# Patient Record
Sex: MALE | Race: WHITE | NOT HISPANIC OR LATINO | Employment: OTHER | URBAN - METROPOLITAN AREA
[De-identification: names, ages, dates, MRNs, and addresses within clinical notes are randomized per-mention and may not be internally consistent; named-entity substitution may affect disease eponyms.]

---

## 2017-02-08 ENCOUNTER — TELEPHONE (OUTPATIENT)
Dept: FAMILY MEDICINE | Facility: CLINIC | Age: 62
End: 2017-02-08

## 2017-02-08 DIAGNOSIS — E11.9 TYPE 2 DIABETES MELLITUS WITHOUT COMPLICATION (H): Primary | ICD-10-CM

## 2017-02-10 NOTE — TELEPHONE ENCOUNTER
"Pt now notified of the below message   And was offered apt's   And stated \" i have bigger problems right now i will call back later\"    Veena Jorge  [facility]     "

## 2017-05-19 ENCOUNTER — MYC MEDICAL ADVICE (OUTPATIENT)
Dept: FAMILY MEDICINE | Facility: CLINIC | Age: 62
End: 2017-05-19

## 2017-05-19 DIAGNOSIS — E78.5 HYPERLIPIDEMIA LDL GOAL <100: ICD-10-CM

## 2017-05-19 DIAGNOSIS — E11.9 TYPE 2 DIABETES MELLITUS WITHOUT COMPLICATION, WITHOUT LONG-TERM CURRENT USE OF INSULIN (H): Primary | ICD-10-CM

## 2017-05-19 DIAGNOSIS — I25.9 CHRONIC ISCHEMIC HEART DISEASE, UNSPECIFIED: ICD-10-CM

## 2017-05-19 DIAGNOSIS — I10 ESSENTIAL HYPERTENSION WITH GOAL BLOOD PRESSURE LESS THAN 140/90: ICD-10-CM

## 2017-05-19 NOTE — TELEPHONE ENCOUNTER
Lisinopril    Last Written Prescription Date: 08/05/16  Last Fill Quantity: 90, # refills: 3  Last Office Visit with [facility] or  Health prescribing provider: 08/05/16       Potassium   Date Value Ref Range Status   07/30/2016 5.0 3.4 - 5.3 mmol/L Final     Creatinine   Date Value Ref Range Status   07/30/2016 0.81 0.66 - 1.25 mg/dL Final     BP Readings from Last 3 Encounters:   08/05/16 115/65   01/29/16 114/64   07/24/15 136/82     ATorvastatin         Last Written Prescription Date: 08/05/16  Last Fill Quantity: 90, # refills: 3    Last Office Visit with [facility] or  Health prescribing provider:  08/05/16   Future Office Visit:      BP Readings from Last 3 Encounters:   08/05/16 115/65   01/29/16 114/64   07/24/15 136/82     Lab Results   Component Value Date    ALT 31 08/14/2013     Lab Results   Component Value Date    CHOL 152 01/29/2016     Lab Results   Component Value Date    HDL 47 01/29/2016     Lab Results   Component Value Date    LDL 84 01/29/2016     Lab Results   Component Value Date    TRIG 105 01/29/2016     Lab Results   Component Value Date    CHOLHDLRATIO 3.0 07/24/2015     Metoprolol     Last Written Prescription Date: 08/05/16  Last Fill Quantity: 90,  # refills: 3   Last Office Visit with [facility] or  Health prescribing provider: 08/05/16    Metformin         Last Written Prescription Date: 08/05/16  Last Fill Quantity: 90, # refills: 3  Last Office Visit with [facility] or  Health prescribing provider:  08/05/16        BP Readings from Last 3 Encounters:   08/05/16 115/65   01/29/16 114/64   07/24/15 136/82     Lab Results   Component Value Date    MICROL 6 07/24/2015     Lab Results   Component Value Date    UMALCR 7.50 07/24/2015     Creatinine   Date Value Ref Range Status   07/30/2016 0.81 0.66 - 1.25 mg/dL Final   ]  GFR Estimate   Date Value Ref Range Status   07/30/2016 >90  Non  GFR Calc   >60 mL/min/1.7m2 Final   01/29/2016 >90  Non  GFR Calc   >60  mL/min/1.7m2 Final   07/24/2015 >90  Non  GFR Calc   >60 mL/min/1.7m2 Final     GFR Estimate If Black   Date Value Ref Range Status   07/30/2016 >90   GFR Calc   >60 mL/min/1.7m2 Final   01/29/2016 >90   GFR Calc   >60 mL/min/1.7m2 Final   07/24/2015 >90   GFR Calc   >60 mL/min/1.7m2 Final     Lab Results   Component Value Date    CHOL 152 01/29/2016     Lab Results   Component Value Date    HDL 47 01/29/2016     Lab Results   Component Value Date    LDL 84 01/29/2016     Lab Results   Component Value Date    TRIG 105 01/29/2016     Lab Results   Component Value Date    CHOLHDLRATIO 3.0 07/24/2015     Lab Results   Component Value Date    AST 24 10/27/2012     Lab Results   Component Value Date    ALT 31 08/14/2013     Lab Results   Component Value Date    A1C 6.0 07/30/2016    A1C 6.3 01/29/2016    A1C 6.1 07/24/2015    A1C 6.2 07/26/2014    A1C 6.3 04/14/2014     Potassium   Date Value Ref Range Status   07/30/2016 5.0 3.4 - 5.3 mmol/L Final

## 2017-05-24 NOTE — TELEPHONE ENCOUNTER
Routing refill request to provider for review/approval because:  Labs not current:  lipids  Patient needs to be seen because it has been more than 6 months since last office visit for metformin      Thank you  Arlin ZEPEDA RN

## 2017-05-25 RX ORDER — LISINOPRIL 10 MG/1
TABLET ORAL
Qty: 90 TABLET | Refills: 0 | Status: SHIPPED | OUTPATIENT
Start: 2017-05-25 | End: 2017-08-04

## 2017-05-25 RX ORDER — METOPROLOL SUCCINATE 50 MG/1
TABLET, EXTENDED RELEASE ORAL
Qty: 90 TABLET | Refills: 0 | Status: SHIPPED | OUTPATIENT
Start: 2017-05-25 | End: 2017-08-04

## 2017-05-25 RX ORDER — ATORVASTATIN CALCIUM 80 MG/1
TABLET, FILM COATED ORAL
Qty: 90 TABLET | Refills: 0 | Status: SHIPPED | OUTPATIENT
Start: 2017-05-25 | End: 2017-08-04

## 2017-05-25 NOTE — TELEPHONE ENCOUNTER
Refilled for 90 days.  He will need an appointment for further refills.  Maria De Jesus Hay, CNP

## 2017-07-12 ENCOUNTER — TELEPHONE (OUTPATIENT)
Dept: FAMILY MEDICINE | Facility: CLINIC | Age: 62
End: 2017-07-12

## 2017-07-12 DIAGNOSIS — I10 HYPERTENSION GOAL BP (BLOOD PRESSURE) < 140/90: Primary | ICD-10-CM

## 2017-07-12 DIAGNOSIS — E11.9 TYPE 2 DIABETES MELLITUS WITHOUT COMPLICATION (H): ICD-10-CM

## 2017-07-12 NOTE — TELEPHONE ENCOUNTER
Due for diabetes office visit.  Labs: A1c, lipids, BMP, TSH, microalbumin.  Maria De Jesus Hay, CNP

## 2017-07-26 NOTE — TELEPHONE ENCOUNTER
Panel Management Review      Patient has the following on his problem list:     Diabetes  Please see documentation below from Maria De Jesus Hay  Patient notified and scheduled for appointment.    ASA: Passed    Last A1C  Lab Results   Component Value Date    A1C 6.0 07/30/2016    A1C 6.3 01/29/2016    A1C 6.1 07/24/2015    A1C 6.2 07/26/2014    A1C 6.3 04/14/2014     A1C tested: Passed    Last LDL:    Lab Results   Component Value Date    CHOL 152 01/29/2016     Lab Results   Component Value Date    HDL 47 01/29/2016     Lab Results   Component Value Date    LDL 84 01/29/2016     Lab Results   Component Value Date    TRIG 105 01/29/2016     Lab Results   Component Value Date    CHOLHDLRATIO 3.0 07/24/2015     Lab Results   Component Value Date    NHDL 105 01/29/2016       Is the patient on a Statin? YES             Is the patient on Aspirin? YES    Medications     HMG CoA Reductase Inhibitors    atorvastatin (LIPITOR) 80 MG tablet    Salicylates    aspirin 81 MG EC tablet          Last three blood pressure readings:  BP Readings from Last 3 Encounters:   08/05/16 115/65   01/29/16 114/64   07/24/15 136/82       Date of last diabetes office visit:      Tobacco History:     History   Smoking Status     Current Every Day Smoker     Packs/day: 1.00     Years: 40.00     Types: Cigarettes     Start date: 1/1/1972   Smokeless Tobacco     Never Used             Composite cancer screening  Chart review shows that this patient is due/due soon for the following None  Summary:    Patient is due/failing the following:   Diabetes office visit  Labs as ordered    Action needed:   Patient needs office visit .    Type of outreach:    Sent [employer] message.    Questions for provider review:    None                                                                                                                                    SHERON VERA        .

## 2017-07-29 DIAGNOSIS — E11.9 TYPE 2 DIABETES MELLITUS WITHOUT COMPLICATION (H): ICD-10-CM

## 2017-07-29 DIAGNOSIS — I10 HYPERTENSION GOAL BP (BLOOD PRESSURE) < 140/90: ICD-10-CM

## 2017-07-29 LAB
ANION GAP SERPL CALCULATED.3IONS-SCNC: 3 MMOL/L (ref 3–14)
BUN SERPL-MCNC: 17 MG/DL (ref 7–30)
CALCIUM SERPL-MCNC: 8.7 MG/DL (ref 8.5–10.1)
CHLORIDE SERPL-SCNC: 104 MMOL/L (ref 94–109)
CHOLEST SERPL-MCNC: 133 MG/DL
CO2 SERPL-SCNC: 31 MMOL/L (ref 20–32)
CREAT SERPL-MCNC: 0.88 MG/DL (ref 0.66–1.25)
CREAT UR-MCNC: 93 MG/DL
GFR SERPL CREATININE-BSD FRML MDRD: 88 ML/MIN/1.7M2
GLUCOSE SERPL-MCNC: 92 MG/DL (ref 70–99)
HBA1C MFR BLD: 6 % (ref 4.3–6)
HDLC SERPL-MCNC: 50 MG/DL
LDLC SERPL CALC-MCNC: 70 MG/DL
MICROALBUMIN UR-MCNC: 35 MG/L
MICROALBUMIN/CREAT UR: 37.74 MG/G CR (ref 0–17)
NONHDLC SERPL-MCNC: 83 MG/DL
POTASSIUM SERPL-SCNC: 4.5 MMOL/L (ref 3.4–5.3)
SODIUM SERPL-SCNC: 138 MMOL/L (ref 133–144)
TRIGL SERPL-MCNC: 64 MG/DL
TSH SERPL DL<=0.005 MIU/L-ACNC: 0.42 MU/L (ref 0.4–4)

## 2017-07-29 PROCEDURE — 83036 HEMOGLOBIN GLYCOSYLATED A1C: CPT | Performed by: NURSE PRACTITIONER

## 2017-07-29 PROCEDURE — 82043 UR ALBUMIN QUANTITATIVE: CPT | Performed by: NURSE PRACTITIONER

## 2017-07-29 PROCEDURE — 80061 LIPID PANEL: CPT | Performed by: NURSE PRACTITIONER

## 2017-07-29 PROCEDURE — 84443 ASSAY THYROID STIM HORMONE: CPT | Performed by: NURSE PRACTITIONER

## 2017-07-29 PROCEDURE — 36415 COLL VENOUS BLD VENIPUNCTURE: CPT | Performed by: NURSE PRACTITIONER

## 2017-07-29 PROCEDURE — 80048 BASIC METABOLIC PNL TOTAL CA: CPT | Performed by: NURSE PRACTITIONER

## 2017-08-04 ENCOUNTER — OFFICE VISIT (OUTPATIENT)
Dept: FAMILY MEDICINE | Facility: CLINIC | Age: 62
End: 2017-08-04
Payer: COMMERCIAL

## 2017-08-04 VITALS
SYSTOLIC BLOOD PRESSURE: 131 MMHG | BODY MASS INDEX: 38.25 KG/M2 | HEART RATE: 60 BPM | WEIGHT: 238 LBS | DIASTOLIC BLOOD PRESSURE: 78 MMHG | HEIGHT: 66 IN | TEMPERATURE: 98.4 F

## 2017-08-04 DIAGNOSIS — I73.9 PAD (PERIPHERAL ARTERY DISEASE) (H): ICD-10-CM

## 2017-08-04 DIAGNOSIS — E66.01 MORBID OBESITY DUE TO EXCESS CALORIES (H): ICD-10-CM

## 2017-08-04 DIAGNOSIS — E78.5 HYPERLIPIDEMIA LDL GOAL <100: ICD-10-CM

## 2017-08-04 DIAGNOSIS — I10 ESSENTIAL HYPERTENSION WITH GOAL BLOOD PRESSURE LESS THAN 140/90: ICD-10-CM

## 2017-08-04 DIAGNOSIS — E11.9 TYPE 2 DIABETES MELLITUS WITHOUT COMPLICATION, WITHOUT LONG-TERM CURRENT USE OF INSULIN (H): Primary | ICD-10-CM

## 2017-08-04 DIAGNOSIS — I25.10 CORONARY ARTERY DISEASE INVOLVING NATIVE CORONARY ARTERY OF NATIVE HEART WITHOUT ANGINA PECTORIS: ICD-10-CM

## 2017-08-04 PROCEDURE — 99214 OFFICE O/P EST MOD 30 MIN: CPT | Performed by: NURSE PRACTITIONER

## 2017-08-04 RX ORDER — METOPROLOL SUCCINATE 50 MG/1
TABLET, EXTENDED RELEASE ORAL
Qty: 90 TABLET | Refills: 3 | Status: SHIPPED | OUTPATIENT
Start: 2017-08-04 | End: 2018-06-15

## 2017-08-04 RX ORDER — ATORVASTATIN CALCIUM 80 MG/1
TABLET, FILM COATED ORAL
Qty: 90 TABLET | Refills: 3 | Status: SHIPPED | OUTPATIENT
Start: 2017-08-04 | End: 2018-08-17

## 2017-08-04 RX ORDER — LISINOPRIL 10 MG/1
TABLET ORAL
Qty: 90 TABLET | Refills: 3 | Status: SHIPPED | OUTPATIENT
Start: 2017-08-04 | End: 2018-08-17

## 2017-08-04 NOTE — PROGRESS NOTES
"  SUBJECTIVE:                                                    Roger Bonilla is a 61 year old male who presents to clinic today for the following health issues:      Diabetes Follow-up      Patient is checking blood sugars: not at all    Diabetic concerns: None     Symptoms of hypoglycemia (low blood sugar): none     Paresthesias (numbness or burning in feet) or sores: No     Date of last diabetic eye exam: 2013    Hyperlipidemia Follow-Up      Rate your low fat/cholesterol diet?: not monitoring fat    Taking statin?  Yes, no muscle aches from statin    Other lipid medications/supplements?:  none    Hypertension Follow-up      Outpatient blood pressures are not being checked.    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  Additional history: as documented      Reviewed and updated as needed this visit by clinical staffTobacco  Allergies  Meds       Reviewed and updated as needed this visit by Provider         ROS:  Constitutional, HEENT, cardiovascular, pulmonary, gi and gu systems are negative, except as otherwise noted.      OBJECTIVE:   /78  Pulse 60  Temp 98.4  F (36.9  C) (Oral)  Ht 5' 6\" (1.676 m)  Wt 238 lb (108 kg)  BMI 38.41 kg/m2  Body mass index is 38.41 kg/(m^2).  GENERAL: healthy, alert and no distress  HENT: ear canals and TM's normal, nose and mouth without ulcers or lesions  NECK: no adenopathy, no " asymmetry, masses, or scars and thyroid normal to palpation  RESP: lungs clear to auscultation - no rales, rhonchi or wheezes  CV: regular rate and rhythm, normal S1 S2, no S3 or S4, no murmur, click or rub, no peripheral edema and peripheral pulses strong  ABDOMEN: soft, nontender, no hepatosplenomegaly, no masses and bowel sounds normal  MS: no gross musculoskeletal defects noted, no edema  Diabetic foot exam: normal DP and PT pulses, no trophic changes or ulcerative lesions and normal monofilament exam    Diagnostic Test Results:  Results for orders placed or performed in visit on 07/29/17   Hemoglobin A1c   Result Value Ref Range    Hemoglobin A1C 6.0 4.3 - 6.0 %   Lipid panel reflex to direct LDL   Result Value Ref Range    Cholesterol 133 <200 mg/dL    Triglycerides 64 <150 mg/dL    HDL Cholesterol 50 >39 mg/dL    LDL Cholesterol Calculated 70 <100 mg/dL    Non HDL Cholesterol 83 <130 mg/dL   Basic metabolic panel   Result Value Ref Range    Sodium 138 133 - 144 mmol/L    Potassium 4.5 3.4 - 5.3 mmol/L    Chloride 104 94 - 109 mmol/L    Carbon Dioxide 31 20 - 32 mmol/L    Anion Gap 3 3 - 14 mmol/L    Glucose 92 70 - 99 mg/dL    Urea Nitrogen 17 7 - 30 mg/dL    Creatinine 0.88 0.66 - 1.25 mg/dL    GFR Estimate 88 >60 mL/min/1.7m2    GFR Estimate If Black >90   GFR Calc   >60 mL/min/1.7m2    Calcium 8.7 8.5 - 10.1 mg/dL   TSH with free T4 reflex   Result Value Ref Range    TSH 0.42 0.40 - 4.00 mU/L   Albumin Random Urine Quantitative   Result Value Ref Range    Creatinine Urine 93 mg/dL    Albumin Urine mg/L 35 mg/L    Albumin Urine mg/g Cr 37.74 (H) 0 - 17 mg/g Cr       ASSESSMENT/PLAN:       ICD-10-CM    1. Type 2 diabetes mellitus without complication, without long-term current use of insulin (H) E11.9 Well controlled.  Follow up in 6 months.  Recommended smoking cessation.  metFORMIN (GLUCOPHAGE) 1000 MG tablet     2. Essential hypertension with goal blood pressure less than 140/90 I10 Well  controlled.  lisinopril (PRINIVIL/ZESTRIL) 10 MG tablet     metoprolol (TOPROL-XL) 50 MG 24 hr tablet     3. Hyperlipidemia LDL goal <100 E78.5 Well controlled.  atorvastatin (LIPITOR) 80 MG tablet     4. Coronary artery disease involving native coronary artery of native heart without angina pectoris I25.10 Asymptomatic.  Hasn't seen a cardiologist in years.  Last stress test was 2011.  Recommended cardiology follow up - he declined.  Recommended stress test - he declined.    metoprolol (TOPROL-XL) 50 MG 24 hr tablet     5. PAD - no complaints today. Doesn't want any follow up.  6. Morbid obesity - advised weight loss.    The risks, benefits and treatment options of prescribed medications or other treatments have been discussed with the patient. The patient verbalized their understanding and should call or follow up if no improvement or if they develop further problems.    SAL [facility]

## 2017-08-04 NOTE — MR AVS SNAPSHOT
After Visit Summary   8/4/2017    Roger Bonilla    MRN: [id number]           Patient Information     Date Of Birth          1955        Visit Information        Provider Department      8/4/2017 9:00 AM Maria De Jesus Hay APRN CNP [facility]        Today's Diagnoses     Type 2 diabetes mellitus without complication, without long-term current use of insulin (H)    -  1    Essential hypertension with goal blood pressure less than 140/90        Hyperlipidemia LDL goal <100        Coronary artery disease involving native coronary artery of native heart without angina pectoris        PAD (peripheral artery disease) (H)        Morbid obesity due to excess calories (H)           Follow-ups after your visit        Who to contact     If you have questions or need follow up information about today's clinic visit or your schedule please contact [facility] directly at [phone].  Normal or non-critical lab and imaging results will be communicated to you by [employer], letter or phone within 4 business days after the clinic has received the results. If you do not hear from us within 7 days, please contact the clinic through [employer] or phone. If you have a critical or abnormal lab result, we will notify you by phone as soon as possible.  Submit refill requests through [employer] or call your pharmacy and they will forward the refill request to us. Please allow 3 business days for your refill to be completed.          Additional Information About Your Visit        MyChart Information     [employer] gives you secure access to your electronic health record. If you see a primary care provider, you can also send messages to your care team and make appointments. If you have questions, please call your primary care clinic.  If you do not have a primary care provider, please call [phone] and they will assist you.        Care EveryWhere ID     This is your Care EveryWhere ID. This  "could be used by other organizations to access your [facility] medical records  [id number]        Your Vitals Were     Pulse Temperature Height BMI (Body Mass Index)          60 98.4  F (36.9  C) (Oral) 5' 6\" (1.676 m) 38.41 kg/m2         Blood Pressure from Last 3 Encounters:   08/04/17 131/78   08/05/16 115/65   01/29/16 114/64    Weight from Last 3 Encounters:   08/04/17 238 lb (108 kg)   08/05/16 246 lb (111.6 kg)   01/29/16 243 lb 11.2 oz (110.5 kg)              Today, you had the following     No orders found for display         Today's Medication Changes          These changes are accurate as of: 8/4/17  9:32 AM.  If you have any questions, ask your nurse or doctor.               These medicines have changed or have updated prescriptions.        Dose/Directions    atorvastatin 80 MG tablet   Commonly known as:  LIPITOR   This may have changed:  See the new instructions.   Used for:  Hyperlipidemia LDL goal <100   Changed by:  Maria De Jesus Hay APRN CNP        TAKE 1 TABLET (80 MG) BY MOUTH DAILY   Quantity:  90 tablet   Refills:  3       lisinopril 10 MG tablet   Commonly known as:  PRINIVIL/ZESTRIL   This may have changed:  See the new instructions.   Used for:  Essential hypertension with goal blood pressure less than 140/90   Changed by:  Maria De Jesus Hay APRN CNP        TAKE 1 TABLET (10 MG) BY MOUTH DAILY   Quantity:  90 tablet   Refills:  3       metFORMIN 1000 MG tablet   Commonly known as:  GLUCOPHAGE   This may have changed:  See the new instructions.   Used for:  Type 2 diabetes mellitus without complication, without long-term current use of insulin (H)   Changed by:  Maria De Jesus Hay APRN CNP        TAKE ONE TABLET BY MOUTH ONE TIME DAILY   Quantity:  90 tablet   Refills:  3       metoprolol 50 MG 24 hr tablet   Commonly known as:  TOPROL-XL   This may have changed:  See the new instructions.   Used for:  Essential hypertension with goal blood pressure less " than 140/90, Coronary artery disease involving native coronary artery of native heart without angina pectoris   Changed by:  Maria De Jesus Hay APRN CNP        TAKE 1 TABLET (50 MG) BY MOUTH DAILY   Quantity:  90 tablet   Refills:  3            Where to get your medicines      These medications were sent to [facility] [id number] [place] [address]. [place]  [address] [place] [address]     Phone:  [phone]     atorvastatin 80 MG tablet    lisinopril 10 MG tablet    metFORMIN 1000 MG tablet    metoprolol 50 MG 24 hr tablet                Primary Care Provider Office Phone # Fax #    SAL Jackson [phone]       [facility] [address] [facility] [address]        Equal Access to Services     CALOS SALAS : Hadii maxine cardoso hadasho Soomaali, waaxda luqadaha, qaybta kaalmada adeegyada, milton chandler . So [facility] [phone].    ATENCIÓN: Si habla español, tiene a canales disposición servicios gratuitos de asistencia lingüística. Llame al [phone].    We comply with applicable federal civil rights laws and [place] laws. We do not discriminate on the basis of race, color, national origin, age, disability sex, sexual orientation or gender identity.            Thank you!     Thank you for choosing [facility]  for your care. Our goal is always to provide you with excellent care. Hearing back from our patients is one way we can continue to improve our services. Please take a few minutes to complete the written survey that you may receive in the mail after your visit with us. Thank you!             Your Updated Medication List - Protect others around you: Learn how to safely use, store and throw away your medicines at www.disposemymeds.org.          This list is accurate as of: 8/4/17  9:32 AM.  Always use your most recent med list.                   Brand Name Dispense Instructions for use Diagnosis     aspirin 81 MG EC tablet     90 tablet    Take 1 tablet (81 mg) by mouth daily    PAD (peripheral artery disease) (H), Type 2 diabetes, HbA1c goal < 7% (H)       atorvastatin 80 MG tablet    LIPITOR    90 tablet    TAKE 1 TABLET (80 MG) BY MOUTH DAILY    Hyperlipidemia LDL goal <100       lisinopril 10 MG tablet    PRINIVIL/ZESTRIL    90 tablet    TAKE 1 TABLET (10 MG) BY MOUTH DAILY    Essential hypertension with goal blood pressure less than 140/90       metFORMIN 1000 MG tablet    GLUCOPHAGE    90 tablet    TAKE ONE TABLET BY MOUTH ONE TIME DAILY    Type 2 diabetes mellitus without complication, without long-term current use of insulin (H)       metoprolol 50 MG 24 hr tablet    TOPROL-XL    90 tablet    TAKE 1 TABLET (50 MG) BY MOUTH DAILY    Essential hypertension with goal blood pressure less than 140/90, Coronary artery disease involving native coronary artery of native heart without angina pectoris

## 2018-02-14 ENCOUNTER — TELEPHONE (OUTPATIENT)
Dept: FAMILY MEDICINE | Facility: CLINIC | Age: 63
End: 2018-02-14

## 2018-02-14 DIAGNOSIS — E11.9 TYPE 2 DIABETES MELLITUS WITHOUT COMPLICATION, WITHOUT LONG-TERM CURRENT USE OF INSULIN (H): Primary | ICD-10-CM

## 2018-02-14 DIAGNOSIS — Z11.59 NEED FOR HEPATITIS C SCREENING TEST: ICD-10-CM

## 2018-02-14 NOTE — TELEPHONE ENCOUNTER
Patient is due for a diabetes follow up appointment with me.      Non-fasting labs due:  A1C    Also due for hep C screening    Orders were placed, please have lab work done before visit.      Please ask patient to bring in their glucometer so we can download the data.    Please call patient to schedule.  Maria De Jesus Hay, CNP

## 2018-02-14 NOTE — LETTER
February 27, 2018      Roger Bonilla  [address]    [facility] [address]        Dear Roger,     In order to ensure we are providing the best quality care, we have reviewed your chart and see that you are due for:  A diabetic office visit with non fasting labs.  Please have the labs done prior to the office visit.  Bring your glucose meter to your visit so we can download the information to your chart.    Please call the clinic at your earliest convenience to schedule a lab and clinic appointment.  [phone]    Thank you for trusting us with your health care.  Sincerely,    Your [facility] Team/lw

## 2018-02-27 NOTE — TELEPHONE ENCOUNTER
Panel Management Review      Patient has the following on his problem list:     Diabetes    ASA:     Last A1C  Lab Results   Component Value Date    A1C 6.0 07/29/2017    A1C 6.0 07/30/2016    A1C 6.3 01/29/2016    A1C 6.1 07/24/2015    A1C 6.2 07/26/2014     A1C tested: Passed    Last LDL:    Lab Results   Component Value Date    CHOL 133 07/29/2017     Lab Results   Component Value Date    HDL 50 07/29/2017     Lab Results   Component Value Date    LDL 70 07/29/2017     Lab Results   Component Value Date    TRIG 64 07/29/2017     Lab Results   Component Value Date    CHOLHDLRATIO 3.0 07/24/2015     Lab Results   Component Value Date    NHDL 83 07/29/2017       Is the patient on a Statin? YES             Is the patient on Aspirin? YES    Medications     HMG CoA Reductase Inhibitors    atorvastatin (LIPITOR) 80 MG tablet    Salicylates    aspirin 81 MG EC tablet          Last three blood pressure readings:  BP Readings from Last 3 Encounters:   08/04/17 131/78   08/05/16 115/65   01/29/16 114/64       Date of last diabetes office visit: 8/4/17     Tobacco History:     History   Smoking Status     Current Every Day Smoker     Packs/day: 1.00     Years: 40.00     Types: Cigarettes     Start date: 1/1/1972   Smokeless Tobacco     Never Used           Composite cancer screening  Chart review shows that this patient is due/due soon for the following None  Summary:    Patient is due/failing the following:   Diabetic office visit and labs    Action needed:   Patient needs office visit for diabetic check.    Type of outreach:    No response to phone calls, letter with recommendations mailed to patient.    Questions for provider review:    None                                                                                                                                    BENJAMIN Donis, SHERON

## 2018-04-02 DIAGNOSIS — E78.5 HYPERLIPIDEMIA LDL GOAL <100: ICD-10-CM

## 2018-04-02 DIAGNOSIS — I10 ESSENTIAL HYPERTENSION WITH GOAL BLOOD PRESSURE LESS THAN 140/90: ICD-10-CM

## 2018-04-02 DIAGNOSIS — E11.9 TYPE 2 DIABETES MELLITUS WITHOUT COMPLICATION, WITHOUT LONG-TERM CURRENT USE OF INSULIN (H): ICD-10-CM

## 2018-04-02 RX ORDER — LISINOPRIL 10 MG/1
TABLET ORAL
Qty: 90 TABLET | Refills: 3 | Status: CANCELLED | OUTPATIENT
Start: 2018-04-02

## 2018-04-02 RX ORDER — ATORVASTATIN CALCIUM 80 MG/1
TABLET, FILM COATED ORAL
Qty: 90 TABLET | Refills: 3 | Status: CANCELLED | OUTPATIENT
Start: 2018-04-02

## 2018-04-02 NOTE — TELEPHONE ENCOUNTER
Left message for patient to return call to clinic.    Patient has refills available on all medications requested, please have him check his pharmacy  Patient is due to follow up in clinic for diabets    Natasha SOTO Rn

## 2018-05-09 ENCOUNTER — TELEPHONE (OUTPATIENT)
Dept: FAMILY MEDICINE | Facility: CLINIC | Age: 63
End: 2018-05-09

## 2018-05-09 NOTE — TELEPHONE ENCOUNTER
Left message on patient's answering machine to schedule lab and office visit for diabetes recheck.  Please help patient schedule these two appointments and remind him to bring his glucometer in with him.  Thank you!

## 2018-05-09 NOTE — TELEPHONE ENCOUNTER
Patient is due for a diabetes follow up appointment with me.      Non-fasting labs due:  A1C    Also due for hepatitis C screening.    Orders were placed, please have lab work done before visit.      Please ask patient to bring in their glucometer so we can download the data.    Please call patient to schedule.  Maria De Jesus Hay, CNP

## 2018-05-16 NOTE — TELEPHONE ENCOUNTER
Panel Management Review      Patient has the following on his problem list:     Diabetes    ASA: Passed    Last A1C  Lab Results   Component Value Date    A1C 6.0 07/29/2017    A1C 6.0 07/30/2016    A1C 6.3 01/29/2016    A1C 6.1 07/24/2015    A1C 6.2 07/26/2014     A1C tested: FAILED    Last LDL:    Lab Results   Component Value Date    CHOL 133 07/29/2017     Lab Results   Component Value Date    HDL 50 07/29/2017     Lab Results   Component Value Date    LDL 70 07/29/2017     Lab Results   Component Value Date    TRIG 64 07/29/2017     Lab Results   Component Value Date    CHOLHDLRATIO 3.0 07/24/2015     Lab Results   Component Value Date    NHDL 83 07/29/2017       Is the patient on a Statin? YES             Is the patient on Aspirin? YES    Medications     HMG CoA Reductase Inhibitors    atorvastatin (LIPITOR) 80 MG tablet    Salicylates    aspirin 81 MG EC tablet          Last three blood pressure readings:  BP Readings from Last 3 Encounters:   08/04/17 131/78   08/05/16 115/65   01/29/16 114/64       Date of last diabetes office visit: 8/4/17     Tobacco History:     History   Smoking Status     Current Every Day Smoker     Packs/day: 1.00     Years: 40.00     Types: Cigarettes     Start date: 1/1/1972   Smokeless Tobacco     Never Used           Composite cancer screening  Chart review shows that this patient is due/due soon for the following None  Summary:    Patient is due/failing the following:   Hep C and A1C followed by a office visit to followup on Diabetes    Action needed:   Patient needs office visit for Diabetes. and Patient needs non-fasting lab only appointment    Type of outreach:    Sent [employer] message. will postpone a few days to see if he has viewed    Questions for provider review:    Miranda SOTO CMA

## 2018-05-25 ENCOUNTER — MYC MEDICAL ADVICE (OUTPATIENT)
Dept: FAMILY MEDICINE | Facility: CLINIC | Age: 63
End: 2018-05-25

## 2018-06-15 DIAGNOSIS — I25.10 CORONARY ARTERY DISEASE INVOLVING NATIVE CORONARY ARTERY OF NATIVE HEART WITHOUT ANGINA PECTORIS: ICD-10-CM

## 2018-06-15 DIAGNOSIS — I10 ESSENTIAL HYPERTENSION WITH GOAL BLOOD PRESSURE LESS THAN 140/90: ICD-10-CM

## 2018-06-15 NOTE — TELEPHONE ENCOUNTER
"Requested Prescriptions   Pending Prescriptions Disp Refills     metoprolol succinate (TOPROL-XL) 50 MG 24 hr tablet  Last Written Prescription Date:  08/04/17  Last Fill Quantity: 90,  # refills: 3   Last office visit: 8/4/2017 with prescribing provider:  08/04/17   Future Office Visit:     90 tablet 3     Sig: TAKE 1 TABLET (50 MG) BY MOUTH DAILY    Beta-Blockers Protocol Passed    6/15/2018  1:17 PM       Passed - Blood pressure under 140/90 in past 12 months    BP Readings from Last 3 Encounters:   08/04/17 131/78   08/05/16 115/65   01/29/16 114/64          Passed - Patient is age 6 or older       Passed - Recent (12 mo) or future (30 days) visit within the authorizing provider's specialty    Patient had office visit in the last 12 months or has a visit in the next 30 days with authorizing provider or within the authorizing provider's specialty.  See \"Patient Info\" tab in inbasket, or \"Choose Columns\" in Meds & Orders section of the refill encounter.              "

## 2018-06-19 RX ORDER — METOPROLOL SUCCINATE 50 MG/1
TABLET, EXTENDED RELEASE ORAL
Qty: 90 TABLET | Refills: 0 | Status: SHIPPED | OUTPATIENT
Start: 2018-06-19 | End: 2018-08-17

## 2018-06-19 NOTE — TELEPHONE ENCOUNTER
Prescription approved per [facility] Refill Protocol.  Pt due for labs and OV end of July/early Aug.    Raven SERRANO RN

## 2018-07-27 DIAGNOSIS — Z11.59 NEED FOR HEPATITIS C SCREENING TEST: ICD-10-CM

## 2018-07-27 DIAGNOSIS — E11.9 TYPE 2 DIABETES MELLITUS WITHOUT COMPLICATION, WITHOUT LONG-TERM CURRENT USE OF INSULIN (H): ICD-10-CM

## 2018-07-27 LAB — HBA1C MFR BLD: 6 % (ref 0–5.6)

## 2018-07-27 PROCEDURE — 83036 HEMOGLOBIN GLYCOSYLATED A1C: CPT | Performed by: NURSE PRACTITIONER

## 2018-07-27 PROCEDURE — 87522 HEPATITIS C REVRS TRNSCRPJ: CPT | Performed by: NURSE PRACTITIONER

## 2018-07-27 PROCEDURE — 36415 COLL VENOUS BLD VENIPUNCTURE: CPT | Performed by: NURSE PRACTITIONER

## 2018-07-27 PROCEDURE — 86803 HEPATITIS C AB TEST: CPT | Performed by: NURSE PRACTITIONER

## 2018-07-29 LAB — HCV AB SERPL QL IA: REACTIVE

## 2018-07-30 LAB
HCV RNA SERPL NAA+PROBE-ACNC: NORMAL [IU]/ML
HCV RNA SERPL NAA+PROBE-LOG IU: NORMAL LOG IU/ML

## 2018-08-17 ENCOUNTER — OFFICE VISIT (OUTPATIENT)
Dept: FAMILY MEDICINE | Facility: CLINIC | Age: 63
End: 2018-08-17
Payer: COMMERCIAL

## 2018-08-17 VITALS
TEMPERATURE: 98.4 F | WEIGHT: 258 LBS | SYSTOLIC BLOOD PRESSURE: 130 MMHG | DIASTOLIC BLOOD PRESSURE: 80 MMHG | BODY MASS INDEX: 42.99 KG/M2 | OXYGEN SATURATION: 95 % | HEART RATE: 68 BPM | HEIGHT: 65 IN

## 2018-08-17 DIAGNOSIS — I10 ESSENTIAL HYPERTENSION WITH GOAL BLOOD PRESSURE LESS THAN 140/90: ICD-10-CM

## 2018-08-17 DIAGNOSIS — E66.01 MORBID OBESITY (H): ICD-10-CM

## 2018-08-17 DIAGNOSIS — B07.8 COMMON WART: ICD-10-CM

## 2018-08-17 DIAGNOSIS — I73.9 PAD (PERIPHERAL ARTERY DISEASE) (H): ICD-10-CM

## 2018-08-17 DIAGNOSIS — I25.10 CORONARY ARTERY DISEASE INVOLVING NATIVE CORONARY ARTERY OF NATIVE HEART WITHOUT ANGINA PECTORIS: ICD-10-CM

## 2018-08-17 DIAGNOSIS — E78.5 HYPERLIPIDEMIA LDL GOAL <100: ICD-10-CM

## 2018-08-17 DIAGNOSIS — E11.9 TYPE 2 DIABETES MELLITUS WITHOUT COMPLICATION, WITHOUT LONG-TERM CURRENT USE OF INSULIN (H): Primary | ICD-10-CM

## 2018-08-17 DIAGNOSIS — Z72.0 TOBACCO ABUSE: ICD-10-CM

## 2018-08-17 LAB
ANION GAP SERPL CALCULATED.3IONS-SCNC: 4 MMOL/L (ref 3–14)
BUN SERPL-MCNC: 15 MG/DL (ref 7–30)
CALCIUM SERPL-MCNC: 8.9 MG/DL (ref 8.5–10.1)
CHLORIDE SERPL-SCNC: 102 MMOL/L (ref 94–109)
CHOLEST SERPL-MCNC: 142 MG/DL
CO2 SERPL-SCNC: 29 MMOL/L (ref 20–32)
CREAT SERPL-MCNC: 0.83 MG/DL (ref 0.66–1.25)
CREAT UR-MCNC: 59 MG/DL
GFR SERPL CREATININE-BSD FRML MDRD: >90 ML/MIN/1.7M2
GLUCOSE SERPL-MCNC: 100 MG/DL (ref 70–99)
HDLC SERPL-MCNC: 42 MG/DL
LDLC SERPL CALC-MCNC: 77 MG/DL
MICROALBUMIN UR-MCNC: 24 MG/L
MICROALBUMIN/CREAT UR: 40.98 MG/G CR (ref 0–17)
NONHDLC SERPL-MCNC: 100 MG/DL
POTASSIUM SERPL-SCNC: 5 MMOL/L (ref 3.4–5.3)
SODIUM SERPL-SCNC: 135 MMOL/L (ref 133–144)
TRIGL SERPL-MCNC: 116 MG/DL

## 2018-08-17 PROCEDURE — 17110 DESTRUCTION B9 LES UP TO 14: CPT | Performed by: NURSE PRACTITIONER

## 2018-08-17 PROCEDURE — 80061 LIPID PANEL: CPT | Performed by: NURSE PRACTITIONER

## 2018-08-17 PROCEDURE — 36415 COLL VENOUS BLD VENIPUNCTURE: CPT | Performed by: NURSE PRACTITIONER

## 2018-08-17 PROCEDURE — 99214 OFFICE O/P EST MOD 30 MIN: CPT | Mod: 25 | Performed by: NURSE PRACTITIONER

## 2018-08-17 PROCEDURE — 82043 UR ALBUMIN QUANTITATIVE: CPT | Performed by: NURSE PRACTITIONER

## 2018-08-17 PROCEDURE — 80048 BASIC METABOLIC PNL TOTAL CA: CPT | Performed by: NURSE PRACTITIONER

## 2018-08-17 RX ORDER — METOPROLOL SUCCINATE 50 MG/1
TABLET, EXTENDED RELEASE ORAL
Qty: 90 TABLET | Refills: 3 | Status: SHIPPED | OUTPATIENT
Start: 2018-08-17 | End: 2019-06-11

## 2018-08-17 RX ORDER — LISINOPRIL 10 MG/1
TABLET ORAL
Qty: 90 TABLET | Refills: 3 | Status: SHIPPED | OUTPATIENT
Start: 2018-08-17 | End: 2019-06-11

## 2018-08-17 RX ORDER — ATORVASTATIN CALCIUM 80 MG/1
TABLET, FILM COATED ORAL
Qty: 90 TABLET | Refills: 3 | Status: SHIPPED | OUTPATIENT
Start: 2018-08-17 | End: 2019-06-11

## 2018-08-17 NOTE — LETTER
August 21, 2018      Roger Bonilla  P O    [facility] [place] [address]        Dear ,    We are writing to inform you of your test results.    Cholesterol is very good.  Electrolytes are normal.  Your urine albumin was mildly elevated - this test monitors for protein in your urine which is the first sign of early kidney damage from your diabetes and hypertension. Your blood test for kidney function is still normal.  It will continue to be important to keep your diabetes and blood pressure well controlled to prevent further kidney damage.  It also is important that you quit smoking.  Let me know if you have questions.    Resulted Orders   Lipid panel reflex to direct LDL Fasting   Result Value Ref Range    Cholesterol 142 <200 mg/dL    Triglycerides 116 <150 mg/dL    HDL Cholesterol 42 >39 mg/dL    LDL Cholesterol Calculated 77 <100 mg/dL      Comment:      Desirable:       <100 mg/dl    Non HDL Cholesterol 100 <130 mg/dL   Basic metabolic panel   Result Value Ref Range    Sodium 135 133 - 144 mmol/L    Potassium 5.0 3.4 - 5.3 mmol/L      Comment:      Specimen slightly hemolyzed, potassium may be falsely elevated    Chloride 102 94 - 109 mmol/L    Carbon Dioxide 29 20 - 32 mmol/L    Anion Gap 4 3 - 14 mmol/L    Glucose 100 (H) 70 - 99 mg/dL    Urea Nitrogen 15 7 - 30 mg/dL    Creatinine 0.83 0.66 - 1.25 mg/dL    GFR Estimate >90 >60 mL/min/1.7m2      Comment:      Non  GFR Calc    GFR Estimate If Black >90 >60 mL/min/1.7m2      Comment:       GFR Calc    Calcium 8.9 8.5 - 10.1 mg/dL   Albumin Random Urine Quantitative with Creat Ratio   Result Value Ref Range    Creatinine Urine 59 mg/dL    Albumin Urine mg/L 24 mg/L    Albumin Urine mg/g Cr 40.98 (H) 0 - 17 mg/g Cr       If you have any questions or concerns, please call the clinic at the number listed above.       Sincerely,        SAL Reid CNP

## 2018-08-17 NOTE — PROGRESS NOTES
"  SUBJECTIVE:   Roger Bonilla is a 62 year old male who presents to clinic today for the following health issues:      Diabetes Follow-up      Patient is checking blood sugars: not at all    Diabetic concerns: None     Symptoms of hypoglycemia (low blood sugar): none     Paresthesias (numbness or burning in feet) or sores: Yes  Numbness; no sores     Date of last diabetic eye exam: 2012      Hyperlipidemia Follow-Up      Rate your low fat/cholesterol diet?: poor    Taking statin?  Yes, muscle aches, possibly from other cause    Other lipid medications/supplements?:  none    Hypertension Follow-up      Outpatient blood pressures are not being checked.    Low Salt Diet: no added salt    BP Readings from Last 2 Encounters:   08/17/18 130/80   08/04/17 131/78     Hemoglobin A1C (%)   Date Value   07/27/2018 6.0 (H)   07/29/2017 6.0     LDL Cholesterol Calculated (mg/dL)   Date Value   07/29/2017 70   01/29/2016 84       Vascular Disease Follow-up:  Coronary Artery Disease (CAD) and PAD      Chest pain or pressure, left side neck or arm pain: No    Shortness of breath/increased sweats/nausea with exertion: Yes  When its humid outside-     Pain in calves walking 1-2 blocks: Yes \"sore\"    Worsened or new symptoms since last visit: No    Nitroglycerin use: no    Daily aspirin use: Yes      Amount of exercise or physical activity: job- 4 days per week-     Problems taking medications regularly: No    Medication side effects: none    Diet: regular (no restrictions)      Smoker - thinking about quitting.  Doesn't want any medication to help  Will do on own when ready.      Left thumb wart - asking to have frozen off today.      Problem list and histories reviewed & adjusted, as indicated.  Additional history: as documented      Reviewed and updated as needed this visit by clinical staff  Tobacco  Allergies  Meds  Med Hx  Surg Hx  Fam Hx  Soc Hx      Reviewed and updated as needed this visit by " "Provider         ROS:  Constitutional, HEENT, cardiovascular, pulmonary, gi and gu systems are negative, except as otherwise noted.    OBJECTIVE:     /80 (BP Location: Right arm)  Pulse 68  Temp 98.4  F (36.9  C) (Tympanic)  Ht 5' 5.25\" (1.657 m)  Wt 258 lb (117 kg)  SpO2 95%  BMI 42.61 kg/m2  Body mass index is 42.61 kg/(m^2).  GENERAL: healthy, alert and no distress  HENT: ear canals and TM's normal, nose and mouth without ulcers or lesions  NECK: no adenopathy, no asymmetry, masses, or scars and thyroid normal to palpation  RESP: lungs clear to auscultation - no rales, rhonchi or wheezes  CV: regular rate and rhythm, normal S1 S2, no S3 or S4, no murmur, click or rub, no peripheral edema and peripheral pulses strong  ABDOMEN: soft, nontender, no hepatosplenomegaly, no masses and bowel sounds normal  MS: no gross musculoskeletal defects noted, no edema  Diabetic foot exam: normal DP and PT pulses, no trophic changes or ulcerative lesions, normal sensory exam and normal monofilament exam    Diagnostic Test Results:  Results for orders placed or performed in visit on 07/27/18   **A1C FUTURE anytime   Result Value Ref Range    Hemoglobin A1C 6.0 (H) 0 - 5.6 %   **Hepatitis C Screen Reflex to RNA FUTURE anytime   Result Value Ref Range    Hepatitis C Antibody Reactive (AA) NR^Nonreactive   Hepatitis C RNA Quantitative   Result Value Ref Range    HCV RNA Quant IU/ml HCV RNA Not Detected HCVND^HCV RNA Not Detected [IU]/mL    Log of HCV RNA Qt Not Calculated <1.2 Log IU/mL       SKIN: raised common wart left thumb:  After a complete discussion of the multiple treatment options for warts including the risks and bennefits of each, the patient has decided on treatment with Liquid nitrogen.  Paring was not performed on each wart.  Each wart was frozen easily three times with liquid nitrogen. A total of 1 warts are treated today.  The etiology of common warts were discussed.  Warm soapy water soaks and sanding " also recommended.  The patient is to return every two weeks until all warts have resolved.    ASSESSMENT/PLAN:       ICD-10-CM    1. Type 2 diabetes mellitus without complication, without long-term current use of insulin (H) E11.9 Well controlled.  Follow up in 6 months.  metFORMIN (GLUCOPHAGE) 1000 MG tablet     Lipid panel reflex to direct LDL Fasting     Basic metabolic panel     Albumin Random Urine Quantitative with Creat Ratio     2. Coronary artery disease involving native coronary artery of native heart without angina pectoris I25.10 Asymptomatic currently.  Patient declines cardiology follow up at this time.  metoprolol succinate (TOPROL-XL) 50 MG 24 hr tablet     3. PAD (peripheral artery disease) (H) I73.9 Patient denies symptoms.     4. Morbid obesity (H) E66.01 Encouraged weight loss     5. Hyperlipidemia LDL goal <100 E78.5 Lipid panel today.  atorvastatin (LIPITOR) 80 MG tablet     6. Essential hypertension with goal blood pressure less than 140/90 I10 Well controlled.  lisinopril (PRINIVIL/ZESTRIL) 10 MG tablet     metoprolol succinate (TOPROL-XL) 50 MG 24 hr tablet     7. Tobacco abuse Z72.0 Encouraged cessation - he is thinking about it.     8. Common wart B07.8 DESTRUCT BENIGN LESION, UP TO 14         The risks, benefits and treatment options of prescribed medications or other treatments have been discussed with the patient. The patient verbalized their understanding and should call or follow up if no improvement or if they develop further problems.    SAL Reid [facility]

## 2018-08-17 NOTE — PATIENT INSTRUCTIONS
Thank you for choosing [facility].  You may be receiving a survey in the mail from [employer] Magall[employer] regarding your visit today.  Please take a few minutes to complete and return the survey to let us know how we are doing.      If you have questions or concerns, please contact us via [employer] or you can contact your care team at [phone].    Our Clinic hours are:  Monday 6:40 am  to 7:00 pm  Tuesday -Friday 6:40 am to 5:00 pm    The [place] outpatient lab hours are:  Monday - Friday 6:10 am to 4:45 pm  Saturdays 7:00 am to 11:00 am  Appointments are required, call [phone]    If you have clinical questions after hours or would like to schedule an appointment,  call the clinic at [phone].

## 2018-08-17 NOTE — MR AVS SNAPSHOT
After Visit Summary   8/17/2018    Roger Bonilla    MRN: [id number]           Patient Information     Date Of Birth          1955        Visit Information        Provider Department      8/17/2018 10:00 AM Maria De Jesus Hay APRN CNP [facility]        Today's Diagnoses     Type 2 diabetes mellitus without complication, without long-term current use of insulin (H)    -  1    Coronary artery disease involving native coronary artery of native heart without angina pectoris        PAD (peripheral artery disease) (H)        Morbid obesity (H)        Hyperlipidemia LDL goal <100        Essential hypertension with goal blood pressure less than 140/90        Tobacco abuse        Common wart          Care Instructions          Thank you for choosing [facility]  You may be receiving a survey in the mail from [employer] regarding your visit today.  Please take a few minutes to complete and return the survey to let us know how we are doing.      If you have questions or concerns, please contact us via [employer] or you can contact your care team at [phone].    Our Clinic hours are:  Monday 6:40 am  to 7:00 pm  Tuesday -Friday 6:40 am to 5:00 pm    The [place] outpatient lab hours are:  Monday - Friday 6:10 am to 4:45 pm  Saturdays 7:00 am to 11:00 am  Appointments are required, call [phone]    If you have clinical questions after hours or would like to schedule an appointment,  call the clinic at [phone].            Follow-ups after your visit        Who to contact     If you have questions or need follow up information about today's clinic visit or your schedule please contact [facility] directly at [phone].  Normal or non-critical lab and imaging results will be communicated to you by MyChart, letter or phone within 4 business days after the clinic has received the results. If you do not hear from us within 7 days, please contact the clinic  "through [employer] or phone. If you have a critical or abnormal lab result, we will notify you by phone as soon as possible.  Submit refill requests through [employer] or call your pharmacy and they will forward the refill request to us. Please allow 3 business days for your refill to be completed.          Additional Information About Your Visit        [employer] Information     [employer] gives you secure access to your electronic health record. If you see a primary care provider, you can also send messages to your care team and make appointments. If you have questions, please call your primary care clinic.  If you do not have a primary care provider, please call [phone] and they will assist you.        Care EveryWhere ID     This is your Care EveryWhere ID. This could be used by other organizations to access your [place] medical records  [id number]        Your Vitals Were     Pulse Temperature Height Pulse Oximetry BMI (Body Mass Index)       68 98.4  F (36.9  C) (Tympanic) 5' 5.25\" (1.657 m) 95% 42.61 kg/m2        Blood Pressure from Last 3 Encounters:   08/17/18 130/80   08/04/17 131/78   08/05/16 115/65    Weight from Last 3 Encounters:   08/17/18 258 lb (117 kg)   08/04/17 238 lb (108 kg)   08/05/16 246 lb (111.6 kg)              We Performed the Following     Albumin Random Urine Quantitative with Creat Ratio     Basic metabolic panel     DESTRUCT BENIGN LESION, UP TO 14     Lipid panel reflex to direct LDL Fasting          Where to get your medicines      These medications were [place] [facility] [place] [place] [address] [address]  [address] [address] [address]     Phone:  [phone]     atorvastatin 80 MG tablet    lisinopril 10 MG tablet    metFORMIN 1000 MG tablet    metoprolol succinate 50 MG 24 hr tablet          Primary Care Provider Office Phone # Fax #    Maria De Jesus SAL Ramirez [phone] [address] [facility] [address] [facility] [address]        Equal Access " to Services     CALOS SALAS : Carlos A Mcelroy, wanelson correia, qalucita engelalmilton kendall. So [facility] [phone].    ATENCIÓN: Si habla darrin, tiene a canales disposición servicios gratuitos de asistencia lingüística. Llame al [phone].    We comply with applicable federal civil rights laws and [place] laws. We do not discriminate on the basis of race, color, national origin, age, disability, sex, sexual orientation, or gender identity.            Thank you!     Thank you for choosing [facility]  for your care. Our goal is always to provide you with excellent care. Hearing back from our patients is one way we can continue to improve our services. Please take a few minutes to complete the written survey that you may receive in the mail after your visit with us. Thank you!             Your Updated Medication List - Protect others around you: Learn how to safely use, store and throw away your medicines at www.disposemymeds.org.          This list is accurate as of 8/17/18 10:29 AM.  Always use your most recent med list.                   Brand Name Dispense Instructions for use Diagnosis    aspirin 81 MG EC tablet     90 tablet    Take 1 tablet (81 mg) by mouth daily    PAD (peripheral artery disease) (H), Type 2 diabetes, HbA1c goal < 7% (H)       atorvastatin 80 MG tablet    LIPITOR    90 tablet    TAKE 1 TABLET (80 MG) BY MOUTH DAILY    Hyperlipidemia LDL goal <100       lisinopril 10 MG tablet    PRINIVIL/ZESTRIL    90 tablet    TAKE 1 TABLET (10 MG) BY MOUTH DAILY    Essential hypertension with goal blood pressure less than 140/90       metFORMIN 1000 MG tablet    GLUCOPHAGE    90 tablet    TAKE ONE TABLET BY MOUTH ONE TIME DAILY    Type 2 diabetes mellitus without complication, without long-term current use of insulin (H)       metoprolol succinate 50 MG 24 hr tablet    TOPROL-XL    90 tablet    TAKE 1 TABLET (50 MG) BY MOUTH DAILY     Essential hypertension with goal blood pressure less than 140/90, Coronary artery disease involving native coronary artery of native heart without angina pectoris

## 2019-02-07 ENCOUNTER — TELEPHONE (OUTPATIENT)
Dept: FAMILY MEDICINE | Facility: CLINIC | Age: 64
End: 2019-02-07

## 2019-02-07 DIAGNOSIS — E11.9 TYPE 2 DIABETES MELLITUS WITHOUT COMPLICATION, WITHOUT LONG-TERM CURRENT USE OF INSULIN (H): Primary | ICD-10-CM

## 2019-02-07 NOTE — TELEPHONE ENCOUNTER
Patient returned call to clinic. Informed him he was due to a diabetic check, offered to schedule OV, patient declined and reports he will be in August.     [facility] A[facility]

## 2019-02-07 NOTE — TELEPHONE ENCOUNTER
Panel Management Review      Patient has the following on his problem list: None      Composite cancer screening  Chart review shows that this patient is due/due soon for the following None  Summary:    Patient is due/failing the following:   A1C    Action needed:   Patient needs office visit for diabetes check, needs labs done prior     Type of outreach:    Phone, left message for patient to call back.     Questions for provider review:    None                                                                                                                                    Tiffanie Bentley

## 2019-02-07 NOTE — TELEPHONE ENCOUNTER
Patient is due for a diabetes follow up appointment with me.      Non-fsting labs due:  A1C    Orders were placed, please have lab work done before visit.      Please ask patient to bring in their glucometer so we can download the data.    Please call patient to schedule.    Also ask about diabetic eye exam  Maria De Jesus Hay, CNP

## 2019-06-06 ENCOUNTER — NURSE TRIAGE (OUTPATIENT)
Dept: FAMILY MEDICINE | Facility: CLINIC | Age: 64
End: 2019-06-06

## 2019-06-06 NOTE — TELEPHONE ENCOUNTER
"  Reason for Disposition    Numbness (i.e., loss of sensation) in hand or fingers    Additional Information    Negative: Passed out (i.e., lost consciousness, collapsed and was not responding)    Negative: Shock suspected (e.g., cold/pale/clammy skin, too weak to stand, low BP, rapid pulse)    Negative: [1] Similar pain previously AND [2] it was from \"heart attack\"    Negative: [1] Similar pain previously AND [2] it was from \"angina\" AND [3] not relieved by nitroglycerin    Negative: Sounds like a life-threatening emergency to the triager    Followed a shoulder injury    Shoulder pain from overuse (work, exercise, gardening) OR from self-induced lifting injury    Negative: Chest pain    Negative: Difficulty breathing or unusual sweating (e.g., sweating without exertion)    Negative: [1] Pain lasting > 5 minutes AND [2] pain also present in chest  (Exception: pain is clearly made worse by movement)    Negative: [1] Age > 40 AND [2] no obvious cause AND [3] pain even when not moving the arm    (Exception: pain is clearly made worse by moving arm or bending neck)    Negative: [1] SEVERE pain AND [2] not improved 2 hours after pain medicine    Negative: [1] Red area or streak AND [2] fever    Negative: [1] Swollen joint AND [2] fever    Negative: Patient sounds very sick or weak to the triager    Negative: Entire arm is swollen    Negative: Weakness (i.e., loss of strength) in hand or fingers    (Exception: not truly weak; hand feels weak because of pain)    Negative: [1] Shoulder pains with exertion (e.g., walking) AND [2] pain goes away on resting AND [3] not present now    Answer Assessment - Initial Assessment Questions  1. ONSET: \"When did the pain start?\"      4 months  2. LOCATION: \"Where is the pain located?\"      Shoulders, then arms, now into hands  3. PAIN: \"How bad is the pain?\" (Scale 1-10; or mild, moderate, severe)    - MILD (1-3): doesn't interfere with normal activities    - MODERATE (4-7): interferes " "with normal activities (e.g., work or school) or awakens from sleep    - SEVERE (8-10): excruciating pain, unable to do any normal activities, unable to move arm at all due to pain      Moderate per pt  4. 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  5. CAUSE: \"What do you think is causing the shoulder pain?\"      Lifting the heavy bags  6. OTHER SYMPTOMS: \"Do you have any other symptoms?\" (e.g., neck pain, swelling, rash, fever, numbness, weakness)      Numbness and tingling into the arms and hands bilaterally.  7. PREGNANCY: \"Is there any chance you are pregnant?\" \"When was your last menstrual period?\"      No.    Protocols used: SHOULDER PAIN-A-, SHOULDER INJURY-A-    "

## 2019-06-06 NOTE — TELEPHONE ENCOUNTER
Pt LM of numbness and tingling in his hands.  Call got disconnected.  Called pt back and LM to call clinic/RN to discuss symptoms.  Panda

## 2019-06-06 NOTE — TELEPHONE ENCOUNTER
Nurse Triage SBAR    Situation: Roger Bonilla has questions about care advice given per protocol. Patient states he is tasking ibuprofen for the bilateral shoulder and arm pain and it is helping some and requesting/needing  an office visit today or tomorrow..    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  He is having bilateral shoulder pain x 4 months with tingling and numbness radiating into his hands.  He is having difficulty raising his arms above his head now.  He is not dropping things.     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  He is having bilateral shoulder pain x 4 months with tingling and numbness radiating into his hands.  He is having difficulty raising his arms above his head now.  He is not dropping things.    (See information below for more triage details.)    Recommendation: Routing to provider for recommendation on not needed.    Protocol Recommended Disposition: Routine office follow-up appointment  in next 24 hours    Nurse Triage Follow Up: Patient informed of recommendations and reasons to call back or seek care in the Clinic. Patient verbalized understanding and agrees with the plan.

## 2019-06-11 ENCOUNTER — ANCILLARY PROCEDURE (OUTPATIENT)
Dept: GENERAL RADIOLOGY | Facility: CLINIC | Age: 64
End: 2019-06-11
Attending: NURSE PRACTITIONER
Payer: COMMERCIAL

## 2019-06-11 ENCOUNTER — OFFICE VISIT (OUTPATIENT)
Dept: FAMILY MEDICINE | Facility: CLINIC | Age: 64
End: 2019-06-11
Payer: COMMERCIAL

## 2019-06-11 ENCOUNTER — TELEPHONE (OUTPATIENT)
Dept: FAMILY MEDICINE | Facility: CLINIC | Age: 64
End: 2019-06-11

## 2019-06-11 VITALS
WEIGHT: 261 LBS | DIASTOLIC BLOOD PRESSURE: 82 MMHG | RESPIRATION RATE: 16 BRPM | OXYGEN SATURATION: 97 % | HEART RATE: 73 BPM | BODY MASS INDEX: 41.95 KG/M2 | SYSTOLIC BLOOD PRESSURE: 138 MMHG | HEIGHT: 66 IN | TEMPERATURE: 97.5 F

## 2019-06-11 DIAGNOSIS — M25.512 CHRONIC PAIN OF BOTH SHOULDERS: Primary | ICD-10-CM

## 2019-06-11 DIAGNOSIS — M25.511 CHRONIC PAIN OF BOTH SHOULDERS: ICD-10-CM

## 2019-06-11 DIAGNOSIS — M25.512 CHRONIC PAIN OF BOTH SHOULDERS: ICD-10-CM

## 2019-06-11 DIAGNOSIS — Z72.0 TOBACCO ABUSE: ICD-10-CM

## 2019-06-11 DIAGNOSIS — G89.29 CHRONIC PAIN OF BOTH SHOULDERS: ICD-10-CM

## 2019-06-11 DIAGNOSIS — M25.511 CHRONIC PAIN OF BOTH SHOULDERS: Primary | ICD-10-CM

## 2019-06-11 DIAGNOSIS — I25.10 CORONARY ARTERY DISEASE INVOLVING NATIVE CORONARY ARTERY OF NATIVE HEART WITHOUT ANGINA PECTORIS: ICD-10-CM

## 2019-06-11 DIAGNOSIS — E78.5 HYPERLIPIDEMIA LDL GOAL <100: ICD-10-CM

## 2019-06-11 DIAGNOSIS — G89.29 CHRONIC PAIN OF BOTH SHOULDERS: Primary | ICD-10-CM

## 2019-06-11 DIAGNOSIS — E11.9 TYPE 2 DIABETES MELLITUS WITHOUT COMPLICATION, WITHOUT LONG-TERM CURRENT USE OF INSULIN (H): ICD-10-CM

## 2019-06-11 DIAGNOSIS — I73.9 PAD (PERIPHERAL ARTERY DISEASE) (H): ICD-10-CM

## 2019-06-11 DIAGNOSIS — I10 ESSENTIAL HYPERTENSION WITH GOAL BLOOD PRESSURE LESS THAN 140/90: ICD-10-CM

## 2019-06-11 DIAGNOSIS — E66.01 MORBID OBESITY (H): ICD-10-CM

## 2019-06-11 LAB
ANION GAP SERPL CALCULATED.3IONS-SCNC: 2 MMOL/L (ref 3–14)
BUN SERPL-MCNC: 15 MG/DL (ref 7–30)
CALCIUM SERPL-MCNC: 9.1 MG/DL (ref 8.5–10.1)
CHLORIDE SERPL-SCNC: 103 MMOL/L (ref 94–109)
CHOLEST SERPL-MCNC: 152 MG/DL
CO2 SERPL-SCNC: 30 MMOL/L (ref 20–32)
CREAT SERPL-MCNC: 0.7 MG/DL (ref 0.66–1.25)
CREAT UR-MCNC: 63 MG/DL
GFR SERPL CREATININE-BSD FRML MDRD: >90 ML/MIN/{1.73_M2}
GLUCOSE SERPL-MCNC: 108 MG/DL (ref 70–99)
HBA1C MFR BLD: 6.3 % (ref 0–5.6)
HDLC SERPL-MCNC: 48 MG/DL
LDLC SERPL CALC-MCNC: 87 MG/DL
MICROALBUMIN UR-MCNC: 126 MG/L
MICROALBUMIN/CREAT UR: 199.68 MG/G CR (ref 0–17)
NONHDLC SERPL-MCNC: 104 MG/DL
POTASSIUM SERPL-SCNC: 4.5 MMOL/L (ref 3.4–5.3)
SODIUM SERPL-SCNC: 135 MMOL/L (ref 133–144)
TRIGL SERPL-MCNC: 86 MG/DL
TSH SERPL DL<=0.005 MIU/L-ACNC: 0.76 MU/L (ref 0.4–4)

## 2019-06-11 PROCEDURE — 73030 X-RAY EXAM OF SHOULDER: CPT | Mod: LT

## 2019-06-11 PROCEDURE — 82043 UR ALBUMIN QUANTITATIVE: CPT | Performed by: NURSE PRACTITIONER

## 2019-06-11 PROCEDURE — 36415 COLL VENOUS BLD VENIPUNCTURE: CPT | Performed by: NURSE PRACTITIONER

## 2019-06-11 PROCEDURE — 99214 OFFICE O/P EST MOD 30 MIN: CPT | Performed by: NURSE PRACTITIONER

## 2019-06-11 PROCEDURE — 80048 BASIC METABOLIC PNL TOTAL CA: CPT | Performed by: NURSE PRACTITIONER

## 2019-06-11 PROCEDURE — 84443 ASSAY THYROID STIM HORMONE: CPT | Performed by: NURSE PRACTITIONER

## 2019-06-11 PROCEDURE — 73030 X-RAY EXAM OF SHOULDER: CPT | Mod: RT

## 2019-06-11 PROCEDURE — 80061 LIPID PANEL: CPT | Performed by: NURSE PRACTITIONER

## 2019-06-11 PROCEDURE — 72040 X-RAY EXAM NECK SPINE 2-3 VW: CPT

## 2019-06-11 PROCEDURE — 83036 HEMOGLOBIN GLYCOSYLATED A1C: CPT | Performed by: NURSE PRACTITIONER

## 2019-06-11 RX ORDER — LISINOPRIL 10 MG/1
TABLET ORAL
Qty: 90 TABLET | Refills: 3 | Status: SHIPPED | OUTPATIENT
Start: 2019-06-11 | End: 2019-06-11 | Stop reason: DRUGHIGH

## 2019-06-11 RX ORDER — METOPROLOL SUCCINATE 50 MG/1
TABLET, EXTENDED RELEASE ORAL
Qty: 90 TABLET | Refills: 3 | Status: SHIPPED | OUTPATIENT
Start: 2019-06-11 | End: 2020-06-04

## 2019-06-11 RX ORDER — ATORVASTATIN CALCIUM 80 MG/1
TABLET, FILM COATED ORAL
Qty: 90 TABLET | Refills: 3 | Status: SHIPPED | OUTPATIENT
Start: 2019-06-11 | End: 2020-06-04

## 2019-06-11 RX ORDER — LISINOPRIL 20 MG/1
20 TABLET ORAL DAILY
Qty: 90 TABLET | Refills: 3 | Status: SHIPPED | OUTPATIENT
Start: 2019-06-11 | End: 2020-04-12

## 2019-06-11 ASSESSMENT — MIFFLIN-ST. JEOR: SCORE: 1913.7

## 2019-06-11 ASSESSMENT — PAIN SCALES - GENERAL: PAINLEVEL: SEVERE PAIN (7)

## 2019-06-11 NOTE — PATIENT INSTRUCTIONS
Schedule a diabetic eye exam.        Thank you for choosing [facility].  You may be receiving an email and/or telephone survey request from [facility] [employer] regarding your visit today.  Please take a few minutes to respond to the survey to let us know how we are doing.      If you have questions or concerns, please contact us via [employer] or you can contact your care team at [phone].    Our Clinic hours are:  Monday 6:40 am  to 7:00 pm  Tuesday -Friday 6:40 am to 5:00 pm    The [place] outpatient lab hours are:  Monday - Friday 6:10 am to 4:45 pm  Saturdays 7:00 am to 11:00 am  Appointments are required, call [phone]    If you have clinical questions after hours or would like to schedule an appointment,  call the clinic at [phone].

## 2019-06-11 NOTE — PROGRESS NOTES
" Subjective   Chief Complaint   Patient presents with     Muscle Pain     muscle aches in Arms     Diabetes     Due for a1c and Diabetic follow up , he is fasting        Roger Bonilla is a 63 year old male who presents to clinic today for the following health issues:      Diabetes Follow-up      How often are you checking your blood sugar? Not at all    What time of day are you checking your blood sugars (select all that apply)?N/A    Have you had any blood sugars above 200?  No    Have you had any blood sugars below 70?  No    What symptoms do you notice when your blood sugar is low?  None    What concerns do you have today about your diabetes? None     Do you have any of these symptoms? (Select all that apply)  Blurry Vision, Numbness in Hand     Have you had a diabetic eye exam in the last 12 months? No      Hyperlipidemia Follow-Up      Are you having any of the following symptoms? (Select all that apply)  Left-sided neck or arm pain, and Right side side    Are you regularly taking any medication or supplement to lower your cholesterol?   Yes- Atorvastatin 80mg     Are you having muscle aches or other side effects that you think could be caused by your cholesterol lowering medication?  No    Hypertension Follow-up      Do you check your blood pressure regularly outside of the clinic? No     Are you following a low salt diet? No    Are your blood pressures ever more than 140 on the top number (systolic) OR more   than 90 on the bottom number (diastolic), for example 140/90? No    BP Readings from Last 2 Encounters:   06/11/19 138/82   08/17/18 130/80     Hemoglobin A1C (%)   Date Value   07/27/2018 6.0 (H)   07/29/2017 6.0     LDL Cholesterol Calculated (mg/dL)   Date Value   08/17/2018 77   07/29/2017 70       Amount of exercise or physical activity: \" All day long\"    Problems taking medications regularly: No    Medication side effects: none    Diet: regular (no restrictions)      Muscle Aches     Onset: 4 " "months ago     Description:   Location: Started in Shoulders and Neck Area, now down into both Biceps, Now hand numbness as well   Character: sharp pain when he lifts arms    Intensity: moderate to severe with use     Progression of Symptoms: worse    Accompanying Signs & Symptoms:  Other symptoms: numbness, weakness of Arms and swelling    History:   Previous similar pain: no       Precipitating factors:   Trauma or overuse: no   Pain in shoulder with lifting arms above shoulder level or reaching/working above his head.    Alleviating factors:  Improved by: rest/inactivity- certain positions he lays in bed helps    Therapies Tried and outcome: Ibuprofen - somewhat helpful      Vascular Disease Follow-up      Are you having any of the following symptoms? (Select all that apply) No complaints of shortness of breath, chest pain or pressure.  No increased sweating or nausea with activity.  No left-sided neck or arm pain.  No complaints of pain in calves when walking 1-2 blocks.    How often do you take nitroglycerin? Never    Do you take an aspirin every day? Yes            Reviewed and updated as needed this visit by Provider         Review of Systems   ROS COMP: Constitutional, HEENT, cardiovascular, pulmonary, gi and gu systems are negative, except as otherwise noted.      Objective    /82   Pulse 73   Temp 97.5  F (36.4  C) (Tympanic)   Resp 16   Ht 1.664 m (5' 5.5\")   Wt 118.4 kg (261 lb)   SpO2 97%   BMI 42.77 kg/m    Body mass index is 42.77 kg/m .  Physical Exam   GENERAL: healthy, alert and no distress  HENT: ear canals and TM's normal, nose and mouth without ulcers or lesions  NECK: no adenopathy, no asymmetry, masses, or scars and thyroid normal to palpation  RESP: lungs clear to auscultation - no rales, rhonchi or wheezes  CV: regular rate and rhythm, normal S1 S2, no S3 or S4, no murmur, click or rub, no peripheral edema and peripheral pulses strong  MS: Shoulders: appearance normal. ROM " limited to shoulder level in all directions. Neck: nontender. ROM normal.  Diabetic foot exam: normal DP and PT pulses, no trophic changes or ulcerative lesions and normal monofilament exam            Assessment & Plan       ICD-10-CM    1. Chronic pain of both shoulders M25.511 Arthritis in shoulders vs DDD in neck vs rotator cuff pathology vs other.  xrays today - plan pending results.    XR Cervical Spine 2/3 Views    G89.29 XR Shoulder Left 2 Views    M25.512 XR Shoulder Right 2 Views        2. Type 2 diabetes mellitus without complication, without long-term current use of insulin (H) E11.9 metFORMIN (GLUCOPHAGE) 1000 MG tablet     **A1C FUTURE anytime     Lipid panel reflex to direct LDL Fasting     Basic metabolic panel     TSH with free T4 reflex     Albumin Random Urine Quantitative with Creat Ratio     3. Hyperlipidemia LDL goal <100 E78.5 atorvastatin (LIPITOR) 80 MG tablet     Lipid panel reflex to direct LDL Fasting     4. PAD (peripheral artery disease) (H) I73.9 Currently asymptomatic.     5. Morbid obesity (H) E66.01 Encouraged weight loss.     6. Essential hypertension with goal blood pressure less than 140/90 I10 Borderline to mildly high.  Increase lisinopril to 20 mg daily.     metoprolol succinate ER (TOPROL-XL) 50 MG 24 hr tablet     7. Coronary artery disease involving native coronary artery of native heart without angina pectoris I25.10 Currently asymptomatic.  metoprolol succinate ER (TOPROL-XL) 50 MG 24 hr tablet     8. Tobacco abuse Z72.0 Encouraged cessation - he is thinking about it.  Offered Chantix - he declined.        Tobacco Cessation:   reports that he has been smoking cigarettes.  He started smoking about 47 years ago. He has a 40.00 pack-year smoking history. He has never used smokeless tobacco.  Tobacco Cessation Action Plan: Information offered: Patient not interested at this time      BMI:   Estimated body mass index is 42.77 kg/m  as calculated from the following:    Height as  "of this encounter: 1.664 m (5' 5.5\").    Weight as of this encounter: 118.4 kg (261 lb).   Weight management plan: Discussed healthy diet and exercise guidelines        Patient Instructions   Schedule a diabetic eye exam.        Thank you for choosing [facility].  You may be receiving an email and/or telephone survey request from [facility] Customer Experience regarding your visit today.  Please take a few minutes to respond to the survey to let us know how we are doing.      If you have questions or concerns, please contact us via [employer] works or you can contact your care team at [phone].    Our Clinic hours are:  Monday 6:40 am  to 7:00 pm  Tuesday -Friday 6:40 am to 5:00 pm    The [place] outpatient lab hours are:  Monday - Friday 6:10 am to 4:45 pm  Saturdays 7:00 am to 11:00 am  Appointments are required, call [phone]    If you have clinical questions after hours or would like to schedule an appointment,  call the clinic at [phone].        Return in about 6 months (around 12/11/2019) for Diabetes Recheck.    SAL Kc CNP  [facility]      "

## 2019-06-11 NOTE — TELEPHONE ENCOUNTER
I called pharmacist at [facility].  He reports that he received a prescription for lisinopril. 10 mg daily.  Then, he received a prescription for lisinopril 20 mg daily a few min later.    Reviewed today's visit notes and prescription is for 20 mg daily.    JASON Loyd's visit note from today:  Essential hypertension with goal blood pressure less than 140/90 I10 Borderline to mildly high.  Increase lisinopril to 20 mg daily.        metoprolol succinate ER (TOPROL-XL) 50 MG 24 hr tablet

## 2019-06-13 DIAGNOSIS — R93.89 ABNORMAL X-RAY OF NECK: Primary | ICD-10-CM

## 2019-06-13 DIAGNOSIS — M19.019 SHOULDER ARTHRITIS: Primary | ICD-10-CM

## 2019-06-14 ENCOUNTER — MYC MEDICAL ADVICE (OUTPATIENT)
Dept: FAMILY MEDICINE | Facility: CLINIC | Age: 64
End: 2019-06-14

## 2019-06-14 NOTE — TELEPHONE ENCOUNTER
Please see mychart.  Arthritis found on neck Xray.  Possible this could be work related from repetitive motion?    Routing to provider.  Chaya SOTO RN

## 2019-06-20 ENCOUNTER — HOSPITAL ENCOUNTER (OUTPATIENT)
Dept: ULTRASOUND IMAGING | Facility: CLINIC | Age: 64
Discharge: HOME | End: 2019-06-20
Attending: NURSE PRACTITIONER | Admitting: NURSE PRACTITIONER
Payer: COMMERCIAL

## 2019-06-20 ENCOUNTER — TELEPHONE (OUTPATIENT)
Dept: OTHER | Facility: CLINIC | Age: 64
End: 2019-06-20

## 2019-06-20 ENCOUNTER — OFFICE VISIT (OUTPATIENT)
Dept: ORTHOPEDICS | Facility: CLINIC | Age: 64
End: 2019-06-20
Payer: COMMERCIAL

## 2019-06-20 VITALS
BODY MASS INDEX: 41.95 KG/M2 | SYSTOLIC BLOOD PRESSURE: 181 MMHG | WEIGHT: 261 LBS | DIASTOLIC BLOOD PRESSURE: 86 MMHG | HEIGHT: 66 IN

## 2019-06-20 DIAGNOSIS — M25.511 PAIN OF BOTH SHOULDER JOINTS: Primary | ICD-10-CM

## 2019-06-20 DIAGNOSIS — I65.23 CAROTID STENOSIS, BILATERAL: Primary | ICD-10-CM

## 2019-06-20 DIAGNOSIS — R93.89 ABNORMAL X-RAY OF NECK: ICD-10-CM

## 2019-06-20 DIAGNOSIS — I65.23 ATHEROSCLEROSIS OF BOTH CAROTID ARTERIES: Primary | ICD-10-CM

## 2019-06-20 DIAGNOSIS — M25.512 PAIN OF BOTH SHOULDER JOINTS: Primary | ICD-10-CM

## 2019-06-20 PROCEDURE — 93880 EXTRACRANIAL BILAT STUDY: CPT

## 2019-06-20 PROCEDURE — 99203 OFFICE O/P NEW LOW 30 MIN: CPT | Mod: 25 | Performed by: FAMILY MEDICINE

## 2019-06-20 PROCEDURE — 20611 DRAIN/INJ JOINT/BURSA W/US: CPT | Mod: 50 | Performed by: FAMILY MEDICINE

## 2019-06-20 RX ADMIN — ROPIVACAINE HYDROCHLORIDE 3 ML: 5 INJECTION, SOLUTION EPIDURAL; INFILTRATION; PERINEURAL at 12:00

## 2019-06-20 RX ADMIN — TRIAMCINOLONE ACETONIDE 40 MG: 40 INJECTION, SUSPENSION INTRA-ARTICULAR; INTRAMUSCULAR at 12:00

## 2019-06-20 ASSESSMENT — MIFFLIN-ST. JEOR: SCORE: 1913.7

## 2019-06-20 NOTE — LETTER
June 20, 2019      Roger was seen in my office today.  He needs modification to his normal work duties based on my exam today, as these symptoms have increased with his work duties starting in February. 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  He should have a lift/carry restriction of 20 pounds.  He should avoid lifting any weight over his head, or any repetitive activity over shoulder height.  He should otherwise modify or eliminate other painful activity as needed.  Updated recommendations will be provided at his next visit in 2 weeks.      Fantasma Brady DO, GAYLE  Primary Care Sports Medicine  [facility]

## 2019-06-20 NOTE — TELEPHONE ENCOUNTER
Pt referred to [facility] by Maria De Jesus Hay APRN CNP for bilateral carotid stenosis.    Pt needs to be scheduled for CTA head/neck and consult with vascular surgery.  Will route to scheduling to coordinate an appointment at next available.    SHI ThompsonN, RN

## 2019-06-20 NOTE — TELEPHONE ENCOUNTER
Attempted to reach patient on home/mobile number but could not leave a message. I will attempt to reach him again later.

## 2019-06-20 NOTE — PROGRESS NOTES
Roger Bonilla  :  1955  DOS: 2019  MRN: [id number]    Sports Medicine Clinic Visit    PCP: Maria De Jesus Hay    Roger Bonilla is a 63 year old Right hand dominant male who is seen in consultation at the request of  Maria De Jesus Hay PA-C presenting with chronic bilateral shoulder pain.    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  Pain located over bilateral deep anterior shoulder, biceps region, radiating to hands.  Reports constant radiating, numbness/tingling and swelling to bilateral hands, mostly ring fingers.  Additional Features:  Positive: swelling, numbness and weakness.  Symptoms are better with Rest.  Symptoms are worse with: lifting, shoulder flexion/abduction, lying on either shoulder.  Hand numbness is worse in evening.  Other evaluation and/or treatments so far consists of: Ibuprofen, Rest and PCP.  Recent imaging completed: X-rays completed 19.  Prior History of related problems: H/o mild baseline shoulder pain that he has not treated in recent past.    Social History: works  for [employer]    Review of Systems  Musculoskeletal: as above  Remainder of review of systems is negative including constitutional, CV, pulmonary, GI, Skin and Neurologic except as noted in HPI or medical history.    Past Medical History:   Diagnosis Date     Coronary artery disease may 1 2005    2 stents put in heart     Pure hypercholesterolemia      Type II or unspecified type diabetes mellitus without mention of complication, not stated as uncontrolled      Unspecified cardiovascular disease -    MI -- Angioplasty two stents RCA & OM2     Unspecified essential hypertension      Past Surgical History:   Procedure Laterality Date     CARDIAC SURGERY  may 1, 2005     COLONOSCOPY  2011    Procedure:COLONOSCOPY; Screening Colonoscopy; Surgeon:LUTHER FLORES; Location:[facility]     SURGICAL HISTORY OF -   "     None     VASCULAR SURGERY  oct 2013    stent put in leg       Objective  /86   Ht 1.664 m (5' 5.5\")   Wt 118.4 kg (261 lb)   BMI 42.77 kg/m      General: healthy, alert and in no distress    HEENT: no scleral icterus or conjunctival erythema   Skin: no suspicious lesions or rash. No jaundice.   CV: regular rhythm by palpation, 2+ distal pulses, no pedal edema    Resp: normal respiratory effort without conversational dyspnea   Psych: normal mood and affect    Gait: nonantalgic, appropriate coordination and balance   Neuro: normal light touch sensory exam of the extremities. Motor strength as noted below     Bilateral Shoulder exam    ROM:        Decreased active and passive ROM with flexion, extension, abduction, internal and external rotation.    Tender:        Anterior GH joint > subacromial areas, lateral deltoid    Non Tender:       remainder of shoulder    Strength:        abduction 5/5       internal rotation 5/5       external rotation 5/5       adduction 5/5    Impingement testing:        positive (+) Neer       neg (-) Manley       neg (-) empty can       neg (-) crossover       positive (+) O'esther       positive (+) crank    Stability testing:       neg (-) anterior glide       neg (-) sulcus sign    Skin:       no visible deformities       well perfused       capillary refill brisk    Sensation:        normal sensation over shoulder and upper extremity       Radiology  Results for orders placed or performed in visit on 06/11/19   XR Shoulder Right 2 Views    Narrative    RIGHT SHOULDER 2 VIEWS  6/11/2019 8:37 AM     HISTORY: Chronic pain of both shoulders.    COMPARISON: 8/1/2005      Impression    IMPRESSION: Moderate acromioclavicular degenerative change and mild  inferior glenohumeral degenerative change. No evidence of acute  fracture.    KAN FERRARA MD     Large Joint Injection/Arthocentesis: bilateral glenohumeral  Date/Time: 6/20/2019 12:00 PM  Performed by: Fantasma Cook " DO Yosef  Authorized by: Fantasma Cook DO     Indications:  Pain  Needle Size:  21 G  Guidance: ultrasound    Approach:  Posterolateral  Location:  Shoulder  Laterality:  Bilateral      Site:  Bilateral glenohumeral  Medications (Right):  40 mg triamcinolone 40 MG/ML; 3 mL ropivacaine 5 MG/ML  Medications (Left):  40 mg triamcinolone 40 MG/ML; 3 mL ropivacaine 5 MG/ML  Outcome:  Tolerated well, no immediate complications  Procedure discussed: discussed risks, benefits, and alternatives    Consent Given by:  Patient  Prep: patient was prepped and draped in usual sterile fashion        Assessment:  1. Pain of both shoulder joints        Plan:  Discussed the assessment with the patient.  Follow up: prn based on clinical progress  XR images independently visualized and reviewed with patient today in clinic  Mild GH joint changes, moderate AC joint changes  B/l GH joint injections today with good initial relief from anesthetic effect  PT options reviewed  Consider additional imaging vs diagnostic injection based on clinical progress  Expectations and goals of CSI reviewed  Often 2-3 days for steroid effect, and can take up to two weeks for maximum effect  We discussed modified progressive pain-free activity as tolerated  Do not overuse in first two weeks if feeling better due to concern for vulnerability while steroid is working  Supportive care reviewed  All questions were answered today  Contact us with additional questions or concerns  Signs and sx of concern reviewed    Thanks very much for sending this nice gentleman to us, I will keep you updated with his progress      Fantasma Cook DO, CAQ  Primary Care Sports Medicine  [facility]           Disclaimer: This note consists of symbols derived from keyboarding, dictation and/or voice recognition software. As a result, there may be errors in the script that have gone undetected. Please consider this when interpreting information  found in this chart.

## 2019-06-20 NOTE — LETTER
2019         RE: Roger Bonilla  [address]  [facility] [place] [address]        Dear Colleague,    Thank you for referring your patient, Roger Bonilla, to the [facility]. Please see a copy of my visit note below.    Roger Bonilla  :  1955  DOS: 2019  MRN: [id number]    Sports Medicine Clinic Visit    PCP: Maria De Jesus Hay    Roger Bonilla is a 63 year old Right hand dominant male who is seen in consultation at the request of  Maria De Jesus Hay PA-C presenting with chronic bilateral shoulder pain.    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  Pain located over bilateral deep anterior shoulder, biceps region, radiating to hands.  Reports constant radiating, numbness/tingling and swelling to bilateral hands, mostly ring fingers.  Additional Features:  Positive: swelling, numbness and weakness.  Symptoms are better with Rest.  Symptoms are worse with: lifting, shoulder flexion/abduction, lying on either shoulder.  Hand numbness is worse in evening.  Other evaluation and/or treatments so far consists of: Ibuprofen, Rest and PCP.  Recent imaging completed: X-rays completed 19.  Prior History of related problems: H/o mild baseline shoulder pain that he has not treated in recent past.    Social History: works  for [employer]    Review of Systems  Musculoskeletal: as above  Remainder of review of systems is negative including constitutional, CV, pulmonary, GI, Skin and Neurologic except as noted in HPI or medical history.    Past Medical History:   Diagnosis Date     Coronary artery disease may 1 2005    2 stents put in heart     Pure hypercholesterolemia      Type II or unspecified type diabetes mellitus without mention of complication, not stated as uncontrolled      Unspecified cardiovascular disease -    MI -- Angioplasty two stents RCA & OM2     Unspecified essential  "hypertension      Past Surgical History:   Procedure Laterality Date     CARDIAC SURGERY  may 1, 2005     COLONOSCOPY  11/30/2011    Procedure:COLONOSCOPY; Screening Colonoscopy; Surgeon:LUTHER FLORES; Location:[facility]     SURGICAL HISTORY OF -       None     VASCULAR SURGERY  oct 2013    stent put in leg       Objective  /86   Ht 1.664 m (5' 5.5\")   Wt 118.4 kg (261 lb)   BMI 42.77 kg/m       General: healthy, alert and in no distress    HEENT: no scleral icterus or conjunctival erythema   Skin: no suspicious lesions or rash. No jaundice.   CV: regular rhythm by palpation, 2+ distal pulses, no pedal edema    Resp: normal respiratory effort without conversational dyspnea   Psych: normal mood and affect    Gait: nonantalgic, appropriate coordination and balance   Neuro: normal light touch sensory exam of the extremities. Motor strength as noted below     Bilateral Shoulder exam    ROM:        Decreased active and passive ROM with flexion, extension, abduction, internal and external rotation.    Tender:        Anterior GH joint > subacromial areas, lateral deltoid    Non Tender:       remainder of shoulder    Strength:        abduction 5/5       internal rotation 5/5       external rotation 5/5       adduction 5/5    Impingement testing:        positive (+) Neer       neg (-) Manley       neg (-) empty can       neg (-) crossover       positive (+) O'esther       positive (+) crank    Stability testing:       neg (-) anterior glide       neg (-) sulcus sign    Skin:       no visible deformities       well perfused       capillary refill brisk    Sensation:        normal sensation over shoulder and upper extremity       Radiology  Results for orders placed or performed in visit on 06/11/19   XR Shoulder Right 2 Views    Narrative    RIGHT SHOULDER 2 VIEWS  6/11/2019 8:37 AM     HISTORY: Chronic pain of both shoulders.    COMPARISON: 8/1/2005      Impression    IMPRESSION: Moderate acromioclavicular " degenerative change and mild  inferior glenohumeral degenerative change. No evidence of acute  fracture.    KAN FERRARA MD     Large Joint Injection/Arthocentesis: bilateral glenohumeral  Date/Time: 6/20/2019 12:00 PM  Performed by: Fantasma Cook DO  Authorized by: Fantasma Cook DO     Indications:  Pain  Needle Size:  21 G  Guidance: ultrasound    Approach:  Posterolateral  Location:  Shoulder  Laterality:  Bilateral      Site:  Bilateral glenohumeral  Medications (Right):  40 mg triamcinolone 40 MG/ML; 3 mL ropivacaine 5 MG/ML  Medications (Left):  40 mg triamcinolone 40 MG/ML; 3 mL ropivacaine 5 MG/ML  Outcome:  Tolerated well, no immediate complications  Procedure discussed: discussed risks, benefits, and alternatives    Consent Given by:  Patient  Prep: patient was prepped and draped in usual sterile fashion        Assessment:  1. Pain of both shoulder joints        Plan:  Discussed the assessment with the patient.  Follow up: prn based on clinical progress  XR images independently visualized and reviewed with patient today in clinic  Mild GH joint changes, moderate AC joint changes  B/l GH joint injections today with good initial relief from anesthetic effect  PT options reviewed  Consider additional imaging vs diagnostic injection based on clinical progress  Expectations and goals of CSI reviewed  Often 2-3 days for steroid effect, and can take up to two weeks for maximum effect  We discussed modified progressive pain-free activity as tolerated  Do not overuse in first two weeks if feeling better due to concern for vulnerability while steroid is working  Supportive care reviewed  All questions were answered today  Contact us with additional questions or concerns  Signs and sx of concern reviewed    Thanks very much for sending this nice gentleman to us, I will keep you updated with his progress      Fantasma Cook DO, CAQ  Primary Care Sports Medicine  [facility]           Disclaimer: This note consists of symbols derived from keyboarding, dictation and/or voice recognition software. As a result, there may be errors in the script that have gone undetected. Please consider this when interpreting information found in this chart.    Again, thank you for allowing me to participate in the care of your patient.        Sincerely,        Fantasma Cook, DO

## 2019-07-02 ENCOUNTER — OFFICE VISIT (OUTPATIENT)
Dept: ORTHOPEDICS | Facility: CLINIC | Age: 64
End: 2019-07-02
Payer: COMMERCIAL

## 2019-07-02 VITALS
WEIGHT: 260 LBS | DIASTOLIC BLOOD PRESSURE: 86 MMHG | HEIGHT: 66 IN | SYSTOLIC BLOOD PRESSURE: 142 MMHG | BODY MASS INDEX: 41.78 KG/M2

## 2019-07-02 DIAGNOSIS — M25.511 PAIN OF BOTH SHOULDER JOINTS: Primary | ICD-10-CM

## 2019-07-02 DIAGNOSIS — M25.512 PAIN OF BOTH SHOULDER JOINTS: Primary | ICD-10-CM

## 2019-07-02 PROCEDURE — 99213 OFFICE O/P EST LOW 20 MIN: CPT | Performed by: FAMILY MEDICINE

## 2019-07-02 RX ORDER — TRIAMCINOLONE ACETONIDE 40 MG/ML
40 INJECTION, SUSPENSION INTRA-ARTICULAR; INTRAMUSCULAR
Status: DISCONTINUED | OUTPATIENT
Start: 2019-06-20 | End: 2019-09-03

## 2019-07-02 RX ORDER — ROPIVACAINE HYDROCHLORIDE 5 MG/ML
3 INJECTION, SOLUTION EPIDURAL; INFILTRATION; PERINEURAL
Status: DISCONTINUED | OUTPATIENT
Start: 2019-06-20 | End: 2019-09-03

## 2019-07-02 ASSESSMENT — MIFFLIN-ST. JEOR: SCORE: 1909.16

## 2019-07-02 NOTE — LETTER
2019         RE: Roger Bonilla  [address]  [facility] [address]        Dear Colleague,    Thank you for referring your patient, Roger Bonilla, to the [facility]. Please see a copy of my visit note below.    Roger Bonilla  :  1955  DOS: 2019  MRN: [id number]    Sports Medicine Clinic Visit    PCP: Maria De Jesus Hay    Roger Bonilla is a 63 year old Right hand dominant male who is seen in consultation at the request of  Maria De Jesus Hay PA-C presenting with chronic bilateral shoulder pain.    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  Pain located over bilateral deep anterior shoulder, biceps region, radiating to hands.  Reports constant radiating, numbness/tingling and swelling to bilateral hands, mostly ring fingers.  Additional Features:  Positive: swelling, numbness and weakness.  Symptoms are better with Rest.  Symptoms are worse with: lifting, shoulder flexion/abduction, lying on either shoulder.  Hand numbness is worse in evening.  Other evaluation and/or treatments so far consists of: Ibuprofen, Rest and PCP.  Recent imaging completed: X-rays completed 19.  Prior History of related problems: H/o mild baseline shoulder pain that he has not treated in recent past.    Social History: works  for [employer]    Interim History - 2019  Since last visit on 2019 patient has mild right shoulder pain, minimal left shoulder pain over the last 2 weeks.  Bilateral shoulder glenohumeral injection completed on  provided excellent relief on left, ~ 80% relief on right.  Continues to note mild tingling sensation in right hand and pain with shoulder flexion.  No new injury in the interim.      Review of Systems  Musculoskeletal: as above  Remainder of review of systems is negative including constitutional, CV, pulmonary, GI, Skin and Neurologic except as  "noted in HPI or medical history.    Past Medical History:   Diagnosis Date     Coronary artery disease may 1 2005    2 stents put in heart     Pure hypercholesterolemia      Type II or unspecified type diabetes mellitus without mention of complication, not stated as uncontrolled      Unspecified cardiovascular disease 5-2001    MI -- Angioplasty two stents RCA & OM2     Unspecified essential hypertension      Past Surgical History:   Procedure Laterality Date     CARDIAC SURGERY  may 1, 2005     COLONOSCOPY  11/30/2011    Procedure:COLONOSCOPY; Screening Colonoscopy; Surgeon:LUTHER FLORES; Location:[facility]     SURGICAL HISTORY OF -       None     VASCULAR SURGERY  oct 2013    stent put in leg       Objective  /86   Ht 1.664 m (5' 5.5\")   Wt 117.9 kg (260 lb)   BMI 42.61 kg/m       General: healthy, alert and in no distress    HEENT: no scleral icterus or conjunctival erythema   Skin: no suspicious lesions or rash. No jaundice.   CV: regular rhythm by palpation, 2+ distal pulses, no pedal edema    Resp: normal respiratory effort without conversational dyspnea   Psych: normal mood and affect    Gait: nonantalgic, appropriate coordination and balance   Neuro: normal light touch sensory exam of the extremities. Motor strength as noted below     Bilateral Shoulder exam    ROM:        Decreased active and passive ROM with flexion, extension, abduction, internal and external rotation.    Tender:        Anterior GH joint > subacromial areas, lateral deltoid, decreased today    Non Tender:       remainder of shoulder    Strength:        abduction 5/5       internal rotation 5/5       external rotation 5/5       adduction 5/5    Impingement testing:        positive (+) Neer       neg (-) empty can       neg (-) crossover       Very mild positive (+) crank    Stability testing:       neg (-) anterior glide       neg (-) sulcus sign    Skin:       no visible deformities       well perfused       capillary refill " brisk    Sensation:        normal sensation over shoulder and upper extremity       Radiology  Results for orders placed or performed in visit on 06/11/19   XR Shoulder Right 2 Views    Narrative    RIGHT SHOULDER 2 VIEWS  6/11/2019 8:37 AM     HISTORY: Chronic pain of both shoulders.    COMPARISON: 8/1/2005      Impression    IMPRESSION: Moderate acromioclavicular degenerative change and mild  inferior glenohumeral degenerative change. No evidence of acute  fracture.    KAN FERRARA MD     Procedures  Assessment:  1. Pain of both shoulder joints        Plan:  Discussed the assessment with the patient.  Follow up: prn based on clinical progress  XR images independently visualized and reviewed with patient again today in clinic  Mild GH joint changes, moderate AC joint changes  B/l GH joint injections have provided good relief so far  PT options reviewed  Supportive care reviewed  All questions were answered today  Contact us with additional questions or concerns  Signs and sx of concern reviewed      Fantasma Cook DO, CAQ  Primary Care Sports Medicine  [facility]           Disclaimer: This note consists of symbols derived from keyboarding, dictation and/or voice recognition software. As a result, there may be errors in the script that have gone undetected. Please consider this when interpreting information found in this chart.    Again, thank you for allowing me to participate in the care of your patient.        Sincerely,        Fantasma Cook DO

## 2019-07-02 NOTE — PROGRESS NOTES
Roger Bonilla  :  1955  DOS: 2019  MRN: [id number]    Sports Medicine Clinic Visit    PCP: Maria De Jesus Hay    Roger Bonilla is a 63 year old Right hand dominant male who is seen in consultation at the request of  Maria De Jesus Hay PA-C presenting with chronic bilateral shoulder pain.    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  Pain located over bilateral deep anterior shoulder, biceps region, radiating to hands.  Reports constant radiating, numbness/tingling and swelling to bilateral hands, mostly ring fingers.  Additional Features:  Positive: swelling, numbness and weakness.  Symptoms are better with Rest.  Symptoms are worse with: lifting, shoulder flexion/abduction, lying on either shoulder.  Hand numbness is worse in evening.  Other evaluation and/or treatments so far consists of: Ibuprofen, Rest and PCP.  Recent imaging completed: X-rays completed 19.  Prior History of related problems: H/o mild baseline shoulder pain that he has not treated in recent past.    Social History: works  for [employer]    Interim History - 2019  Since last visit on 2019 patient has mild right shoulder pain, minimal left shoulder pain over the last 2 weeks.  Bilateral shoulder glenohumeral injection completed on  provided excellent relief on left, ~ 80% relief on right.  Continues to note mild tingling sensation in right hand and pain with shoulder flexion.  No new injury in the interim.      Review of Systems  Musculoskeletal: as above  Remainder of review of systems is negative including constitutional, CV, pulmonary, GI, Skin and Neurologic except as noted in HPI or medical history.    Past Medical History:   Diagnosis Date     Coronary artery disease may 1 2005    2 stents put in heart     Pure hypercholesterolemia      Type II or unspecified type diabetes mellitus without mention of complication,  "not stated as uncontrolled      Unspecified cardiovascular disease 5-2001    MI -- Angioplasty two stents RCA & OM2     Unspecified essential hypertension      Past Surgical History:   Procedure Laterality Date     CARDIAC SURGERY  may 1, 2005     COLONOSCOPY  11/30/2011    Procedure:COLONOSCOPY; Screening Colonoscopy; Surgeon:LUTHER FLORES; Location:[facility]     SURGICAL HISTORY OF -       None     VASCULAR SURGERY  oct 2013    stent put in leg       Objective  /86   Ht 1.664 m (5' 5.5\")   Wt 117.9 kg (260 lb)   BMI 42.61 kg/m      General: healthy, alert and in no distress    HEENT: no scleral icterus or conjunctival erythema   Skin: no suspicious lesions or rash. No jaundice.   CV: regular rhythm by palpation, 2+ distal pulses, no pedal edema    Resp: normal respiratory effort without conversational dyspnea   Psych: normal mood and affect    Gait: nonantalgic, appropriate coordination and balance   Neuro: normal light touch sensory exam of the extremities. Motor strength as noted below     Bilateral Shoulder exam    ROM:        Decreased active and passive ROM with flexion, extension, abduction, internal and external rotation.    Tender:        Anterior GH joint > subacromial areas, lateral deltoid, decreased today    Non Tender:       remainder of shoulder    Strength:        abduction 5/5       internal rotation 5/5       external rotation 5/5       adduction 5/5    Impingement testing:        positive (+) Neer       neg (-) empty can       neg (-) crossover       Very mild positive (+) crank    Stability testing:       neg (-) anterior glide       neg (-) sulcus sign    Skin:       no visible deformities       well perfused       capillary refill brisk    Sensation:        normal sensation over shoulder and upper extremity       Radiology  Results for orders placed or performed in visit on 06/11/19   XR Shoulder Right 2 Views    Narrative    RIGHT SHOULDER 2 VIEWS  6/11/2019 8:37 AM     HISTORY: " Chronic pain of both shoulders.    COMPARISON: 8/1/2005      Impression    IMPRESSION: Moderate acromioclavicular degenerative change and mild  inferior glenohumeral degenerative change. No evidence of acute  fracture.    KAN FERRARA MD     Procedures  Assessment:  1. Pain of both shoulder joints        Plan:  Discussed the assessment with the patient.  Follow up: prn based on clinical progress  XR images independently visualized and reviewed with patient again today in clinic  Mild GH joint changes, moderate AC joint changes  B/l GH joint injections have provided good relief so far  PT options reviewed  Supportive care reviewed  All questions were answered today  Contact us with additional questions or concerns  Signs and sx of concern reviewed      Fantasma Cook DO, GAYLE  Primary Care Sports Medicine  [facility]           Disclaimer: This note consists of symbols derived from keyboarding, dictation and/or voice recognition software. As a result, there may be errors in the script that have gone undetected. Please consider this when interpreting information found in this chart.

## 2019-07-19 ENCOUNTER — TELEPHONE (OUTPATIENT)
Dept: FAMILY MEDICINE | Facility: CLINIC | Age: 64
End: 2019-07-19

## 2019-07-19 ENCOUNTER — ALLIED HEALTH/NURSE VISIT (OUTPATIENT)
Dept: FAMILY MEDICINE | Facility: CLINIC | Age: 64
End: 2019-07-19
Payer: COMMERCIAL

## 2019-07-19 VITALS — SYSTOLIC BLOOD PRESSURE: 128 MMHG | HEART RATE: 71 BPM | DIASTOLIC BLOOD PRESSURE: 72 MMHG

## 2019-07-19 DIAGNOSIS — Z01.30 BP CHECK: Primary | ICD-10-CM

## 2019-07-19 PROCEDURE — 99207 ZZC NO CHARGE NURSE ONLY: CPT

## 2019-07-19 NOTE — PROGRESS NOTES
S-(situation): BP Check    B-(background): 06/11/19 [place]:     6. Essential hypertension with goal blood pressure less than 140/90 I10 Borderline to mildly high.  Increase lisinopril to 20 mg daily.       metoprolol succinate ER (TOPROL-XL) 50 MG 24 hr tablet   BP at /90 and 138/82    A-(assessment):   Vital Signs 7/19/2019   Systolic 128   Diastolic 72   Pulse 71   Manual    Patient reports no side effects or complaints at time of visit.     R-(recommendations): Continue medications as prescribed. Routing provider for update.     SHI RobisonN, RN

## 2019-08-19 ENCOUNTER — MYC MEDICAL ADVICE (OUTPATIENT)
Dept: FAMILY MEDICINE | Facility: CLINIC | Age: 64
End: 2019-08-19

## 2019-08-19 NOTE — TELEPHONE ENCOUNTER
Please see my chart note  Patient did follow up with ortho for shoulders, but do not see mention of legs and hands  Do you want him to follow up with ortho or you?     Routing to provider.    Natasha SOTO Rn

## 2019-08-20 ENCOUNTER — MYC MEDICAL ADVICE (OUTPATIENT)
Dept: FAMILY MEDICINE | Facility: CLINIC | Age: 64
End: 2019-08-20

## 2019-08-23 ENCOUNTER — TELEPHONE (OUTPATIENT)
Dept: VASCULAR SURGERY | Facility: CLINIC | Age: 64
End: 2019-08-23

## 2019-08-23 NOTE — TELEPHONE ENCOUNTER
Spoke to Mr Bonilla.  Patient will be seen by Dr Adorno at [facility] on 9/17/19.

## 2019-08-23 NOTE — TELEPHONE ENCOUNTER
Health Call Center    Phone Message    May a detailed message be left on voicemail: yes    Reason for Call: Other: Pt is being referred by his [place] provider Maria De Jesus Hay for the diagnosis of Atherosclerosis of both carotid arteries. It appears that Seb Adorno and Juanita see for this primarily, please contact pt to discuss scheduling with one of them. Thanks!     Action Taken: Message routed to:  [facility] ([facility] Vascular

## 2019-09-03 ENCOUNTER — OFFICE VISIT (OUTPATIENT)
Dept: ORTHOPEDICS | Facility: CLINIC | Age: 64
End: 2019-09-03
Payer: OTHER MISCELLANEOUS

## 2019-09-03 ENCOUNTER — ANCILLARY PROCEDURE (OUTPATIENT)
Dept: GENERAL RADIOLOGY | Facility: CLINIC | Age: 64
End: 2019-09-03
Attending: FAMILY MEDICINE
Payer: OTHER MISCELLANEOUS

## 2019-09-03 VITALS
BODY MASS INDEX: 40.82 KG/M2 | HEIGHT: 66 IN | DIASTOLIC BLOOD PRESSURE: 76 MMHG | WEIGHT: 254 LBS | SYSTOLIC BLOOD PRESSURE: 136 MMHG

## 2019-09-03 DIAGNOSIS — M25.532 CHRONIC PAIN OF BOTH WRISTS: ICD-10-CM

## 2019-09-03 DIAGNOSIS — M25.532 CHRONIC PAIN OF BOTH WRISTS: Primary | ICD-10-CM

## 2019-09-03 DIAGNOSIS — M25.531 CHRONIC PAIN OF BOTH WRISTS: ICD-10-CM

## 2019-09-03 DIAGNOSIS — M25.441 SWELLING OF HAND JOINT, RIGHT: ICD-10-CM

## 2019-09-03 DIAGNOSIS — G89.29 CHRONIC PAIN OF BOTH WRISTS: Primary | ICD-10-CM

## 2019-09-03 DIAGNOSIS — M25.531 CHRONIC PAIN OF BOTH WRISTS: Primary | ICD-10-CM

## 2019-09-03 DIAGNOSIS — G56.03 BILATERAL CARPAL TUNNEL SYNDROME: ICD-10-CM

## 2019-09-03 DIAGNOSIS — G89.29 CHRONIC PAIN OF BOTH WRISTS: ICD-10-CM

## 2019-09-03 PROCEDURE — 73110 X-RAY EXAM OF WRIST: CPT | Mod: RT

## 2019-09-03 PROCEDURE — 99214 OFFICE O/P EST MOD 30 MIN: CPT | Performed by: FAMILY MEDICINE

## 2019-09-03 ASSESSMENT — MIFFLIN-ST. JEOR: SCORE: 1881.95

## 2019-09-03 NOTE — LETTER
September 3, 2019      Roger was seen in my office again today.  He requires ongoing modification to his normal work duties based on my exam today, as his shoulder and wrist and hand symptoms have increased with work duties starting in February/March of 2019.  His shoulders are feeling better after cortisone injections.  His wrist symptoms today besides generalized pain are more consistant with carpal tunnel syndrome, on the right much greater than the left.  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  He should to have a lift/carry restriction of 20 pounds.  He should avoid lifting any weight over his head, or any repetitive activity over shoulder height.  He should otherwise modify or eliminate other painful activity as needed in his wrists or hands.  Updated recommendations will be provided at his next visit in 3 weeks.      Fantasma Brady, , CAVALENCIA  Primary Care Sports Medicine  [facility]

## 2019-09-03 NOTE — LETTER
9/3/2019         RE: Roger Bonilla  [address]  [facility] [address]        Dear Colleague,    Thank you for referring your patient, Roger Bonilla, to the [facility]. Please see a copy of my visit note below.    Roger Bonilla  :  1955  DOS: 9/3/2019  MRN: [id number]    Sports Medicine Clinic Visit    PCP: Maria De Jesus Hay    Roger Bonilla is a 63 year old Right hand dominant male who is seen as a self referral presenting with bilateral hand pain and numbness/tingling.    He has reported swelling in the right hand since March.  He reports shoulder pain to start, but that migrated to both hands, with his right hand more numb/tingling than left hand throughout the day, affecting the 3rd finger most, but all fingers to some degree.  No specific injury, but he reports worsening symptoms with repetitive activity at work using hands and arms for lift/carry/push/pul/grasp/manipulation.  He reports strength issues as well.  No history of infection.  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    Past Medical History:   Diagnosis Date     Coronary artery disease may 1 2005    2 stents put in heart     Pure hypercholesterolemia      Type II or unspecified type diabetes mellitus without mention of complication, not stated as uncontrolled      Unspecified cardiovascular disease -    MI -- Angioplasty two stents RCA & OM2     Unspecified essential hypertension      Past Surgical History:   Procedure Laterality Date     CARDIAC SURGERY  may 1, 2005     COLONOSCOPY  2011    Procedure:COLONOSCOPY; Screening Colonoscopy; Surgeon:LUTHER FLORES; Location:[facility]     SURGICAL HISTORY OF -        "None     VASCULAR SURGERY  oct 2013    stent put in leg     Family History   Problem Relation Age of Onset     Cerebrovascular Disease Maternal Grandmother      Arthritis Other         hip fracture     Respiratory Father         emphysema     Alzheimer Disease Maternal Grandfather      Breast Cancer Sister      Cancer - colorectal No family hx of      Prostate Cancer No family hx of          Objective  /76   Ht 1.664 m (5' 5.5\")   Wt 115.2 kg (254 lb)   BMI 41.62 kg/m         General: healthy, alert and in no distress      HEENT: no scleral icterus or conjunctival erythema     Skin: no suspicious lesions or rash. No jaundice.     CV: regular rhythm by palpation, 2+ distal pulses, no pedal edema      Resp: normal respiratory effort without conversational dyspnea     Psych: normal mood and affect      Gait: nonantalgic, appropriate coordination and balance     Neuro: normal light touch sensory exam of the extremities. Motor strength as noted below     Bilateral Wrist and Hand exam    Inspection:       Swelling: mild over the dorsal hand and wrist on the right    Tender:       Radiocarpal joint R>>L    Non Tender:       Remainder of the Wrist and Hand bilateral    ROM:       Decreased active and passive ROM of the R>L wrist with flexino and extension, painful flexion on the right     Strength:       Motor function intact, decreased thumb /opposition strength on the R>>L    Special Tests:        positive (+) Tinel's test right,       positive (+) Phalen's test bilateral,       neg (-) TFCC compression test bilateral and       neg (-) Finkelstein's maneuver bilateral    Neurovascular:       2+ radial pulses bilaterally with brisk capillary refill and      normal sensation to light touch in the radial, median and ulnar nerve distributions    Radiology:  Recent Results (from the past 744 hour(s))   XR Wrist Bilateral G/E 3 vw    Narrative    WRIST BILATERAL THREE OR MORE VIEWS     9/3/2019 2:14 PM     HISTORY: " Chronic pain of both wrists.     COMPARISON: None.      Impression    IMPRESSION:   Right wrist: Joint spaces well-preserved. No evidence of fracture or  subluxation.    Left wrist: Mild STT and first MCP degenerative changes. Old ulnar  styloid avulsion fracture. No acute fracture or subluxation.    LONNY DURÁN MD       Assessment:  1. Chronic pain of both wrists    2. Bilateral carpal tunnel syndrome    3. Swelling of hand joint, right        Plan:  Discussed the assessment with the patient.  Follow up: 3 weeks  Unsure of the etiology of the vague dorsal hand swelling on the right, advised f/u with PCP as this appears more derm related, I asked patient to think about chemical contacts or other risks at work  No clear infectious appearance, reviewed signs and sx of concern  Main concern with numbness, tingling and weakness in the R>L hand and wrist  Sx consistent with CTS, along with some generalized RC joint pain on the right  Start with bracing, reviewed strategies for full time weaning to nighttime only  He will also have 2 of the next 3 weeks off from work, which may be helpful  Consider CSI about median nerve, vs RC joint, for labile or worsening sx  Reviewed option for EMG in the future  XR images independently visualized and reviewed with patient today in clinic  No significant DJD in wrists today, which is reassuring  Letter for work updated  We also discussed long term risks with the nature of his work relative to his age/conditioning for ongoing joint pains and overuse injuries  Home handouts provided and supportive care reviewed  All questions were answered today  Contact us with additional questions or concerns  Signs and sx of concern reviewed      Fantasma Cook DO, CAQ  Primary Care Sports Medicine  [facility]       Time spent in one-on-one evaluation and discussion with patient regarding nature of problem, course, prior treatments, and therapeutic options, at least 50% of  which was spent in counseling and coordination of care: 35 minutes          Disclaimer: This note consists of symbols derived from keyboarding, dictation and/or voice recognition software. As a result, there may be errors in the script that have gone undetected. Please consider this when interpreting information found in this chart.    Again, thank you for allowing me to participate in the care of your patient.        Sincerely,        Fantasma Cook, DO

## 2019-09-03 NOTE — PROGRESS NOTES
Roger Bonilla  :  1955  DOS: 9/3/2019  MRN: [id number]    Sports Medicine Clinic Visit    PCP: Maria De Jesus Hay    Roger Bonilla is a 63 year old Right hand dominant male who is seen as a self referral presenting with bilateral hand pain and numbness/tingling.    He has reported swelling in the right hand since March.  He reports shoulder pain to start, but that migrated to both hands, with his right hand more numb/tingling than left hand throughout the day, affecting the 3rd finger most, but all fingers to some degree.  No specific injury, but he reports worsening symptoms with repetitive activity at work using hands and arms for lift/carry/push/pul/grasp/manipulation.  He reports strength issues as well.  No history of infection.  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    Past Medical History:   Diagnosis Date     Coronary artery disease may 1 2005    2 stents put in heart     Pure hypercholesterolemia      Type II or unspecified type diabetes mellitus without mention of complication, not stated as uncontrolled      Unspecified cardiovascular disease -    MI -- Angioplasty two stents RCA & OM2     Unspecified essential hypertension      Past Surgical History:   Procedure Laterality Date     CARDIAC SURGERY  may 1, 2005     COLONOSCOPY  2011    Procedure:COLONOSCOPY; Screening Colonoscopy; Surgeon:LUTHER FLORES; Location:[facility]     SURGICAL HISTORY OF -       None     VASCULAR SURGERY  oct 2013    stent put in leg     Family History   Problem Relation Age of Onset     Cerebrovascular Disease Maternal Grandmother      Arthritis Other         hip fracture     Respiratory Father         emphysema      "Alzheimer Disease Maternal Grandfather      Breast Cancer Sister      Cancer - colorectal No family hx of      Prostate Cancer No family hx of          Objective  /76   Ht 1.664 m (5' 5.5\")   Wt 115.2 kg (254 lb)   BMI 41.62 kg/m        General: healthy, alert and in no distress      HEENT: no scleral icterus or conjunctival erythema     Skin: no suspicious lesions or rash. No jaundice.     CV: regular rhythm by palpation, 2+ distal pulses, no pedal edema      Resp: normal respiratory effort without conversational dyspnea     Psych: normal mood and affect      Gait: nonantalgic, appropriate coordination and balance     Neuro: normal light touch sensory exam of the extremities. Motor strength as noted below     Bilateral Wrist and Hand exam    Inspection:       Swelling: mild over the dorsal hand and wrist on the right    Tender:       Radiocarpal joint R>>L    Non Tender:       Remainder of the Wrist and Hand bilateral    ROM:       Decreased active and passive ROM of the R>L wrist with flexino and extension, painful flexion on the right     Strength:       Motor function intact, decreased thumb /opposition strength on the R>>L    Special Tests:        positive (+) Tinel's test right,       positive (+) Phalen's test bilateral,       neg (-) TFCC compression test bilateral and       neg (-) Finkelstein's maneuver bilateral    Neurovascular:       2+ radial pulses bilaterally with brisk capillary refill and      normal sensation to light touch in the radial, median and ulnar nerve distributions    Radiology:  Recent Results (from the past 744 hour(s))   XR Wrist Bilateral G/E 3 vw    Narrative    WRIST BILATERAL THREE OR MORE VIEWS     9/3/2019 2:14 PM     HISTORY: Chronic pain of both wrists.     COMPARISON: None.      Impression    IMPRESSION:   Right wrist: Joint spaces well-preserved. No evidence of fracture or  subluxation.    Left wrist: Mild STT and first MCP degenerative changes. Old " ulnar  styloid avulsion fracture. No acute fracture or subluxation.    LONNY DURÁN MD       Assessment:  1. Chronic pain of both wrists    2. Bilateral carpal tunnel syndrome    3. Swelling of hand joint, right        Plan:  Discussed the assessment with the patient.  Follow up: 3 weeks  Unsure of the etiology of the vague dorsal hand swelling on the right, advised f/u with PCP as this appears more derm related, I asked patient to think about chemical contacts or other risks at work  No clear infectious appearance, reviewed signs and sx of concern  Main concern with numbness, tingling and weakness in the R>L hand and wrist  Sx consistent with CTS, along with some generalized RC joint pain on the right  Start with bracing, reviewed strategies for full time weaning to nighttime only  He will also have 2 of the next 3 weeks off from work, which may be helpful  Consider CSI about median nerve, vs RC joint, for labile or worsening sx  Reviewed option for EMG in the future  XR images independently visualized and reviewed with patient today in clinic  No significant DJD in wrists today, which is reassuring  Letter for work updated  We also discussed long term risks with the nature of his work relative to his age/conditioning for ongoing joint pains and overuse injuries  Home handouts provided and supportive care reviewed  All questions were answered today  Contact us with additional questions or concerns  Signs and sx of concern reviewed      Fantasma Cook DO, CAVALENCIA  Primary Care Sports Medicine  [facility]       Time spent in one-on-one evaluation and discussion with patient regarding nature of problem, course, prior treatments, and therapeutic options, at least 50% of which was spent in counseling and coordination of care: 35 minutes          Disclaimer: This note consists of symbols derived from keyboarding, dictation and/or voice recognition software. As a result, there may be errors in the script  that have gone undetected. Please consider this when interpreting information found in this chart.

## 2019-09-17 ENCOUNTER — OFFICE VISIT (OUTPATIENT)
Dept: VASCULAR SURGERY | Facility: CLINIC | Age: 64
End: 2019-09-17
Payer: COMMERCIAL

## 2019-09-17 VITALS — DIASTOLIC BLOOD PRESSURE: 84 MMHG | RESPIRATION RATE: 18 BRPM | SYSTOLIC BLOOD PRESSURE: 163 MMHG | HEART RATE: 80 BPM

## 2019-09-17 DIAGNOSIS — I73.9 PAD (PERIPHERAL ARTERY DISEASE) (H): ICD-10-CM

## 2019-09-17 DIAGNOSIS — I65.23 ATHEROSCLEROSIS OF BOTH CAROTID ARTERIES: Primary | ICD-10-CM

## 2019-09-17 PROCEDURE — 99203 OFFICE O/P NEW LOW 30 MIN: CPT | Performed by: SURGERY

## 2019-09-17 NOTE — NURSING NOTE
"Initial BP (!) 163/84 (BP Location: Left arm, Patient Position: Chair, Cuff Size: Adult Regular)   Pulse 80   Resp 18  Estimated body mass index is 41.62 kg/m  as calculated from the following:    Height as of 9/3/19: 1.664 m (5' 5.5\").    Weight as of 9/3/19: 115.2 kg (254 lb). .    Patient is here for a consult for carotid stenosis.  grayson vasquez LPN    "

## 2019-09-17 NOTE — LETTER
9/17/2019       RE: Roger Bonilla  [address]  [facility] [address]     Dear Colleague,    Thank you for referring your patient, Roger Bonilla, to the [facility]. Please see a copy of my visit note below.      Vascular Surgery Consultation Note     Patient:  Roger Bonilla   Date of birth 1955, Medical record number [id number]  Date of Visit:  09/17/2019  Consult Requester:No att. providers found            Assessment and Recommendations:   ASSESSMENT / RECOMMENDATION:  Asymptomatic right carotid stenosis not in need of intervention. Will check duplex 6 months from his last study and also check on his peripheral circulation given his reported h/o leg stent in the past. Strongly encouraged smoking cessation. Would consider ECHO given prior PCI and recent overall development of edema.      Many thanks for involving me in the care of this very pleasant patient. Should any questions or concerns arise, please don't hesitate to contact me.    Warm Regards,    Karma Adorno MD, DFSVS, RPVI  Director, [facility]  Professor and Chief, Vascular and Endovascular Surgery  [facility]  Cell: [phone]  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   Last Carotid duplex in June 2019. Denies stroke, amaurosis or TIA. Had a leg stent about 13 years ago, PCI about 20 years ago. States he has been having issues with swelling in arms and legs lately.         Review of Systems:   CONSTITUTIONAL:  No fevers or chills  EYES: negative for icterus  ENT:  negative for hearing loss, tinnitus and sore throat  RESPIRATORY:  negative for cough with sputum and dyspnea  CARDIOVASCULAR:  negative for chest pain, dyspnea  GASTROINTESTINAL:  negative for nausea, vomiting, diarrhea and constipation  GENITOURINARY:  negative for dysuria  HEME:  No easy  bruising  INTEGUMENT:  negative for rash and pruritus  NEURO:  Negative for headache           Past Medical History:     Past Medical History:   Diagnosis Date     Coronary artery disease may 1 2005    2 stents put in heart     Pure hypercholesterolemia      Type II or unspecified type diabetes mellitus without mention of complication, not stated as uncontrolled      Unspecified cardiovascular disease 5-2001    MI -- Angioplasty two stents RCA & OM2     Unspecified essential hypertension             Past Surgical History:     Past Surgical History:   Procedure Laterality Date     CARDIAC SURGERY  may 1, 2005     COLONOSCOPY  11/30/2011    Procedure:COLONOSCOPY; Screening Colonoscopy; Surgeon:LUTHER FLORES; Location:[facility]     SURGICAL HISTORY OF -       None     VASCULAR SURGERY  oct 2013    stent put in leg            Family History:     Family History   Problem Relation Age of Onset     Cerebrovascular Disease Maternal Grandmother      Arthritis Other         hip fracture     Respiratory Father         emphysema     Alzheimer Disease Maternal Grandfather      Breast Cancer Sister      Cancer - colorectal No family hx of      Prostate Cancer No family hx of             Social History:     Social History     Tobacco Use     Smoking status: Current Every Day Smoker     Packs/day: 1.00     Years: 40.00     Pack years: 40.00     Types: Cigarettes     Start date: 1/1/1972     Smokeless tobacco: Never Used   Substance Use Topics     Alcohol use: Yes     Comment: 10 beers a week      History   Sexual Activity     Sexual activity: Yes     Partners: Female     Birth control/ protection: Condom            Current Medications (antimicrobials listed in bold):            Allergies:     Allergies   Allergen Reactions     Nkda [No Known Drug Allergies]             Physical Exam:   Vitals were reviewed  Patient Vitals for the past 24 hrs:   BP Pulse Resp   09/17/19 0836 (!) 163/84 80 18   09/17/19 0834 (!) 182/88 80 18      Ranges for his vital signs:  Pulse:  [80] 80  Resp:  [18] 18  BP: (163-182)/(84-88) 163/84    Physical Examination:  GENERAL:  well-developed, well-nourished, in no acute distress.   HEENT:  Head is normocephalic, atraumatic   EYES:  Eyes have anicteric sclerae without conjunctival injection or stigmata of endocarditis.    ENT:  Oropharynx is moist without exudates or ulcers. Tongue is midline  NECK:  Supple. No cervical lymphadenopathy. No carotid bruits.  LUNGS:  Clear to auscultation bilateral.   CARDIOVASCULAR:  Regular rate and rhythm with no murmurs, gallops or rubs.  ABDOMEN:  Normal bowel sounds, soft, nontender. No appreciable pulsatile masses.  SKIN:  No acute rashes.    NEUROLOGIC:  Grossly nonfocal. Active x 4 extremities  PULSES: Radial, carotid, femoral pulses difficult to appreciate. Pedal  pulses by Doppler bilaterally.  EXTREMITIES: No Ulcers.         Laboratory Data:     Hematology Studies    Recent Labs   Lab Test 09/13/13  0640 08/14/13  0917 12/18/12  1800   WBC 12.0* 10.4 13.0*   ANEU  --   --  7.0   AEOS  --   --  0.7   HGB 16.4  --  15.0   MCV 93  --  93     --  221       Metabolic Studies     Recent Labs   Lab Test 06/11/19  0845 08/17/18  1030 07/29/17  0850 07/30/16  0959 01/29/16  1059    135 138 139 139   POTASSIUM 4.5 5.0 4.5 5.0 5.6*   CHLORIDE 103 102 104 104 103   CO2 30 29 31 30 35*   BUN 15 15 17 13 15   CR 0.70 0.83 0.88 0.81 0.81   GFRESTIMATED >90 >90 88 >90  Non  GFR Calc   >90  Non  GFR Calc         Imaging Studies  R ICA  with EDV 62 cm/s with ratio of 2.6 puts this at ~60-70% stenosis.    Total time spent 30  minutes face to face with patient with more than 50% time spent in counseling and coordination of care.    Again, thank you for allowing me to participate in the care of your patient.      Sincerely,    Karma Adorno MD

## 2019-09-17 NOTE — PROGRESS NOTES
Vascular Surgery Consultation Note     Patient:  Roger Bonilla   Date of birth 1955, Medical record number [id number]  Date of Visit:  09/17/2019  Consult Requester:No att. providers found            Assessment and Recommendations:   ASSESSMENT / RECOMMENDATION:  Asymptomatic right carotid stenosis not in need of intervention. Will check duplex 6 months from his last study and also check on his peripheral circulation given his reported h/o leg stent in the past. Strongly encouraged smoking cessation. Would consider ECHO given prior PCI and recent overall development of edema.      Many thanks for involving me in the care of this very pleasant patient. Should any questions or concerns arise, please don't hesitate to contact me.    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   Last Carotid duplex in June 2019. Denies stroke, amaurosis or TIA. Had a leg stent about 13 years ago, PCI about 20 years ago. States he has been having issues with swelling in arms and legs lately.         Review of Systems:   CONSTITUTIONAL:  No fevers or chills  EYES: negative for icterus  ENT:  negative for hearing loss, tinnitus and sore throat  RESPIRATORY:  negative for cough with sputum and dyspnea  CARDIOVASCULAR:  negative for chest pain, dyspnea  GASTROINTESTINAL:  negative for nausea, vomiting, diarrhea and constipation  GENITOURINARY:  negative for dysuria  HEME:  No easy bruising  INTEGUMENT:  negative for rash and pruritus  NEURO:  Negative for headache           Past Medical History:     Past Medical History:   Diagnosis Date     Coronary artery disease may 1 2005    2 stents put in heart     Pure hypercholesterolemia      Type II or  unspecified type diabetes mellitus without mention of complication, not stated as uncontrolled      Unspecified cardiovascular disease 5-2001    MI -- Angioplasty two stents RCA & OM2     Unspecified essential hypertension             Past Surgical History:     Past Surgical History:   Procedure Laterality Date     CARDIAC SURGERY  may 1, 2005     COLONOSCOPY  11/30/2011    Procedure:COLONOSCOPY; Screening Colonoscopy; Surgeon:LUTHER FLORES; Location:[facility]     SURGICAL HISTORY OF -       None     VASCULAR SURGERY  oct 2013    stent put in leg            Family History:     Family History   Problem Relation Age of Onset     Cerebrovascular Disease Maternal Grandmother      Arthritis Other         hip fracture     Respiratory Father         emphysema     Alzheimer Disease Maternal Grandfather      Breast Cancer Sister      Cancer - colorectal No family hx of      Prostate Cancer No family hx of             Social History:     Social History     Tobacco Use     Smoking status: Current Every Day Smoker     Packs/day: 1.00     Years: 40.00     Pack years: 40.00     Types: Cigarettes     Start date: 1/1/1972     Smokeless tobacco: Never Used   Substance Use Topics     Alcohol use: Yes     Comment: 10 beers a week      History   Sexual Activity     Sexual activity: Yes     Partners: Female     Birth control/ protection: Condom            Current Medications (antimicrobials listed in bold):            Allergies:     Allergies   Allergen Reactions     Nkda [No Known Drug Allergies]             Physical Exam:   Vitals were reviewed  Patient Vitals for the past 24 hrs:   BP Pulse Resp   09/17/19 0836 (!) 163/84 80 18   09/17/19 0834 (!) 182/88 80 18     Ranges for his vital signs:  Pulse:  [80] 80  Resp:  [18] 18  BP: (163-182)/(84-88) 163/84    Physical Examination:  GENERAL:  well-developed, well-nourished, in no acute distress.   HEENT:  Head is normocephalic, atraumatic   EYES:  Eyes have anicteric sclerae without  conjunctival injection or stigmata of endocarditis.    ENT:  Oropharynx is moist without exudates or ulcers. Tongue is midline  NECK:  Supple. No cervical lymphadenopathy. No carotid bruits.  LUNGS:  Clear to auscultation bilateral.   CARDIOVASCULAR:  Regular rate and rhythm with no murmurs, gallops or rubs.  ABDOMEN:  Normal bowel sounds, soft, nontender. No appreciable pulsatile masses.  SKIN:  No acute rashes.    NEUROLOGIC:  Grossly nonfocal. Active x 4 extremities  PULSES: Radial, carotid, femoral pulses difficult to appreciate. Pedal  pulses by Doppler bilaterally.  EXTREMITIES: No Ulcers.         Laboratory Data:     Hematology Studies    Recent Labs   Lab Test 09/13/13  0640 08/14/13  0917 12/18/12  1800   WBC 12.0* 10.4 13.0*   ANEU  --   --  7.0   AEOS  --   --  0.7   HGB 16.4  --  15.0   MCV 93  --  93     --  221       Metabolic Studies     Recent Labs   Lab Test 06/11/19  0845 08/17/18  1030 07/29/17  0850 07/30/16  0959 01/29/16  1059    135 138 139 139   POTASSIUM 4.5 5.0 4.5 5.0 5.6*   CHLORIDE 103 102 104 104 103   CO2 30 29 31 30 35*   BUN 15 15 17 13 15   CR 0.70 0.83 0.88 0.81 0.81   GFRESTIMATED >90 >90 88 >90  Non  GFR Calc   >90  Non  GFR Calc         Imaging Studies  R ICA  with EDV 62 cm/s with ratio of 2.6 puts this at ~60-70% stenosis.    Total time spent 30  minutes face to face with patient with more than 50% time spent in counseling and coordination of care.

## 2019-09-18 ENCOUNTER — HOSPITAL ENCOUNTER (OUTPATIENT)
Dept: ULTRASOUND IMAGING | Facility: CLINIC | Age: 64
Discharge: HOME | End: 2019-09-18
Attending: SURGERY | Admitting: SURGERY
Payer: COMMERCIAL

## 2019-09-18 ENCOUNTER — HOSPITAL ENCOUNTER (OUTPATIENT)
Dept: ULTRASOUND IMAGING | Facility: CLINIC | Age: 64
End: 2019-09-18
Attending: SURGERY
Payer: COMMERCIAL

## 2019-09-18 DIAGNOSIS — I73.9 PAD (PERIPHERAL ARTERY DISEASE) (H): ICD-10-CM

## 2019-09-18 DIAGNOSIS — I65.23 ATHEROSCLEROSIS OF BOTH CAROTID ARTERIES: ICD-10-CM

## 2019-09-18 PROCEDURE — 93922 UPR/L XTREMITY ART 2 LEVELS: CPT

## 2019-09-18 PROCEDURE — 93925 LOWER EXTREMITY STUDY: CPT

## 2019-09-24 ENCOUNTER — OFFICE VISIT (OUTPATIENT)
Dept: ORTHOPEDICS | Facility: CLINIC | Age: 64
End: 2019-09-24
Payer: OTHER MISCELLANEOUS

## 2019-09-24 VITALS — DIASTOLIC BLOOD PRESSURE: 80 MMHG | SYSTOLIC BLOOD PRESSURE: 135 MMHG

## 2019-09-24 DIAGNOSIS — G56.03 BILATERAL CARPAL TUNNEL SYNDROME: ICD-10-CM

## 2019-09-24 DIAGNOSIS — G89.29 CHRONIC PAIN OF BOTH WRISTS: Primary | ICD-10-CM

## 2019-09-24 DIAGNOSIS — M25.532 CHRONIC PAIN OF BOTH WRISTS: Primary | ICD-10-CM

## 2019-09-24 DIAGNOSIS — M25.531 CHRONIC PAIN OF BOTH WRISTS: Primary | ICD-10-CM

## 2019-09-24 DIAGNOSIS — M25.441 SWELLING OF HAND JOINT, RIGHT: ICD-10-CM

## 2019-09-24 PROCEDURE — 76942 ECHO GUIDE FOR BIOPSY: CPT | Performed by: FAMILY MEDICINE

## 2019-09-24 PROCEDURE — 99214 OFFICE O/P EST MOD 30 MIN: CPT | Mod: 25 | Performed by: FAMILY MEDICINE

## 2019-09-24 PROCEDURE — 20526 THER INJECTION CARP TUNNEL: CPT | Mod: RT | Performed by: FAMILY MEDICINE

## 2019-09-24 RX ADMIN — TRIAMCINOLONE ACETONIDE 40 MG: 40 INJECTION, SUSPENSION INTRA-ARTICULAR; INTRAMUSCULAR at 13:40

## 2019-09-24 RX ADMIN — LIDOCAINE HYDROCHLORIDE 2 ML: 10 INJECTION, SOLUTION INFILTRATION; PERINEURAL at 13:40

## 2019-09-24 ASSESSMENT — MIFFLIN-ST. JEOR: SCORE: 1876.95

## 2019-09-24 NOTE — PROGRESS NOTES
Roger Bonilla  :  1955  DOS: 19  MRN: [id number]    Sports Medicine Clinic Visit    PCP: Maria De Jesus Hay    Roger Bonilla is a 63 year old Right hand dominant male who is seen as a self referral presenting with bilateral hand pain and numbness/tingling.    He has reported swelling in the right hand since March.  He reports shoulder pain to start, but that migrated to both hands, with his right hand more numb/tingling than left hand throughout the day, affecting the 3rd finger most, but all fingers to some degree.  No specific injury, but he reports worsening symptoms with repetitive activity at work using hands and arms for lift/carry/push/pul/grasp/manipulation.  He reports strength issues as well.  No history of infection.  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  Has been wearing wrist braces in evening, mild relief from symptoms while he was off of work.  No interim injury.       Review of Systems  Musculoskeletal: as above  Remainder of review of systems is negative including constitutional, CV, pulmonary, GI, Skin and Neurologic except as noted in HPI or medical history.    Past Medical History:   Diagnosis Date     Coronary artery disease may 1 2005    2 stents put in heart     Pure hypercholesterolemia      Type II or unspecified type diabetes mellitus without mention of complication, not stated as uncontrolled      Unspecified cardiovascular disease -    MI -- Angioplasty two stents RCA & OM2     Unspecified essential hypertension      Past Surgical History:   Procedure Laterality Date     CARDIAC SURGERY  may 1, 2005     COLONOSCOPY  2011    Procedure:COLONOSCOPY; Screening Colonoscopy; Surgeon:LUTHER FLORES; Location:[facility]     SURGICAL HISTORY OF -     "   None     VASCULAR SURGERY  oct 2013    stent put in leg     Family History   Problem Relation Age of Onset     Cerebrovascular Disease Maternal Grandmother      Arthritis Other         hip fracture     Respiratory Father         emphysema     Alzheimer Disease Maternal Grandfather      Breast Cancer Sister      Cancer - colorectal No family hx of      Prostate Cancer No family hx of      Objective  /80   Ht (P) 1.664 m (5' 5.5\")   Wt (P) 115.2 kg (254 lb)   BMI (P) 41.62 kg/m        General: healthy, alert and in no distress      HEENT: no scleral icterus or conjunctival erythema     Skin: no suspicious lesions or rash. No jaundice.     CV: regular rhythm by palpation, 2+ distal pulses, no pedal edema      Resp: normal respiratory effort without conversational dyspnea     Psych: normal mood and affect      Gait: nonantalgic, appropriate coordination and balance     Neuro: normal light touch sensory exam of the extremities. Motor strength as noted below     Bilateral Wrist and Hand exam    Inspection:       Swelling: mild over the dorsal hand and wrist on the right improved   Tender:       Radiocarpal joint R>L    Non Tender:       Remainder of the Wrist and Hand bilateral    ROM:       Decreased active and passive ROM of the R>L wrist with flexion and extension, painful flexion on the right     Strength:       Motor function intact, decreased thumb /opposition strength on the R>L     Special Tests:        positive (+) Tinel's test right ,       positive (+) Phalen's test bilateral,       neg (-) TFCC compression test bilateral and       neg (-) Finkelstein's maneuver bilateral    Neurovascular:       2+ radial pulses bilaterally with brisk capillary refill and      normal sensation to light touch in the radial, median and ulnar nerve distributions    Radiology:  Recent Results (from the past 744 hour(s))   XR Wrist Bilateral G/E 3 vw    Narrative    WRIST BILATERAL THREE OR MORE VIEWS     9/3/2019 " 2:14 PM     HISTORY: Chronic pain of both wrists.     COMPARISON: None.      Impression    IMPRESSION:   Right wrist: Joint spaces well-preserved. No evidence of fracture or  subluxation.    Left wrist: Mild STT and first MCP degenerative changes. Old ulnar  styloid avulsion fracture. No acute fracture or subluxation.    LONNY DURÁN MD     Hand / Upper Extremity Injection/Arthrocentesis: R carpal tunnel  Date/Time: 9/24/2019 1:40 PM  Performed by: Fantasma Cook DO  Authorized by: Fantasma Cook DO     Indications:  Therapeutic and diagnostic  Needle Size:  25 G  Guidance: ultrasound    Approach:  Ulnar  Condition: carpal tunnel      Site:  R carpal tunnel  Medications:  2 mL lidocaine 1 %; 40 mg triamcinolone 40 MG/ML  Outcome:  Tolerated well, no immediate complications  Procedure discussed: discussed risks, benefits, and alternatives    Consent Given by:  Patient  Timeout: timeout called immediately prior to procedure    Prep: patient was prepped and draped in usual sterile fashion     Hydrodissection of median nerve in carpal tunnel performed, with fenestration of transverse carpal ligament      Assessment:  1. Chronic pain of both wrists    2. Bilateral carpal tunnel syndrome    3. Swelling of hand joint, right        Plan:  Discussed the assessment with the patient.  Follow up: 3 weeks  Unsure of the etiology of the vague dorsal hand swelling on the right, advised f/u with PCP as this appears more derm related, I asked patient to think about chemical contacts or other risks at work  No clear infectious appearance, reviewed signs and sx of concern  Main concern with numbness, tingling and weakness in the R>L hand and wrist  Sx consistent with CTS, along with some generalized RC joint pain on the right  Continue with bracing, reviewed strategies for full time weaning to nighttime only  CSI to right carpal tunnel today  Reviewed option for EMG in the future  XR images independently visualized  and reviewed with patient today in clinic  No significant DJD in wrists today, which is reassuring  Letter for work updated  We also discussed again long term risks with the nature of his work relative to his age/conditioning for ongoing joint pains and overuse injuries  Home handouts provided and supportive care reviewed  All questions were answered today  Contact us with additional questions or concerns  Signs and sx of concern reviewed      Fantasma Cook DO, CAVALENCIA  Primary Care Sports Medicine  [facility]       Time spent in one-on-one evaluation and discussion with patient regarding nature of problem, course, prior treatments, and therapeutic options, at least 50% of which was spent in counseling and coordination of care: 27 minutes          Disclaimer: This note consists of symbols derived from keyboarding, dictation and/or voice recognition software. As a result, there may be errors in the script that have gone undetected. Please consider this when interpreting information found in this chart.

## 2019-09-24 NOTE — LETTER
2019         RE: Roger Bonilla  [address]  [facility] [address]        Dear Colleague,    Thank you for referring your patient, Roger Bonilla, to the [facility]. Please see a copy of my visit note below.    Roger Bonilla  :  1955  DOS: 19  MRN: [id number]    Sports Medicine Clinic Visit    PCP: Maria De Jesus Hay    Roger Bonilla is a 63 year old Right hand dominant male who is seen as a self referral presenting with bilateral hand pain and numbness/tingling.    He has reported swelling in the right hand since March.  He reports shoulder pain to start, but that migrated to both hands, with his right hand more numb/tingling than left hand throughout the day, affecting the 3rd finger most, but all fingers to some degree.  No specific injury, but he reports worsening symptoms with repetitive activity at work using hands and arms for lift/carry/push/pul/grasp/manipulation.  He reports strength issues as well.  No history of infection.  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  Has been wearing wrist braces in evening, mild relief from symptoms while he was off of work.  No interim injury.       Review of Systems  Musculoskeletal: as above  Remainder of review of systems is negative including constitutional, CV, pulmonary, GI, Skin and Neurologic except as noted in HPI or medical history.    Past Medical History:   Diagnosis Date     Coronary artery disease may 1 2005    2 stents put in heart     Pure hypercholesterolemia      Type II or unspecified type diabetes mellitus without mention of complication, not stated as uncontrolled      Unspecified cardiovascular disease -    MI -- Angioplasty two stents RCA & OM2     Unspecified essential  "hypertension      Past Surgical History:   Procedure Laterality Date     CARDIAC SURGERY  may 1, 2005     COLONOSCOPY  11/30/2011    Procedure:COLONOSCOPY; Screening Colonoscopy; Surgeon:LUTHER FLORES; Location:[facility]     SURGICAL HISTORY OF -       None     VASCULAR SURGERY  oct 2013    stent put in leg     Family History   Problem Relation Age of Onset     Cerebrovascular Disease Maternal Grandmother      Arthritis Other         hip fracture     Respiratory Father         emphysema     Alzheimer Disease Maternal Grandfather      Breast Cancer Sister      Cancer - colorectal No family hx of      Prostate Cancer No family hx of      Objective  /80   Ht (P) 1.664 m (5' 5.5\")   Wt (P) 115.2 kg (254 lb)   BMI (P) 41.62 kg/m         General: healthy, alert and in no distress      HEENT: no scleral icterus or conjunctival erythema     Skin: no suspicious lesions or rash. No jaundice.     CV: regular rhythm by palpation, 2+ distal pulses, no pedal edema      Resp: normal respiratory effort without conversational dyspnea     Psych: normal mood and affect      Gait: nonantalgic, appropriate coordination and balance     Neuro: normal light touch sensory exam of the extremities. Motor strength as noted below     Bilateral Wrist and Hand exam    Inspection:       Swelling: mild over the dorsal hand and wrist on the right improved   Tender:       Radiocarpal joint R>L    Non Tender:       Remainder of the Wrist and Hand bilateral    ROM:       Decreased active and passive ROM of the R>L wrist with flexion and extension, painful flexion on the right     Strength:       Motor function intact, decreased thumb /opposition strength on the R>L     Special Tests:        positive (+) Tinel's test right ,       positive (+) Phalen's test bilateral,       neg (-) TFCC compression test bilateral and       neg (-) Finkelstein's maneuver bilateral    Neurovascular:       2+ radial pulses bilaterally with brisk capillary " refill and      normal sensation to light touch in the radial, median and ulnar nerve distributions    Radiology:  Recent Results (from the past 744 hour(s))   XR Wrist Bilateral G/E 3 vw    Narrative    WRIST BILATERAL THREE OR MORE VIEWS     9/3/2019 2:14 PM     HISTORY: Chronic pain of both wrists.     COMPARISON: None.      Impression    IMPRESSION:   Right wrist: Joint spaces well-preserved. No evidence of fracture or  subluxation.    Left wrist: Mild STT and first MCP degenerative changes. Old ulnar  styloid avulsion fracture. No acute fracture or subluxation.    LONNY DURÁN MD     Hand / Upper Extremity Injection/Arthrocentesis: R carpal tunnel  Date/Time: 9/24/2019 1:40 PM  Performed by: Fantasma Cook DO  Authorized by: Fantasma Cook DO     Indications:  Therapeutic and diagnostic  Needle Size:  25 G  Guidance: ultrasound    Approach:  Ulnar  Condition: carpal tunnel      Site:  R carpal tunnel  Medications:  2 mL lidocaine 1 %; 40 mg triamcinolone 40 MG/ML  Outcome:  Tolerated well, no immediate complications  Procedure discussed: discussed risks, benefits, and alternatives    Consent Given by:  Patient  Timeout: timeout called immediately prior to procedure    Prep: patient was prepped and draped in usual sterile fashion     Hydrodissection of median nerve in carpal tunnel performed, with fenestration of transverse carpal ligament      Assessment:  1. Chronic pain of both wrists    2. Bilateral carpal tunnel syndrome    3. Swelling of hand joint, right        Plan:  Discussed the assessment with the patient.  Follow up: 3 weeks  Unsure of the etiology of the vague dorsal hand swelling on the right, advised f/u with PCP as this appears more derm related, I asked patient to think about chemical contacts or other risks at work  No clear infectious appearance, reviewed signs and sx of concern  Main concern with numbness, tingling and weakness in the R>L hand and wrist  Sx consistent with  CTS, along with some generalized RC joint pain on the right  Continue with bracing, reviewed strategies for full time weaning to nighttime only  CSI to right carpal tunnel today  Reviewed option for EMG in the future  XR images independently visualized and reviewed with patient today in clinic  No significant DJD in wrists today, which is reassuring  Letter for work updated  We also discussed again long term risks with the nature of his work relative to his age/conditioning for ongoing joint pains and overuse injuries  Home handouts provided and supportive care reviewed  All questions were answered today  Contact us with additional questions or concerns  Signs and sx of concern reviewed      Fantasma Cook DO, GAYLE  Primary Care Sports Medicine  [facility]       Time spent in one-on-one evaluation and discussion with patient regarding nature of problem, course, prior treatments, and therapeutic options, at least 50% of which was spent in counseling and coordination of care: 27 minutes          Disclaimer: This note consists of symbols derived from keyboarding, dictation and/or voice recognition software. As a result, there may be errors in the script that have gone undetected. Please consider this when interpreting information found in this chart.    Again, thank you for allowing me to participate in the care of your patient.        Sincerely,        Fantasma Cook DO

## 2019-09-24 NOTE — LETTER
September 24, 2019      Roger was seen in my office again today.  He requires ongoing modification to his normal work duties based on my follow up exam today, as his shoulder and wrist and hand symptoms have increased with work duties starting in February/March of 2019.  His shoulders are feeling better after cortisone injections.  His wrist symptoms today continue to demonstrate generalized pain in the wrist, but his worst symptoms are nerve related and are consistant with carpal tunnel syndrome, on the right much greater than the left.  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  He should continue to have a lift/carry restriction of 20 pounds.  He should avoid lifting any weight over his head, or any repetitive activity over shoulder height.  He should otherwise modify or eliminate other painful activity as needed in his wrists or hands.  We will try an injection for carpal tunnel syndrome today and evaluate his response.  Updated recommendations will be provided at his next visit in 3 weeks.      Fantasma Brady DO, GAYLE  Primary Care Sports Medicine  [facility]

## 2019-10-04 ENCOUNTER — TRANSFERRED RECORDS (OUTPATIENT)
Dept: HEALTH INFORMATION MANAGEMENT | Facility: CLINIC | Age: 64
End: 2019-10-04

## 2019-10-15 ENCOUNTER — OFFICE VISIT (OUTPATIENT)
Dept: ORTHOPEDICS | Facility: CLINIC | Age: 64
End: 2019-10-15
Payer: OTHER MISCELLANEOUS

## 2019-10-15 VITALS
SYSTOLIC BLOOD PRESSURE: 156 MMHG | BODY MASS INDEX: 41.14 KG/M2 | WEIGHT: 256 LBS | DIASTOLIC BLOOD PRESSURE: 77 MMHG | HEIGHT: 66 IN

## 2019-10-15 DIAGNOSIS — M25.512 PAIN OF BOTH SHOULDER JOINTS: ICD-10-CM

## 2019-10-15 DIAGNOSIS — M25.511 PAIN OF BOTH SHOULDER JOINTS: ICD-10-CM

## 2019-10-15 DIAGNOSIS — M25.532 CHRONIC PAIN OF BOTH WRISTS: Primary | ICD-10-CM

## 2019-10-15 DIAGNOSIS — G56.03 BILATERAL CARPAL TUNNEL SYNDROME: ICD-10-CM

## 2019-10-15 DIAGNOSIS — M25.531 CHRONIC PAIN OF BOTH WRISTS: Primary | ICD-10-CM

## 2019-10-15 DIAGNOSIS — G89.29 CHRONIC PAIN OF BOTH WRISTS: Primary | ICD-10-CM

## 2019-10-15 PROCEDURE — 99213 OFFICE O/P EST LOW 20 MIN: CPT | Performed by: FAMILY MEDICINE

## 2019-10-15 ASSESSMENT — MIFFLIN-ST. JEOR: SCORE: 1886.02

## 2019-10-15 NOTE — PROGRESS NOTES
Roger Bonilla  :  1955  DOS: 10/15/19  MRN: [id number]    Sports Medicine Clinic Visit    PCP: Maria De Jesus Hay    Roger Bonilla is a 63 year old Right hand dominant male who is seen as a self referral presenting with bilateral hand pain and numbness/tingling.    He has reported swelling in the right hand since March.  He reports shoulder pain to start, but that migrated to both hands, with his right hand more numb/tingling than left hand throughout the day, affecting the 3rd finger most, but all fingers to some degree.  No specific injury, but he reports worsening symptoms with repetitive activity at work using hands and arms for lift/carry/push/pul/grasp/manipulation.  He reports strength issues as well.  No history of infection.  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  Has been wearing wrist braces in evening, mild relief from symptoms while he was off of work.  No interim injury.       Interim History - October 15, 2019  Since last visit on 2019 patient has mild-moderate right hand numbness/tingling, continues to have left hand pain.  Right carpal tunnel injection completed on  provided some relief.  Notes overall tingling is improved, but still present.  Continues to have discomfort with grasping objects.  No new injury in the interim.      Review of Systems  Musculoskeletal: as above  Remainder of review of systems is negative including constitutional, CV, pulmonary, GI, Skin and Neurologic except as noted in HPI or medical history.    Past Medical History:   Diagnosis Date     Coronary artery disease may 1 2005    2 stents put in heart     Pure hypercholesterolemia      Type II or unspecified type diabetes mellitus without mention of complication, not stated as  "uncontrolled      Unspecified cardiovascular disease 5-2001    MI -- Angioplasty two stents RCA & OM2     Unspecified essential hypertension      Past Surgical History:   Procedure Laterality Date     CARDIAC SURGERY  may 1, 2005     COLONOSCOPY  11/30/2011    Procedure:COLONOSCOPY; Screening Colonoscopy; Surgeon:LUTHER FLORES; Location:[facility]     SURGICAL HISTORY OF -       None     VASCULAR SURGERY  oct 2013    stent put in leg     Family History   Problem Relation Age of Onset     Cerebrovascular Disease Maternal Grandmother      Arthritis Other         hip fracture     Respiratory Father         emphysema     Alzheimer Disease Maternal Grandfather      Breast Cancer Sister      Cancer - colorectal No family hx of      Prostate Cancer No family hx of          Objective  BP (!) 156/77   Ht 1.664 m (5' 5.5\")   Wt 116.1 kg (256 lb)   BMI 41.95 kg/m        General: healthy, alert and in no distress      HEENT: no scleral icterus or conjunctival erythema     Skin: no suspicious lesions or rash. No jaundice.     CV: regular rhythm by palpation, 2+ distal pulses, no pedal edema      Resp: normal respiratory effort without conversational dyspnea     Psych: normal mood and affect      Gait: nonantalgic, appropriate coordination and balance     Neuro: normal light touch sensory exam of the extremities. Motor strength as noted below     Bilateral Wrist and Hand exam    Inspection:       Swelling: mild over the dorsal hand and wrist on the right improved    Tender:       Radiocarpal joint R>>L improved    Non Tender:       Remainder of the Wrist and Hand bilateral    ROM:       Minimally decreased active and passive ROM of the b/l wrist with flexino and extension, painful flexion on the right     Strength:       Motor function intact, decreased thumb /opposition strength on the R, similar to previous    Special Tests:        positive (+) Tinel's test right improved,       positive (+) Phalen's test bilateral " improved,       neg (-) TFCC compression test bilateral and       neg (-) Finkelstein's maneuver bilateral    Neurovascular:       2+ radial pulses bilaterally with brisk capillary refill and      normal sensation to light touch in the radial, median and ulnar nerve distributions    Radiology:  Recent Results (from the past 744 hour(s))   XR Wrist Bilateral G/E 3 vw    Narrative    WRIST BILATERAL THREE OR MORE VIEWS     9/3/2019 2:14 PM     HISTORY: Chronic pain of both wrists.     COMPARISON: None.      Impression    IMPRESSION:   Right wrist: Joint spaces well-preserved. No evidence of fracture or  subluxation.    Left wrist: Mild STT and first MCP degenerative changes. Old ulnar  styloid avulsion fracture. No acute fracture or subluxation.    LONNY DURÁN MD     Procedures  Assessment:  1. Chronic pain of both wrists    2. Bilateral carpal tunnel syndrome    3. Pain of both shoulder joints        Plan:  Discussed the assessment with the patient.  Follow up: 8 weeks  Overall the shoulders continue to feel much better after glenohumeral joint injections  He does have intermittent mild pain on the right, but does not need additional intervention at this point  Main concern today continues to be numbness, tingling and weakness in the R>L hand and wrist  Overall his symptoms are significantly improved after the right carpal tunnel injection  He still has mild symptoms, but does not believe that there is any significant pain that was not impacted by the injection  Advised that the strength deficits he is noticing may improve over time, but may not as well  Continue with bracing, reviewed strategies for full time weaning to nighttime only  Reviewed option for EMG in the future  Reviewed option for orthopedic hand surgery referral in the future  Advised physical therapy, and offered occupational hand therapy as well  He deferred these for now, are available anytime in the future if desired  XR images independently  visualized and reviewed with patient today in clinic  Letter for work updated, continue current restrictions, can consider making permanent overtime based on his ongoing clinical progress  We also again discussed long term risks with the nature of his work relative to his age/conditioning for ongoing joint pains and overuse injuries  Home handouts provided and supportive care reviewed  All questions were answered today  Contact us with additional questions or concerns  Signs and sx of concern reviewed      Fantasma Cook DO, CAVALENCIA  Primary Care Sports Medicine  [facility]             Disclaimer: This note consists of symbols derived from keyboarding, dictation and/or voice recognition software. As a result, there may be errors in the script that have gone undetected. Please consider this when interpreting information found in this chart.

## 2019-10-15 NOTE — LETTER
10/15/2019         RE: Roger Bonilla  [address]  [facility] [address]        Dear Colleague,    Thank you for referring your patient, Roger Bonilla, to the [facility]. Please see a copy of my visit note below.    Roger Bonilla  :  1955  DOS: 10/15/19  MRN: [id number]    Sports Medicine Clinic Visit    PCP: Maria De Jesus Hay    Roger Bonilla is a 63 year old Right hand dominant male who is seen as a self referral presenting with bilateral hand pain and numbness/tingling.    He has reported swelling in the right hand since March.  He reports shoulder pain to start, but that migrated to both hands, with his right hand more numb/tingling than left hand throughout the day, affecting the 3rd finger most, but all fingers to some degree.  No specific injury, but he reports worsening symptoms with repetitive activity at work using hands and arms for lift/carry/push/pul/grasp/manipulation.  He reports strength issues as well.  No history of infection.  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  Has been wearing wrist braces in evening, mild relief from symptoms while he was off of work.  No interim injury.       Interim History - October 15, 2019  Since last visit on 2019 patient has mild-moderate right hand numbness/tingling, continues to have left hand pain.  Right carpal tunnel injection completed on  provided some relief.  Notes overall tingling is improved, but still present.  Continues to have discomfort with grasping objects.  No new injury in the interim.      Review of Systems  Musculoskeletal: as above  Remainder of review of systems is negative including constitutional, CV, pulmonary, GI, Skin and Neurologic except as noted in HPI or medical  "history.    Past Medical History:   Diagnosis Date     Coronary artery disease may 1 2005    2 stents put in heart     Pure hypercholesterolemia      Type II or unspecified type diabetes mellitus without mention of complication, not stated as uncontrolled      Unspecified cardiovascular disease 5-2001    MI -- Angioplasty two stents RCA & OM2     Unspecified essential hypertension      Past Surgical History:   Procedure Laterality Date     CARDIAC SURGERY  may 1, 2005     COLONOSCOPY  11/30/2011    Procedure:COLONOSCOPY; Screening Colonoscopy; Surgeon:LUTHER FLORES; Location:[facility]     SURGICAL HISTORY OF -       None     VASCULAR SURGERY  oct 2013    stent put in leg     Family History   Problem Relation Age of Onset     Cerebrovascular Disease Maternal Grandmother      Arthritis Other         hip fracture     Respiratory Father         emphysema     Alzheimer Disease Maternal Grandfather      Breast Cancer Sister      Cancer - colorectal No family hx of      Prostate Cancer No family hx of          Objective  BP (!) 156/77   Ht 1.664 m (5' 5.5\")   Wt 116.1 kg (256 lb)   BMI 41.95 kg/m         General: healthy, alert and in no distress      HEENT: no scleral icterus or conjunctival erythema     Skin: no suspicious lesions or rash. No jaundice.     CV: regular rhythm by palpation, 2+ distal pulses, no pedal edema      Resp: normal respiratory effort without conversational dyspnea     Psych: normal mood and affect      Gait: nonantalgic, appropriate coordination and balance     Neuro: normal light touch sensory exam of the extremities. Motor strength as noted below     Bilateral Wrist and Hand exam    Inspection:       Swelling: mild over the dorsal hand and wrist on the right improved    Tender:       Radiocarpal joint R>>L improved    Non Tender:       Remainder of the Wrist and Hand bilateral    ROM:       Minimally decreased active and passive ROM of the b/l wrist with flexino and extension, painful " flexion on the right     Strength:       Motor function intact, decreased thumb /opposition strength on the R, similar to previous    Special Tests:        positive (+) Tinel's test right improved,       positive (+) Phalen's test bilateral improved,       neg (-) TFCC compression test bilateral and       neg (-) Finkelstein's maneuver bilateral    Neurovascular:       2+ radial pulses bilaterally with brisk capillary refill and      normal sensation to light touch in the radial, median and ulnar nerve distributions    Radiology:  Recent Results (from the past 744 hour(s))   XR Wrist Bilateral G/E 3 vw    Narrative    WRIST BILATERAL THREE OR MORE VIEWS     9/3/2019 2:14 PM     HISTORY: Chronic pain of both wrists.     COMPARISON: None.      Impression    IMPRESSION:   Right wrist: Joint spaces well-preserved. No evidence of fracture or  subluxation.    Left wrist: Mild STT and first MCP degenerative changes. Old ulnar  styloid avulsion fracture. No acute fracture or subluxation.    LONNY DURÁN MD     Procedures  Assessment:  1. Chronic pain of both wrists    2. Bilateral carpal tunnel syndrome    3. Pain of both shoulder joints        Plan:  Discussed the assessment with the patient.  Follow up: 8 weeks  Overall the shoulders continue to feel much better after glenohumeral joint injections  He does have intermittent mild pain on the right, but does not need additional intervention at this point  Main concern today continues to be numbness, tingling and weakness in the R>L hand and wrist  Overall his symptoms are significantly improved after the right carpal tunnel injection  He still has mild symptoms, but does not believe that there is any significant pain that was not impacted by the injection  Advised that the strength deficits he is noticing may improve over time, but may not as well  Continue with bracing, reviewed strategies for full time weaning to nighttime only  Reviewed option for EMG in the  future  Reviewed option for orthopedic hand surgery referral in the future  Advised physical therapy, and offered occupational hand therapy as well  He deferred these for now, are available anytime in the future if desired  XR images independently visualized and reviewed with patient today in clinic  Letter for work updated, continue current restrictions, can consider making permanent overtime based on his ongoing clinical progress  We also again discussed long term risks with the nature of his work relative to his age/conditioning for ongoing joint pains and overuse injuries  Home handouts provided and supportive care reviewed  All questions were answered today  Contact us with additional questions or concerns  Signs and sx of concern reviewed      Fantasma Cook DO, GAYLE  Primary Care Sports Medicine  [facility]             Disclaimer: This note consists of symbols derived from keyboarding, dictation and/or voice recognition software. As a result, there may be errors in the script that have gone undetected. Please consider this when interpreting information found in this chart.    Again, thank you for allowing me to participate in the care of your patient.        Sincerely,        Fantasma Cook DO

## 2019-10-15 NOTE — LETTER
October 15, 2019      Roger was seen in my office again today.  He requires ongoing modification to his normal work duties for his shoulder and wrist and hand symptoms with work duties starting in February/March of 2019.  His shoulders are feeling better after cortisone injections, with only mild recurrence of pain on the right intermittently.  His wrist symptoms today have overall improved after injection for CTS.  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  He should continue to have a lift/carry restriction of 20 pounds.  He should avoid lifting any weight over his head, or any repetitive activity over shoulder height.  He should otherwise modify or eliminate other painful activity as needed in his wrists or hands. I have offered physical therapy and occupational therapy if needed, and these will continue to be available if needed.  We will consider whether to make these restrictions permanent based on his ongoing progress. Updated recommendations will be provided at his next visit in 2 months, sooner if needed.        Fantasma Brady, , GAYLE  Primary Care Sports Medicine  [facility]

## 2019-11-06 RX ORDER — TRIAMCINOLONE ACETONIDE 40 MG/ML
40 INJECTION, SUSPENSION INTRA-ARTICULAR; INTRAMUSCULAR
Status: DISCONTINUED | OUTPATIENT
Start: 2019-09-24 | End: 2020-09-08

## 2019-11-06 RX ORDER — LIDOCAINE HYDROCHLORIDE 10 MG/ML
2 INJECTION, SOLUTION INFILTRATION; PERINEURAL
Status: DISCONTINUED | OUTPATIENT
Start: 2019-09-24 | End: 2020-09-08

## 2019-11-14 ENCOUNTER — OFFICE VISIT (OUTPATIENT)
Dept: ORTHOPEDICS | Facility: CLINIC | Age: 64
End: 2019-11-14
Payer: OTHER MISCELLANEOUS

## 2019-11-14 VITALS
DIASTOLIC BLOOD PRESSURE: 86 MMHG | BODY MASS INDEX: 42.27 KG/M2 | SYSTOLIC BLOOD PRESSURE: 161 MMHG | HEIGHT: 66 IN | WEIGHT: 263 LBS

## 2019-11-14 DIAGNOSIS — M25.511 PAIN OF BOTH SHOULDER JOINTS: Primary | ICD-10-CM

## 2019-11-14 DIAGNOSIS — M19.011 OSTEOARTHRITIS OF BILATERAL GLENOHUMERAL JOINTS: ICD-10-CM

## 2019-11-14 DIAGNOSIS — M25.512 PAIN OF BOTH SHOULDER JOINTS: Primary | ICD-10-CM

## 2019-11-14 DIAGNOSIS — M19.012 OSTEOARTHRITIS OF BILATERAL GLENOHUMERAL JOINTS: ICD-10-CM

## 2019-11-14 DIAGNOSIS — G56.03 BILATERAL CARPAL TUNNEL SYNDROME: ICD-10-CM

## 2019-11-14 PROCEDURE — 20611 DRAIN/INJ JOINT/BURSA W/US: CPT | Mod: LT | Performed by: FAMILY MEDICINE

## 2019-11-14 PROCEDURE — 99213 OFFICE O/P EST LOW 20 MIN: CPT | Mod: 25 | Performed by: FAMILY MEDICINE

## 2019-11-14 RX ADMIN — ROPIVACAINE HYDROCHLORIDE 3 ML: 5 INJECTION, SOLUTION EPIDURAL; INFILTRATION; PERINEURAL at 14:00

## 2019-11-14 RX ADMIN — TRIAMCINOLONE ACETONIDE 40 MG: 40 INJECTION, SUSPENSION INTRA-ARTICULAR; INTRAMUSCULAR at 14:00

## 2019-11-14 ASSESSMENT — MIFFLIN-ST. JEOR: SCORE: 1917.77

## 2019-11-14 NOTE — LETTER
2019         RE: Roger Bonilla  [address]  [facility] [address]        Dear Colleague,    Thank you for referring your patient, Roger Bonilla, to the [facility]. Please see a copy of my visit note below.    Roger Bonilla  :  1955  DOS: 19  MRN: [id number]    Sports Medicine Clinic Visit    PCP: Maria De Jesus Hay    Roger Bonilla is a 63 year old Right hand dominant male who is seen in consultation at the request of  Maria De Jesus Hay PA-C presenting with chronic bilateral shoulder pain.    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  Pain located over bilateral deep anterior shoulder, biceps region, radiating to hands.  Reports constant radiating, numbness/tingling and swelling to bilateral hands, mostly ring fingers.  Additional Features:  Positive: swelling, numbness and weakness.  Symptoms are better with Rest.  Symptoms are worse with: lifting, shoulder flexion/abduction, lying on either shoulder.  Hand numbness is worse in evening.  Other evaluation and/or treatments so far consists of: Ibuprofen, Rest and PCP.  Recent imaging completed: X-rays completed 19.  Prior History of related problems: H/o mild baseline shoulder pain that he has not treated in recent past.    Social History: works  for [employer]    Interim History - 2019  Since last visit on 2019 patient has mild right shoulder pain, minimal left shoulder pain over the last 2 weeks.  Bilateral shoulder glenohumeral injection completed on  provided excellent relief on left, ~ 80% relief on right.  Continues to note mild tingling sensation in right hand and pain with shoulder flexion.  No new injury in the interim.    Interim History - 2019  Since last visit on 10/15/2019 patient has moderate bilateral shoulder pain right >>>left.  Bilateral shoulder glenohumeral  "injection completed on 6/20/19 provided good relief for ~ 3.5 - 4 months.  Patient desires repeat injections today.  No new injury in the interim.    Right hand numbness/tingling continues to be improved following carpal tunnel injection on 9/24.    Review of Systems  Musculoskeletal: as above  Remainder of review of systems is negative including constitutional, CV, pulmonary, GI, Skin and Neurologic except as noted in HPI or medical history.    Past Medical History:   Diagnosis Date     Coronary artery disease may 1 2005    2 stents put in heart     Pure hypercholesterolemia      Type II or unspecified type diabetes mellitus without mention of complication, not stated as uncontrolled      Unspecified cardiovascular disease 5-2001    MI -- Angioplasty two stents RCA & OM2     Unspecified essential hypertension      Past Surgical History:   Procedure Laterality Date     CARDIAC SURGERY  may 1, 2005     COLONOSCOPY  11/30/2011    Procedure:COLONOSCOPY; Screening Colonoscopy; Surgeon:LUTHER FLORES; Location:[facility]     SURGICAL HISTORY OF -       None     VASCULAR SURGERY  oct 2013    stent put in leg       Objective  BP (!) 161/86   Ht 1.664 m (5' 5.5\")   Wt 119.3 kg (263 lb)   BMI 43.10 kg/m       General: healthy, alert and in no distress    HEENT: no scleral icterus or conjunctival erythema   Skin: no suspicious lesions or rash. No jaundice.   CV: regular rhythm by palpation, 2+ distal pulses, no pedal edema    Resp: normal respiratory effort without conversational dyspnea   Psych: normal mood and affect    Gait: nonantalgic, appropriate coordination and balance   Neuro: normal light touch sensory exam of the extremities. Motor strength as noted below     Bilateral Shoulder exam    ROM:        Decreased active and passive ROM with flexion, extension, abduction, internal and external rotation.    Tender:        Anterior GH joint > subacromial areas, lateral deltoid, more focal left than right today    Non " Tender:       remainder of shoulder    Strength:        abduction 5/5       internal rotation 5/5       external rotation 5/5       adduction 5/5    Impingement testing:        positive (+) Neer       neg (-) empty can       neg (-) crossover       Left positive (+) crank    Stability testing:       neg (-) anterior glide       neg (-) sulcus sign    Skin:       no visible deformities       well perfused       capillary refill brisk    Sensation:        normal sensation over shoulder and upper extremity       Radiology  Results for orders placed or performed in visit on 06/11/19   XR Shoulder Right 2 Views    Narrative    RIGHT SHOULDER 2 VIEWS  6/11/2019 8:37 AM     HISTORY: Chronic pain of both shoulders.    COMPARISON: 8/1/2005      Impression    IMPRESSION: Moderate acromioclavicular degenerative change and mild  inferior glenohumeral degenerative change. No evidence of acute  fracture.    KAN FERRARA MD     Large Joint Injection/Arthocentesis: L glenohumeral joint  Date/Time: 11/14/2019 2:00 PM  Performed by: Fantasma Cook DO  Authorized by: Fantasma Cook DO     Indications:  Pain  Needle Size:  21 G  Guidance: ultrasound    Approach:  Posterolateral  Location:  Shoulder      Site:  L glenohumeral joint  Medications:  40 mg triamcinolone 40 MG/ML; 3 mL ropivacaine 5 MG/ML  Outcome:  Tolerated well, no immediate complications  Procedure discussed: discussed risks, benefits, and alternatives    Consent Given by:  Patient  Timeout: timeout called immediately prior to procedure    Prep: patient was prepped and draped in usual sterile fashion        Assessment:  1. Pain of both shoulder joints    2. Osteoarthritis of bilateral glenohumeral joints    3. Bilateral carpal tunnel syndrome        Plan:  Discussed the assessment with the patient.  Follow up: prn based on clinical progress  XR images independently visualized and reviewed with patient again today in clinic  Mild GH joint changes,  moderate AC joint changes  B/l GH joint injections had provided good relief so far, worsening pain now on L>R  Repeat CSI to the left GH joint today, can consider on Right again in future if needed  Ongoing work restrictions outlined in letter  PT options reviewed  Expectations and goals of CSI reviewed  Often 2-3 days for steroid effect, and can take up to two weeks for maximum effect  We discussed modified progressive pain-free activity as tolerated  Do not overuse in first two weeks if feeling better due to concern for vulnerability while steroid is working  Supportive care reviewed  All questions were answered today  Contact us with additional questions or concerns  Signs and sx of concern reviewed      Fantasma Cook DO, GAYLE  Primary Care Sports Medicine  [facility]           Disclaimer: This note consists of symbols derived from keyboarding, dictation and/or voice recognition software. As a result, there may be errors in the script that have gone undetected. Please consider this when interpreting information found in this chart.    Again, thank you for allowing me to participate in the care of your patient.        Sincerely,        Fantasma Cook DO

## 2019-11-14 NOTE — LETTER
November 14, 2019      Roger was seen in my office again today.  He requires ongoing modification to his normal work duties for his shoulder and wrist and hand symptoms, which started with work duties starting in February/March of 2019.  His shoulders were feeling better after cortisone injections to the glenohumeral joint, with only mild recurrence of pain on the right intermittently, and now moderate recurrence of pain on the left.  His wrist symptoms today continue to be overall improved after injection for Carpal Tunnel Syndrome.  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  He should continue to have a lift/carry restriction of 20 pounds.  He should avoid lifting any weight over his head, or any repetitive activity over shoulder height.  He should otherwise modify or eliminate other painful activity as needed in his wrists or hands. I have offered physical therapy and occupational therapy if needed, and these will continue to be available if needed.  We will consider whether to make these restrictions permanent based on his ongoing progress. Updated recommendations will be provided at his next visit in 3 months, sooner if needed.        Fantasma Brady DO, GAYLE  Primary Care Sports Medicine  [facility]

## 2019-11-14 NOTE — PROGRESS NOTES
Roger Bonilla  :  1955  DOS: 19  MRN: [id number]    Sports Medicine Clinic Visit    PCP: Maria De Jesus Hay    Roger Bonilla is a 63 year old Right hand dominant male who is seen in consultation at the request of  Maria De Jesus Hay PA-C presenting with chronic bilateral shoulder pain.    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  Pain located over bilateral deep anterior shoulder, biceps region, radiating to hands.  Reports constant radiating, numbness/tingling and swelling to bilateral hands, mostly ring fingers.  Additional Features:  Positive: swelling, numbness and weakness.  Symptoms are better with Rest.  Symptoms are worse with: lifting, shoulder flexion/abduction, lying on either shoulder.  Hand numbness is worse in evening.  Other evaluation and/or treatments so far consists of: Ibuprofen, Rest and PCP.  Recent imaging completed: X-rays completed 19.  Prior History of related problems: H/o mild baseline shoulder pain that he has not treated in recent past.    Social History: works  for [employer]    Interim History - 2019  Since last visit on 2019 patient has mild right shoulder pain, minimal left shoulder pain over the last 2 weeks.  Bilateral shoulder glenohumeral injection completed on  provided excellent relief on left, ~ 80% relief on right.  Continues to note mild tingling sensation in right hand and pain with shoulder flexion.  No new injury in the interim.    Interim History - 2019  Since last visit on 10/15/2019 patient has moderate bilateral shoulder pain right >>>left.  Bilateral shoulder glenohumeral injection completed on 19 provided good relief for ~ 3.5 - 4 months.  Patient desires repeat injections today.  No new injury in the interim.    Right hand numbness/tingling continues to be improved following carpal tunnel injection on .    Review  "of Systems  Musculoskeletal: as above  Remainder of review of systems is negative including constitutional, CV, pulmonary, GI, Skin and Neurologic except as noted in HPI or medical history.    Past Medical History:   Diagnosis Date     Coronary artery disease may 1 2005    2 stents put in heart     Pure hypercholesterolemia      Type II or unspecified type diabetes mellitus without mention of complication, not stated as uncontrolled      Unspecified cardiovascular disease 5-2001    MI -- Angioplasty two stents RCA & OM2     Unspecified essential hypertension      Past Surgical History:   Procedure Laterality Date     CARDIAC SURGERY  may 1, 2005     COLONOSCOPY  11/30/2011    Procedure:COLONOSCOPY; Screening Colonoscopy; Surgeon:LUTHER FLORES; Location:[facility]     SURGICAL HISTORY OF -       None     VASCULAR SURGERY  oct 2013    stent put in leg       Objective  BP (!) 161/86   Ht 1.664 m (5' 5.5\")   Wt 119.3 kg (263 lb)   BMI 43.10 kg/m      General: healthy, alert and in no distress    HEENT: no scleral icterus or conjunctival erythema   Skin: no suspicious lesions or rash. No jaundice.   CV: regular rhythm by palpation, 2+ distal pulses, no pedal edema    Resp: normal respiratory effort without conversational dyspnea   Psych: normal mood and affect    Gait: nonantalgic, appropriate coordination and balance   Neuro: normal light touch sensory exam of the extremities. Motor strength as noted below     Bilateral Shoulder exam    ROM:        Decreased active and passive ROM with flexion, extension, abduction, internal and external rotation.    Tender:        Anterior GH joint > subacromial areas, lateral deltoid, more focal left than right today    Non Tender:       remainder of shoulder    Strength:        abduction 5/5       internal rotation 5/5       external rotation 5/5       adduction 5/5    Impingement testing:        positive (+) Neer       neg (-) empty can       neg (-) crossover       Left " positive (+) crank    Stability testing:       neg (-) anterior glide       neg (-) sulcus sign    Skin:       no visible deformities       well perfused       capillary refill brisk    Sensation:        normal sensation over shoulder and upper extremity       Radiology  Results for orders placed or performed in visit on 06/11/19   XR Shoulder Right 2 Views    Narrative    RIGHT SHOULDER 2 VIEWS  6/11/2019 8:37 AM     HISTORY: Chronic pain of both shoulders.    COMPARISON: 8/1/2005      Impression    IMPRESSION: Moderate acromioclavicular degenerative change and mild  inferior glenohumeral degenerative change. No evidence of acute  fracture.    KAN FERRARA MD     Large Joint Injection/Arthocentesis: L glenohumeral joint  Date/Time: 11/14/2019 2:00 PM  Performed by: Fantasma Cook DO  Authorized by: Fantasma Cook DO     Indications:  Pain  Needle Size:  21 G  Guidance: ultrasound    Approach:  Posterolateral  Location:  Shoulder      Site:  L glenohumeral joint  Medications:  40 mg triamcinolone 40 MG/ML; 3 mL ropivacaine 5 MG/ML  Outcome:  Tolerated well, no immediate complications  Procedure discussed: discussed risks, benefits, and alternatives    Consent Given by:  Patient  Timeout: timeout called immediately prior to procedure    Prep: patient was prepped and draped in usual sterile fashion        Assessment:  1. Pain of both shoulder joints    2. Osteoarthritis of bilateral glenohumeral joints    3. Bilateral carpal tunnel syndrome        Plan:  Discussed the assessment with the patient.  Follow up: prn based on clinical progress  XR images independently visualized and reviewed with patient again today in clinic  Mild GH joint changes, moderate AC joint changes  B/l GH joint injections had provided good relief so far, worsening pain now on L>R  Repeat CSI to the left GH joint today, can consider on Right again in future if needed  Ongoing work restrictions outlined in letter  PT  options reviewed  Expectations and goals of CSI reviewed  Often 2-3 days for steroid effect, and can take up to two weeks for maximum effect  We discussed modified progressive pain-free activity as tolerated  Do not overuse in first two weeks if feeling better due to concern for vulnerability while steroid is working  Supportive care reviewed  All questions were answered today  Contact us with additional questions or concerns  Signs and sx of concern reviewed      Fantasma Cook DO, CAVALENCIA  Primary Care Sports Medicine  [facility]           Disclaimer: This note consists of symbols derived from keyboarding, dictation and/or voice recognition software. As a result, there may be errors in the script that have gone undetected. Please consider this when interpreting information found in this chart.

## 2019-11-18 RX ORDER — TRIAMCINOLONE ACETONIDE 40 MG/ML
40 INJECTION, SUSPENSION INTRA-ARTICULAR; INTRAMUSCULAR
Status: DISCONTINUED | OUTPATIENT
Start: 2019-11-14 | End: 2020-09-08

## 2019-11-18 RX ORDER — ROPIVACAINE HYDROCHLORIDE 5 MG/ML
3 INJECTION, SOLUTION EPIDURAL; INFILTRATION; PERINEURAL
Status: DISCONTINUED | OUTPATIENT
Start: 2019-11-14 | End: 2020-09-08

## 2019-12-05 ENCOUNTER — TELEPHONE (OUTPATIENT)
Dept: FAMILY MEDICINE | Facility: CLINIC | Age: 64
End: 2019-12-05

## 2019-12-05 DIAGNOSIS — E11.9 TYPE 2 DIABETES MELLITUS WITHOUT COMPLICATION, WITHOUT LONG-TERM CURRENT USE OF INSULIN (H): Primary | ICD-10-CM

## 2019-12-05 NOTE — TELEPHONE ENCOUNTER
Patient is due for a diabetes follow up appointment with me.      Non-fasting labs due:  A1C    Orders were placed, please have lab work done before visit.      Please ask patient to bring in their glucometer so we can download the data.    Please call patient to schedule.  Maria De Jesus Hay, CNP

## 2019-12-08 ENCOUNTER — HEALTH MAINTENANCE LETTER (OUTPATIENT)
Age: 64
End: 2019-12-08

## 2019-12-10 NOTE — TELEPHONE ENCOUNTER
Panel Management Review      Patient has the following on his problem list:     Diabetes        Composite cancer screening  Chart review shows that this patient is due/due soon for the following None  Summary:    Patient is due/failing the following:     Patient is due for Diabetes office visit with Maria De Jesus Hay NP and  lab to be done prior   Non-fasting labs due:  A1C     Orders were placed, please have lab work done before visit.       Please ask patient to bring in their glucometer so we can download the data.      Type of outreach:    Phone, left message for patient to call back.     Maco SOTO CMA

## 2019-12-16 NOTE — TELEPHONE ENCOUNTER
Panel Management Review      Patient has the following on his problem list:     Diabetes    Patient is due/failing the following:   Office visit  and A1C  Type of outreach:    Phone, spoke to patient.  He stated he will have to look at his schedule and call back another time.                                                                                                                                      Maco SOTO CMA

## 2019-12-17 ENCOUNTER — HOSPITAL ENCOUNTER (OUTPATIENT)
Dept: ULTRASOUND IMAGING | Facility: CLINIC | Age: 64
Discharge: HOME | End: 2019-12-17
Attending: SURGERY | Admitting: SURGERY
Payer: COMMERCIAL

## 2019-12-17 ENCOUNTER — OFFICE VISIT (OUTPATIENT)
Dept: VASCULAR SURGERY | Facility: CLINIC | Age: 64
End: 2019-12-17
Payer: COMMERCIAL

## 2019-12-17 VITALS — RESPIRATION RATE: 18 BRPM | DIASTOLIC BLOOD PRESSURE: 83 MMHG | HEART RATE: 72 BPM | SYSTOLIC BLOOD PRESSURE: 164 MMHG

## 2019-12-17 DIAGNOSIS — I73.9 PAD (PERIPHERAL ARTERY DISEASE) (H): ICD-10-CM

## 2019-12-17 DIAGNOSIS — I65.23 ATHEROSCLEROSIS OF BOTH CAROTID ARTERIES: Primary | ICD-10-CM

## 2019-12-17 DIAGNOSIS — I65.23 ATHEROSCLEROSIS OF BOTH CAROTID ARTERIES: ICD-10-CM

## 2019-12-17 PROCEDURE — 93880 EXTRACRANIAL BILAT STUDY: CPT

## 2019-12-17 PROCEDURE — 99213 OFFICE O/P EST LOW 20 MIN: CPT | Performed by: SURGERY

## 2019-12-17 NOTE — NURSING NOTE
"Initial BP (!) 164/83 (BP Location: Right arm, Patient Position: Chair, Cuff Size: Adult Large)   Pulse 72   Resp 18  Estimated body mass index is 43.1 kg/m  as calculated from the following:    Height as of 11/14/19: 1.664 m (5' 5.5\").    Weight as of 11/14/19: 119.3 kg (263 lb). .    Patient is here for results.  grayson vasquez LPN    "

## 2019-12-17 NOTE — PROGRESS NOTES
Vascular Surgery Consultation Note     Patient:  Roger Bonilla   Date of birth 1955, Medical record number [id number]  Date of Visit:  12/17/2019  Consult Requester:No att. providers found            Assessment and Recommendations:   ASSESSMENT / RECOMMENDATION:    Stable right carotid stenosis in the 50-79% based on plaque on Bmode and color duplex. Better evaluation of carotid this time. Will recheck carotid duplex and MARGARITA (non-exercise) in one year. Continue antiplatelet therapy and statin. Signs and symptoms of stroke reviewed. Will follow up sooner than one year if issues arise.    Many thanks for involving me in the care of this very pleasant patient. Should any questions or concerns arise, please don't hesitate to contact me.    Warm Regards,    Karma Adorno MD, DFSVS, RPVI  Director, [facility]  Professor and Chief, Vascular and Endovascular Surgery  [facility]  Cell: [phone]  [email or web address]           History of Present Illness:   Following up today for asymptomatic carotid disease and PAD. Denies stroke, amaurosis or TIA. Still smoking ~ 1/2 ppd. Continues to have issues with his joints and receives cortisone shots in shoulders and wrists. Still working at [employer]. Denies lifestyle-limiting claudication.           Review of Systems:   CONSTITUTIONAL:  No fevers or chills  EYES: negative for icterus  ENT:  negative for hearing loss, tinnitus and sore throat  RESPIRATORY:  negative for cough with sputum and dyspnea  CARDIOVASCULAR:  negative for chest pain, dyspnea  GASTROINTESTINAL:  negative for nausea, vomiting, diarrhea and constipation  GENITOURINARY:  negative for dysuria  HEME:  No easy bruising  INTEGUMENT:  negative for rash and pruritus  NEURO:  Negative for headache           Past Medical History:     Past Medical History:   Diagnosis Date     Coronary artery disease may 1 2005    2 stents put in heart     Pure hypercholesterolemia      Type II or  unspecified type diabetes mellitus without mention of complication, not stated as uncontrolled      Unspecified cardiovascular disease 5-2001    MI -- Angioplasty two stents RCA & OM2     Unspecified essential hypertension             Past Surgical History:     Past Surgical History:   Procedure Laterality Date     CARDIAC SURGERY  may 1, 2005     COLONOSCOPY  11/30/2011    Procedure:COLONOSCOPY; Screening Colonoscopy; Surgeon:LUTHER FLORES; Location:[place] [facility]     SURGICAL HISTORY OF -       None     VASCULAR SURGERY  oct 2013    stent put in leg            Family History:     Family History   Problem Relation Age of Onset     Cerebrovascular Disease Maternal Grandmother      Arthritis Other         hip fracture     Respiratory Father         emphysema     Alzheimer Disease Maternal Grandfather      Breast Cancer Sister      Cancer - colorectal No family hx of      Prostate Cancer No family hx of             Social History:     Social History     Tobacco Use     Smoking status: Current Every Day Smoker     Packs/day: 1.00     Years: 40.00     Pack years: 40.00     Types: Cigarettes     Start date: 1/1/1972     Smokeless tobacco: Never Used   Substance Use Topics     Alcohol use: Yes     Comment: 10 beers a week      History   Sexual Activity     Sexual activity: Yes     Partners: Female     Birth control/ protection: Condom            Current Medications (antimicrobials listed in bold):       lidocaine  2 mL       ropivacaine  3 mL       triamcinolone  40 mg       triamcinolone  40 mg              Allergies:     Allergies   Allergen Reactions     Nkda [No Known Drug Allergies]             Physical Exam:   Vitals were reviewed  Patient Vitals for the past 24 hrs:   BP Pulse Resp   12/17/19 0857 (!) 164/83 72 18     Ranges for his vital signs:  Pulse:  [72] 72  Resp:  [18] 18  BP: (164)/(83) 164/83    Physical Examination:  GENERAL:  well-developed, well-nourished, in no acute distress.   HEENT:  Head is  normocephalic, atraumatic   EYES:  Eyes have anicteric sclerae without conjunctival injection or stigmata of endocarditis.    ENT:  Oropharynx is moist without exudates or ulcers. Tongue is midline  NECK:  Supple. No cervical lymphadenopathy. No carotid bruits.  LUNGS:  Clear to auscultation bilateral.   CARDIOVASCULAR:  Regular rate and rhythm with no murmurs, gallops or rubs.  ABDOMEN:  Normal bowel sounds, soft, nontender. No appreciable pulsatile masses.  SKIN:  No acute rashes.    NEUROLOGIC:  Grossly nonfocal. Active x 4 extremities  PULSES: Radial, carotid, femoral, feet warm without ulcers           Laboratory Data:     Hematology Studies    Recent Labs   Lab Test 09/13/13  0640 08/14/13  0917 12/18/12  1800   WBC 12.0* 10.4 13.0*   ANEU  --   --  7.0   AEOS  --   --  0.7   HGB 16.4  --  15.0   MCV 93  --  93     --  221       Metabolic Studies     Recent Labs   Lab Test 06/11/19  0845 08/17/18  1030 07/29/17  0850 07/30/16  0959 01/29/16  1059    135 138 139 139   POTASSIUM 4.5 5.0 4.5 5.0 5.6*   CHLORIDE 103 102 104 104 103   CO2 30 29 31 30 35*   BUN 15 15 17 13 15   CR 0.70 0.83 0.88 0.81 0.81   GFRESTIMATED >90 >90 88 >90  Non  GFR Calc   >90  Non  GFR Calc         Imaging Studies  Stable 50-70% stenosis of right internal carotid artery and <50% on the left    Total time spent 20 minutes face to face with patient with more than 50% time spent in counseling and coordination of care.

## 2020-03-05 ENCOUNTER — TELEPHONE (OUTPATIENT)
Dept: FAMILY MEDICINE | Facility: CLINIC | Age: 65
End: 2020-03-05

## 2020-03-05 NOTE — TELEPHONE ENCOUNTER
Patient Quality Outreach Summary      Summary:    Patient is due/failing the following:   Diabetic Follow-Up Visit, non fasting labs    Type of outreach:    Phone, left message for patient/parent to call back.    Questions for provider review:    None                                                                                                                    BENJAMIN Donis MA       Chart routed to Care Team.

## 2020-03-12 NOTE — TELEPHONE ENCOUNTER
Patient Quality Outreach 2nd Attempt      Summary:    Type of outreach:    Phone, spoke to patient/parent. Lab and clinic appts scheduled for diabetes.  4/3/20 and 4/10/20        Questions for provider review:    None                                                                                                                    BENJAMIN Donis MA

## 2020-03-15 ENCOUNTER — HEALTH MAINTENANCE LETTER (OUTPATIENT)
Age: 65
End: 2020-03-15

## 2020-04-29 ENCOUNTER — TELEPHONE (OUTPATIENT)
Dept: FAMILY MEDICINE | Facility: CLINIC | Age: 65
End: 2020-04-29

## 2020-04-29 DIAGNOSIS — E11.9 TYPE 2 DIABETES MELLITUS WITHOUT COMPLICATION, WITHOUT LONG-TERM CURRENT USE OF INSULIN (H): Primary | ICD-10-CM

## 2020-04-29 NOTE — LETTER
May 12, 2020      Roger Bonilla  [address]   [facility] [address]        Dear Roger,     Maria De Jesus Hay NP  has been reviewing your chart.  It appears that there are aspects of your care that could be improved.  At this time you are due for : Patient is due for an office visit with me. BP was high at last several office visits and needs to be rechecked.  He is also due for diabetes visit and labs (fasting). Labs ordered.  Appointments for lab and office visit can be scheduled by calling [phone].   If you could plan to do that in the near future it would be very helpful.  We are trying to help our patients achieve their health care goals.      If you have any questions please feel free to contact the clinic     Thank you,      Sincerely,        SAL Kc CNP

## 2020-04-29 NOTE — TELEPHONE ENCOUNTER
Panel Management Review      Patient has the following on his problem list:     Diabetes      Summary:    Patient is due/failing the following:   Patient is due for an office visit with me.  BP was high at last several office visits and needs to be rechecked.  He is also due for diabetes visit and labs (fasting).     I am in [place] next week - please call him and help him schedule a face-to-face visit with me.     Labs ordered.  Maria De Jesus Hay CNP     Type of outreach:    Phone, left message for patient to call back.       Maco SOTO CMA

## 2020-04-29 NOTE — TELEPHONE ENCOUNTER
Patient is due for an office visit with me.  BP was high at last several office visits and needs to be rechecked.  He is also due for diabetes visit and labs (fasting).    I am in [place] next week - please call him and help him schedule a face-to-face visit with me.    Labs ordered.  Maria De Jesus Hay, CNP

## 2020-05-06 NOTE — TELEPHONE ENCOUNTER
Panel Management Review      Patient has the following on his problem list:     Diabetes    Type of outreach:    Attempt # 2. Left message for patient to call back.     Maco SOTO CMA

## 2020-05-12 NOTE — TELEPHONE ENCOUNTER
Panel Management Review      Patient has the following on his problem list:   Diabetes    Summary:    Patient is due/failing the following:   Patient is due for an office visit with me.  BP was high at last several office visits and needs to be rechecked.  He is also due for diabetes visit and labs (fasting).        Labs ordered.  Maria De Jesus Hay CNP    Type of outreach:    Sent letter.  Maco SOTO CMA

## 2020-06-04 ENCOUNTER — OFFICE VISIT (OUTPATIENT)
Dept: FAMILY MEDICINE | Facility: CLINIC | Age: 65
End: 2020-06-04
Payer: COMMERCIAL

## 2020-06-04 VITALS
WEIGHT: 259 LBS | HEIGHT: 65 IN | OXYGEN SATURATION: 96 % | DIASTOLIC BLOOD PRESSURE: 92 MMHG | SYSTOLIC BLOOD PRESSURE: 152 MMHG | BODY MASS INDEX: 43.15 KG/M2 | TEMPERATURE: 97.9 F | HEART RATE: 69 BPM

## 2020-06-04 DIAGNOSIS — E66.01 MORBID OBESITY (H): ICD-10-CM

## 2020-06-04 DIAGNOSIS — E78.5 HYPERLIPIDEMIA LDL GOAL <100: ICD-10-CM

## 2020-06-04 DIAGNOSIS — Z01.83 BLOOD TYPING ENCOUNTER: ICD-10-CM

## 2020-06-04 DIAGNOSIS — I10 ESSENTIAL HYPERTENSION WITH GOAL BLOOD PRESSURE LESS THAN 140/90: ICD-10-CM

## 2020-06-04 DIAGNOSIS — I73.9 PAD (PERIPHERAL ARTERY DISEASE) (H): ICD-10-CM

## 2020-06-04 DIAGNOSIS — I25.10 CORONARY ARTERY DISEASE INVOLVING NATIVE CORONARY ARTERY OF NATIVE HEART WITHOUT ANGINA PECTORIS: ICD-10-CM

## 2020-06-04 DIAGNOSIS — Z87.891 PERSONAL HISTORY OF TOBACCO USE: ICD-10-CM

## 2020-06-04 DIAGNOSIS — E11.9 TYPE 2 DIABETES MELLITUS WITHOUT COMPLICATION, WITHOUT LONG-TERM CURRENT USE OF INSULIN (H): Primary | ICD-10-CM

## 2020-06-04 LAB
ABO + RH BLD: NORMAL
ABO + RH BLD: NORMAL
ANION GAP SERPL CALCULATED.3IONS-SCNC: 5 MMOL/L (ref 3–14)
BUN SERPL-MCNC: 17 MG/DL (ref 7–30)
CALCIUM SERPL-MCNC: 9 MG/DL (ref 8.5–10.1)
CHLORIDE SERPL-SCNC: 105 MMOL/L (ref 94–109)
CHOLEST SERPL-MCNC: 139 MG/DL
CO2 SERPL-SCNC: 27 MMOL/L (ref 20–32)
CREAT SERPL-MCNC: 0.89 MG/DL (ref 0.66–1.25)
CREAT UR-MCNC: 95 MG/DL
GFR SERPL CREATININE-BSD FRML MDRD: >90 ML/MIN/{1.73_M2}
GLUCOSE SERPL-MCNC: 103 MG/DL (ref 70–99)
HBA1C MFR BLD: 7 % (ref 0–5.6)
HDLC SERPL-MCNC: 36 MG/DL
LDLC SERPL CALC-MCNC: 86 MG/DL
MICROALBUMIN UR-MCNC: 78 MG/L
MICROALBUMIN/CREAT UR: 82.12 MG/G CR (ref 0–17)
NONHDLC SERPL-MCNC: 103 MG/DL
POTASSIUM SERPL-SCNC: 4.6 MMOL/L (ref 3.4–5.3)
SODIUM SERPL-SCNC: 137 MMOL/L (ref 133–144)
SPECIMEN EXP DATE BLD: NORMAL
TRIGL SERPL-MCNC: 83 MG/DL

## 2020-06-04 PROCEDURE — 82043 UR ALBUMIN QUANTITATIVE: CPT | Performed by: NURSE PRACTITIONER

## 2020-06-04 PROCEDURE — 80061 LIPID PANEL: CPT | Performed by: NURSE PRACTITIONER

## 2020-06-04 PROCEDURE — G0296 VISIT TO DETERM LDCT ELIG: HCPCS | Performed by: NURSE PRACTITIONER

## 2020-06-04 PROCEDURE — 80048 BASIC METABOLIC PNL TOTAL CA: CPT | Performed by: NURSE PRACTITIONER

## 2020-06-04 PROCEDURE — 86900 BLOOD TYPING SEROLOGIC ABO: CPT | Performed by: NURSE PRACTITIONER

## 2020-06-04 PROCEDURE — 36415 COLL VENOUS BLD VENIPUNCTURE: CPT | Performed by: NURSE PRACTITIONER

## 2020-06-04 PROCEDURE — 86901 BLOOD TYPING SEROLOGIC RH(D): CPT | Performed by: NURSE PRACTITIONER

## 2020-06-04 PROCEDURE — 83036 HEMOGLOBIN GLYCOSYLATED A1C: CPT | Performed by: NURSE PRACTITIONER

## 2020-06-04 PROCEDURE — 99214 OFFICE O/P EST MOD 30 MIN: CPT | Performed by: NURSE PRACTITIONER

## 2020-06-04 RX ORDER — METOPROLOL SUCCINATE 50 MG/1
TABLET, EXTENDED RELEASE ORAL
Qty: 90 TABLET | Refills: 3 | Status: SHIPPED | OUTPATIENT
Start: 2020-06-04 | End: 2021-03-25

## 2020-06-04 RX ORDER — ATORVASTATIN CALCIUM 80 MG/1
TABLET, FILM COATED ORAL
Qty: 90 TABLET | Refills: 3 | Status: SHIPPED | OUTPATIENT
Start: 2020-06-04 | End: 2021-03-25

## 2020-06-04 RX ORDER — LISINOPRIL 20 MG/1
TABLET ORAL
Qty: 90 TABLET | Refills: 0 | Status: CANCELLED | OUTPATIENT
Start: 2020-06-04

## 2020-06-04 RX ORDER — LISINOPRIL 40 MG/1
40 TABLET ORAL DAILY
Qty: 90 TABLET | Refills: 3 | Status: SHIPPED | OUTPATIENT
Start: 2020-06-04 | End: 2021-03-25

## 2020-06-04 ASSESSMENT — MIFFLIN-ST. JEOR: SCORE: 1891.7

## 2020-06-04 NOTE — PATIENT INSTRUCTIONS
Increase lisinopril to 40 mg daily.  Return for BP check in 1 month.    Diabetes visit in 6 months.      Lung cancer screening test: to schedule call [phone]        Thank you for choosing [facility].  You may be receiving an email and/or telephone survey request from [facility] Customer Experience regarding your visit today.  Please take a few minutes to respond to the survey to let us know how we are doing.      If you have questions or concerns, please contact us via [employer] or you can contact your care team at [phone].    Our Clinic hours are:  Monday 6:40 am  to 7:00 pm  Tuesday -Friday 6:40 am to 5:00 pm    The [place] outpatient lab hours are:  Monday - Friday 6:10 am to 4:45 pm  Saturdays 7:00 am to 11:00 am  Appointments are required, call [phone]    If you have clinical questions after hours or would like to schedule an appointment,  call the clinic at [phone].    Lung Cancer Screening   Frequently Asked Questions  If you are at high-risk for lung cancer, getting screened with low-dose computed tomography (LDCT) every year can help save your life. This handout offers answers to some of the most common questions about lung cancer screening. If you have other questions, please call [phone] ([phone]).     What is it?  Lung cancer screening uses special X-ray technology to create an image of your lung tissue. The exam is quick and easy and takes less than 10 seconds. We don t give you any medicine or use any needles. You can eat before and after the exam. You don t need to change your clothes as long as the clothing on your chest doesn t contain metal. But, you do need to be able to hold your breath for at least 6 seconds during the exam.    What is the goal of lung cancer screening?  The goal of lung cancer screening is to save lives. Many times, lung cancer is not found until a person starts having physical symptoms. Lung cancer screening can help detect lung cancer  in the earliest stages when it may be easier to treat.    Who should be screened for lung cancer?  We suggest lung cancer screening for anyone who is at high-risk for lung cancer. You are in the high-risk group if you:      are between the ages of 55 and 79, and    have smoked at least 1 pack of cigarettes a day for 30 or more years, and    still smoke or have quit within the past 15 years.    However, if you have a new cough or shortness of breath, you should talk to your doctor before being screened.    Some national lung health advocacy groups also recommend screening for people ages 50 to 79 who have smoked an average of 1 pack of cigarettes a day for 20 years. They must also have at least 1 other risk factor for lung cancer, not including exposure to secondhand smoke. Other risk factors are having had cancer in the past, emphysema, pulmonary fibrosis, COPD, a family history of lung cancer, or exposure to certain materials such as arsenic, asbestos, beryllium, cadmium, chromium, diesel fumes, nickel, radon or silica. Your care team can help you know if you have one of these risk factors.     Why does it matter if I have symptoms?  Certain symptoms can be a sign that you have a condition in your lungs that should be checked and treated by your doctor. These symptoms include fever, chest pain, a new or changing cough, shortness of breath that you have never felt before, coughing up blood or unexplained weight loss. Having any of these symptoms can greatly affect the results of lung cancer screening.       Should all smokers get an LDCT lung cancer screening exam?  It depends. Lung cancer screening is for a very specific group of men and women who have a history of heavy smoking over a long period of time (see  Who should be screened for lung cancer  above).  I am in the high-risk group, but have been diagnosed with cancer in the past. Is LDCT lung cancer screening right for me?  In some cases, you should not have  LDCT lung screening, such as when your doctor is already following your cancer with CT scan studies. Your doctor will help you decide if LDCT lung screening is right for you.  Do I need to have a screening exam every year?  Yes. If you are in the high-risk group described earlier, you should get an LDCT lung cancer screening exam every year until you are 79, or are no longer willing or able to undergo screening and possible procedures to diagnose and treat lung cancer.  How effective is LDCT at preventing death from lung cancer?  Studies have shown that LDCT lung cancer screening can lower the risk of death from lung cancer by 20 percent in people who are at high-risk.  What are the risks?  There are some risks and limitations of LDCT lung cancer screening. We want to make sure you understand the risks and benefits, so please let us know if you have any questions. Your doctor may want to talk with you more about these risks.    Radiation exposure: As with any exam that uses radiation, there is a very small increased risk of cancer. The amount of radiation in LDCT is small--about the same amount a person would get from a mammogram. Your doctor orders the exam when he or she feels the potential benefits outweigh the risks.    False negatives: No test is perfect, including LDCT. It is possible that you may have a medical condition, including lung cancer, that is not found during your exam. This is called a false negative result.    False positives and more testing: LDCT very often finds something in the lung that could be cancer, but in fact is not. This is called a false positive result. False positive tests often cause anxiety. To make sure these findings are not cancer, you may need to have more tests. These tests will be done only if you give us permission. Sometimes patients need a treatment that can have side effects, such as a biopsy. For more information on false positives, see  What can I expect from the  results?     Findings not related to lung cancer: Your LDCT exam also takes pictures of areas of your body next to your lungs. In a very small number of cases, the CT scan will show an abnormal finding in one of these areas, such as your kidneys, adrenal glands, liver or thyroid. This finding may not be serious, but you may need more tests. Your doctor can help you decide what other tests you may need, if any.  What can I expect from the results?  About 1 out of 4 LDCT exams will find something that may need more tests. Most of the time, these findings are lung nodules. Lung nodules are very small collections of tissue in the lung. These nodules are very common, and the vast majority--more than 97 percent--are not cancer (benign). Most are normal lymph nodes or small areas of scarring from past infections.  But, if a small lung nodule is found to be cancer, the cancer can be cured more than 90 percent of the time. To know if the nodule is cancer, we may need to get more images before your next yearly screening exam. If the nodule has suspicious features (for example, it is large, has an odd shape or grows over time), we will refer you to a specialist for further testing.  Will my doctor also get the results?  Yes. Your doctor will get a copy of your results.  Is it okay to keep smoking now that there s a cancer screening exam?  No. Tobacco is one of the strongest cancer-causing agents. It causes not only lung cancer, but other cancers and cardiovascular (heart) diseases as well. The damage caused by smoking builds over time. This means that the longer you smoke, the higher your risk of disease. While it is never too late to quit, the sooner you quit, the better.  Where can I find help to quit smoking?  The best way to prevent lung cancer is to stop smoking. If you have already quit smoking, congratulations and keep it up! For help on quitting smoking, please call QUITPLAN at 2-651-840-PLAN (9569) or the American  Cancer Society at 1-246.288.1978 to find local resources near you.  One-on-one health coaching:  If you d prefer to work individually with a health care provider on tobacco cessation, we offer:      Medication Therapy Management:  Our specially trained pharmacists work closely with you and your doctor to help you quit smoking.  Call [phone] or [phone] (toll free).     Can Do: Health coaching offered by [facility].  [email or web address]

## 2020-06-04 NOTE — PROGRESS NOTES
Subjective     Roger Bonilla is a 64 year old male who presents to clinic today for the following health issues:    HPI   Chief Complaint   Patient presents with     Chronic Disease Management     Patient Request     patient would like to know his blood type; can you order for him?       Diabetes Follow-up      How often are you checking your blood sugar? Not at all    What concerns do you have today about your diabetes? None     Do you have any of these symptoms? (Select all that apply)  No numbness or tingling in feet.  No redness, sores or blisters on feet.  No complaints of excessive thirst.  No reports of blurry vision.  No significant changes to weight.              Hyperlipidemia Follow-Up      Are you regularly taking any medication or supplement to lower your cholesterol?   Yes- lipitor    Are you having muscle aches or other side effects that you think could be caused by your cholesterol lowering medication?  Yes- patient has all kinds of muscle aches    Hypertension Follow-up      Do you check your blood pressure regularly outside of the clinic? No     Are you following a low salt diet? Yes    Are your blood pressures ever more than 140 on the top number (systolic) OR more   than 90 on the bottom number (diastolic), for example 140/90?  Doesn't check    BP Readings from Last 2 Encounters:   06/04/20 (!) 152/92   12/17/19 (!) 164/83     Hemoglobin A1C (%)   Date Value   06/11/2019 6.3 (H)   07/27/2018 6.0 (H)     LDL Cholesterol Calculated (mg/dL)   Date Value   06/11/2019 87   08/17/2018 77         How many servings of fruits and vegetables do you eat daily?  0-1    On average, how many sweetened beverages do you drink each day (Examples: soda, juice, sweet tea, etc.  Do NOT count diet or artificially sweetened beverages)?   0    How many days per week do you exercise enough to make your heart beat faster?  Just while at work    How many minutes a day do you exercise enough to make your heart beat faster?  "    How many days per week do you miss taking your medication? 0      PAD: states that his legs feel ok.  He doesn't think there was any follow up plan from vascular.    Tobacco:  No interest in quitting.              Reviewed and updated as needed this visit by Provider         Review of Systems   Constitutional, HEENT, cardiovascular, pulmonary, gi and gu systems are negative, except as otherwise noted.      Objective    BP (!) 152/92 (BP Location: Left arm)   Pulse 69   Temp 97.9  F (36.6  C) (Tympanic)   Ht 1.651 m (5' 5\")   Wt 117.5 kg (259 lb)   SpO2 96%   BMI 43.10 kg/m    Body mass index is 43.1 kg/m .  Physical Exam   GENERAL: healthy, alert and no distress  NECK: no adenopathy, no asymmetry, masses, or scars and thyroid normal to palpation  RESP: lungs clear to auscultation - no rales, rhonchi or wheezes  CV: regular rate and rhythm, normal S1 S2, no S3 or S4, no murmur, click or rub, no peripheral edema and peripheral pulses strong  ABDOMEN: soft, nontender, no hepatosplenomegaly, no masses and bowel sounds normal  MS: no gross musculoskeletal defects noted, no edema  Diabetic foot exam: normal DP and PT pulses, no trophic changes or ulcerative lesions and normal monofilament exam    Diagnostic Test Results:  Results for orders placed or performed in visit on 06/04/20 (from the past 24 hour(s))   **A1C FUTURE anytime   Result Value Ref Range    Hemoglobin A1C 7.0 (H) 0 - 5.6 %           Assessment & Plan       ICD-10-CM    1. Type 2 diabetes mellitus without complication, without long-term current use of insulin (H)  E11.9 A1c increased.  Patient reports that he may be eating more since he quit drinking. Advised to monitor diet more closely.  Recheck in 6 months.    metFORMIN (GLUCOPHAGE) 1000 MG tablet     Albumin Random Urine Quantitative with Creat Ratio     **Basic metabolic panel FUTURE anytime     Lipid panel reflex to direct LDL Fasting     **A1C FUTURE anytime     2. Hyperlipidemia LDL goal " "<100  E78.5 Continue atorvastatin (LIPITOR) 80 MG tablet     3. Essential hypertension with goal blood pressure less than 140/90  I10 Poorly controlled.  Continue metoprolol  Increase lisinopril to 40 mg daily    metoprolol succinate ER (TOPROL-XL) 50 MG 24 hr tablet     lisinopril (ZESTRIL) 40 MG tablet     4. Coronary artery disease involving native coronary artery of native heart without angina pectoris  I25.10 Asymptomatic.  metoprolol succinate ER (TOPROL-XL) 50 MG 24 hr tablet     5. PAD (peripheral artery disease) (H)  I73.9 Continue ASA daily.  Per vascular notes, will need repeat ABIs and carotid US one year after last visit.     6. Morbid obesity (H)  E66.01 Work on weight loss     7. Personal history of tobacco use  Z87.891 Prof fee: Shared Decisionmaking for Lung Cancer Screening     CT Chest Lung Cancer Scrn Low Dose wo     Okay for Smoking Cessation Study (PLUTO) to Contact Patient     8. Blood typing encounter  Z01.83 ABO and Rh - patient request.        Tobacco Cessation:   reports that he has been smoking cigarettes. He started smoking about 48 years ago. He has a 50.00 pack-year smoking history. He has never used smokeless tobacco.  Tobacco Cessation Action Plan: Information offered: Patient not interested at this time      BMI:   Estimated body mass index is 43.1 kg/m  as calculated from the following:    Height as of this encounter: 1.651 m (5' 5\").    Weight as of this encounter: 117.5 kg (259 lb).   Weight management plan: Discussed healthy diet and exercise guidelines            Return in about 6 months (around 12/4/2020) for Diabetes Recheck.    [facility]      Lung Cancer Screening Shared Decision Making Visit     Roger Bonilla is eligible for lung cancer screening on the basis of the information provided in my signed lung cancer screening order.     I have discussed with patient the risks and benefits of screening for lung cancer with low-dose CT. "     The risks include:  radiation exposure: one low dose chest CT has as much ionizing radiation as about 15 chest x-rays or 6 months of background radiation living in [place]    false positives: 96% of positive findings/nodules are NOT cancer, but some might still require additional diagnostic evaluation, including biopsy  over-diagnosis: some slow growing cancers that might never have been clinically significant will be detected and treated unnecessarily     The benefit of early detection of lung cancer is contingent upon adherence to annual screening or more frequent follow up if indicated.     Furthermore, reaping the benefits of screening requires Roger A Ring to be willing and physically able to undergo diagnostic procedures, if indicated. Although no specific guide is available for determining severity of comorbidities, it is reasonable to withhold screening in patients who have greater mortality risk from other diseases.     We did discuss that the only way to prevent lung cancer is to not smoke. Smoking cessation assistance was offered.    I did not offer risk estimation using a calculator such as this one:    Megan Hay, CNP

## 2020-08-17 DIAGNOSIS — I10 ESSENTIAL HYPERTENSION WITH GOAL BLOOD PRESSURE LESS THAN 140/90: ICD-10-CM

## 2020-08-17 RX ORDER — LISINOPRIL 20 MG/1
TABLET ORAL
Qty: 90 TABLET | Refills: 0 | OUTPATIENT
Start: 2020-08-17

## 2020-08-17 NOTE — TELEPHONE ENCOUNTER
This script for lisinopril 20mg should be discontinued with the pharmacy.  His lisinopril was increased to 40 mg and a script was sent for the year on 6/4/2020.  Maria De Jesus Hay CNP

## 2020-08-27 ENCOUNTER — TELEPHONE (OUTPATIENT)
Dept: FAMILY MEDICINE | Facility: CLINIC | Age: 65
End: 2020-08-27

## 2020-08-27 NOTE — TELEPHONE ENCOUNTER
Lisinopril increased to 40 mg daily in June  Please ask him to come in for an RN BP check.  Maria De Jesus Hay, CNP

## 2020-09-01 NOTE — TELEPHONE ENCOUNTER
Panel Management Review      Patient has the following on his problem list:     Hypertension   Last three blood pressure readings:  BP Readings from Last 3 Encounters:   06/04/20 (!) 152/92   12/17/19 (!) 164/83   11/14/19 (!) 161/86     Blood pressure: FAILED    HTN Guidelines:  Less than 140/90      Composite cancer screening  Chart review shows that this patient is due/due soon for the following None  Summary:    Patient is due/failing the following:   BP CHECK    Action needed:   Patient needs nurse only appointment.    Type of outreach:    Sent [email or web address] message. will postpone a few days to see if he has viewed                                                                                                                                        Maco SOTO CMA

## 2020-09-08 ENCOUNTER — TELEPHONE (OUTPATIENT)
Dept: FAMILY MEDICINE | Facility: CLINIC | Age: 65
End: 2020-09-08

## 2020-09-08 ENCOUNTER — ALLIED HEALTH/NURSE VISIT (OUTPATIENT)
Dept: FAMILY MEDICINE | Facility: CLINIC | Age: 65
End: 2020-09-08
Payer: MEDICARE

## 2020-09-08 VITALS — SYSTOLIC BLOOD PRESSURE: 134 MMHG | HEART RATE: 80 BPM | DIASTOLIC BLOOD PRESSURE: 80 MMHG

## 2020-09-08 DIAGNOSIS — I10 HYPERTENSION GOAL BP (BLOOD PRESSURE) < 140/90: Primary | ICD-10-CM

## 2020-09-08 DIAGNOSIS — I10 ESSENTIAL HYPERTENSION WITH GOAL BLOOD PRESSURE LESS THAN 140/90: Primary | ICD-10-CM

## 2020-09-08 PROCEDURE — 99207 ZZC NO CHARGE NURSE ONLY: CPT

## 2020-09-08 NOTE — TELEPHONE ENCOUNTER
Covering for PCP: BP below goal stated in his chart.  Rx printed for home BP cuff and placed in outbasket in [facility].      Thanks,  John Martinez MD

## 2020-09-08 NOTE — TELEPHONE ENCOUNTER
Roger Bonilla is a 64 year old year old patient who comes in today for a Blood Pressure check because for ongoing blood pressure monitoring and BP recheck since lisinopril was increased ot 40 mg daily when pt was seen in June.     Vital Signs as repeated by RN:  145/84  Pulse of 84  134/80  Pulse of 80, rechecked 5 min later.     Patient is taking medication as prescribed  Patient is tolerating medications well.  Patient is not monitoring Blood Pressure at home.  However, pt asks for blood pressure monitor.  He says he will check BP's at home if insurance covers a BP monitor.     Current complaints: none  Disposition:  Routed to provider.  Pt is at goal today since lisinopril was increased to 40 mg daily.     Also, pt updates that he is still working on reducing smoking.  He smokes 1/2 pack most days.     Pt uses [facility].     Britta Martin RN            Instructions      After Visit Summary (Automatic SnapShot taken 9/8/2020)   Additional Documentation     Vitals:     /80 (BP Location: Left arm, Patient Position: Sitting, Cuff Size: Adult Large)    Pulse 80       More Vitals    Flowsheets:     Vitals Reassessment       Encounter Info:     Billing Info,    History,    Allergies,    Detailed Report       AVS Reports     Date/Time  Report  Action  User    9/8/2020  1:35 PM  After Visit Summary  Automatically Generated  Britta Martin RN    Encounter Information      Provider  Department  Encounter #  Center    9/8/2020 1:00 PM  [facility]   Arrive at: [facility]  [id number]  [facility]    Reviewed this Encounter      Medications  Problems  Allergies  History    Britta Martin RN    Reviewed     Communicable/Travel screen     Communicable/Travel Screening  12/17/2019 12/17/2019 6/4/2020 9/8/2020 9/8/2020    In the last month, have you been in contact with someone who was confirmed or suspected to have Coronavirus / COVID-19?  -  -  No / Unsure  No / Unsure  No / Unsure    Do you have any of  the following symptoms?  -  -  None of these  None of these  None of these    Have you traveled internationally in the last month?  No  No  No  No  No    View Complete Flowsheet  ...More Data Exists    Orders Placed      None   Medication Changes        None      Medication List     Visit Diagnoses         Hypertension goal BP (blood pressure) < 140/90      Problem List       Roger Bonilla is a 64 year old year old patient who comes in today for a Blood Pressure check because for ongoing blood pressure monitoring and BP recheck since lisinopril was increased ot 40 mg daily when pt was seen in June.     Vital Signs as repeated by RN:  145/84  Pulse of 84  134/80  Pulse of 80, rechecked 5 min later.     Patient is taking medication as prescribed  Patient is tolerating medications well.  Patient is not monitoring Blood Pressure at home.  However, pt asks for blood pressure monitor.  He says he will check BP's at home if insurance covers a BP monitor.     Current complaints: none  Disposition:  Routed to provider.  Pt is at goal today since lisinopril was increased to 40 mg daily.     Also, pt updates that he is still working on reducing smoking.  He smokes 1/2 pack most days.     Pt uses [facility].     Britta Martin RN

## 2020-09-08 NOTE — NURSING NOTE
Roger Bonilla is a 64 year old year old patient who comes in today for a Blood Pressure check because for ongoing blood pressure monitoring and BP recheck since lisinopril was increased ot 40 mg daily when pt was seen in June.    Vital Signs as repeated by RN:  145/84  Pulse of 84  134/80  Pulse of 80, rechecked 5 min later.    Patient is taking medication as prescribed  Patient is tolerating medications well.  Patient is not monitoring Blood Pressure at home.  However, pt asks for blood pressure monitor.  He says he will check BP's at home if insurance covers a BP monitor.    Current complaints: none  Disposition:  Routed to provider.  Pt is at goal today since lisinopril was increased to 40 mg daily.    Also, pt updates that he is still working on reducing smoking.  He smokes 1/2 pack most days.    Pt uses [facility].    Britta Martin RN

## 2020-12-16 ENCOUNTER — TELEPHONE (OUTPATIENT)
Dept: FAMILY MEDICINE | Facility: CLINIC | Age: 65
End: 2020-12-16

## 2020-12-16 DIAGNOSIS — E11.9 TYPE 2 DIABETES MELLITUS WITHOUT COMPLICATION, WITHOUT LONG-TERM CURRENT USE OF INSULIN (H): Primary | ICD-10-CM

## 2020-12-16 NOTE — TELEPHONE ENCOUNTER
Patient Quality Outreach      Summary:    Patient has the following on his problem list/HM:   Diabetes    Last A1C:  Lab Results   Component Value Date    A1C 7.0 06/04/2020    A1C 6.3 06/11/2019       Last LDL:    Lab Results   Component Value Date    LDL 86 06/04/2020       Is the patient on a Statin? Yes          Is the patient on Aspirin? Yes    Medications     HMG CoA Reductase Inhibitors     atorvastatin (LIPITOR) 80 MG tablet       Salicylates     aspirin 81 MG EC tablet             Last three blood pressure readings:  BP Readings from Last 3 Encounters:   09/08/20 134/80   06/04/20 (!) 152/92   12/17/19 (!) 164/83            Tobacco Use      Smoking status: Current Every Day Smoker        Packs/day: 1.00        Years: 50.00        Pack years: 50        Types: Cigarettes        Start date: 1/1/1972      Smokeless tobacco: Never Used          Patient is due/failing the following:   Patient is due for a diabetes follow up appointment with me.       Non-fasting labs due:  A1C     Orders were placed, please have lab work done before visit.       Please ask patient to bring in their glucometer so we can download the data.     Please call patient to schedule.  Maria De Jesus Hay CNP       Type of outreach:    Sent [employer] message. -will postpone a few days to see if patient views                                                                                    Maco SOTO CMA

## 2021-01-10 ENCOUNTER — HEALTH MAINTENANCE LETTER (OUTPATIENT)
Age: 66
End: 2021-01-10

## 2021-03-01 ENCOUNTER — TELEPHONE (OUTPATIENT)
Dept: FAMILY MEDICINE | Facility: CLINIC | Age: 66
End: 2021-03-01

## 2021-03-01 NOTE — TELEPHONE ENCOUNTER
Patient Quality Outreach Summary      Summary:    Patient is due/failing the following:   Diabetic Follow-Up Visit-Patient is due for a diabetes follow up appointment with me.       Non-fasting labs due:  A1C     Orders were placed, please have lab work done before visit.       Please ask patient to bring in their glucometer so we can download the data.       Type of outreach:  Attempt #1  Phone, left message for patient/parent to call back.                                                 Maco SOTO CMA

## 2021-03-01 NOTE — LETTER
March 9, 2021      Roger Bonilla  [address]   [facility] [address]        Dear Roger,     Maria De Jesus Hay NP  has been reviewing your chart.  It appears that there are aspects of your care that could be improved.  At this time you are due for : Patient is due for a diabetes follow up appointment with me.   Non-fasting labs due: A1C Orders were placed, please have lab work done before visit.   Please ask patient to bring in their glucometer so we can download the data.. Appointments for lab and office visit can be scheduled thru my chart or by calling [phone].    If you could plan to do that in the near future it would be very helpful.  We are trying to help our patients achieve their health care goals.      If you have any questions please feel free to contact the clinic     Thank you,    Sincerely,        [facility] CNP

## 2021-03-06 ENCOUNTER — IMMUNIZATION (OUTPATIENT)
Dept: FAMILY MEDICINE | Facility: CLINIC | Age: 66
End: 2021-03-06
Payer: COMMERCIAL

## 2021-03-06 PROCEDURE — 91300 PR COVID VAC PFIZER DIL RECON 30 MCG/0.3 ML IM: CPT

## 2021-03-06 PROCEDURE — 0001A PR COVID VAC PFIZER DIL RECON 30 MCG/0.3 ML IM: CPT

## 2021-03-09 NOTE — TELEPHONE ENCOUNTER
Patient Quality Outreach Summary      Summary:    Patient is due/failing the following:   Diabetic Follow-Up Visit - labsprior     Type of outreach:    Sent letter.                                                 Maco SOTO CMA

## 2021-03-24 NOTE — PROGRESS NOTES
"    Assessment & Plan     Type 2 diabetes mellitus without complication, without long-term current use of insulin (H)  Well controlled.  - metFORMIN (GLUCOPHAGE) 1000 MG tablet; TAKE ONE TABLET BY MOUTH ONE TIME DAILY  - **A1C FUTURE anytime    Coronary artery disease involving native coronary artery of native heart without angina pectoris  Asymptomatic.  Doesn't want to follow up with cardiology.  - metoprolol succinate ER (TOPROL-XL) 50 MG 24 hr tablet; TAKE 1 TABLET (50 MG) BY MOUTH DAILY    Essential hypertension with goal blood pressure less than 140/90  Well controlled.  - lisinopril (ZESTRIL) 40 MG tablet; Take 1 tablet (40 mg) by mouth daily  - metoprolol succinate ER (TOPROL-XL) 50 MG 24 hr tablet; TAKE 1 TABLET (50 MG) BY MOUTH DAILY    Hyperlipidemia LDL goal <100  - atorvastatin (LIPITOR) 80 MG tablet; TAKE 1 TABLET (80 MG) BY MOUTH DAILY    PAD (peripheral artery disease) (H)  Stable.    Morbid obesity (H)  Encourage weight loss    Atherosclerosis of both carotid arteries  Due for surveillance carotid ultrasound - he declined.          BMI:   Estimated body mass index is 44.1 kg/m  as calculated from the following:    Height as of this encounter: 1.651 m (5' 5\").    Weight as of this encounter: 120.2 kg (265 lb).   Weight management plan: Discussed healthy diet and exercise guidelines        Return in about 6 months (around 9/25/2021) for Diabetes Recheck.    The risks, benefits and treatment options of prescribed medications or other treatments have been discussed with the patient. The patient verbalized their understanding and should call or follow up if no improvement or if they develop further problems.    [facility] is a 65 year old who presents for the following health issues     HPI     Diabetes Follow-up      How often are you checking your blood sugar? Not at all    What concerns do you have today about your " diabetes? None     Do you have any of these symptoms? (Select all that apply)  No numbness or tingling in feet.  No redness, sores or blisters on feet.  No complaints of excessive thirst.  No reports of blurry vision.  No significant changes to weight.-  Has blurry vision    Have you had a diabetic eye exam in the last 12 months? No              Hyperlipidemia Follow-Up      Are you regularly taking any medication or supplement to lower your cholesterol?   Yes- lipitor    Are you having muscle aches or other side effects that you think could be caused by your cholesterol lowering medication?  No    Hypertension Follow-up      Do you check your blood pressure regularly outside of the clinic? No     Are you following a low salt diet? No, doesn't add salt    Are your blood pressures ever more than 140 on the top number (systolic) OR more   than 90 on the bottom number (diastolic), for example 140/90?  Doesn't check    BP Readings from Last 2 Encounters:   03/25/21 130/78   09/08/20 134/80     Hemoglobin A1C (%)   Date Value   03/25/2021 6.4 (H)   06/04/2020 7.0 (H)     LDL Cholesterol Calculated (mg/dL)   Date Value   06/04/2020 86   06/11/2019 87         How many servings of fruits and vegetables do you eat daily?  1-2    On average, how many sweetened beverages do you drink each day (Examples: soda, juice, sweet tea, etc.  Do NOT count diet or artificially sweetened beverages)?   0    How many days per week do you exercise enough to make your heart beat faster? JUST AT WORK- PHYSICAL JOB    How many minutes a day do you exercise enough to make your heart beat faster?     How many days per week do you miss taking your medication? 0    Vascular Disease Follow-up      How often do you take nitroglycerin? Doesn't have RX    Do you take an aspirin every day? Yes      Obesity:  Not working on weight loss.  Wt Readings from Last 4 Encounters:   03/25/21 120.2 kg (265 lb)   06/04/20 117.5 kg (259 lb)   11/14/19 119.3 kg (263  "lb)   10/15/19 116.1 kg (256 lb)       PAD:  States that he has intermittent pain in legs - stable    Carotid atherosclerosis:  Last was seen by vascular 12/2019  Was told to follow up in one year.      Review of Systems   Constitutional, HEENT, cardiovascular, pulmonary, gi and gu systems are negative, except as otherwise noted.      Objective    /78 (BP Location: Right arm)   Pulse 69   Temp 98.7  F (37.1  C) (Tympanic)   Ht 1.651 m (5' 5\")   Wt 120.2 kg (265 lb)   SpO2 95%   BMI 44.10 kg/m    Body mass index is 44.1 kg/m .  Physical Exam   GENERAL: healthy, alert and no distress  HENT: ear canals and TM's normal, nose and mouth without ulcers or lesions  NECK: no adenopathy, no asymmetry, masses, or scars and thyroid normal to palpation  RESP: lungs clear to auscultation - no rales, rhonchi or wheezes  CV: regular rate and rhythm, normal S1 S2, no S3 or S4, no murmur, click or rub, no peripheral edema and peripheral pulses strong  ABDOMEN: soft, nontender, no hepatosplenomegaly, no masses and bowel sounds normal  MS: no gross musculoskeletal defects noted, no edema  Diabetic foot exam: normal DP and PT pulses, no trophic changes or ulcerative lesions, normal sensory exam and normal monofilament exam    Results for orders placed or performed in visit on 03/25/21 (from the past 24 hour(s))   **A1C FUTURE anytime   Result Value Ref Range    Hemoglobin A1C 6.4 (H) 0 - 5.6 %               "

## 2021-03-25 ENCOUNTER — OFFICE VISIT (OUTPATIENT)
Dept: FAMILY MEDICINE | Facility: CLINIC | Age: 66
End: 2021-03-25
Payer: COMMERCIAL

## 2021-03-25 VITALS
TEMPERATURE: 98.7 F | HEIGHT: 65 IN | OXYGEN SATURATION: 95 % | DIASTOLIC BLOOD PRESSURE: 78 MMHG | HEART RATE: 69 BPM | BODY MASS INDEX: 44.15 KG/M2 | SYSTOLIC BLOOD PRESSURE: 130 MMHG | WEIGHT: 265 LBS

## 2021-03-25 DIAGNOSIS — E11.9 TYPE 2 DIABETES MELLITUS WITHOUT COMPLICATION, WITHOUT LONG-TERM CURRENT USE OF INSULIN (H): Primary | ICD-10-CM

## 2021-03-25 DIAGNOSIS — I65.23 ATHEROSCLEROSIS OF BOTH CAROTID ARTERIES: ICD-10-CM

## 2021-03-25 DIAGNOSIS — E66.01 MORBID OBESITY (H): ICD-10-CM

## 2021-03-25 DIAGNOSIS — I10 ESSENTIAL HYPERTENSION WITH GOAL BLOOD PRESSURE LESS THAN 140/90: ICD-10-CM

## 2021-03-25 DIAGNOSIS — I25.10 CORONARY ARTERY DISEASE INVOLVING NATIVE CORONARY ARTERY OF NATIVE HEART WITHOUT ANGINA PECTORIS: ICD-10-CM

## 2021-03-25 DIAGNOSIS — I73.9 PAD (PERIPHERAL ARTERY DISEASE) (H): ICD-10-CM

## 2021-03-25 DIAGNOSIS — E78.5 HYPERLIPIDEMIA LDL GOAL <100: ICD-10-CM

## 2021-03-25 LAB — HBA1C MFR BLD: 6.4 % (ref 0–5.6)

## 2021-03-25 PROCEDURE — 99214 OFFICE O/P EST MOD 30 MIN: CPT | Performed by: NURSE PRACTITIONER

## 2021-03-25 PROCEDURE — 36415 COLL VENOUS BLD VENIPUNCTURE: CPT | Performed by: NURSE PRACTITIONER

## 2021-03-25 PROCEDURE — 83036 HEMOGLOBIN GLYCOSYLATED A1C: CPT | Performed by: NURSE PRACTITIONER

## 2021-03-25 RX ORDER — LISINOPRIL 40 MG/1
40 TABLET ORAL DAILY
Qty: 90 TABLET | Refills: 3 | Status: SHIPPED | OUTPATIENT
Start: 2021-03-25 | End: 2022-03-23

## 2021-03-25 RX ORDER — ATORVASTATIN CALCIUM 80 MG/1
TABLET, FILM COATED ORAL
Qty: 90 TABLET | Refills: 3 | Status: SHIPPED | OUTPATIENT
Start: 2021-03-25 | End: 2022-03-23

## 2021-03-25 RX ORDER — METOPROLOL SUCCINATE 50 MG/1
TABLET, EXTENDED RELEASE ORAL
Qty: 90 TABLET | Refills: 3 | Status: SHIPPED | OUTPATIENT
Start: 2021-03-25 | End: 2022-03-23

## 2021-03-25 ASSESSMENT — MIFFLIN-ST. JEOR: SCORE: 1913.91

## 2021-03-27 ENCOUNTER — IMMUNIZATION (OUTPATIENT)
Dept: FAMILY MEDICINE | Facility: CLINIC | Age: 66
End: 2021-03-27
Attending: FAMILY MEDICINE
Payer: COMMERCIAL

## 2021-03-27 PROCEDURE — 0002A PR COVID VAC PFIZER DIL RECON 30 MCG/0.3 ML IM: CPT

## 2021-03-27 PROCEDURE — 91300 PR COVID VAC PFIZER DIL RECON 30 MCG/0.3 ML IM: CPT

## 2021-05-12 ENCOUNTER — TRANSFERRED RECORDS (OUTPATIENT)
Dept: HEALTH INFORMATION MANAGEMENT | Facility: CLINIC | Age: 66
End: 2021-05-12

## 2021-05-12 LAB — RETINOPATHY: NEGATIVE

## 2021-06-21 ENCOUNTER — MYC MEDICAL ADVICE (OUTPATIENT)
Dept: FAMILY MEDICINE | Facility: CLINIC | Age: 66
End: 2021-06-21

## 2021-06-23 ENCOUNTER — HOSPITAL ENCOUNTER (INPATIENT)
Facility: CLINIC | Age: 66
LOS: 1 days | Discharge: HOME | DRG: 392 | End: 2021-06-25
Attending: FAMILY MEDICINE | Admitting: FAMILY MEDICINE
Payer: COMMERCIAL

## 2021-06-23 ENCOUNTER — APPOINTMENT (OUTPATIENT)
Dept: CT IMAGING | Facility: CLINIC | Age: 66
DRG: 392 | End: 2021-06-23
Attending: FAMILY MEDICINE
Payer: COMMERCIAL

## 2021-06-23 DIAGNOSIS — N17.9 ACUTE KIDNEY INJURY (H): ICD-10-CM

## 2021-06-23 DIAGNOSIS — K52.9 GASTROENTERITIS: ICD-10-CM

## 2021-06-23 DIAGNOSIS — Z11.52 ENCOUNTER FOR SCREENING LABORATORY TESTING FOR COVID-19 VIRUS: ICD-10-CM

## 2021-06-23 DIAGNOSIS — E86.0 DEHYDRATION: ICD-10-CM

## 2021-06-23 PROBLEM — R11.2 NAUSEA, VOMITING AND DIARRHEA: Status: ACTIVE | Noted: 2021-06-23

## 2021-06-23 PROBLEM — Z20.822 COVID-19 RULED OUT: Status: ACTIVE | Noted: 2021-06-23

## 2021-06-23 PROBLEM — I25.10 CORONARY ARTERY DISEASE INVOLVING NATIVE CORONARY ARTERY OF NATIVE HEART WITHOUT ANGINA PECTORIS: Chronic | Status: ACTIVE | Noted: 2017-08-04

## 2021-06-23 PROBLEM — E83.42 HYPOMAGNESEMIA: Status: ACTIVE | Noted: 2021-06-23

## 2021-06-23 PROBLEM — I65.23 ATHEROSCLEROSIS OF BOTH CAROTID ARTERIES: Chronic | Status: ACTIVE | Noted: 2019-06-20

## 2021-06-23 PROBLEM — E83.39 HYPOPHOSPHATEMIA: Status: ACTIVE | Noted: 2021-06-23

## 2021-06-23 PROBLEM — R19.7 NAUSEA, VOMITING AND DIARRHEA: Status: ACTIVE | Noted: 2021-06-23

## 2021-06-23 LAB
ALBUMIN SERPL-MCNC: 4.4 G/DL (ref 3.4–5)
ALBUMIN UR-MCNC: NEGATIVE MG/DL
ALP SERPL-CCNC: 72 U/L (ref 40–150)
ALT SERPL W P-5'-P-CCNC: 40 U/L (ref 0–70)
ANION GAP SERPL CALCULATED.3IONS-SCNC: 8 MMOL/L (ref 3–14)
APPEARANCE UR: CLEAR
AST SERPL W P-5'-P-CCNC: 26 U/L (ref 0–45)
BASOPHILS # BLD AUTO: 0.1 10E9/L (ref 0–0.2)
BASOPHILS NFR BLD AUTO: 0.4 %
BILIRUB SERPL-MCNC: 1.2 MG/DL (ref 0.2–1.3)
BILIRUB UR QL STRIP: NEGATIVE
BUN SERPL-MCNC: 66 MG/DL (ref 7–30)
CALCIUM SERPL-MCNC: 10 MG/DL (ref 8.5–10.1)
CHLORIDE SERPL-SCNC: 102 MMOL/L (ref 94–109)
CK SERPL-CCNC: 119 U/L (ref 30–300)
CO2 SERPL-SCNC: 24 MMOL/L (ref 20–32)
COLOR UR AUTO: YELLOW
CREAT SERPL-MCNC: 2.86 MG/DL (ref 0.66–1.25)
CREAT SERPL-MCNC: 3.56 MG/DL (ref 0.66–1.25)
CREAT UR-MCNC: 225 MG/DL
DIFFERENTIAL METHOD BLD: ABNORMAL
EOSINOPHIL # BLD AUTO: 0.1 10E9/L (ref 0–0.7)
EOSINOPHIL NFR BLD AUTO: 0.7 %
ERYTHROCYTE [DISTWIDTH] IN BLOOD BY AUTOMATED COUNT: 13.4 % (ref 10–15)
FRACT EXCRET NA UR+SERPL-RTO: 0.4 %
GFR SERPL CREATININE-BSD FRML MDRD: 17 ML/MIN/{1.73_M2}
GFR SERPL CREATININE-BSD FRML MDRD: 22 ML/MIN/{1.73_M2}
GLUCOSE BLDC GLUCOMTR-MCNC: 119 MG/DL (ref 70–99)
GLUCOSE BLDC GLUCOMTR-MCNC: 77 MG/DL (ref 70–99)
GLUCOSE SERPL-MCNC: 88 MG/DL (ref 70–99)
GLUCOSE UR STRIP-MCNC: NEGATIVE MG/DL
HCT VFR BLD AUTO: 47.4 % (ref 40–53)
HGB BLD-MCNC: 15.7 G/DL (ref 13.3–17.7)
HGB UR QL STRIP: NEGATIVE
HYALINE CASTS #/AREA URNS LPF: 17 /LPF (ref 0–2)
IMM GRANULOCYTES # BLD: 0.1 10E9/L (ref 0–0.4)
IMM GRANULOCYTES NFR BLD: 0.4 %
KETONES UR STRIP-MCNC: 5 MG/DL
LABORATORY COMMENT REPORT: NORMAL
LACTATE BLD-SCNC: 1.9 MMOL/L (ref 0.7–2)
LEUKOCYTE ESTERASE UR QL STRIP: NEGATIVE
LIPASE SERPL-CCNC: 259 U/L (ref 73–393)
LYMPHOCYTES # BLD AUTO: 2.1 10E9/L (ref 0.8–5.3)
LYMPHOCYTES NFR BLD AUTO: 15 %
MAGNESIUM SERPL-MCNC: 1 MG/DL (ref 1.6–2.3)
MCH RBC QN AUTO: 31.9 PG (ref 26.5–33)
MCHC RBC AUTO-ENTMCNC: 33.1 G/DL (ref 31.5–36.5)
MCV RBC AUTO: 96 FL (ref 78–100)
MONOCYTES # BLD AUTO: 1.1 10E9/L (ref 0–1.3)
MONOCYTES NFR BLD AUTO: 8.4 %
MUCOUS THREADS #/AREA URNS LPF: PRESENT /LPF
NEUTROPHILS # BLD AUTO: 10.3 10E9/L (ref 1.6–8.3)
NEUTROPHILS NFR BLD AUTO: 75.1 %
NITRATE UR QL: NEGATIVE
NRBC # BLD AUTO: 0 10*3/UL
NRBC BLD AUTO-RTO: 0 /100
PH UR STRIP: 5 PH (ref 5–7)
PHOSPHATE SERPL-MCNC: 2.3 MG/DL (ref 2.5–4.5)
PHOSPHATE SERPL-MCNC: 3.7 MG/DL (ref 2.5–4.5)
PLATELET # BLD AUTO: 229 10E9/L (ref 150–450)
POTASSIUM SERPL-SCNC: 5.1 MMOL/L (ref 3.4–5.3)
PROT SERPL-MCNC: 8.2 G/DL (ref 6.8–8.8)
RBC # BLD AUTO: 4.92 10E12/L (ref 4.4–5.9)
RBC #/AREA URNS AUTO: 1 /HPF (ref 0–2)
SARS-COV-2 RNA RESP QL NAA+PROBE: NEGATIVE
SODIUM SERPL-SCNC: 134 MMOL/L (ref 133–144)
SODIUM UR-SCNC: 48 MMOL/L
SOURCE: ABNORMAL
SP GR UR STRIP: 1.01 (ref 1–1.03)
SPECIMEN SOURCE: NORMAL
SQUAMOUS #/AREA URNS AUTO: 1 /HPF (ref 0–1)
T4 FREE SERPL-MCNC: 1.32 NG/DL (ref 0.76–1.46)
TSH SERPL DL<=0.005 MIU/L-ACNC: 0.35 MU/L (ref 0.4–4)
UROBILINOGEN UR STRIP-MCNC: 0 MG/DL (ref 0–2)
WBC # BLD AUTO: 11.1 10E9/L (ref 4–11)
WBC # BLD AUTO: 13.7 10E9/L (ref 4–11)
WBC #/AREA URNS AUTO: 1 /HPF (ref 0–5)

## 2021-06-23 PROCEDURE — 96361 HYDRATE IV INFUSION ADD-ON: CPT | Performed by: FAMILY MEDICINE

## 2021-06-23 PROCEDURE — 83735 ASSAY OF MAGNESIUM: CPT | Performed by: PHYSICIAN ASSISTANT

## 2021-06-23 PROCEDURE — 96375 TX/PRO/DX INJ NEW DRUG ADDON: CPT

## 2021-06-23 PROCEDURE — 999N001017 HC STATISTIC GLUCOSE BY METER IP

## 2021-06-23 PROCEDURE — 96374 THER/PROPH/DIAG INJ IV PUSH: CPT | Performed by: FAMILY MEDICINE

## 2021-06-23 PROCEDURE — 250N000009 HC RX 250: Performed by: FAMILY MEDICINE

## 2021-06-23 PROCEDURE — 83690 ASSAY OF LIPASE: CPT | Performed by: FAMILY MEDICINE

## 2021-06-23 PROCEDURE — 85048 AUTOMATED LEUKOCYTE COUNT: CPT | Performed by: PHYSICIAN ASSISTANT

## 2021-06-23 PROCEDURE — 99285 EMERGENCY DEPT VISIT HI MDM: CPT | Performed by: FAMILY MEDICINE

## 2021-06-23 PROCEDURE — 87635 SARS-COV-2 COVID-19 AMP PRB: CPT | Performed by: FAMILY MEDICINE

## 2021-06-23 PROCEDURE — 99285 EMERGENCY DEPT VISIT HI MDM: CPT | Mod: 25 | Performed by: FAMILY MEDICINE

## 2021-06-23 PROCEDURE — G0378 HOSPITAL OBSERVATION PER HR: HCPCS

## 2021-06-23 PROCEDURE — 80053 COMPREHEN METABOLIC PANEL: CPT | Performed by: EMERGENCY MEDICINE

## 2021-06-23 PROCEDURE — 84443 ASSAY THYROID STIM HORMONE: CPT | Performed by: FAMILY MEDICINE

## 2021-06-23 PROCEDURE — 82565 ASSAY OF CREATININE: CPT | Performed by: PHYSICIAN ASSISTANT

## 2021-06-23 PROCEDURE — 258N000003 HC RX IP 258 OP 636: Performed by: FAMILY MEDICINE

## 2021-06-23 PROCEDURE — 250N000011 HC RX IP 250 OP 636: Performed by: PHYSICIAN ASSISTANT

## 2021-06-23 PROCEDURE — 84300 ASSAY OF URINE SODIUM: CPT | Performed by: PHYSICIAN ASSISTANT

## 2021-06-23 PROCEDURE — C9803 HOPD COVID-19 SPEC COLLECT: HCPCS | Performed by: FAMILY MEDICINE

## 2021-06-23 PROCEDURE — 36415 COLL VENOUS BLD VENIPUNCTURE: CPT | Performed by: PHYSICIAN ASSISTANT

## 2021-06-23 PROCEDURE — 81001 URINALYSIS AUTO W/SCOPE: CPT | Performed by: FAMILY MEDICINE

## 2021-06-23 PROCEDURE — 85025 COMPLETE CBC W/AUTO DIFF WBC: CPT | Performed by: EMERGENCY MEDICINE

## 2021-06-23 PROCEDURE — 82570 ASSAY OF URINE CREATININE: CPT | Performed by: PHYSICIAN ASSISTANT

## 2021-06-23 PROCEDURE — 250N000011 HC RX IP 250 OP 636: Performed by: EMERGENCY MEDICINE

## 2021-06-23 PROCEDURE — 83930 ASSAY OF BLOOD OSMOLALITY: CPT | Performed by: PHYSICIAN ASSISTANT

## 2021-06-23 PROCEDURE — 99220 PR INITIAL OBSERVATION CARE,LEVEL III: CPT | Performed by: PHYSICIAN ASSISTANT

## 2021-06-23 PROCEDURE — 84439 ASSAY OF FREE THYROXINE: CPT | Performed by: FAMILY MEDICINE

## 2021-06-23 PROCEDURE — 258N000003 HC RX IP 258 OP 636: Performed by: EMERGENCY MEDICINE

## 2021-06-23 PROCEDURE — 84100 ASSAY OF PHOSPHORUS: CPT | Performed by: PHYSICIAN ASSISTANT

## 2021-06-23 PROCEDURE — 83605 ASSAY OF LACTIC ACID: CPT | Performed by: FAMILY MEDICINE

## 2021-06-23 PROCEDURE — 82550 ASSAY OF CK (CPK): CPT | Performed by: FAMILY MEDICINE

## 2021-06-23 PROCEDURE — 74176 CT ABD & PELVIS W/O CONTRAST: CPT

## 2021-06-23 RX ORDER — HYDRALAZINE HYDROCHLORIDE 20 MG/ML
10 INJECTION INTRAMUSCULAR; INTRAVENOUS EVERY 6 HOURS PRN
Status: DISCONTINUED | OUTPATIENT
Start: 2021-06-23 | End: 2021-06-25 | Stop reason: HOSPADM

## 2021-06-23 RX ORDER — ATORVASTATIN CALCIUM 80 MG/1
80 TABLET, FILM COATED ORAL DAILY
Status: DISCONTINUED | OUTPATIENT
Start: 2021-06-24 | End: 2021-06-25 | Stop reason: HOSPADM

## 2021-06-23 RX ORDER — ONDANSETRON 2 MG/ML
4 INJECTION INTRAMUSCULAR; INTRAVENOUS EVERY 6 HOURS PRN
Status: DISCONTINUED | OUTPATIENT
Start: 2021-06-23 | End: 2021-06-25 | Stop reason: HOSPADM

## 2021-06-23 RX ORDER — SODIUM CHLORIDE, SODIUM LACTATE, POTASSIUM CHLORIDE, CALCIUM CHLORIDE 600; 310; 30; 20 MG/100ML; MG/100ML; MG/100ML; MG/100ML
1000 INJECTION, SOLUTION INTRAVENOUS CONTINUOUS
Status: DISCONTINUED | OUTPATIENT
Start: 2021-06-23 | End: 2021-06-25 | Stop reason: HOSPADM

## 2021-06-23 RX ORDER — ACETAMINOPHEN 650 MG/1
650 SUPPOSITORY RECTAL EVERY 4 HOURS PRN
Status: DISCONTINUED | OUTPATIENT
Start: 2021-06-23 | End: 2021-06-25 | Stop reason: HOSPADM

## 2021-06-23 RX ORDER — METOPROLOL SUCCINATE 50 MG/1
50 TABLET, EXTENDED RELEASE ORAL DAILY
Status: DISCONTINUED | OUTPATIENT
Start: 2021-06-24 | End: 2021-06-25 | Stop reason: HOSPADM

## 2021-06-23 RX ORDER — MAGNESIUM SULFATE HEPTAHYDRATE 40 MG/ML
4 INJECTION, SOLUTION INTRAVENOUS ONCE
Status: COMPLETED | OUTPATIENT
Start: 2021-06-23 | End: 2021-06-23

## 2021-06-23 RX ORDER — ONDANSETRON 2 MG/ML
4 INJECTION INTRAMUSCULAR; INTRAVENOUS ONCE
Status: COMPLETED | OUTPATIENT
Start: 2021-06-23 | End: 2021-06-23

## 2021-06-23 RX ORDER — DEXTROSE MONOHYDRATE 25 G/50ML
25-50 INJECTION, SOLUTION INTRAVENOUS
Status: DISCONTINUED | OUTPATIENT
Start: 2021-06-23 | End: 2021-06-25 | Stop reason: HOSPADM

## 2021-06-23 RX ORDER — ONDANSETRON 4 MG/1
4 TABLET, ORALLY DISINTEGRATING ORAL EVERY 6 HOURS PRN
Status: DISCONTINUED | OUTPATIENT
Start: 2021-06-23 | End: 2021-06-23

## 2021-06-23 RX ORDER — ASPIRIN 81 MG/1
81 TABLET ORAL DAILY
Status: DISCONTINUED | OUTPATIENT
Start: 2021-06-24 | End: 2021-06-25 | Stop reason: HOSPADM

## 2021-06-23 RX ORDER — SODIUM CHLORIDE, SODIUM LACTATE, POTASSIUM CHLORIDE, CALCIUM CHLORIDE 600; 310; 30; 20 MG/100ML; MG/100ML; MG/100ML; MG/100ML
INJECTION, SOLUTION INTRAVENOUS CONTINUOUS
Status: DISCONTINUED | OUTPATIENT
Start: 2021-06-23 | End: 2021-06-23

## 2021-06-23 RX ORDER — ONDANSETRON 2 MG/ML
4 INJECTION INTRAMUSCULAR; INTRAVENOUS EVERY 6 HOURS PRN
Status: DISCONTINUED | OUTPATIENT
Start: 2021-06-23 | End: 2021-06-23

## 2021-06-23 RX ORDER — ONDANSETRON 4 MG/1
4 TABLET, ORALLY DISINTEGRATING ORAL EVERY 6 HOURS PRN
Status: DISCONTINUED | OUTPATIENT
Start: 2021-06-23 | End: 2021-06-25 | Stop reason: HOSPADM

## 2021-06-23 RX ORDER — ACETAMINOPHEN 325 MG/1
650 TABLET ORAL EVERY 4 HOURS PRN
Status: DISCONTINUED | OUTPATIENT
Start: 2021-06-23 | End: 2021-06-25 | Stop reason: HOSPADM

## 2021-06-23 RX ORDER — LISINOPRIL 40 MG/1
40 TABLET ORAL DAILY
Status: DISCONTINUED | OUTPATIENT
Start: 2021-06-24 | End: 2021-06-25 | Stop reason: HOSPADM

## 2021-06-23 RX ORDER — NICOTINE POLACRILEX 4 MG
15-30 LOZENGE BUCCAL
Status: DISCONTINUED | OUTPATIENT
Start: 2021-06-23 | End: 2021-06-25 | Stop reason: HOSPADM

## 2021-06-23 RX ADMIN — SODIUM CHLORIDE, POTASSIUM CHLORIDE, SODIUM LACTATE AND CALCIUM CHLORIDE 1000 ML: 600; 310; 30; 20 INJECTION, SOLUTION INTRAVENOUS at 16:12

## 2021-06-23 RX ADMIN — ONDANSETRON 4 MG: 2 INJECTION INTRAMUSCULAR; INTRAVENOUS at 12:00

## 2021-06-23 RX ADMIN — MAGNESIUM SULFATE 4 G: 4 INJECTION INTRAVENOUS at 18:14

## 2021-06-23 RX ADMIN — SODIUM CHLORIDE, POTASSIUM CHLORIDE, SODIUM LACTATE AND CALCIUM CHLORIDE 1000 ML: 600; 310; 30; 20 INJECTION, SOLUTION INTRAVENOUS at 12:00

## 2021-06-23 RX ADMIN — SODIUM PHOSPHATE, MONOBASIC, MONOHYDRATE 9 MMOL: 276; 142 INJECTION, SOLUTION INTRAVENOUS at 20:31

## 2021-06-23 RX ADMIN — SODIUM CHLORIDE, POTASSIUM CHLORIDE, SODIUM LACTATE AND CALCIUM CHLORIDE 1000 ML: 600; 310; 30; 20 INJECTION, SOLUTION INTRAVENOUS at 17:41

## 2021-06-23 ASSESSMENT — ENCOUNTER SYMPTOMS
SINUS PRESSURE: 0
VOMITING: 1
COUGH: 0
ABDOMINAL PAIN: 1
SHORTNESS OF BREATH: 0
FREQUENCY: 0
DYSURIA: 0
CHILLS: 0
CONSTIPATION: 0
FEVER: 0
BLOOD IN STOOL: 0
WHEEZING: 0
PALPITATIONS: 0
DIARRHEA: 0
HEADACHES: 0
NAUSEA: 1
DIAPHORESIS: 0
SORE THROAT: 0
FATIGUE: 1

## 2021-06-23 ASSESSMENT — MIFFLIN-ST. JEOR
SCORE: 1816.38
SCORE: 1826.75

## 2021-06-23 NOTE — PROGRESS NOTES
"[facility] [facility] [place] [place] ADMISSION NOTE    Patient admitted to room 2311 at approximately 1715 via cart from emergency room. Patient was accompanied by transport tech.     Verbal SBAR report received from RN prior to patient arrival.     Patient ambulated to bed independently. Patient alert and oriented X 3. The patient is not having any pain.  . Admission vital signs: Blood pressure 136/64, pulse 75, temperature 99  F (37.2  C), temperature source Oral, resp. rate 18, height 1.676 m (5' 6\"), weight 109.9 kg (242 lb 4.6 oz), SpO2 96 %. Patient was oriented to plan of care, call light, bed controls, tv, telephone, bathroom and visiting hours.     Risk Assessment    The following safety risks were identified during admission: none. Yellow risk band applied: NO.     Skin Initial Assessment    This writer admitted this patient and completed a full skin assessment and Clifford score in the Adult PCS flowsheet. Appropriate interventions initiated as needed.     Secondary skin check completed by Stefany GOMEZ.         Education    Patient has a [place] to Observation order: Yes  Observation education completed and documented: Yes      Romana Pena RN    "

## 2021-06-23 NOTE — H&P
[facility]    History and Physical  Hospital Medicine       Date of Admission:  6/23/2021  Date of Service: 6/23/2021     Assessment & Plan   Roger Bonilla is a 65 year old male who presents on 6/23/2021 with nausea, vomiting, and diarrhea.     Acute kidney injury   Creatinine at time of admission 3.56, GFR 17, BUN 66.  No known history of kidney disease.  Prior creatinine in June 2020 was 0.89.  Suspect pre renal due to dehydration in setting of GI loss.  He was given 2 L of LR in the emergency department.   - Check urine sodium, serum osm, Fena.    - If not improving consider renal US.   - Bladder scan per unit routine.     - Obtain urinalysis.    - Continue IVF.   - Follow BMP.   - Avoid nephrotoxic agents.   - Renally dose medications.    Nausea, vomiting and diarrhea  Abdominal pain  Dehydration  Roughly 2 weeks of symptoms.  Patient is vague in his description of his symptoms.  He says that his diarrhea is roughly 3 episodes of watery diarrhea per day.  He denies seeing any blood in the stool.  He has been taking over-the-counter Pepto and Imodium without significant relief.  He admits to diffuse abdominal pain.  He admits to poor oral intake.  He denies any new foods or eating out at any restaurants.  No known sick contacts.  No recent travel.  CT was obtained which revealed no acute changes in the abdomen or pelvis to account for patient's symptoms.   - Stool studies ordered.    - Enteric precautions.    - Fluids as above.    - Antiemetics on board.   - Diet as tolerated.     Hypomagnesemia  Initial mag of 1.0.    - Replacement and monitoring per protocol.     Hypophosphatemia  Initial phos of 2.3.    - Replacement and monitoring per protocol.      Hypertension goal BP (blood pressure) < 140/90  Patient managed prior to admission with lisinopril 40 mg daily, Toprol-XL 50 mg daily.   - Holding lisinopril in the setting of acute kidney injury.  Resume when creatinine stable.   -  Continue prior to admission metoprolol with holding parameters.   - As needed hydralazine.   - Continue to monitor blood pressure.    Type 2 diabetes mellitus without complication  Patient managed prior to admission with metformin 1000 mg once daily.  A1c in March 2021 of 6.4.   - Holding Metformin in the setting of acute kidney injury.   - Insulin sliding scale.   - Continue to monitor blood sugar.   - Hypo-/hyperglycemia protocol.    Coronary artery disease involving native coronary artery of native heart without angina pectoris  Atherosclerosis of both carotid arteries  PAD (peripheral artery disease)   Hyperlipidemia LDL goal <100  Patient has a history of stent placement in 2005.  He is due for surveillance carotid ultrasound though during his last primary care visit in March he declined.   - Continue prior to admission aspirin, Lipitor.    AAA  Noted on CT:  Extensive calcified atherosclerotic plaque, abdominal aorta and  branch vessels with small infrarenal abdominal aortic aneurysm  measuring 3.3 cm.    Adrenal gland thickening, bilateral   Noted on CT.     ABIGAIL (obstructive sleep apnea)  Patient does not use CPAP at baseline.      COVID-19 ruled out  COVID 19 vaccination received   Patient tested negative at time of hospital admission.   Patient received his COVID vaccination in March.            Diet:  As tolerated, moderate CHO  DVT Prophylaxis: Low Risk/Ambulatory with no VTE prophylaxis indicated  Peter Catheter: Not present  Code Status:   Full code       Disposition Plan   Expected discharge: 1-2 days, recommended to prior living arrangement once adequate pain management/ tolerating PO medications, renal function improved and safe disposition plan.  Entered: Kelli Sotomayor PA-C 06/23/2021, 4:00 PM       Status: Patient is appropriate for admission to observation for further evaluation and treatment.    Kelli Sotomayor PA-C        The patient's care was discussed with the Attending Physician,  Dr. Sun, ED provider, Dr. Ochoa, and the patient.     Primary Care Physician   Maria De Jesus Hay [phone]    History is obtained from the patient, Roger Bonilla and review of old records via the EMR.    History of Present Illness   Roger Bonilla is a 65 year old male with past medical history of hypertension, CAD,  hyperlipidemia, DM, PAD who now presents on 6/23/2021 with abdominal pain, nausea, vomiting and diarrhea.     Patient reports symptoms started roughly 2 weeks ago.  He says that he had onset of abdominal pain which is diffuse, diarrhea up to 2-3 episodes per day, nausea and vomiting.  He admits to poor oral intake.  He denies passing any blood in his stool or in his vomit.  He feels dehydrated and weak.  He admits to poor urine output.  He admits to chills.    Patient denies chest pain or shortness of breath.   Denies headache or dizziness. No recent falls or injuries. No cough, sore throat or nasal congestion. Denies neuropathy.     Patient admits to daily tobacco use. Patient admits to alcohol use.  Last use was roughly 2 weeks ago.  Prior to that he drank 2-3 beers every day.  Denies history of alcohol withdrawal. Patient denies recreational drug use.    Patient lives at home with his significant other. No known sick contacts.  He has been vaccinated for Covid.    Review of Systems   Negative unless stated in HPI.     Past Medical History    Past Medical History:   Diagnosis Date     Coronary artery disease may 1 2005    2 stents put in heart     Pure hypercholesterolemia      Type II or unspecified type diabetes mellitus without mention of complication, not stated as uncontrolled      Unspecified cardiovascular disease 5-2001    MI -- Angioplasty two stents RCA & OM2     Unspecified essential hypertension      Patient Active Problem List    Diagnosis Date Noted     Acute kidney injury (H) 06/23/2021     Priority: Medium     COVID-19 ruled out 06/23/2021     Priority: Medium     Nausea, vomiting and  diarrhea 06/23/2021     Priority: Medium     Dehydration 06/23/2021     Priority: Medium     Gastroenteritis 06/23/2021     Priority: Medium     Hypomagnesemia 06/23/2021     Priority: Medium     Hypophosphatemia 06/23/2021     Priority: Medium     Atherosclerosis of both carotid arteries 06/20/2019     Priority: Medium     Coronary artery disease involving native coronary artery of native heart without angina pectoris 08/04/2017     Priority: Medium     Type 2 diabetes mellitus without complication (H) 10/21/2015     Priority: Medium     PAD (peripheral artery disease) (H) 04/14/2014     Priority: Medium     Hypertension goal BP (blood pressure) < 140/90 08/05/2013     Priority: Medium     Tobacco abuse 11/13/2012     Priority: Medium     Morbid obesity (H) 12/01/2011     Priority: Medium     ABIGAIL (obstructive sleep apnea) 11/28/2011     Priority: Medium     Severe ABIGAIL with significant oxygen desaturations in REM (AHI 87.2, ba desat 66% in REM)  Incomplete titration with remaining events in supine sleep on CPAP 19 cm H2O.  Looked significantly improved during brief trial of bilevel PAP at 16/12 including lateral NREM / REM and supine NREM.         Hyperlipidemia LDL goal <100 09/30/2011     Priority: Medium     Disorder of bursae and tendons in shoulder region 11/10/2005     Priority: Medium     Problem list name updated by automated process. Provider to review       Cardiovascular disease 05/01/2001     Priority: Medium     MI -- Angioplasty two stents RCA & OM2  Problem list name updated by automated process. Provider to review          Past Surgical History   Past Surgical History:   Procedure Laterality Date     CARDIAC SURGERY  may 1, 2005     COLONOSCOPY  11/30/2011    Procedure:COLONOSCOPY; Screening Colonoscopy; Surgeon:LUTHER FLORES; Location:[facility]     SURGICAL HISTORY OF -       None     VASCULAR SURGERY  oct 2013    stent put in leg        Prior to Admission Medications   Prior to Admission  Medications   Prescriptions Last Dose Informant Patient Reported? Taking?   aspirin 81 MG EC tablet 6/23/2021 at 0450  No Yes   Sig: Take 1 tablet (81 mg) by mouth daily   atorvastatin (LIPITOR) 80 MG tablet 6/23/2021 at 0450  No Yes   Sig: TAKE 1 TABLET (80 MG) BY MOUTH DAILY   lisinopril (ZESTRIL) 40 MG tablet 6/23/2021 at 0450  No Yes   Sig: Take 1 tablet (40 mg) by mouth daily   metFORMIN (GLUCOPHAGE) 1000 MG tablet 6/23/2021 at 0450  No Yes   Sig: TAKE ONE TABLET BY MOUTH ONE TIME DAILY   metoprolol succinate ER (TOPROL-XL) 50 MG 24 hr tablet 6/23/2021 at 0450  No Yes   Sig: TAKE 1 TABLET (50 MG) BY MOUTH DAILY      Facility-Administered Medications: None     Allergies   Allergies   Allergen Reactions     Nkda [No Known Drug Allergies]        Family History    Family History   Problem Relation Age of Onset     Cerebrovascular Disease Maternal Grandmother      Arthritis Other         hip fracture     Respiratory Father         emphysema     Alzheimer Disease Maternal Grandfather      Breast Cancer Sister      Cancer - colorectal No family hx of      Prostate Cancer No family hx of        Social History   Social History     Socioeconomic History     Marital status:      Spouse name: Not on file     Number of children: Not on file     Years of education: Not on file     Highest education level: Not on file   Occupational History     Not on file   Social Needs     Financial resource strain: Not on file     Food insecurity     Worry: Not on file     Inability: Not on file     Transportation needs     Medical: Not on file     Non-medical: Not on file   Tobacco Use     Smoking status: Current Every Day Smoker     Packs/day: 0.50     Years: 50.00     Pack years: 25.00     Types: Cigarettes     Start date: 1/1/1972     Smokeless tobacco: Never Used   Substance and Sexual Activity     Alcohol use: Not Currently     Comment: 10 beers a week      Drug use: No     Sexual activity: Yes     Partners: Female     Birth  "control/protection: Condom   Lifestyle     Physical activity     Days per week: Not on file     Minutes per session: Not on file     Stress: Not on file   Relationships     Social connections     Talks on phone: Not on file     Gets together: Not on file     Attends Muslim service: Not on file     Active member of club or organization: Not on file     Attends meetings of clubs or organizations: Not on file     Relationship status: Not on file     Intimate partner violence     Fear of current or ex partner: Not on file     Emotionally abused: Not on file     Physically abused: Not on file     Forced sexual activity: Not on file   Other Topics Concern     Parent/sibling w/ CABG, MI or angioplasty before 65F 55M? No   Social History Narrative     Not on file       Physical Exam   /64   Pulse 75   Temp 99  F (37.2  C) (Oral)   Resp 18   Ht 1.676 m (5' 6\")   Wt 109.9 kg (242 lb 4.6 oz)   SpO2 96%   BMI 39.11 kg/m       Weight: 242 lbs 4.57 oz Body mass index is 39.11 kg/m .     Constitutional: Alert, oriented, cooperative, no apparent distress, appears nontoxic, speaking in full sentences, appears stated age.  Flat affect.  Eyes: Eyes are clear. No scleral icterus. Extroccular movements intact.   HEENT: Oropharynx is clear and dry.  Normocephalic, no evidence of cranial trauma.   Cardiovascular: Regular rhythm and rate, normal S1 and S2. No murmur appreciated. Radial pulses strong and symmetric. No lower extremity edema.  Respiratory: Lung sounds are clear to auscultation bilaterally without wheezes, rhonchi, or crackles.  GI: Obese soft, non-distended. Non-tender, no rebound or guarding. No hepatosplenomegaly or masses appreciated. Normal bowel sounds.  Musculoskeletal: Without obvious deformity, moves all extremities approprietly. Normal muscle bulk and tone.   Skin: Warm and dry, no rashes or ecchymoses.   Neurologic: Neck supple. Patient moves all extremities. Cranial nerves are grossly intact.  " is symmetric. Gross strength and sensation are equal in bilateral upper and lower extremities.   Genitourinary: Deferred      Data   Data reviewed today:   Recent Labs   Lab 06/23/21  1156   WBC 13.7*   HGB 15.7   MCV 96         POTASSIUM 5.1   CHLORIDE 102   CO2 24   BUN 66*   CR 3.56*   ANIONGAP 8   SUE 10.0   GLC 88   ALBUMIN 4.4   PROTTOTAL 8.2   BILITOTAL 1.2   ALKPHOS 72   ALT 40   AST 26   LIPASE 259       Recent Results (from the past 24 hour(s))   CT Abdomen Pelvis w/o Contrast    Narrative    CT ABDOMEN AND PELVIS WITHOUT CONTRAST 6/23/2021 3:08 PM    CLINICAL HISTORY: Bowel obstruction suspected; two weeks of abd pain,  vomiting, decreased oral intake, distention and now acute kidney  injury - absolutely NO IV contrast.    TECHNIQUE: CT scan of the abdomen and pelvis was performed without IV  contrast. Multiplanar reformats were obtained. Dose reduction  techniques were used.  CONTRAST: None.    COMPARISON: None.    FINDINGS:   LOWER CHEST: Normal.    HEPATOBILIARY: Unremarkable allowing for the noncontrast technique.    PANCREAS: Normal.    SPLEEN: Normal.    ADRENAL GLANDS: Bilateral adrenal gland thickening is noted, left  greater than right.    KIDNEYS/BLADDER: No urinary tract calculi or hydronephrosis. Bladder  is unremarkable. No definite renal lesions are appreciated on this  noncontrast study.    BOWEL: No bowel obstruction, diverticulitis or appendicitis.    LYMPH NODES: No enlarged abdominal or pelvic lymph nodes. Incidental  retroaortic left renal vein is a normal anatomic variant.    VASCULATURE: Calcified plaque abdominal aorta and branch vessels with  small infrarenal abdominal aortic aneurysm measuring 3.3 cm on image  109 of series 5.    PELVIC ORGANS: Prostate gland and rectum are unremarkable. No pelvic  free fluid.    OTHER: None.    MUSCULOSKELETAL: Degenerative spine changes.      Impression    IMPRESSION:   1.  No acute changes in the abdomen or pelvis to account for  patient's  symptoms.    2.  Extensive calcified atherosclerotic plaque, abdominal aorta and  branch vessels with small infrarenal abdominal aortic aneurysm  measuring 3.3 cm.    3.  Bilateral adrenal gland thickening. No specific follow-up  suggested.    BEVERLY SAENZ MD

## 2021-06-23 NOTE — PROGRESS NOTES
Skin affirmation note    Admitting nurse completed full skin assessment, Clifford score and Clifford interventions. This writer agrees with the initial skin assessment findings.

## 2021-06-23 NOTE — ED NOTES
Mid-abdominal pain for 2 weeks.  Nausea/vomiting after eating.  Patient denies pain with urination.  Patient's last bowel movement was yesterday and small.  Patient stated that bowel movements have been small.

## 2021-06-23 NOTE — ED PROVIDER NOTES
History     Chief Complaint   Patient presents with     GI Problem     nausea VOMITING diarrhea 2 weeks/ diabetic     HPI  Roger Bonilla is a 65 year old male who presents with history of tobacco abuse hypertension peripheral arterial disease type 2 diabetes coronary disease with vomiting and diarrhea for the last 2 weeks.  Findings on initial labs suggestive of acute kidney injury.  Home medications include metoprolol Metformin lisinopril atorvastatin and aspirin.    He tells me that vomiting and diarrhea started approximately 2 weeks ago and since then decreased p.o. intake.  Feeling more dry and dehydrated.  No significant back pain.  No dysuria urgency frequency or hematuria.  He has had decreased urination.  He has not had no obvious spoiled food intake although wonders if he ate bad meat but does not have a specific complaint.  He has no travel history no contagious contacts.  He has had some abdominal distention.  No bloody emesis and no green or brown emesis.'s been clear.  He had one episode of dark or even black stool he notes after taking some Ensure but he primarily sees brownish stool.  No blood in the stool that he is aware of.  He has no prior bowel obstruction.  He has had associated abdominal pain primarily upper and periumbilical.  No radiation.  Allergies:  Allergies   Allergen Reactions     Nkda [No Known Drug Allergies]        Problem List:    Patient Active Problem List    Diagnosis Date Noted     Atherosclerosis of both carotid arteries 06/20/2019     Priority: Medium     Coronary artery disease involving native coronary artery of native heart without angina pectoris 08/04/2017     Priority: Medium     Type 2 diabetes mellitus without complication (H) 10/21/2015     Priority: Medium     PAD (peripheral artery disease) (H) 04/14/2014     Priority: Medium     Hypertension goal BP (blood pressure) < 140/90 08/05/2013     Priority: Medium     Tobacco abuse 11/13/2012     Priority: Medium      Morbid obesity (H) 12/01/2011     Priority: Medium     ABIGAIL (obstructive sleep apnea) 11/28/2011     Priority: Medium     Severe ABIGAIL with significant oxygen desaturations in REM (AHI 87.2, ba desat 66% in REM)  Incomplete titration with remaining events in supine sleep on CPAP 19 cm H2O.  Looked significantly improved during brief trial of bilevel PAP at 16/12 including lateral NREM / REM and supine NREM.         Hyperlipidemia LDL goal <100 09/30/2011     Priority: Medium     Disorder of bursae and tendons in shoulder region 11/10/2005     Priority: Medium     Problem list name updated by automated process. Provider to review       Cardiovascular disease 05/01/2001     Priority: Medium     MI -- Angioplasty two stents RCA & OM2  Problem list name updated by automated process. Provider to review          Past Medical History:    Past Medical History:   Diagnosis Date     Coronary artery disease may 1 2005     Pure hypercholesterolemia      Type II or unspecified type diabetes mellitus without mention of complication, not stated as uncontrolled      Unspecified cardiovascular disease 5-2001     Unspecified essential hypertension        Past Surgical History:    Past Surgical History:   Procedure Laterality Date     CARDIAC SURGERY  may 1, 2005     COLONOSCOPY  11/30/2011    Procedure:COLONOSCOPY; Screening Colonoscopy; Surgeon:LUTHER FLORES; Location:[facility]     SURGICAL HISTORY OF -       None     VASCULAR SURGERY  oct 2013    stent put in leg       Family History:    Family History   Problem Relation Age of Onset     Cerebrovascular Disease Maternal Grandmother      Arthritis Other         hip fracture     Respiratory Father         emphysema     Alzheimer Disease Maternal Grandfather      Breast Cancer Sister      Cancer - colorectal No family hx of      Prostate Cancer No family hx of        Social History:  Marital Status:   [2]  Social History     Tobacco Use     Smoking status: Current Every Day  "Smoker     Packs/day: 0.50     Years: 50.00     Pack years: 25.00     Types: Cigarettes     Start date: 1/1/1972     Smokeless tobacco: Never Used   Substance Use Topics     Alcohol use: Not Currently     Comment: 10 beers a week      Drug use: No        Medications:    aspirin 81 MG EC tablet  atorvastatin (LIPITOR) 80 MG tablet  lisinopril (ZESTRIL) 40 MG tablet  metFORMIN (GLUCOPHAGE) 1000 MG tablet  metoprolol succinate ER (TOPROL-XL) 50 MG 24 hr tablet          Review of Systems   Constitutional: Positive for fatigue. Negative for chills, diaphoresis and fever.   HENT: Negative for ear pain, sinus pressure and sore throat.    Eyes: Negative for visual disturbance.   Respiratory: Negative for cough, shortness of breath and wheezing.    Cardiovascular: Negative for chest pain and palpitations.   Gastrointestinal: Positive for abdominal pain, nausea and vomiting. Negative for blood in stool, constipation and diarrhea.   Genitourinary: Negative for dysuria, frequency and urgency.   Skin: Negative for rash.   Neurological: Negative for headaches.   All other systems reviewed and are negative.      Physical Exam   BP: 114/74  Pulse: 100  Temp: 98  F (36.7  C)  Resp: 18  Height: 167.6 cm (5' 6\")  Weight: 108.9 kg (240 lb)  SpO2: 96 %      Physical Exam  Constitutional:       Appearance: He is not diaphoretic.   HENT:      Head: Atraumatic.   Eyes:      Conjunctiva/sclera: Conjunctivae normal.   Neck:      Musculoskeletal: Neck supple.   Cardiovascular:      Rate and Rhythm: Normal rate.      Heart sounds: No murmur.   Pulmonary:      Effort: No respiratory distress.      Breath sounds: No stridor. No wheezing.   Abdominal:      General: Abdomen is flat. There is distension.      Palpations: Abdomen is soft.      Tenderness: There is abdominal tenderness.   Musculoskeletal:      Right lower leg: No edema.      Left lower leg: No edema.   Skin:     Coloration: Skin is not pale.      Findings: No rash.   Neurological:    "   General: No focal deficit present.      Mental Status: He is alert.      Sensory: No sensory deficit.      Motor: No weakness.         ED Course        Procedures               Critical Care time:  none               Results for orders placed or performed during the hospital encounter of 06/23/21 (from the past 24 hour(s))   CBC with platelets, differential   Result Value Ref Range    WBC 13.7 (H) 4.0 - 11.0 10e9/L    RBC Count 4.92 4.4 - 5.9 10e12/L    Hemoglobin 15.7 13.3 - 17.7 g/dL    Hematocrit 47.4 40.0 - 53.0 %    MCV 96 78 - 100 fl    MCH 31.9 26.5 - 33.0 pg    MCHC 33.1 31.5 - 36.5 g/dL    RDW 13.4 10.0 - 15.0 %    Platelet Count 229 150 - 450 10e9/L    Diff Method Automated Method     % Neutrophils 75.1 %    % Lymphocytes 15.0 %    % Monocytes 8.4 %    % Eosinophils 0.7 %    % Basophils 0.4 %    % Immature Granulocytes 0.4 %    Nucleated RBCs 0 0 /100    Absolute Neutrophil 10.3 (H) 1.6 - 8.3 10e9/L    Absolute Lymphocytes 2.1 0.8 - 5.3 10e9/L    Absolute Monocytes 1.1 0.0 - 1.3 10e9/L    Absolute Eosinophils 0.1 0.0 - 0.7 10e9/L    Absolute Basophils 0.1 0.0 - 0.2 10e9/L    Abs Immature Granulocytes 0.1 0 - 0.4 10e9/L    Absolute Nucleated RBC 0.0    Comprehensive metabolic panel   Result Value Ref Range    Sodium 134 133 - 144 mmol/L    Potassium 5.1 3.4 - 5.3 mmol/L    Chloride 102 94 - 109 mmol/L    Carbon Dioxide 24 20 - 32 mmol/L    Anion Gap 8 3 - 14 mmol/L    Glucose 88 70 - 99 mg/dL    Urea Nitrogen 66 (H) 7 - 30 mg/dL    Creatinine 3.56 (H) 0.66 - 1.25 mg/dL    GFR Estimate 17 (L) >60 mL/min/[1.73_m2]    GFR Estimate If Black 20 (L) >60 mL/min/[1.73_m2]    Calcium 10.0 8.5 - 10.1 mg/dL    Bilirubin Total 1.2 0.2 - 1.3 mg/dL    Albumin 4.4 3.4 - 5.0 g/dL    Protein Total 8.2 6.8 - 8.8 g/dL    Alkaline Phosphatase 72 40 - 150 U/L    ALT 40 0 - 70 U/L    AST 26 0 - 45 U/L   Lipase   Result Value Ref Range    Lipase 259 73 - 393 U/L   TSH with free T4 reflex   Result Value Ref Range    TSH 0.35 (L)  0.40 - 4.00 mU/L   CK total   Result Value Ref Range    CK Total 119 30 - 300 U/L   T4 free   Result Value Ref Range    T4 Free 1.32 0.76 - 1.46 ng/dL   Asymptomatic SARS-CoV-2 COVID-19 Virus (Coronavirus) by PCR    Specimen: Nasopharyngeal   Result Value Ref Range    SARS-CoV-2 Virus Specimen Source Nasopharyngeal     SARS-CoV-2 PCR Result NEGATIVE     SARS-CoV-2 PCR Comment (Note)    CT Abdomen Pelvis w/o Contrast    Narrative    CT ABDOMEN AND PELVIS WITHOUT CONTRAST 6/23/2021 3:08 PM    CLINICAL HISTORY: Bowel obstruction suspected; two weeks of abd pain,  vomiting, decreased oral intake, distention and now acute kidney  injury - absolutely NO IV contrast.    TECHNIQUE: CT scan of the abdomen and pelvis was performed without IV  contrast. Multiplanar reformats were obtained. Dose reduction  techniques were used.  CONTRAST: None.    COMPARISON: None.    FINDINGS:   LOWER CHEST: Normal.    HEPATOBILIARY: Unremarkable allowing for the noncontrast technique.    PANCREAS: Normal.    SPLEEN: Normal.    ADRENAL GLANDS: Bilateral adrenal gland thickening is noted, left  greater than right.    KIDNEYS/BLADDER: No urinary tract calculi or hydronephrosis. Bladder  is unremarkable. No definite renal lesions are appreciated on this  noncontrast study.    BOWEL: No bowel obstruction, diverticulitis or appendicitis.    LYMPH NODES: No enlarged abdominal or pelvic lymph nodes. Incidental  retroaortic left renal vein is a normal anatomic variant.    VASCULATURE: Calcified plaque abdominal aorta and branch vessels with  small infrarenal abdominal aortic aneurysm measuring 3.3 cm on image  109 of series 5.    PELVIC ORGANS: Prostate gland and rectum are unremarkable. No pelvic  free fluid.    OTHER: None.    MUSCULOSKELETAL: Degenerative spine changes.      Impression    IMPRESSION:   1.  No acute changes in the abdomen or pelvis to account for patient's  symptoms.    2.  Extensive calcified atherosclerotic plaque, abdominal aorta  and  branch vessels with small infrarenal abdominal aortic aneurysm  measuring 3.3 cm.    3.  Bilateral adrenal gland thickening. No specific follow-up  suggested.    BEVERLY SAENZ MD   Lactic acid whole blood   Result Value Ref Range    Lactic Acid 1.9 0.7 - 2.0 mmol/L       Medications   lactated ringers BOLUS 1,000 mL (1,000 mLs Intravenous New Bag 6/23/21 1200)   ondansetron (ZOFRAN) injection 4 mg (4 mg Intravenous Given 6/23/21 1200)       Assessments & Plan (with Medical Decision Making)     MDM: Roger Bonilla is a 65 year old male who presents with obstructive sleep apnea hypertension type 2 diabetes coronary disease and tobacco abuse.    Patient has an acute kidney injury prior to my seeing him.  He has findings on exam of some abdominal distention tenderness.  Differential diagnosis is gastroenteritis versus partial small bowel obstruction, gastroparesis related to his diabetes.  He has no anion gap or acidosis to suggest ketoacidosis.  He is initiated on IV fluids, obtain stool samples when available CT without contrast other than oral contrast.  Obtain urinalysis when labs back will discuss with hospitalist regarding hospitalization for hydration.  Covid test done for hospital stay    CT imaging and other testing are reassuring other than his acute kidney injury.  Will manage this as an acute kidney injury gastroenteritis.  Obtain stool samples as able.  Discussed with Kelli and she accepts for observation stay.        ED to Inpatient Handoff:    Discussed with Kelli ROSENBERG  Patient accepted for Observation Stay  Pending studies include lactate  Code Status: Not Addressed           I have reviewed the nursing notes.    I have reviewed the findings, diagnosis, plan and need for follow up with the patient.       New Prescriptions    No medications on file       Final diagnoses:   Acute kidney injury (H)   Gastroenteritis   Dehydration       6/23/2021   [facility] EMERGENCY DEPT     Don  Fantasma PINEDA MD  06/23/21 6623

## 2021-06-24 PROBLEM — Z11.52 ENCOUNTER FOR SCREENING LABORATORY TESTING FOR COVID-19 VIRUS: Status: ACTIVE | Noted: 2021-06-24

## 2021-06-24 LAB
ANION GAP SERPL CALCULATED.3IONS-SCNC: 7 MMOL/L (ref 3–14)
BUN SERPL-MCNC: 56 MG/DL (ref 7–30)
C DIFF TOX B STL QL: NEGATIVE
CALCIUM SERPL-MCNC: 8.8 MG/DL (ref 8.5–10.1)
CHLORIDE SERPL-SCNC: 105 MMOL/L (ref 94–109)
CO2 SERPL-SCNC: 26 MMOL/L (ref 20–32)
CREAT SERPL-MCNC: 2.43 MG/DL (ref 0.66–1.25)
ERYTHROCYTE [DISTWIDTH] IN BLOOD BY AUTOMATED COUNT: 13.4 % (ref 10–15)
GFR SERPL CREATININE-BSD FRML MDRD: 27 ML/MIN/{1.73_M2}
GLUCOSE BLDC GLUCOMTR-MCNC: 109 MG/DL (ref 70–99)
GLUCOSE BLDC GLUCOMTR-MCNC: 120 MG/DL (ref 70–99)
GLUCOSE BLDC GLUCOMTR-MCNC: 129 MG/DL (ref 70–99)
GLUCOSE BLDC GLUCOMTR-MCNC: 83 MG/DL (ref 70–99)
GLUCOSE BLDC GLUCOMTR-MCNC: 96 MG/DL (ref 70–99)
GLUCOSE SERPL-MCNC: 108 MG/DL (ref 70–99)
HCT VFR BLD AUTO: 41.8 % (ref 40–53)
HGB BLD-MCNC: 13.7 G/DL (ref 13.3–17.7)
MAGNESIUM SERPL-MCNC: 2.9 MG/DL (ref 1.6–2.3)
MCH RBC QN AUTO: 31.9 PG (ref 26.5–33)
MCHC RBC AUTO-ENTMCNC: 32.8 G/DL (ref 31.5–36.5)
MCV RBC AUTO: 97 FL (ref 78–100)
OSMOLALITY SERPL: 298 MMOL/KG (ref 280–301)
PHOSPHATE SERPL-MCNC: 4.2 MG/DL (ref 2.5–4.5)
PLATELET # BLD AUTO: 172 10E9/L (ref 150–450)
POTASSIUM SERPL-SCNC: 4.4 MMOL/L (ref 3.4–5.3)
RBC # BLD AUTO: 4.29 10E12/L (ref 4.4–5.9)
SODIUM SERPL-SCNC: 138 MMOL/L (ref 133–144)
SODIUM UR-SCNC: 48 MMOL/L
SPECIMEN SOURCE: NORMAL
WBC # BLD AUTO: 9.4 10E9/L (ref 4–11)

## 2021-06-24 PROCEDURE — 999N001017 HC STATISTIC GLUCOSE BY METER IP

## 2021-06-24 PROCEDURE — 250N000013 HC RX MED GY IP 250 OP 250 PS 637: Performed by: PHYSICIAN ASSISTANT

## 2021-06-24 PROCEDURE — G0378 HOSPITAL OBSERVATION PER HR: HCPCS

## 2021-06-24 PROCEDURE — 87493 C DIFF AMPLIFIED PROBE: CPT | Performed by: FAMILY MEDICINE

## 2021-06-24 PROCEDURE — 84100 ASSAY OF PHOSPHORUS: CPT | Performed by: PHYSICIAN ASSISTANT

## 2021-06-24 PROCEDURE — 258N000003 HC RX IP 258 OP 636: Performed by: FAMILY MEDICINE

## 2021-06-24 PROCEDURE — 120N000001 HC R&B MED SURG/OB

## 2021-06-24 PROCEDURE — 80048 BASIC METABOLIC PNL TOTAL CA: CPT | Performed by: PHYSICIAN ASSISTANT

## 2021-06-24 PROCEDURE — 85027 COMPLETE CBC AUTOMATED: CPT | Performed by: PHYSICIAN ASSISTANT

## 2021-06-24 PROCEDURE — 83735 ASSAY OF MAGNESIUM: CPT | Performed by: PHYSICIAN ASSISTANT

## 2021-06-24 PROCEDURE — 87506 IADNA-DNA/RNA PROBE TQ 6-11: CPT | Performed by: FAMILY MEDICINE

## 2021-06-24 PROCEDURE — 36415 COLL VENOUS BLD VENIPUNCTURE: CPT | Performed by: PHYSICIAN ASSISTANT

## 2021-06-24 PROCEDURE — 99232 SBSQ HOSP IP/OBS MODERATE 35: CPT | Performed by: FAMILY MEDICINE

## 2021-06-24 RX ADMIN — METOPROLOL SUCCINATE 50 MG: 50 TABLET, EXTENDED RELEASE ORAL at 08:47

## 2021-06-24 RX ADMIN — ATORVASTATIN CALCIUM 80 MG: 80 TABLET, FILM COATED ORAL at 08:47

## 2021-06-24 RX ADMIN — SODIUM CHLORIDE, POTASSIUM CHLORIDE, SODIUM LACTATE AND CALCIUM CHLORIDE 1000 ML: 600; 310; 30; 20 INJECTION, SOLUTION INTRAVENOUS at 11:25

## 2021-06-24 RX ADMIN — ASPIRIN 81 MG: 81 TABLET ORAL at 08:47

## 2021-06-24 RX ADMIN — SODIUM CHLORIDE, POTASSIUM CHLORIDE, SODIUM LACTATE AND CALCIUM CHLORIDE 1000 ML: 600; 310; 30; 20 INJECTION, SOLUTION INTRAVENOUS at 03:37

## 2021-06-24 RX ADMIN — SODIUM CHLORIDE, POTASSIUM CHLORIDE, SODIUM LACTATE AND CALCIUM CHLORIDE 1000 ML: 600; 310; 30; 20 INJECTION, SOLUTION INTRAVENOUS at 19:43

## 2021-06-24 ASSESSMENT — MIFFLIN-ST. JEOR: SCORE: 1837.75

## 2021-06-24 ASSESSMENT — ACTIVITIES OF DAILY LIVING (ADL)
ADLS_ACUITY_SCORE: 16
ADLS_ACUITY_SCORE: 16

## 2021-06-24 NOTE — PLAN OF CARE
Patient is alert and oriented.  Denies pain or nausea at this time.  Tolerated clear liquid dinner, no emesis.  No stool since arrival.  Voided x 1, 350cc.  Bladder scan following showed post void residual of 25cc.  Mag and phos levels low and being replaced per protocols.  LR infusing at 125cc/hr.  Creatinine recheck was down to 2.86 at 1930.

## 2021-06-24 NOTE — PROGRESS NOTES
[facility] Hospitalist Progress Note           Assessment & Plan        Roger Bonilla is a 65 year old male who presents on 6/23/2021 with nausea, vomiting, and diarrhea.      Acute kidney injury   6/23/21 -- Creatinine at time of admission 3.56, GFR 17, BUN 66.  No known history of kidney disease.  Prior creatinine in June 2020 was 0.89.  Suspect pre renal due to dehydration in setting of GI loss.  He was given 2 L of LR in the emergency department.   - If not improving consider renal US.   - Bladder scan per unit routine.      - Continue IVF.  - UA normal.    6/24/2021 -- creatinine down to 2.4, FENa consistent with pre-renal injury. Continue IVF, slowing to 100/h.       Nausea, vomiting and diarrhea  Abdominal pain  Dehydration  6/23/21 -- Roughly 2 weeks of symptoms.  Patient is vague in his description of his symptoms.  He says that his diarrhea is roughly 3 episodes of watery diarrhea per day.  He denies seeing any blood in the stool.  He has been taking over-the-counter Pepto and Imodium without significant relief.  He admits to diffuse abdominal pain.  He admits to poor oral intake.  He denies any new foods or eating out at any restaurants.  No known sick contacts.  No recent travel.  CT was obtained which revealed no acute changes in the abdomen or pelvis to account for patient's symptoms.  6/24/2021 -- improving, CDiff negative, enteric panel in process.      Hypomagnesemia - due to nausea/v and poor intake as above  Initial mag of 1.0.    - Replacement and monitoring per protocol.      Hypophosphatemia - due to nausea/v and poor intake as above  Initial phos of 2.3.    - Replacement and monitoring per protocol.      Hypertension goal BP (blood pressure) < 140/90  6/23/21 -- Patient managed prior to admission with lisinopril 40 mg daily, Toprol-XL 50 mg daily.   - Holding lisinopril in the setting of acute kidney injury.  Resume when creatinine stable.   - Continue prior to admission metoprolol with  holding parameters.   - As needed hydralazine.  6/24/2021 -- reasonably stable.       Type 2 diabetes mellitus without complication  Patient managed prior to admission with metformin 1000 mg once daily.  A1c in March 2021 of 6.4.   - Holding Metformin in the setting of acute kidney injury.   - Insulin sliding scale.   - Continue to monitor blood sugar.   - Hypo-/hyperglycemia protocol.     Coronary artery disease involving native coronary artery of native heart without angina pectoris  Atherosclerosis of both carotid arteries  PAD (peripheral artery disease)   Hyperlipidemia LDL goal <100  Patient has a history of stent placement in 2005.  He was due for surveillance carotid ultrasound though during his last primary care visit in March he declined.   - Continue prior to admission aspirin, Lipitor.     AAA  Noted on CT:  Extensive calcified atherosclerotic plaque, abdominal aorta and branch vessels with small infrarenal abdominal aortic aneurysm measuring 3.3 cm.  Recommend repeat imaging in 2-3 years.       Adrenal gland thickening, bilateral   Noted on CT.  no follow-up needed per radiology      ABIGAIL (obstructive sleep apnea)  Patient does not use CPAP at baseline.      COVID-19 ruled out  COVID 19 vaccination received   Patient tested negative at time of hospital admission.   Patient received his COVID vaccination in March.       DVT Prophylaxis: Low Risk/Ambulatory with no VTE prophylaxis indicated     Peter Catheter: Not present    Code Status:   Full code     Diet  Orders Placed This Encounter      Moderate Consistent CHO Diet            Disposition  Hope for discharge tomorrow AM.              Interval History:   Improving.   3 smaller bowel movements today, still soft.  No emesis, good intake.  No fever or chills. No pain.  No new concerns.              Review of Systems:      ROS: 10 point ROS neg other than the symptoms noted above in the HPI.           Medications:   Current active medications and PTA  medications reviewed, see medication list for details.            Physical Exam:   Vitals were reviewed  Patient Vitals for the past 24 hrs:   BP Temp Temp src Pulse Resp SpO2 Weight   21 1558 133/69 98.9  F (37.2  C) Oral 67 18 93 % --   21 1111 (!) 152/73 98.4  F (36.9  C) Oral 67 20 94 % --   21 0732 (!) 140/70 97.8  F (36.6  C) Oral 65 18 90 % --   21 0340 (!) 143/67 -- -- 59 -- -- --   21 0303 (!) 190/75 98.1  F (36.7  C) Oral 66 18 92 % 111 kg (244 lb 11.4 oz)   21 2235 (!) 142/65 98.2  F (36.8  C) Oral 53 18 92 % --       Temperatures:  Current - Temp: 98.9  F (37.2  C); Max - Temp  Av.3  F (36.8  C)  Min: 97.8  F (36.6  C)  Max: 98.9  F (37.2  C)  Respiration range: Resp  Av.4  Min: 18  Max: 20  Pulse range: Pulse  Av.8  Min: 53  Max: 67  Blood pressure range: Systolic (24hrs), Av , Min:133 , Max:190   ; Diastolic (24hrs), Av, Min:65, Max:75    Pulse oximetry range: SpO2  Av.2 %  Min: 90 %  Max: 94 %  I/O last 3 completed shifts:  In: 2579 [I.V.:2579]  Out: 1550 [Urine:1550]    Intake/Output Summary (Last 24 hours) at 2021 1733  Last data filed at 2021 1657  Gross per 24 hour   Intake 2999 ml   Output 1550 ml   Net 1449 ml     EXAM:  General: awake and alert, NAD, oriented x 3  Head: normocephalic  Neck: unremarkable, no lymphadenopathy   HEENT: oropharynx pink and moist    Heart: Regular rate and rhythm, no murmurs, rubs, or gallops  Lungs: clear to auscultation bilaterally with good air movement throughout  Abdomen: soft, non-tender, no masses or organomegaly  Extremities: no edema in lower extremities   Skin unremarkable.               Data:     Results for orders placed or performed during the hospital encounter of 21 (from the past 24 hour(s))   UA with Microscopic   Result Value Ref Range    Color Urine Yellow     Appearance Urine Clear     Glucose Urine Negative NEG^Negative mg/dL    Bilirubin Urine Negative NEG^Negative     Ketones Urine 5 (A) NEG^Negative mg/dL    Specific Gravity Urine 1.015 1.003 - 1.035    Blood Urine Negative NEG^Negative    pH Urine 5.0 5.0 - 7.0 pH    Protein Albumin Urine Negative NEG^Negative mg/dL    Urobilinogen mg/dL 0.0 0.0 - 2.0 mg/dL    Nitrite Urine Negative NEG^Negative    Leukocyte Esterase Urine Negative NEG^Negative    Source Midstream Urine     WBC Urine 1 0 - 5 /HPF    RBC Urine 1 0 - 2 /HPF    Squamous Epithelial /HPF Urine 1 0 - 1 /HPF    Mucous Urine Present (A) NEG^Negative /LPF    Hyaline Casts 17 (H) 0 - 2 /LPF   Fractional excretion of sodium   Result Value Ref Range    Creatinine Urine 225 mg/dL    Sodium Urine mmol/L 48 mmol/L    %FENA 0.4 %   Sodium random urine   Result Value Ref Range    Sodium Urine mmol/L 48 mmol/L   Osmolality   Result Value Ref Range    Osmolality 298 280 - 301 mmol/kg   Creatinine   Result Value Ref Range    Creatinine 2.86 (H) 0.66 - 1.25 mg/dL    GFR Estimate 22 (L) >60 mL/min/[1.73_m2]    GFR Estimate If Black 25 (L) >60 mL/min/[1.73_m2]   WBC count   Result Value Ref Range    WBC 11.1 (H) 4.0 - 11.0 10e9/L   Phosphorus   Result Value Ref Range    Phosphorus 3.7 2.5 - 4.5 mg/dL   Glucose by meter   Result Value Ref Range    Glucose 119 (H) 70 - 99 mg/dL   Glucose by meter   Result Value Ref Range    Glucose 83 70 - 99 mg/dL   Basic metabolic panel   Result Value Ref Range    Sodium 138 133 - 144 mmol/L    Potassium 4.4 3.4 - 5.3 mmol/L    Chloride 105 94 - 109 mmol/L    Carbon Dioxide 26 20 - 32 mmol/L    Anion Gap 7 3 - 14 mmol/L    Glucose 108 (H) 70 - 99 mg/dL    Urea Nitrogen 56 (H) 7 - 30 mg/dL    Creatinine 2.43 (H) 0.66 - 1.25 mg/dL    GFR Estimate 27 (L) >60 mL/min/[1.73_m2]    GFR Estimate If Black 31 (L) >60 mL/min/[1.73_m2]    Calcium 8.8 8.5 - 10.1 mg/dL   CBC with platelets   Result Value Ref Range    WBC 9.4 4.0 - 11.0 10e9/L    RBC Count 4.29 (L) 4.4 - 5.9 10e12/L    Hemoglobin 13.7 13.3 - 17.7 g/dL    Hematocrit 41.8 40.0 - 53.0 %    MCV 97  78 - 100 fl    MCH 31.9 26.5 - 33.0 pg    MCHC 32.8 31.5 - 36.5 g/dL    RDW 13.4 10.0 - 15.0 %    Platelet Count 172 150 - 450 10e9/L   Magnesium   Result Value Ref Range    Magnesium 2.9 (H) 1.6 - 2.3 mg/dL   Phosphorus   Result Value Ref Range    Phosphorus 4.2 2.5 - 4.5 mg/dL   Glucose by meter   Result Value Ref Range    Glucose 96 70 - 99 mg/dL   Clostridium difficile toxin B PCR    Specimen: Feces   Result Value Ref Range    Specimen Description Feces     C Diff Toxin B PCR Negative NEG^Negative   Glucose by meter   Result Value Ref Range    Glucose 120 (H) 70 - 99 mg/dL   Glucose by meter   Result Value Ref Range    Glucose 109 (H) 70 - 99 mg/dL           Attestation:  I have reviewed today's vital signs, notes, medications, labs and imaging.  Amount of time spent in direct patient care: 30 minutes.     Jg Elliott MD, MD

## 2021-06-24 NOTE — UTILIZATION REVIEW
Admission Status; Secondary Review Determination     Admission Date: 6/23/2021 11:40 AM       Under the authority of the Utilization Management Committee, the utilization review process indicated a secondary review on the above patient.  The review outcome is based on review of the medical records, discussions with staff, and applying clinical experience noted on the date of the review.        (x)      Inpatient Status Appropriate - This patient's medical care is consistent with medical management for inpatient care and reasonable inpatient medical practice.       RATIONALE FOR DETERMINATION      Brief clinical presentation, information copied from the chart, abbreviated and edited for relevant content:     Roger Bonilla is a 65 year old male who presents on 6/23/2021 with nausea, vomiting, and diarrhea.  Admitted to OBS with Acute kidney injury. Meeting IP guidelines, paged team to change to IP.      Creatinine at time of admission 3.56, GFR 17, BUN 66.  No known history of kidney disease.  Prior creatinine in June 2020 was 0.89.Suspect pre renal due to dehydration in setting of GI loss.  He was given 2 L of LR in the emergency department. Plan to Check urine sodium, serum osm, Fena.  If not improving consider renal US. Bladder scan per unit routine.  Obtain urinalysis. Continue IVF. Follow BMP.   Also with Nausea, vomiting and diarrhea, Abdominal pain and Dehydration.   Roughly 2 weeks of symptoms. He has been taking over-the-counter Pepto and Imodium without significant relief.  He admits to diffuse abdominal pain.  He admits to poor oral intake.CT was obtained which revealed no acute changes in the abdomen or pelvis to account for patient's symptoms.Stool studies ordered. Also with Hypomagnesemia, Initial mag of 1.0, Replacement and monitoring per protocol.    Also with Hypophosphatemia, Initial phos of 2.3.    Replacement and monitoring per protocol.    At the time of admission with the information available to  the attending physician, more than 2 nights hospital complex care was anticipated. Also, there was a risk of adverse outcome if patient was treated outpatient or observation. High intensity of services anticipated. Inpatient admission appropriate based on Medicare guidelines.       The information on this document is developed by the utilization review team in order for the business office to ensure compliance.  This only denotes the appropriateness of proper admission status and does not reflect the quality of care rendered.         The definitions of Inpatient Status and Observation Status used in making the determination above are those provided in the CMS Coverage Manual, Chapter 1 and Chapter 6, section 70.4.      Sincerely,      Genoveva Gayle MD   Utilization Review/ Case Management  [facility].

## 2021-06-24 NOTE — PLAN OF CARE
"AOx4, up independent in room. Denies nausea, no episodes of diarrhea-awaiting stool sample. LR @ 125ml/hr, denies pain, blood sugars 119 HS; 83 0200. Rested comfortably throughout shift. BP (!) 143/67 (BP Location: Left arm)   Pulse 59   Temp 98.1  F (36.7  C) (Oral)   Resp 18   Ht 1.676 m (5' 6\")   Wt 111 kg (244 lb 11.4 oz)   SpO2 92%   BMI 39.50 kg/m      "

## 2021-06-24 NOTE — PLAN OF CARE
Stool samples sent, small amount of liquid brown stool this am.  Denies nausea and no complaints of pain.  Up independently in room, IV fluids infusing, tolerating diet.

## 2021-06-25 VITALS
HEIGHT: 66 IN | RESPIRATION RATE: 18 BRPM | HEART RATE: 93 BPM | DIASTOLIC BLOOD PRESSURE: 73 MMHG | BODY MASS INDEX: 39.33 KG/M2 | SYSTOLIC BLOOD PRESSURE: 151 MMHG | OXYGEN SATURATION: 93 % | WEIGHT: 244.71 LBS | TEMPERATURE: 98.1 F

## 2021-06-25 PROBLEM — I71.40 AAA (ABDOMINAL AORTIC ANEURYSM) (H): Status: ACTIVE | Noted: 2021-06-25

## 2021-06-25 LAB
ANION GAP SERPL CALCULATED.3IONS-SCNC: 5 MMOL/L (ref 3–14)
BUN SERPL-MCNC: 32 MG/DL (ref 7–30)
C COLI+JEJUNI+LARI FUSA STL QL NAA+PROBE: NOT DETECTED
CALCIUM SERPL-MCNC: 9 MG/DL (ref 8.5–10.1)
CHLORIDE SERPL-SCNC: 111 MMOL/L (ref 94–109)
CO2 SERPL-SCNC: 25 MMOL/L (ref 20–32)
CREAT SERPL-MCNC: 1.37 MG/DL (ref 0.66–1.25)
EC STX1 GENE STL QL NAA+PROBE: NOT DETECTED
EC STX2 GENE STL QL NAA+PROBE: NOT DETECTED
ENTERIC PATHOGEN COMMENT: NORMAL
ERYTHROCYTE [DISTWIDTH] IN BLOOD BY AUTOMATED COUNT: 13.3 % (ref 10–15)
GFR SERPL CREATININE-BSD FRML MDRD: 53 ML/MIN/{1.73_M2}
GLUCOSE BLDC GLUCOMTR-MCNC: 107 MG/DL (ref 70–99)
GLUCOSE BLDC GLUCOMTR-MCNC: 94 MG/DL (ref 70–99)
GLUCOSE SERPL-MCNC: 101 MG/DL (ref 70–99)
HCT VFR BLD AUTO: 42.2 % (ref 40–53)
HGB BLD-MCNC: 14.1 G/DL (ref 13.3–17.7)
MCH RBC QN AUTO: 32.2 PG (ref 26.5–33)
MCHC RBC AUTO-ENTMCNC: 33.4 G/DL (ref 31.5–36.5)
MCV RBC AUTO: 96 FL (ref 78–100)
NOROV GI+II ORF1-ORF2 JNC STL QL NAA+PR: NOT DETECTED
PLATELET # BLD AUTO: 152 10E9/L (ref 150–450)
POTASSIUM SERPL-SCNC: 4.7 MMOL/L (ref 3.4–5.3)
RBC # BLD AUTO: 4.38 10E12/L (ref 4.4–5.9)
RVA NSP5 STL QL NAA+PROBE: NOT DETECTED
SALMONELLA SP RPOD STL QL NAA+PROBE: NOT DETECTED
SHIGELLA SP+EIEC IPAH STL QL NAA+PROBE: NOT DETECTED
SODIUM SERPL-SCNC: 141 MMOL/L (ref 133–144)
V CHOL+PARA RFBL+TRKH+TNAA STL QL NAA+PR: NOT DETECTED
WBC # BLD AUTO: 9.6 10E9/L (ref 4–11)
Y ENTERO RECN STL QL NAA+PROBE: NOT DETECTED

## 2021-06-25 PROCEDURE — 80048 BASIC METABOLIC PNL TOTAL CA: CPT | Performed by: FAMILY MEDICINE

## 2021-06-25 PROCEDURE — 999N001017 HC STATISTIC GLUCOSE BY METER IP

## 2021-06-25 PROCEDURE — 36415 COLL VENOUS BLD VENIPUNCTURE: CPT | Performed by: FAMILY MEDICINE

## 2021-06-25 PROCEDURE — 99239 HOSP IP/OBS DSCHRG MGMT >30: CPT | Performed by: FAMILY MEDICINE

## 2021-06-25 PROCEDURE — 85027 COMPLETE CBC AUTOMATED: CPT | Performed by: FAMILY MEDICINE

## 2021-06-25 PROCEDURE — 250N000013 HC RX MED GY IP 250 OP 250 PS 637: Performed by: PHYSICIAN ASSISTANT

## 2021-06-25 RX ADMIN — METOPROLOL SUCCINATE 50 MG: 50 TABLET, EXTENDED RELEASE ORAL at 08:45

## 2021-06-25 RX ADMIN — ASPIRIN 81 MG: 81 TABLET ORAL at 08:45

## 2021-06-25 RX ADMIN — ATORVASTATIN CALCIUM 80 MG: 80 TABLET, FILM COATED ORAL at 08:45

## 2021-06-25 ASSESSMENT — ACTIVITIES OF DAILY LIVING (ADL)
ADLS_ACUITY_SCORE: 16
WEAR_GLASSES_OR_BLIND: YES
DOING_ERRANDS_INDEPENDENTLY_DIFFICULTY: NO
TOILETING_ISSUES: NO
FALL_HISTORY_WITHIN_LAST_SIX_MONTHS: NO
VISION_MANAGEMENT: GLASSES
WALKING_OR_CLIMBING_STAIRS_DIFFICULTY: NO
DIFFICULTY_EATING/SWALLOWING: NO
DRESSING/BATHING_DIFFICULTY: NO
DIFFICULTY_COMMUNICATING: NO
CONCENTRATING,_REMEMBERING_OR_MAKING_DECISIONS_DIFFICULTY: NO

## 2021-06-25 NOTE — DISCHARGE SUMMARY
[facility] Discharge Summary    Roger Bonilla MRN# [id number]   Age: 65 year old YOB: 1955     Date of Admission:  6/23/2021  Date of Discharge::  6/25/2021  Admitting Physician:  gJ Elliott MD  Discharge Physician:  Jg Elliott MD, MD             Admission Diagnoses:   Dehydration [E86.0]  Gastroenteritis [K52.9]  Acute kidney injury (H) [N17.9]  Encounter for screening laboratory testing for COVID-19 virus [Z20.822]          Principle Discharge Diagnosis:       Acute kidney injury due to diarrhea/dehydration     See hospital course for further active diagnoses addressed during this admission.              Medications Prior to Admission:     Medications Prior to Admission   Medication Sig Dispense Refill Last Dose     aspirin 81 MG EC tablet Take 1 tablet (81 mg) by mouth daily 90 tablet 3 6/23/2021 at 0450     atorvastatin (LIPITOR) 80 MG tablet TAKE 1 TABLET (80 MG) BY MOUTH DAILY 90 tablet 3 6/23/2021 at 0450     lisinopril (ZESTRIL) 40 MG tablet Take 1 tablet (40 mg) by mouth daily 90 tablet 3 6/23/2021 at 0450     metFORMIN (GLUCOPHAGE) 1000 MG tablet TAKE ONE TABLET BY MOUTH ONE TIME DAILY 90 tablet 3 6/23/2021 at 0450     metoprolol succinate ER (TOPROL-XL) 50 MG 24 hr tablet TAKE 1 TABLET (50 MG) BY MOUTH DAILY 90 tablet 3 6/23/2021 at 0450             Discharge Medications:     Current Discharge Medication List      CONTINUE these medications which have NOT CHANGED    Details   aspirin 81 MG EC tablet Take 1 tablet (81 mg) by mouth daily  Qty: 90 tablet, Refills: 3    Associated Diagnoses: PAD (peripheral artery disease) (H); Type 2 diabetes, HbA1c goal < 7% (H)      atorvastatin (LIPITOR) 80 MG tablet TAKE 1 TABLET (80 MG) BY MOUTH DAILY  Qty: 90 tablet, Refills: 3    Comments: Profile Rx: patient will contact pharmacy when needed  Associated Diagnoses: Hyperlipidemia LDL goal <100      lisinopril (ZESTRIL) 40 MG tablet Take 1 tablet (40 mg) by mouth daily  Qty: 90 tablet,  Refills: 3    Comments: Profile Rx: patient will contact pharmacy when needed  Associated Diagnoses: Essential hypertension with goal blood pressure less than 140/90      metFORMIN (GLUCOPHAGE) 1000 MG tablet TAKE ONE TABLET BY MOUTH ONE TIME DAILY  Qty: 90 tablet, Refills: 3    Comments: Profile Rx: patient will contact pharmacy when needed  Associated Diagnoses: Type 2 diabetes mellitus without complication, without long-term current use of insulin (H)      metoprolol succinate ER (TOPROL-XL) 50 MG 24 hr tablet TAKE 1 TABLET (50 MG) BY MOUTH DAILY  Qty: 90 tablet, Refills: 3    Comments: Profile Rx: patient will contact pharmacy when needed  Associated Diagnoses: Essential hypertension with goal blood pressure less than 140/90; Coronary artery disease involving native coronary artery of native heart without angina pectoris                        Brief History of Illness:     From Admission H+P:   Roger Bonilla is a 65 year old male with past medical history of hypertension, CAD,  hyperlipidemia, DM, PAD who now presents on 6/23/2021 with abdominal pain, nausea, vomiting and diarrhea.      Patient reports symptoms started roughly 2 weeks ago.  He says that he had onset of abdominal pain which is diffuse, diarrhea up to 2-3 episodes per day, nausea and vomiting.  He admits to poor oral intake.  He denies passing any blood in his stool or in his vomit.  He feels dehydrated and weak.  He admits to poor urine output.  He admits to chills.     Patient denies chest pain or shortness of breath.   Denies headache or dizziness. No recent falls or injuries. No cough, sore throat or nasal congestion. Denies neuropathy.      Patient admits to daily tobacco use. Patient admits to alcohol use.  Last use was roughly 2 weeks ago.  Prior to that he drank 2-3 beers every day.  Denies history of alcohol withdrawal. Patient denies recreational drug use.     Patient lives at home with his significant other. No known sick contacts.  He  "has been vaccinated for Covid.               TODAY:     Subjective:  Doing well, diarrhea resolved with none yesterday evening or today.  Feels good and ready for discharge.  Taking orals well. No dyspnea pain or fever.       ROS:   ROS: 10 point ROS neg other than the symptoms noted above in the HPI.   BP (!) 151/73   Pulse 93   Temp 98.1  F (36.7  C) (Oral)   Resp 18   Ht 1.676 m (5' 6\")   Wt 111 kg (244 lb 11.4 oz)   SpO2 93%   BMI 39.50 kg/m     EXAM:  General: awake and alert, NAD, oriented x 3  Head: normocephalic  Neck: unremarkable, no lymphadenopathy   HEENT: oropharynx pink and moist    Heart: Regular rate and rhythm, no murmurs, rubs, or gallops  Lungs: clear to auscultation bilaterally with good air movement throughout  Abdomen: soft, non-tender, no masses or organomegaly  Extremities: no edema in lower extremities   Skin unremarkable.            Hospital Course:      Roger Bonilla is a 65 year old male who presents on 6/23/2021 with nausea, vomiting, and diarrhea.      Acute kidney injury due to diarrhea/dehydration   6/23/21 -- Creatinine at time of admission 3.56, GFR 17, BUN 66.  No known history of kidney disease.  Prior creatinine in June 2020 was 0.89.  Suspect pre renal due to dehydration in setting of GI loss.  He was given 2 L of LR in the emergency department.   - If not improving consider renal US.   - Bladder scan per unit routine.      - Continue IVF.  - UA normal.    6/24/2021 -- creatinine down to 2.4, FENa consistent with pre-renal injury. Continue IVF, slowing to 100/h.    6/25/2021 -- creatinine back to 1.37 - ok for discharge, follow-up with primary care provider in 1 week.       Nausea, vomiting and diarrhea - suspect viral gastroenteritis   Dehydration due to this  6/23/21 -- Roughly 2 weeks of symptoms.  Patient is vague in his description of his symptoms.  He says that his diarrhea is roughly 3 episodes of watery diarrhea per day.  He denies seeing any blood in the stool. "  He has been taking over-the-counter Pepto and Imodium without significant relief.  He admits to diffuse abdominal pain.  He admits to poor oral intake.  He denies any new foods or eating out at any restaurants.  No known sick contacts.  No recent travel.  CT was obtained which revealed no acute changes in the abdomen or pelvis to account for patient's symptoms.  6/24/2021 -- improving, CDiff negative,   6/25/2021 -- diarrhea resolved, enteric panel still in process.  ok to discharge without results given symptoms having resolved, lab checking into this - may be a problem with the specimen.  Follow-up on final results with primary care provider.      Hypomagnesemia - due to nausea/v and poor intake as above  Initial mag of 1.0.    - Replaced per protocol.      Hypophosphatemia - due to nausea/v and poor intake as above  Initial phos of 2.3.    - Replaced per protocol.      Hypertension goal BP (blood pressure) < 140/90  6/23/21 -- Patient managed prior to admission with lisinopril 40 mg daily, Toprol-XL 50 mg daily.   - Holding lisinopril in the setting of acute kidney injury.  Resume when creatinine stable.   - Continue prior to admission metoprolol with holding parameters.   - As needed hydralazine.  6/24/2021 -- reasonably stable.    6/25/2021 -- ok to resume prior to admission medications given improved creatinine.       Type 2 diabetes mellitus without complication  Patient managed prior to admission with metformin 1000 mg once daily.  A1c in March 2021 of 6.4.   - Held Metformin in the setting of acute kidney injury, resumed on discharge with improved creatinine.      Coronary artery disease involving native coronary artery of native heart without angina pectoris  Atherosclerosis of both carotid arteries  PAD (peripheral artery disease)   Hyperlipidemia LDL goal <100  Patient has a history of stent placement in 2005.  He was due for surveillance carotid ultrasound though during his last primary care visit in  March he declined.   - Continue prior to admission aspirin, Lipitor.     AAA  Noted on CT:  Extensive calcified atherosclerotic plaque, abdominal aorta and branch vessels with small infrarenal abdominal aortic aneurysm measuring 3.3 cm.  Recommend repeat imaging in 2-3 years.       Adrenal gland thickening, bilateral   Noted on CT.  no follow-up needed per radiology      ABIGAIL (obstructive sleep apnea)  Patient does not use CPAP at baseline.      COVID-19 ruled out  COVID 19 vaccination received   Patient tested negative at time of hospital admission.   Patient received his COVID vaccination in March.         DVT Prophylaxis: Low Risk/Ambulatory with no VTE prophylaxis indicated     Code Status:   Full code                Discharge Instructions and Follow-Up:     Discharge diet: Orders Placed This Encounter      Moderate Consistent CHO Diet      Diet       Discharge activity: Activity as tolerated   Discharge follow-up: Follow up with primary care provider in 7 days           Discharge Disposition:     Discharged to home      Attestation:  I have reviewed today's vital signs, notes, medications, labs and imaging.  Amount of time performed on this discharge summary: 45 minutes.    Jg Elliott MD, MD

## 2021-06-25 NOTE — PROGRESS NOTES
[facility] DISCHARGE NOTE    Patient discharged to home at 11:11 AM via wheel chair. Accompanied by staff. Discharge instructions reviewed with patient, opportunity offered to ask questions. Prescriptions - None ordered for discharge. All belongings sent with patient.    Romana Pena RN

## 2021-06-25 NOTE — PLAN OF CARE
AOx4, up independent in room. Denies pain, nausea, vomiting and diarrhea. LR @ 125ml/hr, blood sugars 129 HS; 146 0200. VSS, rested comfortably throughout shift.   35

## 2021-06-28 ENCOUNTER — TELEPHONE (OUTPATIENT)
Dept: FAMILY MEDICINE | Facility: CLINIC | Age: 66
End: 2021-06-28

## 2021-06-28 NOTE — TELEPHONE ENCOUNTER
ED/UC/IP follow up phone call: Acute Kidney Injury (H)    RN please call to follow up. [facility]    Number of ED visits in past 6 months = 0      Number of IP visits in past 6 months = 1    Elza Landry CSS on 6/28/2021 at 9:20 AM

## 2021-06-30 NOTE — PROGRESS NOTES
Assessment & Plan     Gastroenteritis  - CBC with platelets  - Basic metabolic panel  (Ca, Cl, CO2, Creat, Gluc, K, Na, BUN)    Dehydration  - CBC with platelets  - Basic metabolic panel  (Ca, Cl, CO2, Creat, Gluc, K, Na, BUN)    Hospital discharge follow-up  - CBC with platelets  - Basic metabolic panel  (Ca, Cl, CO2, Creat, Gluc, K, Na, BUN)    65-year-old male who was admitted to the hospital after presenting to the ER with viral gastroenteritis and acute kidney injury.  By time of discharge patient's kidney function had recovered and he was tolerating a regular diet.  He had no complications while hospitalized and his chronic medical conditions were stable.  Today he reports complete resolution of his gastroenteritis symptoms.  He is working on increasing his water intake and has cut back on his cigarette use.  We will recheck a CBC and BMP today.  He was instructed to follow-up for any recurrence of symptoms.    The risks, benefits and treatment options of prescribed medications or other treatments have been discussed with the patient. The patient verbalized their understanding and should call or follow up if no improvement or if they develop further problems.    SAL Kc CNP  [facility] JANE Mccord is a 65 year old who presents for the following health issues     HPI       Hospital Follow-up Visit:    Hospital/Nursing Home/IP Rehab Facility: [facility]  Date of Admission: 6/23  Date of Discharge: 6/25/21  Reason(s) for Admission: dehydration, gastroenteritis      Was your hospitalization related to COVID-19? No   Problems taking medications regularly:  None  Medication changes since discharge: None  Problems adhering to non-medication therapy:  None    Summary of hospitalization:  [facility] discharge summary reviewed  Diagnostic Tests/Treatments reviewed.  Follow up needed: none  Other Healthcare Providers Involved in  "Patient s Care:         None  Update since discharge: improved.     Post Discharge Medication Reconciliation: discharge medications reconciled, continue medications without change.  Plan of care communicated with patient              HPI: 65-year-old male with past medical history significant for hypertension, coronary artery disease, hyperlipidemia, diabetes and peripheral artery disease who presented to the ER on June 23 with diarrhea, nausea and vomiting, and abdominal pain.  In the ER his creatinine was noted to be 3.56.  He was admitted to the hospital with diagnosis of viral gastroenteritis and acute kidney injury related to dehydration.  Patient was treated with IV fluids.  By time of discharge creatinine was down to 1.37.  He was tested for C. difficile while hospitalized which was negative.  Enteric bacteria and viral panel was also negative.  AAA noted incidentally on CT scan, measuring 3.3 cm.  Repeat imaging will be needed in 2 to 3 years.  Patient also has atherosclerosis in his carotid arteries, coronary artery disease and peripheral artery disease.  Follow-up has been recommended and he declines.  His other medical conditions were stable while hospitalized.    Today patient reports that he is feeling at his baseline.  He denies any diarrhea, nausea or vomiting, or abdominal pain.  His appetite is normal and he is eating well.  He denies any fever or chills.  He is taking his medications as prescribed.  He continues to smoke but is working on cutting back.  He had no concerns or questions today.            Review of Systems   Constitutional, HEENT, cardiovascular, pulmonary, gi and gu systems are negative, except as otherwise noted.      Objective    /72 (BP Location: Right arm)   Pulse 79   Temp 99  F (37.2  C) (Tympanic)   Ht 1.676 m (5' 6\")   Wt 113.9 kg (251 lb)   SpO2 96%   BMI 40.51 kg/m    Body mass index is 40.51 kg/m .  Physical Exam   GENERAL: healthy, alert and no distress  NECK: " no adenopathy, no asymmetry, masses, or scars and thyroid normal to palpation  RESP: lungs clear to auscultation - no rales, rhonchi or wheezes  CV: regular rate and rhythm, normal S1 S2, no S3 or S4, no murmur, click or rub, no peripheral edema and peripheral pulses strong  ABDOMEN: soft, nontender, no hepatosplenomegaly, no masses and bowel sounds normal  MS: no gross musculoskeletal defects noted, no edema

## 2021-07-01 ENCOUNTER — OFFICE VISIT (OUTPATIENT)
Dept: FAMILY MEDICINE | Facility: CLINIC | Age: 66
End: 2021-07-01
Payer: COMMERCIAL

## 2021-07-01 VITALS
SYSTOLIC BLOOD PRESSURE: 130 MMHG | BODY MASS INDEX: 40.34 KG/M2 | HEIGHT: 66 IN | DIASTOLIC BLOOD PRESSURE: 72 MMHG | WEIGHT: 251 LBS | TEMPERATURE: 99 F | OXYGEN SATURATION: 96 % | HEART RATE: 79 BPM

## 2021-07-01 DIAGNOSIS — K52.9 GASTROENTERITIS: Primary | ICD-10-CM

## 2021-07-01 DIAGNOSIS — E86.0 DEHYDRATION: ICD-10-CM

## 2021-07-01 DIAGNOSIS — Z09 HOSPITAL DISCHARGE FOLLOW-UP: ICD-10-CM

## 2021-07-01 LAB
ANION GAP SERPL CALCULATED.3IONS-SCNC: 6 MMOL/L (ref 3–14)
BUN SERPL-MCNC: 13 MG/DL (ref 7–30)
CALCIUM SERPL-MCNC: 9.3 MG/DL (ref 8.5–10.1)
CHLORIDE SERPL-SCNC: 105 MMOL/L (ref 94–109)
CO2 SERPL-SCNC: 27 MMOL/L (ref 20–32)
CREAT SERPL-MCNC: 1.09 MG/DL (ref 0.66–1.25)
ERYTHROCYTE [DISTWIDTH] IN BLOOD BY AUTOMATED COUNT: 13.7 % (ref 10–15)
GFR SERPL CREATININE-BSD FRML MDRD: 70 ML/MIN/{1.73_M2}
GLUCOSE SERPL-MCNC: 85 MG/DL (ref 70–99)
HCT VFR BLD AUTO: 42.9 % (ref 40–53)
HGB BLD-MCNC: 14.1 G/DL (ref 13.3–17.7)
MCH RBC QN AUTO: 32.3 PG (ref 26.5–33)
MCHC RBC AUTO-ENTMCNC: 32.9 G/DL (ref 31.5–36.5)
MCV RBC AUTO: 98 FL (ref 78–100)
PLATELET # BLD AUTO: 212 10E9/L (ref 150–450)
POTASSIUM SERPL-SCNC: 4.6 MMOL/L (ref 3.4–5.3)
RBC # BLD AUTO: 4.36 10E12/L (ref 4.4–5.9)
SODIUM SERPL-SCNC: 138 MMOL/L (ref 133–144)
WBC # BLD AUTO: 12.4 10E9/L (ref 4–11)

## 2021-07-01 PROCEDURE — 85027 COMPLETE CBC AUTOMATED: CPT | Performed by: NURSE PRACTITIONER

## 2021-07-01 PROCEDURE — 99213 OFFICE O/P EST LOW 20 MIN: CPT | Performed by: NURSE PRACTITIONER

## 2021-07-01 PROCEDURE — 80048 BASIC METABOLIC PNL TOTAL CA: CPT | Performed by: NURSE PRACTITIONER

## 2021-07-01 PROCEDURE — 36415 COLL VENOUS BLD VENIPUNCTURE: CPT | Performed by: NURSE PRACTITIONER

## 2021-07-01 RX ORDER — PREDNISOLONE ACETATE 10 MG/ML
SUSPENSION/ DROPS OPHTHALMIC
Status: ON HOLD | COMMUNITY
Start: 2021-06-11 | End: 2021-07-21

## 2021-07-01 RX ORDER — OFLOXACIN 3 MG/ML
SOLUTION/ DROPS OPHTHALMIC
Status: ON HOLD | COMMUNITY
Start: 2021-06-11 | End: 2021-07-21

## 2021-07-01 RX ORDER — KETOROLAC TROMETHAMINE 5 MG/ML
SOLUTION OPHTHALMIC
Status: ON HOLD | COMMUNITY
Start: 2021-06-11 | End: 2021-07-21

## 2021-07-01 ASSESSMENT — MIFFLIN-ST. JEOR: SCORE: 1866.28

## 2021-07-01 NOTE — TELEPHONE ENCOUNTER
Pt was discharged from the Hospital on 6/25/21 with Kidney Injury.  Pt has appt today,7/1/21 for ER/IP f/u. Panda

## 2021-07-19 ENCOUNTER — HOSPITAL ENCOUNTER (INPATIENT)
Facility: CLINIC | Age: 66
LOS: 1 days | Discharge: HOME | DRG: 392 | End: 2021-07-21
Attending: EMERGENCY MEDICINE
Payer: COMMERCIAL

## 2021-07-19 ENCOUNTER — APPOINTMENT (OUTPATIENT)
Dept: CT IMAGING | Facility: CLINIC | Age: 66
DRG: 392 | End: 2021-07-19
Attending: EMERGENCY MEDICINE
Payer: COMMERCIAL

## 2021-07-19 DIAGNOSIS — K29.00 ACUTE SUPERFICIAL GASTRITIS WITHOUT HEMORRHAGE: Primary | ICD-10-CM

## 2021-07-19 DIAGNOSIS — R10.13 EPIGASTRIC PAIN: ICD-10-CM

## 2021-07-19 DIAGNOSIS — N17.9 ACUTE KIDNEY INJURY (H): ICD-10-CM

## 2021-07-19 DIAGNOSIS — Z11.52 ENCOUNTER FOR SCREENING LABORATORY TESTING FOR COVID-19 VIRUS: ICD-10-CM

## 2021-07-19 LAB
ALBUMIN SERPL-MCNC: 3.6 G/DL (ref 3.4–5)
ALP SERPL-CCNC: 71 U/L (ref 40–150)
ALT SERPL W P-5'-P-CCNC: 31 U/L (ref 0–70)
ANION GAP SERPL CALCULATED.3IONS-SCNC: 5 MMOL/L (ref 3–14)
AST SERPL W P-5'-P-CCNC: 15 U/L (ref 0–45)
BASOPHILS # BLD AUTO: 0.1 10E3/UL (ref 0–0.2)
BASOPHILS NFR BLD AUTO: 0 %
BILIRUB SERPL-MCNC: 0.8 MG/DL (ref 0.2–1.3)
BUN SERPL-MCNC: 22 MG/DL (ref 7–30)
CALCIUM SERPL-MCNC: 9.5 MG/DL (ref 8.5–10.1)
CHLORIDE BLD-SCNC: 103 MMOL/L (ref 94–109)
CO2 SERPL-SCNC: 27 MMOL/L (ref 20–32)
CREAT SERPL-MCNC: 2.22 MG/DL (ref 0.66–1.25)
EOSINOPHIL # BLD AUTO: 0.1 10E3/UL (ref 0–0.7)
EOSINOPHIL NFR BLD AUTO: 0 %
ERYTHROCYTE [DISTWIDTH] IN BLOOD BY AUTOMATED COUNT: 13.6 % (ref 10–15)
GFR SERPL CREATININE-BSD FRML MDRD: 30 ML/MIN/1.73M2
GLUCOSE BLD-MCNC: 98 MG/DL (ref 70–99)
GLUCOSE BLDC GLUCOMTR-MCNC: 95 MG/DL (ref 70–99)
HCT VFR BLD AUTO: 45 % (ref 40–53)
HGB BLD-MCNC: 15 G/DL (ref 13.3–17.7)
IMM GRANULOCYTES # BLD: 0.1 10E3/UL
IMM GRANULOCYTES NFR BLD: 1 %
LIPASE SERPL-CCNC: 156 U/L (ref 73–393)
LYMPHOCYTES # BLD AUTO: 2 10E3/UL (ref 0.8–5.3)
LYMPHOCYTES NFR BLD AUTO: 13 %
MAGNESIUM SERPL-MCNC: 1.9 MG/DL (ref 1.6–2.3)
MCH RBC QN AUTO: 32.4 PG (ref 26.5–33)
MCHC RBC AUTO-ENTMCNC: 33.3 G/DL (ref 31.5–36.5)
MCV RBC AUTO: 97 FL (ref 78–100)
MONOCYTES # BLD AUTO: 1.6 10E3/UL (ref 0–1.3)
MONOCYTES NFR BLD AUTO: 10 %
NEUTROPHILS # BLD AUTO: 11.7 10E3/UL (ref 1.6–8.3)
NEUTROPHILS NFR BLD AUTO: 76 %
NRBC # BLD AUTO: 0 10E3/UL
NRBC BLD AUTO-RTO: 0 /100
PLATELET # BLD AUTO: 255 10E3/UL (ref 150–450)
POTASSIUM BLD-SCNC: 5.1 MMOL/L (ref 3.4–5.3)
PROT SERPL-MCNC: 7.2 G/DL (ref 6.8–8.8)
RBC # BLD AUTO: 4.63 10E6/UL (ref 4.4–5.9)
SARS-COV-2 RNA RESP QL NAA+PROBE: NEGATIVE
SODIUM SERPL-SCNC: 135 MMOL/L (ref 133–144)
WBC # BLD AUTO: 15.5 10E3/UL (ref 4–11)

## 2021-07-19 PROCEDURE — C9803 HOPD COVID-19 SPEC COLLECT: HCPCS | Performed by: EMERGENCY MEDICINE

## 2021-07-19 PROCEDURE — 250N000013 HC RX MED GY IP 250 OP 250 PS 637: Performed by: EMERGENCY MEDICINE

## 2021-07-19 PROCEDURE — 83735 ASSAY OF MAGNESIUM: CPT | Performed by: EMERGENCY MEDICINE

## 2021-07-19 PROCEDURE — 258N000003 HC RX IP 258 OP 636: Performed by: EMERGENCY MEDICINE

## 2021-07-19 PROCEDURE — 250N000009 HC RX 250: Performed by: EMERGENCY MEDICINE

## 2021-07-19 PROCEDURE — G0378 HOSPITAL OBSERVATION PER HR: HCPCS

## 2021-07-19 PROCEDURE — 250N000011 HC RX IP 250 OP 636: Performed by: EMERGENCY MEDICINE

## 2021-07-19 PROCEDURE — 96375 TX/PRO/DX INJ NEW DRUG ADDON: CPT | Performed by: EMERGENCY MEDICINE

## 2021-07-19 PROCEDURE — 36415 COLL VENOUS BLD VENIPUNCTURE: CPT | Performed by: EMERGENCY MEDICINE

## 2021-07-19 PROCEDURE — 83690 ASSAY OF LIPASE: CPT | Performed by: EMERGENCY MEDICINE

## 2021-07-19 PROCEDURE — 99285 EMERGENCY DEPT VISIT HI MDM: CPT | Mod: 25 | Performed by: EMERGENCY MEDICINE

## 2021-07-19 PROCEDURE — 93005 ELECTROCARDIOGRAM TRACING: CPT | Performed by: EMERGENCY MEDICINE

## 2021-07-19 PROCEDURE — 96361 HYDRATE IV INFUSION ADD-ON: CPT | Performed by: EMERGENCY MEDICINE

## 2021-07-19 PROCEDURE — 87635 SARS-COV-2 COVID-19 AMP PRB: CPT | Performed by: EMERGENCY MEDICINE

## 2021-07-19 PROCEDURE — 85004 AUTOMATED DIFF WBC COUNT: CPT | Performed by: EMERGENCY MEDICINE

## 2021-07-19 PROCEDURE — 93010 ELECTROCARDIOGRAM REPORT: CPT | Performed by: EMERGENCY MEDICINE

## 2021-07-19 PROCEDURE — 82040 ASSAY OF SERUM ALBUMIN: CPT | Performed by: EMERGENCY MEDICINE

## 2021-07-19 PROCEDURE — 36592 COLLECT BLOOD FROM PICC: CPT | Performed by: EMERGENCY MEDICINE

## 2021-07-19 PROCEDURE — 74176 CT ABD & PELVIS W/O CONTRAST: CPT

## 2021-07-19 PROCEDURE — 96365 THER/PROPH/DIAG IV INF INIT: CPT | Performed by: EMERGENCY MEDICINE

## 2021-07-19 RX ORDER — ONDANSETRON 2 MG/ML
4 INJECTION INTRAMUSCULAR; INTRAVENOUS EVERY 6 HOURS PRN
Status: DISCONTINUED | OUTPATIENT
Start: 2021-07-19 | End: 2021-07-19

## 2021-07-19 RX ORDER — ONDANSETRON 2 MG/ML
4 INJECTION INTRAMUSCULAR; INTRAVENOUS EVERY 30 MIN PRN
Status: DISCONTINUED | OUTPATIENT
Start: 2021-07-19 | End: 2021-07-20

## 2021-07-19 RX ORDER — ACETAMINOPHEN 325 MG/1
650 TABLET ORAL EVERY 4 HOURS PRN
Status: DISCONTINUED | OUTPATIENT
Start: 2021-07-19 | End: 2021-07-19

## 2021-07-19 RX ORDER — ONDANSETRON 2 MG/ML
4 INJECTION INTRAMUSCULAR; INTRAVENOUS EVERY 6 HOURS PRN
Status: DISCONTINUED | OUTPATIENT
Start: 2021-07-19 | End: 2021-07-21 | Stop reason: HOSPADM

## 2021-07-19 RX ORDER — NALOXONE HYDROCHLORIDE 0.4 MG/ML
0.4 INJECTION, SOLUTION INTRAMUSCULAR; INTRAVENOUS; SUBCUTANEOUS
Status: DISCONTINUED | OUTPATIENT
Start: 2021-07-19 | End: 2021-07-21 | Stop reason: HOSPADM

## 2021-07-19 RX ORDER — HYDROMORPHONE HYDROCHLORIDE 1 MG/ML
0.5 INJECTION, SOLUTION INTRAMUSCULAR; INTRAVENOUS; SUBCUTANEOUS ONCE
Status: COMPLETED | OUTPATIENT
Start: 2021-07-19 | End: 2021-07-19

## 2021-07-19 RX ORDER — ONDANSETRON 4 MG/1
4 TABLET, ORALLY DISINTEGRATING ORAL EVERY 6 HOURS PRN
Status: DISCONTINUED | OUTPATIENT
Start: 2021-07-19 | End: 2021-07-19

## 2021-07-19 RX ORDER — OXYCODONE HYDROCHLORIDE 5 MG/1
5-10 TABLET ORAL
Status: DISCONTINUED | OUTPATIENT
Start: 2021-07-19 | End: 2021-07-21 | Stop reason: HOSPADM

## 2021-07-19 RX ORDER — ACETAMINOPHEN 325 MG/1
650 TABLET ORAL EVERY 4 HOURS PRN
Status: DISCONTINUED | OUTPATIENT
Start: 2021-07-19 | End: 2021-07-21 | Stop reason: HOSPADM

## 2021-07-19 RX ORDER — ONDANSETRON 4 MG/1
4 TABLET, ORALLY DISINTEGRATING ORAL EVERY 6 HOURS PRN
Status: DISCONTINUED | OUTPATIENT
Start: 2021-07-19 | End: 2021-07-21 | Stop reason: HOSPADM

## 2021-07-19 RX ORDER — SODIUM CHLORIDE, SODIUM LACTATE, POTASSIUM CHLORIDE, CALCIUM CHLORIDE 600; 310; 30; 20 MG/100ML; MG/100ML; MG/100ML; MG/100ML
INJECTION, SOLUTION INTRAVENOUS CONTINUOUS
Status: DISCONTINUED | OUTPATIENT
Start: 2021-07-19 | End: 2021-07-21 | Stop reason: HOSPADM

## 2021-07-19 RX ORDER — NALOXONE HYDROCHLORIDE 0.4 MG/ML
0.2 INJECTION, SOLUTION INTRAMUSCULAR; INTRAVENOUS; SUBCUTANEOUS
Status: DISCONTINUED | OUTPATIENT
Start: 2021-07-19 | End: 2021-07-21 | Stop reason: HOSPADM

## 2021-07-19 RX ADMIN — LIDOCAINE HYDROCHLORIDE 30 ML: 20 SOLUTION ORAL; TOPICAL at 17:38

## 2021-07-19 RX ADMIN — FAMOTIDINE 20 MG: 20 INJECTION, SOLUTION INTRAVENOUS at 20:14

## 2021-07-19 RX ADMIN — SODIUM CHLORIDE, POTASSIUM CHLORIDE, SODIUM LACTATE AND CALCIUM CHLORIDE 1000 ML: 600; 310; 30; 20 INJECTION, SOLUTION INTRAVENOUS at 18:12

## 2021-07-19 RX ADMIN — SODIUM CHLORIDE, POTASSIUM CHLORIDE, SODIUM LACTATE AND CALCIUM CHLORIDE: 600; 310; 30; 20 INJECTION, SOLUTION INTRAVENOUS at 20:40

## 2021-07-19 RX ADMIN — HYDROMORPHONE HYDROCHLORIDE 0.5 MG: 1 INJECTION, SOLUTION INTRAMUSCULAR; INTRAVENOUS; SUBCUTANEOUS at 18:02

## 2021-07-19 RX ADMIN — ONDANSETRON 4 MG: 2 INJECTION INTRAMUSCULAR; INTRAVENOUS at 16:34

## 2021-07-19 ASSESSMENT — ENCOUNTER SYMPTOMS
DIARRHEA: 0
FEVER: 0
ABDOMINAL PAIN: 1
MYALGIAS: 0
COUGH: 0
DYSURIA: 0
SORE THROAT: 0
BLOOD IN STOOL: 0
CHILLS: 0
HEADACHES: 0
LIGHT-HEADEDNESS: 0

## 2021-07-19 ASSESSMENT — MIFFLIN-ST. JEOR: SCORE: 1812.88

## 2021-07-19 NOTE — ED NOTES
Patient reports going to primary doctor after being at ER last and they checked his kidney function, telling him his labs were okay. Hx Type II Diabetes. He reports attempting to drink water with no success in feeling better.

## 2021-07-19 NOTE — ED PROVIDER NOTES
"  History     Chief Complaint   Patient presents with     Abdominal Pain     pt is here with abdominal pain in the middle of his abdomen for one month. pt was seen two weeks ago he was told he was dehydrated. Pt reports daily abdominal pain that starts at noon and ends around midnight.      ADRIAN Bonilla is a 65 year old male with history significant for AAA, DYLAN, obesity, coronary artery disease, diabetes type 2, hypertension and hyperlipidemia who presents emergency department for evaluation of abdominal pain.  Patient has had abdominal pain in his mid abdomen for greater than 1 month.  Chart review shows hospitalization from 6/23/2021 through 6/25/2021 for dehydration, gastroenteritis, acute kidney injury.  Abdominal pain is similar to what he had previously.  He reports having 2 episodes of nausea and vomiting since discharge which occurred a few days ago.  Reports having a decreased appetite.  Pain is described as \"sharp\".  Starts at about noon each day and ends at about 10 PM to midnight.  Pain is constantly there he during this time with a waxing and waning intensity.  Also describes it as \"childbirth\".  Pain worsens after eating or drinking.  Patient also reports that pain is worse when his wife yells at the dog.  No change in pain with position.  Has not been taking anything for pain.  Pain is nonradiating.  No melena or blood in stool.  CT scan of abdomen pelvis at that time showed 3.3 cm AAA.  Recommendation was for repeat imaging in 2 to 3 years.    Current alcohol and tobacco use.         The patient's PMHx, Surgical Hx, Allergies, and Medications were all reviewed with the patient.    Allergies:  Allergies   Allergen Reactions     Nkda [No Known Drug Allergies]        Problem List:    Patient Active Problem List    Diagnosis Date Noted     Epigastric pain 07/19/2021     Priority: Medium     AAA (abdominal aortic aneurysm) (H) 06/25/2021     Priority: Medium     3.3 cm, consider repeat imaging in " 2-3 years from 6/2021       Encounter for screening laboratory testing for COVID-19 virus 06/24/2021     Priority: Medium     Acute kidney injury (H) 06/23/2021     Priority: Medium     COVID-19 ruled out 06/23/2021     Priority: Medium     Dehydration 06/23/2021     Priority: Medium     Hypomagnesemia 06/23/2021     Priority: Medium     Hypophosphatemia 06/23/2021     Priority: Medium     Atherosclerosis of both carotid arteries 06/20/2019     Priority: Medium     Coronary artery disease involving native coronary artery of native heart without angina pectoris 08/04/2017     Priority: Medium     Type 2 diabetes mellitus without complication (H) 10/21/2015     Priority: Medium     PAD (peripheral artery disease) (H) 04/14/2014     Priority: Medium     Hypertension goal BP (blood pressure) < 140/90 08/05/2013     Priority: Medium     Tobacco abuse 11/13/2012     Priority: Medium     Morbid obesity (H) 12/01/2011     Priority: Medium     ABIGAIL (obstructive sleep apnea) 11/28/2011     Priority: Medium     Severe ABIGAIL with significant oxygen desaturations in REM (AHI 87.2, ba desat 66% in REM)  Incomplete titration with remaining events in supine sleep on CPAP 19 cm H2O.  Looked significantly improved during brief trial of bilevel PAP at 16/12 including lateral NREM / REM and supine NREM.         Hyperlipidemia LDL goal <100 09/30/2011     Priority: Medium     Disorder of bursae and tendons in shoulder region 11/10/2005     Priority: Medium     Problem list name updated by automated process. Provider to review       Cardiovascular disease 05/01/2001     Priority: Medium     MI -- Angioplasty two stents RCA & OM2  Problem list name updated by automated process. Provider to review          Past Medical History:    Past Medical History:   Diagnosis Date     Coronary artery disease may 1 2005     Pure hypercholesterolemia      Type II or unspecified type diabetes mellitus without mention of complication, not stated as  uncontrolled      Unspecified cardiovascular disease 5-2001     Unspecified essential hypertension        Past Surgical History:    Past Surgical History:   Procedure Laterality Date     CARDIAC SURGERY  may 1, 2005     COLONOSCOPY  11/30/2011    Procedure:COLONOSCOPY; Screening Colonoscopy; Surgeon:LUTHER FLORES; Location:[facility]     SURGICAL HISTORY OF -       None     VASCULAR SURGERY  oct 2013    stent put in leg       Family History:    Family History   Problem Relation Age of Onset     Cerebrovascular Disease Maternal Grandmother      Arthritis Other         hip fracture     Respiratory Father         emphysema     Alzheimer Disease Maternal Grandfather      Breast Cancer Sister      Cancer - colorectal No family hx of      Prostate Cancer No family hx of        Social History:  Marital Status:   [2]  Social History     Tobacco Use     Smoking status: Current Every Day Smoker     Packs/day: 0.50     Years: 50.00     Pack years: 25.00     Types: Cigarettes     Start date: 1/1/1972     Smokeless tobacco: Never Used   Substance Use Topics     Alcohol use: Not Currently     Comment: 10 beers a week      Drug use: No        Medications:    aspirin 81 MG EC tablet  atorvastatin (LIPITOR) 80 MG tablet  lisinopril (ZESTRIL) 40 MG tablet  metFORMIN (GLUCOPHAGE) 1000 MG tablet  metoprolol succinate ER (TOPROL-XL) 50 MG 24 hr tablet  ketorolac (ACULAR) 0.5 % ophthalmic solution  ofloxacin (OCUFLOX) 0.3 % ophthalmic solution  prednisoLONE acetate (PRED FORTE) 1 % ophthalmic suspension          Review of Systems   Constitutional: Negative for chills and fever.   HENT: Negative for congestion and sore throat.    Eyes: Negative for visual disturbance.   Respiratory: Negative for cough.    Cardiovascular: Negative for chest pain.   Gastrointestinal: Positive for abdominal pain. Negative for blood in stool and diarrhea.   Genitourinary: Negative for dysuria.   Musculoskeletal: Negative for myalgias.   Skin:  Negative for rash.   Neurological: Negative for light-headedness and headaches.       Physical Exam   BP: 134/75  Pulse: 85  Temp: 98.4  F (36.9  C)  Resp: 20  Weight: 110.7 kg (244 lb)  SpO2: 96 %    Physical Exam  GEN: Awake, alert, and cooperative. No acute distress.  Resting comfortably in recumbent position on cart.  HENT: MMM. External ears and nose normal bilaterally.  EYES: EOM intact. Conjunctiva clear. No discharge.   NECK: Symmetric, freely mobile.   CV : Regular rate and rhythm.  No murmurs appreciated.  PULM: Normal effort. No wheezes, rales, or rhonchi bilaterally.  ABD: No pitting pedal or pretibial edema.  NEURO: Normal speech. Following commands. CN II-XII grossly intact. Answering questions and interacting appropriately.   EXT: No gross deformity.  No pitting pedal or pretibial edema  INT: Warm. No diaphoresis. Normal color.        ED Course        Procedures       EKG:  Sinus rhythm with rate of 80 bpm.  Normal axis.  RSR in V1.  Normal R wave progression.  S wave in lead I Q wave in lead III.  No acute changes compared to ECG from 12/18/2012.    Critical Care time:  none               Results for orders placed or performed during the hospital encounter of 07/19/21 (from the past 24 hour(s))   Comprehensive metabolic panel   Result Value Ref Range    Sodium 135 133 - 144 mmol/L    Potassium 5.1 3.4 - 5.3 mmol/L    Chloride 103 94 - 109 mmol/L    Carbon Dioxide (CO2) 27 20 - 32 mmol/L    Anion Gap 5 3 - 14 mmol/L    Urea Nitrogen 22 7 - 30 mg/dL    Creatinine 2.22 (H) 0.66 - 1.25 mg/dL    Calcium 9.5 8.5 - 10.1 mg/dL    Glucose 98 70 - 99 mg/dL    Alkaline Phosphatase 71 40 - 150 U/L    AST 15 0 - 45 U/L    ALT 31 0 - 70 U/L    Protein Total 7.2 6.8 - 8.8 g/dL    Albumin 3.6 3.4 - 5.0 g/dL    Bilirubin Total 0.8 0.2 - 1.3 mg/dL    GFR Estimate 30 (L) >60 mL/min/1.73m2   CBC with platelets differential    Narrative    The following orders were created for panel order CBC with platelets  differential.  Procedure                               Abnormality         Status                     ---------                               -----------         ------                     CBC with platelets and d...[[id number]]  Abnormal            Final result                 Please view results for these tests on the individual orders.   Magnesium   Result Value Ref Range    Magnesium 1.9 1.6 - 2.3 mg/dL   Lipase   Result Value Ref Range    Lipase 156 73 - 393 U/L   CBC with platelets and differential   Result Value Ref Range    WBC Count 15.5 (H) 4.0 - 11.0 10e3/uL    RBC Count 4.63 4.40 - 5.90 10e6/uL    Hemoglobin 15.0 13.3 - 17.7 g/dL    Hematocrit 45.0 40.0 - 53.0 %    MCV 97 78 - 100 fL    MCH 32.4 26.5 - 33.0 pg    MCHC 33.3 31.5 - 36.5 g/dL    RDW 13.6 10.0 - 15.0 %    Platelet Count 255 150 - 450 10e3/uL    % Neutrophils 76 %    % Lymphocytes 13 %    % Monocytes 10 %    % Eosinophils 0 %    % Basophils 0 %    % Immature Granulocytes 1 %    NRBCs per 100 WBC 0 <1 /100    Absolute Neutrophils 11.7 (H) 1.6 - 8.3 10e3/uL    Absolute Lymphocytes 2.0 0.8 - 5.3 10e3/uL    Absolute Monocytes 1.6 (H) 0.0 - 1.3 10e3/uL    Absolute Eosinophils 0.1 0.0 - 0.7 10e3/uL    Absolute Basophils 0.1 0.0 - 0.2 10e3/uL    Absolute Immature Granulocytes 0.1 (H) <=0.0 10e3/uL    Absolute NRBCs 0.0 10e3/uL   Abd/pelvis CT - no contrast - Stone Protocol    Narrative    EXAM: CT ABDOMEN PELVIS W/O CONTRAST  LOCATION: [facility]   DATE/TIME: 7/19/2021 7:03 PM    INDICATION: Nausea/vomiting  Epigastric pain  COMPARISON: 6/23/2021  TECHNIQUE: CT scan of the abdomen and pelvis was performed without IV contrast. Multiplanar reformats were obtained. Dose reduction techniques were used.  CONTRAST: None.    FINDINGS:   LOWER CHEST: Coronary artery calcifications noted.    HEPATOBILIARY: Normal.    PANCREAS: Normal.    SPLEEN: Normal.    ADRENAL GLANDS: Normal.    KIDNEYS/BLADDER: Normal.    BOWEL: No obstruction or  inflammatory change.    LYMPH NODES: Normal.    VASCULATURE: Heavy diffuse atherosclerotic plaque including heavy calcifications involving the proximal splanchnic and renal arteries.. Small AAA with maximal dimension of 3.3 cm unchanged.    PELVIC ORGANS: Minimal bladder wall thickening, bladder collapsed. No ascites.    MUSCULOSKELETAL: Normal.      Impression    IMPRESSION:   1.  No etiology for symptoms evident. Nothing obstructive or inflammatory involving bowel. No urinary tract stone disease.  2.  No change in small AAA. Prominent diffuse atherosclerotic vascular disease.     Asymptomatic COVID-19 Virus (Coronavirus) by PCR Nasopharyngeal *Canceled*    Specimen: Nasopharyngeal; Swab    Narrative    The following orders were created for panel order Asymptomatic COVID-19 Virus (Coronavirus) by PCR Nasopharyngeal.  Procedure                               Abnormality         Status                     ---------                               -----------         ------                       Please view results for these tests on the individual orders.   Asymptomatic COVID-19 Virus (Coronavirus) by PCR Nasopharyngeal *Canceled*    Specimen: Nasopharyngeal; Swab    Narrative    The following orders were created for panel order Asymptomatic COVID-19 Virus (Coronavirus) by PCR Nasopharyngeal.  Procedure                               Abnormality         Status                     ---------                               -----------         ------                     SARS-COV2 (COVID-19) Vir...[[id number]]                                                   Please view results for these tests on the individual orders.   Asymptomatic COVID-19 Virus (Coronavirus) by PCR Nasopharyngeal    Specimen: Nasopharyngeal; Swab    Narrative    The following orders were created for panel order Asymptomatic COVID-19 Virus (Coronavirus) by PCR Nasopharyngeal.  Procedure                               Abnormality         Status                      ---------                               -----------         ------                     SARS-COV2 (COVID-19) Vir...[[id number]]  Normal              Final result                 Please view results for these tests on the individual orders.   SARS-COV2 (COVID-19) Virus RT-PCR    Specimen: Nasopharyngeal; Swab   Result Value Ref Range    SARS CoV2 PCR Negative Negative    Narrative    Testing was performed using the dina  SARS-CoV-2 & Influenza A/B Assay on the dina  Criss[employer]  System.  This test should be ordered for the detection of SARS-COV-2 in individuals who meet SARS-CoV-2 clinical and/or epidemiological criteria. Test performance is unknown in asymptomatic patients.  This test is for in vitro diagnostic use under the FDA EUA for laboratories certified under CLIA to perform moderate and/or high complexity testing. This test has not been FDA cleared or approved.  A negative test does not rule out the presence of PCR inhibitors in the specimen or target RNA in concentration below the limit of detection for the assay. The possibility of a false negative should be considered if the patient's recent exposure or clinical presentation suggests COVID-19.  [facility] are certified under the Clinical Laboratory Improvement Amendments of 1988 (CLIA-88) as qualified to perform moderate and/or high complexity laboratory testing.       Medications   ondansetron (ZOFRAN) injection 4 mg (4 mg Intravenous Given 7/19/21 1634)   lactated ringers infusion ( Intravenous New Bag 7/19/21 2040)   lidocaine (XYLOCAINE) 2 % 15 mL, alum & mag hydroxide-simethicone (MAALOX) 15 mL GI Cocktail (30 mLs Oral Given 7/19/21 1738)   HYDROmorphone (PF) (DILAUDID) injection 0.5 mg (0.5 mg Intravenous Given 7/19/21 1802)   lactated ringers BOLUS 1,000 mL (0 mLs Intravenous Stopped 7/19/21 2039)   famotidine (PEPCID) infusion 20 mg (0 mg Intravenous Stopped 7/19/21 2039)       Assessments & Plan (with Medical Decision Making)    65 year old male with past medical history hypertension, hyperlipidemia, AAA, DYLAN, obesity, with recent admission for likely gastroenteritis with subsequent dehydration and acute kidney injury with abdominal pain for the past month.  Decreased appetite and decreased oral intake.  Reassuring abdominal exam.  Labs notable for leukocytosis with left shift which available on prior admission.  His creatinine was up to greater than 3 on his prior admission and read improved to 1.09 2 weeks ago and is now back up to 2.22.  CT scan of abdomen and pelvis without contrast obtained without any obvious etiology of his symptoms.  There is calcifications of a splanchnic and renal vasculature.    The timing and associated features of his abdominal pain is puzzling.  His symptoms could be in part due to gastritis.  He did not have reported improvement after GI cocktail.  He did have resolution of his pain after hydromorphone.  Fact that the symptoms seem to be worsened with food would be supportive of this diagnosis.  He could also been having abdominal angina.  Unable to obtain contrasted study at this time due to his DYLAN.  He was given 2 L of IV fluids on the emergency department, 20 mg IV Pepcid, and 0.5 mg IV hydromorphone x1.    Given the current pandemic SARS-CoV-2 testing was obtained and results were negative.  Patient require hospitalization for further evaluation and management of his acute kidney injury likely related to decreased oral intake.  I discussed the case with Eda Montelongo with hospital service who accepted the admission.  Transition orders were placed.    I have reviewed the nursing notes.         New Prescriptions    No medications on file       Final diagnoses:   Acute kidney injury (H)   Epigastric pain     Clint Yeh MD    7/19/2021   [facility] EMERGENCY DEPT    Disclaimer: This note consists of words and symbols derived from keyboarding and dictation using voice recognition  software.  As a result, there may be errors that have gone undetected.  Please consider this when interpreting information found in this note.             Clint Yeh MD  07/19/21 [id number]

## 2021-07-19 NOTE — ED TRIAGE NOTES
pt is here with abdominal pain in the middle of his abdomen for one month. pt was seen two weeks ago he was told he was dehydrated. Pt reports daily abdominal pain that starts at noon and ends around midnight.

## 2021-07-20 ENCOUNTER — ANESTHESIA EVENT (OUTPATIENT)
Dept: GASTROENTEROLOGY | Facility: CLINIC | Age: 66
DRG: 392 | End: 2021-07-20
Payer: COMMERCIAL

## 2021-07-20 LAB
ALBUMIN SERPL-MCNC: 2.7 G/DL (ref 3.4–5)
ALBUMIN UR-MCNC: 30 MG/DL
ALP SERPL-CCNC: 54 U/L (ref 40–150)
ALT SERPL W P-5'-P-CCNC: 21 U/L (ref 0–70)
ANION GAP SERPL CALCULATED.3IONS-SCNC: 2 MMOL/L (ref 3–14)
APPEARANCE UR: ABNORMAL
AST SERPL W P-5'-P-CCNC: 14 U/L (ref 0–45)
BASOPHILS # BLD AUTO: 0 10E3/UL (ref 0–0.2)
BASOPHILS NFR BLD AUTO: 0 %
BILIRUB SERPL-MCNC: 0.6 MG/DL (ref 0.2–1.3)
BILIRUB UR QL STRIP: NEGATIVE
BUN SERPL-MCNC: 26 MG/DL (ref 7–30)
CALCIUM SERPL-MCNC: 8.4 MG/DL (ref 8.5–10.1)
CHLORIDE BLD-SCNC: 105 MMOL/L (ref 94–109)
CO2 SERPL-SCNC: 29 MMOL/L (ref 20–32)
COLOR UR AUTO: ABNORMAL
CREAT SERPL-MCNC: 2.34 MG/DL (ref 0.66–1.25)
CREAT UR-MCNC: 326 MG/DL
EOSINOPHIL # BLD AUTO: 0.2 10E3/UL (ref 0–0.7)
EOSINOPHIL NFR BLD AUTO: 2 %
ERYTHROCYTE [DISTWIDTH] IN BLOOD BY AUTOMATED COUNT: 13.6 % (ref 10–15)
FRACT EXCRET NA UR+SERPL-RTO: 0.2 %
GFR SERPL CREATININE-BSD FRML MDRD: 28 ML/MIN/1.73M2
GLUCOSE BLD-MCNC: 100 MG/DL (ref 70–99)
GLUCOSE BLDC GLUCOMTR-MCNC: 115 MG/DL (ref 70–99)
GLUCOSE BLDC GLUCOMTR-MCNC: 120 MG/DL (ref 70–99)
GLUCOSE BLDC GLUCOMTR-MCNC: 121 MG/DL (ref 70–99)
GLUCOSE BLDC GLUCOMTR-MCNC: 140 MG/DL (ref 70–99)
GLUCOSE BLDC GLUCOMTR-MCNC: 92 MG/DL (ref 70–99)
GLUCOSE BLDC GLUCOMTR-MCNC: 93 MG/DL (ref 70–99)
GLUCOSE UR STRIP-MCNC: NEGATIVE MG/DL
HBA1C MFR BLD: 6.2 % (ref 0–5.6)
HCT VFR BLD AUTO: 38.4 % (ref 40–53)
HGB BLD-MCNC: 12.5 G/DL (ref 13.3–17.7)
HGB UR QL STRIP: NEGATIVE
HYALINE CASTS: 62 /LPF
IMM GRANULOCYTES # BLD: 0.1 10E3/UL
IMM GRANULOCYTES NFR BLD: 1 %
KETONES UR STRIP-MCNC: NEGATIVE MG/DL
LEUKOCYTE ESTERASE UR QL STRIP: ABNORMAL
LYMPHOCYTES # BLD AUTO: 3.1 10E3/UL (ref 0.8–5.3)
LYMPHOCYTES NFR BLD AUTO: 25 %
MCH RBC QN AUTO: 32.2 PG (ref 26.5–33)
MCHC RBC AUTO-ENTMCNC: 32.6 G/DL (ref 31.5–36.5)
MCV RBC AUTO: 99 FL (ref 78–100)
MONOCYTES # BLD AUTO: 1.3 10E3/UL (ref 0–1.3)
MONOCYTES NFR BLD AUTO: 11 %
MUCOUS THREADS #/AREA URNS LPF: PRESENT /LPF
NEUTROPHILS # BLD AUTO: 7.4 10E3/UL (ref 1.6–8.3)
NEUTROPHILS NFR BLD AUTO: 61 %
NITRATE UR QL: NEGATIVE
NRBC # BLD AUTO: 0 10E3/UL
NRBC BLD AUTO-RTO: 0 /100
PH UR STRIP: 5 [PH] (ref 5–7)
PLATELET # BLD AUTO: 201 10E3/UL (ref 150–450)
POTASSIUM BLD-SCNC: 4.8 MMOL/L (ref 3.4–5.3)
PROT SERPL-MCNC: 5.6 G/DL (ref 6.8–8.8)
RBC # BLD AUTO: 3.88 10E6/UL (ref 4.4–5.9)
RBC URINE: 1 /HPF
SODIUM SERPL-SCNC: 136 MMOL/L (ref 133–144)
SODIUM UR-SCNC: 34 MMOL/L
SP GR UR STRIP: 1.02 (ref 1–1.03)
SQUAMOUS EPITHELIAL: 2 /HPF
UROBILINOGEN UR STRIP-MCNC: 2 MG/DL
WBC # BLD AUTO: 12.2 10E3/UL (ref 4–11)
WBC URINE: 12 /HPF

## 2021-07-20 PROCEDURE — 99207 PR APP CREDIT; MD BILLING SHARED VISIT: CPT | Performed by: PHYSICIAN ASSISTANT

## 2021-07-20 PROCEDURE — 36415 COLL VENOUS BLD VENIPUNCTURE: CPT | Performed by: PHYSICIAN ASSISTANT

## 2021-07-20 PROCEDURE — 85025 COMPLETE CBC W/AUTO DIFF WBC: CPT | Performed by: PHYSICIAN ASSISTANT

## 2021-07-20 PROCEDURE — 250N000011 HC RX IP 250 OP 636: Performed by: PHYSICIAN ASSISTANT

## 2021-07-20 PROCEDURE — 82570 ASSAY OF URINE CREATININE: CPT | Performed by: PHYSICIAN ASSISTANT

## 2021-07-20 PROCEDURE — 81001 URINALYSIS AUTO W/SCOPE: CPT | Performed by: EMERGENCY MEDICINE

## 2021-07-20 PROCEDURE — 120N000001 HC R&B MED SURG/OB

## 2021-07-20 PROCEDURE — 258N000003 HC RX IP 258 OP 636: Performed by: EMERGENCY MEDICINE

## 2021-07-20 PROCEDURE — 80053 COMPREHEN METABOLIC PANEL: CPT | Performed by: PHYSICIAN ASSISTANT

## 2021-07-20 PROCEDURE — 250N000013 HC RX MED GY IP 250 OP 250 PS 637: Performed by: PHYSICIAN ASSISTANT

## 2021-07-20 PROCEDURE — 84300 ASSAY OF URINE SODIUM: CPT | Performed by: PHYSICIAN ASSISTANT

## 2021-07-20 PROCEDURE — 83036 HEMOGLOBIN GLYCOSYLATED A1C: CPT | Performed by: PHYSICIAN ASSISTANT

## 2021-07-20 PROCEDURE — G0378 HOSPITAL OBSERVATION PER HR: HCPCS

## 2021-07-20 PROCEDURE — C9113 INJ PANTOPRAZOLE SODIUM, VIA: HCPCS | Performed by: PHYSICIAN ASSISTANT

## 2021-07-20 PROCEDURE — 99222 1ST HOSP IP/OBS MODERATE 55: CPT | Mod: AI

## 2021-07-20 PROCEDURE — 250N000012 HC RX MED GY IP 250 OP 636 PS 637: Performed by: PHYSICIAN ASSISTANT

## 2021-07-20 RX ORDER — ASPIRIN 81 MG/1
81 TABLET ORAL DAILY
Status: DISCONTINUED | OUTPATIENT
Start: 2021-07-20 | End: 2021-07-21 | Stop reason: HOSPADM

## 2021-07-20 RX ORDER — HYDROMORPHONE HCL IN WATER/PF 6 MG/30 ML
.2-.5 PATIENT CONTROLLED ANALGESIA SYRINGE INTRAVENOUS
Status: DISCONTINUED | OUTPATIENT
Start: 2021-07-20 | End: 2021-07-21 | Stop reason: HOSPADM

## 2021-07-20 RX ORDER — ATORVASTATIN CALCIUM 80 MG/1
80 TABLET, FILM COATED ORAL DAILY
Status: DISCONTINUED | OUTPATIENT
Start: 2021-07-20 | End: 2021-07-21 | Stop reason: HOSPADM

## 2021-07-20 RX ORDER — DEXTROSE MONOHYDRATE 25 G/50ML
25-50 INJECTION, SOLUTION INTRAVENOUS
Status: DISCONTINUED | OUTPATIENT
Start: 2021-07-20 | End: 2021-07-21 | Stop reason: HOSPADM

## 2021-07-20 RX ORDER — NICOTINE POLACRILEX 4 MG
15-30 LOZENGE BUCCAL
Status: DISCONTINUED | OUTPATIENT
Start: 2021-07-20 | End: 2021-07-21 | Stop reason: HOSPADM

## 2021-07-20 RX ORDER — METOPROLOL SUCCINATE 50 MG/1
50 TABLET, EXTENDED RELEASE ORAL DAILY
Status: DISCONTINUED | OUTPATIENT
Start: 2021-07-20 | End: 2021-07-21 | Stop reason: HOSPADM

## 2021-07-20 RX ADMIN — METOPROLOL SUCCINATE 50 MG: 50 TABLET, EXTENDED RELEASE ORAL at 07:16

## 2021-07-20 RX ADMIN — SODIUM CHLORIDE, POTASSIUM CHLORIDE, SODIUM LACTATE AND CALCIUM CHLORIDE: 600; 310; 30; 20 INJECTION, SOLUTION INTRAVENOUS at 23:10

## 2021-07-20 RX ADMIN — SODIUM CHLORIDE, POTASSIUM CHLORIDE, SODIUM LACTATE AND CALCIUM CHLORIDE: 600; 310; 30; 20 INJECTION, SOLUTION INTRAVENOUS at 07:22

## 2021-07-20 RX ADMIN — ATORVASTATIN CALCIUM 80 MG: 80 TABLET, FILM COATED ORAL at 07:15

## 2021-07-20 RX ADMIN — PANTOPRAZOLE SODIUM 40 MG: 40 INJECTION, POWDER, FOR SOLUTION INTRAVENOUS at 09:32

## 2021-07-20 RX ADMIN — INSULIN ASPART 1 UNITS: 100 INJECTION, SOLUTION INTRAVENOUS; SUBCUTANEOUS at 11:43

## 2021-07-20 RX ADMIN — ASPIRIN 81 MG: 81 TABLET, COATED ORAL at 07:15

## 2021-07-20 RX ADMIN — SODIUM CHLORIDE, POTASSIUM CHLORIDE, SODIUM LACTATE AND CALCIUM CHLORIDE: 600; 310; 30; 20 INJECTION, SOLUTION INTRAVENOUS at 00:42

## 2021-07-20 ASSESSMENT — ACTIVITIES OF DAILY LIVING (ADL)
TOILETING_ISSUES: NO
ADLS_ACUITY_SCORE: 15
DOING_ERRANDS_INDEPENDENTLY_DIFFICULTY: NO
CONCENTRATING,_REMEMBERING_OR_MAKING_DECISIONS_DIFFICULTY: NO
WALKING_OR_CLIMBING_STAIRS_DIFFICULTY: NO
ADLS_ACUITY_SCORE: 15
HEARING_DIFFICULTY_OR_DEAF: NO
DIFFICULTY_EATING/SWALLOWING: NO
DRESSING/BATHING_DIFFICULTY: NO
FALL_HISTORY_WITHIN_LAST_SIX_MONTHS: NO
DIFFICULTY_COMMUNICATING: NO
ADLS_ACUITY_SCORE: 15
WEAR_GLASSES_OR_BLIND: YES

## 2021-07-20 NOTE — H&P
[facility]    History and Physical - Hospitalist Service       Date of Admission:  7/19/2021    Assessment & Plan      Roger Bonilla is a 65 year old male admitted on 7/19/2021. He has history of hypertension, type 2 diabetes, coronary artery disease, peripheral vascular disease, AAA, ABIGAIL and obesity. He presents with ~2 months of daily abdominal pain and DYLAN.    Acute Kidney Injury  Creatinine 2.34, BUN 26, GFR 28. Baseline creatinine ~0.8, GFR typically >90 though last creatinine 1.09 and GFR 70 on 7/1/2021 after recovering from previous DYLAN.  Suspect there may be a component of pre-renal given poor intake, emesis, FENA 0.2 and hyaline casts. May also have some progression to ATN.   - IVF: LR at 125 ml/hr  - Monitor I/O's, daily weights  - avoid nephrotoxic agents  - renally dose medications as appropriate  - hold lisinopril, metformin until creatinine improves  - follow BMP  - SCAN bladder, straight cath for residual > 300 ml     Abdominal Pain  ~2 months of daily periumbilical/epigastric abdominal pain starting around noon and lasting until midnight, sharp, intense pain. Eating causes nausea and emesis, may exacerbate pain at times though pain occurs without food as well. CT abdomen/pelvis shows no clear acute etiology for symptoms, does show diffuse atherosclerotic disease. Labs show mild leukocytosis and DYLAN, normal LFTs and lipase. Unclear etiology at this time, will pursue EGD to evaluate further.   - discussed with surgery, plan for EGD, initially for today though patient received breakfast tray  - moderate carb diet today as tolerated, NPO at midnight  - IVF: LR at 125 ml/hr  - IV pantoprazole    Leukocytosis  WBC 15.5 on presentation with left shift, improved to 12.2 overnight with resolution of left shift.  - follow CBC  - monitor for infectious symptoms    Acute Anemia  Hgb 12.5 after IVF hydration, 15.0 on admission. MCV 97. Previous baseline 14-15. Suspect in part  "hemodilution.  - AM CBC  - monitor for bleeding  - consider further work up if down trending    Hypertension  Chronic. Blood pressures reviewed, stable.   - continue home metoprolol  - hold home lisinopril due to DYLAN    Diabetes Mellitus, Type II  Chronic.  Last a1C 6.4% on 3/2021.   -hold home metformin due to DYLAN  -moderate SSI  -hypo/hyperglycemia protocol with blood glucose monitoring    Coronary Artery Disease  Previously diagnosed, no current concerning symptoms. History of MI, s/p stenting in 2005 to RCA and OM branch, noted residual moderate disease in LAD at that time.  - continue home ASA, statin, beta blocker  - holding ACEi due to DYLAN    Atherosclerosis of both carotid arteries  Peripheral arterial disease  Hyperlipidemia  Chronic. Carotid US 2019 showed >70% stenosis of right ICA, <50% stenosis of left ICA.. Recently declined surveillance US. CT abdomen/pelvis showed diffuse atherosclerotic disease.  - continue home Lipitor  - outpatient surveillance    AAA  Noted on CT, 3.3 cm.  - repeat surveillance in 2-3 years    ABIGAIL  Does not use CPAP    COVID 19 screen negative on admit. Received COVID 19 vaccine.     Diet: NPO per Anesthesia Guidelines for Procedure/Surgery Except for: Meds    DVT Prophylaxis: Low Risk/Ambulatory with no VTE prophylaxis indicated  Peter Catheter: Not present  Central Lines: None  Code Status: No CPR- Do NOT Intubate, discussed directly with patient on admission, stated \"if it's my time to go then it's my time to go\".     Disposition Plan   Expected discharge: 1-2 days recommended to prior living arrangement once renal function improved, work up complete and tolerating adequate oral intake. Admitted to inpatient given renal function worse and will not tolerate adequate PO at this point, needs further IV fluids and evaluation.     The patient's care was discussed with the Attending Physician, Dr. Jose Francisco Borja and Patient.    Keke Pate PA-C  [facility] " Center  Securely message with the Vocera Web Console (learn more here)  Text page via [facility] Paging/Directory  ______________________________________________________________________    Chief Complaint   Abdominal Pain    History is obtained from the patient and electronic health record    History of Present Illness   Roger Bonilla is a 65 year old male who presents with abdominal pain.    Patient states his symptoms started about 2 months ago.  At that time he was having abdominal pain, nausea, vomiting and diarrhea.  He was hospitalized 6/23/21 through 6/25/21 for the symptoms as well as for an DYLAN.  His stool studies (C. difficile and enteric bacterial/viral panel) were negative at that time.  He states following discharge his diarrhea improved though his abdominal pain continued.    He describes the abdominal pain as periumbilical though at times it will higher or little lower.  He states it starts around noon and is constant, sharp pain until around midnight.  He has associated nausea at times emesis.  He does not want to eat because this causes him to vomit.  Is not exactly clear if eating makes the pain worse.  He states if someone talks to him while the pain is present, that can make it worse.  Nothing clearly makes it better, but did seem to improve with IV Dilaudid overnight.  At times he has chills.  No fevers.  He has been having formed bowel movements recently.  He has not seen any blood or black in his emesis or stool.  He has no history of abnormal bowel movements or previous issues with abdominal pain.    He was looking forward to breakfast this morning.  He states he does not typically eat breakfast and that his first meal is around lunchtime.  He was made n.p.o. for an EGD and he was asked not to eat, though his tray came in so he did eat the food.     He states he has been drinking about 3 to 4 glasses of water.  He states he tries to keep up with his hydration the water can also exacerbate his  symptoms and cause him to vomit and so he is unsure if he is keeping up.    He denies lightheadedness, headache, cough, shortness of breath, palpitations, chest pain, changes in urination.    Review of Systems    The 10 point Review of Systems is negative other than noted in the HPI or here.     Past Medical History    I have reviewed this patient's medical history and updated it with pertinent information if needed.   Past Medical History:   Diagnosis Date     Coronary artery disease may 1 2005    2 stents put in heart     Pure hypercholesterolemia      Type II or unspecified type diabetes mellitus without mention of complication, not stated as uncontrolled      Unspecified cardiovascular disease 5-2001    MI -- Angioplasty two stents RCA & OM2     Unspecified essential hypertension        Past Surgical History   I have reviewed this patient's surgical history and updated it with pertinent information if needed.  Past Surgical History:   Procedure Laterality Date     CARDIAC SURGERY  may 1, 2005     COLONOSCOPY  11/30/2011    Procedure:COLONOSCOPY; Screening Colonoscopy; Surgeon:LUTHER FLORES; Location:[facility]     SURGICAL HISTORY OF -       None     VASCULAR SURGERY  oct 2013    stent put in leg       Social History   I have reviewed this patient's social history and updated it with pertinent information if needed.  Lives in [place], [place]. 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  Social History     Tobacco Use     Smoking status: Current Every Day Smoker     Packs/day: 0.50     Years: 50.00     Pack years: 25.00     Types: Cigarettes     Start date: 1/1/1972     Smokeless tobacco: Never Used   Substance Use Topics     Alcohol use: Not Currently     Comment: 10 beers a week      Drug use: No       Family History   I have reviewed this patient's family history and updated it with pertinent information if needed.  Family History   Problem Relation Age of Onset     Cerebrovascular Disease Maternal Grandmother       Arthritis Other         hip fracture     Respiratory Father         emphysema     Alzheimer Disease Maternal Grandfather      Breast Cancer Sister      Cancer - colorectal No family hx of      Prostate Cancer No family hx of        Prior to Admission Medications   Prior to Admission Medications   Prescriptions Last Dose Informant Patient Reported? Taking?   aspirin 81 MG EC tablet 7/19/2021 at am Self No Yes   Sig: Take 1 tablet (81 mg) by mouth daily   atorvastatin (LIPITOR) 80 MG tablet 7/19/2021 at am Self No Yes   Sig: TAKE 1 TABLET (80 MG) BY MOUTH DAILY   Patient taking differently: Take 80 mg by mouth daily TAKE 1 TABLET (80 MG) BY MOUTH DAILY   ketorolac (ACULAR) 0.5 % ophthalmic solution not on yet Self Yes No   Sig: Instill 1 drop into affected eye three times a day starting 2 days before surgery   lisinopril (ZESTRIL) 40 MG tablet 7/19/2021 at am Self No Yes   Sig: Take 1 tablet (40 mg) by mouth daily   metFORMIN (GLUCOPHAGE) 1000 MG tablet 7/19/2021 at am Self No Yes   Sig: TAKE ONE TABLET BY MOUTH ONE TIME DAILY   Patient taking differently: Take 1,000 mg by mouth daily (with breakfast) TAKE ONE TABLET BY MOUTH ONE TIME DAILY   metoprolol succinate ER (TOPROL-XL) 50 MG 24 hr tablet 7/19/2021 at am Self No Yes   Sig: TAKE 1 TABLET (50 MG) BY MOUTH DAILY   Patient taking differently: Take 50 mg by mouth daily TAKE 1 TABLET (50 MG) BY MOUTH DAILY   ofloxacin (OCUFLOX) 0.3 % ophthalmic solution not yet Self Yes No   Sig: Instill 1 drop into affected eye three times a day starting 2 days prior to surgery   prednisoLONE acetate (PRED FORTE) 1 % ophthalmic suspension not yet Self Yes No   Sig: Instill 1 drop into affected eye three times a day starting after surgery      Facility-Administered Medications: None     Allergies   Allergies   Allergen Reactions     Nkda [No Known Drug Allergies]        Physical Exam   Vital Signs: Temp: (!) 96.4  F (35.8  C) Temp src: Axillary BP: 112/56 Pulse: 71   Resp: 18 SpO2:  96 % O2 Device: None (Room air)    Weight: 242 lbs 11.62 oz    Constitutional: sitting up on edge of bed, non-toxic in appearance, awake, alert, cooperative, no apparent distress, and appears stated age  Respiratory: No increased work of breathing, good air exchange, clear to auscultation bilaterally, no crackles or wheezing  Cardiovascular: regular rate and rhythm, and no murmur noted. No lower extremity edema.  GI: normal bowel sounds, soft, non-distended, non-tender  Genitounirinary: deferred  Skin: warm and dry. Normal color, texture and turgor.  Musculoskeletal: normal bulk and tone. Moves all 4 extremities appropriately.  Neurologic: Awake, alert, oriented to name, place and time. Intermittent tremor noted of the head.  Neuropsychiatric: calm, pleasant, cooperative. Affect somewhat flat. Appropriate thought process/content, short term memory appears grossly intact.    Data   Data reviewed today: I reviewed all medications, new labs and imaging results over the last 24 hours. I personally reviewed the EKG tracing showing NSR without acute ST or T wave changes when compared to prior.    Recent Labs   Lab 07/20/21  0727 07/20/21  0437 07/20/21  0200 07/19/21  1725   WBC  --  12.2*  --  15.5*   HGB  --  12.5*  --  15.0   MCV  --  99  --  97   PLT  --  201  --  255   NA  --  136  --  135   POTASSIUM  --  4.8  --  5.1   CHLORIDE  --  105  --  103   CO2  --  29  --  27   BUN  --  26  --  22   CR  --  2.34*  --  2.22*   ANIONGAP  --  2*  --  5   SUE  --  8.4*  --  9.5   GLC 92 100* 121* 98   ALBUMIN  --  2.7*  --  3.6   PROTTOTAL  --  5.6*  --  7.2   BILITOTAL  --  0.6  --  0.8   ALKPHOS  --  54  --  71   ALT  --  21  --  31   AST  --  14  --  15   LIPASE  --   --   --  156     Recent Results (from the past 24 hour(s))   Abd/pelvis CT - no contrast - Stone Protocol    Narrative    EXAM: CT ABDOMEN PELVIS W/O CONTRAST  LOCATION: [facility]   DATE/TIME: 7/19/2021 7:03 PM    INDICATION:  Nausea/vomiting  Epigastric pain  COMPARISON: 6/23/2021  TECHNIQUE: CT scan of the abdomen and pelvis was performed without IV contrast. Multiplanar reformats were obtained. Dose reduction techniques were used.  CONTRAST: None.    FINDINGS:   LOWER CHEST: Coronary artery calcifications noted.    HEPATOBILIARY: Normal.    PANCREAS: Normal.    SPLEEN: Normal.    ADRENAL GLANDS: Normal.    KIDNEYS/BLADDER: Normal.    BOWEL: No obstruction or inflammatory change.    LYMPH NODES: Normal.    VASCULATURE: Heavy diffuse atherosclerotic plaque including heavy calcifications involving the proximal splanchnic and renal arteries.. Small AAA with maximal dimension of 3.3 cm unchanged.    PELVIC ORGANS: Minimal bladder wall thickening, bladder collapsed. No ascites.    MUSCULOSKELETAL: Normal.      Impression    IMPRESSION:   1.  No etiology for symptoms evident. Nothing obstructive or inflammatory involving bowel. No urinary tract stone disease.  2.  No change in small AAA. Prominent diffuse atherosclerotic vascular disease.

## 2021-07-20 NOTE — PLAN OF CARE
AOx4, up independent in room. LR @ 125ml/hr, VSS, blood sugars 96 HS; 121 0200.  Rates abd pain 2/10, declined pain medication. Rested comfortably throughout shift.

## 2021-07-20 NOTE — PLAN OF CARE
"[facility] [place]    Patient admitted to room 2207 at approximately 2210 via cart from emergency room. Patient was accompanied by transport tech.     Verbal SBAR report received from Genet SARGENT prior to patient arrival.     Patient ambulated to bed independently. Patient alert and oriented X 3. Pain is controlled with current analgesics.  Medication(s) being used: narcotic analgesics including dilaudid.  . Admission vital signs: Blood pressure (!) 144/60, pulse 73, temperature 97.1  F (36.2  C), temperature source Oral, resp. rate 18, height 1.651 m (5' 5\"), weight 110.1 kg (242 lb 11.6 oz), SpO2 95 %. Patient was oriented to plan of care, call light, bed controls, tv, telephone, bathroom, and visiting hours.     Risk Assessment    The following safety risks were identified during admission: none. Yellow risk band applied: NO.     Skin Initial Assessment    This writer admitted this patient and completed a full skin assessment and Clifford score in the Adult PCS flowsheet. Appropriate interventions initiated as needed.     Secondary skin check completed by Genoveva SOTO    Pt has scabs on left arm from work pt reported otherwise skin is intact and dry.         Education    Patient has a [place] to Observation order: Yes  Observation education completed and documented: Yes      Isabela Pacheco RN      "

## 2021-07-21 ENCOUNTER — ANESTHESIA (OUTPATIENT)
Dept: GASTROENTEROLOGY | Facility: CLINIC | Age: 66
DRG: 392 | End: 2021-07-21
Payer: COMMERCIAL

## 2021-07-21 VITALS
OXYGEN SATURATION: 96 % | RESPIRATION RATE: 16 BRPM | HEART RATE: 71 BPM | TEMPERATURE: 98.1 F | HEIGHT: 60 IN | WEIGHT: 245 LBS | SYSTOLIC BLOOD PRESSURE: 173 MMHG | BODY MASS INDEX: 48.1 KG/M2 | DIASTOLIC BLOOD PRESSURE: 83 MMHG

## 2021-07-21 DIAGNOSIS — Z11.59 ENCOUNTER FOR SCREENING FOR OTHER VIRAL DISEASES: ICD-10-CM

## 2021-07-21 LAB
ANION GAP SERPL CALCULATED.3IONS-SCNC: 3 MMOL/L (ref 3–14)
BUN SERPL-MCNC: 22 MG/DL (ref 7–30)
CALCIUM SERPL-MCNC: 8.7 MG/DL (ref 8.5–10.1)
CHLORIDE BLD-SCNC: 107 MMOL/L (ref 94–109)
CO2 SERPL-SCNC: 30 MMOL/L (ref 20–32)
CREAT SERPL-MCNC: 1.27 MG/DL (ref 0.66–1.25)
ERYTHROCYTE [DISTWIDTH] IN BLOOD BY AUTOMATED COUNT: 13.3 % (ref 10–15)
GFR SERPL CREATININE-BSD FRML MDRD: 59 ML/MIN/1.73M2
GLUCOSE BLD-MCNC: 114 MG/DL (ref 70–99)
GLUCOSE BLDC GLUCOMTR-MCNC: 104 MG/DL (ref 70–99)
GLUCOSE BLDC GLUCOMTR-MCNC: 112 MG/DL (ref 70–99)
HCT VFR BLD AUTO: 38 % (ref 40–53)
HGB BLD-MCNC: 12.4 G/DL (ref 13.3–17.7)
MCH RBC QN AUTO: 32 PG (ref 26.5–33)
MCHC RBC AUTO-ENTMCNC: 32.6 G/DL (ref 31.5–36.5)
MCV RBC AUTO: 98 FL (ref 78–100)
PLATELET # BLD AUTO: 200 10E3/UL (ref 150–450)
POTASSIUM BLD-SCNC: 5 MMOL/L (ref 3.4–5.3)
RBC # BLD AUTO: 3.88 10E6/UL (ref 4.4–5.9)
SODIUM SERPL-SCNC: 140 MMOL/L (ref 133–144)
UPPER GI ENDOSCOPY: NORMAL
WBC # BLD AUTO: 9.6 10E3/UL (ref 4–11)

## 2021-07-21 PROCEDURE — 0DB58ZX EXCISION OF ESOPHAGUS, VIA NATURAL OR ARTIFICIAL OPENING ENDOSCOPIC, DIAGNOSTIC: ICD-10-PCS | Performed by: SURGERY

## 2021-07-21 PROCEDURE — 88342 IMHCHEM/IMCYTCHM 1ST ANTB: CPT | Mod: TC | Performed by: SURGERY

## 2021-07-21 PROCEDURE — 36415 COLL VENOUS BLD VENIPUNCTURE: CPT | Performed by: PHYSICIAN ASSISTANT

## 2021-07-21 PROCEDURE — 258N000003 HC RX IP 258 OP 636: Performed by: SURGERY

## 2021-07-21 PROCEDURE — 80048 BASIC METABOLIC PNL TOTAL CA: CPT | Performed by: PHYSICIAN ASSISTANT

## 2021-07-21 PROCEDURE — 0DB98ZX EXCISION OF DUODENUM, VIA NATURAL OR ARTIFICIAL OPENING ENDOSCOPIC, DIAGNOSTIC: ICD-10-PCS | Performed by: SURGERY

## 2021-07-21 PROCEDURE — 0DB68ZX EXCISION OF STOMACH, VIA NATURAL OR ARTIFICIAL OPENING ENDOSCOPIC, DIAGNOSTIC: ICD-10-PCS | Performed by: SURGERY

## 2021-07-21 PROCEDURE — 250N000009 HC RX 250: Performed by: NURSE ANESTHETIST, CERTIFIED REGISTERED

## 2021-07-21 PROCEDURE — 250N000011 HC RX IP 250 OP 636: Performed by: NURSE ANESTHETIST, CERTIFIED REGISTERED

## 2021-07-21 PROCEDURE — 99239 HOSP IP/OBS DSCHRG MGMT >30: CPT | Performed by: FAMILY MEDICINE

## 2021-07-21 PROCEDURE — 250N000013 HC RX MED GY IP 250 OP 250 PS 637: Performed by: FAMILY MEDICINE

## 2021-07-21 PROCEDURE — 43239 EGD BIOPSY SINGLE/MULTIPLE: CPT | Performed by: SURGERY

## 2021-07-21 PROCEDURE — 250N000013 HC RX MED GY IP 250 OP 250 PS 637: Performed by: PHYSICIAN ASSISTANT

## 2021-07-21 PROCEDURE — 250N000011 HC RX IP 250 OP 636: Performed by: PHYSICIAN ASSISTANT

## 2021-07-21 PROCEDURE — 370N000017 HC ANESTHESIA TECHNICAL FEE, PER MIN: Performed by: SURGERY

## 2021-07-21 PROCEDURE — 258N000003 HC RX IP 258 OP 636: Performed by: EMERGENCY MEDICINE

## 2021-07-21 PROCEDURE — 250N000009 HC RX 250: Performed by: SURGERY

## 2021-07-21 PROCEDURE — 85014 HEMATOCRIT: CPT | Performed by: PHYSICIAN ASSISTANT

## 2021-07-21 PROCEDURE — C9113 INJ PANTOPRAZOLE SODIUM, VIA: HCPCS | Performed by: PHYSICIAN ASSISTANT

## 2021-07-21 RX ORDER — LIDOCAINE 40 MG/G
CREAM TOPICAL
Status: DISCONTINUED | OUTPATIENT
Start: 2021-07-21 | End: 2021-07-21 | Stop reason: HOSPADM

## 2021-07-21 RX ORDER — LIDOCAINE HYDROCHLORIDE 10 MG/ML
INJECTION, SOLUTION EPIDURAL; INFILTRATION; INTRACAUDAL; PERINEURAL PRN
Status: DISCONTINUED | OUTPATIENT
Start: 2021-07-21 | End: 2021-07-21

## 2021-07-21 RX ORDER — LISINOPRIL 40 MG/1
40 TABLET ORAL DAILY
Status: DISCONTINUED | OUTPATIENT
Start: 2021-07-21 | End: 2021-07-21 | Stop reason: HOSPADM

## 2021-07-21 RX ORDER — PROPOFOL 10 MG/ML
INJECTION, EMULSION INTRAVENOUS PRN
Status: DISCONTINUED | OUTPATIENT
Start: 2021-07-21 | End: 2021-07-21

## 2021-07-21 RX ORDER — SODIUM CHLORIDE, SODIUM LACTATE, POTASSIUM CHLORIDE, CALCIUM CHLORIDE 600; 310; 30; 20 MG/100ML; MG/100ML; MG/100ML; MG/100ML
INJECTION, SOLUTION INTRAVENOUS CONTINUOUS
Status: DISCONTINUED | OUTPATIENT
Start: 2021-07-21 | End: 2021-07-21 | Stop reason: HOSPADM

## 2021-07-21 RX ORDER — OMEPRAZOLE 40 MG/1
40 CAPSULE, DELAYED RELEASE ORAL DAILY
Qty: 30 CAPSULE | Refills: 0 | Status: SHIPPED | OUTPATIENT
Start: 2021-07-21 | End: 2021-09-07

## 2021-07-21 RX ADMIN — SODIUM CHLORIDE, POTASSIUM CHLORIDE, SODIUM LACTATE AND CALCIUM CHLORIDE: 600; 310; 30; 20 INJECTION, SOLUTION INTRAVENOUS at 07:15

## 2021-07-21 RX ADMIN — LISINOPRIL 40 MG: 40 TABLET ORAL at 10:25

## 2021-07-21 RX ADMIN — PANTOPRAZOLE SODIUM 40 MG: 40 INJECTION, POWDER, FOR SOLUTION INTRAVENOUS at 07:07

## 2021-07-21 RX ADMIN — PROPOFOL 50 MG: 10 INJECTION, EMULSION INTRAVENOUS at 09:01

## 2021-07-21 RX ADMIN — ATORVASTATIN CALCIUM 80 MG: 80 TABLET, FILM COATED ORAL at 07:06

## 2021-07-21 RX ADMIN — PROPOFOL 50 MG: 10 INJECTION, EMULSION INTRAVENOUS at 08:55

## 2021-07-21 RX ADMIN — PROPOFOL 50 MG: 10 INJECTION, EMULSION INTRAVENOUS at 08:58

## 2021-07-21 RX ADMIN — PROPOFOL 50 MG: 10 INJECTION, EMULSION INTRAVENOUS at 09:03

## 2021-07-21 RX ADMIN — TOPICAL ANESTHETIC 1 SPRAY: 200 SPRAY DENTAL; PERIODONTAL at 08:45

## 2021-07-21 RX ADMIN — METOPROLOL SUCCINATE 50 MG: 50 TABLET, EXTENDED RELEASE ORAL at 07:07

## 2021-07-21 RX ADMIN — LIDOCAINE HYDROCHLORIDE 80 MG: 10 INJECTION, SOLUTION EPIDURAL; INFILTRATION; INTRACAUDAL; PERINEURAL at 09:03

## 2021-07-21 RX ADMIN — SODIUM CHLORIDE, POTASSIUM CHLORIDE, SODIUM LACTATE AND CALCIUM CHLORIDE: 600; 310; 30; 20 INJECTION, SOLUTION INTRAVENOUS at 08:36

## 2021-07-21 RX ADMIN — ASPIRIN 81 MG: 81 TABLET, COATED ORAL at 07:06

## 2021-07-21 ASSESSMENT — ACTIVITIES OF DAILY LIVING (ADL)
ADLS_ACUITY_SCORE: 15

## 2021-07-21 ASSESSMENT — MIFFLIN-ST. JEOR
SCORE: 1743.81
SCORE: 1819.88

## 2021-07-21 ASSESSMENT — LIFESTYLE VARIABLES: TOBACCO_USE: 1

## 2021-07-21 NOTE — PLAN OF CARE
[facility] TRANSPORT NOTE  Data:   Reason for Transport: egav Bonilla was transported from [facility] via wheel chair at 1000.  Patient was accompanied by Nursing Assistant. Equipment used for transport: None. Family was aware of reason for transport: yes    Action:  Report: received from pat    Response:  Patient's condition upon return was stable.    Darryl Rogers RN

## 2021-07-21 NOTE — CONSULTS
65 year old year old male here for upper endoscopy for abdominal pain>  Patient has had ongoing upper abdominal pain for past several months with 2 ED visits and 2 CT's without significant findings other than a small AAA and atherosclerotic aorta.  He admits no change with eating.  No melenotic stools.        Patient Active Problem List   Diagnosis     Disorder of bursae and tendons in shoulder region     Cardiovascular disease     Hyperlipidemia LDL goal <100     ABIGAIL (obstructive sleep apnea)     Morbid obesity (H)     Tobacco abuse     Hypertension goal BP (blood pressure) < 140/90     PAD (peripheral artery disease) (H)     Type 2 diabetes mellitus without complication (H)     Coronary artery disease involving native coronary artery of native heart without angina pectoris     Atherosclerosis of both carotid arteries     Acute kidney injury (H)     COVID-19 ruled out     Dehydration     Hypomagnesemia     Hypophosphatemia     Encounter for screening laboratory testing for COVID-19 virus     AAA (abdominal aortic aneurysm) (H)     Epigastric pain       Past Medical History:   Diagnosis Date     Coronary artery disease may 1 2005    2 stents put in heart     Pure hypercholesterolemia      Type II or unspecified type diabetes mellitus without mention of complication, not stated as uncontrolled      Unspecified cardiovascular disease 5-2001    MI -- Angioplasty two stents RCA & OM2     Unspecified essential hypertension        Past Surgical History:   Procedure Laterality Date     CARDIAC SURGERY  may 1, 2005     COLONOSCOPY  11/30/2011    Procedure:COLONOSCOPY; Screening Colonoscopy; Surgeon:LUTHER FLORES; Location:[facility]     SURGICAL HISTORY OF -       None     VASCULAR SURGERY  oct 2013    stent put in leg       Family History   Problem Relation Age of Onset     Cerebrovascular Disease Maternal Grandmother      Arthritis Other         hip fracture     Respiratory Father         emphysema     Alzheimer  Disease Maternal Grandfather      Breast Cancer Sister      Cancer - colorectal No family hx of      Prostate Cancer No family hx of        No current outpatient medications on file.       Allergies   Allergen Reactions     Nkda [No Known Drug Allergies]        Pt reports that he has been smoking cigarettes. He started smoking about 49 years ago. He has a 25.00 pack-year smoking history. He has never used smokeless tobacco. He reports previous alcohol use. He reports that he does not use drugs.    Exam:    Awake, Alert OX3  Lungs - CTA bilaterally  CV - RRR, no murmurs, distal pulses intact  Abd - soft, non-distended, non-tender, +BS  Extr - No cyanosis or edema    A/P 65 year old year old male in need of upper endoscopy for abdominal pain. Risks, benefits, alternatives, and complications were discussed including the possibility of perforation and the patient agreed to proceed.    Jeff Cunningham, DO on 7/21/2021 at 8:27 AM

## 2021-07-21 NOTE — BRIEF OP NOTE
[facility]    Brief Operative Note    Pre-operative diagnosis: Epigastric pain [R10.13]  Post-operative diagnosis     1. Antral gastritis  2. Esophagitis  3. Nodular mucosa at lesser curvature    Procedure: Procedure(s):  ESOPHAGOGASTRODUODENOSCOPY, WITH BIOPSY  Surgeon: Surgeon(s) and Role:     * Jeff Cunningham,  - Primary  Anesthesia: Monitor Anesthesia Care   Estimated blood loss: Minimal  Drains: None  Specimens:   ID Type Source Tests Collected by Time Destination   1 :  Biopsy Stomach, Antrum SURGICAL PATHOLOGY EXAM Jeff Cunningham, DO 7/21/2021  9:02 AM    2 :  Biopsy Small Intestine, Duodenum SURGICAL PATHOLOGY EXAM Jeff Cunningham, DO 7/21/2021  9:02 AM    3 :  Tissue Gastric Esophageal Junction SURGICAL PATHOLOGY EXAM Jeff Cunningham, DO 7/21/2021  9:03 AM    4 :  Biopsy Stomach, Body SURGICAL PATHOLOGY EXAM Jeff Cunningham, DO 7/21/2021  9:04 AM      Findings:   None.  Complications: None.  Implants: * No implants in log *

## 2021-07-21 NOTE — PLAN OF CARE
[facility] DISCHARGE NOTE    Patient discharged to home at 1:02 PM via wheel chair. Accompanied by significant other and staff. Discharge instructions reviewed with patient, opportunity offered to ask questions. Prescriptions sent to patients preferred pharmacy. All belongings sent with patient.    Darryl Rogers RN

## 2021-07-21 NOTE — PLAN OF CARE
AOx4, up independently in room. LR @ 125ml/hr, blood sugars 93 HS; 104 0200. Denies pain, has been NPO since MN in anticipation of EGD today. VSS, rested comfortably throughout shift.

## 2021-07-21 NOTE — DISCHARGE SUMMARY
Service Date: 07/21/2021  Discharge Date: 07/21/2021    HOSPITAL COURSE:  Roger Bonilla is a 65-year-old male with a history of hypertension, diabetes mellitus type 2, coronary artery disease, peripheral vascular disease, abdominal aortic aneurysm, ABIGAIL, and obesity.  He presented with 2 months of daily abdominal pain and acute kidney injury.  He initially had nausea, vomiting and diarrhea.  He was hospitalized from 06/23/2021 through 06/25/2021 for the above symptoms, as well as for DYLAN.  His stool studies were negative at that time.  He was discharged.  His diarrhea improved, but his abdominal pain continued.    The pain has been epigastric and periumbilical.  It starts around noon and is constant and sharp until about midnight.  It does briefly get better with antacids.  He has had associated nausea and some rare emesis.  He is not taking in as much p.o. prior to admission.  He had no blood in the stool or emesis.  He had no fever.    Upon admission, he was found to have acute kidney injury with a creatinine of 2.34 and a BUN of 26.  Baseline creatinine had been 0.8.  His lisinopril and metformin were held.  Ultimately, his creatinine was back down to 1.27 at the time of discharge.    In terms of his abdominal pain, that seemed to dissipate in the hospital.  When I saw him, he had just had an EGD that showed gastritis, esophagitis and some shallow gastric ulcerations.  I talked to Dr. Croft about it, who felt that this was primarily a problem with gastritis.  He had some duodenal mucosal ectasia.  Dr. Croft felt that this was not a significant issue.  He recommended discharge on omeprazole.  I talked to the patient about stopping cigarettes, alcohol and NSAIDs.    Also, upon admission, he had some leukocytosis with a white count of 15.5.  This dropped down to 9.6.  There was no sign of infection.    He wanted discharge when I saw him after his EGD.  He was eating without difficulties and said his abdominal  pain was gone.  His DYLAN was better.    ASSESSMENT:    1.  Abdominal pain related to gastritis and esophagitis.  2.  Acute kidney injury related to anorexia and decreased p.o. intake.  3.  Leukocytosis -- stress related.  4.  Delusional anemia.  5.  Hypertension.  6.  Diabetes mellitus type 2.  7.  Coronary artery disease with previous stenting and MI.  8.  Peripheral artery disease.  9.  Hyperlipidemia.  10.  Abdominal aortic aneurysm, under surveillance.  11.  Obstructive sleep apnea -- patient does not use CPAP.    PLAN:  Stop alcohol.  Stop coffee.  No NSAIDs.  Start Protonix 40 mg daily.  He needs primary care followup in 1 week.  I would ask that his biopsy results be reviewed.  He needs to push fluids in the interim, I would suggest a CBC, creatinine and chem 8 rechecked at the time of followup.      Discharge Medication List as of 7/21/2021 12:29 PM      START taking these medications    Details   omeprazole (PRILOSEC) 40 MG DR capsule Take 1 capsule (40 mg) by mouth daily, Disp-30 capsule, R-0, E-Prescribe         CONTINUE these medications which have NOT CHANGED    Details   aspirin 81 MG EC tablet Take 1 tablet (81 mg) by mouth daily, Disp-90 tablet, R-3, OTC      atorvastatin (LIPITOR) 80 MG tablet TAKE 1 TABLET (80 MG) BY MOUTH DAILY, Disp-90 tablet, R-3, E-PrescribeProfile Rx: patient will contact pharmacy when needed      lisinopril (ZESTRIL) 40 MG tablet Take 1 tablet (40 mg) by mouth daily, Disp-90 tablet, R-3, E-PrescribeProfile Rx: patient will contact pharmacy when needed      metFORMIN (GLUCOPHAGE) 1000 MG tablet TAKE ONE TABLET BY MOUTH ONE TIME DAILY, Disp-90 tablet, R-3, E-PrescribeProfile Rx: patient will contact pharmacy when needed      metoprolol succinate ER (TOPROL-XL) 50 MG 24 hr tablet TAKE 1 TABLET (50 MG) BY MOUTH DAILY, Disp-90 tablet, R-3, E-PrescribeProfile Rx: patient will contact pharmacy when needed         STOP taking these medications       ketorolac (ACULAR) 0.5 % ophthalmic  solution Comments:   Reason for Stopping:         ofloxacin (OCUFLOX) 0.3 % ophthalmic solution Comments:   Reason for Stopping:         prednisoLONE acetate (PRED FORTE) 1 % ophthalmic suspension Comments:   Reason for Stopping:             Unresulted Labs Ordered in the Past 30 Days of this Admission     No orders found from 2021 to 2021.             Greater than 30 minutes was spent on this.    Jose Francisco Sun MD        D: 2021   T: 2021   MT: [facility]    Name:     JOSEFINA WHEELERChirag  MRN:      [id number]        Account:      [id number]   :      1955           Service Date: 2021                                  Discharge Date: 2021     Document: [id number]

## 2021-07-21 NOTE — ANESTHESIA CARE TRANSFER NOTE
Patient: Roger Bonilla    Procedure(s):  ESOPHAGOGASTRODUODENOSCOPY, WITH BIOPSY    Diagnosis: Epigastric pain [R10.13]  Diagnosis Additional Information: No value filed.    Anesthesia Type:   General     Note:    Oropharynx: spontaneously breathing  Level of Consciousness: drowsy  Oxygen Supplementation: room air    Independent Airway: airway patency satisfactory and stable  Dentition: dentition unchanged  Vital Signs Stable: post-procedure vital signs reviewed and stable  Report to RN Given: handoff report given  Patient transferred to: Phase II    Handoff Report: Identifed the Patient, Identified the Reponsible Provider, Reviewed the pertinent medical history, Discussed the surgical course, Reviewed Intra-OP anesthesia mangement and issues during anesthesia, Set expectations for post-procedure period and Allowed opportunity for questions and acknowledgement of understanding      Vitals:  Vitals Value Taken Time   /60 07/21/21 0911   Temp     Pulse 72 07/21/21 0911   Resp 18 07/21/21 0910   SpO2 95 % 07/21/21 0913   Vitals shown include unvalidated device data.    Electronically Signed By: SAL Schumacher CRNA  July 21, 2021  9:14 AM

## 2021-07-21 NOTE — ANESTHESIA POSTPROCEDURE EVALUATION
Patient: Roger Bonilla    Procedure(s):  ESOPHAGOGASTRODUODENOSCOPY, WITH BIOPSY    Diagnosis:Epigastric pain [R10.13]  Diagnosis Additional Information: No value filed.    Anesthesia Type:  General    Note:     Postop Pain Control: Uneventful            Sign Out: Well controlled pain   PONV: No   Neuro/Psych: Uneventful            Sign Out: Acceptable/Baseline neuro status   Airway/Respiratory: Uneventful            Sign Out: Acceptable/Baseline resp. status   CV/Hemodynamics: Uneventful            Sign Out: Acceptable CV status; No obvious hypovolemia; No obvious fluid overload   Other NRE: NONE   DID A NON-ROUTINE EVENT OCCUR? No           Last vitals:  Vitals Value Taken Time   /60 07/21/21 0911   Temp     Pulse 72 07/21/21 0911   Resp 18 07/21/21 0910   SpO2 97 % 07/21/21 0914   Vitals shown include unvalidated device data.    Electronically Signed By: SAL Schumacher CRNA  July 21, 2021  9:14 AM

## 2021-07-21 NOTE — ANESTHESIA POSTPROCEDURE EVALUATION
Patient: Roger Bonilla    Procedure(s):  ESOPHAGOGASTRODUODENOSCOPY, WITH BIOPSY    Diagnosis:Epigastric pain [R10.13]  Diagnosis Additional Information: No value filed.    Anesthesia Type:  General    Note:  Disposition: Outpatient   Postop Pain Control: Uneventful            Sign Out: Well controlled pain   PONV: No   Neuro/Psych: Uneventful            Sign Out: Acceptable/Baseline neuro status   Airway/Respiratory: Uneventful            Sign Out: Acceptable/Baseline resp. status   CV/Hemodynamics: Uneventful            Sign Out: Acceptable CV status; No obvious hypovolemia; No obvious fluid overload   Other NRE: NONE   DID A NON-ROUTINE EVENT OCCUR? No           Last vitals:  Vitals Value Taken Time   BP     Temp     Pulse     Resp     SpO2         Electronically Signed By: SAL Schumacher CRNA  July 21, 2021  9:07 AM

## 2021-07-21 NOTE — PROGRESS NOTES
[facility]      Subjective:  No abdominal pain.  Wants to discharge.  Drinks about 10 beers per week.  Does use tobacco.  Limited ibuprofen use.antacids held.    Review of Systems:  CONSTITUTIONAL: NEGATIVE for fever, chills, change in weight  INTEGUMENTARY/SKIN: NEGATIVE for worrisome rashes, moles or lesions  EYES: NEGATIVE for vision changes or irritation  ENT/MOUTH: NEGATIVE for ear, mouth and throat problems  RESP: NEGATIVE for significant cough or SOB  BREAST: NEGATIVE for masses, tenderness or discharge  CV: NEGATIVE for chest pain, palpitations or peripheral edema  GI: NEGATIVE for nausea, abdominal pain, heartburn, or change in bowel habits  : NEGATIVE for frequency, dysuria, or hematuria  MUSCULOSKELETAL: NEGATIVE for significant arthralgias or myalgia  NEURO: NEGATIVE for weakness, dizziness or paresthesias  ENDOCRINE: NEGATIVE for temperature intolerance, skin/hair changes  HEME: NEGATIVE for bleeding problems  PSYCHIATRIC: NEGATIVE for changes in mood or affect    Physical Exam:  Vitals Were Reviewed    Patient Vitals for the past 16 hrs:   BP Temp Temp src Pulse Resp SpO2 Weight   07/21/21 0700 123/57 (!) 96.5  F (35.8  C) Oral 67 18 94 % --   07/21/21 0551 -- -- -- -- -- -- 110.8 kg (244 lb 4.3 oz)   07/21/21 0204 (!) 106/35 98.1  F (36.7  C) Oral 79 18 94 % --   07/20/21 2332 (!) 151/68 98  F (36.7  C) Oral 72 18 95 % --   07/20/21 1909 123/43 97.8  F (36.6  C) Oral 70 18 93 % --       No intake or output data in the 24 hours ending 07/21/21 0823    GENERAL APPEARANCE: healthy, alert and no distress  EYES: conjunctiva clear, eyes grossly normal  RESP: lungs clear to auscultation - no rales, rhonchi or wheezes  CV: regular rate and rhythm, normal S1 S2, no S3 or S4 and no murmur, click or rub   ABDOMEN: soft, nontender, no HSM or masses and bowel sounds normal  MS: no clubbing, cyanosis; no edema  SKIN: clear without significant rashes or lesions    Lab:  Recent Labs   Lab  Test 07/21/21  0416 07/21/21  0207 07/20/21 0437     --  136   POTASSIUM 5.0  --  4.8   CHLORIDE 107  --  105   CO2 30  --  29   ANIONGAP 3  --  2*   * 104* 100*   BUN 22  --  26   CR 1.27*  --  2.34*   SUE 8.7  --  8.4*     CBC RESULTS:   Recent Labs   Lab Test 07/21/21 0416 07/20/21 0437   WBC 9.6 12.2*   RBC 3.88* 3.88*   HGB 12.4* 12.5*   HCT 38.0* 38.4*    201       Results for orders placed or performed during the hospital encounter of 07/19/21 (from the past 24 hour(s))   Glucose by meter   Result Value Ref Range    GLUCOSE BY METER POCT 140 (H) 70 - 99 mg/dL   Glucose by meter   Result Value Ref Range    GLUCOSE BY METER POCT 115 (H) 70 - 99 mg/dL   Glucose by meter   Result Value Ref Range    GLUCOSE BY METER POCT 120 (H) 70 - 99 mg/dL   Glucose by meter   Result Value Ref Range    GLUCOSE BY METER POCT 93 70 - 99 mg/dL   Glucose by meter   Result Value Ref Range    GLUCOSE BY METER POCT 104 (H) 70 - 99 mg/dL   CBC with platelets   Result Value Ref Range    WBC Count 9.6 4.0 - 11.0 10e3/uL    RBC Count 3.88 (L) 4.40 - 5.90 10e6/uL    Hemoglobin 12.4 (L) 13.3 - 17.7 g/dL    Hematocrit 38.0 (L) 40.0 - 53.0 %    MCV 98 78 - 100 fL    MCH 32.0 26.5 - 33.0 pg    MCHC 32.6 31.5 - 36.5 g/dL    RDW 13.3 10.0 - 15.0 %    Platelet Count 200 150 - 450 10e3/uL   Basic metabolic panel   Result Value Ref Range    Sodium 140 133 - 144 mmol/L    Potassium 5.0 3.4 - 5.3 mmol/L    Chloride 107 94 - 109 mmol/L    Carbon Dioxide (CO2) 30 20 - 32 mmol/L    Anion Gap 3 3 - 14 mmol/L    Urea Nitrogen 22 7 - 30 mg/dL    Creatinine 1.27 (H) 0.66 - 1.25 mg/dL    Calcium 8.7 8.5 - 10.1 mg/dL    Glucose 114 (H) 70 - 99 mg/dL    GFR Estimate 59 (L) >60 mL/min/1.73m2       Assessment and Plan:    Assessment & Plan     Roger Bonilla is a 65 year old male admitted on 7/19/2021. He has history of hypertension, type 2 diabetes, coronary artery disease, peripheral vascular disease, AAA, ABIGAIL and obesity. He presents with  ~2 months of daily abdominal pain and DYLAN.     Acute Kidney Injury  Creatinine 2.34, BUN 26, GFR 28. Baseline creatinine ~0.8, GFR typically >90 though last creatinine 1.09 and GFR 70 on 7/1/2021 after recovering from previous DYLAN.  Suspect there may be a component of pre-renal given poor intake, emesis, FENA 0.2 and hyaline casts. May also have some progression to ATN.     Creatinine 2.22--2.34--1.27  IVF: LR at 125 ml/hr  Holding   metformin until creatinine improves  SCAN bladder, straight cath for residual > 300 ml     Restarting lisinopril.  Holding metformin.     Abdominal Pain  Acid peptic ds.  ~2 months of daily periumbilical/epigastric abdominal pain starting around noon and lasting until midnight, sharp, intense pain. Eating causes nausea and emesis, may exacerbate pain at times though pain occurs without food as well. CT abdomen/pelvis shows no clear acute etiology for symptoms, does show diffuse atherosclerotic disease. Labs show mild leukocytosis and DYLAN, normal LFTs and lipase. Unclear etiology at this time, will pursue EGD to evaluate further.   - discussed with surgery, plan for EGD, initially for today though patient received breakfast tray    egd                                                      Impression:          - Duodenal mucosal lymphangiectasia.                        - Gastritis. Biopsied.                        - Nodular mucosa in the lesser curvature of the stomach.                        Biopsied.                        - Gastroesophageal flap valve classified as Hill Grade                        II (fold present, opens with respiration).                        - LA Grade A reflux esophagitis. Rule out Escobedo's                        esophagus. Biopsied.   Recommendation:      - [facility] patient to [facility] care.                        - Resume regular diet.                        - Continue present medications.                        - Use Prilosec (omeprazole) 40 mg  "PO daily for 1 month    We will discharge on omeprazole       Leukocytosis  WBC 15.5 on presentation with left shift, improved to 12.2 overnight with resolution of left shift.  WBC 15.5--12.2--9.6     Acute Anemia  Hgb 12.5 after IVF hydration, 15.0 on admission. MCV 97. Previous baseline 14-15. Suspect in part hemodilution.  Hemoglobin 15.0--12.5--12.4     Hypertension  Chronic. Blood pressures reviewed, stable.   continue home metoprolol  /70-restart lisinopril.       Diabetes Mellitus, Type II  Chronic.  Last a1C 6.4% on 3/2021.   -hold home metformin due to DYLAN  -moderate SSI  -hypo/hyperglycemia protocol with blood glucose monitoring    AM glucose 114     Coronary Artery Disease  Previously diagnosed, no current concerning symptoms. History of MI, s/p stenting in 2005 to RCA and OM branch, noted residual moderate disease in LAD at that time.  - continue home ASA, statin, beta blocker  - holding ACEi due to DYLAN     Atherosclerosis of both carotid arteries  Peripheral arterial disease  Hyperlipidemia  Chronic. Carotid US 2019 showed >70% stenosis of right ICA, <50% stenosis of left ICA.. Recently declined surveillance US. CT abdomen/pelvis showed diffuse atherosclerotic disease.  - continue home Lipitor  - outpatient surveillance     AAA  Noted on CT, 3.3 cm.  - repeat surveillance in 2-3 years     ABIGAIL  Does not use CPAP     COVID 19 screen negative on admit. Received COVID 19 vaccine.        Diet: full adat   DVT Prophylaxis: Low Risk/Ambulatory with no VTE prophylaxis indicated  Peter Catheter: Not present  Central Lines: None  Code Status: No CPR- Do NOT Intubate, discussed directly with patient on admission, stated \"if it's my time to go then it's my time to go\".     Plan: will discharge. Call placed to Dr Cunningham to discuss duodenal lymphangiectasia      "

## 2021-07-21 NOTE — ANESTHESIA PREPROCEDURE EVALUATION
Anesthesia Pre-Procedure Evaluation    Patient: Roger Bonilla   MRN: [id number] : 1955        Preoperative Diagnosis: Epigastric pain [R10.13]   Procedure : Procedure(s):  ESOPHAGOGASTRODUODENOSCOPY (EGD)     Past Medical History:   Diagnosis Date     Coronary artery disease may 1 2005    2 stents put in heart     Pure hypercholesterolemia      Type II or unspecified type diabetes mellitus without mention of complication, not stated as uncontrolled      Unspecified cardiovascular disease -    MI -- Angioplasty two stents RCA & OM2     Unspecified essential hypertension       Past Surgical History:   Procedure Laterality Date     CARDIAC SURGERY  may 1, 2005     COLONOSCOPY  2011    Procedure:COLONOSCOPY; Screening Colonoscopy; Surgeon:LUTHER FLORES; Location:[facility]     SURGICAL HISTORY OF -       None     VASCULAR SURGERY  oct 2013    stent put in leg      Allergies   Allergen Reactions     Nkda [No Known Drug Allergies]       Social History     Tobacco Use     Smoking status: Current Every Day Smoker     Packs/day: 0.50     Years: 50.00     Pack years: 25.00     Types: Cigarettes     Start date: 1972     Smokeless tobacco: Never Used   Substance Use Topics     Alcohol use: Not Currently     Comment: 10 beers a week       Wt Readings from Last 1 Encounters:   21 110.8 kg (244 lb 4.3 oz)        Anesthesia Evaluation   Pt has had prior anesthetic.         ROS/MED HX  ENT/Pulmonary:     (+) sleep apnea, tobacco use, Current use,     Neurologic:  - neg neurologic ROS     Cardiovascular:     (+) hypertension--CAD ---    METS/Exercise Tolerance:     Hematologic:  - neg hematologic  ROS     Musculoskeletal:  - neg musculoskeletal ROS     GI/Hepatic:  - neg GI/hepatic ROS     Renal/Genitourinary:     (+) renal disease, type: ARF,     Endo:     (+) type II DM, Obesity,     Psychiatric/Substance Use:  - neg psychiatric ROS     Infectious Disease:       Malignancy:       Other:             Physical Exam    Airway  airway exam normal      Mallampati: II   TM distance: > 3 FB   Neck ROM: full   Mouth opening: > 3 cm    Respiratory Devices and Support         Dental  no notable dental history         Cardiovascular   cardiovascular exam normal          Pulmonary   pulmonary exam normal                OUTSIDE LABS:  CBC:   Lab Results   Component Value Date    WBC 9.6 07/21/2021    WBC 12.2 (H) 07/20/2021    HGB 12.4 (L) 07/21/2021    HGB 12.5 (L) 07/20/2021    HCT 38.0 (L) 07/21/2021    HCT 38.4 (L) 07/20/2021     07/21/2021     07/20/2021     BMP:   Lab Results   Component Value Date     07/21/2021     07/20/2021    POTASSIUM 5.0 07/21/2021    POTASSIUM 4.8 07/20/2021    CHLORIDE 107 07/21/2021    CHLORIDE 105 07/20/2021    CO2 30 07/21/2021    CO2 29 07/20/2021    BUN 22 07/21/2021    BUN 26 07/20/2021    CR 1.27 (H) 07/21/2021    CR 2.34 (H) 07/20/2021     (H) 07/21/2021     (H) 07/21/2021     COAGS:   Lab Results   Component Value Date    PTT 28 09/13/2013    INR 0.94 09/13/2013     POC:   Lab Results   Component Value Date     (H) 06/25/2021     HEPATIC:   Lab Results   Component Value Date    ALBUMIN 2.7 (L) 07/20/2021    PROTTOTAL 5.6 (L) 07/20/2021    ALT 21 07/20/2021    AST 14 07/20/2021    ALKPHOS 54 07/20/2021    BILITOTAL 0.6 07/20/2021     OTHER:   Lab Results   Component Value Date    LACT 1.9 06/23/2021    A1C 6.2 (H) 07/20/2021    SUE 8.7 07/21/2021    PHOS 4.2 06/24/2021    MAG 1.9 07/19/2021    LIPASE 156 07/19/2021    TSH 0.35 (L) 06/23/2021    T4 1.32 06/23/2021    SED 4 10/27/2008       Anesthesia Plan    ASA Status:  3   NPO Status:  NPO Appropriate    Anesthesia Type: General.     - Airway: Native airway   Induction: Intravenous.   Maintenance: TIVA.        Consents    Anesthesia Plan(s) and associated risks, benefits, and realistic alternatives discussed. Questions answered and patient/representative(s) expressed  understanding.     - Discussed with:  Patient         Postoperative Care    Pain management: IV analgesics, Multi-modal analgesia.   PONV prophylaxis: Ondansetron (or other 5HT-3)     Comments:                SAL Schumacher CRNA

## 2021-07-22 ENCOUNTER — PATIENT OUTREACH (OUTPATIENT)
Dept: CARE COORDINATION | Facility: CLINIC | Age: 66
End: 2021-07-22

## 2021-07-22 DIAGNOSIS — Z71.89 OTHER SPECIFIED COUNSELING: ICD-10-CM

## 2021-07-22 NOTE — PROGRESS NOTES
Clinic Care Coordination Contact  [facility]: Post-Discharge Note  SITUATION                                                      Admission:    Admission Date: 07/19/21   Reason for Admission: Acute kidney injury  Discharge:   Discharge Date: 07/21/21  Discharge Diagnosis: Acute kidney injury    BACKGROUND                                                      Roger Bonilla is a 65-year-old male with a history of hypertension, diabetes mellitus type 2, coronary artery disease, peripheral vascular disease, abdominal aortic aneurysm, ABIGAIL, and obesity.  He presented with 2 months of daily abdominal pain and acute kidney injury.  He initially had nausea, vomiting and diarrhea.  He was hospitalized from 06/23/2021 through 06/25/2021 for the above symptoms, as well as for DYLAN.  His stool studies were negative at that time.  He was discharged.  His diarrhea improved, but his abdominal pain continued.    ASSESSMENT      Discharge Assessment  How are you doing now that you are home?: I feel ok at this time  How are your symptoms? (Red Flag symptoms escalate to triage hotline per guidelines): Improved  Do you feel your condition is stable enough to be safe at home until your provider visit?: Yes  Does the patient have their discharge instructions? : Yes  Does the patient have questions regarding their discharge instructions? : No  Were you started on any new medications or were there changes to any of your previous medications? : Yes - Schedule RN[facility] appt within 48 business hours  Does the patient have all of their medications?: Yes  Do you have questions regarding any of your medications? : No  Do you have all of your needed medical supplies or equipment (DME)?  (i.e. oxygen tank, CPAP, cane, etc.): Yes  Discharge follow-up appointment scheduled within 14 calendar days? : Yes  Discharge Follow Up Appointment Date: 07/26/21  Discharge Follow Up Appointment Scheduled with?: Primary Care Provider    Post-op  Did the patient  have surgery or a procedure: No  Fever: No  Chills: No  Eating & Drinking: eating and drinking without complaints/concerns  Bowel Function: normal  Date of last BM: 07/22/21  Urinary Status: voiding without complaint/concerns        PLAN                                                      Outpatient Plan:    Expected discharge: 1-2 days recommended to prior living arrangement once renal function improved, work up complete and tolerating adequate oral intake. Admitted to inpatient given renal function worse and will not tolerate adequate PO at this point, needs further IV fluids and evaluation.     Future Appointments   Date Time Provider Department Center   7/26/2021 10:40 AM Arlin German NP [facility]         For any urgent concerns, please contact our 24 hour nurse triage line: [phone] ([phone])         Nichole Green   Community Health Worker   [facility]  Ph: [phone]  Fx: [phone]

## 2021-07-23 ENCOUNTER — TELEPHONE (OUTPATIENT)
Dept: FAMILY MEDICINE | Facility: CLINIC | Age: 66
End: 2021-07-23

## 2021-07-23 NOTE — TELEPHONE ENCOUNTER
ED/UC/IP follow up phone call: Acute superficial gastritis without hemorrhage, acute kidney injury (H)    RN please call to follow up. [facility]     Number of ED visits in past 12 months = 0

## 2021-07-26 ENCOUNTER — OFFICE VISIT (OUTPATIENT)
Dept: FAMILY MEDICINE | Facility: CLINIC | Age: 66
End: 2021-07-26
Payer: COMMERCIAL

## 2021-07-26 VITALS
WEIGHT: 245 LBS | BODY MASS INDEX: 39.37 KG/M2 | RESPIRATION RATE: 16 BRPM | HEART RATE: 72 BPM | TEMPERATURE: 98.9 F | OXYGEN SATURATION: 94 % | HEIGHT: 66 IN | DIASTOLIC BLOOD PRESSURE: 68 MMHG | SYSTOLIC BLOOD PRESSURE: 110 MMHG

## 2021-07-26 DIAGNOSIS — H25.9 AGE-RELATED CATARACT OF BOTH EYES, UNSPECIFIED AGE-RELATED CATARACT TYPE: ICD-10-CM

## 2021-07-26 DIAGNOSIS — Z01.818 PREOP GENERAL PHYSICAL EXAM: Primary | ICD-10-CM

## 2021-07-26 LAB — SARS-COV-2 RNA RESP QL NAA+PROBE: NEGATIVE

## 2021-07-26 PROCEDURE — 99214 OFFICE O/P EST MOD 30 MIN: CPT | Performed by: NURSE PRACTITIONER

## 2021-07-26 PROCEDURE — U0003 INFECTIOUS AGENT DETECTION BY NUCLEIC ACID (DNA OR RNA); SEVERE ACUTE RESPIRATORY SYNDROME CORONAVIRUS 2 (SARS-COV-2) (CORONAVIRUS DISEASE [COVID-19]), AMPLIFIED PROBE TECHNIQUE, MAKING USE OF HIGH THROUGHPUT TECHNOLOGIES AS DESCRIBED BY CMS-2020-01-R: HCPCS | Performed by: NURSE PRACTITIONER

## 2021-07-26 PROCEDURE — U0005 INFEC AGEN DETEC AMPLI PROBE: HCPCS | Performed by: NURSE PRACTITIONER

## 2021-07-26 ASSESSMENT — MIFFLIN-ST. JEOR: SCORE: 1835.09

## 2021-07-26 NOTE — PATIENT INSTRUCTIONS
1.  Take all medications as normal except metformin for your diabetes needs to be held the morning of surgery.    Preparing for Your Surgery  Getting started  A nurse will call you to review your health history and instructions. They will give you an arrival time based on your scheduled surgery time.  Please be ready to share the following:    Your doctor's clinic name and phone number    Your medical, surgical and anesthesia history    A list of allergies and sensitivities    A list of medicines, including herbal treatments and over-the-counter drugs    Whether the patient has a legal guardian (ask how to send us the papers in advance)  If you have a child who's having surgery, please ask for a copy of Preparing for Your Child's Surgery.    Preparing for surgery    Within 30 days of surgery: Have a pre-op exam (sometimes called an H&P, or History and Physical). This can be done at a clinic or pre-operative center.  ? If you're having a , you may not need this exam. Talk to your care team    At your pre-op exam, talk to your care team about all medicines you take. If you need to stop any medicines before surgery, ask when to start taking them again.  ? We do this for your safety. Many medicines can make you bleed too much during surgery. Some change how well surgery (anesthesia) drugs work.    Call your insurance company to let them know you're having surgery. (If you don't have insurance, call [phone].)    Call your clinic if there's any change in your health. This includes signs of a cold or flu (sore throat, runny nose, cough, rash, fever). It also includes a scrape or scratch near the surgery site.    If you have questions on the day of surgery, call your hospital or surgery center.  Eating and drinking guidelines  For your safety: Unless your surgeon tells you otherwise, follow the guidelines below.    Eat and drink as usual until 8 hours before surgery. After that, no food or milk.    Drink clear  liquids until 2 hours before surgery. These are liquids you can see through, like water, Gatorade and Propel Water. You may also have black coffee and tea (no cream or milk).    Nothing by mouth within 2 hours of surgery. This includes gum, candy and breath mints.    If you drink, stop drinking alcohol the night before surgery.    If your care team tells you to take medicine on the morning of surgery, it's okay to take it with a sip of water.  Preventing infection    Shower or bathe the night before and morning of your surgery. Follow the instructions your clinic gave you. (If no instructions, use regular soap.)    Don't shave or clip hair near your surgery site. We'll remove the hair if needed.    Don't smoke or vape the morning of surgery. You may chew nicotine gum up to 2 hours before surgery. A nicotine patch is okay.  ? Note: Some surgeries require you to completely quit smoking and nicotine. Check with your surgeon.    Your care team will make every effort to keep you safe from infection. We will:  ? Clean our hands often with soap and water (or an alcohol-based hand rub).  ? Clean the skin at your surgery site with a special soap that kills germs.  ? Give you a special gown to keep you warm. (Cold raises the risk of infection.)  ? Wear special hair covers, masks, gowns and gloves during surgery.  ? Give antibiotic medicine, if prescribed. Not all surgeries need antibiotics.  What to bring on the day of surgery    Photo ID and insurance card    Copy of your health care directive, if you have one    Glasses and hearing aides (bring cases)  ? You can't wear contacts during surgery    Inhaler and eye drops, if you use them (tell us about these when you arrive)    CPAP machine or breathing device, if you use them    A few personal items, if spending the night    If you have . . .  ? A pacemaker or ICD (cardiac defibrillator): Bring the ID card.  ? An implanted stimulator: Bring the remote control.  ? A legal  guardian: Bring a copy of the certified (court-stamped) guardianship papers.  Please remove any jewelry, including body piercings. Leave jewelry and other valuables at home.  If you're going home the day of surgery  Important: If you don't follow the rules below, we must cancel your surgery.     Arrange for someone to drive you home after surgery. You may not drive, take a taxi or take public transportation by yourself (unless you'll have local anesthesia only).    Arrange for a responsible adult to stay with you overnight. If you don't, we may keep you in the hospital overnight, and you may need to pay the costs yourself.  Questions?   If you have any questions for your care team, list them here: _________________________________________________________________________________________________________________________________________________________________________________________________________________________________________________________________________________________________________________________  For informational purposes only. Not to replace the advice of your health care provider. Copyright   2003, 2019 [facility]. All rights reserved. Clinically reviewed by Bridgett Faustin MD. App47 [id number] - REV 4/20.

## 2021-07-26 NOTE — PROGRESS NOTES
[facility]  [address] [address]  Phone: [phone]  Primary Provider: Maria De Jesus Hay  Pre-op Performing Provider: NUNO MARTINEZ    PREOPERATIVE EVALUATION:  Today's date: 7/26/2021    Roger Bonilla is a 65 year old male who presents for a preoperative evaluation.    Surgical Information:  Surgery/Procedure: cataract  R eye surgery  Surgery Location: [facility]  Surgeon: Dr. WILLIAM Valdez  Surgery Date: 07/28/21  Time of Surgery: am  Where patient plans to recover: At home with family  Fax number for surgical facility: Note does not need to be faxed, will be available electronically in Epic.    Type of Anesthesia Anticipated: local    Assessment & Plan     The proposed surgical procedure is considered LOW risk.    Preop general physical exam  Ordered COVID 19 testing for patient today.  No labs or EKG needed since they have been done recently.  - Asymptomatic COVID-19 Virus (Coronavirus) by PCR Nasopharyngeal    Age-related cataract of both eyes, unspecified age-related cataract type  Surgery scheduled for right cataract removal and lens placement.     Risks and Recommendations:  The patient has the following additional risks and recommendations for perioperative complications:  Cardiovascular:   - recent EKG is normal  Diabetes:  - Patient is not on insulin therapy: regular NPO guidelines can be followed.   Obstructive Sleep Apnea:   Does not use CPAP due to cost and insurance not covering    Medication Instructions:   - aspirin: Bleeding risk is low for this procedure and daily aspirin may be continued without modification.    - ACE/ARB: May be continued on the day of surgery.    - Beta Blockers: Continue taking on the day of surgery.   - metformin: HOLD day of surgery.    RECOMMENDATION:  APPROVAL GIVEN to proceed with proposed procedure pending review of diagnostic evaluation.    Subjective     HPI related to upcoming procedure: Patient has bilateral  cataracts and is having his right one treated with phacoemulsification and IOL implant.    Health Care Directive:  Patient does not have a Health Care Directive or Living Will: Discussed advance care planning with patient; however, patient declined at this time.    Preoperative Review of :   reviewed - no record of controlled substances prescribed.    Status of Chronic Conditions:  CAD - Patient has a longstanding history of moderate-severe CAD. Patient denies recent chest pain or NTG use, denies exercise induced dyspnea or PND. Last Stress test 2011, EKG 7/19/21.     HYPERLIPIDEMIA - Patient has a long history of significant Hyperlipidemia requiring medication for treatment with recent good control. Patient reports no problems or side effects with the medication.     SLEEP PROBLEM - Patient has a longstanding history of sleep apnea, does not use CPAP.       Review of Systems  CONSTITUTIONAL: NEGATIVE for fever, chills, change in weight  INTEGUMENTARY/SKIN: POSITIVE for easy bruising of skin; current bruising on arms  EYES: POSITIVE for bilateral cataracts  ENT/MOUTH: NEGATIVE for ear, mouth and throat problems  RESP: NEGATIVE for significant cough or SOB  CV: NEGATIVE for chest pain, palpitations or peripheral edema  GI: POSITIVE for heartburn or reflux  : NEGATIVE for frequency, dysuria, or hematuria  MUSCULOSKELETAL: NEGATIVE for significant arthralgias or myalgia  NEURO: NEGATIVE for weakness, dizziness or paresthesias  ENDOCRINE: NEGATIVE for temperature intolerance, skin/hair changes  HEME: NEGATIVE for bleeding problems  PSYCHIATRIC: NEGATIVE for changes in mood or affect    Patient Active Problem List    Diagnosis Date Noted     Epigastric pain 07/19/2021     Priority: Medium     AAA (abdominal aortic aneurysm) (H) 06/25/2021     Priority: Medium     3.3 cm, consider repeat imaging in 2-3 years from 6/2021       Encounter for screening laboratory testing for COVID-19 virus 06/24/2021     Priority:  Medium     Acute kidney injury (H) 06/23/2021     Priority: Medium     COVID-19 ruled out 06/23/2021     Priority: Medium     Dehydration 06/23/2021     Priority: Medium     Hypomagnesemia 06/23/2021     Priority: Medium     Hypophosphatemia 06/23/2021     Priority: Medium     Atherosclerosis of both carotid arteries 06/20/2019     Priority: Medium     Coronary artery disease involving native coronary artery of native heart without angina pectoris 08/04/2017     Priority: Medium     Type 2 diabetes mellitus without complication (H) 10/21/2015     Priority: Medium     PAD (peripheral artery disease) (H) 04/14/2014     Priority: Medium     Hypertension goal BP (blood pressure) < 140/90 08/05/2013     Priority: Medium     Tobacco abuse 11/13/2012     Priority: Medium     Morbid obesity (H) 12/01/2011     Priority: Medium     ABIGAIL (obstructive sleep apnea) 11/28/2011     Priority: Medium     Severe ABIGAIL with significant oxygen desaturations in REM (AHI 87.2, ba desat 66% in REM)  Incomplete titration with remaining events in supine sleep on CPAP 19 cm H2O.  Looked significantly improved during brief trial of bilevel PAP at 16/12 including lateral NREM / REM and supine NREM.         Hyperlipidemia LDL goal <100 09/30/2011     Priority: Medium     Disorder of bursae and tendons in shoulder region 11/10/2005     Priority: Medium     Problem list name updated by automated process. Provider to review       Cardiovascular disease 05/01/2001     Priority: Medium     MI -- Angioplasty two stents RCA & OM2  Problem list name updated by automated process. Provider to review        Past Medical History:   Diagnosis Date     Coronary artery disease may 1 2005    2 stents put in heart     Pure hypercholesterolemia      Type II or unspecified type diabetes mellitus without mention of complication, not stated as uncontrolled      Unspecified cardiovascular disease 5-2001    MI -- Angioplasty two stents RCA & OM2     Unspecified  essential hypertension      Past Surgical History:   Procedure Laterality Date     CARDIAC SURGERY  may 1, 2005     COLONOSCOPY  11/30/2011    Procedure:COLONOSCOPY; Screening Colonoscopy; Surgeon:LUTHER FLORES; Location:[facility]     ESOPHAGOSCOPY, GASTROSCOPY, DUODENOSCOPY (EGD), COMBINED N/A 7/21/2021    Procedure: ESOPHAGOGASTRODUODENOSCOPY, WITH BIOPSY;  Surgeon: Jeff Cunningham DO;  Location: [facility]     SURGICAL HISTORY OF -       None     VASCULAR SURGERY  oct 2013    stent put in leg     Current Outpatient Medications   Medication Sig Dispense Refill     aspirin 81 MG EC tablet Take 1 tablet (81 mg) by mouth daily 90 tablet 3     atorvastatin (LIPITOR) 80 MG tablet TAKE 1 TABLET (80 MG) BY MOUTH DAILY (Patient taking differently: Take 80 mg by mouth daily TAKE 1 TABLET (80 MG) BY MOUTH DAILY) 90 tablet 3     lisinopril (ZESTRIL) 40 MG tablet Take 1 tablet (40 mg) by mouth daily 90 tablet 3     metFORMIN (GLUCOPHAGE) 1000 MG tablet TAKE ONE TABLET BY MOUTH ONE TIME DAILY (Patient taking differently: Take 1,000 mg by mouth daily (with breakfast) TAKE ONE TABLET BY MOUTH ONE TIME DAILY) 90 tablet 3     metoprolol succinate ER (TOPROL-XL) 50 MG 24 hr tablet TAKE 1 TABLET (50 MG) BY MOUTH DAILY (Patient taking differently: Take 50 mg by mouth daily TAKE 1 TABLET (50 MG) BY MOUTH DAILY) 90 tablet 3     omeprazole (PRILOSEC) 40 MG DR capsule Take 1 capsule (40 mg) by mouth daily 30 capsule 0       Allergies   Allergen Reactions     Nkda [No Known Drug Allergies]         Social History     Tobacco Use     Smoking status: Current Every Day Smoker     Packs/day: 0.50     Years: 50.00     Pack years: 25.00     Types: Cigarettes     Start date: 1/1/1972     Smokeless tobacco: Never Used   Substance Use Topics     Alcohol use: Not Currently     Comment: 10 beers a week      Family History   Problem Relation Age of Onset     Cerebrovascular Disease Maternal Grandmother      Arthritis Other         hip fracture  "    Respiratory Father         emphysema     Alzheimer Disease Maternal Grandfather      Breast Cancer Sister      Cancer - colorectal No family hx of      Prostate Cancer No family hx of      History   Drug Use No         Objective     /68 (BP Location: Left arm, Patient Position: Sitting, Cuff Size: Adult Large)   Pulse 72   Temp 98.9  F (37.2  C) (Tympanic)   Resp 16   Ht 1.67 m (5' 5.75\")   Wt 111.1 kg (245 lb)   SpO2 94%   BMI 39.85 kg/m      Physical Exam    GENERAL APPEARANCE: healthy, alert and no distress     EYES: EOMI,  PERRL     HENT: ear canals and TM's normal and nose and mouth without ulcers or lesions     NECK: no adenopathy, no asymmetry, masses, or scars and thyroid normal to palpation     RESP: lungs clear to auscultation - no rales, rhonchi or wheezes     CV: regular rates and rhythm, normal S1 S2, no S3 or S4 and no murmur, click or rub     ABDOMEN:  soft, nontender, no HSM or masses and bowel sounds normal     MS: extremities normal- no gross deformities noted, no evidence of inflammation in joints, FROM in all extremities.     SKIN: no suspicious lesions or rashes     NEURO: Normal strength and tone, sensory exam grossly normal, mentation intact and speech normal     PSYCH: mentation appears normal. and affect normal/bright     LYMPHATICS: No cervical adenopathy    Recent Labs   Lab Test 07/21/21  0416 07/20/21  0437 06/23/21  1156 03/25/21  1026   HGB 12.4* 12.5*   < >  --     201   < >  --     136   < >  --    POTASSIUM 5.0 4.8   < >  --    CR 1.27* 2.34*   < >  --    A1C  --  6.2*  --  6.4*    < > = values in this interval not displayed.        Diagnostics:  No labs were ordered during this visit.   No EKG required for low risk surgery (cataract, skin procedure, breast biopsy, etc).    Revised Cardiac Risk Index (RCRI):  The patient has the following serious cardiovascular risks for perioperative complications:   - Coronary Artery Disease (MI, positive stress test, " angina, Qs on EKG) = 1 point     RCRI Interpretation: 1 point: Class II (low risk - 0.9% complication rate)    Signed Electronically by: Arlin German NP  Copy of this evaluation report is provided to requesting physician.    [id number]}

## 2021-07-27 ENCOUNTER — ANESTHESIA EVENT (OUTPATIENT)
Dept: SURGERY | Facility: CLINIC | Age: 66
End: 2021-07-27
Payer: COMMERCIAL

## 2021-07-27 LAB
PATH REPORT.ADDENDUM SPEC: NORMAL
PATH REPORT.COMMENTS IMP SPEC: NORMAL
PATH REPORT.FINAL DX SPEC: NORMAL
PATH REPORT.GROSS SPEC: NORMAL
PATH REPORT.MICROSCOPIC SPEC OTHER STN: NORMAL
PATH REPORT.RELEVANT HX SPEC: NORMAL
PHOTO IMAGE: NORMAL

## 2021-07-27 PROCEDURE — 88305 TISSUE EXAM BY PATHOLOGIST: CPT | Mod: 26

## 2021-07-27 PROCEDURE — 88342 IMHCHEM/IMCYTCHM 1ST ANTB: CPT | Mod: 26

## 2021-07-27 NOTE — ANESTHESIA PREPROCEDURE EVALUATION
Anesthesia Pre-Procedure Evaluation    Patient: Roger Bonilla   MRN: [id number] : 1955        Preoperative Diagnosis: Nuclear sclerosis of right eye [H25.11]   Procedure : Procedure(s):  Cataract Extraction with Implant     Past Medical History:   Diagnosis Date     Coronary artery disease may 1 2005    2 stents put in heart     Pure hypercholesterolemia      Type II or unspecified type diabetes mellitus without mention of complication, not stated as uncontrolled      Unspecified cardiovascular disease -    MI -- Angioplasty two stents RCA & OM2     Unspecified essential hypertension       Past Surgical History:   Procedure Laterality Date     CARDIAC SURGERY  may 1, 2005     COLONOSCOPY  2011    Procedure:COLONOSCOPY; Screening Colonoscopy; Surgeon:LUTHER FLORES; Location:[facility]     ESOPHAGOSCOPY, GASTROSCOPY, DUODENOSCOPY (EGD), COMBINED N/A 2021    Procedure: ESOPHAGOGASTRODUODENOSCOPY, WITH BIOPSY;  Surgeon: Jeff Cunningham DO;  Location: [facility]     SURGICAL HISTORY OF -       None     VASCULAR SURGERY  oct 2013    stent put in leg      Allergies   Allergen Reactions     Nkda [No Known Drug Allergies]       Social History     Tobacco Use     Smoking status: Current Every Day Smoker     Packs/day: 0.50     Years: 50.00     Pack years: 25.00     Types: Cigarettes     Start date: 1972     Smokeless tobacco: Never Used   Substance Use Topics     Alcohol use: Not Currently     Comment: 10 beers a week       Wt Readings from Last 1 Encounters:   21 111.1 kg (245 lb)        Anesthesia Evaluation   Pt has had prior anesthetic. Type: General and MAC.    No history of anesthetic complications       ROS/MED HX  ENT/Pulmonary:     (+) sleep apnea, uses CPAP, tobacco use, Current use,     Neurologic:  - neg neurologic ROS     Cardiovascular: Comment: AAA    (+) Dyslipidemia hypertension-Peripheral Vascular Disease-CAD ---Previous cardiac testing   Echo: Date: Results:    Stress  Test: Date: Results:    ECG Reviewed: Date: 7/19/21 Results:  Sinus Rhythm   -RSR(V1) -nondiagnostic.     PROBABLY NORMAL  Cath: Date: Results:      METS/Exercise Tolerance:     Hematologic:  - neg hematologic  ROS     Musculoskeletal:  - neg musculoskeletal ROS     GI/Hepatic:  - neg GI/hepatic ROS     Renal/Genitourinary:     (+) renal disease, type: ARF,     Endo:     (+) type II DM, Obesity,     Psychiatric/Substance Use:  - neg psychiatric ROS     Infectious Disease:  - neg infectious disease ROS     Malignancy:  - neg malignancy ROS     Other:  - neg other ROS          Physical Exam    Airway        Mallampati: II   TM distance: > 3 FB   Neck ROM: full   Mouth opening: > 3 cm    Respiratory Devices and Support         Dental  no notable dental history         Cardiovascular   cardiovascular exam normal       Rhythm and rate: regular and normal     Pulmonary   pulmonary exam normal        breath sounds clear to auscultation           OUTSIDE LABS:  CBC:   Lab Results   Component Value Date    WBC 9.6 07/21/2021    WBC 12.2 (H) 07/20/2021    HGB 12.4 (L) 07/21/2021    HGB 12.5 (L) 07/20/2021    HCT 38.0 (L) 07/21/2021    HCT 38.4 (L) 07/20/2021     07/21/2021     07/20/2021     BMP:   Lab Results   Component Value Date     07/21/2021     07/20/2021    POTASSIUM 5.0 07/21/2021    POTASSIUM 4.8 07/20/2021    CHLORIDE 107 07/21/2021    CHLORIDE 105 07/20/2021    CO2 30 07/21/2021    CO2 29 07/20/2021    BUN 22 07/21/2021    BUN 26 07/20/2021    CR 1.27 (H) 07/21/2021    CR 2.34 (H) 07/20/2021     (H) 07/21/2021     (H) 07/21/2021     COAGS:   Lab Results   Component Value Date    PTT 28 09/13/2013    INR 0.94 09/13/2013     POC:   Lab Results   Component Value Date     (H) 06/25/2021     HEPATIC:   Lab Results   Component Value Date    ALBUMIN 2.7 (L) 07/20/2021    PROTTOTAL 5.6 (L) 07/20/2021    ALT 21 07/20/2021    AST 14 07/20/2021    ALKPHOS 54 07/20/2021     BILITOTAL 0.6 07/20/2021     OTHER:   Lab Results   Component Value Date    LACT 1.9 06/23/2021    A1C 6.2 (H) 07/20/2021    SUE 8.7 07/21/2021    PHOS 4.2 06/24/2021    MAG 1.9 07/19/2021    LIPASE 156 07/19/2021    TSH 0.35 (L) 06/23/2021    T4 1.32 06/23/2021    SED 4 10/27/2008       Anesthesia Plan    ASA Status:  3   NPO Status:  NPO Appropriate    Anesthesia Type: MAC.     - Reason for MAC: chronic cardiopulmonary disease, straight local not clinically adequate              Consents    Anesthesia Plan(s) and associated risks, benefits, and realistic alternatives discussed. Questions answered and patient/representative(s) expressed understanding.     - Discussed with:  Patient         Postoperative Care    Pain management: Oral pain medications.   PONV prophylaxis: Ondansetron (or other 5HT-3)     Comments:                SAL Culver CRNA

## 2021-07-28 ENCOUNTER — ANESTHESIA (OUTPATIENT)
Dept: SURGERY | Facility: CLINIC | Age: 66
End: 2021-07-28
Payer: COMMERCIAL

## 2021-07-28 ENCOUNTER — HOSPITAL ENCOUNTER (OUTPATIENT)
Facility: CLINIC | Age: 66
Discharge: HOME | End: 2021-07-28
Attending: OPHTHALMOLOGY | Admitting: OPHTHALMOLOGY
Payer: COMMERCIAL

## 2021-07-28 VITALS
BODY MASS INDEX: 39.37 KG/M2 | OXYGEN SATURATION: 98 % | WEIGHT: 245 LBS | TEMPERATURE: 98.2 F | DIASTOLIC BLOOD PRESSURE: 62 MMHG | SYSTOLIC BLOOD PRESSURE: 126 MMHG | RESPIRATION RATE: 14 BRPM | HEIGHT: 66 IN | HEART RATE: 76 BPM

## 2021-07-28 PROCEDURE — V2632 POST CHMBR INTRAOCULAR LENS: HCPCS | Performed by: OPHTHALMOLOGY

## 2021-07-28 PROCEDURE — 370N000004 HC ANESTHESIA CATARACT PACKAGE: Performed by: OPHTHALMOLOGY

## 2021-07-28 PROCEDURE — 250N000011 HC RX IP 250 OP 636: Performed by: NURSE ANESTHETIST, CERTIFIED REGISTERED

## 2021-07-28 PROCEDURE — 258N000003 HC RX IP 258 OP 636: Performed by: NURSE ANESTHETIST, CERTIFIED REGISTERED

## 2021-07-28 PROCEDURE — 250N000011 HC RX IP 250 OP 636: Performed by: OPHTHALMOLOGY

## 2021-07-28 PROCEDURE — 360N000007 HC CATARACT SURGICAL PACKAGE: Performed by: OPHTHALMOLOGY

## 2021-07-28 PROCEDURE — 250N000009 HC RX 250: Performed by: NURSE ANESTHETIST, CERTIFIED REGISTERED

## 2021-07-28 PROCEDURE — 250N000009 HC RX 250: Performed by: OPHTHALMOLOGY

## 2021-07-28 PROCEDURE — 761N000008 HC RECOVERY CATRACT PACKAGE: Performed by: OPHTHALMOLOGY

## 2021-07-28 DEVICE — EYE IMP IOL AMO PCL TECNIS ZCB00 18.5: Type: IMPLANTABLE DEVICE | Site: EYE | Status: FUNCTIONAL

## 2021-07-28 RX ORDER — SODIUM CHLORIDE, SODIUM LACTATE, POTASSIUM CHLORIDE, CALCIUM CHLORIDE 600; 310; 30; 20 MG/100ML; MG/100ML; MG/100ML; MG/100ML
INJECTION, SOLUTION INTRAVENOUS CONTINUOUS
Status: DISCONTINUED | OUTPATIENT
Start: 2021-07-28 | End: 2021-07-28 | Stop reason: HOSPADM

## 2021-07-28 RX ORDER — LIDOCAINE HYDROCHLORIDE 20 MG/ML
JELLY TOPICAL PRN
Status: DISCONTINUED | OUTPATIENT
Start: 2021-07-28 | End: 2021-07-28 | Stop reason: HOSPADM

## 2021-07-28 RX ORDER — LIDOCAINE 40 MG/G
CREAM TOPICAL
Status: DISCONTINUED | OUTPATIENT
Start: 2021-07-28 | End: 2021-07-28 | Stop reason: HOSPADM

## 2021-07-28 RX ORDER — BALANCED SALT SOLUTION 6.4; .75; .48; .3; 3.9; 1.7 MG/ML; MG/ML; MG/ML; MG/ML; MG/ML; MG/ML
SOLUTION OPHTHALMIC PRN
Status: DISCONTINUED | OUTPATIENT
Start: 2021-07-28 | End: 2021-07-28 | Stop reason: HOSPADM

## 2021-07-28 RX ORDER — PROPARACAINE HYDROCHLORIDE 5 MG/ML
SOLUTION/ DROPS OPHTHALMIC PRN
Status: DISCONTINUED | OUTPATIENT
Start: 2021-07-28 | End: 2021-07-28 | Stop reason: HOSPADM

## 2021-07-28 RX ORDER — TROPICAMIDE 10 MG/ML
1 SOLUTION/ DROPS OPHTHALMIC
Status: COMPLETED | OUTPATIENT
Start: 2021-07-28 | End: 2021-07-28

## 2021-07-28 RX ADMIN — TROPICAMIDE 1 DROP: 10 SOLUTION/ DROPS OPHTHALMIC at 10:26

## 2021-07-28 RX ADMIN — CYCLOPENTOLATE HYDROCHLORIDE AND PHENYLEPHRINE HYDROCHLORIDE 1 DROP: 2; 10 SOLUTION/ DROPS OPHTHALMIC at 10:26

## 2021-07-28 RX ADMIN — TROPICAMIDE 1 DROP: 10 SOLUTION/ DROPS OPHTHALMIC at 10:11

## 2021-07-28 RX ADMIN — LIDOCAINE HYDROCHLORIDE 0.1 ML: 10 INJECTION, SOLUTION EPIDURAL; INFILTRATION; INTRACAUDAL; PERINEURAL at 10:29

## 2021-07-28 RX ADMIN — TROPICAMIDE 1 DROP: 10 SOLUTION/ DROPS OPHTHALMIC at 10:18

## 2021-07-28 RX ADMIN — CYCLOPENTOLATE HYDROCHLORIDE AND PHENYLEPHRINE HYDROCHLORIDE 1 DROP: 2; 10 SOLUTION/ DROPS OPHTHALMIC at 10:11

## 2021-07-28 RX ADMIN — MIDAZOLAM 2 MG: 1 INJECTION INTRAMUSCULAR; INTRAVENOUS at 10:59

## 2021-07-28 RX ADMIN — CYCLOPENTOLATE HYDROCHLORIDE AND PHENYLEPHRINE HYDROCHLORIDE 1 DROP: 2; 10 SOLUTION/ DROPS OPHTHALMIC at 10:18

## 2021-07-28 RX ADMIN — SODIUM CHLORIDE, POTASSIUM CHLORIDE, SODIUM LACTATE AND CALCIUM CHLORIDE: 600; 310; 30; 20 INJECTION, SOLUTION INTRAVENOUS at 10:28

## 2021-07-28 ASSESSMENT — MIFFLIN-ST. JEOR: SCORE: 1835.09

## 2021-07-28 ASSESSMENT — LIFESTYLE VARIABLES: TOBACCO_USE: 1

## 2021-07-28 NOTE — ANESTHESIA CARE TRANSFER NOTE
Patient: Roger Bonilla    Procedure(s):  Right eye Cataract Extraction with Implant    Diagnosis: Nuclear sclerosis of right eye [H25.11]  Diagnosis Additional Information: No value filed.    Anesthesia Type:   MAC     Note:    Oropharynx: oropharynx clear of all foreign objects and spontaneously breathing  Level of Consciousness: awake  Oxygen Supplementation: room air    Independent Airway: airway patency satisfactory and stable  Dentition: dentition unchanged  Vital Signs Stable: post-procedure vital signs reviewed and stable  Report to RN Given: handoff report given  Patient transferred to: Phase II    Handoff Report: Identifed the Patient, Identified the Reponsible Provider, Reviewed the pertinent medical history, Discussed the surgical course, Reviewed Intra-OP anesthesia mangement and issues during anesthesia, Set expectations for post-procedure period and Allowed opportunity for questions and acknowledgement of understanding      Vitals:  Vitals Value Taken Time   /72 07/28/21 1125   Temp     Pulse 70 07/28/21 1123   Resp 14 07/28/21 1125   SpO2 97 % 07/28/21 1125   Vitals shown include unvalidated device data.    Electronically Signed By: Kyree Gallardo CRNA, APRN CRNA  July 28, 2021  11:27 AM

## 2021-07-28 NOTE — OP NOTE
OPHTHALMOLOGY OPERATIVE NOTE    PATIENT: Roger Bonilla  DATE OF SURGERY: 7/28/2021  PREOPERATIVE DIAGNOSIS:  Senile Nuclear Cataract, Right eye  POSTOPERATIVE DIAGNOSIS:  Senile Nuclear Cataract, Right eye  OPERATIVE PROCEDURE:  Phacoemulsification with placement of intraocular lens  SURGEON:  Reyes Valdez MD  ANESTHESIA:  Topical / MAC  EBL:  None  SPECIMENS:  None  COMPLICATIONS:  None    PROCEDURE:  The patient was brought to the operating room at [facility].  The right eye was prepped and draped in the usual fashion for cataract surgery.  A wire lid speculum was inserted.  A super sharp blade was used to make a paracentesis at the 11 O'clock position.  The super sharp blade was used to make a partial thickness temporal groove, which was 3 mm in length.  0.8 mL of non-preserved epi-Shugarcaine was injected into the anterior chamber.  Viscoelastic was used to inflate the anterior chamber through a cannula.  A 2.5 mm microkeratome was used to make a temporal clear corneal incision in a two-plane fashion.  A cystotome needle and forceps were used to make a capsulorrhexis.  Hydrodissection and hydrodelineation were performed with Balance Salt Solution.  The lens was then phacoemulsified and removed without complications.  The cortical material was removed with bimanual irrigation and aspiration.  The capsular bag was filled with viscoelastic.  A posterior chamber intraocular lens, preselected and recorded, was folded and inserted into the capsular bag.  The viscoelastic was removed with the irrigation and aspiration tip.  Balanced Salt Solution with Vigamox, 150mg/0.1mL, was used to refill the anterior chamber.  The wounds were checked for water tightness and required no suture.  The wire lid speculum was removed.  The patient's right eye was cleaned and a drop of each post-operative drop was placed, followed by a lundberg shield.  The patient tolerated the procedure well, and there were  no complications.      Reyes Valdez MD

## 2021-07-28 NOTE — ANESTHESIA POSTPROCEDURE EVALUATION
Patient: Roger Bonilla    Procedure(s):  Right eye Cataract Extraction with Implant    Diagnosis:Nuclear sclerosis of right eye [H25.11]  Diagnosis Additional Information: No value filed.    Anesthesia Type:  MAC    Note:  Disposition: Outpatient   Postop Pain Control: Uneventful            Sign Out: Well controlled pain   PONV: No   Neuro/Psych: Uneventful            Sign Out: Acceptable/Baseline neuro status   Airway/Respiratory: Uneventful            Sign Out: Acceptable/Baseline resp. status   CV/Hemodynamics: Uneventful            Sign Out: Acceptable CV status; No obvious hypovolemia; No obvious fluid overload   Other NRE: NONE   DID A NON-ROUTINE EVENT OCCUR? No           Last vitals:  Vitals Value Taken Time   /62 07/28/21 1140   Temp     Pulse 76 07/28/21 1136   Resp 14 07/28/21 1140   SpO2 98 % 07/28/21 1140   Vitals shown include unvalidated device data.    Electronically Signed By: Kyree Gallardo CRNA, APRN CRNA  July 28, 2021  11:55 AM

## 2021-08-09 DIAGNOSIS — Z11.59 ENCOUNTER FOR SCREENING FOR OTHER VIRAL DISEASES: ICD-10-CM

## 2021-08-15 ENCOUNTER — LAB (OUTPATIENT)
Dept: FAMILY MEDICINE | Facility: CLINIC | Age: 66
End: 2021-08-15
Attending: OPHTHALMOLOGY
Payer: COMMERCIAL

## 2021-08-15 DIAGNOSIS — Z11.59 ENCOUNTER FOR SCREENING FOR OTHER VIRAL DISEASES: ICD-10-CM

## 2021-08-15 PROCEDURE — U0003 INFECTIOUS AGENT DETECTION BY NUCLEIC ACID (DNA OR RNA); SEVERE ACUTE RESPIRATORY SYNDROME CORONAVIRUS 2 (SARS-COV-2) (CORONAVIRUS DISEASE [COVID-19]), AMPLIFIED PROBE TECHNIQUE, MAKING USE OF HIGH THROUGHPUT TECHNOLOGIES AS DESCRIBED BY CMS-2020-01-R: HCPCS

## 2021-08-15 PROCEDURE — U0005 INFEC AGEN DETEC AMPLI PROBE: HCPCS

## 2021-08-16 ENCOUNTER — ANESTHESIA EVENT (OUTPATIENT)
Dept: SURGERY | Facility: CLINIC | Age: 66
End: 2021-08-16
Payer: COMMERCIAL

## 2021-08-16 LAB — SARS-COV-2 RNA RESP QL NAA+PROBE: NEGATIVE

## 2021-08-16 ASSESSMENT — LIFESTYLE VARIABLES: TOBACCO_USE: 1

## 2021-08-16 NOTE — ANESTHESIA PREPROCEDURE EVALUATION
Anesthesia Pre-Procedure Evaluation    Patient: Roger Bonilla   MRN: [id number] : 1955        Preoperative Diagnosis: Nuclear sclerosis of left eye [H25.12]   Procedure : Procedure(s):  Cataract Extraction with Implant     Past Medical History:   Diagnosis Date     Coronary artery disease may 1 2005    2 stents put in heart     Pure hypercholesterolemia      Type II or unspecified type diabetes mellitus without mention of complication, not stated as uncontrolled      Unspecified cardiovascular disease 5-    MI -- Angioplasty two stents RCA & OM2     Unspecified essential hypertension       Past Surgical History:   Procedure Laterality Date     CARDIAC SURGERY  may 1, 2005     COLONOSCOPY  2011    Procedure:COLONOSCOPY; Screening Colonoscopy; Surgeon:LUTHER FLORES; Location:[facility]     ESOPHAGOSCOPY, GASTROSCOPY, DUODENOSCOPY (EGD), COMBINED N/A 2021    Procedure: ESOPHAGOGASTRODUODENOSCOPY, WITH BIOPSY;  Surgeon: Jeff Cunningham DO;  Location: [facility]     PHACOEMULSIFICATION WITH STANDARD INTRAOCULAR LENS IMPLANT Right 2021    Procedure: Right eye Cataract Extraction with Implant;  Surgeon: Reyes Valdez MD;  Location: [facility]     SURGICAL HISTORY OF -       None     VASCULAR SURGERY  oct 2013    stent put in leg      Allergies   Allergen Reactions     Nkda [No Known Drug Allergies]       Social History     Tobacco Use     Smoking status: Current Every Day Smoker     Packs/day: 0.50     Years: 50.00     Pack years: 25.00     Types: Cigarettes     Start date: 1972     Smokeless tobacco: Never Used   Substance Use Topics     Alcohol use: Not Currently     Comment: 10 beers a week       Wt Readings from Last 1 Encounters:   21 111.1 kg (245 lb)        Anesthesia Evaluation   Pt has had prior anesthetic. Type: General and MAC.    No history of anesthetic complications       ROS/MED HX  ENT/Pulmonary:     (+) sleep apnea, uses CPAP, tobacco use, Current use,      Neurologic:  - neg neurologic ROS     Cardiovascular: Comment: AAA    (+) Dyslipidemia hypertension-Peripheral Vascular Disease-CAD ---Previous cardiac testing   Echo: Date: Results:    Stress Test: Date: Results:    ECG Reviewed: Date: 7/19/21 Results:  Sinus Rhythm   -RSR(V1) -nondiagnostic.     PROBABLY NORMAL  Cath: Date: Results:      METS/Exercise Tolerance:     Hematologic:  - neg hematologic  ROS     Musculoskeletal:  - neg musculoskeletal ROS     GI/Hepatic:  - neg GI/hepatic ROS     Renal/Genitourinary:     (+) renal disease, type: ARF,     Endo:     (+) type II DM, Last HgA1c: 6.2, date: 7/20/21, Obesity,     Psychiatric/Substance Use:  - neg psychiatric ROS     Infectious Disease:  - neg infectious disease ROS     Malignancy:  - neg malignancy ROS     Other:  - neg other ROS          Physical Exam    Airway        Mallampati: II   TM distance: > 3 FB   Neck ROM: full   Mouth opening: > 3 cm    Respiratory Devices and Support         Dental  no notable dental history         Cardiovascular   cardiovascular exam normal          Pulmonary   pulmonary exam normal                OUTSIDE LABS:  CBC:   Lab Results   Component Value Date    WBC 9.6 07/21/2021    WBC 12.2 (H) 07/20/2021    HGB 12.4 (L) 07/21/2021    HGB 12.5 (L) 07/20/2021    HCT 38.0 (L) 07/21/2021    HCT 38.4 (L) 07/20/2021     07/21/2021     07/20/2021     BMP:   Lab Results   Component Value Date     07/21/2021     07/20/2021    POTASSIUM 5.0 07/21/2021    POTASSIUM 4.8 07/20/2021    CHLORIDE 107 07/21/2021    CHLORIDE 105 07/20/2021    CO2 30 07/21/2021    CO2 29 07/20/2021    BUN 22 07/21/2021    BUN 26 07/20/2021    CR 1.27 (H) 07/21/2021    CR 2.34 (H) 07/20/2021     (H) 07/21/2021     (H) 07/21/2021     COAGS:   Lab Results   Component Value Date    PTT 28 09/13/2013    INR 0.94 09/13/2013     POC:   Lab Results   Component Value Date     (H) 06/25/2021     HEPATIC:   Lab Results    Component Value Date    ALBUMIN 2.7 (L) 07/20/2021    PROTTOTAL 5.6 (L) 07/20/2021    ALT 21 07/20/2021    AST 14 07/20/2021    ALKPHOS 54 07/20/2021    BILITOTAL 0.6 07/20/2021     OTHER:   Lab Results   Component Value Date    LACT 1.9 06/23/2021    A1C 6.2 (H) 07/20/2021    SUE 8.7 07/21/2021    PHOS 4.2 06/24/2021    MAG 1.9 07/19/2021    LIPASE 156 07/19/2021    TSH 0.35 (L) 06/23/2021    T4 1.32 06/23/2021    SED 4 10/27/2008       Anesthesia Plan    ASA Status:  3   NPO Status:  NPO Appropriate    Anesthesia Type: MAC.     - Reason for MAC: straight local not clinically adequate, immobility needed              Consents    Anesthesia Plan(s) and associated risks, benefits, and realistic alternatives discussed. Questions answered and patient/representative(s) expressed understanding.     - Discussed with:  Patient         Postoperative Care            Comments:                Ale Riley, CRNA, APRN CRNA

## 2021-08-18 ENCOUNTER — ANESTHESIA (OUTPATIENT)
Dept: SURGERY | Facility: CLINIC | Age: 66
End: 2021-08-18
Payer: COMMERCIAL

## 2021-08-18 ENCOUNTER — HOSPITAL ENCOUNTER (OUTPATIENT)
Facility: CLINIC | Age: 66
Discharge: HOME | End: 2021-08-18
Attending: OPHTHALMOLOGY | Admitting: OPHTHALMOLOGY
Payer: COMMERCIAL

## 2021-08-18 VITALS
HEART RATE: 72 BPM | WEIGHT: 242 LBS | OXYGEN SATURATION: 96 % | BODY MASS INDEX: 38.89 KG/M2 | TEMPERATURE: 99 F | HEIGHT: 66 IN | SYSTOLIC BLOOD PRESSURE: 146 MMHG | RESPIRATION RATE: 16 BRPM | DIASTOLIC BLOOD PRESSURE: 69 MMHG

## 2021-08-18 LAB — GLUCOSE BLDC GLUCOMTR-MCNC: 126 MG/DL (ref 70–99)

## 2021-08-18 PROCEDURE — 250N000009 HC RX 250: Performed by: OPHTHALMOLOGY

## 2021-08-18 PROCEDURE — 258N000003 HC RX IP 258 OP 636: Performed by: NURSE ANESTHETIST, CERTIFIED REGISTERED

## 2021-08-18 PROCEDURE — V2632 POST CHMBR INTRAOCULAR LENS: HCPCS | Performed by: OPHTHALMOLOGY

## 2021-08-18 PROCEDURE — 370N000004 HC ANESTHESIA CATARACT PACKAGE: Performed by: OPHTHALMOLOGY

## 2021-08-18 PROCEDURE — 250N000011 HC RX IP 250 OP 636: Performed by: NURSE ANESTHETIST, CERTIFIED REGISTERED

## 2021-08-18 PROCEDURE — 250N000011 HC RX IP 250 OP 636: Performed by: OPHTHALMOLOGY

## 2021-08-18 PROCEDURE — 761N000008 HC RECOVERY CATRACT PACKAGE: Performed by: OPHTHALMOLOGY

## 2021-08-18 PROCEDURE — 360N000007 HC CATARACT SURGICAL PACKAGE: Performed by: OPHTHALMOLOGY

## 2021-08-18 PROCEDURE — 250N000009 HC RX 250: Performed by: NURSE ANESTHETIST, CERTIFIED REGISTERED

## 2021-08-18 DEVICE — EYE IMP IOL AMO PCL TECNIS ZCB00 20.0: Type: IMPLANTABLE DEVICE | Site: EYE | Status: FUNCTIONAL

## 2021-08-18 RX ORDER — SODIUM CHLORIDE, SODIUM LACTATE, POTASSIUM CHLORIDE, CALCIUM CHLORIDE 600; 310; 30; 20 MG/100ML; MG/100ML; MG/100ML; MG/100ML
INJECTION, SOLUTION INTRAVENOUS CONTINUOUS
Status: DISCONTINUED | OUTPATIENT
Start: 2021-08-18 | End: 2021-08-18 | Stop reason: HOSPADM

## 2021-08-18 RX ORDER — LIDOCAINE HYDROCHLORIDE 20 MG/ML
JELLY TOPICAL PRN
Status: DISCONTINUED | OUTPATIENT
Start: 2021-08-18 | End: 2021-08-18 | Stop reason: HOSPADM

## 2021-08-18 RX ORDER — PROPARACAINE HYDROCHLORIDE 5 MG/ML
SOLUTION/ DROPS OPHTHALMIC PRN
Status: DISCONTINUED | OUTPATIENT
Start: 2021-08-18 | End: 2021-08-18 | Stop reason: HOSPADM

## 2021-08-18 RX ORDER — BALANCED SALT SOLUTION 6.4; .75; .48; .3; 3.9; 1.7 MG/ML; MG/ML; MG/ML; MG/ML; MG/ML; MG/ML
SOLUTION OPHTHALMIC PRN
Status: DISCONTINUED | OUTPATIENT
Start: 2021-08-18 | End: 2021-08-18 | Stop reason: HOSPADM

## 2021-08-18 RX ORDER — TROPICAMIDE 10 MG/ML
1 SOLUTION/ DROPS OPHTHALMIC
Status: COMPLETED | OUTPATIENT
Start: 2021-08-18 | End: 2021-08-18

## 2021-08-18 RX ORDER — LIDOCAINE 40 MG/G
CREAM TOPICAL
Status: DISCONTINUED | OUTPATIENT
Start: 2021-08-18 | End: 2021-08-18 | Stop reason: HOSPADM

## 2021-08-18 RX ADMIN — CYCLOPENTOLATE HYDROCHLORIDE AND PHENYLEPHRINE HYDROCHLORIDE 1 DROP: 2; 10 SOLUTION/ DROPS OPHTHALMIC at 09:39

## 2021-08-18 RX ADMIN — CYCLOPENTOLATE HYDROCHLORIDE AND PHENYLEPHRINE HYDROCHLORIDE 1 DROP: 2; 10 SOLUTION/ DROPS OPHTHALMIC at 09:46

## 2021-08-18 RX ADMIN — TROPICAMIDE 1 DROP: 10 SOLUTION/ DROPS OPHTHALMIC at 09:46

## 2021-08-18 RX ADMIN — MIDAZOLAM 2 MG: 1 INJECTION INTRAMUSCULAR; INTRAVENOUS at 11:07

## 2021-08-18 RX ADMIN — SODIUM CHLORIDE, POTASSIUM CHLORIDE, SODIUM LACTATE AND CALCIUM CHLORIDE: 600; 310; 30; 20 INJECTION, SOLUTION INTRAVENOUS at 09:52

## 2021-08-18 RX ADMIN — CYCLOPENTOLATE HYDROCHLORIDE AND PHENYLEPHRINE HYDROCHLORIDE 1 DROP: 2; 10 SOLUTION/ DROPS OPHTHALMIC at 09:53

## 2021-08-18 RX ADMIN — TROPICAMIDE 1 DROP: 10 SOLUTION/ DROPS OPHTHALMIC at 09:38

## 2021-08-18 RX ADMIN — LIDOCAINE HYDROCHLORIDE 0.1 ML: 10 INJECTION, SOLUTION EPIDURAL; INFILTRATION; INTRACAUDAL; PERINEURAL at 09:52

## 2021-08-18 RX ADMIN — TROPICAMIDE 1 DROP: 10 SOLUTION/ DROPS OPHTHALMIC at 09:53

## 2021-08-18 ASSESSMENT — MIFFLIN-ST. JEOR: SCORE: 1825.45

## 2021-08-18 NOTE — ANESTHESIA CARE TRANSFER NOTE
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    Anesthesia Type:   MAC     Note:      Level of Consciousness: awake  Oxygen Supplementation: room air    Independent Airway: airway patency satisfactory and stable  Dentition: dentition unchanged  Vital Signs Stable: post-procedure vital signs reviewed and stable  Report to RN Given: handoff report given  Patient transferred to: Phase II    Handoff Report: Identifed the Patient, Identified the Reponsible Provider, Reviewed the pertinent medical history, Discussed the surgical course, Reviewed Intra-OP anesthesia mangement and issues during anesthesia, Set expectations for post-procedure period and Allowed opportunity for questions and acknowledgement of understanding      Vitals:  Vitals Value Taken Time   /65 08/18/21 1131   Temp     Pulse 72 08/18/21 1131   Resp 16 08/18/21 1131   SpO2 97 % 08/18/21 1132   Vitals shown include unvalidated device data.    Electronically Signed By: SAL Guzman CRNA  August 18, 2021  11:40 AM

## 2021-08-18 NOTE — OP NOTE
CATARACT OPERATIVE NOTE    PATIENT: Roger Bonilla  DATE OF SURGERY: 8/18/2021  PREOPERATIVE DIAGNOSIS:  Senile Nuclear Cataract, Left eye  POSTOPERATIVE DIAGNOSIS:  Senile Nuclear Cataract, Left eye  OPERATIVE PROCEDURE:  Phacoemulsification and placement of intraocular lens  SURGEON:  Reyes Valdez MD  ANESTHESIA:  Topical / MAC  EBL:  None  SPECIMENS:  None  COMPLICATIONS:  None    PROCEDURE:  The patient was brought to the operating room at [facility].  The left eye was prepped and draped in the usual fashion for cataract surgery.  A wire lid speculum was inserted.  A super sharp blade was used to make a paracentesis at the 5 O'clock position.  The super sharp blade was used to make a partial thickness temporal groove, which was 3 mm in length.  0.8 mL of non-preserved epi-Shugarcaine was injected into the anterior chamber.  Viscoelastic was used to inflate the anterior chamber through a cannula.  A 2.5 mm microkeratome was used to make a temporal clear corneal incision in a two-plane fashion.  A cystotome needle and forceps were used to make a capsulorrhexis.  Hydrodissection and hydrodelineation were performed with Balance Salt Solution.  The lens was then phacoemulsified and removed without complications.  The cortical material was removed with bimanual irrigation and aspiration.  The capsular bag was filled with viscoelastic.  A posterior chamber intraocular lens, preselected and recorded, was folded and inserted into the capsular bag.  The viscoelastic was removed with the irrigation and aspiration tip.  Balanced Salt Solution with Vigamox, 150mg/0.1mL, was used to refill the anterior chamber.  The wounds were checked for water tightness and required no suture.  The wire lid speculum was removed.  The patient's left eye was cleaned and a drop of each post-operative drop was placed, followed by a lundberg shield.  The patient tolerated the procedure well, and there were no  complications.      Reyes Valdez MD

## 2021-08-18 NOTE — ANESTHESIA POSTPROCEDURE EVALUATION
Patient: Roger Bonilla    Procedure(s):  left eye Cataract Extraction with Implant    Diagnosis:Nuclear sclerosis of left eye [H25.12]  Diagnosis Additional Information: No value filed.    Anesthesia Type:  MAC    Note:  Disposition: Outpatient   Postop Pain Control: Uneventful            Sign Out: Well controlled pain   PONV: No   Neuro/Psych: Uneventful            Sign Out: Acceptable/Baseline neuro status   Airway/Respiratory: Uneventful            Sign Out: Acceptable/Baseline resp. status   CV/Hemodynamics: Uneventful            Sign Out: Acceptable CV status; No obvious hypovolemia; No obvious fluid overload   Other NRE: NONE   DID A NON-ROUTINE EVENT OCCUR? No           Last vitals:  Vitals Value Taken Time   /65 08/18/21 1131   Temp     Pulse 72 08/18/21 1131   Resp 16 08/18/21 1131   SpO2 97 % 08/18/21 1132   Vitals shown include unvalidated device data.    Electronically Signed By: SAL Guzman CRNA  August 18, 2021  11:40 AM

## 2021-09-07 ENCOUNTER — MYC MEDICAL ADVICE (OUTPATIENT)
Dept: FAMILY MEDICINE | Facility: CLINIC | Age: 66
End: 2021-09-07

## 2021-09-07 DIAGNOSIS — K29.00 ACUTE SUPERFICIAL GASTRITIS WITHOUT HEMORRHAGE: ICD-10-CM

## 2021-09-07 RX ORDER — OMEPRAZOLE 40 MG/1
40 CAPSULE, DELAYED RELEASE ORAL DAILY
Qty: 30 CAPSULE | Refills: 1 | Status: SHIPPED | OUTPATIENT
Start: 2021-09-07 | End: 2021-10-27

## 2021-09-07 NOTE — TELEPHONE ENCOUNTER
Maria De Jesus,    Please see my chart message.  Routing refill request to provider for review/approval because:  Prescribe by another provider. Saadia SCOTT RN

## 2021-09-07 NOTE — TELEPHONE ENCOUNTER
Refills sent for 2 months.  Should follow-up with PCP in 2 months to discuss risks/benefits of continuation of therapy.    Thanks,  John Martinez MD

## 2021-09-20 ENCOUNTER — TELEPHONE (OUTPATIENT)
Dept: FAMILY MEDICINE | Facility: CLINIC | Age: 66
End: 2021-09-20

## 2021-09-20 DIAGNOSIS — E11.9 TYPE 2 DIABETES MELLITUS WITHOUT COMPLICATION, WITHOUT LONG-TERM CURRENT USE OF INSULIN (H): Primary | ICD-10-CM

## 2021-09-20 NOTE — TELEPHONE ENCOUNTER
Patient is due for a diabetes follow up appointment with me.      Fasting labs due:  Lipid panel, A1C, Metabolic Panel and Microalbumin    Orders were placed, please have lab work done before visit.      Please ask patient to bring in their glucometer so we can download the data.    Please call patient to schedule.  Maria De Jesus Hay, CNP

## 2021-09-21 NOTE — TELEPHONE ENCOUNTER
Patient Quality Outreach Summary      Summary:    Patient is due/failing the following:   Patient is due for a diabetes follow up appointment with me.       Fasting labs due:  Lipid panel, A1C, Metabolic Panel and Microalbumin     Orders were placed, please have lab work done before visit.       Please ask patient to bring in their glucometer so we can download the data.    Type of outreach:    Sent [email or web address] message. will postpone a few days to check if he has viewed    Maco SOTO CMA

## 2021-10-08 ENCOUNTER — OFFICE VISIT (OUTPATIENT)
Dept: FAMILY MEDICINE | Facility: CLINIC | Age: 66
End: 2021-10-08
Payer: COMMERCIAL

## 2021-10-08 ENCOUNTER — LAB (OUTPATIENT)
Dept: LAB | Facility: CLINIC | Age: 66
End: 2021-10-08
Payer: COMMERCIAL

## 2021-10-08 VITALS
DIASTOLIC BLOOD PRESSURE: 82 MMHG | HEART RATE: 72 BPM | TEMPERATURE: 98.5 F | SYSTOLIC BLOOD PRESSURE: 138 MMHG | BODY MASS INDEX: 39.05 KG/M2 | WEIGHT: 243 LBS | HEIGHT: 66 IN | OXYGEN SATURATION: 96 %

## 2021-10-08 DIAGNOSIS — E11.9 TYPE 2 DIABETES MELLITUS WITHOUT COMPLICATION, WITHOUT LONG-TERM CURRENT USE OF INSULIN (H): Primary | Chronic | ICD-10-CM

## 2021-10-08 DIAGNOSIS — I10 HYPERTENSION GOAL BP (BLOOD PRESSURE) < 140/90: Chronic | ICD-10-CM

## 2021-10-08 DIAGNOSIS — I73.9 PAD (PERIPHERAL ARTERY DISEASE) (H): Chronic | ICD-10-CM

## 2021-10-08 DIAGNOSIS — E11.9 TYPE 2 DIABETES MELLITUS WITHOUT COMPLICATION, WITHOUT LONG-TERM CURRENT USE OF INSULIN (H): ICD-10-CM

## 2021-10-08 DIAGNOSIS — I25.10 CORONARY ARTERY DISEASE INVOLVING NATIVE CORONARY ARTERY OF NATIVE HEART WITHOUT ANGINA PECTORIS: Chronic | ICD-10-CM

## 2021-10-08 DIAGNOSIS — E78.5 HYPERLIPIDEMIA LDL GOAL <100: Chronic | ICD-10-CM

## 2021-10-08 DIAGNOSIS — Z23 HIGH PRIORITY FOR 2019-NCOV VACCINE: ICD-10-CM

## 2021-10-08 DIAGNOSIS — Z23 NEED FOR PROPHYLACTIC VACCINATION AND INOCULATION AGAINST INFLUENZA: ICD-10-CM

## 2021-10-08 DIAGNOSIS — I65.23 ATHEROSCLEROSIS OF BOTH CAROTID ARTERIES: Chronic | ICD-10-CM

## 2021-10-08 LAB
ANION GAP SERPL CALCULATED.3IONS-SCNC: 3 MMOL/L (ref 3–14)
BUN SERPL-MCNC: 14 MG/DL (ref 7–30)
CALCIUM SERPL-MCNC: 8.4 MG/DL (ref 8.5–10.1)
CHLORIDE BLD-SCNC: 108 MMOL/L (ref 94–109)
CHOLEST SERPL-MCNC: 150 MG/DL
CO2 SERPL-SCNC: 28 MMOL/L (ref 20–32)
CREAT SERPL-MCNC: 0.97 MG/DL (ref 0.66–1.25)
CREAT UR-MCNC: 92 MG/DL
FASTING STATUS PATIENT QL REPORTED: YES
GFR SERPL CREATININE-BSD FRML MDRD: 81 ML/MIN/1.73M2
GLUCOSE BLD-MCNC: 95 MG/DL (ref 70–99)
HBA1C MFR BLD: 6 % (ref 0–5.6)
HDLC SERPL-MCNC: 42 MG/DL
LDLC SERPL CALC-MCNC: 85 MG/DL
MICROALBUMIN UR-MCNC: 57 MG/L
MICROALBUMIN/CREAT UR: 61.96 MG/G CR (ref 0–17)
NONHDLC SERPL-MCNC: 108 MG/DL
POTASSIUM BLD-SCNC: 4.1 MMOL/L (ref 3.4–5.3)
SODIUM SERPL-SCNC: 139 MMOL/L (ref 133–144)
TRIGL SERPL-MCNC: 116 MG/DL

## 2021-10-08 PROCEDURE — 90662 IIV NO PRSV INCREASED AG IM: CPT | Performed by: NURSE PRACTITIONER

## 2021-10-08 PROCEDURE — 83036 HEMOGLOBIN GLYCOSYLATED A1C: CPT

## 2021-10-08 PROCEDURE — 82043 UR ALBUMIN QUANTITATIVE: CPT

## 2021-10-08 PROCEDURE — 91300 COVID-19,PF,PFIZER (12+ YRS): CPT | Performed by: NURSE PRACTITIONER

## 2021-10-08 PROCEDURE — 36415 COLL VENOUS BLD VENIPUNCTURE: CPT

## 2021-10-08 PROCEDURE — 0004A COVID-19,PF,PFIZER (12+ YRS): CPT | Performed by: NURSE PRACTITIONER

## 2021-10-08 PROCEDURE — 80061 LIPID PANEL: CPT

## 2021-10-08 PROCEDURE — 80048 BASIC METABOLIC PNL TOTAL CA: CPT

## 2021-10-08 PROCEDURE — 99214 OFFICE O/P EST MOD 30 MIN: CPT | Mod: 25 | Performed by: NURSE PRACTITIONER

## 2021-10-08 PROCEDURE — G0008 ADMIN INFLUENZA VIRUS VAC: HCPCS | Performed by: NURSE PRACTITIONER

## 2021-10-08 ASSESSMENT — MIFFLIN-ST. JEOR: SCORE: 1824.99

## 2021-10-08 NOTE — NURSING NOTE
Patient does not have his covid [employer] card with him today  Told patient we can fill it out next time he comes to clinic with today's date/dose.  gk

## 2021-10-08 NOTE — PATIENT INSTRUCTIONS
Schedule an appointment to follow up with Dr Adorno in vascular surgery clinic.    Schedule a stress test: [phone]      Follow up with me again for a diabetes check in 6 months    Continue to work on quitting smoking.

## 2021-10-08 NOTE — PROGRESS NOTES
Assessment & Plan     Type 2 diabetes mellitus without complication, without long-term current use of insulin (H)  Well-controlled with Metformin.  Recheck A1c in 6 months.    PAD (peripheral artery disease) (H)  Patient reports an increase in pain with walking 100 yards or more; pain resolves at rest.  He is overdue for follow-up with vascular surgery.  New referral given.  Medical therapies are optimized including high-dose statin, daily aspirin, good blood pressure control, and good diabetic control.  Strongly encouraged smoking cessation.  - Vascular Surgery Referral; Future    Atherosclerosis of both carotid arteries  Asymptomatic.  He is overdue for follow-up with vascular surgery.  New referral given.  Medical therapies are optimized including high-dose statin, daily aspirin, good blood pressure control, and good diabetic control.  Strongly encouraged smoking cessation.  - Vascular Surgery Referral; Future    Coronary artery disease involving native coronary artery of native heart without angina pectoris  Last stress test was in 2011.  High risk patient.  Recommend repeat stress test.  - NM Lexiscan stress test; Future    Hyperlipidemia LDL goal <100  Well controlled.    Hypertension goal BP (blood pressure) < 140/90  Well controlled.    High priority for 2019-nCoV vaccine    Need for prophylactic vaccination and inoculation against influenza          Return in about 6 months (around 4/8/2022) for Diabetes Recheck.    The risks, benefits and treatment options of prescribed medications or other treatments have been discussed with the patient. The patient verbalized their understanding and should call or follow up if no improvement or if they develop further problems.    SAL Kc CNP  [facility] JANE Mccord is a 66 year old who presents for the following health issues     History of Present Illness       Diabetes:   He presents for follow up of diabetes.   He is not checking blood glucose. He has no concerns regarding his diabetes at this time.  He is not experiencing numbness or burning in feet, excessive thirst, blurry vision, weight changes or redness, sores or blisters on feet.         Hyperlipidemia:  He presents for follow up of hyperlipidemia.  He is taking medication to lower cholesterol. He is not having (patient reports his legs hurt after he walks more than 100 yards.) myalgia or other side effects to statin medications.    Hypertension: He presents for follow up of hypertension.  He does not check blood pressure  regularly outside of the clinic.  He does not follow a low salt diet. (doesn't add salt to food)     Vascular Disease:  He presents for follow up of vascular disease.  He never takes nitroglycerin. He takes daily aspirin.    He eats 0-1 servings of fruits and vegetables daily.He consumes 1 sweetened beverage(s) daily.He exercises with enough effort to increase his heart rate 10 to 19 minutes per day.  He exercises with enough effort to increase his heart rate 3 or less days per week.   He is taking medications regularly.       Diabetes Follow-up      How often are you checking your blood sugar? Not at all    What concerns do you have today about your diabetes? None     Do you have any of these symptoms? (Select all that apply)  No numbness or tingling in feet.  No redness, sores or blisters on feet.  No complaints of excessive thirst.  No reports of blurry vision.  No significant changes to weight.                Hypertension Follow-up      Do you check your blood pressure regularly outside of the clinic? No     Are you following a low salt diet? No    Are your blood pressures ever more than 140 on the top number (systolic) OR more   than 90 on the bottom number (diastolic), for example 140/90?  Doesn't check    BP Readings from Last 2 Encounters:   10/08/21 138/82   08/18/21 (!) 146/69     Hemoglobin A1C (%)   Date Value   10/08/2021 6.0 (H)  "  07/20/2021 6.2 (H)   03/25/2021 6.4 (H)   06/04/2020 7.0 (H)     LDL Cholesterol Calculated (mg/dL)   Date Value   06/04/2020 86   06/11/2019 87       History of peripheral artery disease:  He last was evaluated with MARGARITA studies and vascular surgery consultation in 2019.  He was directed to follow-up in 1 year.  However, then the COVID-19 pandemic hit and he was lost to follow-up.  He reports that he has ongoing pain in his calves when he walks 100 yards or more.  The pain resolves at rest.    History of carotid atherosclerosis:  Last carotid ultrasound was also in 2019.  Vascular surgeon was following this as well.  He is asymptomatic.        Review of Systems   Constitutional, HEENT, cardiovascular, pulmonary, gi and gu systems are negative, except as otherwise noted.      Objective    /82 (BP Location: Right arm, Cuff Size: Adult Large)   Pulse 72   Temp 98.5  F (36.9  C) (Tympanic)   Ht 1.676 m (5' 6\")   Wt 110.2 kg (243 lb)   SpO2 96%   BMI 39.22 kg/m    Body mass index is 39.22 kg/m .  Physical Exam   GENERAL: healthy, alert and no distress  NECK: no adenopathy, no asymmetry, masses, or scars and thyroid normal to palpation  RESP: lungs clear to auscultation - no rales, rhonchi or wheezes  CV: regular rate and rhythm, normal S1 S2, no S3 or S4, no murmur, click or rub, no peripheral edema and peripheral pulses strong  ABDOMEN: soft, nontender, no hepatosplenomegaly, no masses and bowel sounds normal  MS: no gross musculoskeletal defects noted, no edema    Results for orders placed or performed in visit on 10/08/21 (from the past 24 hour(s))   Hemoglobin A1c   Result Value Ref Range    Hemoglobin A1C 6.0 (H) 0.0 - 5.6 %   Lipid panel reflex to direct LDL Fasting   Result Value Ref Range    Cholesterol 150 <200 mg/dL    Triglycerides 116 <150 mg/dL    Direct Measure HDL 42 >=40 mg/dL    LDL Cholesterol Calculated 85 <=100 mg/dL    Non HDL Cholesterol 108 <130 mg/dL    Patient Fasting > 8hrs? Yes  "    Narrative    Cholesterol  Desirable:  <200 mg/dL    Triglycerides  Normal:  Less than 150 mg/dL  Borderline High:  150-199 mg/dL  High:  200-499 mg/dL  Very High:  Greater than or equal to 500 mg/dL    Direct Measure HDL  Female:  Greater than or equal to 50 mg/dL   Male:  Greater than or equal to 40 mg/dL    LDL Cholesterol  Desirable:  <100mg/dL  Above Desirable:  100-129 mg/dL   Borderline High:  130-159 mg/dL   High:  160-189 mg/dL   Very High:  >= 190 mg/dL    Non HDL Cholesterol  Desirable:  130 mg/dL  Above Desirable:  130-159 mg/dL  Borderline High:  160-189 mg/dL  High:  190-219 mg/dL  Very High:  Greater than or equal to 220 mg/dL   Albumin Random Urine Quantitative with Creat Ratio   Result Value Ref Range    Creatinine Urine mg/dL 92 mg/dL    Albumin Urine mg/L 57 mg/L    Albumin Urine mg/g Cr 61.96 (H) 0.00 - 17.00 mg/g Cr   Basic metabolic panel  (Ca, Cl, CO2, Creat, Gluc, K, Na, BUN)   Result Value Ref Range    Sodium 139 133 - 144 mmol/L    Potassium 4.1 3.4 - 5.3 mmol/L    Chloride 108 94 - 109 mmol/L    Carbon Dioxide (CO2) 28 20 - 32 mmol/L    Anion Gap 3 3 - 14 mmol/L    Urea Nitrogen 14 7 - 30 mg/dL    Creatinine 0.97 0.66 - 1.25 mg/dL    Calcium 8.4 (L) 8.5 - 10.1 mg/dL    Glucose 95 70 - 99 mg/dL    GFR Estimate 81 >60 mL/min/1.73m2

## 2021-10-27 DIAGNOSIS — K29.00 ACUTE SUPERFICIAL GASTRITIS WITHOUT HEMORRHAGE: ICD-10-CM

## 2021-10-27 RX ORDER — OMEPRAZOLE 40 MG/1
40 CAPSULE, DELAYED RELEASE ORAL DAILY
Qty: 90 CAPSULE | Refills: 1 | Status: SHIPPED | OUTPATIENT
Start: 2021-10-27 | End: 2022-03-23

## 2021-11-10 DIAGNOSIS — I65.23 ATHEROSCLEROSIS OF BOTH CAROTID ARTERIES: Primary | ICD-10-CM

## 2021-11-10 DIAGNOSIS — I73.9 PAD (PERIPHERAL ARTERY DISEASE) (H): ICD-10-CM

## 2021-11-11 ENCOUNTER — MYC MEDICAL ADVICE (OUTPATIENT)
Dept: VASCULAR SURGERY | Facility: CLINIC | Age: 66
End: 2021-11-11
Payer: COMMERCIAL

## 2021-11-13 ENCOUNTER — HOSPITAL ENCOUNTER (OUTPATIENT)
Dept: ULTRASOUND IMAGING | Facility: CLINIC | Age: 66
Discharge: HOME | End: 2021-11-13
Attending: SURGERY | Admitting: SURGERY
Payer: COMMERCIAL

## 2021-11-13 DIAGNOSIS — I65.23 ATHEROSCLEROSIS OF BOTH CAROTID ARTERIES: ICD-10-CM

## 2021-11-13 PROCEDURE — 93880 EXTRACRANIAL BILAT STUDY: CPT

## 2021-11-16 ENCOUNTER — HOSPITAL ENCOUNTER (OUTPATIENT)
Dept: ULTRASOUND IMAGING | Facility: CLINIC | Age: 66
Discharge: HOME | End: 2021-11-16
Attending: SURGERY | Admitting: SURGERY
Payer: COMMERCIAL

## 2021-11-16 DIAGNOSIS — I73.9 PAD (PERIPHERAL ARTERY DISEASE) (H): ICD-10-CM

## 2021-11-16 PROCEDURE — 93922 UPR/L XTREMITY ART 2 LEVELS: CPT

## 2021-11-24 ENCOUNTER — VIRTUAL VISIT (OUTPATIENT)
Dept: VASCULAR SURGERY | Facility: CLINIC | Age: 66
End: 2021-11-24
Payer: COMMERCIAL

## 2021-11-24 DIAGNOSIS — I65.23 ATHEROSCLEROSIS OF BOTH CAROTID ARTERIES: Primary | ICD-10-CM

## 2021-11-24 DIAGNOSIS — I73.9 PAD (PERIPHERAL ARTERY DISEASE) (H): ICD-10-CM

## 2021-11-24 DIAGNOSIS — I73.9 INTERMITTENT CLAUDICATION (H): ICD-10-CM

## 2021-11-24 PROCEDURE — 99215 OFFICE O/P EST HI 40 MIN: CPT | Mod: 95 | Performed by: SURGERY

## 2021-11-24 NOTE — NURSING NOTE
Chief Complaint   Patient presents with     Follow Up     PAD and carotid stenosis follow-up.  US MARGARITA Doppler No Exercise and US Carotid Bilateral completed prior.        Medications and allergies reconciled.    Madisyn Flores LPN

## 2021-11-24 NOTE — PROGRESS NOTES
Vascular Surgery Consultation Note     Patient:  Roger Bonilla   Date of birth 1955, Medical record number [id number]  Date of Visit:  11/24/2021  Consult Requester:No att. providers found            Assessment and Recommendations:   ASSESSMENT / RECOMMENDATION:  66-year-old male with high-grade, greater than 80%, right internal carotid artery stenosis, asymptomatic at this time with chronic nondisabling bilateral lower extremity claudication.  He has had significant velocity increase in the right internal carotid artery since we last met 2 years ago.  His claudication seems relatively unchanged.  He has had some neck pain wake him at night, which is how his prior myocardial infarction, developed.  His nurse practitioner, Maria De Jesus, ordered a cardiac nuclear stress test back in October which she has yet to set up.  Have encouraged him to do this now.  Once we see the results of this we will discuss options for his carotid intervention.  He currently works at a [employer] and likely will be a candidate for a carotid endarterectomy.  While he is hospitalized for that we can evaluate his iliac arteries with duplex. I discussed the risks of right-sided carotid endarterectomy including but not limited to death, stroke, bleeding, MI and infection. We reviewed the benefits and alternatives including medical management and stenting. Mr. Bonilla was involved in all aspects of decision making and appears to understand. They would like to proceed with the recommended treatment of carotid endarterectomy.         Many thanks for involving me in the care of this very pleasant patient. Should any questions or concerns arise, please don't hesitate to contact me.    Warm Regards,    Karma Adorno MD, DFSVS, RPVI  Director, [facility] Vascular Services  Professor and Chief, Vascular and Endovascular Surgery  [facility]  [email or web address]          45 minutes spent on the date of the encounter doing chart review,  review of outside records, review of test results, interpretation of tests, patient visit, documentation and discussion with other provider(s)           HPI: Mr. Bonilla is following up today for asymptomatic carotid disease as well as stable peripheral arterial disease.  He is still working at a local [employer].  He has been doing well overall.  He states at times he has pain in his neck which wakes him up.  He reports that this was how his prior cardiac event evolved and necessitated coronary stents back in early 2000's.  His primary care nurse practitioner has ordered a stress study for him. He has not set this up yet.      Review of Systems   Constitutional, HEENT, cardiovascular, pulmonary, gi and gu systems are negative, except as otherwise noted.    Physical Exam   GENERAL: Healthy, alert and no distress  EYES: Eyes grossly normal to inspection.  No discharge or erythema, or obvious scleral/conjunctival abnormalities.  RESP: No audible wheeze, cough, or visible cyanosis.  No visible retractions or increased work of breathing.    SKIN: Visible skin clear. No significant rash, abnormal pigmentation or lesions.  NEURO: Cranial nerves grossly intact.  Mentation and speech appropriate for age.  PSYCH: Mentation appears normal, affect normal/bright, judgement and insight intact, normal speech and appearance well-groomed.      ABIs are 0.64 bilaterally.  Right internal carotid artery stenosis is greater than 80% with peak systolic velocities over 500 cm/s and end-diastolic velocities over 200 cm/s.      Flex is a 66 year old who is being evaluated via a billable telephone visit.      What phone number would you like to be contacted at? [phone]  How would you like to obtain your AVS? [employer]        Phone call duration: 30 minutes

## 2021-11-24 NOTE — LETTER
11/24/2021       RE: Roger Bonilla  [address]  [facility] [address]     Dear Colleague,    Thank you for referring your patient, Roger Bonilla, to the [facility] [place] at [facility]. Please see a copy of my visit note below.      Vascular Surgery Consultation Note     Patient:  Roger Bonilla   Date of birth 1955, Medical record number [id number]  Date of Visit:  11/24/2021  Consult Requester:No att. providers found            Assessment and Recommendations:   ASSESSMENT / RECOMMENDATION:  66-year-old male with high-grade, greater than 80%, right internal carotid artery stenosis, asymptomatic at this time with chronic nondisabling bilateral lower extremity claudication.  He has had significant velocity increase in the right internal carotid artery since we last met 2 years ago.  His claudication seems relatively unchanged.  He has had some neck pain wake him at night, which is how his prior myocardial infarction, developed.  His nurse practitioner, Maria De Jesus, ordered a cardiac nuclear stress test back in October which she has yet to set up.  Have encouraged him to do this now.  Once we see the results of this we will discuss options for his carotid intervention.  He currently works at a [email or web address] and likely will be a candidate for a carotid endarterectomy.  While he is hospitalized for that we can evaluate his iliac arteries with duplex. I discussed the risks of right-sided carotid endarterectomy including but not limited to death, stroke, bleeding, MI and infection. We reviewed the benefits and alternatives including medical management and stenting. Mr. Bonilla was involved in all aspects of decision making and appears to understand. They would like to proceed with the recommended treatment of carotid endarterectomy.         Many thanks for involving me in the care of this very pleasant patient. Should any questions or concerns arise, please  don't hesitate to contact me.    Warm Regards,    Karma Adorno MD, DFSVS, RPVI  Director, [facility] Vascular Services  Professor and Chief, Vascular and Endovascular Surgery  [facility]  [email or web address]          45 minutes spent on the date of the encounter doing chart review, review of outside records, review of test results, interpretation of tests, patient visit, documentation and discussion with other provider(s)           HPI: Mr. Bonilla is following up today for asymptomatic carotid disease as well as stable peripheral arterial disease.  He is still working at a local [employer].  He has been doing well overall.  He states at times he has pain in his neck which wakes him up.  He reports that this was how his prior cardiac event evolved and necessitated coronary stents back in early 2000's.  His primary care nurse practitioner has ordered a stress study for him. He has not set this up yet.      Review of Systems   Constitutional, HEENT, cardiovascular, pulmonary, gi and gu systems are negative, except as otherwise noted.    Physical Exam   GENERAL: Healthy, alert and no distress  EYES: Eyes grossly normal to inspection.  No discharge or erythema, or obvious scleral/conjunctival abnormalities.  RESP: No audible wheeze, cough, or visible cyanosis.  No visible retractions or increased work of breathing.    SKIN: Visible skin clear. No significant rash, abnormal pigmentation or lesions.  NEURO: Cranial nerves grossly intact.  Mentation and speech appropriate for age.  PSYCH: Mentation appears normal, affect normal/bright, judgement and insight intact, normal speech and appearance well-groomed.      ABIs are 0.64 bilaterally.  Right internal carotid artery stenosis is greater than 80% with peak systolic velocities over 500 cm/s and end-diastolic velocities over 200 cm/s.    Again, thank you for allowing me to participate in the care of your patient.      Sincerely,    Karma Adorno MD

## 2021-11-24 NOTE — PATIENT INSTRUCTIONS
Thank you so much for choosing us for your care. It was a pleasure to see you at the vascular clinic today.     Follow-up recommendations: We will plan on getting you scheduled for a left carotid endarterectomy once your cardiac stress test has been completed.    Additional testing/imaging ordered today: Proceed with previously planned cardiac stress test.    Pre-op Instructions    You will be contacted with the official time and date of surgery by our surgery scheduler. Surgery will be at the [facility] at [address] in [place].    Please do not eat 8 hours prior to your surgery. You may have clear liquids until your check in time. You should have a physical done by your PCP no more than 30 days prior to surgery. You will also be contacted by the COVID testing team to coordinate a COVID test 72-96 hours prior to surgery. Please shower the night before with a non scented antibacterial soap to reduce your risk of surgical site infection. Let us know if you are not feeling well the week of surgery as this would impact our decision to put you under general anesthesia.    You will receive a phone call 24 hours prior to your surgery from a pre-op nurse who will help answer any last minute questions and provide further instructions for your day of surgery.    If you are on anticoagulants (Eliquis, Plavix, Coumadin, etc), a nurse will be contacting you with instructions on holding these medications prior to surgery. If you take a daily baby aspirin, you will CONTINUE your aspirin, including on the day of your surgery.      Our scheduling team will get in touch with you to set up any follow-up testing/imaging and/or appointments. Please be aware that any testing/imaging recommended today will need to completed prior to your next visit with the provider. If testing/imaging is not completed prior to your next visit, your visit may be rescheduled.        If you have any  questions, please call Giselle Caal RN at [phone]. We also encourage the use of Next[employer] to communicate with your HealthCare Provider.      If you have an urgent need after business hours (8:00 am to 4:30 pm) please call [phone], option 4, and ask for the vascular attending on call. For non-urgent after hours needs, please call the vascular nurse at [phone] and leave a detailed voicemail. For scheduling needs, please call our scheduling line at [phone].    Patient Education     Having Carotid Endarterectomy  Endarterectomy is the removal of plaque from the carotid artery through a cut (incision) in the neck. This surgery has a low risk of stroke or complication (1% to 3%). It typically involves a quick recovery with little pain. You may be asleep under general anesthesia during surgery, or awake with local anesthesia to control pain. This will be discussed with you before surgery.   How the endarterectomy is done     A skin incision is made over the carotid artery.      A shunt helps keep blood flowing during the procedure.   1. Make the skin incision. The surgeon makes an incision in the skin over the carotid artery.  2. Open the artery. The surgeon places clamps on the artery above and below the blockage. This temporarily stops blood flow. The brain receives blood from the carotid artery on the other side of your neck. The surgeon then makes an incision in the artery itself.  3. Place shunt. A shunt may be used to preserve blood flow to the brain during the procedure. After the shunt is in place, the clamps are removed from the internal carotid artery. In some cases a shunt is not needed because the brain is receiving enough blood through other arteries (from the carotid artery on the other side of your neck).   4. Remove plaque. The surgeon loosens plaque from the artery wall. The plaque is then removed, often in a single piece. The surgeon inspects the artery to confirm that all of the  plaque has been removed.  5. Close the incision. The surgeon closes the incision using either sutures or a patch. The clamps are then removed. Next, the skin incision is sutured closed. A tube or drain may be put in place to keep fluids from collecting around the area.  The surgery usually takes around 2 hours, but may be longer depending on the anesthetic and your situation.   [employer] last reviewed this educational content on 12/1/2019 2000-2021 [employer]. All rights reserved. This information is not intended as a substitute for professional medical care. Always follow your healthcare professional's instructions.

## 2021-12-08 ENCOUNTER — HOSPITAL ENCOUNTER (OUTPATIENT)
Dept: NUCLEAR MEDICINE | Facility: CLINIC | Age: 66
Setting detail: NUCLEAR MEDICINE
End: 2021-12-08
Attending: NURSE PRACTITIONER
Payer: COMMERCIAL

## 2021-12-08 ENCOUNTER — HOSPITAL ENCOUNTER (OUTPATIENT)
Dept: CARDIOLOGY | Facility: CLINIC | Age: 66
End: 2021-12-08
Attending: NURSE PRACTITIONER
Payer: COMMERCIAL

## 2021-12-08 VITALS — WEIGHT: 243 LBS | BODY MASS INDEX: 39.05 KG/M2 | HEIGHT: 66 IN

## 2021-12-08 DIAGNOSIS — R94.39 ABNORMAL CARDIOVASCULAR STRESS TEST: Primary | ICD-10-CM

## 2021-12-08 DIAGNOSIS — I25.10 CORONARY ARTERY DISEASE INVOLVING NATIVE CORONARY ARTERY OF NATIVE HEART WITHOUT ANGINA PECTORIS: Chronic | ICD-10-CM

## 2021-12-08 LAB
CV STRESS MAX HR HE: 101
NUC STRESS EJECTION FRACTION: 38 %
RATE PRESSURE PRODUCT: NORMAL
STRESS ECHO BASELINE DIASTOLIC HE: 82
STRESS ECHO BASELINE HR: 68 BPM
STRESS ECHO BASELINE SYSTOLIC BP: 156
STRESS ECHO CALCULATED PERCENT HR: 66 %
STRESS ECHO LAST STRESS DIASTOLIC BP: 80
STRESS ECHO LAST STRESS SYSTOLIC BP: 160
STRESS ECHO TARGET HR: 154

## 2021-12-08 PROCEDURE — 93016 CV STRESS TEST SUPVJ ONLY: CPT | Performed by: INTERNAL MEDICINE

## 2021-12-08 PROCEDURE — 93018 CV STRESS TEST I&R ONLY: CPT | Performed by: INTERNAL MEDICINE

## 2021-12-08 PROCEDURE — 78452 HT MUSCLE IMAGE SPECT MULT: CPT | Mod: 26 | Performed by: INTERNAL MEDICINE

## 2021-12-08 PROCEDURE — 343N000001 HC RX 343: Performed by: NURSE PRACTITIONER

## 2021-12-08 PROCEDURE — 78452 HT MUSCLE IMAGE SPECT MULT: CPT

## 2021-12-08 PROCEDURE — 93017 CV STRESS TEST TRACING ONLY: CPT

## 2021-12-08 PROCEDURE — 250N000011 HC RX IP 250 OP 636: Performed by: NURSE PRACTITIONER

## 2021-12-08 PROCEDURE — A9502 TC99M TETROFOSMIN: HCPCS | Performed by: NURSE PRACTITIONER

## 2021-12-08 RX ORDER — REGADENOSON 0.08 MG/ML
0.4 INJECTION, SOLUTION INTRAVENOUS ONCE
Status: COMPLETED | OUTPATIENT
Start: 2021-12-08 | End: 2021-12-08

## 2021-12-08 RX ADMIN — REGADENOSON 0.4 MG: 0.08 INJECTION, SOLUTION INTRAVENOUS at 10:03

## 2021-12-08 RX ADMIN — TETROFOSMIN 33 MCI.: 1.38 INJECTION, POWDER, LYOPHILIZED, FOR SOLUTION INTRAVENOUS at 10:07

## 2021-12-08 RX ADMIN — TETROFOSMIN 10.5 MCI.: 1.38 INJECTION, POWDER, LYOPHILIZED, FOR SOLUTION INTRAVENOUS at 08:27

## 2021-12-08 ASSESSMENT — MIFFLIN-ST. JEOR: SCORE: 1824.99

## 2021-12-09 ENCOUNTER — OFFICE VISIT (OUTPATIENT)
Dept: CARDIOLOGY | Facility: CLINIC | Age: 66
End: 2021-12-09
Payer: COMMERCIAL

## 2021-12-09 VITALS
BODY MASS INDEX: 39.64 KG/M2 | WEIGHT: 245.6 LBS | HEART RATE: 70 BPM | DIASTOLIC BLOOD PRESSURE: 72 MMHG | OXYGEN SATURATION: 97 % | SYSTOLIC BLOOD PRESSURE: 146 MMHG

## 2021-12-09 DIAGNOSIS — I25.10 CORONARY ARTERY DISEASE INVOLVING NATIVE CORONARY ARTERY OF NATIVE HEART WITHOUT ANGINA PECTORIS: Primary | ICD-10-CM

## 2021-12-09 PROCEDURE — 99204 OFFICE O/P NEW MOD 45 MIN: CPT | Performed by: INTERNAL MEDICINE

## 2021-12-09 RX ORDER — ISOSORBIDE MONONITRATE 30 MG/1
30 TABLET, EXTENDED RELEASE ORAL DAILY
Qty: 90 TABLET | Refills: 3 | Status: ON HOLD | OUTPATIENT
Start: 2021-12-09 | End: 2022-03-30

## 2021-12-09 NOTE — LETTER
12/9/2021    Maria De Jesus Hay, SAL CNP  [address] [address]    RE: Roger ROSETTA Bonilla       Dear Colleague,     I had the pleasure of seeing Roger Bonilla in the [facility].  HPI and Plan:   Today I had the pleasure of seeing Roger Bonilla at [facility]. He is a pleasant 66 year old patient with a past medical history of coronary disease status post stenting in 2001 and possibly again in 2013, peripheral vascular disease, hyperlipidemia and hypertension who presents to the clinic for preoperative cardiovascular stratification before undergoing right carotid endarterectomy.    The patient was initially seen in 2001 after an inferior myocardial infarction.  This was treated with 2 stents in the RCA.  OM also had a significant lesion and was also stented with 1 drug-eluting stent.  LAD was noted to have moderate disease and was not intervened on.  He was then seen by my colleague Lucy Dawson in 2011 and underwent a stress test which showed old infarct in the inferior wall with mild saravanan-infarct ischemia.  Ejection fraction was 50%.  This was managed conservatively.  He also tells me that he had stenting on one of his lower extremities but I cannot find the details of the same.  Most recently, the patient has been diagnosed with severe right ICA stenosis for which endarterectomy is being planned.  In preparation for this a nuclear stress test was ordered which showed moderate area of infarction along with moderate area of ischemia.  Ejection fraction was 40%.  He is hence referred to cardiology for further evaluation and preoperative clearance.    The patient works in a [employer] factory and is able to do moderate to heavy exertion without much difficulty.  This involves lifting and moving 40 pound bags and stacking them together.  He does not do much walking at his work but believes he can walk a block easily without any difficulty.  He however does have peripheral  vascular disease in his lower extremities which cause claudication.  The symptoms have been stable and he is being followed again by vascular surgery for this.  The patient also reports occasions of chest pain which are mild in severity and last a few minutes and resolve spontaneously.  These occur mostly at extreme exertion and happen once a month.  This has never bothered him and he has not sought medical attention for this.    I spent 1 hr reviewing past tests, echos, caths, blood work and clinic notes.    Assessment and plan:   1.  Preoperative cardiovascular stratification before right carotid endarterectomy  2.  Coronary disease s/p PCI of RCA and OM in 2001  3.  Peripheral vascular disease in the form of right internal carotid stenosis and lower extremity disease [moderate]  4.  Hypertension  5.  Hyperlipidemia  6.  Obesity    I reviewed the results of the recently performed nuclear stress test which shows fixed inferior defect along with moderate saravanan-infarct ischemia.  Because the stents were put in over 20 years ago it is quite possible that he has developed some ISR.  However, the fact that he is able to do moderate exertion at his work without much difficulty is very reassuring.  He is definitely > 4 Mets.  I believe overall he is minimally symptomatic if not completely asymptomatic from coronary disease standpoint. He does report angina occasionally on moderate exertion and taken together with positive stress test and h/o prior MI I believe that performing an angiogram to further risk stratify him is not unreasonable. Since he is undergoing vascular we likely wont have to stop DAPT in case PCI is performed. For now, I will continue all his medication and add low dose imdur for HTN. I have discussed the risks and benefits or LHC with the pt and answered all his questions. He provided verbal consent to proceed. He will be an appropriate candidate for DAPT if PCI is performed.      Thank you for allowing  me to participate in the care of Roger Bonilla    This note was completed in part using Dragon voice recognition software. Although reviewed after completion, some word and grammatical errors may occur.    Parveen Ramirez MD  Cardiology    Orders Placed This Encounter   Procedures     Echocardiogram Complete       Orders Placed This Encounter   Medications     isosorbide mononitrate (IMDUR) 30 MG 24 hr tablet     Sig: Take 1 tablet (30 mg) by mouth daily     Dispense:  90 tablet     Refill:  3       There are no discontinued medications.    Encounter Diagnosis   Name Primary?     Coronary artery disease involving native coronary artery of native heart without angina pectoris Yes       CURRENT MEDICATIONS:  Current Outpatient Medications   Medication Sig Dispense Refill     aspirin 81 MG EC tablet Take 1 tablet (81 mg) by mouth daily 90 tablet 3     atorvastatin (LIPITOR) 80 MG tablet TAKE 1 TABLET (80 MG) BY MOUTH DAILY (Patient taking differently: Take 80 mg by mouth daily TAKE 1 TABLET (80 MG) BY MOUTH DAILY) 90 tablet 3     isosorbide mononitrate (IMDUR) 30 MG 24 hr tablet Take 1 tablet (30 mg) by mouth daily 90 tablet 3     lisinopril (ZESTRIL) 40 MG tablet Take 1 tablet (40 mg) by mouth daily 90 tablet 3     metFORMIN (GLUCOPHAGE) 1000 MG tablet TAKE ONE TABLET BY MOUTH ONE TIME DAILY (Patient taking differently: Take 1,000 mg by mouth daily (with breakfast) TAKE ONE TABLET BY MOUTH ONE TIME DAILY) 90 tablet 3     metoprolol succinate ER (TOPROL-XL) 50 MG 24 hr tablet TAKE 1 TABLET (50 MG) BY MOUTH DAILY (Patient taking differently: Take 50 mg by mouth daily TAKE 1 TABLET (50 MG) BY MOUTH DAILY) 90 tablet 3     omeprazole (PRILOSEC) 40 MG DR capsule Take 1 capsule (40 mg) by mouth daily 90 capsule 1       ALLERGIES     Allergies   Allergen Reactions     Nkda [No Known Drug Allergies]        PAST MEDICAL HISTORY:  Past Medical History:   Diagnosis Date     Coronary artery disease may 1 2005    2 stents put in  heart     Pure hypercholesterolemia      Type II or unspecified type diabetes mellitus without mention of complication, not stated as uncontrolled      Unspecified cardiovascular disease 5-2001    MI -- Angioplasty two stents RCA & OM2     Unspecified essential hypertension        PAST SURGICAL HISTORY:  Past Surgical History:   Procedure Laterality Date     CARDIAC SURGERY  may 1, 2005     COLONOSCOPY  11/30/2011    Procedure:COLONOSCOPY; Screening Colonoscopy; Surgeon:LUTHER FLORES; Location:[facility]     ESOPHAGOSCOPY, GASTROSCOPY, DUODENOSCOPY (EGD), COMBINED N/A 7/21/2021    Procedure: ESOPHAGOGASTRODUODENOSCOPY, WITH BIOPSY;  Surgeon: Jeff Cunningham DO;  Location: [facility]     PHACOEMULSIFICATION WITH STANDARD INTRAOCULAR LENS IMPLANT Right 7/28/2021    Procedure: Right eye Cataract Extraction with Implant;  Surgeon: Reyes Valdez MD;  Location: [facility]     PHACOEMULSIFICATION WITH STANDARD INTRAOCULAR LENS IMPLANT Left 8/18/2021    Procedure: left eye Cataract Extraction with Implant;  Surgeon: Reyes Valdez MD;  Location: [facility]     SURGICAL HISTORY OF -       None     VASCULAR SURGERY  oct 2013    stent put in leg       FAMILY HISTORY:  Family History   Problem Relation Age of Onset     Cerebrovascular Disease Maternal Grandmother      Arthritis Other         hip fracture     Respiratory Father         emphysema     Alzheimer Disease Maternal Grandfather      Breast Cancer Sister      Cancer - colorectal No family hx of      Prostate Cancer No family hx of        SOCIAL HISTORY:  Social History     Socioeconomic History     Marital status:      Spouse name: None     Number of children: None     Years of education: None     Highest education level: None   Occupational History     None   Tobacco Use     Smoking status: Current Every Day Smoker     Packs/day: 0.50     Years: 50.00     Pack years: 25.00     Types: Cigarettes     Start date: 1/1/1972     Smokeless tobacco:  Never Used   Substance and Sexual Activity     Alcohol use: Not Currently     Comment: 10 beers a week      Drug use: No     Sexual activity: Yes     Partners: Female     Birth control/protection: Condom   Other Topics Concern     Parent/sibling w/ CABG, MI or angioplasty before 65F 55M? No   Social History Narrative     None     Social Determinants of Health     Financial Resource Strain: Not on file   Food Insecurity: Not on file   Transportation Needs: Not on file   Physical Activity: Not on file   Stress: Not on file   Social Connections: Not on file   Intimate Partner Violence: Not on file   Housing Stability: Not on file       Review of Systems:  Skin:  Positive for bruising     Eyes:  Positive for glasses    ENT:  Negative      Respiratory:  Positive for sleep apnea     Cardiovascular:    fatigue;exercise intolerance;palpitations;Positive for    Gastroenterology: Negative      Genitourinary:  Positive for decreased urinary stream    Musculoskeletal:  Positive for   leg cramping with activity  Neurologic:  Positive for memory problems;numbness or tingling of feet;tremors    Psychiatric:  Negative      Heme/Lymph/Imm:  Negative      Endocrine:  Positive for diabetes      Physical Exam:  Vitals: BP (!) 146/72 (BP Location: Left arm, Patient Position: Sitting, Cuff Size: Adult Large)   Pulse 70   Wt 111.4 kg (245 lb 9.6 oz)   SpO2 97%   BMI 39.64 kg/m    Eyes: No icterus.  Pulmonary: Chest symmetric, lungs clear bilaterally and no crackles, wheezes or rales.  Cardiovascular: RRR with normal S1 and S2, no murmur, JVP normal.  Musculoskeletal: Edema of the lower extremities: None.  Neurologic: Oriented and appropriate without obvious focal deficits.   Psychiatric: Normal affect.     Recent Lab Results:  LIPID RESULTS:  Lab Results   Component Value Date    CHOL 150 10/08/2021    CHOL 139 06/04/2020    HDL 42 10/08/2021    HDL 36 (L) 06/04/2020    LDL 85 10/08/2021    LDL 86 06/04/2020    TRIG 116 10/08/2021     TRIG 83 06/04/2020    CHOLHDLRATIO 3.0 07/24/2015       LIVER ENZYME RESULTS:  Lab Results   Component Value Date    AST 14 07/20/2021    AST 26 06/23/2021    ALT 21 07/20/2021    ALT 40 06/23/2021       CBC RESULTS:  Lab Results   Component Value Date    WBC 9.6 07/21/2021    WBC 12.4 (H) 07/01/2021    RBC 3.88 (L) 07/21/2021    RBC 4.36 (L) 07/01/2021    HGB 12.4 (L) 07/21/2021    HGB 14.1 07/01/2021    HCT 38.0 (L) 07/21/2021    HCT 42.9 07/01/2021    MCV 98 07/21/2021    MCV 98 07/01/2021    MCH 32.0 07/21/2021    MCH 32.3 07/01/2021    MCHC 32.6 07/21/2021    MCHC 32.9 07/01/2021    RDW 13.3 07/21/2021    RDW 13.7 07/01/2021     07/21/2021     07/01/2021       BMP RESULTS:  Lab Results   Component Value Date     10/08/2021     07/01/2021    POTASSIUM 4.1 10/08/2021    POTASSIUM 4.6 07/01/2021    CHLORIDE 108 10/08/2021    CHLORIDE 105 07/01/2021    CO2 28 10/08/2021    CO2 27 07/01/2021    ANIONGAP 3 10/08/2021    ANIONGAP 6 07/01/2021    GLC 95 10/08/2021    GLC 85 07/01/2021    BUN 14 10/08/2021    BUN 13 07/01/2021    CR 0.97 10/08/2021    CR 1.09 07/01/2021    GFRESTIMATED 81 10/08/2021    GFRESTIMATED 70 07/01/2021    GFRESTBLACK 82 07/01/2021    SUE 8.4 (L) 10/08/2021    SUE 9.3 07/01/2021        A1C RESULTS:  Lab Results   Component Value Date    A1C 6.0 (H) 10/08/2021    A1C 6.4 (H) 03/25/2021       INR RESULTS:  Lab Results   Component Value Date    INR 0.94 09/13/2013    INR 0.94 10/27/2008       All medical records were reviewed in detail and discussed with the patient. Greater than 30 mins were spent with the patient, 50% of this time was spent on counseling and coordination of care.  After visit summary was printed and given to the patient.      Parveen Ramirez MD   [facility]

## 2021-12-09 NOTE — NURSING NOTE
"Initial BP (!) 146/72 (BP Location: Left arm, Patient Position: Sitting, Cuff Size: Adult Large)   Pulse 70   Wt 111.4 kg (245 lb 9.6 oz)   SpO2 97%   BMI 39.64 kg/m   Estimated body mass index is 39.64 kg/m  as calculated from the following:    Height as of 12/8/21: 1.676 m (5' 6\").    Weight as of this encounter: 111.4 kg (245 lb 9.6 oz).  Patient is accompanied to visit by: here alone  Assistive equipment used in clinic today: anson OCAMPO      "

## 2021-12-10 ENCOUNTER — HOSPITAL ENCOUNTER (OUTPATIENT)
Dept: CARDIOLOGY | Facility: CLINIC | Age: 66
Discharge: HOME | End: 2021-12-10
Attending: INTERNAL MEDICINE | Admitting: INTERNAL MEDICINE
Payer: COMMERCIAL

## 2021-12-10 DIAGNOSIS — I25.10 CORONARY ARTERY DISEASE INVOLVING NATIVE CORONARY ARTERY OF NATIVE HEART WITHOUT ANGINA PECTORIS: ICD-10-CM

## 2021-12-10 LAB — LVEF ECHO: NORMAL

## 2021-12-10 PROCEDURE — 93306 TTE W/DOPPLER COMPLETE: CPT | Mod: 26 | Performed by: INTERNAL MEDICINE

## 2021-12-10 PROCEDURE — 93306 TTE W/DOPPLER COMPLETE: CPT

## 2021-12-10 NOTE — PROGRESS NOTES
HPI and Plan:   Today I had the pleasure of seeing Roger Bonilla at [facility]. He is a pleasant 66 year old patient with a past medical history of coronary disease status post stenting in 2001 and possibly again in 2013, peripheral vascular disease, hyperlipidemia and hypertension who presents to the clinic for preoperative cardiovascular stratification before undergoing right carotid endarterectomy.    The patient was initially seen in 2001 after an inferior myocardial infarction.  This was treated with 2 stents in the RCA.  OM also had a significant lesion and was also stented with 1 drug-eluting stent.  LAD was noted to have moderate disease and was not intervened on.  He was then seen by my colleague Lucy Dawson in 2011 and underwent a stress test which showed old infarct in the inferior wall with mild saravanan-infarct ischemia.  Ejection fraction was 50%.  This was managed conservatively.  He also tells me that he had stenting on one of his lower extremities but I cannot find the details of the same.  Most recently, the patient has been diagnosed with severe right ICA stenosis for which endarterectomy is being planned.  In preparation for this a nuclear stress test was ordered which showed moderate area of infarction along with moderate area of ischemia.  Ejection fraction was 40%.  He is hence referred to cardiology for further evaluation and preoperative clearance.    The patient works in a [employer] and is able to do moderate to heavy exertion without much difficulty.  This involves lifting and moving 40 pound bags and stacking them together.  He does not do much walking at his work but believes he can walk a block easily without any difficulty.  He however does have peripheral vascular disease in his lower extremities which cause claudication.  The symptoms have been stable and he is being followed again by vascular surgery for this.  The patient also reports occasions of chest  pain which are mild in severity and last a few minutes and resolve spontaneously.  These occur mostly at extreme exertion and happen once a month.  This has never bothered him and he has not sought medical attention for this.    I spent 1 hr reviewing past tests, echos, caths, blood work and clinic notes.    Assessment and plan:   1.  Preoperative cardiovascular stratification before right carotid endarterectomy  2.  Coronary disease s/p PCI of RCA and OM in 2001  3.  Peripheral vascular disease in the form of right internal carotid stenosis and lower extremity disease [moderate]  4.  Hypertension  5.  Hyperlipidemia  6.  Obesity    I reviewed the results of the recently performed nuclear stress test which shows fixed inferior defect along with moderate saravanan-infarct ischemia.  Because the stents were put in over 20 years ago it is quite possible that he has developed some ISR.  However, the fact that he is able to do moderate exertion at his work without much difficulty is very reassuring.  He is definitely > 4 Mets.  I believe overall he is minimally symptomatic if not completely asymptomatic from coronary disease standpoint. He does report angina occasionally on moderate exertion and taken together with positive stress test and h/o prior MI I believe that performing an angiogram to further risk stratify him is not unreasonable. Since he is undergoing vascular we likely wont have to stop DAPT in case PCI is performed. For now, I will continue all his medication and add low dose imdur for HTN. I have discussed the risks and benefits or LHC with the pt and answered all his questions. He provided verbal consent to proceed. He will be an appropriate candidate for DAPT if PCI is performed.      Thank you for allowing me to participate in the care of Roger Bonilla    This note was completed in part using Dragon voice recognition software. Although reviewed after completion, some word and grammatical errors may  occur.    Parveen Ramirez MD  Cardiology    Orders Placed This Encounter   Procedures     Echocardiogram Complete       Orders Placed This Encounter   Medications     isosorbide mononitrate (IMDUR) 30 MG 24 hr tablet     Sig: Take 1 tablet (30 mg) by mouth daily     Dispense:  90 tablet     Refill:  3       There are no discontinued medications.    Encounter Diagnosis   Name Primary?     Coronary artery disease involving native coronary artery of native heart without angina pectoris Yes       CURRENT MEDICATIONS:  Current Outpatient Medications   Medication Sig Dispense Refill     aspirin 81 MG EC tablet Take 1 tablet (81 mg) by mouth daily 90 tablet 3     atorvastatin (LIPITOR) 80 MG tablet TAKE 1 TABLET (80 MG) BY MOUTH DAILY (Patient taking differently: Take 80 mg by mouth daily TAKE 1 TABLET (80 MG) BY MOUTH DAILY) 90 tablet 3     isosorbide mononitrate (IMDUR) 30 MG 24 hr tablet Take 1 tablet (30 mg) by mouth daily 90 tablet 3     lisinopril (ZESTRIL) 40 MG tablet Take 1 tablet (40 mg) by mouth daily 90 tablet 3     metFORMIN (GLUCOPHAGE) 1000 MG tablet TAKE ONE TABLET BY MOUTH ONE TIME DAILY (Patient taking differently: Take 1,000 mg by mouth daily (with breakfast) TAKE ONE TABLET BY MOUTH ONE TIME DAILY) 90 tablet 3     metoprolol succinate ER (TOPROL-XL) 50 MG 24 hr tablet TAKE 1 TABLET (50 MG) BY MOUTH DAILY (Patient taking differently: Take 50 mg by mouth daily TAKE 1 TABLET (50 MG) BY MOUTH DAILY) 90 tablet 3     omeprazole (PRILOSEC) 40 MG DR capsule Take 1 capsule (40 mg) by mouth daily 90 capsule 1       ALLERGIES     Allergies   Allergen Reactions     Nkda [No Known Drug Allergies]        PAST MEDICAL HISTORY:  Past Medical History:   Diagnosis Date     Coronary artery disease may 1 2005    2 stents put in heart     Pure hypercholesterolemia      Type II or unspecified type diabetes mellitus without mention of complication, not stated as uncontrolled      Unspecified cardiovascular disease 5-2001    MI  -- Angioplasty two stents RCA & OM2     Unspecified essential hypertension        PAST SURGICAL HISTORY:  Past Surgical History:   Procedure Laterality Date     CARDIAC SURGERY  may 1, 2005     COLONOSCOPY  11/30/2011    Procedure:COLONOSCOPY; Screening Colonoscopy; Surgeon:LUTHER FLORES; Location:[facility]     ESOPHAGOSCOPY, GASTROSCOPY, DUODENOSCOPY (EGD), COMBINED N/A 7/21/2021    Procedure: ESOPHAGOGASTRODUODENOSCOPY, WITH BIOPSY;  Surgeon: Jeff Cunningham DO;  Location: [facility]     PHACOEMULSIFICATION WITH STANDARD INTRAOCULAR LENS IMPLANT Right 7/28/2021    Procedure: Right eye Cataract Extraction with Implant;  Surgeon: Reyes Valdez MD;  Location: [facility]     PHACOEMULSIFICATION WITH STANDARD INTRAOCULAR LENS IMPLANT Left 8/18/2021    Procedure: left eye Cataract Extraction with Implant;  Surgeon: Reyes Valdez MD;  Location: [facility]     SURGICAL HISTORY OF -       None     VASCULAR SURGERY  oct 2013    stent put in leg       FAMILY HISTORY:  Family History   Problem Relation Age of Onset     Cerebrovascular Disease Maternal Grandmother      Arthritis Other         hip fracture     Respiratory Father         emphysema     Alzheimer Disease Maternal Grandfather      Breast Cancer Sister      Cancer - colorectal No family hx of      Prostate Cancer No family hx of        SOCIAL HISTORY:  Social History     Socioeconomic History     Marital status:      Spouse name: None     Number of children: None     Years of education: None     Highest education level: None   Occupational History     None   Tobacco Use     Smoking status: Current Every Day Smoker     Packs/day: 0.50     Years: 50.00     Pack years: 25.00     Types: Cigarettes     Start date: 1/1/1972     Smokeless tobacco: Never Used   Substance and Sexual Activity     Alcohol use: Not Currently     Comment: 10 beers a week      Drug use: No     Sexual activity: Yes     Partners: Female     Birth control/protection:  Condom   Other Topics Concern     Parent/sibling w/ CABG, MI or angioplasty before 65F 55M? No   Social History Narrative     None     Social Determinants of Health     Financial Resource Strain: Not on file   Food Insecurity: Not on file   Transportation Needs: Not on file   Physical Activity: Not on file   Stress: Not on file   Social Connections: Not on file   Intimate Partner Violence: Not on file   Housing Stability: Not on file       Review of Systems:  Skin:  Positive for bruising     Eyes:  Positive for glasses    ENT:  Negative      Respiratory:  Positive for sleep apnea     Cardiovascular:    fatigue;exercise intolerance;palpitations;Positive for    Gastroenterology: Negative      Genitourinary:  Positive for decreased urinary stream    Musculoskeletal:  Positive for   leg cramping with activity  Neurologic:  Positive for memory problems;numbness or tingling of feet;tremors    Psychiatric:  Negative      Heme/Lymph/Imm:  Negative      Endocrine:  Positive for diabetes      Physical Exam:  Vitals: BP (!) 146/72 (BP Location: Left arm, Patient Position: Sitting, Cuff Size: Adult Large)   Pulse 70   Wt 111.4 kg (245 lb 9.6 oz)   SpO2 97%   BMI 39.64 kg/m    Eyes: No icterus.  Pulmonary: Chest symmetric, lungs clear bilaterally and no crackles, wheezes or rales.  Cardiovascular: RRR with normal S1 and S2, no murmur, JVP normal.  Musculoskeletal: Edema of the lower extremities: None.  Neurologic: Oriented and appropriate without obvious focal deficits.   Psychiatric: Normal affect.     Recent Lab Results:  LIPID RESULTS:  Lab Results   Component Value Date    CHOL 150 10/08/2021    CHOL 139 06/04/2020    HDL 42 10/08/2021    HDL 36 (L) 06/04/2020    LDL 85 10/08/2021    LDL 86 06/04/2020    TRIG 116 10/08/2021    TRIG 83 06/04/2020    CHOLHDLRATIO 3.0 07/24/2015       LIVER ENZYME RESULTS:  Lab Results   Component Value Date    AST 14 07/20/2021    AST 26 06/23/2021    ALT 21 07/20/2021    ALT 40 06/23/2021        CBC RESULTS:  Lab Results   Component Value Date    WBC 9.6 07/21/2021    WBC 12.4 (H) 07/01/2021    RBC 3.88 (L) 07/21/2021    RBC 4.36 (L) 07/01/2021    HGB 12.4 (L) 07/21/2021    HGB 14.1 07/01/2021    HCT 38.0 (L) 07/21/2021    HCT 42.9 07/01/2021    MCV 98 07/21/2021    MCV 98 07/01/2021    MCH 32.0 07/21/2021    MCH 32.3 07/01/2021    MCHC 32.6 07/21/2021    MCHC 32.9 07/01/2021    RDW 13.3 07/21/2021    RDW 13.7 07/01/2021     07/21/2021     07/01/2021       BMP RESULTS:  Lab Results   Component Value Date     10/08/2021     07/01/2021    POTASSIUM 4.1 10/08/2021    POTASSIUM 4.6 07/01/2021    CHLORIDE 108 10/08/2021    CHLORIDE 105 07/01/2021    CO2 28 10/08/2021    CO2 27 07/01/2021    ANIONGAP 3 10/08/2021    ANIONGAP 6 07/01/2021    GLC 95 10/08/2021    GLC 85 07/01/2021    BUN 14 10/08/2021    BUN 13 07/01/2021    CR 0.97 10/08/2021    CR 1.09 07/01/2021    GFRESTIMATED 81 10/08/2021    GFRESTIMATED 70 07/01/2021    GFRESTBLACK 82 07/01/2021    SUE 8.4 (L) 10/08/2021    SUE 9.3 07/01/2021        A1C RESULTS:  Lab Results   Component Value Date    A1C 6.0 (H) 10/08/2021    A1C 6.4 (H) 03/25/2021       INR RESULTS:  Lab Results   Component Value Date    INR 0.94 09/13/2013    INR 0.94 10/27/2008       CC  No referring provider defined for this encounter.    All medical records were reviewed in detail and discussed with the patient. Greater than 30 mins were spent with the patient, 50% of this time was spent on counseling and coordination of care.  After visit summary was printed and given to the patient.

## 2021-12-13 ENCOUNTER — TELEPHONE (OUTPATIENT)
Dept: CARDIOLOGY | Facility: CLINIC | Age: 66
End: 2021-12-13
Payer: COMMERCIAL

## 2021-12-13 DIAGNOSIS — I25.10 CORONARY ARTERY DISEASE INVOLVING NATIVE CORONARY ARTERY OF NATIVE HEART WITHOUT ANGINA PECTORIS: Primary | ICD-10-CM

## 2021-12-13 NOTE — TELEPHONE ENCOUNTER
Spoke with patient regarding recommendations per Dr. Ramirez:    I saw this pt at [facility] yesterday and told him that I will call him with the final plan after looking at his cath films from 2021 and reviewing the Stress images on the nuclear station.  I also talked to other people and the consensus is to perform LHC.    Coronary Angiogram scheduled for 12/29/21.  COVID test scheduled for 12/27/21 with follow up on 1/12/22.  Patient called with dates and times.  RN will follow up with patient closer to procedure to review pre-procedure prep/instructions.    Sridevi Radford RN on 12/13/2021 at 11:16 AM

## 2021-12-27 ENCOUNTER — TELEPHONE (OUTPATIENT)
Dept: CARDIOLOGY | Facility: CLINIC | Age: 66
End: 2021-12-27

## 2021-12-27 ENCOUNTER — LAB (OUTPATIENT)
Dept: LAB | Facility: CLINIC | Age: 66
End: 2021-12-27
Payer: COMMERCIAL

## 2021-12-27 DIAGNOSIS — I25.10 CORONARY ARTERY DISEASE INVOLVING NATIVE CORONARY ARTERY OF NATIVE HEART WITHOUT ANGINA PECTORIS: Primary | ICD-10-CM

## 2021-12-27 DIAGNOSIS — I25.10 CORONARY ARTERY DISEASE INVOLVING NATIVE CORONARY ARTERY OF NATIVE HEART WITHOUT ANGINA PECTORIS: ICD-10-CM

## 2021-12-27 LAB — SARS-COV-2 RNA RESP QL NAA+PROBE: NEGATIVE

## 2021-12-27 PROCEDURE — U0003 INFECTIOUS AGENT DETECTION BY NUCLEIC ACID (DNA OR RNA); SEVERE ACUTE RESPIRATORY SYNDROME CORONAVIRUS 2 (SARS-COV-2) (CORONAVIRUS DISEASE [COVID-19]), AMPLIFIED PROBE TECHNIQUE, MAKING USE OF HIGH THROUGHPUT TECHNOLOGIES AS DESCRIBED BY CMS-2020-01-R: HCPCS | Performed by: INTERNAL MEDICINE

## 2021-12-27 PROCEDURE — U0005 INFEC AGEN DETEC AMPLI PROBE: HCPCS | Performed by: INTERNAL MEDICINE

## 2021-12-27 RX ORDER — LIDOCAINE 40 MG/G
CREAM TOPICAL
Status: CANCELLED | OUTPATIENT
Start: 2021-12-27

## 2021-12-27 RX ORDER — SODIUM CHLORIDE 9 MG/ML
INJECTION, SOLUTION INTRAVENOUS CONTINUOUS
Status: CANCELLED | OUTPATIENT
Start: 2021-12-27

## 2021-12-27 NOTE — TELEPHONE ENCOUNTER
HEART CATHETERIZATION PREP INSTRUCTIONS    Procedure is scheduled for 12/29/21. They should check in at the welcome desk of [facility], at 0630. The procedure will start at 0830  Patient to be NPO after midnight.  Patient is NOT on insulin, DOES take Metformin which he is holding for 2 days before procedure. Lab appt scheduled for 12/31/21 to check BMP, patient to hold until results reviewed.  Patient is NOT on an anticoagulant.   Instructed patient to take 325 mg ASA pm before and am of cath (4 baby ASA tabs x2).   Patient can take his/her other meds prior to procedure with small sips of water.  Patient DENIES any contrast allergy  Patient will need someone available to drive them to the hospital and to drive them home.  Patient should have someone available to stay with them for at least 24 hours after the procedure.   COVID-19 test scheduled for 12/27/21.   Nursing orders signed and held? YES    Patient has verbalized understanding of instructions, no further questions at this time.  Sridevi Radford RN on 12/27/2021 at 2:19 PM

## 2021-12-29 ENCOUNTER — HOSPITAL ENCOUNTER (OUTPATIENT)
Facility: CLINIC | Age: 66
Discharge: HOME | End: 2021-12-29
Admitting: INTERNAL MEDICINE
Payer: COMMERCIAL

## 2021-12-29 VITALS
DIASTOLIC BLOOD PRESSURE: 66 MMHG | HEIGHT: 66 IN | OXYGEN SATURATION: 99 % | WEIGHT: 249 LBS | RESPIRATION RATE: 16 BRPM | HEART RATE: 63 BPM | SYSTOLIC BLOOD PRESSURE: 149 MMHG | BODY MASS INDEX: 40.02 KG/M2 | TEMPERATURE: 97.8 F

## 2021-12-29 DIAGNOSIS — I25.10 CORONARY ARTERY DISEASE INVOLVING NATIVE CORONARY ARTERY OF NATIVE HEART WITHOUT ANGINA PECTORIS: ICD-10-CM

## 2021-12-29 PROBLEM — Z98.890 STATUS POST CORONARY ANGIOGRAM: Status: ACTIVE | Noted: 2021-12-29

## 2021-12-29 LAB
ANION GAP SERPL CALCULATED.3IONS-SCNC: 5 MMOL/L (ref 3–14)
APTT PPP: 30 SECONDS (ref 22–38)
BUN SERPL-MCNC: 21 MG/DL (ref 7–30)
CALCIUM SERPL-MCNC: 8.9 MG/DL (ref 8.5–10.1)
CHLORIDE BLD-SCNC: 106 MMOL/L (ref 94–109)
CO2 SERPL-SCNC: 28 MMOL/L (ref 20–32)
CREAT SERPL-MCNC: 0.9 MG/DL (ref 0.66–1.25)
ERYTHROCYTE [DISTWIDTH] IN BLOOD BY AUTOMATED COUNT: 13.6 % (ref 10–15)
GFR SERPL CREATININE-BSD FRML MDRD: >90 ML/MIN/1.73M2
GLUCOSE BLD-MCNC: 135 MG/DL (ref 70–99)
HCT VFR BLD AUTO: 40.3 % (ref 40–53)
HGB BLD-MCNC: 13 G/DL (ref 13.3–17.7)
INR PPP: 0.98 (ref 0.85–1.15)
MCH RBC QN AUTO: 29.9 PG (ref 26.5–33)
MCHC RBC AUTO-ENTMCNC: 32.3 G/DL (ref 31.5–36.5)
MCV RBC AUTO: 93 FL (ref 78–100)
PLATELET # BLD AUTO: 248 10E3/UL (ref 150–450)
POTASSIUM BLD-SCNC: 3.9 MMOL/L (ref 3.4–5.3)
RBC # BLD AUTO: 4.35 10E6/UL (ref 4.4–5.9)
SODIUM SERPL-SCNC: 139 MMOL/L (ref 133–144)
WBC # BLD AUTO: 11.3 10E3/UL (ref 4–11)

## 2021-12-29 PROCEDURE — 272N000001 HC OR GENERAL SUPPLY STERILE: Performed by: INTERNAL MEDICINE

## 2021-12-29 PROCEDURE — 85027 COMPLETE CBC AUTOMATED: CPT | Performed by: INTERNAL MEDICINE

## 2021-12-29 PROCEDURE — 250N000009 HC RX 250: Performed by: INTERNAL MEDICINE

## 2021-12-29 PROCEDURE — 258N000003 HC RX IP 258 OP 636: Performed by: INTERNAL MEDICINE

## 2021-12-29 PROCEDURE — 999N000054 HC STATISTIC EKG NON-CHARGEABLE

## 2021-12-29 PROCEDURE — 85730 THROMBOPLASTIN TIME PARTIAL: CPT | Performed by: INTERNAL MEDICINE

## 2021-12-29 PROCEDURE — 250N000011 HC RX IP 250 OP 636: Performed by: INTERNAL MEDICINE

## 2021-12-29 PROCEDURE — 93005 ELECTROCARDIOGRAM TRACING: CPT

## 2021-12-29 PROCEDURE — 93454 CORONARY ARTERY ANGIO S&I: CPT | Mod: 26 | Performed by: INTERNAL MEDICINE

## 2021-12-29 PROCEDURE — 93454 CORONARY ARTERY ANGIO S&I: CPT | Performed by: INTERNAL MEDICINE

## 2021-12-29 PROCEDURE — 82310 ASSAY OF CALCIUM: CPT | Performed by: INTERNAL MEDICINE

## 2021-12-29 PROCEDURE — 85610 PROTHROMBIN TIME: CPT | Performed by: INTERNAL MEDICINE

## 2021-12-29 PROCEDURE — 99152 MOD SED SAME PHYS/QHP 5/>YRS: CPT | Performed by: INTERNAL MEDICINE

## 2021-12-29 PROCEDURE — 99153 MOD SED SAME PHYS/QHP EA: CPT | Performed by: INTERNAL MEDICINE

## 2021-12-29 PROCEDURE — C1887 CATHETER, GUIDING: HCPCS | Performed by: INTERNAL MEDICINE

## 2021-12-29 PROCEDURE — 36415 COLL VENOUS BLD VENIPUNCTURE: CPT | Performed by: INTERNAL MEDICINE

## 2021-12-29 PROCEDURE — C1894 INTRO/SHEATH, NON-LASER: HCPCS | Performed by: INTERNAL MEDICINE

## 2021-12-29 RX ORDER — VERAPAMIL HYDROCHLORIDE 2.5 MG/ML
INJECTION, SOLUTION INTRAVENOUS
Status: DISCONTINUED | OUTPATIENT
Start: 2021-12-29 | End: 2021-12-29 | Stop reason: HOSPADM

## 2021-12-29 RX ORDER — SODIUM CHLORIDE 9 MG/ML
INJECTION, SOLUTION INTRAVENOUS CONTINUOUS
Status: DISCONTINUED | OUTPATIENT
Start: 2021-12-29 | End: 2021-12-29 | Stop reason: HOSPADM

## 2021-12-29 RX ORDER — OXYCODONE HYDROCHLORIDE 5 MG/1
10 TABLET ORAL EVERY 4 HOURS PRN
Status: DISCONTINUED | OUTPATIENT
Start: 2021-12-29 | End: 2021-12-29 | Stop reason: HOSPADM

## 2021-12-29 RX ORDER — LIDOCAINE 40 MG/G
CREAM TOPICAL
Status: DISCONTINUED | OUTPATIENT
Start: 2021-12-29 | End: 2021-12-29 | Stop reason: HOSPADM

## 2021-12-29 RX ORDER — FENTANYL CITRATE 50 UG/ML
INJECTION, SOLUTION INTRAMUSCULAR; INTRAVENOUS
Status: DISCONTINUED | OUTPATIENT
Start: 2021-12-29 | End: 2021-12-29 | Stop reason: HOSPADM

## 2021-12-29 RX ORDER — FENTANYL CITRATE 50 UG/ML
25 INJECTION, SOLUTION INTRAMUSCULAR; INTRAVENOUS
Status: DISCONTINUED | OUTPATIENT
Start: 2021-12-29 | End: 2021-12-29 | Stop reason: HOSPADM

## 2021-12-29 RX ORDER — FLUMAZENIL 0.1 MG/ML
0.2 INJECTION, SOLUTION INTRAVENOUS
Status: DISCONTINUED | OUTPATIENT
Start: 2021-12-29 | End: 2021-12-29 | Stop reason: HOSPADM

## 2021-12-29 RX ORDER — IOPAMIDOL 755 MG/ML
INJECTION, SOLUTION INTRAVASCULAR
Status: DISCONTINUED | OUTPATIENT
Start: 2021-12-29 | End: 2021-12-29 | Stop reason: HOSPADM

## 2021-12-29 RX ORDER — ATROPINE SULFATE 0.1 MG/ML
0.5 INJECTION INTRAVENOUS
Status: DISCONTINUED | OUTPATIENT
Start: 2021-12-29 | End: 2021-12-29 | Stop reason: HOSPADM

## 2021-12-29 RX ORDER — NALOXONE HYDROCHLORIDE 0.4 MG/ML
0.2 INJECTION, SOLUTION INTRAMUSCULAR; INTRAVENOUS; SUBCUTANEOUS
Status: DISCONTINUED | OUTPATIENT
Start: 2021-12-29 | End: 2021-12-29 | Stop reason: HOSPADM

## 2021-12-29 RX ORDER — ACETAMINOPHEN 325 MG/1
650 TABLET ORAL EVERY 4 HOURS PRN
Status: DISCONTINUED | OUTPATIENT
Start: 2021-12-29 | End: 2021-12-29 | Stop reason: HOSPADM

## 2021-12-29 RX ORDER — NALOXONE HYDROCHLORIDE 0.4 MG/ML
0.4 INJECTION, SOLUTION INTRAMUSCULAR; INTRAVENOUS; SUBCUTANEOUS
Status: DISCONTINUED | OUTPATIENT
Start: 2021-12-29 | End: 2021-12-29 | Stop reason: HOSPADM

## 2021-12-29 RX ORDER — NITROGLYCERIN 5 MG/ML
VIAL (ML) INTRAVENOUS
Status: DISCONTINUED | OUTPATIENT
Start: 2021-12-29 | End: 2021-12-29 | Stop reason: HOSPADM

## 2021-12-29 RX ORDER — OXYCODONE HYDROCHLORIDE 5 MG/1
5 TABLET ORAL EVERY 4 HOURS PRN
Status: DISCONTINUED | OUTPATIENT
Start: 2021-12-29 | End: 2021-12-29 | Stop reason: HOSPADM

## 2021-12-29 RX ORDER — HEPARIN SODIUM 1000 [USP'U]/ML
INJECTION, SOLUTION INTRAVENOUS; SUBCUTANEOUS
Status: DISCONTINUED | OUTPATIENT
Start: 2021-12-29 | End: 2021-12-29 | Stop reason: HOSPADM

## 2021-12-29 RX ADMIN — SODIUM CHLORIDE: 9 INJECTION, SOLUTION INTRAVENOUS at 07:16

## 2021-12-29 ASSESSMENT — MIFFLIN-ST. JEOR: SCORE: 1852.21

## 2021-12-29 NOTE — Clinical Note
Patient called requesting for medication gabapentin to be increased. Please advise   Patient education provided.

## 2021-12-29 NOTE — DISCHARGE INSTRUCTIONS
Cardiac Angiogram Discharge Instructions - Radial    After you go home:      Have an adult stay with you until tomorrow.    Drink extra fluids for 2 days.    You may resume your normal diet.    No smoking       For 24 hours - due to the sedation you received:    Relax and take it easy.    Do NOT make any important or legal decisions.    Do NOT drive or operate machines at home or at work.    Do NOT drink alcohol.    Care of Wrist Puncture Site:      For the first 24 hrs - check the puncture site every 1-2 hours while awake.    It is normal to have soreness at the puncture site and mild tingling in your hand for up to 3 days.    Remove the bandaid after 24 hours. If there is minor oozing, apply another bandaid and remove it after 12 hours.    You may shower tomorrow.  Do NOT take a bath, or use a hot tub or pool for at least 3 days. Do NOT scrub the site. Do not use lotion or powder near the puncture site.           Activity:        For 2 days:     do not use your hand or arm to support your weight (such as rising from a chair)     do not bend your wrist (such as lifting a garage door).    do not lift more than 5 pounds or exercise your arm (such as tennis, golf or bowling).    Do NOT do any heavy activity such as exercise, lifting, or straining.     Bleeding:      If you start bleeding from the site in your wrist, sit down and press firmly on/above the site for 10 minutes.     Once bleeding stops, keep arm still for 2 hours.     Call [facility] as soon as you can.       Call 911 right away if you have heavy bleeding or bleeding that does not stop.      Medicines:      If you are taking an antiplatelet medication such as Plavix, Brilinta or Effient, do not stop taking it until you talk to your cardiologist.        If you are on Metformin (Glucophage), do not restart it until you have blood tests (within 2 to 3 days after discharge).  After you have your blood drawn, you may restart the Metformin.     Take your  medications, including blood thinners, unless your provider tells you not to.      If you take Coumadin (Warfarin), have your INR checked by your provider in  3-5 days. Call your clinic to schedule this.    If you have stopped any medicines, check with your provider about when to restart them.    Follow Up Appointments:      Follow up with [facility] Nurse Practitioner at [facility] of patient preference in 7-10 days.    Call the clinic if:      You have a large or growing hard lump around the site.    The site is red, swollen, hot or tender.    Blood or fluid is draining from the site.    You have chills or a fever greater than 101 F (38 C).    Your arm feels numb, cool or changes color.    You have hives, a rash or unusual itching.    Any questions or concerns.          [facility] [place]:    [phone] [facility] (7 days a week)

## 2021-12-29 NOTE — PROGRESS NOTES
Care Suites Post Procedure Note    Patient Information  Name: Roger Bonilla  Age: 66 year old    Post Procedure  Time patient returned to Care Suites: 0920  Concerns/abnormal assessment: None at this time.  If abnormal assessment, provider notified: NA.    Right wrist with TR band on. Balloon inflated with 9 ml's or air, arm board on and secured with coban wrap.  Radial pulse present, CMS WNL. Right arm elevated on 2 pillows.  Patient instructed on activity restriction with right arm and wrist with verbal understanding received.    Plan/Other: Per orders.    Kitty Borja RN

## 2021-12-29 NOTE — PROGRESS NOTES
Care Suites Admission Nursing Note    Patient Information  Name: Roger Bonilla  Age: 66 year old  Reason for admission: Coronary angiogram  Care Suites arrival time: 0630    Visitor Information  Name: Mil  Informed of visitor restrictions: Yes  1 visitor allowed per patient   Visitor must screen negative for COVID symptoms   Visitor must wear a mask  Waiting rooms closed to visitors    Patient Admission/Assessment   Pre-procedure assessment complete: Yes  If abnormal assessment/labs, provider notified: N/A  NPO: Yes  Medications held per instructions/orders: Yes  Consent: deferred  If applicable, pregnancy test status: deferred  Patient oriented to room: Yes  Education/questions answered: Yes  Plan/other: Proceed as scheduled.    Discharge Planning  Discharge name/phone number: Mil-wife [phone]. Emiliano-son is their  [phone]  Overnight post sedation caregiver: Mil  Discharge location: home    Kitty Borja RN

## 2021-12-29 NOTE — PROGRESS NOTES
PATIENT/VISITOR WELLNESS SCREENING    Step 1 Patient Screening    1. In the last month, have you been in contact with someone who was confirmed or suspected to have Coronavirus/COVID-19? No    2. Do you have the following symptoms?  Fever/Chills? No   Cough? No   Shortness of breath? No   New loss of taste or smell? No  Sore throat? No  Muscle or body aches? No  Headaches? No  Fatigue? No  Vomiting or diarrhea? No    Step 2 Visitor Screening    1. Name of Visitor (1 visitor per patient): Jan    2. In the last month, have you been in contact with someone who was confirmed or suspected to have Coronavirus/COVID-19? No    3. Do you have the following symptoms?  Fever/Chills? No   Cough? No   Shortness of breath? No   Skin rash? No   Loss of taste or smell? No  Sore throat? No  Runny or stuffy nose? No  Muscle or body aches? No  Headaches? No  Fatigue? No  Vomiting or diarrhea? No    I

## 2021-12-29 NOTE — PRE-PROCEDURE
GENERAL PRE-PROCEDURE:   Procedure:  Coronary angiogram possible PCI  Date/Time:  12/29/2021 8:21 AM    Verbal consent obtained?: Yes    Written consent obtained?: Yes    Risks and benefits: Risks, benefits and alternatives were discussed    Consent given by:  Patient  Patient states understanding of procedure being performed: Yes    Patient's understanding of procedure matches consent: Yes    Procedure consent matches procedure scheduled: Yes    Expected level of sedation:  Moderate  Appropriately NPO:  Yes  ASA Class:  3  Mallampati  :  Grade 3- soft palate visible, posterior pharyngeal wall not visible  Lungs:  Lungs clear with good breath sounds bilaterally  Heart:  Normal heart sounds and rate  History & Physical reviewed:  History and physical reviewed and no updates needed  Statement of review:  I have reviewed the lab findings, diagnostic data, medications, and the plan for sedation

## 2021-12-30 ENCOUNTER — TELEPHONE (OUTPATIENT)
Dept: CARDIOLOGY | Facility: CLINIC | Age: 66
End: 2021-12-30
Payer: COMMERCIAL

## 2021-12-30 ENCOUNTER — MYC MEDICAL ADVICE (OUTPATIENT)
Dept: FAMILY MEDICINE | Facility: CLINIC | Age: 66
End: 2021-12-30
Payer: COMMERCIAL

## 2021-12-30 NOTE — TELEPHONE ENCOUNTER
"        Your note from office visit 12\9  \"Most recently, the patient has been diagnosed with severe right ICA stenosis for which endarterectomy is being planned.  In preparation for this a nuclear stress test was ordered which showed moderate area of infarction along with moderate area of ischemia.  Ejection fraction was 40%.  He is hence referred to cardiology for further evaluation and preoperative clearance\".    I did reply to Dr. Karma Adorno that you were out of the office this week, and that you would address this Monday 1\3\22, when you return.    Mirella GOMEZ BSN  Team 3  "

## 2021-12-31 ENCOUNTER — LAB (OUTPATIENT)
Dept: LAB | Facility: CLINIC | Age: 66
End: 2021-12-31
Payer: COMMERCIAL

## 2021-12-31 DIAGNOSIS — I25.10 CORONARY ARTERY DISEASE INVOLVING NATIVE CORONARY ARTERY OF NATIVE HEART WITHOUT ANGINA PECTORIS: ICD-10-CM

## 2021-12-31 LAB
ANION GAP SERPL CALCULATED.3IONS-SCNC: 5 MMOL/L (ref 3–14)
ATRIAL RATE - MUSE: 54 BPM
BUN SERPL-MCNC: 17 MG/DL (ref 7–30)
CALCIUM SERPL-MCNC: 8.9 MG/DL (ref 8.5–10.1)
CHLORIDE BLD-SCNC: 104 MMOL/L (ref 94–109)
CO2 SERPL-SCNC: 28 MMOL/L (ref 20–32)
CREAT SERPL-MCNC: 0.94 MG/DL (ref 0.66–1.25)
DIASTOLIC BLOOD PRESSURE - MUSE: NORMAL MMHG
GFR SERPL CREATININE-BSD FRML MDRD: 89 ML/MIN/1.73M2
GLUCOSE BLD-MCNC: 115 MG/DL (ref 70–99)
INTERPRETATION ECG - MUSE: NORMAL
P AXIS - MUSE: 69 DEGREES
POTASSIUM BLD-SCNC: 4.1 MMOL/L (ref 3.4–5.3)
PR INTERVAL - MUSE: 170 MS
QRS DURATION - MUSE: 96 MS
QT - MUSE: 450 MS
QTC - MUSE: 426 MS
R AXIS - MUSE: 68 DEGREES
SODIUM SERPL-SCNC: 137 MMOL/L (ref 133–144)
SYSTOLIC BLOOD PRESSURE - MUSE: NORMAL MMHG
T AXIS - MUSE: 68 DEGREES
VENTRICULAR RATE- MUSE: 54 BPM

## 2021-12-31 PROCEDURE — 80048 BASIC METABOLIC PNL TOTAL CA: CPT | Performed by: INTERNAL MEDICINE

## 2021-12-31 PROCEDURE — 36415 COLL VENOUS BLD VENIPUNCTURE: CPT | Performed by: INTERNAL MEDICINE

## 2022-01-03 ENCOUNTER — MYC MEDICAL ADVICE (OUTPATIENT)
Dept: FAMILY MEDICINE | Facility: CLINIC | Age: 67
End: 2022-01-03
Payer: COMMERCIAL

## 2022-01-04 ENCOUNTER — CARE COORDINATION (OUTPATIENT)
Dept: CARDIOLOGY | Facility: CLINIC | Age: 67
End: 2022-01-04
Payer: COMMERCIAL

## 2022-01-04 DIAGNOSIS — I25.10 CARDIOVASCULAR DISEASE: Primary | ICD-10-CM

## 2022-01-04 NOTE — TELEPHONE ENCOUNTER
Yes, he should be seen  Please assist patient in making an appointment.  Maria De Jesus Hay, CNP

## 2022-01-04 NOTE — PROGRESS NOTES
Patient's case presented at coronary heart team meeting this AM. Recommendation is to proceed for workup for CAB.     Called [employer] to discuss. No answer. No CTC on file. LVM advising that I have sent an update to his [employer] account for review at his convenience and provided team 3 callback number.     CT surgery consult placed. FYI to Kamryn Carroll NP and [facility] cardiology nurses re: upcoming appt.     Future Appointments   Date Time Provider Department Center   1/12/2022  8:45 AM Kamryn Carroll APRN [facility]   1/14/2022 11:00 AM Mary Lee NP [facility] Anne, BSN, RN, PHN, HNB-BC   1/4/2022 at 9:17 AM

## 2022-01-05 NOTE — TELEPHONE ENCOUNTER
Is hard for me to know if this can wait until January 14 as I have not seen the spot.  If he is having signs of infection, then he should be seen earlier and you can use one of my acute slots.  Maria De Jesus Hay, CNP

## 2022-01-05 NOTE — TELEPHONE ENCOUNTER
Appointment is already made on 1-14-22.  Maria De Jesus, is this ok to wait? Do you want us to reschedule tomorrow on a same day?    JASON Caraballo

## 2022-01-06 NOTE — TELEPHONE ENCOUNTER
Called pt. States is red and has pus filled center. No fever currently. Pt feels it is infected and it is painful and would like to be seen sooner. Scheduled for appt tomorrow as pt is off work.      Rowdy Sher RN

## 2022-01-07 ENCOUNTER — OFFICE VISIT (OUTPATIENT)
Dept: FAMILY MEDICINE | Facility: CLINIC | Age: 67
End: 2022-01-07
Payer: COMMERCIAL

## 2022-01-07 VITALS
TEMPERATURE: 97.9 F | SYSTOLIC BLOOD PRESSURE: 132 MMHG | BODY MASS INDEX: 37.83 KG/M2 | RESPIRATION RATE: 16 BRPM | DIASTOLIC BLOOD PRESSURE: 76 MMHG | HEIGHT: 67 IN | OXYGEN SATURATION: 96 % | HEART RATE: 76 BPM | WEIGHT: 241 LBS

## 2022-01-07 DIAGNOSIS — L02.414 CUTANEOUS ABSCESS OF LEFT UPPER EXTREMITY: Primary | ICD-10-CM

## 2022-01-07 PROCEDURE — 99213 OFFICE O/P EST LOW 20 MIN: CPT | Performed by: NURSE PRACTITIONER

## 2022-01-07 RX ORDER — DOXYCYCLINE HYCLATE 100 MG
100 TABLET ORAL 2 TIMES DAILY
Qty: 20 TABLET | Refills: 0 | Status: SHIPPED | OUTPATIENT
Start: 2022-01-07 | End: 2022-01-17

## 2022-01-07 ASSESSMENT — MIFFLIN-ST. JEOR: SCORE: 1823.86

## 2022-01-07 NOTE — PROGRESS NOTES
"  Assessment & Plan     Cutaneous abscess of left upper extremity  Ruptured spontaneously in clinic. Able to express large amount of purulent discharge.  Instructions and supplies given for dressing changes at home.  Recommend a round of antibiotics - patient has a carotid endartectomy and CABG coming up soon.  Should follow up early next week if abscess recurs.   - doxycycline hyclate (VIBRA-TABS) 100 MG tablet; Take 1 tablet (100 mg) by mouth 2 times daily for 10 days      The risks, benefits and treatment options of prescribed medications or other treatments have been discussed with the patient. The patient verbalized their understanding and should call or follow up if no improvement or if they develop further problems.    SAL Kc CNP  [facility]PHANIE Mccord is a 66 year old who presents for the following health issues     HPI     Skin Lesion  Onset/Duration: first had this10 years ago. Area started becoming red and swollen several days ago. Progressively worsening. Started oozing last night    Description  Location: Left shoulder   Color: pink, red and white   Border description: raised, bleeding, inflamed, oozing   Character: raised, painful, burning, draining  Itching: no  Bleeding:  YES- oozing yellow   Intensity:  Moderate   Progression of Symptoms:  worsening  Accompanying signs and symptoms:   Bleeding: YES  Scaling: no  Excessive sun exposure/tanning: no  Sunscreen used: no  History:           Any previous history of skin cancer: no  Any family history of melanoma: no  Previous episodes of similar lesion: YES  Precipitating or alleviating factors: Yes   Therapies tried and outcome: none        Review of Systems   Constitutional, HEENT, cardiovascular, pulmonary, gi and gu systems are negative, except as otherwise noted.      Objective    /76   Pulse 76   Temp 97.9  F (36.6  C) (Tympanic)   Resp 16   Ht 1.689 m (5' 6.5\")   Wt 109.3 kg (241 lb)  "  SpO2 96%   BMI 38.32 kg/m    Body mass index is 38.32 kg/m .  Physical Exam   GENERAL: healthy, alert and no distress  SKIN: Left upper arm - 1 inch area of erythema and fluctuance. Abscess burst spontaneously in clinic when bandaid removed. Able to express a large of amount of purulent discharge. No associated cellulitis.

## 2022-01-12 ENCOUNTER — OFFICE VISIT (OUTPATIENT)
Dept: CARDIOLOGY | Facility: CLINIC | Age: 67
End: 2022-01-12
Payer: COMMERCIAL

## 2022-01-12 VITALS
DIASTOLIC BLOOD PRESSURE: 66 MMHG | HEIGHT: 66 IN | SYSTOLIC BLOOD PRESSURE: 137 MMHG | OXYGEN SATURATION: 97 % | HEART RATE: 68 BPM | BODY MASS INDEX: 38.9 KG/M2

## 2022-01-12 DIAGNOSIS — E78.5 HYPERLIPIDEMIA LDL GOAL <70: ICD-10-CM

## 2022-01-12 DIAGNOSIS — I25.118 CORONARY ARTERY DISEASE OF NATIVE ARTERY OF NATIVE HEART WITH STABLE ANGINA PECTORIS (H): Primary | ICD-10-CM

## 2022-01-12 DIAGNOSIS — I73.9 PVD (PERIPHERAL VASCULAR DISEASE) (H): ICD-10-CM

## 2022-01-12 DIAGNOSIS — I10 BENIGN ESSENTIAL HYPERTENSION: ICD-10-CM

## 2022-01-12 PROCEDURE — 99214 OFFICE O/P EST MOD 30 MIN: CPT | Performed by: NURSE PRACTITIONER

## 2022-01-12 RX ORDER — EZETIMIBE 10 MG/1
10 TABLET ORAL DAILY
Qty: 30 TABLET | Refills: 11 | Status: SHIPPED | OUTPATIENT
Start: 2022-01-12 | End: 2022-10-14

## 2022-01-12 NOTE — LETTER
1/12/2022    Maria De Jesus Hay, APRN CNP  [address] [facility] [address]    RE: Roger Bonilla       Dear Colleague,     I had the pleasure of seeing Roger Bonilla in the [facility].  Cardiology Clinic Progress Note  Roger Bonilla MRN# [id number]   YOB: 1955 Age: 66 year old      Primary Cardiologist:   Dr. Ramirez            History of Presenting Illness:      Roger Bonilla is a pleasant 66 year old patient with a past cardiac history significant for   1. CAD  2. PVD (right carotid stenosis and lower extremity disease)  3. Hypertension  4. Hyperlipidemia  Past medical history significant for obesity.    In 2001 he had inferior MI.  Coronary angiogram with 2 stents to the RCA and SATNAM x 1 to the OM.  He had residual LAD disease which was managed with medical management.  Patient notes a history of stenting of the lower extremity but unable to find details of this in his chart.    Patient was seen by Dr. Ramirez in December 2021.  He had recently been diagnosed with severe right ICA stenosis and had plans for right carotid endarterectomy.  Stress test prior showed moderate area of ischemia.  Patient complained of mild chest pain when exerting himself significantly, lasting a few minutes and resolving spontaneously, approximately monthly.  He was able to lift 40 pound bags at work without difficulty.  He did have complaints of claudication which he follows with vascular surgery and symptoms have been stable.    Pt presents today for angiogram follow-up. Coronary angiogram 12/29/2021 with three-vessel CAD.  They noted no high risk lesions that would benefit from intervention prior to upcoming carotid endarterectomy.  His case was reviewed with Dr. Ramirez.  Given that he was minimally symptomatic with only exertional chest discomfort on severe exertion medical management was recommended.  If chest symptoms increase in severity or other strong indications arise then would consider  multivessel PCI versus referral for CABG. Patient's case was then discussed at coronary team meeting and it was felt that CABG would be recommended. Results reviewed today.    I did discuss these recommendations with the patient, who was agreeable to calling to schedule.  He continues with very mild chest discomfort on maximal exertion which has been stable and unchanged.  His right radial angiogram site is without ecchymosis, bleeding, hematoma, or bruit.  He is agreeable to adding Zetia given his LDL is not at goal of less than 70.  If this is not affordable, he will let us know and we may consider PCSK9 inhibitor. Patient reports no shortness of breath, PND, orthopnea, presyncope, syncope, edema, heart racing, or palpitations.    Labs:  LIPID RESULTS:  Lab Results   Component Value Date    CHOL 150 10/08/2021    CHOL 139 06/04/2020    HDL 42 10/08/2021    HDL 36 (L) 06/04/2020    LDL 85 10/08/2021    LDL 86 06/04/2020    TRIG 116 10/08/2021    TRIG 83 06/04/2020    CHOLHDLRATIO 3.0 07/24/2015       LIVER ENZYME RESULTS:  Lab Results   Component Value Date    AST 14 07/20/2021    AST 26 06/23/2021    ALT 21 07/20/2021    ALT 40 06/23/2021       CBC RESULTS:  Lab Results   Component Value Date    WBC 11.3 (H) 12/29/2021    WBC 12.4 (H) 07/01/2021    RBC 4.35 (L) 12/29/2021    RBC 4.36 (L) 07/01/2021    HGB 13.0 (L) 12/29/2021    HGB 14.1 07/01/2021    HCT 40.3 12/29/2021    HCT 42.9 07/01/2021    MCV 93 12/29/2021    MCV 98 07/01/2021    MCH 29.9 12/29/2021    MCH 32.3 07/01/2021    MCHC 32.3 12/29/2021    MCHC 32.9 07/01/2021    RDW 13.6 12/29/2021    RDW 13.7 07/01/2021     12/29/2021     07/01/2021       BMP RESULTS:  Lab Results   Component Value Date     12/31/2021     07/01/2021    POTASSIUM 4.1 12/31/2021    POTASSIUM 4.6 07/01/2021    CHLORIDE 104 12/31/2021    CHLORIDE 105 07/01/2021    CO2 28 12/31/2021    CO2 27 07/01/2021    ANIONGAP 5 12/31/2021    ANIONGAP 6 07/01/2021    GLC  115 (H) 12/31/2021    GLC 85 07/01/2021    BUN 17 12/31/2021    BUN 13 07/01/2021    CR 0.94 12/31/2021    CR 1.09 07/01/2021    GFRESTIMATED 89 12/31/2021    GFRESTIMATED 70 07/01/2021    GFRESTBLACK 82 07/01/2021    SUE 8.9 12/31/2021    SUE 9.3 07/01/2021        A1C RESULTS:  Lab Results   Component Value Date    A1C 6.0 (H) 10/08/2021    A1C 6.4 (H) 03/25/2021       INR RESULTS:  Lab Results   Component Value Date    INR 0.98 12/29/2021    INR 0.94 09/13/2013    INR 0.94 10/27/2008       Results reviewed today.       Current Cardiac Medications         Aspirin 81 mg daily  Atorvastatin 80 mg daily  Imdur 30 mg daily  Lisinopril 40 mg daily  Metoprolol XL 50 mg daily                   Assessment and Plan:       Plan    Patient Instructions   Medication Changes:  5. START zetia 10 mg daily     Recommendations:  1. Check blood pressure at least 1 hour after medications. Call the clinic if your blood pressure is consistently greater than 130/80.      Follow-up:  1. Fasting lab in 2 months (lipid/ALT) after adding zetia   2. Cardiac surgery will call to schedule consultation at [facility]   3. See Dr. Ramirez for cardiology follow up at [facility]: 3 months       Cardiology Scheduling~[phone]  Cardiology Clinic RN~[phone] (Emilia Araujo, RN and Sridevi ALEXANDER RN)          1. CAD    Angiogram 2001 with PCI of RCA and OM    Minimal chest discomfort with severe exertion    Continue statin, aspirin, ACE inhibitor, beta-blocker, Imdur    Continue medical management    CABG recommended per coronary team meeting      2. Hypertension    Controlled    Continue Imdur, lisinopril, metoprolol      3. Hyperlipidemia    Last LDL 85 on 10/2021    Continue atorvastatin 80 mg daily and start zetia       4. PVD    Upcoming right carotid endarterectomy    Also lower extremity disease-moderate    Follows with vascular surgery    Continue statin, aspirin                 Thank you for allowing me to participate in this delightful  patient's care.      This note was completed in part using Dragon voice recognition software. Although reviewed after completion, some word and grammatical errors may occur.    SAL Willoughby CNP, SAL, CNP           Data:   All laboratory data reviewed      Total time spent today was 38 mins, reviewing labs, testing, notes, documenting notes, and seeing patient.          Constitutional:  cooperative, alert and oriented, well developed, well nourished, in no acute distress  overweight     Skin:  warm and dry to the touch         Head:  normocephalic       Eyes:  pupils equal and round       ENT:  no pallor or cyanosis       Neck:  no stiffness       Respiratory:  clear to auscultation; normal symmetry        Cardiac: regular rate and rhythm; normal S1 and S2                 pulses full and equal     GI:  abdomen soft, nondistended     Extremities and Muscular Skeletal:  no edema       Neurological:  affect appropriate; no gross motor deficits       Psych:  Alert and Oriented x 3 , appropriate affact      SAL Willoughby CNP   [facility]

## 2022-01-12 NOTE — PATIENT INSTRUCTIONS
Medication Changes:  1. START zetia 10 mg daily     Recommendations:  1. Check blood pressure at least 1 hour after medications. Call the clinic if your blood pressure is consistently greater than 130/80.      Follow-up:  1. Fasting lab in 2 months (lipid/ALT)  2. Cardiac surgery will call to schedule consultation at [facility]   3. See Dr. Ramirez for cardiology follow up at [facility]: 3 months       Cardiology Scheduling~[phone]  Cardiology Clinic RN~[phone] (Emilia Araujo, JASON and Sridevi ALEXANDER RN)

## 2022-01-12 NOTE — PROGRESS NOTES
Cardiology Clinic Progress Note  Roger Bonilla MRN# [id number]   YOB: 1955 Age: 66 year old      Primary Cardiologist:   Dr. Ramirez            History of Presenting Illness:      Roger Bonilla is a pleasant 66 year old patient with a past cardiac history significant for   1. CAD  2. PVD (right carotid stenosis and lower extremity disease)  3. Hypertension  4. Hyperlipidemia  Past medical history significant for obesity.    In 2001 he had inferior MI.  Coronary angiogram with 2 stents to the RCA and SATNAM x 1 to the OM.  He had residual LAD disease which was managed with medical management.  Patient notes a history of stenting of the lower extremity but unable to find details of this in his chart.    Patient was seen by Dr. Ramirez in December 2021.  He had recently been diagnosed with severe right ICA stenosis and had plans for right carotid endarterectomy.  Stress test prior showed moderate area of ischemia.  Patient complained of mild chest pain when exerting himself significantly, lasting a few minutes and resolving spontaneously, approximately monthly.  He was able to lift 40 pound bags at work without difficulty.  He did have complaints of claudication which he follows with vascular surgery and symptoms have been stable.    Pt presents today for angiogram follow-up. Coronary angiogram 12/29/2021 with three-vessel CAD.  They noted no high risk lesions that would benefit from intervention prior to upcoming carotid endarterectomy.  His case was reviewed with Dr. Ramirez.  Given that he was minimally symptomatic with only exertional chest discomfort on severe exertion medical management was recommended.  If chest symptoms increase in severity or other strong indications arise then would consider multivessel PCI versus referral for CABG. Patient's case was then discussed at coronary team meeting and it was felt that CABG would be recommended. Results reviewed today.    I did discuss these recommendations  with the patient, who was agreeable to calling to schedule.  He continues with very mild chest discomfort on maximal exertion which has been stable and unchanged.  His right radial angiogram site is without ecchymosis, bleeding, hematoma, or bruit.  He is agreeable to adding Zetia given his LDL is not at goal of less than 70.  If this is not affordable, he will let us know and we may consider PCSK9 inhibitor. Patient reports no shortness of breath, PND, orthopnea, presyncope, syncope, edema, heart racing, or palpitations.    Labs:  LIPID RESULTS:  Lab Results   Component Value Date    CHOL 150 10/08/2021    CHOL 139 06/04/2020    HDL 42 10/08/2021    HDL 36 (L) 06/04/2020    LDL 85 10/08/2021    LDL 86 06/04/2020    TRIG 116 10/08/2021    TRIG 83 06/04/2020    CHOLHDLRATIO 3.0 07/24/2015       LIVER ENZYME RESULTS:  Lab Results   Component Value Date    AST 14 07/20/2021    AST 26 06/23/2021    ALT 21 07/20/2021    ALT 40 06/23/2021       CBC RESULTS:  Lab Results   Component Value Date    WBC 11.3 (H) 12/29/2021    WBC 12.4 (H) 07/01/2021    RBC 4.35 (L) 12/29/2021    RBC 4.36 (L) 07/01/2021    HGB 13.0 (L) 12/29/2021    HGB 14.1 07/01/2021    HCT 40.3 12/29/2021    HCT 42.9 07/01/2021    MCV 93 12/29/2021    MCV 98 07/01/2021    MCH 29.9 12/29/2021    MCH 32.3 07/01/2021    MCHC 32.3 12/29/2021    MCHC 32.9 07/01/2021    RDW 13.6 12/29/2021    RDW 13.7 07/01/2021     12/29/2021     07/01/2021       BMP RESULTS:  Lab Results   Component Value Date     12/31/2021     07/01/2021    POTASSIUM 4.1 12/31/2021    POTASSIUM 4.6 07/01/2021    CHLORIDE 104 12/31/2021    CHLORIDE 105 07/01/2021    CO2 28 12/31/2021    CO2 27 07/01/2021    ANIONGAP 5 12/31/2021    ANIONGAP 6 07/01/2021     (H) 12/31/2021    GLC 85 07/01/2021    BUN 17 12/31/2021    BUN 13 07/01/2021    CR 0.94 12/31/2021    CR 1.09 07/01/2021    GFRESTIMATED 89 12/31/2021    GFRESTIMATED 70 07/01/2021    GFRESTBLACK 82  07/01/2021    SUE 8.9 12/31/2021    SUE 9.3 07/01/2021        A1C RESULTS:  Lab Results   Component Value Date    A1C 6.0 (H) 10/08/2021    A1C 6.4 (H) 03/25/2021       INR RESULTS:  Lab Results   Component Value Date    INR 0.98 12/29/2021    INR 0.94 09/13/2013    INR 0.94 10/27/2008       Results reviewed today.       Current Cardiac Medications         Aspirin 81 mg daily  Atorvastatin 80 mg daily  Imdur 30 mg daily  Lisinopril 40 mg daily  Metoprolol XL 50 mg daily                   Assessment and Plan:       Plan    Patient Instructions   Medication Changes:  5. START zetia 10 mg daily     Recommendations:  1. Check blood pressure at least 1 hour after medications. Call the clinic if your blood pressure is consistently greater than 130/80.      Follow-up:  1. Fasting lab in 2 months (lipid/ALT) after adding zetia   2. Cardiac surgery will call to schedule consultation at [facility]   3. See Dr. Ramirez for cardiology follow up at [facility]: 3 months       Cardiology Scheduling~[phone]  Cardiology Clinic RN~[phone] (Emilia Araujo, RN and Sridevi ALEXANDER RN)          1. CAD    Angiogram 2001 with PCI of RCA and OM    Minimal chest discomfort with severe exertion    Continue statin, aspirin, ACE inhibitor, beta-blocker, Imdur    Continue medical management    CABG recommended per coronary team meeting      2. Hypertension    Controlled    Continue Imdur, lisinopril, metoprolol      3. Hyperlipidemia    Last LDL 85 on 10/2021    Continue atorvastatin 80 mg daily and start zetia       4. PVD    Upcoming right carotid endarterectomy    Also lower extremity disease-moderate    Follows with vascular surgery    Continue statin, aspirin                 Thank you for allowing me to participate in this delightful patient's care.      This note was completed in part using Dragon voice recognition software. Although reviewed after completion, some word and grammatical errors may occur.    Kamryn Chavez,  APRN CNP, APRN, CNP           Data:   All laboratory data reviewed      Total time spent today was 38 mins, reviewing labs, testing, notes, documenting notes, and seeing patient.          Constitutional:  cooperative, alert and oriented, well developed, well nourished, in no acute distress  overweight     Skin:  warm and dry to the touch         Head:  normocephalic       Eyes:  pupils equal and round       ENT:  no pallor or cyanosis       Neck:  no stiffness       Respiratory:  clear to auscultation; normal symmetry        Cardiac: regular rate and rhythm; normal S1 and S2                 pulses full and equal     GI:  abdomen soft, nondistended     Extremities and Muscular Skeletal:  no edema       Neurological:  affect appropriate; no gross motor deficits       Psych:  Alert and Oriented x 3 , appropriate affact

## 2022-01-12 NOTE — NURSING NOTE
"Initial /66   Pulse 68   Ht 1.676 m (5' 6\")   SpO2 97%   BMI 38.90 kg/m   Estimated body mass index is 38.9 kg/m  as calculated from the following:    Height as of this encounter: 1.676 m (5' 6\").    Weight as of 1/7/22: 109.3 kg (241 lb). .      "

## 2022-01-25 ENCOUNTER — OFFICE VISIT (OUTPATIENT)
Dept: CARDIOLOGY | Facility: CLINIC | Age: 67
End: 2022-01-25
Payer: COMMERCIAL

## 2022-01-25 VITALS
BODY MASS INDEX: 39.89 KG/M2 | DIASTOLIC BLOOD PRESSURE: 78 MMHG | WEIGHT: 248.2 LBS | OXYGEN SATURATION: 97 % | HEART RATE: 69 BPM | HEIGHT: 66 IN | SYSTOLIC BLOOD PRESSURE: 170 MMHG

## 2022-01-25 DIAGNOSIS — I25.10 CARDIOVASCULAR DISEASE: ICD-10-CM

## 2022-01-25 PROCEDURE — 99204 OFFICE O/P NEW MOD 45 MIN: CPT | Performed by: SURGERY

## 2022-01-25 ASSESSMENT — MIFFLIN-ST. JEOR: SCORE: 1848.58

## 2022-01-25 NOTE — LETTER
1/25/2022      RE: Roger Bonilla  [address]  [facility] [address]       Dear Colleague,    Thank you for the opportunity to participate in the care of your patient, Roger Bonilla, at the [facility] [place] [facility]. Please see a copy of my visit note below.          Cardiothoracic Surgery Consult    Date of Service: 1/25/2022    REFERRING CARDIOLOGIST: Dr. Parveen Ramirez, Dr. Karma Adorno    REASON FOR CONSULTATION: severe coronary artery disease     HISTORY OF PRESENT ILLNESS: Mr. Bonilla is a 66 year old male with history of heavy smoking, bilateral lower leg claudication who was evaluated for shortness of breath while shoveling snow. His PCI history includes stent to RCAx2 and OM. His RCA stent is 100% occluded and OM stent is 90% occluded. His left main is 50% occluded but is likely flow limiting while providing collaterals to the right system. He has chest pain on heavy exertion like shoveling snow with associated shortness of breath. No syncope. No pain at rest.    His case was discussed at coronary conference regarding his coronary disease and severe 90% right carotid artery stenosis. Plan was to offer surgery for carotid then CABG pending clinic visits with cardiac surgery and vascular surgery.       IMPRESSION AND PLAN: Mr. Bonilla is a 66 year heavy smoker with 90% right carotid artery stenosis and multi vessel coronary artery disease (h/o SATNAM to RCA and OM)    Echocardiography demonstrates LVEF of 60-65%.     I reviewed the angiogram with him in detail and outlined my plan.     I recommend addressing his right carotid artery stenosis with Dr. Adorno first followed by coronary artery bypass grafting 6-8 weeks later.     I discussed the technical details of the procedure with the patient, as well as the expected postoperative course and recovery. The risks include but are not limited to bleeding, infection, stroke, heart or graft failure, dysrhythmia,  respiratory failure, kidney or liver injury, bowel or limb ischemia, and death.     Calculated STS risk for mortality is 0.5% and the calculated risk of major morbidity or mortality is 8.9%. The patient understands these risks and wishes to undergo the operation once his right carotid artery is addressed to minimize risk of post op stroke.    I did spend over 10 min counseling on smoking cessation for both his carotid artery, and coronary surgeries. I recommended he follow up with his PCP to discuss aids that may help to quit.          Thank you very much for this referral.     Glenna Waters MD   Cardiothoracic Surgery  P: [phone]  C: [phone]      PAST MEDICAL HISTORY:   Past Medical History:   Diagnosis Date     Coronary artery disease may 1 2005    2 stents put in heart     Pure hypercholesterolemia      Type II or unspecified type diabetes mellitus without mention of complication, not stated as uncontrolled      Unspecified cardiovascular disease 5-2001    MI -- Angioplasty two stents RCA & OM2     Unspecified essential hypertension        PAST SURGICAL HISTORY:   Past Surgical History:   Procedure Laterality Date     CARDIAC SURGERY  may 1, 2005     COLONOSCOPY  11/30/2011    Procedure:COLONOSCOPY; Screening Colonoscopy; Surgeon:LUTHER FLORES; Location:[facility]     CV CORONARY ANGIOGRAM N/A 12/29/2021    Procedure: Coronary Angiogram;  Surgeon: Jonny Moore MD;  [facility]: [facility] CARDIAC CATH LAB     ESOPHAGOSCOPY, GASTROSCOPY, DUODENOSCOPY (EGD), COMBINED N/A 7/21/2021    Procedure: ESOPHAGOGASTRODUODENOSCOPY, WITH BIOPSY;  Surgeon: Jeff Cunningham DO;  Location: [facility]     PHACOEMULSIFICATION WITH STANDARD INTRAOCULAR LENS IMPLANT Right 7/28/2021    Procedure: Right eye Cataract Extraction with Implant;  Surgeon: Reyes Valdez MD;  Location: [facility]     PHACOEMULSIFICATION WITH STANDARD INTRAOCULAR LENS IMPLANT Left 8/18/2021    Procedure: left eye Cataract  Extraction with Implant;  Surgeon: Reyes Valdez MD;  Location: [facility]     SURGICAL HISTORY OF -       None     VASCULAR SURGERY  oct 2013    stent put in leg       ALLERGIES:   Allergies   Allergen Reactions     Nkda [No Known Drug Allergies]         CURRENT MEDICATIONS:  Current Outpatient Medications   Medication     aspirin 81 MG EC tablet     atorvastatin (LIPITOR) 80 MG tablet     ezetimibe (ZETIA) 10 MG tablet     isosorbide mononitrate (IMDUR) 30 MG 24 hr tablet     lisinopril (ZESTRIL) 40 MG tablet     metFORMIN (GLUCOPHAGE) 1000 MG tablet     metoprolol succinate ER (TOPROL-XL) 50 MG 24 hr tablet     omeprazole (PRILOSEC) 40 MG DR capsule     No current facility-administered medications for this visit.         FAMILY HISTORY:   Family History   Problem Relation Age of Onset     Cerebrovascular Disease Maternal Grandmother      Arthritis Other         hip fracture     Respiratory Father         emphysema     Alzheimer Disease Maternal Grandfather      Breast Cancer Sister      Cancer - colorectal No family hx of      Prostate Cancer No family hx of      The family history was reviewed and is not pertinent to the patient's disease/illness      SOCIAL HISTORY:   Social History     Socioeconomic History     Marital status:      Spouse name: Not on file     Number of children: Not on file     Years of education: Not on file     Highest education level: Not on file   Occupational History     Not on file   Tobacco Use     Smoking status: Current Every Day Smoker     Packs/day: 0.50     Years: 50.00     Pack years: 25.00     Types: Cigarettes     Start date: 1/1/1972     Smokeless tobacco: Never Used   Vaping Use     Vaping Use: Never used   Substance and Sexual Activity     Alcohol use: Yes     Comment: 2 beers a week      Drug use: No     Sexual activity: Yes     Partners: Female     Birth control/protection: Condom   Other Topics Concern     Parent/sibling w/ CABG, MI or angioplasty before 65F  55M? No   Social History Narrative     Not on file     Social Determinants of Health     Financial Resource Strain: Not on file   Food Insecurity: Not on file   Transportation Needs: Not on file   Physical Activity: Not on file   Stress: Not on file   Social Connections: Not on file   Intimate Partner Violence: Not on file   Housing Stability: Not on file         REVIEW OF SYSTEMS:  Review of Systems - Negative except chest pain  A complete 10 point review of systems was obtained and is negative other than the above stated complaints    PHYSICAL EXAMINATION:   Vitals: There were no vitals taken for this visit.  GENERAL:  Well developed but obese   HEENT: conjunctiva anicteric and sclera clear   NECK: no JVD  RESPIRATIONS: breathing comfortably, no audible wheezing  ABDOMEN: non distended  EXTREMITIES: no deformity or swelling   NEUROLOGIC: intact and symmetric grossly  PSYCHIATRIC: alert and oriented x3, pleasant     LABORATORY STUDIES:   Lab Results   Component Value Date    WBC 11.3 (H) 12/29/2021    HGB 13.0 (L) 12/29/2021    HCT 40.3 12/29/2021    MCV 93 12/29/2021     12/29/2021      Lab Results   Component Value Date    BUN 17 12/31/2021     12/31/2021    CO2 28 12/31/2021       Please do not hesitate to contact me if you have any questions/concerns.     Sincerely,     Glenna Waters MD

## 2022-01-25 NOTE — PROGRESS NOTES
Cardiothoracic Surgery Consult    Date of Service: 1/25/2022    REFERRING CARDIOLOGIST: Dr. Parveen Ramirez, Dr. Karma Adorno    REASON FOR CONSULTATION: severe coronary artery disease     HISTORY OF PRESENT ILLNESS: Mr. Bonilla is a 66 year old male with history of heavy smoking, bilateral lower leg claudication who was evaluated for shortness of breath while shoveling snow. His PCI history includes stent to RCAx2 and OM. His RCA stent is 100% occluded and OM stent is 90% occluded. His left main is 50% occluded but is likely flow limiting while providing collaterals to the right system. He has chest pain on heavy exertion like shoveling snow with associated shortness of breath. No syncope. No pain at rest.    His case was discussed at coronary conference regarding his coronary disease and severe 90% right carotid artery stenosis. Plan was to offer surgery for carotid then CABG pending clinic visits with cardiac surgery and vascular surgery.       IMPRESSION AND PLAN: Mr. Bonilla is a 66 year heavy smoker with 90% right carotid artery stenosis and multi vessel coronary artery disease (h/o SATNAM to RCA and OM)    Echocardiography demonstrates LVEF of 60-65%.     I reviewed the angiogram with him in detail and outlined my plan.     I recommend addressing his right carotid artery stenosis with Dr. Adorno first followed by coronary artery bypass grafting 6-8 weeks later.     I discussed the technical details of the procedure with the patient, as well as the expected postoperative course and recovery. The risks include but are not limited to bleeding, infection, stroke, heart or graft failure, dysrhythmia, respiratory failure, kidney or liver injury, bowel or limb ischemia, and death.     Calculated STS risk for mortality is 0.5% and the calculated risk of major morbidity or mortality is 8.9%. The patient understands these risks and wishes to undergo the operation once his right carotid artery is addressed to minimize risk of post  op stroke.    I did spend over 10 min counseling on smoking cessation for both his carotid artery, and coronary surgeries. I recommended he follow up with his PCP to discuss aids that may help to quit.          Thank you very much for this referral.     Glenna Waters MD   Cardiothoracic Surgery  P: [phone]  C: [phone]      PAST MEDICAL HISTORY:   Past Medical History:   Diagnosis Date     Coronary artery disease may 1 2005    2 stents put in heart     Pure hypercholesterolemia      Type II or unspecified type diabetes mellitus without mention of complication, not stated as uncontrolled      Unspecified cardiovascular disease 5-2001    MI -- Angioplasty two stents RCA & OM2     Unspecified essential hypertension        PAST SURGICAL HISTORY:   Past Surgical History:   Procedure Laterality Date     CARDIAC SURGERY  may 1, 2005     COLONOSCOPY  11/30/2011    Procedure:COLONOSCOPY; Screening Colonoscopy; Surgeon:LUTHER FLORES; Location:[facility]     CV CORONARY ANGIOGRAM N/A 12/29/2021    Procedure: Coronary Angiogram;  Surgeon: Jonny Moore MD;  Location: [facility] CARDIAC CATH LAB     ESOPHAGOSCOPY, GASTROSCOPY, DUODENOSCOPY (EGD), COMBINED N/A 7/21/2021    Procedure: ESOPHAGOGASTRODUODENOSCOPY, WITH BIOPSY;  Surgeon: Jeff Cunningham DO;  Location: [facility]     PHACOEMULSIFICATION WITH STANDARD INTRAOCULAR LENS IMPLANT Right 7/28/2021    Procedure: Right eye Cataract Extraction with Implant;  Surgeon: Reyes Valdez MD;  Location: [facility]     PHACOEMULSIFICATION WITH STANDARD INTRAOCULAR LENS IMPLANT Left 8/18/2021    Procedure: left eye Cataract Extraction with Implant;  Surgeon: Reyes Valdez MD;  Location: [facility]     SURGICAL HISTORY OF -       None     VASCULAR SURGERY  oct 2013    stent put in leg       ALLERGIES:   Allergies   Allergen Reactions     Nkda [No Known Drug Allergies]         CURRENT MEDICATIONS:  Current Outpatient Medications   Medication      aspirin 81 MG EC tablet     atorvastatin (LIPITOR) 80 MG tablet     ezetimibe (ZETIA) 10 MG tablet     isosorbide mononitrate (IMDUR) 30 MG 24 hr tablet     lisinopril (ZESTRIL) 40 MG tablet     metFORMIN (GLUCOPHAGE) 1000 MG tablet     metoprolol succinate ER (TOPROL-XL) 50 MG 24 hr tablet     omeprazole (PRILOSEC) 40 MG DR capsule     No current facility-administered medications for this visit.         FAMILY HISTORY:   Family History   Problem Relation Age of Onset     Cerebrovascular Disease Maternal Grandmother      Arthritis Other         hip fracture     Respiratory Father         emphysema     Alzheimer Disease Maternal Grandfather      Breast Cancer Sister      Cancer - colorectal No family hx of      Prostate Cancer No family hx of      The family history was reviewed and is not pertinent to the patient's disease/illness      SOCIAL HISTORY:   Social History     Socioeconomic History     Marital status:      Spouse name: Not on file     Number of children: Not on file     Years of education: Not on file     Highest education level: Not on file   Occupational History     Not on file   Tobacco Use     Smoking status: Current Every Day Smoker     Packs/day: 0.50     Years: 50.00     Pack years: 25.00     Types: Cigarettes     Start date: 1/1/1972     Smokeless tobacco: Never Used   Vaping Use     Vaping Use: Never used   Substance and Sexual Activity     Alcohol use: Yes     Comment: 2 beers a week      Drug use: No     Sexual activity: Yes     Partners: Female     Birth control/protection: Condom   Other Topics Concern     Parent/sibling w/ CABG, MI or angioplasty before 65F 55M? No   Social History Narrative     Not on file     Social Determinants of Health     Financial Resource Strain: Not on file   Food Insecurity: Not on file   Transportation Needs: Not on file   Physical Activity: Not on file   Stress: Not on file   Social Connections: Not on file   Intimate Partner Violence: Not on file    Housing Stability: Not on file         REVIEW OF SYSTEMS:  Review of Systems - Negative except chest pain  A complete 10 point review of systems was obtained and is negative other than the above stated complaints    PHYSICAL EXAMINATION:   Vitals: There were no vitals taken for this visit.  GENERAL:  Well developed but obese   HEENT: conjunctiva anicteric and sclera clear   NECK: no JVD  RESPIRATIONS: breathing comfortably, no audible wheezing  ABDOMEN: non distended  EXTREMITIES: no deformity or swelling   NEUROLOGIC: intact and symmetric grossly  PSYCHIATRIC: alert and oriented x3, pleasant     LABORATORY STUDIES:   Lab Results   Component Value Date    WBC 11.3 (H) 12/29/2021    HGB 13.0 (L) 12/29/2021    HCT 40.3 12/29/2021    MCV 93 12/29/2021     12/29/2021      Lab Results   Component Value Date    BUN 17 12/31/2021     12/31/2021    CO2 28 12/31/2021

## 2022-02-02 ENCOUNTER — VIRTUAL VISIT (OUTPATIENT)
Dept: VASCULAR SURGERY | Facility: CLINIC | Age: 67
End: 2022-02-02
Payer: COMMERCIAL

## 2022-02-02 DIAGNOSIS — I25.118 CORONARY ARTERY DISEASE OF NATIVE ARTERY OF NATIVE HEART WITH STABLE ANGINA PECTORIS (H): ICD-10-CM

## 2022-02-02 DIAGNOSIS — I73.9 INTERMITTENT CLAUDICATION (H): ICD-10-CM

## 2022-02-02 DIAGNOSIS — I65.21 ASYMPTOMATIC STENOSIS OF RIGHT CAROTID ARTERY: Primary | ICD-10-CM

## 2022-02-02 PROCEDURE — 99214 OFFICE O/P EST MOD 30 MIN: CPT | Mod: 95 | Performed by: SURGERY

## 2022-02-02 RX ORDER — CEFAZOLIN SODIUM 2 G/50ML
2 SOLUTION INTRAVENOUS SEE ADMIN INSTRUCTIONS
Status: CANCELLED | OUTPATIENT
Start: 2022-02-02

## 2022-02-02 RX ORDER — CEFAZOLIN SODIUM 2 G/50ML
2 SOLUTION INTRAVENOUS
Status: CANCELLED | OUTPATIENT
Start: 2022-02-02

## 2022-02-02 NOTE — PROGRESS NOTES
Vascular Surgery Consultation Note     Patient:  Roger Bonilla   Date of birth 1955, Medical record number [id number]  Date of Visit:  02/02/2022  Consult Requester:No att. providers found            Assessment and Recommendations:   ASSESSMENT / RECOMMENDATION:    66-year-old male with high-grade, greater than 90%, right internal carotid artery stenosis, asymptomatic, with stable coronary artery disease.  His stress test late in 2021 was positive resulting in coronary catheterization and recommended coronary artery bypass grafting.  We collectively discussed his case with cardiology and cardiac surgery.  Given his extremely high velocities in the right carotid artery the consensus was that the risk of stroke would be extremely high if he underwent either CABG first or combined carotid CABG.  His cardiac surgeon, Dr. Waters, felt that the patient should undergo a carotid endarterectomy first with subsequent CABG 6 to 8 weeks later.  I discussed the risks and benefits of this extensively with Mr. Bonilla.  He is agreeable and would like to proceed with right carotid endarterectomy.  I discussed the risks of right-sided carotid endarterectomy including but not limited to death, stroke, bleeding, MI and infection. We reviewed the benefits and alternatives including medical management and stenting. Mr. Bonilla was involved in all aspects of decision making and appears to understand. They would like to proceed with the recommended treatment of carotid endarterectomy.     Many thanks for involving me in the care of this very pleasant patient. Should any questions or concerns arise, please don't hesitate to contact me.    Warm Regards,    Karma Adorno MD, DFSVS, RPVI  Director, [facility] Vascular Services  Professor and Chief, Vascular and Endovascular Surgery  [facility]  [email or web address]          45 minutes spent on the date of the encounter doing chart review, review of outside records, review of test  results, interpretation of tests, patient visit, documentation and discussion with other provider(s)           HPI: Mr. Bonilla is followed up today for his asymptomatic high-grade right carotid stenosis.  He was seen by me last November for follow-up of peripheral arterial disease as well as asymptomatic carotid disease.  He has been waking up with some issues with chest pain and ultimately underwent a stress test which was positive.  His cardiac catheterization resulted in plans for coronary artery bypass grafting with the recommendation this be done after the carotid endarterectomy.  Today he feels well.  He states approximately 1 time per week he has some issues with chest pain when lying down which goes away when he sits up.  He is a  at a [employer] factory and would plan roughly 2 weeks off of work after carotid endarterectomy.  He denies stroke, amaurosis or TIA.      Review of Systems   Constitutional, HEENT, cardiovascular, pulmonary, gi and gu systems are negative, except as otherwise noted.    Physical Exam   GENERAL: Healthy, alert and no distress  EYES: Eyes grossly normal to inspection.  No discharge or erythema, or obvious scleral/conjunctival abnormalities.  RESP: No audible wheeze, cough, or visible cyanosis.  No visible retractions or increased work of breathing.    SKIN: Visible skin clear. No significant rash, abnormal pigmentation or lesions.  NEURO: Cranial nerves grossly intact.  Mentation and speech appropriate for age.  PSYCH: Mentation appears normal, affect normal/bright, judgement and insight intact, normal speech and appearance well-groomed.          Flex is a 66 year old who is being evaluated via a billable telephone visit.      What phone number would you like to be contacted at? [phone]   How would you like to obtain your AVS? [employer]    Phone call duration: 30 minutes

## 2022-02-02 NOTE — LETTER
2/2/2022       RE: Roger Bonilla  [address]  [facility] [address]     Dear Colleague,    Thank you for referring your patient, Roger Bonilla, to the [facility] VASCULAR CLINIC [place] at [facility]. Please see a copy of my visit note below.      Vascular Surgery Consultation Note     Patient:  Roger Bonilla   Date of birth 1955, Medical record number [id number]  Date of Visit:  02/02/2022  Consult Requester:No att. providers found            Assessment and Recommendations:   ASSESSMENT / RECOMMENDATION:    66-year-old male with high-grade, greater than 90%, right internal carotid artery stenosis, asymptomatic, with stable coronary artery disease.  His stress test late in 2021 was positive resulting in coronary catheterization and recommended coronary artery bypass grafting.  We collectively discussed his case with cardiology and cardiac surgery.  Given his extremely high velocities in the right carotid artery the consensus was that the risk of stroke would be extremely high if he underwent either CABG first or combined carotid CABG.  His cardiac surgeon, Dr. Waters, felt that the patient should undergo a carotid endarterectomy first with subsequent CABG 6 to 8 weeks later.  I discussed the risks and benefits of this extensively with Mr. Bonilla.  He is agreeable and would like to proceed with right carotid endarterectomy.  I discussed the risks of right-sided carotid endarterectomy including but not limited to death, stroke, bleeding, MI and infection. We reviewed the benefits and alternatives including medical management and stenting. Mr. Bonilla was involved in all aspects of decision making and appears to understand. They would like to proceed with the recommended treatment of carotid endarterectomy.     Many thanks for involving me in the care of this very pleasant patient. Should any questions or concerns arise, please don't hesitate to contact me.    Warm  Regards,    Karma Adorno MD, DFSVS, RPVI  Director, [facility] Vascular Services  Professor and Chief, Vascular and Endovascular Surgery  [facility]  [email or web address]          45 minutes spent on the date of the encounter doing chart review, review of outside records, review of test results, interpretation of tests, patient visit, documentation and discussion with other provider(s)           HPI: Mr. Bonilla is followed up today for his asymptomatic high-grade right carotid stenosis.  He was seen by me last November for follow-up of peripheral arterial disease as well as asymptomatic carotid disease.  He has been waking up with some issues with chest pain and ultimately underwent a stress test which was positive.  His cardiac catheterization resulted in plans for coronary artery bypass grafting with the recommendation this be done after the carotid endarterectomy.  Today he feels well.  He states approximately 1 time per week he has some issues with chest pain when lying down which goes away when he sits up.  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  He denies stroke, amaurosis or TIA.      Review of Systems   Constitutional, HEENT, cardiovascular, pulmonary, gi and gu systems are negative, except as otherwise noted.    Physical Exam   GENERAL: Healthy, alert and no distress  EYES: Eyes grossly normal to inspection.  No discharge or erythema, or obvious scleral/conjunctival abnormalities.  RESP: No audible wheeze, cough, or visible cyanosis.  No visible retractions or increased work of breathing.    SKIN: Visible skin clear. No significant rash, abnormal pigmentation or lesions.  NEURO: Cranial nerves grossly intact.  Mentation and speech appropriate for age.  PSYCH: Mentation appears normal, affect normal/bright, judgement and insight intact, normal speech and appearance well-groomed.          Again, thank you for allowing me  to participate in the care of your patient.      Sincerely,    Karma Adorno MD

## 2022-02-02 NOTE — NURSING NOTE
Chief Complaint   Patient presents with     Consult     to discuss carotid artery surgery, no questions/concerns at time of rooming, pt reported no vitals for todays visit      Patient denies any changes since echeck-in regarding medication and allergies and states all information entered during echeck-in remains accurate.    Jacqui Dixon, VF/CMA

## 2022-02-04 ENCOUNTER — OFFICE VISIT (OUTPATIENT)
Dept: FAMILY MEDICINE | Facility: CLINIC | Age: 67
End: 2022-02-04
Payer: COMMERCIAL

## 2022-02-04 VITALS
HEIGHT: 66 IN | SYSTOLIC BLOOD PRESSURE: 132 MMHG | OXYGEN SATURATION: 98 % | BODY MASS INDEX: 39.05 KG/M2 | HEART RATE: 60 BPM | DIASTOLIC BLOOD PRESSURE: 60 MMHG | TEMPERATURE: 97 F | RESPIRATION RATE: 20 BRPM | WEIGHT: 243 LBS

## 2022-02-04 DIAGNOSIS — E66.01 MORBID OBESITY (H): ICD-10-CM

## 2022-02-04 DIAGNOSIS — I65.23 ATHEROSCLEROSIS OF BOTH CAROTID ARTERIES: Chronic | ICD-10-CM

## 2022-02-04 DIAGNOSIS — Z72.0 TOBACCO ABUSE: ICD-10-CM

## 2022-02-04 DIAGNOSIS — G47.33 OSA (OBSTRUCTIVE SLEEP APNEA): Chronic | ICD-10-CM

## 2022-02-04 DIAGNOSIS — Z01.818 PRE-OP EVALUATION: Primary | ICD-10-CM

## 2022-02-04 DIAGNOSIS — E11.9 TYPE 2 DIABETES MELLITUS WITHOUT COMPLICATION, WITHOUT LONG-TERM CURRENT USE OF INSULIN (H): Chronic | ICD-10-CM

## 2022-02-04 DIAGNOSIS — E78.5 HYPERLIPIDEMIA LDL GOAL <70: ICD-10-CM

## 2022-02-04 DIAGNOSIS — I25.118 CORONARY ARTERY DISEASE OF NATIVE ARTERY OF NATIVE HEART WITH STABLE ANGINA PECTORIS (H): ICD-10-CM

## 2022-02-04 DIAGNOSIS — I10 BENIGN ESSENTIAL HYPERTENSION: ICD-10-CM

## 2022-02-04 LAB
ANION GAP SERPL CALCULATED.3IONS-SCNC: 2 MMOL/L (ref 3–14)
BUN SERPL-MCNC: 16 MG/DL (ref 7–30)
CALCIUM SERPL-MCNC: 9.2 MG/DL (ref 8.5–10.1)
CHLORIDE BLD-SCNC: 106 MMOL/L (ref 94–109)
CO2 SERPL-SCNC: 31 MMOL/L (ref 20–32)
CREAT SERPL-MCNC: 1.01 MG/DL (ref 0.66–1.25)
ERYTHROCYTE [DISTWIDTH] IN BLOOD BY AUTOMATED COUNT: 14.2 % (ref 10–15)
GFR SERPL CREATININE-BSD FRML MDRD: 82 ML/MIN/1.73M2
GLUCOSE BLD-MCNC: 108 MG/DL (ref 70–99)
HBA1C MFR BLD: 6.4 % (ref 0–5.6)
HCT VFR BLD AUTO: 43 % (ref 40–53)
HGB BLD-MCNC: 13.5 G/DL (ref 13.3–17.7)
MCH RBC QN AUTO: 29.9 PG (ref 26.5–33)
MCHC RBC AUTO-ENTMCNC: 31.4 G/DL (ref 31.5–36.5)
MCV RBC AUTO: 95 FL (ref 78–100)
PLATELET # BLD AUTO: 211 10E3/UL (ref 150–450)
POTASSIUM BLD-SCNC: 4.7 MMOL/L (ref 3.4–5.3)
RBC # BLD AUTO: 4.51 10E6/UL (ref 4.4–5.9)
SODIUM SERPL-SCNC: 139 MMOL/L (ref 133–144)
WBC # BLD AUTO: 10.3 10E3/UL (ref 4–11)

## 2022-02-04 PROCEDURE — 85027 COMPLETE CBC AUTOMATED: CPT | Performed by: NURSE PRACTITIONER

## 2022-02-04 PROCEDURE — 36415 COLL VENOUS BLD VENIPUNCTURE: CPT | Performed by: NURSE PRACTITIONER

## 2022-02-04 PROCEDURE — 99214 OFFICE O/P EST MOD 30 MIN: CPT | Performed by: NURSE PRACTITIONER

## 2022-02-04 PROCEDURE — 80048 BASIC METABOLIC PNL TOTAL CA: CPT | Performed by: NURSE PRACTITIONER

## 2022-02-04 PROCEDURE — 83036 HEMOGLOBIN GLYCOSYLATED A1C: CPT | Performed by: NURSE PRACTITIONER

## 2022-02-04 ASSESSMENT — MIFFLIN-ST. JEOR: SCORE: 1824.99

## 2022-02-04 NOTE — PROGRESS NOTES
[facility]  [address] [address]  Phone: [phone]  Primary Provider: Maria De Jesus Moore  Pre-op Performing Provider: MARIA DE JESUS MOORE      PREOPERATIVE EVALUATION:  Today's date: 2/4/2022    Roger Bonilla is a 66 year old male who presents for a preoperative evaluation.    Surgical Information:  Surgery/Procedure: Right carotid endarterectomy  Surgery Location: [facility]  Surgeon: Dr. Adorno  Surgery Date: TBD-  Not scheduled in [facility] yet/ patient hasn't gotten a phone call with details.  Time of Surgery: TBD  Where patient plans to recover: At home with family  Fax number for surgical facility: Note does not need to be faxed, will be available electronically in Epic.    Type of Anesthesia Anticipated: to be determined    Assessment & Plan     The proposed surgical procedure is considered HIGH risk.    Pre-op evaluation  - Hemoglobin A1c; Future  - CBC with platelets; Future  - Basic metabolic panel  (Ca, Cl, CO2, Creat, Gluc, K, Na, BUN); Future    Atherosclerosis of both carotid arteries  - Hemoglobin A1c; Future  - CBC with platelets; Future  - Basic metabolic panel  (Ca, Cl, CO2, Creat, Gluc, K, Na, BUN); Future    Coronary artery disease of native artery of native heart with stable angina pectoris (H)  - Hemoglobin A1c; Future  - CBC with platelets; Future  - Basic metabolic panel  (Ca, Cl, CO2, Creat, Gluc, K, Na, BUN); Future    Morbid obesity (H)  - Hemoglobin A1c; Future  - CBC with platelets; Future  - Basic metabolic panel  (Ca, Cl, CO2, Creat, Gluc, K, Na, BUN); Future    ABIGAIL (obstructive sleep apnea)  - Hemoglobin A1c; Future  - CBC with platelets; Future  - Basic metabolic panel  (Ca, Cl, CO2, Creat, Gluc, K, Na, BUN); Future    Type 2 diabetes mellitus without complication, without long-term current use of insulin (H)  - Hemoglobin A1c; Future  - CBC with platelets; Future  - Basic metabolic panel  (Ca, Cl, CO2, Creat, Gluc, K, Na, BUN);  Future    Hyperlipidemia LDL goal <70  - Hemoglobin A1c; Future  - CBC with platelets; Future  - Basic metabolic panel  (Ca, Cl, CO2, Creat, Gluc, K, Na, BUN); Future    Tobacco abuse  Patient states that he quit as of today  - Hemoglobin A1c; Future  - CBC with platelets; Future  - Basic metabolic panel  (Ca, Cl, CO2, Creat, Gluc, K, Na, BUN); Future    Benign essential hypertension  - Hemoglobin A1c; Future  - CBC with platelets; Future  - Basic metabolic panel  (Ca, Cl, CO2, Creat, Gluc, K, Na, BUN); Future         Risks and Recommendations:  The patient has the following additional risks and recommendations for perioperative complications:  Cardiovascular:   - recent cardiac work up reviewed  Diabetes:  - Patient is not on insulin therapy: regular NPO guidelines can be followed.   Obstructive Sleep Apnea:   Patient not using a CPAP  Social and Substance:    - Active nicotine user, advised smoking cessation - Patient reports that he quit smoking as of today    Medication Instructions:  Patient is to take all scheduled medications on the day of surgery - per Dr Adorno, patient is to take all medications as prescribed the day of surgery, including the aspirin    RECOMMENDATION:  APPROVAL GIVEN to proceed with proposed procedure, without further diagnostic evaluation.                      Subjective     HPI related to upcoming procedure:   Carotid atherosclerosis       Preop Questions 2/4/2022   1. Have you ever had a heart attack or stroke? YES    2. Have you ever had surgery on your heart or blood vessels, such as a stent placement, a coronary artery bypass, or surgery on an artery in your head, neck, heart, or legs? YES    3. Do you have chest pain with activity? YES    4. Do you have a history of  heart failure? YES    5. Do you currently have a cold, bronchitis or symptoms of other infection? No   6. Do you have a cough, shortness of breath, or wheezing? No   7. Do you or anyone in your family have previous  history of blood clots? UNKNOWN    8. Do you or does anyone in your family have a serious bleeding problem such as prolonged bleeding following surgeries or cuts? UNKNOWN    9. Have you ever had problems with anemia or been told to take iron pills? No   10. Have you had any abnormal blood loss such as black, tarry or bloody stools? YES    11. Have you ever had a blood transfusion? No   12. Are you willing to have a blood transfusion if it is medically needed before, during, or after your surgery? Yes   13. Have you or any of your relatives ever had problems with anesthesia? UNKNOWN    14. Do you have sleep apnea, excessive snoring or daytime drowsiness? YES    14a. Do you have a CPAP machine? No   15. Do you have any artifical heart valves or other implanted medical devices like a pacemaker, defibrillator, or continuous glucose monitor? No   16. Do you have artificial joints? No   17. Are you allergic to latex? No       Health Care Directive:  Patient does not have a Health Care Directive or Living Will: yes - on file      Status of Chronic Conditions:  CAD - Patient has a longstanding history of moderate-severe CAD. Last Stress test 12/2021, EKG 12/2021.     DIABETES - Patient has a longstanding history of DiabetesType Type II . Patient is being treated with oral agents and denies significant side effects. Control has been good. Complicating factors include but are not limited to: hypertension, hyperlipidemia, CAD/PVD, morbid obesity  and tobacco use.     HYPERLIPIDEMIA - Patient has a long history of significant Hyperlipidemia requiring medication for treatment with recent good control. Patient reports no problems or side effects with the medication.     HYPERTENSION - Patient has longstanding history of HTN , currently denies any symptoms referable to elevated blood pressure. Specifically denies chest pain, palpitations, dyspnea, orthopnea, PND or peripheral edema. Blood pressure readings have been in normal  range. Current medication regimen is as listed below. Patient denies any side effects of medication.     SLEEP PROBLEM - Patient has a longstanding history of ABIGAIL - not using a CPAP      Review of Systems  CONSTITUTIONAL: NEGATIVE for fever, chills, change in weight  INTEGUMENTARY/SKIN: NEGATIVE for worrisome rashes, moles or lesions  EYES: NEGATIVE for vision changes or irritation  ENT/MOUTH: NEGATIVE for ear, mouth and throat problems  RESP: NEGATIVE for significant cough or SOB  CV: NEGATIVE for chest pain, palpitations or peripheral edema  GI: NEGATIVE for nausea, abdominal pain, heartburn, or change in bowel habits  : NEGATIVE for frequency, dysuria, or hematuria  MUSCULOSKELETAL: NEGATIVE for significant arthralgias or myalgia  NEURO: NEGATIVE for weakness, dizziness or paresthesias  ENDOCRINE: NEGATIVE for temperature intolerance, skin/hair changes  HEME: NEGATIVE for bleeding problems  PSYCHIATRIC: NEGATIVE for changes in mood or affect    Patient Active Problem List    Diagnosis Date Noted     Status post coronary angiogram 12/29/2021     Priority: Medium     Epigastric pain 07/19/2021     Priority: Medium     AAA (abdominal aortic aneurysm) (H) 06/25/2021     Priority: Medium     3.3 cm, consider repeat imaging in 2-3 years from 6/2021       Encounter for screening laboratory testing for COVID-19 virus 06/24/2021     Priority: Medium     Acute kidney injury (H) 06/23/2021     Priority: Medium     COVID-19 ruled out 06/23/2021     Priority: Medium     Dehydration 06/23/2021     Priority: Medium     Hypomagnesemia 06/23/2021     Priority: Medium     Hypophosphatemia 06/23/2021     Priority: Medium     Atherosclerosis of both carotid arteries 06/20/2019     Priority: Medium     Coronary artery disease of native artery of native heart with stable angina pectoris (H) 08/04/2017     Priority: Medium     Type 2 diabetes mellitus without complication (H) 10/21/2015     Priority: Medium     PAD (peripheral artery  disease) (H) 04/14/2014     Priority: Medium     Benign essential hypertension 08/05/2013     Priority: Medium     Tobacco abuse 11/13/2012     Priority: Medium     Morbid obesity (H) 12/01/2011     Priority: Medium     ABIGAIL (obstructive sleep apnea) 11/28/2011     Priority: Medium     Severe ABIGAIL with significant oxygen desaturations in REM (AHI 87.2, ba desat 66% in REM)  Incomplete titration with remaining events in supine sleep on CPAP 19 cm H2O.  Looked significantly improved during brief trial of bilevel PAP at 16/12 including lateral NREM / REM and supine NREM.         Hyperlipidemia LDL goal <70 09/30/2011     Priority: Medium     Disorder of bursae and tendons in shoulder region 11/10/2005     Priority: Medium     Problem list name updated by automated process. Provider to review       PVD (peripheral vascular disease) (H) 05/01/2001     Priority: Medium     MI -- Angioplasty two stents RCA & OM2  Problem list name updated by automated process. Provider to review        Past Medical History:   Diagnosis Date     Coronary artery disease may 1 2005    2 stents put in heart     Pure hypercholesterolemia      Type II or unspecified type diabetes mellitus without mention of complication, not stated as uncontrolled      Unspecified cardiovascular disease 5-2001    MI -- Angioplasty two stents RCA & OM2     Unspecified essential hypertension      Past Surgical History:   Procedure Laterality Date     CARDIAC SURGERY  may 1, 2005     COLONOSCOPY  11/30/2011    Procedure:COLONOSCOPY; Screening Colonoscopy; Surgeon:LUTHER FLORES; Location:[facility]     CV CORONARY ANGIOGRAM N/A 12/29/2021    Procedure: Coronary Angiogram;  Surgeon: Jonny Moore MD;  Location: [facility] CARDIAC CATH LAB     ESOPHAGOSCOPY, GASTROSCOPY, DUODENOSCOPY (EGD), COMBINED N/A 7/21/2021    Procedure: ESOPHAGOGASTRODUODENOSCOPY, WITH BIOPSY;  Surgeon: Jeff Cunningham DO;  Location: [facility]     PHACOEMULSIFICATION WITH  STANDARD INTRAOCULAR LENS IMPLANT Right 7/28/2021    Procedure: Right eye Cataract Extraction with Implant;  Surgeon: Reyes Valdez MD;  Location: [facility]     PHACOEMULSIFICATION WITH STANDARD INTRAOCULAR LENS IMPLANT Left 8/18/2021    Procedure: left eye Cataract Extraction with Implant;  Surgeon: Reyes Valdez MD;  Location: [facility]     SURGICAL HISTORY OF -       None     VASCULAR SURGERY  oct 2013    stent put in leg     Current Outpatient Medications   Medication Sig Dispense Refill     aspirin 81 MG EC tablet Take 1 tablet (81 mg) by mouth daily 90 tablet 3     atorvastatin (LIPITOR) 80 MG tablet TAKE 1 TABLET (80 MG) BY MOUTH DAILY (Patient taking differently: Take 80 mg by mouth daily TAKE 1 TABLET (80 MG) BY MOUTH DAILY) 90 tablet 3     ezetimibe (ZETIA) 10 MG tablet Take 1 tablet (10 mg) by mouth daily 30 tablet 11     isosorbide mononitrate (IMDUR) 30 MG 24 hr tablet Take 1 tablet (30 mg) by mouth daily 90 tablet 3     lisinopril (ZESTRIL) 40 MG tablet Take 1 tablet (40 mg) by mouth daily 90 tablet 3     metFORMIN (GLUCOPHAGE) 1000 MG tablet TAKE ONE TABLET BY MOUTH ONE TIME DAILY (Patient taking differently: Take 1,000 mg by mouth daily (with breakfast) TAKE ONE TABLET BY MOUTH ONE TIME DAILY) 90 tablet 3     metoprolol succinate ER (TOPROL-XL) 50 MG 24 hr tablet TAKE 1 TABLET (50 MG) BY MOUTH DAILY (Patient taking differently: Take 50 mg by mouth daily TAKE 1 TABLET (50 MG) BY MOUTH DAILY) 90 tablet 3     omeprazole (PRILOSEC) 40 MG DR capsule Take 1 capsule (40 mg) by mouth daily 90 capsule 1       Allergies   Allergen Reactions     Nkda [No Known Drug Allergies]         Social History     Tobacco Use     Smoking status: Current Every Day Smoker     Packs/day: 0.50     Years: 50.00     Pack years: 25.00     Types: Cigarettes     Start date: 1/1/1972     Smokeless tobacco: Never Used   Substance Use Topics     Alcohol use: Not Currently     Comment: 2 beers a week        History   Drug  "Use No         Objective     /60 (BP Location: Left arm, Cuff Size: Adult Large)   Pulse 60   Temp 97  F (36.1  C) (Tympanic)   Resp 20   Ht 1.676 m (5' 6\")   Wt 110.2 kg (243 lb)   SpO2 98%   BMI 39.22 kg/m      Physical Exam    GENERAL APPEARANCE: healthy, alert and no distress     EYES: EOMI,  PERRL     HENT: ear canals and TM's normal and nose and mouth without ulcers or lesions     NECK: no adenopathy, no asymmetry, masses, or scars and thyroid normal to palpation     RESP: lungs clear to auscultation - no rales, rhonchi or wheezes     CV: regular rates and rhythm, normal S1 S2, no S3 or S4 and no murmur, click or rub     ABDOMEN:  soft, nontender, no HSM or masses and bowel sounds normal     MS: extremities normal- no gross deformities noted, no evidence of inflammation in joints, FROM in all extremities.     SKIN: no suspicious lesions or rashes     NEURO: Normal strength and tone, sensory exam grossly normal, mentation intact and speech normal     PSYCH: mentation appears normal. and affect normal/bright     LYMPHATICS: No cervical adenopathy    Recent Labs   Lab Test 12/31/21  1144 12/29/21  0709 10/08/21  0922 07/21/21  0416 07/20/21  0437   HGB  --  13.0*  --  12.4* 12.5*   PLT  --  248  --  200 201   INR  --  0.98  --   --   --     139 139 140 136   POTASSIUM 4.1 3.9 4.1 5.0 4.8   CR 0.94 0.90 0.97 1.27* 2.34*   A1C  --   --  6.0*  --  6.2*        Diagnostics:  Recent Results (from the past 24 hour(s))   Hemoglobin A1c    Collection Time: 02/04/22 11:28 AM   Result Value Ref Range    Hemoglobin A1C 6.4 (H) 0.0 - 5.6 %   CBC with platelets    Collection Time: 02/04/22 11:28 AM   Result Value Ref Range    WBC Count 10.3 4.0 - 11.0 10e3/uL    RBC Count 4.51 4.40 - 5.90 10e6/uL    Hemoglobin 13.5 13.3 - 17.7 g/dL    Hematocrit 43.0 40.0 - 53.0 %    MCV 95 78 - 100 fL    MCH 29.9 26.5 - 33.0 pg    MCHC 31.4 (L) 31.5 - 36.5 g/dL    RDW 14.2 10.0 - 15.0 %    Platelet Count 211 150 - 450 " 10e3/uL   Basic metabolic panel  (Ca, Cl, CO2, Creat, Gluc, K, Na, BUN)    Collection Time: 02/04/22 11:28 AM   Result Value Ref Range    Sodium 139 133 - 144 mmol/L    Potassium 4.7 3.4 - 5.3 mmol/L    Chloride 106 94 - 109 mmol/L    Carbon Dioxide (CO2) 31 20 - 32 mmol/L    Anion Gap 2 (L) 3 - 14 mmol/L    Urea Nitrogen 16 7 - 30 mg/dL    Creatinine 1.01 0.66 - 1.25 mg/dL    Calcium 9.2 8.5 - 10.1 mg/dL    Glucose 108 (H) 70 - 99 mg/dL    GFR Estimate 82 >60 mL/min/1.73m2        EKG not done in clinic - recent cardiac work up reviewed.    Revised Cardiac Risk Index (RCRI):  The patient has the following serious cardiovascular risks for perioperative complications:   - High risk surgery (>5% cardiac complication risk) = 1 point   - Coronary Artery Disease (MI, positive stress test, angina, Qs on EKG) = 1 point   - Congestive Heart Failure (pulmonary edema, PND, s3 van, CXR with pulmonary congestion, basilar rales) = 1 point     RCRI Interpretation: 3 points: Class IV (high risk - >11% complication rate)    Estimated Functional Capacity: CANNOT perform 4 METS without symptoms           Signed Electronically by: SAL Kc CNP  Copy of this evaluation report is provided to requesting physician.

## 2022-02-09 ENCOUNTER — TELEPHONE (OUTPATIENT)
Dept: VASCULAR SURGERY | Facility: CLINIC | Age: 67
End: 2022-02-09
Payer: COMMERCIAL

## 2022-02-09 DIAGNOSIS — Z11.59 ENCOUNTER FOR SCREENING FOR OTHER VIRAL DISEASES: Primary | ICD-10-CM

## 2022-02-10 DIAGNOSIS — I65.23 ATHEROSCLEROSIS OF BOTH CAROTID ARTERIES: Primary | ICD-10-CM

## 2022-02-10 DIAGNOSIS — Z98.890 S/P CAROTID ENDARTERECTOMY: ICD-10-CM

## 2022-02-10 NOTE — PROGRESS NOTES
Date: 2/10/2022    Time of Call: 8:34 AM     Diagnosis:  Carotid Stenosis, s/p Carotid endartectomy     [ VORB ] Ordering provider: Dr Karma Adorno  Order: US Carotid, Bilateral     Order received by: Madisyn Flores LPN     Follow-up/additional notes:

## 2022-02-10 NOTE — TELEPHONE ENCOUNTER
Spoke with patient to schedule procedure with Dr. Karma Adorno    Procedure was scheduled on 02/18 at [facility] OR  Patient will have H&P with (PCP)     Patient is aware a COVID-19 test is needed before their procedure. The test should be with-in 4 days of their procedure.   Test Details: Date 02/16 Location [facility] [place]    PO Visit scheduled on:     4-6 week to be at [place]     Patient is aware a / is needed day of surgery.   Surgery letter was sent via [employer], patient has my direct contact information for any further questions.     Vendor requested: no

## 2022-02-12 ENCOUNTER — HEALTH MAINTENANCE LETTER (OUTPATIENT)
Age: 67
End: 2022-02-12

## 2022-02-16 ENCOUNTER — LAB (OUTPATIENT)
Dept: LAB | Facility: CLINIC | Age: 67
End: 2022-02-16
Payer: COMMERCIAL

## 2022-02-16 ENCOUNTER — TELEPHONE (OUTPATIENT)
Dept: VASCULAR SURGERY | Facility: CLINIC | Age: 67
End: 2022-02-16
Payer: COMMERCIAL

## 2022-02-16 DIAGNOSIS — Z11.59 ENCOUNTER FOR SCREENING FOR OTHER VIRAL DISEASES: ICD-10-CM

## 2022-02-16 LAB — SARS-COV-2 RNA RESP QL NAA+PROBE: NEGATIVE

## 2022-02-16 PROCEDURE — U0003 INFECTIOUS AGENT DETECTION BY NUCLEIC ACID (DNA OR RNA); SEVERE ACUTE RESPIRATORY SYNDROME CORONAVIRUS 2 (SARS-COV-2) (CORONAVIRUS DISEASE [COVID-19]), AMPLIFIED PROBE TECHNIQUE, MAKING USE OF HIGH THROUGHPUT TECHNOLOGIES AS DESCRIBED BY CMS-2020-01-R: HCPCS

## 2022-02-16 PROCEDURE — U0005 INFEC AGEN DETEC AMPLI PROBE: HCPCS

## 2022-02-16 NOTE — TELEPHONE ENCOUNTER
Called and spoke with Pt.  Noted Dial antibacterial soap is the recommended soap to use.  Pt inquired if [facility] spring antibacterial would be ok, as he has that already.  Confirmed that was acceptable.  Pt verbalized understanding, agreed to current plan and denied any further questions.    Madisyn Flores LPN

## 2022-02-16 NOTE — TELEPHONE ENCOUNTER
The pt is stating that he is scheduled for surgery on 2/18/22. The pt is stating that he received his pre-op  information in the mail but he did not receive the antiseptic soap with that be sent to him before 2/18/22 or is there somewhere the pt can pick it up.  Carlos Remy on 2/16/2022 at 10:33 AM

## 2022-02-17 ENCOUNTER — TELEPHONE (OUTPATIENT)
Dept: VASCULAR SURGERY | Facility: CLINIC | Age: 67
End: 2022-02-17
Payer: COMMERCIAL

## 2022-02-17 ENCOUNTER — ANESTHESIA EVENT (OUTPATIENT)
Dept: SURGERY | Facility: CLINIC | Age: 67
DRG: 039 | End: 2022-02-17
Payer: COMMERCIAL

## 2022-02-17 NOTE — TELEPHONE ENCOUNTER
Returned call to Brant. I was unable to reach him and his voicemail has not been set up. I will try again shortly.    SHI SolisN, RN  [facility] - [facility] Vascular Surgery  Ph: [phone]  Fax: [phone]

## 2022-02-17 NOTE — TELEPHONE ENCOUNTER
[facility] Call Center    Phone Message    May a detailed message be left on voicemail: yes     Reason for Call: Other: Brant, pts son called has questions regarding visitor and transportation policy and how long the procedure may take. Please call him back ASAP at [phone]. Thanks!     Action Taken: Message routed to:  Other: Vascular surgery    Travel Screening: Not Applicable

## 2022-02-17 NOTE — TELEPHONE ENCOUNTER
I spoke with Brant and relayed our visitor policy to him. I answered his questions and he verbalized understanding.    OBDULIO Solis, RN  [facility] Vascular Surgery  Ph: [phone]  Fax: [phone]

## 2022-02-18 ENCOUNTER — ANESTHESIA (OUTPATIENT)
Dept: SURGERY | Facility: CLINIC | Age: 67
DRG: 039 | End: 2022-02-18
Payer: COMMERCIAL

## 2022-02-18 ENCOUNTER — HOSPITAL ENCOUNTER (INPATIENT)
Facility: CLINIC | Age: 67
LOS: 1 days | Discharge: HOME | DRG: 039 | End: 2022-02-19
Attending: SURGERY | Admitting: SURGERY
Payer: COMMERCIAL

## 2022-02-18 DIAGNOSIS — I73.9 PAD (PERIPHERAL ARTERY DISEASE) (H): Primary | Chronic | ICD-10-CM

## 2022-02-18 PROBLEM — Z41.9 ELECTIVE SURGERY: Status: ACTIVE | Noted: 2022-02-18

## 2022-02-18 LAB
ABO/RH(D): NORMAL
ANION GAP SERPL CALCULATED.3IONS-SCNC: 3 MMOL/L (ref 3–14)
ANTIBODY SCREEN: NEGATIVE
BUN SERPL-MCNC: 20 MG/DL (ref 7–30)
CALCIUM SERPL-MCNC: 8.8 MG/DL (ref 8.5–10.1)
CHLORIDE BLD-SCNC: 107 MMOL/L (ref 94–109)
CO2 SERPL-SCNC: 30 MMOL/L (ref 20–32)
CREAT SERPL-MCNC: 1 MG/DL (ref 0.66–1.25)
ERYTHROCYTE [DISTWIDTH] IN BLOOD BY AUTOMATED COUNT: 14.6 % (ref 10–15)
GFR SERPL CREATININE-BSD FRML MDRD: 83 ML/MIN/1.73M2
GLUCOSE BLD-MCNC: 100 MG/DL (ref 70–99)
GLUCOSE BLDC GLUCOMTR-MCNC: 104 MG/DL (ref 70–99)
GLUCOSE BLDC GLUCOMTR-MCNC: 149 MG/DL (ref 70–99)
GLUCOSE BLDC GLUCOMTR-MCNC: 151 MG/DL (ref 70–99)
GLUCOSE BLDC GLUCOMTR-MCNC: 165 MG/DL (ref 70–99)
HCT VFR BLD AUTO: 41.8 % (ref 40–53)
HGB BLD-MCNC: 13.2 G/DL (ref 13.3–17.7)
MCH RBC QN AUTO: 28.9 PG (ref 26.5–33)
MCHC RBC AUTO-ENTMCNC: 31.6 G/DL (ref 31.5–36.5)
MCV RBC AUTO: 92 FL (ref 78–100)
PLATELET # BLD AUTO: 261 10E3/UL (ref 150–450)
POTASSIUM BLD-SCNC: 4.2 MMOL/L (ref 3.4–5.3)
RBC # BLD AUTO: 4.56 10E6/UL (ref 4.4–5.9)
SODIUM SERPL-SCNC: 140 MMOL/L (ref 133–144)
SPECIMEN EXPIRATION DATE: NORMAL
WBC # BLD AUTO: 10.4 10E3/UL (ref 4–11)

## 2022-02-18 PROCEDURE — 370N000017 HC ANESTHESIA TECHNICAL FEE, PER MIN: Performed by: SURGERY

## 2022-02-18 PROCEDURE — 999N000157 HC STATISTIC RCP TIME EA 10 MIN

## 2022-02-18 PROCEDURE — 258N000003 HC RX IP 258 OP 636: Performed by: ANESTHESIOLOGY

## 2022-02-18 PROCEDURE — 4A103RD MONITORING OF INTRACRANIAL SATURATION, PERCUTANEOUS APPROACH: ICD-10-PCS | Performed by: SURGERY

## 2022-02-18 PROCEDURE — 86850 RBC ANTIBODY SCREEN: CPT | Performed by: ANESTHESIOLOGY

## 2022-02-18 PROCEDURE — 360N000077 HC SURGERY LEVEL 4, PER MIN: Performed by: SURGERY

## 2022-02-18 PROCEDURE — 250N000011 HC RX IP 250 OP 636

## 2022-02-18 PROCEDURE — 250N000011 HC RX IP 250 OP 636: Performed by: SURGERY

## 2022-02-18 PROCEDURE — 258N000003 HC RX IP 258 OP 636: Performed by: SURGERY

## 2022-02-18 PROCEDURE — 250N000009 HC RX 250: Performed by: SURGERY

## 2022-02-18 PROCEDURE — 250N000012 HC RX MED GY IP 250 OP 636 PS 637: Performed by: STUDENT IN AN ORGANIZED HEALTH CARE EDUCATION/TRAINING PROGRAM

## 2022-02-18 PROCEDURE — 999N000141 HC STATISTIC PRE-PROCEDURE NURSING ASSESSMENT: Performed by: SURGERY

## 2022-02-18 PROCEDURE — 258N000003 HC RX IP 258 OP 636: Performed by: STUDENT IN AN ORGANIZED HEALTH CARE EDUCATION/TRAINING PROGRAM

## 2022-02-18 PROCEDURE — 03CM0ZZ EXTIRPATION OF MATTER FROM RIGHT EXTERNAL CAROTID ARTERY, OPEN APPROACH: ICD-10-PCS | Performed by: SURGERY

## 2022-02-18 PROCEDURE — 03CK0ZZ EXTIRPATION OF MATTER FROM RIGHT INTERNAL CAROTID ARTERY, OPEN APPROACH: ICD-10-PCS | Performed by: SURGERY

## 2022-02-18 PROCEDURE — 82310 ASSAY OF CALCIUM: CPT | Performed by: SURGERY

## 2022-02-18 PROCEDURE — 999N000015 HC STATISTIC ARTERIAL MONITORING DAILY

## 2022-02-18 PROCEDURE — 710N000010 HC RECOVERY PHASE 1, LEVEL 2, PER MIN: Performed by: SURGERY

## 2022-02-18 PROCEDURE — 120N000003 HC R&B IMCU UMMC

## 2022-02-18 PROCEDURE — 250N000011 HC RX IP 250 OP 636: Performed by: STUDENT IN AN ORGANIZED HEALTH CARE EDUCATION/TRAINING PROGRAM

## 2022-02-18 PROCEDURE — 250N000025 HC SEVOFLURANE, PER MIN: Performed by: SURGERY

## 2022-02-18 PROCEDURE — 258N000003 HC RX IP 258 OP 636

## 2022-02-18 PROCEDURE — 36415 COLL VENOUS BLD VENIPUNCTURE: CPT | Performed by: SURGERY

## 2022-02-18 PROCEDURE — 272N000001 HC OR GENERAL SUPPLY STERILE: Performed by: SURGERY

## 2022-02-18 PROCEDURE — 250N000009 HC RX 250

## 2022-02-18 PROCEDURE — 86901 BLOOD TYPING SEROLOGIC RH(D): CPT | Performed by: ANESTHESIOLOGY

## 2022-02-18 PROCEDURE — 85014 HEMATOCRIT: CPT | Performed by: SURGERY

## 2022-02-18 PROCEDURE — C1763 CONN TISS, NON-HUMAN: HCPCS | Performed by: SURGERY

## 2022-02-18 PROCEDURE — 250N000013 HC RX MED GY IP 250 OP 250 PS 637: Performed by: SURGERY

## 2022-02-18 PROCEDURE — 35301 RECHANNELING OF ARTERY: CPT | Mod: RT | Performed by: SURGERY

## 2022-02-18 DEVICE — GRAFT PATCH VASC XENOSURE BIOLOGIC 0.8X08CM E0.8P8: Type: IMPLANTABLE DEVICE | Site: NECK | Status: FUNCTIONAL

## 2022-02-18 RX ORDER — LABETALOL HYDROCHLORIDE 5 MG/ML
10-20 INJECTION, SOLUTION INTRAVENOUS EVERY 4 HOURS PRN
Status: DISCONTINUED | OUTPATIENT
Start: 2022-02-18 | End: 2022-02-19 | Stop reason: HOSPADM

## 2022-02-18 RX ORDER — NICOTINE POLACRILEX 4 MG
15-30 LOZENGE BUCCAL
Status: DISCONTINUED | OUTPATIENT
Start: 2022-02-18 | End: 2022-02-19 | Stop reason: HOSPADM

## 2022-02-18 RX ORDER — HYDROMORPHONE HCL IN WATER/PF 6 MG/30 ML
0.2 PATIENT CONTROLLED ANALGESIA SYRINGE INTRAVENOUS
Status: DISCONTINUED | OUTPATIENT
Start: 2022-02-18 | End: 2022-02-19 | Stop reason: HOSPADM

## 2022-02-18 RX ORDER — ATORVASTATIN CALCIUM 80 MG/1
80 TABLET, FILM COATED ORAL EVERY EVENING
Status: DISCONTINUED | OUTPATIENT
Start: 2022-02-18 | End: 2022-02-18

## 2022-02-18 RX ORDER — HEPARIN SODIUM 1000 [USP'U]/ML
INJECTION, SOLUTION INTRAVENOUS; SUBCUTANEOUS PRN
Status: DISCONTINUED | OUTPATIENT
Start: 2022-02-18 | End: 2022-02-18

## 2022-02-18 RX ORDER — PROTAMINE SULFATE 10 MG/ML
INJECTION, SOLUTION INTRAVENOUS PRN
Status: DISCONTINUED | OUTPATIENT
Start: 2022-02-18 | End: 2022-02-18

## 2022-02-18 RX ORDER — HYDROMORPHONE HYDROCHLORIDE 1 MG/ML
0.4 INJECTION, SOLUTION INTRAMUSCULAR; INTRAVENOUS; SUBCUTANEOUS EVERY 5 MIN PRN
Status: DISCONTINUED | OUTPATIENT
Start: 2022-02-18 | End: 2022-02-18

## 2022-02-18 RX ORDER — LISINOPRIL 40 MG/1
40 TABLET ORAL DAILY
Status: DISCONTINUED | OUTPATIENT
Start: 2022-02-19 | End: 2022-02-18

## 2022-02-18 RX ORDER — SODIUM CHLORIDE, SODIUM LACTATE, POTASSIUM CHLORIDE, CALCIUM CHLORIDE 600; 310; 30; 20 MG/100ML; MG/100ML; MG/100ML; MG/100ML
INJECTION, SOLUTION INTRAVENOUS CONTINUOUS PRN
Status: DISCONTINUED | OUTPATIENT
Start: 2022-02-18 | End: 2022-02-18

## 2022-02-18 RX ORDER — EZETIMIBE 10 MG/1
10 TABLET ORAL DAILY
Status: DISCONTINUED | OUTPATIENT
Start: 2022-02-19 | End: 2022-02-19 | Stop reason: HOSPADM

## 2022-02-18 RX ORDER — NALOXONE HYDROCHLORIDE 0.4 MG/ML
0.4 INJECTION, SOLUTION INTRAMUSCULAR; INTRAVENOUS; SUBCUTANEOUS
Status: DISCONTINUED | OUTPATIENT
Start: 2022-02-18 | End: 2022-02-19 | Stop reason: HOSPADM

## 2022-02-18 RX ORDER — OXYCODONE HYDROCHLORIDE 5 MG/1
5 TABLET ORAL EVERY 4 HOURS PRN
Status: DISCONTINUED | OUTPATIENT
Start: 2022-02-18 | End: 2022-02-18

## 2022-02-18 RX ORDER — ACETAMINOPHEN 325 MG/1
650 TABLET ORAL EVERY 4 HOURS PRN
Status: DISCONTINUED | OUTPATIENT
Start: 2022-02-21 | End: 2022-02-19 | Stop reason: HOSPADM

## 2022-02-18 RX ORDER — ONDANSETRON 2 MG/ML
4 INJECTION INTRAMUSCULAR; INTRAVENOUS EVERY 30 MIN PRN
Status: DISCONTINUED | OUTPATIENT
Start: 2022-02-18 | End: 2022-02-18

## 2022-02-18 RX ORDER — FENTANYL CITRATE 50 UG/ML
25 INJECTION, SOLUTION INTRAMUSCULAR; INTRAVENOUS
Status: DISCONTINUED | OUTPATIENT
Start: 2022-02-18 | End: 2022-02-18

## 2022-02-18 RX ORDER — SODIUM CHLORIDE 9 MG/ML
INJECTION, SOLUTION INTRAVENOUS CONTINUOUS
Status: DISCONTINUED | OUTPATIENT
Start: 2022-02-18 | End: 2022-02-19 | Stop reason: HOSPADM

## 2022-02-18 RX ORDER — LIDOCAINE HYDROCHLORIDE 20 MG/ML
INJECTION, SOLUTION INFILTRATION; PERINEURAL PRN
Status: DISCONTINUED | OUTPATIENT
Start: 2022-02-18 | End: 2022-02-18

## 2022-02-18 RX ORDER — NOREPINEPHRINE BITARTRATE 0.06 MG/ML
.01-.6 INJECTION, SOLUTION INTRAVENOUS CONTINUOUS
Status: DISCONTINUED | OUTPATIENT
Start: 2022-02-18 | End: 2022-02-18 | Stop reason: HOSPADM

## 2022-02-18 RX ORDER — ATORVASTATIN CALCIUM 80 MG/1
80 TABLET, FILM COATED ORAL ONCE
Status: DISCONTINUED | OUTPATIENT
Start: 2022-02-18 | End: 2022-02-19 | Stop reason: HOSPADM

## 2022-02-18 RX ORDER — CEFAZOLIN SODIUM 1 G/50ML
2 SOLUTION INTRAVENOUS
Status: COMPLETED | OUTPATIENT
Start: 2022-02-18 | End: 2022-02-18

## 2022-02-18 RX ORDER — ONDANSETRON 2 MG/ML
4 INJECTION INTRAMUSCULAR; INTRAVENOUS EVERY 6 HOURS PRN
Status: DISCONTINUED | OUTPATIENT
Start: 2022-02-18 | End: 2022-02-19 | Stop reason: HOSPADM

## 2022-02-18 RX ORDER — LIDOCAINE 40 MG/G
CREAM TOPICAL
Status: DISCONTINUED | OUTPATIENT
Start: 2022-02-18 | End: 2022-02-19 | Stop reason: HOSPADM

## 2022-02-18 RX ORDER — BISACODYL 10 MG
10 SUPPOSITORY, RECTAL RECTAL DAILY PRN
Status: DISCONTINUED | OUTPATIENT
Start: 2022-02-18 | End: 2022-02-19 | Stop reason: HOSPADM

## 2022-02-18 RX ORDER — LIDOCAINE 40 MG/G
CREAM TOPICAL
Status: DISCONTINUED | OUTPATIENT
Start: 2022-02-18 | End: 2022-02-18 | Stop reason: HOSPADM

## 2022-02-18 RX ORDER — OXYCODONE HYDROCHLORIDE 5 MG/1
5 TABLET ORAL EVERY 4 HOURS PRN
Status: DISCONTINUED | OUTPATIENT
Start: 2022-02-18 | End: 2022-02-19 | Stop reason: HOSPADM

## 2022-02-18 RX ORDER — ACETAMINOPHEN 325 MG/1
975 TABLET ORAL EVERY 8 HOURS
Status: DISCONTINUED | OUTPATIENT
Start: 2022-02-18 | End: 2022-02-19 | Stop reason: HOSPADM

## 2022-02-18 RX ORDER — LABETALOL HYDROCHLORIDE 5 MG/ML
5 INJECTION, SOLUTION INTRAVENOUS EVERY 5 MIN PRN
Status: DISCONTINUED | OUTPATIENT
Start: 2022-02-18 | End: 2022-02-18

## 2022-02-18 RX ORDER — MEPERIDINE HYDROCHLORIDE 25 MG/ML
12.5 INJECTION INTRAMUSCULAR; INTRAVENOUS; SUBCUTANEOUS
Status: DISCONTINUED | OUTPATIENT
Start: 2022-02-18 | End: 2022-02-18

## 2022-02-18 RX ORDER — AMOXICILLIN 250 MG
1 CAPSULE ORAL 2 TIMES DAILY
Status: DISCONTINUED | OUTPATIENT
Start: 2022-02-18 | End: 2022-02-19 | Stop reason: HOSPADM

## 2022-02-18 RX ORDER — SODIUM CHLORIDE, SODIUM LACTATE, POTASSIUM CHLORIDE, CALCIUM CHLORIDE 600; 310; 30; 20 MG/100ML; MG/100ML; MG/100ML; MG/100ML
INJECTION, SOLUTION INTRAVENOUS CONTINUOUS
Status: DISCONTINUED | OUTPATIENT
Start: 2022-02-18 | End: 2022-02-18

## 2022-02-18 RX ORDER — PROCHLORPERAZINE MALEATE 5 MG
5 TABLET ORAL EVERY 6 HOURS PRN
Status: DISCONTINUED | OUTPATIENT
Start: 2022-02-18 | End: 2022-02-19 | Stop reason: HOSPADM

## 2022-02-18 RX ORDER — PHENYLEPHRINE HCL IN 0.9% NACL 50MG/250ML
.1-1 PLASTIC BAG, INJECTION (ML) INTRAVENOUS CONTINUOUS
Status: DISCONTINUED | OUTPATIENT
Start: 2022-02-18 | End: 2022-02-18 | Stop reason: CLARIF

## 2022-02-18 RX ORDER — METOPROLOL SUCCINATE 50 MG/1
50 TABLET, EXTENDED RELEASE ORAL DAILY
Status: DISCONTINUED | OUTPATIENT
Start: 2022-02-19 | End: 2022-02-18

## 2022-02-18 RX ORDER — ASPIRIN 81 MG/1
81 TABLET ORAL DAILY
Status: DISCONTINUED | OUTPATIENT
Start: 2022-02-19 | End: 2022-02-19 | Stop reason: HOSPADM

## 2022-02-18 RX ORDER — CEFAZOLIN SODIUM 1 G/50ML
2 SOLUTION INTRAVENOUS SEE ADMIN INSTRUCTIONS
Status: DISCONTINUED | OUTPATIENT
Start: 2022-02-18 | End: 2022-02-18

## 2022-02-18 RX ORDER — FENTANYL CITRATE 50 UG/ML
INJECTION, SOLUTION INTRAMUSCULAR; INTRAVENOUS PRN
Status: DISCONTINUED | OUTPATIENT
Start: 2022-02-18 | End: 2022-02-18

## 2022-02-18 RX ORDER — HYDROMORPHONE HCL IN WATER/PF 6 MG/30 ML
0.4 PATIENT CONTROLLED ANALGESIA SYRINGE INTRAVENOUS
Status: DISCONTINUED | OUTPATIENT
Start: 2022-02-18 | End: 2022-02-19 | Stop reason: HOSPADM

## 2022-02-18 RX ORDER — ISOSORBIDE MONONITRATE 30 MG/1
30 TABLET, EXTENDED RELEASE ORAL DAILY
Status: DISCONTINUED | OUTPATIENT
Start: 2022-02-19 | End: 2022-02-18

## 2022-02-18 RX ORDER — GLYCOPYRROLATE 0.2 MG/ML
INJECTION, SOLUTION INTRAMUSCULAR; INTRAVENOUS PRN
Status: DISCONTINUED | OUTPATIENT
Start: 2022-02-18 | End: 2022-02-18

## 2022-02-18 RX ORDER — ISOSORBIDE MONONITRATE 30 MG/1
30 TABLET, EXTENDED RELEASE ORAL DAILY
Status: DISCONTINUED | OUTPATIENT
Start: 2022-02-18 | End: 2022-02-19 | Stop reason: HOSPADM

## 2022-02-18 RX ORDER — LISINOPRIL 40 MG/1
40 TABLET ORAL DAILY
Status: DISCONTINUED | OUTPATIENT
Start: 2022-02-18 | End: 2022-02-19 | Stop reason: HOSPADM

## 2022-02-18 RX ORDER — ATORVASTATIN CALCIUM 80 MG/1
80 TABLET, FILM COATED ORAL DAILY
Status: DISCONTINUED | OUTPATIENT
Start: 2022-02-19 | End: 2022-02-19 | Stop reason: HOSPADM

## 2022-02-18 RX ORDER — ATORVASTATIN CALCIUM 80 MG/1
80 TABLET, FILM COATED ORAL DAILY
Status: DISCONTINUED | OUTPATIENT
Start: 2022-02-18 | End: 2022-02-18

## 2022-02-18 RX ORDER — ALBUTEROL SULFATE 0.83 MG/ML
2.5 SOLUTION RESPIRATORY (INHALATION) EVERY 4 HOURS PRN
Status: DISCONTINUED | OUTPATIENT
Start: 2022-02-18 | End: 2022-02-18

## 2022-02-18 RX ORDER — OXYCODONE HYDROCHLORIDE 10 MG/1
10 TABLET ORAL EVERY 4 HOURS PRN
Status: DISCONTINUED | OUTPATIENT
Start: 2022-02-18 | End: 2022-02-19 | Stop reason: HOSPADM

## 2022-02-18 RX ORDER — EZETIMIBE 10 MG/1
10 TABLET ORAL DAILY
Status: DISCONTINUED | OUTPATIENT
Start: 2022-02-18 | End: 2022-02-18

## 2022-02-18 RX ORDER — ONDANSETRON 4 MG/1
4 TABLET, ORALLY DISINTEGRATING ORAL EVERY 30 MIN PRN
Status: DISCONTINUED | OUTPATIENT
Start: 2022-02-18 | End: 2022-02-18

## 2022-02-18 RX ORDER — NALOXONE HYDROCHLORIDE 0.4 MG/ML
0.2 INJECTION, SOLUTION INTRAMUSCULAR; INTRAVENOUS; SUBCUTANEOUS
Status: DISCONTINUED | OUTPATIENT
Start: 2022-02-18 | End: 2022-02-19 | Stop reason: HOSPADM

## 2022-02-18 RX ORDER — PROPOFOL 10 MG/ML
INJECTION, EMULSION INTRAVENOUS CONTINUOUS PRN
Status: DISCONTINUED | OUTPATIENT
Start: 2022-02-18 | End: 2022-02-18

## 2022-02-18 RX ORDER — FAMOTIDINE 20 MG/1
20 TABLET, FILM COATED ORAL 2 TIMES DAILY
Status: DISCONTINUED | OUTPATIENT
Start: 2022-02-18 | End: 2022-02-19 | Stop reason: HOSPADM

## 2022-02-18 RX ORDER — ONDANSETRON 4 MG/1
4 TABLET, ORALLY DISINTEGRATING ORAL EVERY 6 HOURS PRN
Status: DISCONTINUED | OUTPATIENT
Start: 2022-02-18 | End: 2022-02-19 | Stop reason: HOSPADM

## 2022-02-18 RX ORDER — ONDANSETRON 2 MG/ML
INJECTION INTRAMUSCULAR; INTRAVENOUS PRN
Status: DISCONTINUED | OUTPATIENT
Start: 2022-02-18 | End: 2022-02-18

## 2022-02-18 RX ORDER — METOPROLOL SUCCINATE 50 MG/1
50 TABLET, EXTENDED RELEASE ORAL DAILY
Status: DISCONTINUED | OUTPATIENT
Start: 2022-02-18 | End: 2022-02-19 | Stop reason: HOSPADM

## 2022-02-18 RX ORDER — FENTANYL CITRATE 50 UG/ML
25 INJECTION, SOLUTION INTRAMUSCULAR; INTRAVENOUS EVERY 5 MIN PRN
Status: DISCONTINUED | OUTPATIENT
Start: 2022-02-18 | End: 2022-02-18

## 2022-02-18 RX ORDER — HYDRALAZINE HYDROCHLORIDE 20 MG/ML
10 INJECTION INTRAMUSCULAR; INTRAVENOUS EVERY 6 HOURS PRN
Status: DISCONTINUED | OUTPATIENT
Start: 2022-02-18 | End: 2022-02-19 | Stop reason: HOSPADM

## 2022-02-18 RX ORDER — BUPIVACAINE HYDROCHLORIDE AND EPINEPHRINE 5; 5 MG/ML; UG/ML
INJECTION, SOLUTION PERINEURAL PRN
Status: DISCONTINUED | OUTPATIENT
Start: 2022-02-18 | End: 2022-02-18 | Stop reason: HOSPADM

## 2022-02-18 RX ORDER — DEXAMETHASONE SODIUM PHOSPHATE 4 MG/ML
INJECTION, SOLUTION INTRA-ARTICULAR; INTRALESIONAL; INTRAMUSCULAR; INTRAVENOUS; SOFT TISSUE PRN
Status: DISCONTINUED | OUTPATIENT
Start: 2022-02-18 | End: 2022-02-18

## 2022-02-18 RX ORDER — DEXTROSE MONOHYDRATE 25 G/50ML
25-50 INJECTION, SOLUTION INTRAVENOUS
Status: DISCONTINUED | OUTPATIENT
Start: 2022-02-18 | End: 2022-02-19 | Stop reason: HOSPADM

## 2022-02-18 RX ORDER — PROPOFOL 10 MG/ML
INJECTION, EMULSION INTRAVENOUS PRN
Status: DISCONTINUED | OUTPATIENT
Start: 2022-02-18 | End: 2022-02-18

## 2022-02-18 RX ORDER — POLYETHYLENE GLYCOL 3350 17 G/17G
17 POWDER, FOR SOLUTION ORAL DAILY
Status: DISCONTINUED | OUTPATIENT
Start: 2022-02-19 | End: 2022-02-19 | Stop reason: HOSPADM

## 2022-02-18 RX ORDER — CEFAZOLIN SODIUM 1 G/50ML
2 SOLUTION INTRAVENOUS EVERY 8 HOURS
Status: COMPLETED | OUTPATIENT
Start: 2022-02-18 | End: 2022-02-18

## 2022-02-18 RX ADMIN — REMIFENTANIL HYDROCHLORIDE 0.07 MCG/KG/MIN: 1 INJECTION, POWDER, LYOPHILIZED, FOR SOLUTION INTRAVENOUS at 07:57

## 2022-02-18 RX ADMIN — ROCURONIUM BROMIDE 50 MG: 50 INJECTION, SOLUTION INTRAVENOUS at 07:40

## 2022-02-18 RX ADMIN — Medication 0.03 MCG/KG/MIN: at 07:53

## 2022-02-18 RX ADMIN — FENTANYL CITRATE 75 MCG: 50 INJECTION, SOLUTION INTRAMUSCULAR; INTRAVENOUS at 09:04

## 2022-02-18 RX ADMIN — INSULIN ASPART 1 UNITS: 100 INJECTION, SOLUTION INTRAVENOUS; SUBCUTANEOUS at 18:06

## 2022-02-18 RX ADMIN — PROPOFOL 120 MCG/KG/MIN: 10 INJECTION, EMULSION INTRAVENOUS at 07:46

## 2022-02-18 RX ADMIN — PROPOFOL 30 MG: 10 INJECTION, EMULSION INTRAVENOUS at 09:04

## 2022-02-18 RX ADMIN — GLYCOPYRROLATE 0.2 MG: 0.2 INJECTION, SOLUTION INTRAMUSCULAR; INTRAVENOUS at 09:33

## 2022-02-18 RX ADMIN — INSULIN ASPART 1 UNITS: 100 INJECTION, SOLUTION INTRAVENOUS; SUBCUTANEOUS at 21:59

## 2022-02-18 RX ADMIN — ROCURONIUM BROMIDE 10 MG: 50 INJECTION, SOLUTION INTRAVENOUS at 10:22

## 2022-02-18 RX ADMIN — PROPOFOL 40 MG: 10 INJECTION, EMULSION INTRAVENOUS at 08:26

## 2022-02-18 RX ADMIN — PROTAMINE SULFATE 5 MG: 10 INJECTION, SOLUTION INTRAVENOUS at 11:06

## 2022-02-18 RX ADMIN — HYDRALAZINE HYDROCHLORIDE 10 MG: 20 INJECTION INTRAMUSCULAR; INTRAVENOUS at 15:35

## 2022-02-18 RX ADMIN — GLYCOPYRROLATE 0.1 MG: 0.2 INJECTION, SOLUTION INTRAMUSCULAR; INTRAVENOUS at 08:05

## 2022-02-18 RX ADMIN — NICARDIPINE HYDROCHLORIDE 10 MG/HR: 0.2 INJECTION, SOLUTION INTRAVENOUS at 11:57

## 2022-02-18 RX ADMIN — ONDANSETRON 4 MG: 2 INJECTION INTRAMUSCULAR; INTRAVENOUS at 11:15

## 2022-02-18 RX ADMIN — SODIUM CHLORIDE, SODIUM LACTATE, POTASSIUM CHLORIDE, CALCIUM CHLORIDE: 600; 310; 30; 20 INJECTION, SOLUTION INTRAVENOUS at 08:07

## 2022-02-18 RX ADMIN — PHENYLEPHRINE HYDROCHLORIDE 25 MCG: 10 INJECTION INTRAVENOUS at 10:54

## 2022-02-18 RX ADMIN — PHENYLEPHRINE HYDROCHLORIDE 50 MCG: 10 INJECTION INTRAVENOUS at 11:22

## 2022-02-18 RX ADMIN — PROPOFOL 100 MCG/KG/MIN: 10 INJECTION, EMULSION INTRAVENOUS at 11:00

## 2022-02-18 RX ADMIN — REMIFENTANIL HYDROCHLORIDE 0.15 MCG/KG/MIN: 1 INJECTION, POWDER, LYOPHILIZED, FOR SOLUTION INTRAVENOUS at 10:34

## 2022-02-18 RX ADMIN — ROCURONIUM BROMIDE 10 MG: 50 INJECTION, SOLUTION INTRAVENOUS at 09:29

## 2022-02-18 RX ADMIN — GLYCOPYRROLATE 0.1 MG: 0.2 INJECTION, SOLUTION INTRAMUSCULAR; INTRAVENOUS at 08:09

## 2022-02-18 RX ADMIN — SODIUM CHLORIDE, POTASSIUM CHLORIDE, SODIUM LACTATE AND CALCIUM CHLORIDE: 600; 310; 30; 20 INJECTION, SOLUTION INTRAVENOUS at 09:45

## 2022-02-18 RX ADMIN — SODIUM CHLORIDE 75 ML/HR: 9 INJECTION, SOLUTION INTRAVENOUS at 12:52

## 2022-02-18 RX ADMIN — HEPARIN SODIUM 6000 UNITS: 1000 INJECTION INTRAVENOUS; SUBCUTANEOUS at 09:42

## 2022-02-18 RX ADMIN — DEXAMETHASONE SODIUM PHOSPHATE 8 MG: 4 INJECTION, SOLUTION INTRA-ARTICULAR; INTRALESIONAL; INTRAMUSCULAR; INTRAVENOUS; SOFT TISSUE at 08:12

## 2022-02-18 RX ADMIN — FENTANYL CITRATE 25 MCG: 50 INJECTION, SOLUTION INTRAMUSCULAR; INTRAVENOUS at 08:28

## 2022-02-18 RX ADMIN — LIDOCAINE HYDROCHLORIDE 100 MG: 20 INJECTION, SOLUTION INFILTRATION; PERINEURAL at 07:39

## 2022-02-18 RX ADMIN — PHENYLEPHRINE HYDROCHLORIDE 100 MCG: 10 INJECTION INTRAVENOUS at 09:25

## 2022-02-18 RX ADMIN — PROPOFOL 50 MG: 10 INJECTION, EMULSION INTRAVENOUS at 08:02

## 2022-02-18 RX ADMIN — SODIUM CHLORIDE, POTASSIUM CHLORIDE, SODIUM LACTATE AND CALCIUM CHLORIDE: 600; 310; 30; 20 INJECTION, SOLUTION INTRAVENOUS at 07:33

## 2022-02-18 RX ADMIN — Medication 2 G: at 07:58

## 2022-02-18 RX ADMIN — SODIUM CHLORIDE, POTASSIUM CHLORIDE, SODIUM LACTATE AND CALCIUM CHLORIDE 25 ML/HR: 600; 310; 30; 20 INJECTION, SOLUTION INTRAVENOUS at 06:30

## 2022-02-18 RX ADMIN — ROCURONIUM BROMIDE 20 MG: 50 INJECTION, SOLUTION INTRAVENOUS at 08:22

## 2022-02-18 RX ADMIN — METOPROLOL SUCCINATE 50 MG: 50 TABLET, FILM COATED, EXTENDED RELEASE ORAL at 18:06

## 2022-02-18 RX ADMIN — FENTANYL CITRATE 150 MCG: 50 INJECTION, SOLUTION INTRAMUSCULAR; INTRAVENOUS at 07:39

## 2022-02-18 RX ADMIN — ISOSORBIDE MONONITRATE 30 MG: 30 TABLET, EXTENDED RELEASE ORAL at 19:45

## 2022-02-18 RX ADMIN — SUGAMMADEX 200 MG: 100 INJECTION, SOLUTION INTRAVENOUS at 11:46

## 2022-02-18 RX ADMIN — PHENYLEPHRINE HYDROCHLORIDE 100 MCG: 10 INJECTION INTRAVENOUS at 09:43

## 2022-02-18 RX ADMIN — PHENYLEPHRINE HYDROCHLORIDE 50 MCG: 10 INJECTION INTRAVENOUS at 10:08

## 2022-02-18 RX ADMIN — PROPOFOL 40 MG: 10 INJECTION, EMULSION INTRAVENOUS at 09:51

## 2022-02-18 RX ADMIN — CEFAZOLIN SODIUM 2 G: 10 INJECTION, POWDER, FOR SOLUTION INTRAVENOUS at 21:59

## 2022-02-18 RX ADMIN — HEPARIN SODIUM 2000 UNITS: 1000 INJECTION INTRAVENOUS; SUBCUTANEOUS at 10:42

## 2022-02-18 RX ADMIN — PHENYLEPHRINE HYDROCHLORIDE 100 MCG: 10 INJECTION INTRAVENOUS at 07:58

## 2022-02-18 RX ADMIN — PROTAMINE SULFATE 15 MG: 10 INJECTION, SOLUTION INTRAVENOUS at 11:10

## 2022-02-18 RX ADMIN — CEFAZOLIN SODIUM 2 G: 10 INJECTION, POWDER, FOR SOLUTION INTRAVENOUS at 16:03

## 2022-02-18 RX ADMIN — PROPOFOL 150 MG: 10 INJECTION, EMULSION INTRAVENOUS at 07:39

## 2022-02-18 ASSESSMENT — ACTIVITIES OF DAILY LIVING (ADL)
ADLS_ACUITY_SCORE: 6
ADLS_ACUITY_SCORE: 4
ADLS_ACUITY_SCORE: 6
ADLS_ACUITY_SCORE: 4
ADLS_ACUITY_SCORE: 6
ADLS_ACUITY_SCORE: 4
ADLS_ACUITY_SCORE: 6
ADLS_ACUITY_SCORE: 4
ADLS_ACUITY_SCORE: 4
ADLS_ACUITY_SCORE: 6
ADLS_ACUITY_SCORE: 4
ADLS_ACUITY_SCORE: 6

## 2022-02-18 ASSESSMENT — LIFESTYLE VARIABLES: TOBACCO_USE: 1

## 2022-02-18 ASSESSMENT — ENCOUNTER SYMPTOMS: ORTHOPNEA: 0

## 2022-02-18 NOTE — PLAN OF CARE
"Goal Outcome Evaluation:    Plan of Care Reviewed With: patient          Outcome Evaluation: All questions answered in preop area.  Blood pressure (!) 158/75, pulse 68, temperature 98.6  F (37  C), temperature source Oral, resp. rate 16, height 1.676 m (5' 6\"), weight 108 kg (238 lb 1.6 oz), SpO2 96 %.    Patient admitted to 6B post right carotid endarterectomy for occlusion of carotid artery. Patient is afebrile, vitals stable, neuros and CMS intact. Blood pressure slightly elevated. Will continue to monitor.    Plan: Continue with POC. Notify primary team with changes.    "

## 2022-02-18 NOTE — BRIEF OP NOTE
[facility]    Brief Operative Note    Pre-operative diagnosis: Asymptomatic stenosis of right carotid artery [I65.21]  Post-operative diagnosis Same as pre-operative diagnosis    Procedure: Procedure(s):  Right carotid endarterectomy with ELECTROENCEPHALOGRAM(EEG)  Surgeon: Surgeon(s) and Role:     * Karma Adorno MD - Primary      * Madisyn Francisco MD - Assistnat   Anesthesia: Other   Estimated Blood Loss: 50    Drains:  None   Specimens: None   Findings:   Large plaque removed, bovine patch placed. No EEG changes. No shunt required.   Complications: None.  Implants:   Implant Name Type Inv. Item Serial No.  Lot No. LRB No. Used Action   GRAFT PATCH VASC XENOSURE BIOLOGIC 0.8X08CM E0.8P8 - [id number]  GRAFT PATCH VASC XENOSURE BIOLOGIC 0.8X08CM E0.8P8  [facility] VASCULAR IN [id number] 1 Implanted

## 2022-02-18 NOTE — ANESTHESIA PREPROCEDURE EVALUATION
Anesthesia Pre-Procedure Evaluation    Patient: Roger Bonilla   MRN: [id number] : 1955        Preoperative Diagnosis: Epigastric pain [R10.13]   Procedure : Procedure(s):  ESOPHAGOGASTRODUODENOSCOPY (EGD)     Past Medical History:   Diagnosis Date     Coronary artery disease may 1 2005    2 stents put in heart     Obese      Pure hypercholesterolemia      Sleep apnea      Type II or unspecified type diabetes mellitus without mention of complication, not stated as uncontrolled      Unspecified cardiovascular disease -    MI -- Angioplasty two stents RCA & OM2     Unspecified essential hypertension       Past Surgical History:   Procedure Laterality Date     CARDIAC SURGERY  may 1, 2005     COLONOSCOPY  2011    Procedure:COLONOSCOPY; Screening Colonoscopy; Surgeon:LUTHER FLORES; Location:[facility]     CV CORONARY ANGIOGRAM N/A 2021    Procedure: Coronary Angiogram;  Surgeon: Jonny Moore MD;  Location: [facility] CARDIAC CATH LAB     ESOPHAGOSCOPY, GASTROSCOPY, DUODENOSCOPY (EGD), COMBINED N/A 2021    Procedure: ESOPHAGOGASTRODUODENOSCOPY, WITH BIOPSY;  Surgeon: Jeff Cunningham DO;  Location: [facility]     PHACOEMULSIFICATION WITH STANDARD INTRAOCULAR LENS IMPLANT Right 2021    Procedure: Right eye Cataract Extraction with Implant;  Surgeon: Reyes Valdez MD;  Location: [facility]     PHACOEMULSIFICATION WITH STANDARD INTRAOCULAR LENS IMPLANT Left 2021    Procedure: left eye Cataract Extraction with Implant;  Surgeon: Reyes Valdez MD;  Location: [facility]     SURGICAL HISTORY OF -       None     VASCULAR SURGERY  oct 2013    stent put in leg      Allergies   Allergen Reactions     Nkda [No Known Drug Allergies]       Social History     Tobacco Use     Smoking status: Current Every Day Smoker     Packs/day: 0.50     Years: 50.00     Pack years: 25.00     Types: Cigarettes     Start date: 1972     Last attempt to quit: 2022     Years since  quittin.0     Smokeless tobacco: Never Used   Substance Use Topics     Alcohol use: Yes     Comment: 2 beers a week       Wt Readings from Last 1 Encounters:   22 108 kg (238 lb 1.6 oz)        Anesthesia Evaluation   Pt has had prior anesthetic. Type: General and MAC.    No history of anesthetic complications       ROS/MED HX  ENT/Pulmonary:     (+) sleep apnea, doesn't use CPAP, tobacco use, Past use,     Neurologic:  - neg neurologic ROS     Cardiovascular: Comment: Currently electively limiting exertion  Subtotal occlusion of LCx stent   of RCA    (+) hypertension--CAD angina-with excertion. -stent-Drug Eluting Stent. Taking blood thinners Pt has received instructions:  (-) past MI, CHF, MILAN, orthopnea/PND, past MI, CABG and murmur   METS/Exercise Tolerance: 3 - Able to walk 1-2 blocks without stopping    Hematologic:  - neg hematologic  ROS     Musculoskeletal:  - neg musculoskeletal ROS     GI/Hepatic:  - neg GI/hepatic ROS  (-) GERD   Renal/Genitourinary:       Endo:     (+) type II DM, Not using insulin, Obesity,     Psychiatric/Substance Use:  - neg psychiatric ROS     Infectious Disease:       Malignancy:  - neg malignancy ROS     Other:            Physical Exam    Airway  airway exam normal      Mallampati: II   TM distance: > 3 FB   Neck ROM: full   Mouth opening: > 3 cm    Respiratory Devices and Support         Dental     Comment: Teeth in poor repair        Cardiovascular   cardiovascular exam normal       Rhythm and rate: regular and normal (-) no murmur    Pulmonary   pulmonary exam normal                OUTSIDE LABS:  CBC:   Lab Results   Component Value Date    WBC 10.4 2022    WBC 10.3 2022    HGB 13.2 (L) 2022    HGB 13.5 2022    HCT 41.8 2022    HCT 43.0 2022     2022     2022     BMP:   Lab Results   Component Value Date     2022     2022    POTASSIUM 4.2 2022    POTASSIUM 4.7 2022     CHLORIDE 107 02/18/2022    CHLORIDE 106 02/04/2022    CO2 30 02/18/2022    CO2 31 02/04/2022    BUN 20 02/18/2022    BUN 16 02/04/2022    CR 1.00 02/18/2022    CR 1.01 02/04/2022     (H) 02/18/2022     (H) 02/18/2022     COAGS:   Lab Results   Component Value Date    PTT 30 12/29/2021    INR 0.98 12/29/2021     POC:   Lab Results   Component Value Date     (H) 06/25/2021     HEPATIC:   Lab Results   Component Value Date    ALBUMIN 2.7 (L) 07/20/2021    PROTTOTAL 5.6 (L) 07/20/2021    ALT 21 07/20/2021    AST 14 07/20/2021    ALKPHOS 54 07/20/2021    BILITOTAL 0.6 07/20/2021     OTHER:   Lab Results   Component Value Date    LACT 1.9 06/23/2021    A1C 6.4 (H) 02/04/2022    SUE 8.8 02/18/2022    PHOS 4.2 06/24/2021    MAG 1.9 07/19/2021    LIPASE 156 07/19/2021    TSH 0.35 (L) 06/23/2021    T4 1.32 06/23/2021    SED 4 10/27/2008       Anesthesia Plan    ASA Status:  4   NPO Status:  NPO Appropriate    Anesthesia Type: General.     - Airway: ETT   Induction: Intravenous.   Maintenance: TIVA.   Techniques and Equipment:     - Lines/Monitors: Arterial Line, 2nd IV     - Blood: T&C     Consents    Anesthesia Plan(s) and associated risks, benefits, and realistic alternatives discussed. Questions answered and patient/representative(s) expressed understanding.    - Discussed:     - Discussed with:  Patient      - Extended Intubation/Ventilatory Support Discussed: No.      - Patient is DNR/DNI Status: No    Use of blood products discussed: Yes.     - Discussed with: Patient.     - Consented: consented to blood products            Reason for refusal: other.     Postoperative Care    Pain management: IV analgesics, Oral pain medications.   PONV prophylaxis: Ondansetron (or other 5HT-3)     Comments:    Other Comments: Patient seen and examined.  Risks, benefits and alternatives to GETA discussed.  Questions answered and patient wishes to proceed                Ryees Vanegas MD

## 2022-02-18 NOTE — OR NURSING
Dr. Adorno at bedside. Stated to stop Nicardipine gtt. SBP goals <150. Monitor cuff pressures. MIVF NS @ 75 ml/hr. Patient will transfer to [facility] when room available. Dr. Vanegas at bedside. Will address sign-out. Surgery to control blood pressure parameters and orders.

## 2022-02-18 NOTE — ANESTHESIA CARE TRANSFER NOTE
Patient: Roger Bonilla    Procedure: Procedure(s):  Right carotid endarterectomy with ELECTROENCEPHALOGRAM(EEG)       Diagnosis: Asymptomatic stenosis of right carotid artery [I65.21]  Diagnosis Additional Information: No value filed.    Anesthesia Type:   General     Note:    Oropharynx: oropharynx clear of all foreign objects and spontaneously breathing  Level of Consciousness: awake  Oxygen Supplementation: nasal cannula  Level of Supplemental Oxygen (L/min / FiO2): 3  Independent Airway: airway patency satisfactory and stable  Dentition: dentition unchanged  Vital Signs Stable: post-procedure vital signs reviewed and stable  Report to RN Given: handoff report given  Patient transferred to: PACU    Handoff Report: Identifed the Patient, Identified the Reponsible Provider, Reviewed the pertinent medical history, Discussed the surgical course, Reviewed Intra-OP anesthesia mangement and issues during anesthesia, Set expectations for post-procedure period and Allowed opportunity for questions and acknowledgement of understanding      Vitals:  Vitals Value Taken Time   /55 02/18/22 1209   Temp     Pulse 82 02/18/22 1216   Resp     SpO2 93 % 02/18/22 1216   Vitals shown include unvalidated device data.    Electronically Signed By: SAL John CRNA  February 18, 2022  12:16 PM

## 2022-02-18 NOTE — PLAN OF CARE
"BP (!) 147/69   Pulse 75   Temp 97.8  F (36.6  C) (Oral)   Resp 18   Ht 1.676 m (5' 6\")   Wt 108 kg (238 lb 1.6 oz)   SpO2 97%   BMI 38.43 kg/m      Hours of Care [id number]  Neuro: A&Ox4. CMS and neuro checks intact  Cardiac: SR, SBP >150, PRN hydralazine given x1  Respiratory:  Sating 95% on 2L  GI/: voiding spontaneously, no reported BM  Diet/appetite:  Low sat fat, carb consistent diet orders, tolerating well  Activity:  SBA, up to bathroom  Pain: Denies  Skin: No new deficits noted   LDA's: PIV x2, SL, MIVF    Plan:  Nursing will continue to follow the POC and update the MD team with concerns.    Christina Ward, RN  6B Intermediate Care         "

## 2022-02-18 NOTE — ANESTHESIA PROCEDURE NOTES
Arterial Line Procedure Note    Pre-Procedure   Staff -        Anesthesiologist:  Reyes Vanegas MD       Performed By: anesthesiologist       Location: OR       Pre-Anesthestic Checklist: patient identified, IV checked, risks and benefits discussed, informed consent, monitors and equipment checked, pre-op evaluation and at physician/surgeon's request  Timeout:       Correct Patient: Yes        Correct Procedure: Yes        Correct Site: Yes        Correct Position: Yes   Line Placement:   This line was placed Post Induction starting at 2/18/2022 7:41 AM and ending at 2/18/2022 7:45 AM  Procedure   Procedure: arterial line       Diagnosis: carotid stenosis       Laterality: left       Insertion Site: radial.  Sterile Prep        Standard elements of sterile barrier followed       Skin prep: Chloraprep  Insertion/Injection        Technique: Seldinger Technique        Catheter Type/Size: 20 G, 1.75 in/4.5 cm quick cath (integral wire)  Narrative        Tegaderm dressing used.       Complications: None apparent,        Arterial waveform: Yes        IBP within 10% of NIBP: Yes

## 2022-02-18 NOTE — OP NOTE
DATE OF PROCEDURE:  2/18/2022       PREOPERATIVE DIAGNOSIS:  right  internal carotid artery stenosis.      POSTOPERATIVE DIAGNOSIS:  right internal carotid artery stenosis.      PROCEDURE:   1.  right carotid endarterectomy.   2.  Completion carotid duplex intraop.      SURGEON:  Karma Adorno MD      ASSISTANT:  Madisyn Francisco MD and Ian Whitaker MS4     ANESTHESIA:  General endotracheal anesthesia with cerebral oximetry monitoring.      SPECIMENS:  None.      ESTIMATED BLOOD LOSS:  50 mL.      COMPLICATIONS:  None apparent.      FINDINGS:  A focal high-grade plaque located at the right carotid bifurcation.      INDICATIONS:  This 66 year old male has a high grade right carotid stenosis.  I addressed the risks and benefits with the patient and he was willing to proceed with right carotid endarterectomy. Discussions were had with Cardiology and Cardiac Surgery beforehand that plan would be for R CEA first then CABG in 4-6 weeks.     PROCEDURE IN DETAIL:  The patient was brought to the operating room and placed in the semi-recumbent Bragg position after general endotracheal anesthesia was achieved and cerebral oximetry leads placed.  The right neck was prepped and draped in sterile fashion.  An incision was made along the anterior border of the sternocleidomastoid and carried down to the carotid sheath.  There, with the sternocleidomastoid and internal jugular artery reflected laterally, the common carotid, internal and external carotid arteries were dissected free from the surrounding structures and looped with umbilical tapes.  The superior thyroid artery was looped with a vessel loop.  The patient was then heparinized with 6000 units of IV heparin.  After 3 minutes, the internal carotid artery was gently clamped with the hockey stick bulldog clamp, followed by securing the external carotid artery, superior thyroid artery and a Dami clamp on the common carotid artery proximally.  The common carotid artery was  quickly opened through to pass the area of stenosis at the internal carotid artery to a soft, normal area in the internal carotid.  At this point, the plaque was removed from the internal carotid artery in a feathered fashion.  All loose bits of intima and media were then removed in conjunction with heparin saline irrigation.  An eversion endarterectomy was then performed of the external carotid artery.  A bovine pericardial patch was then anastomosed with 6-0 Prolene suture.  Prior to completion of anastomosis, the shunt was clamped and removed and the anastomosis completed.  The anastomosis appeared to be hemostatic.  I performed an on-table carotid artery duplex evaluation which showed velocities ranging from  cm/second throughout the common and internal carotid arteries.  At this point, the patient's heparinization was reversed with protamine.  A total of 20 mg of protamine was utilized.  The surgical site was inspected for hemostasis, and when this was achieved, the incision was closed in layers with running 3-0 Vicryl suture to the platysma followed by 4-0 Monocryl and skin glue. 10cc of 0.5% Marcaine with epinephrine was injected for analgesia. The patient awakened from anesthesia neurologically intact.  I was present for the entire portion of procedure.         Karma Adorno MD, FACS, RPVI  Chief, Vascular and Endovascular Surgery  [facility]  Cell: [phone]   [email or web address]

## 2022-02-18 NOTE — PROVIDER NOTIFICATION
Please note patient is to be admitted to 6B post-op

## 2022-02-18 NOTE — ANESTHESIA PROCEDURE NOTES
Airway       Patient location during procedure: OR       Procedure Start/Stop Times: 2/18/2022 7:42 AM  Staff -        CRNA: Braxton Henriquez APRN CRNA       Performed By: CRNA  Consent for Airway        Urgency: elective  Indications and Patient Condition       Indications for airway management: saravanan-procedural       Induction type:intravenous       Mask difficulty assessment: 2 - vent by mask + OA or adjuvant +/- NMBA    Final Airway Details       Final airway type: endotracheal airway       Successful airway: ETT - single  Endotracheal Airway Details        ETT size (mm): 8.0       Cuffed: yes       Successful intubation technique: direct laryngoscopy       DL Blade Type: Borja 2       Grade View of Cords: 2       Adjucts: stylet       Position: Left       Measured from: gums/teeth       Secured at (cm): 24       Bite block used: None    Post intubation assessment        Placement verified by: capnometry, equal breath sounds and chest rise        Number of attempts at approach: 1       Secured with: pink tape       Ease of procedure: easy       Dentition: Intact and Unchanged

## 2022-02-18 NOTE — ANESTHESIA POSTPROCEDURE EVALUATION
Patient: Roger Bonilla    Procedure: Procedure(s):  Right carotid endarterectomy with ELECTROENCEPHALOGRAM(EEG)       Diagnosis:Asymptomatic stenosis of right carotid artery [I65.21]  Diagnosis Additional Information: No value filed.    Anesthesia Type:  General    Note:  Disposition: Admission   Postop Pain Control: Uneventful            Sign Out: Well controlled pain   PONV: No   Neuro/Psych: Uneventful            Sign Out: Acceptable/Baseline neuro status   Airway/Respiratory: Uneventful            Sign Out: Acceptable/Baseline resp. status; O2 supplementation               Oxygen: Nasal Cannula   CV/Hemodynamics: Uneventful            Sign Out: Acceptable CV status; No obvious hypovolemia; No obvious fluid overload   Other NRE: NONE   DID A NON-ROUTINE EVENT OCCUR? No           Last vitals:  Vitals Value Taken Time   /48 02/18/22 1245   Temp 37  C (98.6  F) 02/18/22 1246   Pulse 76 02/18/22 1246   Resp     SpO2 94 % 02/18/22 1246   Vitals shown include unvalidated device data.    Electronically Signed By: Reyes Vanegas MD  February 18, 2022  12:48 PM

## 2022-02-19 VITALS
OXYGEN SATURATION: 99 % | WEIGHT: 244.49 LBS | HEIGHT: 66 IN | BODY MASS INDEX: 39.29 KG/M2 | DIASTOLIC BLOOD PRESSURE: 78 MMHG | SYSTOLIC BLOOD PRESSURE: 167 MMHG | HEART RATE: 72 BPM | TEMPERATURE: 98.6 F | RESPIRATION RATE: 14 BRPM

## 2022-02-19 LAB
BASOPHILS # BLD AUTO: 0 10E3/UL (ref 0–0.2)
BASOPHILS NFR BLD AUTO: 0 %
EOSINOPHIL # BLD AUTO: 0 10E3/UL (ref 0–0.7)
EOSINOPHIL NFR BLD AUTO: 0 %
ERYTHROCYTE [DISTWIDTH] IN BLOOD BY AUTOMATED COUNT: 14.6 % (ref 10–15)
GLUCOSE BLDC GLUCOMTR-MCNC: 134 MG/DL (ref 70–99)
HCT VFR BLD AUTO: 39.7 % (ref 40–53)
HGB BLD-MCNC: 12.6 G/DL (ref 13.3–17.7)
IMM GRANULOCYTES # BLD: 0.1 10E3/UL
IMM GRANULOCYTES NFR BLD: 1 %
INR PPP: 1.03 (ref 0.85–1.15)
LYMPHOCYTES # BLD AUTO: 1.2 10E3/UL (ref 0.8–5.3)
LYMPHOCYTES NFR BLD AUTO: 7 %
MCH RBC QN AUTO: 29.5 PG (ref 26.5–33)
MCHC RBC AUTO-ENTMCNC: 31.7 G/DL (ref 31.5–36.5)
MCV RBC AUTO: 93 FL (ref 78–100)
MONOCYTES # BLD AUTO: 1.4 10E3/UL (ref 0–1.3)
MONOCYTES NFR BLD AUTO: 8 %
NEUTROPHILS # BLD AUTO: 14.4 10E3/UL (ref 1.6–8.3)
NEUTROPHILS NFR BLD AUTO: 84 %
NRBC # BLD AUTO: 0 10E3/UL
NRBC BLD AUTO-RTO: 0 /100
PLATELET # BLD AUTO: 241 10E3/UL (ref 150–450)
POTASSIUM BLD-SCNC: 4 MMOL/L (ref 3.4–5.3)
RBC # BLD AUTO: 4.27 10E6/UL (ref 4.4–5.9)
WBC # BLD AUTO: 17.2 10E3/UL (ref 4–11)

## 2022-02-19 PROCEDURE — 250N000013 HC RX MED GY IP 250 OP 250 PS 637: Performed by: SURGERY

## 2022-02-19 PROCEDURE — 84132 ASSAY OF SERUM POTASSIUM: CPT | Performed by: STUDENT IN AN ORGANIZED HEALTH CARE EDUCATION/TRAINING PROGRAM

## 2022-02-19 PROCEDURE — 250N000013 HC RX MED GY IP 250 OP 250 PS 637: Performed by: STUDENT IN AN ORGANIZED HEALTH CARE EDUCATION/TRAINING PROGRAM

## 2022-02-19 PROCEDURE — 85610 PROTHROMBIN TIME: CPT | Performed by: STUDENT IN AN ORGANIZED HEALTH CARE EDUCATION/TRAINING PROGRAM

## 2022-02-19 PROCEDURE — 85004 AUTOMATED DIFF WBC COUNT: CPT | Performed by: STUDENT IN AN ORGANIZED HEALTH CARE EDUCATION/TRAINING PROGRAM

## 2022-02-19 PROCEDURE — 36415 COLL VENOUS BLD VENIPUNCTURE: CPT | Performed by: STUDENT IN AN ORGANIZED HEALTH CARE EDUCATION/TRAINING PROGRAM

## 2022-02-19 PROCEDURE — 250N000011 HC RX IP 250 OP 636: Performed by: SURGERY

## 2022-02-19 RX ORDER — ACETAMINOPHEN 325 MG/1
975 TABLET ORAL EVERY 8 HOURS
COMMUNITY
Start: 2022-02-19 | End: 2022-03-23

## 2022-02-19 RX ORDER — AMOXICILLIN 250 MG
1 CAPSULE ORAL 2 TIMES DAILY
COMMUNITY
Start: 2022-02-19 | End: 2022-03-23

## 2022-02-19 RX ADMIN — EZETIMIBE 10 MG: 10 TABLET ORAL at 08:11

## 2022-02-19 RX ADMIN — LISINOPRIL 40 MG: 40 TABLET ORAL at 08:11

## 2022-02-19 RX ADMIN — FAMOTIDINE 20 MG: 20 TABLET ORAL at 08:11

## 2022-02-19 RX ADMIN — HYDRALAZINE HYDROCHLORIDE 10 MG: 20 INJECTION INTRAMUSCULAR; INTRAVENOUS at 01:18

## 2022-02-19 RX ADMIN — ATORVASTATIN CALCIUM 80 MG: 80 TABLET, FILM COATED ORAL at 08:11

## 2022-02-19 RX ADMIN — METOPROLOL SUCCINATE 50 MG: 50 TABLET, FILM COATED, EXTENDED RELEASE ORAL at 08:11

## 2022-02-19 RX ADMIN — ISOSORBIDE MONONITRATE 30 MG: 30 TABLET, EXTENDED RELEASE ORAL at 08:11

## 2022-02-19 RX ADMIN — ASPIRIN 81 MG: 81 TABLET ORAL at 08:11

## 2022-02-19 RX ADMIN — HYDRALAZINE HYDROCHLORIDE 10 MG: 20 INJECTION INTRAMUSCULAR; INTRAVENOUS at 08:52

## 2022-02-19 ASSESSMENT — ACTIVITIES OF DAILY LIVING (ADL)
ADLS_ACUITY_SCORE: 6

## 2022-02-19 NOTE — DISCHARGE SUMMARY
[facility] Vascular Surgery Service Discharge Summary:     NAME: Roger Bonilla   MRN: [id number]   : 1955   PCP: Maria De Jesus Hay    DATE OF ADMISSION: 2022       Procedure/Surgery Information   Procedure: Procedure(s):  Right carotid endarterectomy with ELECTROENCEPHALOGRAM(EEG)   Surgeon(s): Surgeon(s) and Role:     * Karma Adorno MD - Primary   Specimens: * No specimens in log *         PRE/POSTOPERATIVE DIAGNOSES:    Right internal carotid artery stenosis    PROCEDURES PERFORMED, 2022:   1.  Right carotid endarterectomy.   2.  Completion carotid duplex intraop    INTRAOPERATIVE FINDINGS: A focal high-grade plaque located at the right carotid bifurcation    POSTOPERATIVE COMPLICATIONS: None     DATE OF DISCHARGE: 2022    ADMISSION HPI (from 2022):  This 66 year old male has a high grade right carotid stenosis. I addressed the risks and benefits with the patient and he was willing to proceed with right carotid endarterectomy. Discussions were had with Cardiology and Cardiac Surgery beforehand that plan would be for R CEA first then CABG in 4-6 weeks.    HOSPITAL COURSE: Roger Bonilla is a 66 year old male who was admitted on 2022 and underwent the above-named procedures. He tolerated the procedure well and postoperatively was transferred to the general post-surgical unit. The remainder of his course was essentiallly uncomplicated. Prior to discharge, his pain was controlled well without narcotics. He was able to perform ADLs and ambulate independently without difficulty. He did have a slight right sided facial droop noted with smiling. On 2022, he was discharged to home in stable condition.    Vascular Surgery Core Measures:  Continue aspirin and atorvastatin      PAST MEDICAL HISTORY:  Past Medical History:   Diagnosis Date     Coronary artery disease may 1 2005    2 stents put in heart     Obese      Pure hypercholesterolemia      Sleep apnea      Type II or unspecified type  diabetes mellitus without mention of complication, not stated as uncontrolled      Unspecified cardiovascular disease 5-2001    MI -- Angioplasty two stents RCA & OM2     Unspecified essential hypertension        PAST SURGICAL HISTORY:  Past Surgical History:   Procedure Laterality Date     CARDIAC SURGERY  may 1, 2005     COLONOSCOPY  11/30/2011    Procedure:COLONOSCOPY; Screening Colonoscopy; Surgeon:LUTHER FLORES; Location:[facility]     CV CORONARY ANGIOGRAM N/A 12/29/2021    Procedure: Coronary Angiogram;  Surgeon: Jonny Moore MD;  Location:  [facility] CARDIAC CATH LAB     ESOPHAGOSCOPY, GASTROSCOPY, DUODENOSCOPY (EGD), COMBINED N/A 7/21/2021    Procedure: ESOPHAGOGASTRODUODENOSCOPY, WITH BIOPSY;  Surgeon: Jeff Cunningham DO;  Location: [facility]     PHACOEMULSIFICATION WITH STANDARD INTRAOCULAR LENS IMPLANT Right 7/28/2021    Procedure: Right eye Cataract Extraction with Implant;  Surgeon: Reyes Valdez MD;  Location: [facility]     PHACOEMULSIFICATION WITH STANDARD INTRAOCULAR LENS IMPLANT Left 8/18/2021    Procedure: left eye Cataract Extraction with Implant;  Surgeon: Reyes Valdez MD;  Location: [facility]     SURGICAL HISTORY OF -       None     VASCULAR SURGERY  oct 2013    stent put in leg         Labs:  Recent Labs   Lab Test 02/19/22  0535 02/18/22  0605   WBC 17.2* 10.4   RBC 4.27* 4.56   HGB 12.6* 13.2*   HCT 39.7* 41.8   MCV 93 92   MCH 29.5 28.9   MCHC 31.7 31.6    261     Recent Labs   Lab Test 02/19/22  0535 02/18/22  0605 02/04/22  1128 12/31/21  1144   POTASSIUM 4.0 4.2 4.7 4.1   CHLORIDE  --  107 106 104   BUN  --  20 16 17       DISCHARGE INSTRUCTIONS:  Discharge Orders      Activity    Your activity upon discharge: activity as tolerated, no lifting, driving, or strenuous exercise for at least one week, and no driving while on analgesics     Reason for your hospital stay    Right carotid endarterectomy     Follow Up ([facility])    Follow up with Dr. Adorno  as scheduled in about two weeks    Appointments on [place] and/or [facility] (with [facility] or [facility] provider or service). Call [phone] if you haven't heard regarding these appointments within 7 days of discharge.     Diet    Follow this diet upon discharge: Orders Placed This Encounter      Advance Diet as Tolerated: Fully Advanced to diet(s) per Provider order; Moderate Consistent Carb (60 g CHO per Meal) Diet; Low Saturated Fat Na <2400mg Diet     Discharge Medications   Current Discharge Medication List      START taking these medications    Details   acetaminophen (TYLENOL) 325 MG tablet Take 3 tablets (975 mg) by mouth every 8 hours    Associated Diagnoses: PAD (peripheral artery disease) (H)      senna-docusate (SENOKOT-S/PERICOLACE) 8.6-50 MG tablet Take 1 tablet by mouth 2 times daily    Associated Diagnoses: PAD (peripheral artery disease) (H)         CONTINUE these medications which have NOT CHANGED    Details   aspirin 81 MG EC tablet Take 1 tablet (81 mg) by mouth daily  Qty: 90 tablet, Refills: 3    Associated Diagnoses: PAD (peripheral artery disease) (H); Type 2 diabetes, HbA1c goal < 7% (H)      atorvastatin (LIPITOR) 80 MG tablet TAKE 1 TABLET (80 MG) BY MOUTH DAILY  Qty: 90 tablet, Refills: 3    Comments: Profile Rx: patient will contact pharmacy when needed  Associated Diagnoses: Hyperlipidemia LDL goal <100      ezetimibe (ZETIA) 10 MG tablet Take 1 tablet (10 mg) by mouth daily  Qty: 30 tablet, Refills: 11    Associated Diagnoses: Hyperlipidemia LDL goal <70      isosorbide mononitrate (IMDUR) 30 MG 24 hr tablet Take 1 tablet (30 mg) by mouth daily  Qty: 90 tablet, Refills: 3    Associated Diagnoses: Coronary artery disease involving native coronary artery of native heart without angina pectoris      lisinopril (ZESTRIL) 40 MG tablet Take 1 tablet (40 mg) by mouth daily  Qty: 90 tablet, Refills: 3    Comments: Profile Rx: patient will contact pharmacy when needed  Associated Diagnoses:  Essential hypertension with goal blood pressure less than 140/90      metFORMIN (GLUCOPHAGE) 1000 MG tablet TAKE ONE TABLET BY MOUTH ONE TIME DAILY  Qty: 90 tablet, Refills: 3    Comments: Profile Rx: patient will contact pharmacy when needed  Associated Diagnoses: Type 2 diabetes mellitus without complication, without long-term current use of insulin (H)      metoprolol succinate ER (TOPROL-XL) 50 MG 24 hr tablet TAKE 1 TABLET (50 MG) BY MOUTH DAILY  Qty: 90 tablet, Refills: 3    Comments: Profile Rx: patient will contact pharmacy when needed  Associated Diagnoses: Essential hypertension with goal blood pressure less than 140/90; Coronary artery disease involving native coronary artery of native heart without angina pectoris      omeprazole (PRILOSEC) 40 MG DR capsule Take 1 capsule (40 mg) by mouth daily  Qty: 90 capsule, Refills: 1    Comments: This prescription was filled on 10/6/2021. Any refills authorized will be placed on file.  Associated Diagnoses: Acute superficial gastritis without hemorrhage           Allergies   Allergies   Allergen Reactions     Nkda [No Known Drug Allergies]        Maria Ines Hudson MD  02/19/22  9:56 AM

## 2022-02-19 NOTE — PLAN OF CARE
DISCHARGE                         2/19/2022 10:15 AM  ----------------------------------------------------------------------------  Discharged to: Home  Via: private transportation  Accompanied by: Family  Discharge Instructions: diet, activity, medications, follow up appointments, when to call the MD, aftercare instructions.  Prescriptions: Only new meds are OTC, declined to  at this time  Follow Up Appointments: arranged; information given  Belongings: All sent with pt  IV: d/c'd  Telemetry: d/c'd  Pt exhibits understanding of above discharge instructions; all questions answered.    Discharge Paperwork: Signed, copied, and sent home with patient.

## 2022-02-19 NOTE — PROGRESS NOTES
"  [facility] Progress Note  Post Op Check    02/18/2022    Roger Bonilla is a 66 year old male POD#0 s/p Right carotid endarterectomy.    Pt reports doing well after surgery. Pain well controlled with tylenol, dilaudid prn, oxycodone prn. Tolerating diet without nausea or vomiting. Denies SOB, chest pain, or dizziness. Voiding spontaneously.    BP (!) 174/82   Pulse 83   Temp 97.8  F (36.6  C) (Oral)   Resp 18   Ht 1.676 m (5' 6\")   Wt 108 kg (238 lb 1.6 oz)   SpO2 97%   BMI 38.43 kg/m      Gen: A&O x3, NAD  Chest: breathing non-labored on 3L NC  Abdomen: soft, non-distended, non-tender  Incision: clean, dry, intact with dressing in place  Neuro: CN II-XII in tact and symmetric, SILT in all distributions bilaterally in UE and LE, strength 5/5 in bilateral UE and LE, denies numbness or tingling anywhere    A/P: No acute post-op issues. Continue plan of care per primary team. Please call with any questions.    Lizandro Anguiano MD  General Surgery PGY-1    "

## 2022-02-19 NOTE — PLAN OF CARE
Neuro: A&Ox4. CMS and Neuros intact. Baseline tremors.  Cardiac: SR. Hydralazine given x1 for SBP>160   Respiratory: Sating >92 on 3L NC for ABIGAIL.  GI/: Adequate urine output.   Diet/appetite: Tolerating mod carb, low sodium diet. Eating well.  Activity:  Assist of 1, up to chair and in halls.  Pain: No complaints of pain  Skin: No new deficits noted.  LDA's: PIVx2- MIVF & SL    Plan: Continue with POC. Notify primary team with changes.

## 2022-02-19 NOTE — PROGRESS NOTES
VASCULAR SURGERY PROGRESS NOTE    Mr. Bonilla is a 67yo male with right ICA stenosis now s/p R CEA.    NAEON. Remains neuro intact. Pain well controlled without medication. Voiding spontaneously. Hypertension controlled with hydralazine.    Vitals reviewed. Hypertensive overnight to 181 systolic.    On exam, slight right sided lower lip droop, otherwise CN II-XII intact. Strength symmetric in upper extremities. Right neck incision with some swelling, no erythema or hematoma. Trachea midline. Incision dressed with Dermabond.    Labs reviewed. Expected post op leukocytosis and mild anemia.     Mr. Bonilla is a 67yo male now POD1 s/p right CEA, recovering on expected course    - continue home antihypertensives  - discharge to home today    Maria Ines Hudson MD  02/19/22  7:50 AM

## 2022-02-21 ENCOUNTER — TELEPHONE (OUTPATIENT)
Dept: VASCULAR SURGERY | Facility: CLINIC | Age: 67
End: 2022-02-21
Payer: COMMERCIAL

## 2022-02-21 ENCOUNTER — PATIENT OUTREACH (OUTPATIENT)
Dept: CARE COORDINATION | Facility: CLINIC | Age: 67
End: 2022-02-21
Payer: COMMERCIAL

## 2022-02-21 ENCOUNTER — NURSE TRIAGE (OUTPATIENT)
Dept: NURSING | Facility: CLINIC | Age: 67
End: 2022-02-21
Payer: COMMERCIAL

## 2022-02-21 DIAGNOSIS — Z71.89 OTHER SPECIFIED COUNSELING: ICD-10-CM

## 2022-02-21 NOTE — PROGRESS NOTES
Clinic Care Coordination Contact  [facility]: Post-Discharge Note  SITUATION                                                      Admission:    Admission Date: 02/18/22   Reason for Admission: Right internal carotid artery stenosis, right carotid endarterectomy, completion carotid duplex intraop  Discharge:   Discharge Date: 02/19/22  Discharge Diagnosis: Right internal carotid artery stenosis    BACKGROUND                                                      Per hospital discharge summary and inpatient provider notes:    Roger Bonilla is a 66 year old male with high grade right carotid stenosis. He was admitted on 2/18/2022 and underwent the above-named procedures. He tolerated the procedure well and postoperatively was transferred to the general post-surgical unit. The remainder of his course was essentiallly uncomplicated. Prior to discharge, his pain was controlled well without narcotics. He was able to perform ADLs and ambulate independently without difficulty. He did have a slight right sided facial droop noted with smiling. On 2/19/2022, he was discharged to home in stable condition.    ASSESSMENT      Enrollment  Primary Care Care Coordination Status: Declined    Discharge Assessment  How are you doing now that you are home?: Alright, pretty swollen (face)  How are your symptoms? (Red Flag symptoms escalate to triage hotline per guidelines): Unchanged  Do you feel your condition is stable enough to be safe at home until your provider visit?: Yes  Does the patient have their discharge instructions? : Yes  Does the patient have questions regarding their discharge instructions? : No  Were you started on any new medications or were there changes to any of your previous medications? : Yes  Does the patient have all of their medications?: Yes (Patient already had medication at home)  Do you have questions regarding any of your medications? : No  Do you have all of your needed medical supplies or equipment  (DME)?  (i.e. oxygen tank, CPAP, cane, etc.): Yes  Discharge follow-up appointment scheduled within 14 calendar days? : Yes  Discharge Follow Up Appointment Date: 03/23/22  Discharge Follow Up Appointment Scheduled with?: Specialty Care Provider    Post-op ([facility] CTA Only)  If the patient had a surgery or procedure, do they have any questions for a nurse?: Yes (see comment) ([facility] is connecting with RN to discuss swelling in chin/face)      PLAN                                                      Outpatient Plan:      Follow up with Dr. Adorno as scheduled in about two weeks.    Future Appointments   Date Time Provider Department Center   3/1/2022  2:30 PM Glenna Waters MD [facility]   3/11/2022  9:45 AM [facility]   3/22/2022  9:30 AM [facility]   3/23/2022  8:00 AM Karma Adorno MD [facility]   4/18/2022 10:30 AM Parveen Ramirez MD [facility]         For any urgent concerns, please contact our 24 hour nurse triage line: [phone] ([phone])       Skylar Camacho  Community Health Worker  [facility]  Ph: [phone]

## 2022-02-21 NOTE — TELEPHONE ENCOUNTER
Received notification from triage nurse that pt requested urgent clinic visit. I returned pt's call.    Pt states he is having some swelling around his CEA incision. He reports the swelling started yesterday and has remained about the same. He reports the area is red in color. He denies drainage. He also denies pain. He reports there is an area of fluid collection under his chin as well. He denies any dyspnea or trouble swallowing. I requested he send us a photo ASAP. I instructed him to proceed to the ED if he experiences SOB, dysphagia, or signs of a stroke.    Pt reports they are eating and drinking w/o difficulty. Pt is voiding and has had a bowel movement.    VQI measures: Pt has been prescribed aspirin and atorvastatin and reports they are taking it as prescribed.    Confirmed follow up appointments and provided callback number for further questions/concerns.    I will update Dr. Adorno and await pt's photos of his incision area.    OBDULIO Solis, RN  RN - [facility] Vascular Surgery  Ph: [phone]  Fax: [phone]

## 2022-02-21 NOTE — TELEPHONE ENCOUNTER
Pt is calling in about a right Carotid Endarterectomy he had on 2/18/2022. Pt reports redness and swelling that started yesterday, on the right side of his neck below the incision. Pt reports it feels puffy, like there is fluid in there. Pt denies any pain, bleeding, or drainage.   Care advice given, and per protocol pt should have this evaluated in person in the clinic. Pt was transferred to scheduling to make an appointment with the specialty schedulers. Pt was not able to get the specialty schedulers to answer. Pt was advised if he is not able to get into the clinic today a note will be sent to the clinic to see if they can get him scheduled. If he is not able to get scheduled, he should go to the Urgent Care clinic to have this evaluated. Pt verbalized understanding.     Pt was unable to get an appointment scheduled for today in the clinic. Please call Roger at [phone] to advise if he can be squeezed in, or where he should be evaluated    Ranjit Vanegas RN on 2/21/2022 at 12:24 PM       Reason for Disposition    Patient wants to be seen    Additional Information    Negative: Sounds like a life-threatening emergency to the triager    Negative: Chest pain    Negative: Difficulty breathing    Negative: Acting confused (e.g., disoriented, slurred speech) or excessively sleepy    Negative: Surgical incision symptoms and questions    Negative: Discomfort (pain, burning or stinging) when passing urine and male    Negative: Discomfort (pain, burning or stinging) when passing urine and female    Negative: Constipation    Negative: New or worsening leg (calf, thigh) pain    Negative: New or worsening leg swelling    Negative: Dizziness is severe, or persists > 24 hours after surgery    Negative: Symptoms arising from use of a urinary catheter (Peter or Coude)    Negative: Cast problems or questions    Negative: Medication question    Negative: Bright red, wide-spread, sunburn-like rash    Negative: SEVERE headache  and after spinal (epidural) anesthesia    Negative: Vomiting and persists > 4 hours    Negative: Vomiting and abdomen looks much more swollen than usual    Negative: Drinking very little and dehydration suspected (e.g., no urine > 12 hours, very dry mouth, very lightheaded)    Negative: Patient sounds very sick or weak to the triager    Negative: Sounds like a serious complication to the triager    Negative: Fever > 100.4 F (38.0 C)    Negative: Caller has URGENT question and triager unable to answer question    Negative: SEVERE post-op pain (e.g., excruciating, pain scale 8-10) that is not controlled with pain medications    Negative: Headache and after spinal (epidural) anesthesia and not severe    Negative: Fever present > 3 days (72 hours)    Protocols used: POST-OP SYMPTOMS AND [id number]

## 2022-02-21 NOTE — TELEPHONE ENCOUNTER
I contacted pt after discussing further with Dr. Adorno. Pt denies headaches. He states he is not sure what his BP is because he does not have a blood pressure machine at home. I asked him to please contact us if he does start to experience headaches. He has my direct callback number.    SHI SolisN, RN  RNCC - [facility] Vascular Surgery  Ph: [phone]  Fax: [phone]

## 2022-02-21 NOTE — TELEPHONE ENCOUNTER
Photos received and sent to Dr. Adorno for review. Nothing concerning at this time per MD. I instructed pt to continue to monitor the area and watch for drainage, fevers, warmth to the area, and pain. If he experiences SOB or dysphagia he is to proceed directly to ED. We also discussed signs of stroke and that he should call 911 if these symptoms occur.    I encouraged him to avoid laying flat to help with fluid shift. I asked him to please let us know if the swelling does not improve over the next few days. I will touch base with him at the end of the week to check in.    I will forward pt's photos to HIM[facility] to be placed in pt's chart.    OBDULIO Solis, RN  RN - [facility] Vascular Surgery  Ph: [phone]  Fax: [phone]

## 2022-02-25 ENCOUNTER — TELEPHONE (OUTPATIENT)
Dept: VASCULAR SURGERY | Facility: CLINIC | Age: 67
End: 2022-02-25
Payer: COMMERCIAL

## 2022-02-25 NOTE — TELEPHONE ENCOUNTER
I contacted Mr. Bonilla to check on him post-op. He reports the swelling around his incision is continuing to improve. I asked him to please contact us if any issues arise.    OBDULIO Solis, RN  RNCC - [facility] Vascular Surgery  Ph: [phone]  Fax: [phone]

## 2022-03-03 ENCOUNTER — PREP FOR PROCEDURE (OUTPATIENT)
Dept: CARDIOLOGY | Facility: CLINIC | Age: 67
End: 2022-03-03
Payer: COMMERCIAL

## 2022-03-03 DIAGNOSIS — I25.10 CAD (CORONARY ARTERY DISEASE): Primary | ICD-10-CM

## 2022-03-04 ENCOUNTER — TELEPHONE (OUTPATIENT)
Dept: VASCULAR SURGERY | Facility: CLINIC | Age: 67
End: 2022-03-04
Payer: COMMERCIAL

## 2022-03-04 NOTE — TELEPHONE ENCOUNTER
I contacted Mr. Bonilla for his 2 week post-op RN call. I was unable to reach him but asked him to please call me back at his convenience.    SHI SolisN, RN  RN - [facility] Vascular Surgery  Ph: [phone]  Fax: [phone]

## 2022-03-07 ENCOUNTER — TELEPHONE (OUTPATIENT)
Dept: CARDIOLOGY | Facility: CLINIC | Age: 67
End: 2022-03-07
Payer: COMMERCIAL

## 2022-03-07 DIAGNOSIS — I21.A9 OTHER MYOCARDIAL INFARCTION TYPE (H): ICD-10-CM

## 2022-03-07 DIAGNOSIS — Z11.59 ENCOUNTER FOR SCREENING FOR OTHER VIRAL DISEASES: Primary | ICD-10-CM

## 2022-03-07 DIAGNOSIS — R07.89 OTHER CHEST PAIN: ICD-10-CM

## 2022-03-07 DIAGNOSIS — I25.118 CORONARY ARTERY DISEASE OF NATIVE HEART WITH STABLE ANGINA PECTORIS, UNSPECIFIED VESSEL OR LESION TYPE (H): Primary | ICD-10-CM

## 2022-03-07 RX ORDER — CHLORHEXIDINE GLUCONATE ORAL RINSE 1.2 MG/ML
10 SOLUTION DENTAL ONCE
Status: CANCELLED | OUTPATIENT
Start: 2022-03-07 | End: 2022-03-07

## 2022-03-07 RX ORDER — CEFAZOLIN SODIUM 2 G/100ML
2 INJECTION, SOLUTION INTRAVENOUS
Status: CANCELLED | OUTPATIENT
Start: 2022-03-07

## 2022-03-07 RX ORDER — ASPIRIN 81 MG/1
162 TABLET, CHEWABLE ORAL
Status: CANCELLED | OUTPATIENT
Start: 2022-03-07

## 2022-03-07 RX ORDER — CEFAZOLIN SODIUM 2 G/100ML
2 INJECTION, SOLUTION INTRAVENOUS SEE ADMIN INSTRUCTIONS
Status: CANCELLED | OUTPATIENT
Start: 2022-03-07

## 2022-03-07 RX ORDER — FAMOTIDINE 20 MG/1
20 TABLET, FILM COATED ORAL
Status: CANCELLED | OUTPATIENT
Start: 2022-03-07

## 2022-03-07 RX ORDER — ASPIRIN 81 MG/1
81 TABLET, CHEWABLE ORAL
Status: CANCELLED | OUTPATIENT
Start: 2022-03-07

## 2022-03-07 RX ORDER — LIDOCAINE 40 MG/G
CREAM TOPICAL
Status: CANCELLED | OUTPATIENT
Start: 2022-03-07

## 2022-03-07 NOTE — TELEPHONE ENCOUNTER
Per task, pt needs to schedule surgery with Dr. Waters after 3/23. Talked with pt and scheduled them for 3/24. Will call if anything changes

## 2022-03-11 ENCOUNTER — LAB (OUTPATIENT)
Dept: LAB | Facility: CLINIC | Age: 67
End: 2022-03-11
Payer: COMMERCIAL

## 2022-03-11 DIAGNOSIS — E78.5 HYPERLIPIDEMIA LDL GOAL <70: ICD-10-CM

## 2022-03-11 DIAGNOSIS — I21.A9 OTHER MYOCARDIAL INFARCTION TYPE (H): ICD-10-CM

## 2022-03-11 DIAGNOSIS — I25.118 CORONARY ARTERY DISEASE OF NATIVE HEART WITH STABLE ANGINA PECTORIS, UNSPECIFIED VESSEL OR LESION TYPE (H): ICD-10-CM

## 2022-03-11 LAB
ALBUMIN SERPL-MCNC: 3.5 G/DL (ref 3.4–5)
ALP SERPL-CCNC: 88 U/L (ref 40–150)
ALT SERPL W P-5'-P-CCNC: 28 U/L (ref 0–70)
ANION GAP SERPL CALCULATED.3IONS-SCNC: 4 MMOL/L (ref 3–14)
AST SERPL W P-5'-P-CCNC: 18 U/L (ref 0–45)
BASOPHILS # BLD AUTO: 0 10E3/UL (ref 0–0.2)
BASOPHILS NFR BLD AUTO: 0 %
BILIRUB SERPL-MCNC: 0.7 MG/DL (ref 0.2–1.3)
BUN SERPL-MCNC: 16 MG/DL (ref 7–30)
CALCIUM SERPL-MCNC: 9.3 MG/DL (ref 8.5–10.1)
CHLORIDE BLD-SCNC: 105 MMOL/L (ref 94–109)
CHOLEST SERPL-MCNC: 136 MG/DL
CO2 SERPL-SCNC: 30 MMOL/L (ref 20–32)
CREAT SERPL-MCNC: 0.9 MG/DL (ref 0.66–1.25)
EOSINOPHIL # BLD AUTO: 0.4 10E3/UL (ref 0–0.7)
EOSINOPHIL NFR BLD AUTO: 3 %
ERYTHROCYTE [DISTWIDTH] IN BLOOD BY AUTOMATED COUNT: 14.6 % (ref 10–15)
FASTING STATUS PATIENT QL REPORTED: YES
GFR SERPL CREATININE-BSD FRML MDRD: >90 ML/MIN/1.73M2
GLUCOSE BLD-MCNC: 116 MG/DL (ref 70–99)
HBA1C MFR BLD: 6.6 % (ref 0–5.6)
HCT VFR BLD AUTO: 45.7 % (ref 40–53)
HDLC SERPL-MCNC: 39 MG/DL
HGB BLD-MCNC: 14.2 G/DL (ref 13.3–17.7)
LDLC SERPL CALC-MCNC: 80 MG/DL
LYMPHOCYTES # BLD AUTO: 2.1 10E3/UL (ref 0.8–5.3)
LYMPHOCYTES NFR BLD AUTO: 19 %
MCH RBC QN AUTO: 29.5 PG (ref 26.5–33)
MCHC RBC AUTO-ENTMCNC: 31.1 G/DL (ref 31.5–36.5)
MCV RBC AUTO: 95 FL (ref 78–100)
MONOCYTES # BLD AUTO: 1 10E3/UL (ref 0–1.3)
MONOCYTES NFR BLD AUTO: 10 %
NEUTROPHILS # BLD AUTO: 7.3 10E3/UL (ref 1.6–8.3)
NEUTROPHILS NFR BLD AUTO: 68 %
NONHDLC SERPL-MCNC: 97 MG/DL
PLATELET # BLD AUTO: 268 10E3/UL (ref 150–450)
POTASSIUM BLD-SCNC: 4.2 MMOL/L (ref 3.4–5.3)
PROT SERPL-MCNC: 7.5 G/DL (ref 6.8–8.8)
RBC # BLD AUTO: 4.82 10E6/UL (ref 4.4–5.9)
SODIUM SERPL-SCNC: 139 MMOL/L (ref 133–144)
TRIGL SERPL-MCNC: 86 MG/DL
WBC # BLD AUTO: 10.9 10E3/UL (ref 4–11)

## 2022-03-11 PROCEDURE — 80053 COMPREHEN METABOLIC PANEL: CPT

## 2022-03-11 PROCEDURE — 85025 COMPLETE CBC W/AUTO DIFF WBC: CPT

## 2022-03-11 PROCEDURE — 36415 COLL VENOUS BLD VENIPUNCTURE: CPT

## 2022-03-11 PROCEDURE — 83036 HEMOGLOBIN GLYCOSYLATED A1C: CPT

## 2022-03-11 PROCEDURE — 80061 LIPID PANEL: CPT | Performed by: NURSE PRACTITIONER

## 2022-03-14 NOTE — RESULT ENCOUNTER NOTE
Electrolytes, kidney and liver function WNL; Lipids stable and near goal. Done after adding Zetia 10  mg every day on 1/12/22. Follow up with Dr Delong on 4/18/22. Will discuss with Kamryn Carroll NP for further recommendations.

## 2022-03-17 ENCOUNTER — HOSPITAL ENCOUNTER (OUTPATIENT)
Dept: ULTRASOUND IMAGING | Facility: CLINIC | Age: 67
Discharge: HOME | End: 2022-03-17
Attending: SURGERY
Payer: COMMERCIAL

## 2022-03-17 ENCOUNTER — HOSPITAL ENCOUNTER (OUTPATIENT)
Dept: GENERAL RADIOLOGY | Facility: CLINIC | Age: 67
Discharge: HOME | End: 2022-03-17
Attending: SURGERY
Payer: COMMERCIAL

## 2022-03-17 ENCOUNTER — TELEPHONE (OUTPATIENT)
Dept: CARDIOLOGY | Facility: CLINIC | Age: 67
End: 2022-03-17

## 2022-03-17 ENCOUNTER — LAB (OUTPATIENT)
Dept: LAB | Facility: CLINIC | Age: 67
End: 2022-03-17
Payer: COMMERCIAL

## 2022-03-17 ENCOUNTER — HOSPITAL ENCOUNTER (OUTPATIENT)
Dept: CT IMAGING | Facility: CLINIC | Age: 67
Discharge: HOME | End: 2022-03-17
Attending: SURGERY
Payer: COMMERCIAL

## 2022-03-17 DIAGNOSIS — R07.89 OTHER CHEST PAIN: ICD-10-CM

## 2022-03-17 DIAGNOSIS — I25.118 CORONARY ARTERY DISEASE OF NATIVE HEART WITH STABLE ANGINA PECTORIS, UNSPECIFIED VESSEL OR LESION TYPE (H): ICD-10-CM

## 2022-03-17 DIAGNOSIS — I25.10 CARDIOVASCULAR DISEASE: Primary | ICD-10-CM

## 2022-03-17 LAB
ABO/RH(D): NORMAL
ALBUMIN UR-MCNC: 30 MG/DL
ANTIBODY SCREEN: NEGATIVE
APPEARANCE UR: CLEAR
BILIRUB UR QL STRIP: NEGATIVE
COLOR UR AUTO: YELLOW
GLUCOSE UR STRIP-MCNC: NEGATIVE MG/DL
HGB UR QL STRIP: NEGATIVE
HOLD SPECIMEN: NORMAL
HOLD SPECIMEN: NORMAL
KETONES UR STRIP-MCNC: NEGATIVE MG/DL
LEUKOCYTE ESTERASE UR QL STRIP: NEGATIVE
NITRATE UR QL: NEGATIVE
PH UR STRIP: 6 [PH] (ref 5–7)
RBC #/AREA URNS AUTO: NORMAL /HPF
SP GR UR STRIP: 1.02 (ref 1–1.03)
SPECIMEN EXPIRATION DATE: NORMAL
UROBILINOGEN UR STRIP-ACNC: 0.2 E.U./DL
WBC #/AREA URNS AUTO: NORMAL /HPF

## 2022-03-17 PROCEDURE — 36415 COLL VENOUS BLD VENIPUNCTURE: CPT

## 2022-03-17 PROCEDURE — 86850 RBC ANTIBODY SCREEN: CPT

## 2022-03-17 PROCEDURE — 86923 COMPATIBILITY TEST ELECTRIC: CPT | Performed by: SURGERY

## 2022-03-17 PROCEDURE — 71250 CT THORAX DX C-: CPT

## 2022-03-17 PROCEDURE — 93970 EXTREMITY STUDY: CPT

## 2022-03-17 PROCEDURE — 81001 URINALYSIS AUTO W/SCOPE: CPT

## 2022-03-17 PROCEDURE — 71046 X-RAY EXAM CHEST 2 VIEWS: CPT

## 2022-03-17 NOTE — TELEPHONE ENCOUNTER
CV Surgery    Called patient and talked with wife that he will need CTA head/neck prior to his surgery on 3/24 to better eval his carotid disease. Message out to schedulers to call.     Will follow up on result and refer to vascular prior.    Barby Horn PA-C

## 2022-03-17 NOTE — RESULT ENCOUNTER NOTE
LDL Only slightly improved to 80 (from 85) with adding zetia. Dr. Ramirez can discuss if he wants to consider PCSK9 inhib.

## 2022-03-21 ENCOUNTER — LAB (OUTPATIENT)
Dept: LAB | Facility: CLINIC | Age: 67
End: 2022-03-21
Payer: COMMERCIAL

## 2022-03-21 DIAGNOSIS — Z11.59 ENCOUNTER FOR SCREENING FOR OTHER VIRAL DISEASES: ICD-10-CM

## 2022-03-21 LAB — SARS-COV-2 RNA RESP QL NAA+PROBE: NEGATIVE

## 2022-03-21 PROCEDURE — U0005 INFEC AGEN DETEC AMPLI PROBE: HCPCS

## 2022-03-21 PROCEDURE — U0003 INFECTIOUS AGENT DETECTION BY NUCLEIC ACID (DNA OR RNA); SEVERE ACUTE RESPIRATORY SYNDROME CORONAVIRUS 2 (SARS-COV-2) (CORONAVIRUS DISEASE [COVID-19]), AMPLIFIED PROBE TECHNIQUE, MAKING USE OF HIGH THROUGHPUT TECHNOLOGIES AS DESCRIBED BY CMS-2020-01-R: HCPCS

## 2022-03-22 ENCOUNTER — HOSPITAL ENCOUNTER (OUTPATIENT)
Dept: ULTRASOUND IMAGING | Facility: CLINIC | Age: 67
Discharge: HOME | End: 2022-03-22
Attending: SURGERY
Payer: COMMERCIAL

## 2022-03-22 ENCOUNTER — HOSPITAL ENCOUNTER (OUTPATIENT)
Dept: CT IMAGING | Facility: CLINIC | Age: 67
Discharge: HOME | End: 2022-03-22
Attending: PHYSICIAN ASSISTANT
Payer: COMMERCIAL

## 2022-03-22 DIAGNOSIS — Z98.890 S/P CAROTID ENDARTERECTOMY: ICD-10-CM

## 2022-03-22 DIAGNOSIS — I65.23 ATHEROSCLEROSIS OF BOTH CAROTID ARTERIES: ICD-10-CM

## 2022-03-22 DIAGNOSIS — I25.10 CARDIOVASCULAR DISEASE: ICD-10-CM

## 2022-03-22 PROCEDURE — 70498 CT ANGIOGRAPHY NECK: CPT

## 2022-03-22 PROCEDURE — 250N000011 HC RX IP 250 OP 636: Performed by: RADIOLOGY

## 2022-03-22 PROCEDURE — 70496 CT ANGIOGRAPHY HEAD: CPT

## 2022-03-22 PROCEDURE — 250N000009 HC RX 250: Performed by: RADIOLOGY

## 2022-03-22 PROCEDURE — 93880 EXTRACRANIAL BILAT STUDY: CPT

## 2022-03-22 RX ORDER — IOPAMIDOL 755 MG/ML
70 INJECTION, SOLUTION INTRAVASCULAR ONCE
Status: COMPLETED | OUTPATIENT
Start: 2022-03-22 | End: 2022-03-22

## 2022-03-22 RX ADMIN — SODIUM CHLORIDE 100 ML: 9 INJECTION, SOLUTION INTRAVENOUS at 08:04

## 2022-03-22 RX ADMIN — IOPAMIDOL 70 ML: 755 INJECTION, SOLUTION INTRAVENOUS at 08:04

## 2022-03-23 ENCOUNTER — VIRTUAL VISIT (OUTPATIENT)
Dept: VASCULAR SURGERY | Facility: CLINIC | Age: 67
End: 2022-03-23
Payer: COMMERCIAL

## 2022-03-23 ENCOUNTER — ANESTHESIA EVENT (OUTPATIENT)
Dept: SURGERY | Facility: CLINIC | Age: 67
DRG: 235 | End: 2022-03-23
Payer: COMMERCIAL

## 2022-03-23 DIAGNOSIS — I65.22 ASYMPTOMATIC STENOSIS OF LEFT CAROTID ARTERY: ICD-10-CM

## 2022-03-23 DIAGNOSIS — Z98.890 STATUS POST CAROTID ENDARTERECTOMY: Primary | ICD-10-CM

## 2022-03-23 PROCEDURE — 99024 POSTOP FOLLOW-UP VISIT: CPT | Performed by: SURGERY

## 2022-03-23 RX ORDER — LISINOPRIL 40 MG/1
40 TABLET ORAL EVERY MORNING
Status: ON HOLD | COMMUNITY
End: 2022-03-30

## 2022-03-23 RX ORDER — ATORVASTATIN CALCIUM 80 MG/1
80 TABLET, FILM COATED ORAL EVERY MORNING
COMMUNITY
End: 2022-05-10

## 2022-03-23 RX ORDER — METOPROLOL SUCCINATE 50 MG/1
50 TABLET, EXTENDED RELEASE ORAL EVERY MORNING
Status: ON HOLD | COMMUNITY
End: 2022-03-30

## 2022-03-23 RX ORDER — OMEPRAZOLE 40 MG/1
40 CAPSULE, DELAYED RELEASE ORAL EVERY MORNING
COMMUNITY
End: 2022-07-11

## 2022-03-23 ASSESSMENT — ENCOUNTER SYMPTOMS: ORTHOPNEA: 0

## 2022-03-23 ASSESSMENT — LIFESTYLE VARIABLES: TOBACCO_USE: 1

## 2022-03-23 NOTE — ANESTHESIA PREPROCEDURE EVALUATION
Anesthesia Pre-Procedure Evaluation    Patient: Roger Bonilla   MRN: [id number] : 1955        Procedure : Procedure(s):  CORONARY ARTERY BYPASS GRAFT (CABG)          Past Medical History:   Diagnosis Date     Coronary artery disease may 1 2005    2 stents put in heart     Obese      Pure hypercholesterolemia      Sleep apnea      Type II or unspecified type diabetes mellitus without mention of complication, not stated as uncontrolled      Unspecified cardiovascular disease -    MI -- Angioplasty two stents RCA & OM2     Unspecified essential hypertension       Past Surgical History:   Procedure Laterality Date     CARDIAC SURGERY  may 1, 2005     COLONOSCOPY  2011    Procedure:COLONOSCOPY; Screening Colonoscopy; Surgeon:LUTHER FLORES; Location:[facility]     CV CORONARY ANGIOGRAM N/A 2021    Procedure: Coronary Angiogram;  Surgeon: Jonny Moore MD;  Location: [facility] CARDIAC CATH LAB     ENDARTERECTOMY CAROTID Right 2022    Procedure: Right carotid endarterectomy with ELECTROENCEPHALOGRAM(EEG);  Surgeon: Karma Adorno MD;  Location: [facility] OR     ESOPHAGOSCOPY, GASTROSCOPY, DUODENOSCOPY (EGD), COMBINED N/A 2021    Procedure: ESOPHAGOGASTRODUODENOSCOPY, WITH BIOPSY;  Surgeon: Jeff Cunningham DO;  Location: [facility]     PHACOEMULSIFICATION WITH STANDARD INTRAOCULAR LENS IMPLANT Right 2021    Procedure: Right eye Cataract Extraction with Implant;  Surgeon: Reyes Valdez MD;  Location: [facility]     PHACOEMULSIFICATION WITH STANDARD INTRAOCULAR LENS IMPLANT Left 2021    Procedure: left eye Cataract Extraction with Implant;  Surgeon: Reyes Valdez MD;  Location: [facility]     SURGICAL HISTORY OF -       None     VASCULAR SURGERY  oct 2013    stent put in leg      Allergies   Allergen Reactions     Nkda [No Known Drug Allergies]       Social History     Tobacco Use     Smoking status: Current Every Day Smoker     Packs/day: 0.50     Years: 50.00      Pack years: 25.00     Types: Cigarettes     Start date: 1972     Last attempt to quit: 2022     Years since quittin.1     Smokeless tobacco: Never Used   Substance Use Topics     Alcohol use: Yes     Comment: 2 beers a week       Wt Readings from Last 1 Encounters:   22 110.9 kg (244 lb 7.8 oz)        Anesthesia Evaluation   Pt has had prior anesthetic. Type: General and MAC.    No history of anesthetic complications       ROS/MED HX  ENT/Pulmonary:     (+) sleep apnea, doesn't use CPAP, tobacco use, Current use,  (-) asthma and COPD   Neurologic:  - neg neurologic ROS   (+) peripheral neuropathy, - right side face numbness post carotid endarterectomy.     Cardiovascular: Comment: Currently electively limiting exertion  Subtotal occlusion of LCx stent   of RCA  AAA 3.3 cm, following    (+) hypertension-Peripheral Vascular Disease-- Non Symptomatic. CAD angina-with excertion. -stent-Drug Eluting Stent. Taking blood thinners Pt has received instructions: Previous cardiac testing   Echo: Date:  Results:  Echocardiography Laboratory  [facility] [address] [place] [address]     Name: JOSEFINA WHEELER  MRN: [id number]  : 1955  Study Date: 12/10/2021 12:57 PM  Age: 66 yrs  Gender: Male  Patient Location: [facility]  Reason For Study: CAD  Ordering Physician: JAQUELIN CERVANTES  Referring Physician: Maria De Jesus Hay  Performed By: Nicolle Albarran RDCS     BSA: 2.2 m2  Height: 66 in  Weight: 245 lb  HR: 69  BP: 146/72 mmHg  ______________________________________________________________________________  Procedure  Complete Echo Adult.  ______________________________________________________________________________  Interpretation Summary     1. Left ventricular systolic function is normal. The visual ejection fraction  is 60-65%.  2. No regional wall motion abnormalities noted.  3. The right ventricle is normal in structure, function and size.  4. No evidence for significant valvular pathology.  5. The  ascending aorta is mildly dilated, 3.8 cm.  ______________________________________________________________________________  Left Ventricle  The left ventricle is normal in size. There is mild concentric left  ventricular hypertrophy. Left ventricular systolic function is normal. The  visual ejection fraction is 60-65%. Grade I or early diastolic dysfunction. No  regional wall motion abnormalities noted.     Right Ventricle  The right ventricle is normal in structure, function and size.     Atria  Normal left atrial size. Right atrial size is normal. There is no color  Doppler evidence of an atrial shunt.     Mitral Valve  The mitral valve is normal in structure and function. There is trace mitral  regurgitation.     Tricuspid Valve  The tricuspid valve is normal in structure and function. There is trace  tricuspid regurgitation.     Aortic Valve  The aortic valve is trileaflet with aortic valve sclerosis.     Pulmonic Valve  The pulmonic valve is normal in structure and function.     Vessels  The ascending aorta is Mildly dilated. IVC diameter <2.1 cm collapsing >50%  with sniff suggests a normal RA pressure of 3 mmHg.     Pericardium  There is no pericardial effusion.     Rhythm  Sinus rhythm was noted.  ______________________________________________________________________________  MMode/2D Measurements & Calculations  IVSd: 1.4 cm  Stress Test: Date: Results:    ECG Reviewed: Date: Results:    Cath: Date: 12/21 Results:       -LM: Mild disease  -LAD: There is a 40-50% proximal-mid LAD stenosis at the takeoff of a large first diagonal branch.  This is not a critical stenosis, but given its location and the feeding of collaterals to the RCA, this is almost certainly flow-limiting.  -LCx: Previously placed OM2 stent has a 90% focal stenosis.  -RCA: There is a  of the mid-distal RCA with left to right collaterals.          Plan    -Remove radial band per protocol; wrist precautions  -Case was discussed with  patient's primary cardiologist, Dr. Ramirez.  Given lack of critical, high risk lesions, as well as minimal cardiac symptoms (exertional chest discomfort only occasionally with severe exertion), we agree that medical management would be most appropriate for now.  Patient's mobility is currently      (-) past MI, CHF, MILAN, orthopnea/PND, past MI, CABG and murmur   METS/Exercise Tolerance: 3 - Able to walk 1-2 blocks without stopping    Hematologic:  - neg hematologic  ROS     Musculoskeletal:  - neg musculoskeletal ROS     GI/Hepatic:    (-) GERD and liver disease   Renal/Genitourinary:    (-) renal disease   Endo:     (+) type II DM, Last HgA1c: 6.6, Not using insulin, Obesity,     Psychiatric/Substance Use:  - neg psychiatric ROS     Infectious Disease:       Malignancy:  - neg malignancy ROS     Other:            Physical Exam    Airway        Mallampati: IV   TM distance: > 3 FB   Neck ROM: limited   Mouth opening: > 3 cm    Respiratory Devices and Support         Dental       (+) missing and loose    B=Bridge, C=Chipped, L=Loose, M=Missing    Cardiovascular          Rhythm and rate: regular and normal (-) no murmur    Pulmonary   pulmonary exam normal                OUTSIDE LABS:  CBC:   Lab Results   Component Value Date    WBC 10.9 03/11/2022    WBC 17.2 (H) 02/19/2022    HGB 14.2 03/11/2022    HGB 12.6 (L) 02/19/2022    HCT 45.7 03/11/2022    HCT 39.7 (L) 02/19/2022     03/11/2022     02/19/2022     BMP:   Lab Results   Component Value Date     03/11/2022     02/18/2022    POTASSIUM 4.2 03/11/2022    POTASSIUM 4.0 02/19/2022    CHLORIDE 105 03/11/2022    CHLORIDE 107 02/18/2022    CO2 30 03/11/2022    CO2 30 02/18/2022    BUN 16 03/11/2022    BUN 20 02/18/2022    CR 0.90 03/11/2022    CR 1.00 02/18/2022     (H) 03/11/2022     (H) 02/19/2022     COAGS:   Lab Results   Component Value Date    PTT 30 12/29/2021    INR 1.03 02/19/2022     POC:   Lab Results   Component Value  Date     (H) 06/25/2021     HEPATIC:   Lab Results   Component Value Date    ALBUMIN 3.5 03/11/2022    PROTTOTAL 7.5 03/11/2022    ALT 28 03/11/2022    AST 18 03/11/2022    ALKPHOS 88 03/11/2022    BILITOTAL 0.7 03/11/2022     OTHER:   Lab Results   Component Value Date    LACT 1.9 06/23/2021    A1C 6.6 (H) 03/11/2022    SUE 9.3 03/11/2022    PHOS 4.2 06/24/2021    MAG 1.9 07/19/2021    LIPASE 156 07/19/2021    TSH 0.35 (L) 06/23/2021    T4 1.32 06/23/2021    SED 4 10/27/2008       Anesthesia Plan    ASA Status:  3   NPO Status:  NPO Appropriate    Anesthesia Type: General.     - Airway: ETT   Induction: Intravenous.   Maintenance: Inhalation.   Techniques and Equipment:     - Airway: Video-Laryngoscope     - Lines/Monitors: Arterial Line, Central Line, PAC, KASSIE     Consents    Anesthesia Plan(s) and associated risks, benefits, and realistic alternatives discussed. Questions answered and patient/representative(s) expressed understanding.    - Discussed:     - Discussed with:  Patient      - Extended Intubation/Ventilatory Support Discussed: Yes.      - Patient is DNR/DNI Status: No    Use of blood products discussed: Yes.     - Discussed with: Patient.     - Consented: consented to blood products            Reason for refusal: other.     Postoperative Care    Pain management: IV analgesics.     - Plan for long acting post-op opioid use   PONV prophylaxis: Ondansetron (or other 5HT-3), Dexamethasone or Solumedrol     Comments:                Rigoberto Saul MD

## 2022-03-23 NOTE — H&P (VIEW-ONLY)
Vascular Surgery Clinic Post-Procedure Follow Up Visit    March 23, 2022    Roger Bonilla  [id number]      HPI: Patient follows up today s/p right carotid endarterectomy for high-grade asymptomatic right carotid stenosis. Feeling well with no issues with headache, TIA or strokelike symptoms.  Still feels a healing ridge on the neck. Denies chest pain, fever, chills.       Please see Epic rooming record from today documenting vital signs and current medications.    On exam, pleasant 66 year old in NAD.  Alert and oriented x3.    Carotid duplex studies show a patent functioning right carotid endarterectomy bed without restenosis.  No significant stenosis appreciated in the left internal carotid artery      AP: Doing well s/p right carotid endarterectomy.  Okay to proceed with coronary artery bypass grafting tomorrow and resume aspirin and statin when able.  Will return for follow up in 6 months with carotid duplex study.  He knows to call if issues arise before then.      Many thanks for involving me in the care of this very pleasant patient. Should any questions or concerns arise, please don't hesitate to contact me.    Warm Regards,    Karma Adorno MD, DFSVS, RPVI  Director, [facility]  Professor and Chief, Vascular and Endovascular Surgery  [facility]  [email or web address]  Pager: Cipriano Mccord is a 66 year old who is being evaluated via a phone visit.    maria esther is a 66 year old who is being evaluated via a billable telephone visit.      What phone number would you like to be contacted at? [phone]  How would you like to obtain your AVS? MyChart      Start Time: 8:15 AM  Telephone-Visit Details    Type of service:  Phone Visit    Video End Time:8:30 AM    Originating Location (pt. Location): Home    Distant Location (provider location):  [facility] VASCULAR CLIN[facility] [place]     Platform used for Video Visit: Other: Phone visit     Anisha Rossi, Virtual Facilitator/SHERON,  3/23/2022

## 2022-03-23 NOTE — PATIENT INSTRUCTIONS
Thank you so much for choosing us for your care. It was a pleasure to see you at the vascular clinic today.     Follow-up recommendations: We will see you back in 6 months for a phone visit. Please reach out to us if any issues arise in the meantime.    Additional testing/imaging ordered today: Repeat carotid ultrasound in 6 months.      Our scheduling team will get in touch with you to set up any follow-up testing/imaging and/or appointments. Please be aware that any testing/imaging recommended today will need to completed prior to your next visit with the provider. If testing/imaging is not completed prior to your next visit, your visit may be rescheduled.        If you have any questions, please call Giselle Caal RN at [phone]. We also encourage the use of [employer] to communicate with your HealthCare Provider.      If you have an urgent need after business hours (8:00 am to 4:30 pm) please call [phone], option 4, and ask for the vascular attending on call. For non-urgent after hours needs, please call the vascular nurse at [phone] and leave a detailed voicemail. For scheduling needs, please call our scheduling line at [phone].    Patient Education     Carotid Artery Disease  What is carotid artery disease?   The carotid arteries are the main blood vessels that send blood and oxygen to the brain. When these vessels become narrowed, it s called carotid artery disease. It may also be called carotid artery stenosis. The narrowing is caused by atherosclerosis. This is the buildup of fatty deposits, calcium, and other things inside the artery. Carotid artery disease is like coronary artery disease.In that disease, buildup occurs in the arteries of the heart.That can cause a heart attack.  Carotid artery disease reduces the flow of oxygen to the brain. The brain needs a constant supply of oxygen to work. Even a brief pause in blood supply can cause problems. Brain cells start to die after just a  few minutes without blood or oxygen. If the narrowing of the carotid arteries is severe enough that blood flow is blocked, it can cause a stroke. If a piece of plaque breaks off it can block blood flow to the brain. This too can cause a stroke.  What causes carotid artery disease?   Atherosclerosis causes most carotid artery disease. In this condition, fatty deposits build up along the lining of the arteries.This is called plaque. The plaque narrows the insides of the arteries.This decreases blood flow or fully blocks the flow of blood to the brain.  Who is at risk for carotid artery disease?   Risk factors linked with atherosclerosis include:    Older age    Male    Family history    Race    Genetic factors    High cholesterol    High blood pressure    Smoking    Diabetes    Overweight    Diet high in saturated fat    Lack of exercise  These factors increase a person's risk. But they don't always cause the disease. Knowing your risk factors can help you make lifestyle changes.You can work with your healthcare provider to reduce the chance you will get the disease.  What are the symptoms of carotid artery disease?   The disease may have no symptoms. In some cases, the first sign of the disease is a transient ischemic attack (TIA) or stroke.  A TIA is a sudden, short-term loss of blood flow to a part of the brain. It usually lasts a few minutes to an hour. Symptoms go away fully within 24 hours. There are no lasting effects. When symptoms continue, it is a stroke. Symptoms of a TIA or stroke may include:    Sudden weakness or clumsiness of an arm or leg on 1 side of the body    Sudden paralysis of an arm or leg on 1 side of the body    Loss of coordination or movement    Confusion, loss of ability to concentrate     Dizziness, fainting, or headache    Numbness or loss of feeling in the face or in an arm or leg    Temporary loss of vision or blurred vision    Inability to speak clearly or slurred speech  If you or a  loved one has any of these symptoms, call for medical help right away. A TIA may be a warning sign that a stroke is about to occur. But TIAs don't precede all strokes.  The symptoms of a TIA and stroke are the same. A stroke is loss of blood flow (ischemia) to the brain that lasts long enough to cause brain damage. Brain cells start to die after just a few minutes without oxygen.  The effects after a stroke depends on the size and place in the brain that had loss of blood flow. This may include problems with:    Moving    Speaking    Thinking    Remembering    Bowel and bladder function    Eating    Emotional control    Other vital body functions  Recovery also depends on the size and place of the stroke. A stroke may result in long-term problems, such as weakness in an arm or leg. It may cause paralysis, loss of speech, or even death.  The symptoms of carotid artery disease may look like other health problems. See your healthcare provider for a diagnosis.  How is carotid artery disease diagnosed?   Your healthcare provider will ask about your health history.He or she will give you a physical exam. You will need tests. These may include:    Listening to the carotid arteries.For this test, your healthcare provider places a stethoscope over the carotid artery. This is done to listen for a sound called a bruit (BREW-ee). This sound is made when blood passes through a narrowed artery. A bruit can be a sign of atherosclerosis. But an artery may be diseased and not make this sound.    Carotid artery duplex scan. This test is done to assess the blood flow of the carotid arteries. A probe called a transducer sends out ultrasonic sound waves. The transducer is also like a microphone. It is placed on the carotid arteries at certain locations and angles. The sound waves move through the skin and other body tissues to the blood vessels. The sound waves echo off of the blood cells. The transducer sends the waves to an amplifier.  Your healthcare provider can hear the sound waves. Not enough or no sounds may mean blood flow is blocked.    MRI. This test uses large magnets, radio waves, and a computer to make detailed images of tissues in the body. For this test, you lie inside a big tube while magnets pass around your body. It s very loud.     MR angiography (MRA). This test uses MRI technology and IV contrast dye to make the blood vessels visible. Contrast dye causes blood vessels to show up well on the MRI image. This is so the doctor can see them.    CT angiography (CTA). This test uses X-rays and a computer along with contrast dye to make detailed images of the body. A CTA shows pictures of blood vessels and tissues.It helps find narrowed blood vessels.      Angiography. This test is used to see how blocked the carotid arteries are. It is done by taking X-ray images while a contrast dye is injected. The contrast dye shows the shape and flow of blood through the arteries as X-ray images are made.  How is carotid artery disease treated?   Treatment will depend on your symptoms, age, and general health. It will also depend on how severe the condition is.  If a carotid artery is less than half narrowed, it is often treated with medicine and lifestyle changes. If the artery is between 50% and 70% narrowed, medicine or surgery may be done.  Medical treatment may include the below.  Lifestyle changes    Quitting smoking.This can reduce the risk for carotid artery disease and cardiovascular disease. All nicotine products constrict the blood vessels. This includes electronic cigarettes. This decreases blood flow through the arteries.    Lowering cholesterol. Eat a low-fat, low-cholesterol diet. Eat plenty of vegetables, lean meats (no red meats), fruits, and high-fiber grains. Don't eat processed foods, or foods high in saturated and trans-fats. When diet and exercise are not enough to control cholesterol, you may need medicines.    Lowering blood  sugar. High blood sugar (glucose) can cause damage to the lining of the carotid arteries. Control glucose levels through a low-sugar diet, and regular exercise. If you have diabetes, you may need medicine or other treatment.    Exercising. Lack of exercise can cause weight gain.It can raise blood pressure and cholesterol. Exercise can help you keep a healthy weight and reduce risks for carotid artery disease.    Lowering blood pressure.High blood pressure causes wear and tear and inflammation in blood vessels.This raises the risk for artery narrowing. Blood pressure should be below 140/90 mm/Hg for most people. People with diabetes may need even lower blood pressure.    Medicines  Medicines that may be used include:    Antiplatelets.These medicines make platelets in the blood less able to stick together and cause clots. These medicines include aspirin, clopidogrel, and dipyridamole.    Cholesterol medicines.Statins are a type of medicines that lower cholesterol. They include simvastatin and atorvastatin. Studies have shown that some statins can decrease the thickness of the carotid artery wall.This can increase the size of the opening of the artery.    Blood pressure medicines.Several types of medicines work to lower blood pressure.  If a carotid artery is narrowed from 50% to 70%, you may need stronger treatment, especially if you have symptoms.  Surgery is usually advised for carotid narrowing of more than 70%. Surgery lowers the risk for stroke after symptoms such as TIA or minor stroke.  Types of surgery include:    Carotid endarterectomy (CEA).This is surgery to remove plaque and blood clots from the carotid arteries. CEA may help prevent a stroke in people who have symptoms and a narrowing of 70% or more.    Carotid artery angioplasty with stenting (ARIS).This is an option for people who are unable to have CEA. It uses a very small tube (catheter).This tube is put into a blood vessel in the groin. It is pushed  up to the carotid arteries. Once the tube is in place, a small balloon is inflated at the tip of it.This opens the artery.Then a stent is put in place. A stent is a thin, metal-mesh tube. It is used to hold the artery open.  What are possible complications of carotid artery disease?  The main complication is a stroke. A stroke can cause serious disability.And may cause death.  Can carotid artery disease be prevented?   You can prevent or delay the disease like you would prevent heart disease. This includes:    Diet changes.Eat a healthy diet.It should include plenty of fresh fruits and vegetables. Eat lean meats such as poultry and fish. And eat low-fat or non-fat dairy foods. Limit your intake of salt, sugar, processed foods, saturated fats, and alcohol.     Exercise.Aim for 40 minutes of moderate to vigorous physical activity at least 3 to 4 days per week.    Manage your weight.If you are overweight, take steps to lose weight.     Quit smoking.If you smoke, break the habit. Enroll in a stop-smoking program. This can improve your chances of success. Ask your doctor about prescription medicine.    Control stress.Learn ways to manage stress in your home and work life.  When should I call my healthcare provider?   Learn the symptoms of stroke.Have your family members also learn them. If you think you are having symptoms of a stroke, call 911 right away.  Key points about carotid artery disease    Carotid artery disease is narrowing of the carotid arteries. These arteries send oxygen-rich blood from the heart to the brain.    Narrowing of the carotid arteries can cause a stroke.Symptoms of a stroke should be treated right away.    Eating a healthy diet is 1 way to reduce the risk of carotid artery disease. Exercise, quitting smoking, blood pressure control, and medicine can also help.    Opening the carotid arteries can be done with a surgery or with angioplasty and a stent.    Carotid artery disease may not have  symptoms. But if you have risk factors, see your healthcare provider for screening and diagnosis.    Next steps  Tips to help you get the most from a visit to your healthcare provider:    Know the reason for your visit and what you want to happen.    Before your visit, write down questions you want answered.    Bring someone with you to help you ask questions and remember what your provider tells you.    At the visit, write down the name of a new diagnosis, and any new medicines, treatments, or tests. Also write down any new instructions your provider gives you.    Know why a new medicine or treatment is prescribed, and how it will help you. Also know what the side effects are.    Ask if your condition can be treated in other ways.    Know why a test or procedure is recommended and what the results could mean.    Know what to expect if you do not take the medicine or have the test or procedure.    If you have a follow-up appointment, write down the date, time, and purpose for that visit.    Know how you can contact your provider if you have questions.  Colin last reviewed this educational content on 12/1/2018 2000-2021 [employer]. All rights reserved. This information is not intended as a substitute for professional medical care. Always follow your healthcare professional's instructions.

## 2022-03-23 NOTE — NURSING NOTE
Chief Complaint   Patient presents with     Follow Up     4 week follow up       Patient confirms medications and allergies are accurate via patients echeck in completion, and or denies any changes since last reviewed/verified.     Anisha Rossi, Virtual Facilitator

## 2022-03-23 NOTE — LETTER
3/23/2022      RE: Roger Bonilla  [address]  [facility] [address]       Vascular Surgery Clinic Post-Procedure Follow Up Visit    March 23, 2022    Roger Bonilla  [id number]      HPI: Patient follows up today s/p right carotid endarterectomy for high-grade asymptomatic right carotid stenosis. Feeling well with no issues with headache, TIA or strokelike symptoms.  Still feels a healing ridge on the neck. Denies chest pain, fever, chills.       Please see Epic rooming record from today documenting vital signs and current medications.    On exam, pleasant 66 year old in NAD.  Alert and oriented x3.    Carotid duplex studies show a patent functioning right carotid endarterectomy bed without restenosis.  No significant stenosis appreciated in the left internal carotid artery      AP: Doing well s/p right carotid endarterectomy.  Okay to proceed with coronary artery bypass grafting tomorrow and resume aspirin and statin when able.  Will return for follow up in 6 months with carotid duplex study.  He knows to call if issues arise before then.      Many thanks for involving me in the care of this very pleasant patient. Should any questions or concerns arise, please don't hesitate to contact me.    Warm Regards,    Karma Adorno MD, DFSVS, RPVI  Director, [place] [facility]  Professor and Chief, Vascular and Endovascular Surgery  [facility]  [email or web address]  Pager: Cipriano jose

## 2022-03-23 NOTE — LETTER
3/23/2022       RE: Roger Bonilla  [address]  [facility] [address]     Dear Colleague,    Thank you for referring your patient, Roger Bonilla, to the [facility]ULAR [facility] [place] at [facility]. Please see a copy of my visit note below.    Vascular Surgery Clinic Post-Procedure Follow Up Visit    March 23, 2022    Roger Bonilla  [id number]      HPI: Patient follows up today s/p right carotid endarterectomy for high-grade asymptomatic right carotid stenosis. Feeling well with no issues with headache, TIA or strokelike symptoms.  Still feels a healing ridge on the neck. Denies chest pain, fever, chills.       Please see Epic rooming record from today documenting vital signs and current medications.    On exam, pleasant 66 year old in NAD.  Alert and oriented x3.    Carotid duplex studies show a patent functioning right carotid endarterectomy bed without restenosis.  No significant stenosis appreciated in the left internal carotid artery      AP: Doing well s/p right carotid endarterectomy.  Okay to proceed with coronary artery bypass grafting tomorrow and resume aspirin and statin when able.  Will return for follow up in 6 months with carotid duplex study.  He knows to call if issues arise before then.      Many thanks for involving me in the care of this very pleasant patient. Should any questions or concerns arise, please don't hesitate to contact me.    Warm Regards,    Karma Adorno MD, DFSVS, RPVI  Director, [place] [facility]  Professor and Chief, Vascular and Endovascular Surgery  [facility]  [email or web address]  Pager: Cipriano jose

## 2022-03-23 NOTE — PROGRESS NOTES
Vascular Surgery Clinic Post-Procedure Follow Up Visit    March 23, 2022    Roger Bonilla  [id number]      HPI: Patient follows up today s/p right carotid endarterectomy for high-grade asymptomatic right carotid stenosis. Feeling well with no issues with headache, TIA or strokelike symptoms.  Still feels a healing ridge on the neck. Denies chest pain, fever, chills.       Please see Epic rooming record from today documenting vital signs and current medications.    On exam, pleasant 66 year old in NAD.  Alert and oriented x3.    Carotid duplex studies show a patent functioning right carotid endarterectomy bed without restenosis.  No significant stenosis appreciated in the left internal carotid artery      AP: Doing well s/p right carotid endarterectomy.  Okay to proceed with coronary artery bypass grafting tomorrow and resume aspirin and statin when able.  Will return for follow up in 6 months with carotid duplex study.  He knows to call if issues arise before then.      Many thanks for involving me in the care of this very pleasant patient. Should any questions or concerns arise, please don't hesitate to contact me.    Warm Regards,    Karma Adorno MD, DFSVS, RPVI  Director, [place] [facility]  Professor and Chief, Vascular and Endovascular Surgery  [facility]  [email or web address]  Pager: Cipriano Mccord is a 66 year old who is being evaluated via a phone visit.    maria esther is a 66 year old who is being evaluated via a billable telephone visit.      What phone number would you like to be contacted at? [phone]  How would you like to obtain your AVS? MyChart      Start Time: 8:15 AM  Telephone-Visit Details    Type of service:  Phone Visit    Video End Time:8:30 AM    Originating Location (pt. Location): Home    Distant Location (provider location):  [facility] VASCULAR CLINIC [place]     Platform used for Video Visit: Other: Phone visit     Anisha Rossi, Virtual Facilitator/SHER[place],  3/23/2022

## 2022-03-24 ENCOUNTER — APPOINTMENT (OUTPATIENT)
Dept: GENERAL RADIOLOGY | Facility: CLINIC | Age: 67
DRG: 235 | End: 2022-03-24
Attending: SURGERY
Payer: COMMERCIAL

## 2022-03-24 ENCOUNTER — HOSPITAL ENCOUNTER (INPATIENT)
Facility: CLINIC | Age: 67
LOS: 6 days | Discharge: HOME | DRG: 235 | End: 2022-03-30
Attending: SURGERY | Admitting: SURGERY
Payer: COMMERCIAL

## 2022-03-24 ENCOUNTER — APPOINTMENT (OUTPATIENT)
Dept: GENERAL RADIOLOGY | Facility: CLINIC | Age: 67
DRG: 235 | End: 2022-03-24
Attending: INTERNAL MEDICINE
Payer: COMMERCIAL

## 2022-03-24 ENCOUNTER — ANESTHESIA (OUTPATIENT)
Dept: SURGERY | Facility: CLINIC | Age: 67
DRG: 235 | End: 2022-03-24
Payer: COMMERCIAL

## 2022-03-24 DIAGNOSIS — Z41.9 ELECTIVE SURGERY: ICD-10-CM

## 2022-03-24 DIAGNOSIS — Z95.1 S/P CABG X 3: Primary | ICD-10-CM

## 2022-03-24 PROBLEM — I25.118 ATHEROSCLEROSIS OF NATIVE CORONARY ARTERY OF NATIVE HEART WITH STABLE ANGINA PECTORIS (H): Status: ACTIVE | Noted: 2022-03-24

## 2022-03-24 LAB
ALBUMIN SERPL-MCNC: 3.2 G/DL (ref 3.4–5)
ALP SERPL-CCNC: 58 U/L (ref 40–150)
ALT SERPL W P-5'-P-CCNC: 19 U/L (ref 0–70)
ANION GAP SERPL CALCULATED.3IONS-SCNC: 4 MMOL/L (ref 3–14)
ANION GAP SERPL CALCULATED.3IONS-SCNC: 6 MMOL/L (ref 3–14)
APTT PPP: 32 SECONDS (ref 22–38)
APTT PPP: 38 SECONDS (ref 22–38)
AST SERPL W P-5'-P-CCNC: 30 U/L (ref 0–45)
BASE EXCESS BLDA CALC-SCNC: -0.2 MMOL/L (ref -9–1.8)
BASE EXCESS BLDA CALC-SCNC: 0.4 MMOL/L (ref -9–1.8)
BILIRUB SERPL-MCNC: 0.7 MG/DL (ref 0.2–1.3)
BLD PROD TYP BPU: NORMAL
BLD PROD TYP BPU: NORMAL
BLOOD COMPONENT TYPE: NORMAL
BLOOD COMPONENT TYPE: NORMAL
BUN SERPL-MCNC: 20 MG/DL (ref 7–30)
BUN SERPL-MCNC: 20 MG/DL (ref 7–30)
CA-I BLD-MCNC: 4.4 MG/DL (ref 4.4–5.2)
CALCIUM SERPL-MCNC: 8.2 MG/DL (ref 8.5–10.1)
CALCIUM SERPL-MCNC: 8.9 MG/DL (ref 8.5–10.1)
CHLORIDE BLD-SCNC: 110 MMOL/L (ref 94–109)
CHLORIDE BLD-SCNC: 111 MMOL/L (ref 94–109)
CO2 SERPL-SCNC: 26 MMOL/L (ref 20–32)
CO2 SERPL-SCNC: 26 MMOL/L (ref 20–32)
CODING SYSTEM: NORMAL
CODING SYSTEM: NORMAL
CREAT SERPL-MCNC: 0.97 MG/DL (ref 0.66–1.25)
CREAT SERPL-MCNC: 0.98 MG/DL (ref 0.66–1.25)
CROSSMATCH: NORMAL
CROSSMATCH: NORMAL
ERYTHROCYTE [DISTWIDTH] IN BLOOD BY AUTOMATED COUNT: 14.7 % (ref 10–15)
ERYTHROCYTE [DISTWIDTH] IN BLOOD BY AUTOMATED COUNT: 14.8 % (ref 10–15)
FIBRINOGEN PPP-MCNC: 310 MG/DL (ref 170–490)
GFR SERPL CREATININE-BSD FRML MDRD: 85 ML/MIN/1.73M2
GFR SERPL CREATININE-BSD FRML MDRD: 86 ML/MIN/1.73M2
GLUCOSE BLD-MCNC: 118 MG/DL (ref 70–99)
GLUCOSE BLD-MCNC: 159 MG/DL (ref 70–99)
GLUCOSE BLD-MCNC: 170 MG/DL (ref 70–99)
GLUCOSE BLDC GLUCOMTR-MCNC: 140 MG/DL (ref 70–99)
GLUCOSE BLDC GLUCOMTR-MCNC: 143 MG/DL (ref 70–99)
GLUCOSE BLDC GLUCOMTR-MCNC: 148 MG/DL (ref 70–99)
GLUCOSE BLDC GLUCOMTR-MCNC: 153 MG/DL (ref 70–99)
GLUCOSE BLDC GLUCOMTR-MCNC: 154 MG/DL (ref 70–99)
GLUCOSE BLDC GLUCOMTR-MCNC: 156 MG/DL (ref 70–99)
GLUCOSE BLDC GLUCOMTR-MCNC: 158 MG/DL (ref 70–99)
HCO3 BLD-SCNC: 26 MMOL/L (ref 21–28)
HCO3 BLD-SCNC: 27 MMOL/L (ref 21–28)
HCT VFR BLD AUTO: 32.4 % (ref 40–53)
HCT VFR BLD AUTO: 33 % (ref 40–53)
HGB BLD-MCNC: 10.2 G/DL (ref 13.3–17.7)
HGB BLD-MCNC: 10.4 G/DL (ref 13.3–17.7)
INR PPP: 1.31 (ref 0.85–1.15)
INR PPP: 1.41 (ref 0.85–1.15)
ISSUE DATE AND TIME: NORMAL
ISSUE DATE AND TIME: NORMAL
LACTATE SERPL-SCNC: 0.9 MMOL/L (ref 0.7–2)
MAGNESIUM SERPL-MCNC: 2.4 MG/DL (ref 1.6–2.3)
MCH RBC QN AUTO: 29.5 PG (ref 26.5–33)
MCH RBC QN AUTO: 29.5 PG (ref 26.5–33)
MCHC RBC AUTO-ENTMCNC: 31.5 G/DL (ref 31.5–36.5)
MCHC RBC AUTO-ENTMCNC: 31.5 G/DL (ref 31.5–36.5)
MCV RBC AUTO: 94 FL (ref 78–100)
MCV RBC AUTO: 94 FL (ref 78–100)
MRSA DNA SPEC QL NAA+PROBE: NEGATIVE
O2/TOTAL GAS SETTING VFR VENT: 50 %
O2/TOTAL GAS SETTING VFR VENT: 50 %
OXYHGB MFR BLD: 97 % (ref 92–100)
PCO2 BLD: 44 MM HG (ref 35–45)
PCO2 BLD: 52 MM HG (ref 35–45)
PH BLD: 7.32 [PH] (ref 7.35–7.45)
PH BLD: 7.37 [PH] (ref 7.35–7.45)
PHOSPHATE SERPL-MCNC: 3.2 MG/DL (ref 2.5–4.5)
PLATELET # BLD AUTO: 152 10E3/UL (ref 150–450)
PLATELET # BLD AUTO: 153 10E3/UL (ref 150–450)
PO2 BLD: 127 MM HG (ref 80–105)
PO2 BLD: 176 MM HG (ref 80–105)
POTASSIUM BLD-SCNC: 4.3 MMOL/L (ref 3.4–5.3)
POTASSIUM BLD-SCNC: 4.3 MMOL/L (ref 3.4–5.3)
POTASSIUM BLD-SCNC: 4.5 MMOL/L (ref 3.4–5.3)
POTASSIUM BLD-SCNC: 4.5 MMOL/L (ref 3.4–5.3)
PROT SERPL-MCNC: 5.6 G/DL (ref 6.8–8.8)
RBC # BLD AUTO: 3.46 10E6/UL (ref 4.4–5.9)
RBC # BLD AUTO: 3.52 10E6/UL (ref 4.4–5.9)
SA TARGET DNA: NEGATIVE
SODIUM SERPL-SCNC: 141 MMOL/L (ref 133–144)
SODIUM SERPL-SCNC: 142 MMOL/L (ref 133–144)
UNIT ABO/RH: NORMAL
UNIT ABO/RH: NORMAL
UNIT NUMBER: NORMAL
UNIT NUMBER: NORMAL
UNIT STATUS: NORMAL
UNIT STATUS: NORMAL
UNIT TYPE ISBT: 6200
UNIT TYPE ISBT: 6200
WBC # BLD AUTO: 15.5 10E3/UL (ref 4–11)
WBC # BLD AUTO: 20.2 10E3/UL (ref 4–11)

## 2022-03-24 PROCEDURE — 06BP4ZZ EXCISION OF RIGHT SAPHENOUS VEIN, PERCUTANEOUS ENDOSCOPIC APPROACH: ICD-10-PCS | Performed by: SURGERY

## 2022-03-24 PROCEDURE — 258N000003 HC RX IP 258 OP 636: Performed by: NURSE ANESTHETIST, CERTIFIED REGISTERED

## 2022-03-24 PROCEDURE — 250N000012 HC RX MED GY IP 250 OP 636 PS 637: Performed by: SURGERY

## 2022-03-24 PROCEDURE — 87641 MR-STAPH DNA AMP PROBE: CPT | Performed by: SURGERY

## 2022-03-24 PROCEDURE — 370N000017 HC ANESTHESIA TECHNICAL FEE, PER MIN: Performed by: SURGERY

## 2022-03-24 PROCEDURE — 83735 ASSAY OF MAGNESIUM: CPT | Performed by: SURGERY

## 2022-03-24 PROCEDURE — 999N000259 HC STATISTIC EXTUBATION

## 2022-03-24 PROCEDURE — 82947 ASSAY GLUCOSE BLOOD QUANT: CPT | Performed by: SURGERY

## 2022-03-24 PROCEDURE — 84100 ASSAY OF PHOSPHORUS: CPT | Performed by: SURGERY

## 2022-03-24 PROCEDURE — C1898 LEAD, PMKR, OTHER THAN TRANS: HCPCS | Performed by: SURGERY

## 2022-03-24 PROCEDURE — 250N000011 HC RX IP 250 OP 636: Performed by: SURGERY

## 2022-03-24 PROCEDURE — 06BQ4ZZ EXCISION OF LEFT SAPHENOUS VEIN, PERCUTANEOUS ENDOSCOPIC APPROACH: ICD-10-PCS | Performed by: SURGERY

## 2022-03-24 PROCEDURE — 93010 ELECTROCARDIOGRAM REPORT: CPT | Performed by: INTERNAL MEDICINE

## 2022-03-24 PROCEDURE — 99291 CRITICAL CARE FIRST HOUR: CPT | Mod: 24 | Performed by: INTERNAL MEDICINE

## 2022-03-24 PROCEDURE — 83605 ASSAY OF LACTIC ACID: CPT | Performed by: SURGERY

## 2022-03-24 PROCEDURE — 85384 FIBRINOGEN ACTIVITY: CPT | Performed by: SURGERY

## 2022-03-24 PROCEDURE — 999N000141 HC STATISTIC PRE-PROCEDURE NURSING ASSESSMENT: Performed by: SURGERY

## 2022-03-24 PROCEDURE — 250N000009 HC RX 250: Performed by: ANESTHESIOLOGY

## 2022-03-24 PROCEDURE — 82330 ASSAY OF CALCIUM: CPT | Performed by: SURGERY

## 2022-03-24 PROCEDURE — 999N000063 XR CHEST PORT 1 VIEW

## 2022-03-24 PROCEDURE — 85730 THROMBOPLASTIN TIME PARTIAL: CPT | Performed by: SURGERY

## 2022-03-24 PROCEDURE — 80053 COMPREHEN METABOLIC PANEL: CPT | Performed by: ANESTHESIOLOGY

## 2022-03-24 PROCEDURE — 94003 VENT MGMT INPAT SUBQ DAY: CPT

## 2022-03-24 PROCEDURE — 410N000006: Performed by: SURGERY

## 2022-03-24 PROCEDURE — 021109W BYPASS CORONARY ARTERY, TWO ARTERIES FROM AORTA WITH AUTOLOGOUS VENOUS TISSUE, OPEN APPROACH: ICD-10-PCS | Performed by: SURGERY

## 2022-03-24 PROCEDURE — 82805 BLOOD GASES W/O2 SATURATION: CPT | Performed by: INTERNAL MEDICINE

## 2022-03-24 PROCEDURE — 258N000003 HC RX IP 258 OP 636: Performed by: SURGERY

## 2022-03-24 PROCEDURE — 02100Z9 BYPASS CORONARY ARTERY, ONE ARTERY FROM LEFT INTERNAL MAMMARY, OPEN APPROACH: ICD-10-PCS | Performed by: SURGERY

## 2022-03-24 PROCEDURE — 82803 BLOOD GASES ANY COMBINATION: CPT | Performed by: SURGERY

## 2022-03-24 PROCEDURE — 250N000011 HC RX IP 250 OP 636: Performed by: NURSE ANESTHETIST, CERTIFIED REGISTERED

## 2022-03-24 PROCEDURE — 360N000079 HC SURGERY LEVEL 6, PER MIN: Performed by: SURGERY

## 2022-03-24 PROCEDURE — 85027 COMPLETE CBC AUTOMATED: CPT | Performed by: SURGERY

## 2022-03-24 PROCEDURE — 85610 PROTHROMBIN TIME: CPT | Performed by: SURGERY

## 2022-03-24 PROCEDURE — 272N000001 HC OR GENERAL SUPPLY STERILE: Performed by: SURGERY

## 2022-03-24 PROCEDURE — 33533 CABG ARTERIAL SINGLE: CPT | Mod: GC | Performed by: SURGERY

## 2022-03-24 PROCEDURE — 200N000001 HC R&B ICU

## 2022-03-24 PROCEDURE — 250N000013 HC RX MED GY IP 250 OP 250 PS 637: Performed by: SURGERY

## 2022-03-24 PROCEDURE — 250N000009 HC RX 250: Performed by: NURSE ANESTHETIST, CERTIFIED REGISTERED

## 2022-03-24 PROCEDURE — 999N000065 XR CHEST PORT 1 VIEW

## 2022-03-24 PROCEDURE — 250N000011 HC RX IP 250 OP 636: Performed by: ANESTHESIOLOGY

## 2022-03-24 PROCEDURE — 93005 ELECTROCARDIOGRAM TRACING: CPT

## 2022-03-24 PROCEDURE — 999N000157 HC STATISTIC RCP TIME EA 10 MIN

## 2022-03-24 PROCEDURE — 999N000065 XR ABDOMEN PORT 1 VIEWS

## 2022-03-24 PROCEDURE — P9016 RBC LEUKOCYTES REDUCED: HCPCS | Performed by: SURGERY

## 2022-03-24 PROCEDURE — 33508 ENDOSCOPIC VEIN HARVEST: CPT | Mod: XU | Performed by: SURGERY

## 2022-03-24 PROCEDURE — 999N000009 HC STATISTIC AIRWAY CARE

## 2022-03-24 PROCEDURE — 84132 ASSAY OF SERUM POTASSIUM: CPT | Performed by: SURGERY

## 2022-03-24 PROCEDURE — 5A1221Z PERFORMANCE OF CARDIAC OUTPUT, CONTINUOUS: ICD-10-PCS | Performed by: SURGERY

## 2022-03-24 PROCEDURE — 33518 CABG ARTERY-VEIN TWO: CPT | Mod: GC | Performed by: SURGERY

## 2022-03-24 PROCEDURE — 999N000253 HC STATISTIC WEANING TRIALS

## 2022-03-24 PROCEDURE — 250N000024 HC ISOFLURANE, PER MIN: Performed by: SURGERY

## 2022-03-24 PROCEDURE — P9041 ALBUMIN (HUMAN),5%, 50ML: HCPCS | Performed by: NURSE ANESTHETIST, CERTIFIED REGISTERED

## 2022-03-24 PROCEDURE — 36415 COLL VENOUS BLD VENIPUNCTURE: CPT | Performed by: ANESTHESIOLOGY

## 2022-03-24 PROCEDURE — 82947 ASSAY GLUCOSE BLOOD QUANT: CPT | Performed by: ANESTHESIOLOGY

## 2022-03-24 PROCEDURE — 410N000005 HC PER-PERFUSION, SH ONLY, 1ST 30 MIN: Performed by: SURGERY

## 2022-03-24 PROCEDURE — 82803 BLOOD GASES ANY COMBINATION: CPT | Performed by: INTERNAL MEDICINE

## 2022-03-24 PROCEDURE — 250N000009 HC RX 250: Performed by: SURGERY

## 2022-03-24 PROCEDURE — 258N000003 HC RX IP 258 OP 636: Performed by: ANESTHESIOLOGY

## 2022-03-24 RX ORDER — LIDOCAINE HYDROCHLORIDE 10 MG/ML
INJECTION, SOLUTION INFILTRATION; PERINEURAL
Status: COMPLETED | OUTPATIENT
Start: 2022-03-24 | End: 2022-03-24

## 2022-03-24 RX ORDER — LIDOCAINE 40 MG/G
CREAM TOPICAL
Status: DISCONTINUED | OUTPATIENT
Start: 2022-03-24 | End: 2022-03-24 | Stop reason: HOSPADM

## 2022-03-24 RX ORDER — HEPARIN SODIUM 5000 [USP'U]/.5ML
5000 INJECTION, SOLUTION INTRAVENOUS; SUBCUTANEOUS EVERY 8 HOURS
Status: DISCONTINUED | OUTPATIENT
Start: 2022-03-25 | End: 2022-03-30 | Stop reason: HOSPADM

## 2022-03-24 RX ORDER — ACETAMINOPHEN 325 MG/1
650 TABLET ORAL EVERY 4 HOURS PRN
Status: DISCONTINUED | OUTPATIENT
Start: 2022-03-27 | End: 2022-03-30 | Stop reason: HOSPADM

## 2022-03-24 RX ORDER — FAMOTIDINE 20 MG/1
20 TABLET, FILM COATED ORAL
Status: DISCONTINUED | OUTPATIENT
Start: 2022-03-24 | End: 2022-03-24 | Stop reason: HOSPADM

## 2022-03-24 RX ORDER — ASPIRIN 81 MG/1
81 TABLET, CHEWABLE ORAL
Status: COMPLETED | OUTPATIENT
Start: 2022-03-24 | End: 2022-03-24

## 2022-03-24 RX ORDER — PHENYLEPHRINE HCL IN 0.9% NACL 50MG/250ML
.1-4 PLASTIC BAG, INJECTION (ML) INTRAVENOUS CONTINUOUS PRN
Status: DISCONTINUED | OUTPATIENT
Start: 2022-03-24 | End: 2022-03-26

## 2022-03-24 RX ORDER — ATORVASTATIN CALCIUM 40 MG/1
80 TABLET, FILM COATED ORAL EVERY MORNING
Status: DISCONTINUED | OUTPATIENT
Start: 2022-03-25 | End: 2022-03-30 | Stop reason: HOSPADM

## 2022-03-24 RX ORDER — ALBUMIN, HUMAN INJ 5% 5 %
500 SOLUTION INTRAVENOUS
Status: DISCONTINUED | OUTPATIENT
Start: 2022-03-24 | End: 2022-03-30 | Stop reason: HOSPADM

## 2022-03-24 RX ORDER — LIDOCAINE 40 MG/G
CREAM TOPICAL
Status: DISCONTINUED | OUTPATIENT
Start: 2022-03-24 | End: 2022-03-30 | Stop reason: HOSPADM

## 2022-03-24 RX ORDER — OXYCODONE HYDROCHLORIDE 5 MG/1
5 TABLET ORAL EVERY 4 HOURS PRN
Status: DISCONTINUED | OUTPATIENT
Start: 2022-03-24 | End: 2022-03-29

## 2022-03-24 RX ORDER — CEFAZOLIN SODIUM/WATER 2 G/20 ML
2 SYRINGE (ML) INTRAVENOUS
Status: COMPLETED | OUTPATIENT
Start: 2022-03-24 | End: 2022-03-24

## 2022-03-24 RX ORDER — SODIUM CHLORIDE, SODIUM LACTATE, POTASSIUM CHLORIDE, CALCIUM CHLORIDE 600; 310; 30; 20 MG/100ML; MG/100ML; MG/100ML; MG/100ML
INJECTION, SOLUTION INTRAVENOUS CONTINUOUS PRN
Status: DISCONTINUED | OUTPATIENT
Start: 2022-03-24 | End: 2022-03-24

## 2022-03-24 RX ORDER — PROPOFOL 10 MG/ML
INJECTION, EMULSION INTRAVENOUS CONTINUOUS PRN
Status: DISCONTINUED | OUTPATIENT
Start: 2022-03-24 | End: 2022-03-24

## 2022-03-24 RX ORDER — NEOSTIGMINE METHYLSULFATE 1 MG/ML
VIAL (ML) INJECTION PRN
Status: DISCONTINUED | OUTPATIENT
Start: 2022-03-24 | End: 2022-03-24

## 2022-03-24 RX ORDER — ACETAMINOPHEN 325 MG/1
975 TABLET ORAL EVERY 8 HOURS
Status: COMPLETED | OUTPATIENT
Start: 2022-03-24 | End: 2022-03-27

## 2022-03-24 RX ORDER — CALCIUM GLUCONATE 20 MG/ML
2 INJECTION, SOLUTION INTRAVENOUS
Status: ACTIVE | OUTPATIENT
Start: 2022-03-24 | End: 2022-03-27

## 2022-03-24 RX ORDER — MUPIROCIN 20 MG/G
0.5 OINTMENT TOPICAL 2 TIMES DAILY
Status: DISCONTINUED | OUTPATIENT
Start: 2022-03-24 | End: 2022-03-24 | Stop reason: CLARIF

## 2022-03-24 RX ORDER — CEFAZOLIN SODIUM/WATER 2 G/20 ML
2 SYRINGE (ML) INTRAVENOUS EVERY 8 HOURS
Status: DISCONTINUED | OUTPATIENT
Start: 2022-03-24 | End: 2022-03-25

## 2022-03-24 RX ORDER — IPRATROPIUM BROMIDE AND ALBUTEROL SULFATE 2.5; .5 MG/3ML; MG/3ML
3 SOLUTION RESPIRATORY (INHALATION) EVERY 4 HOURS PRN
Status: DISCONTINUED | OUTPATIENT
Start: 2022-03-24 | End: 2022-03-30 | Stop reason: HOSPADM

## 2022-03-24 RX ORDER — ALBUMIN, HUMAN INJ 5% 5 %
SOLUTION INTRAVENOUS CONTINUOUS PRN
Status: DISCONTINUED | OUTPATIENT
Start: 2022-03-24 | End: 2022-03-24

## 2022-03-24 RX ORDER — SODIUM CHLORIDE 9 MG/ML
INJECTION, SOLUTION INTRAVENOUS CONTINUOUS PRN
Status: DISCONTINUED | OUTPATIENT
Start: 2022-03-24 | End: 2022-03-24

## 2022-03-24 RX ORDER — AMOXICILLIN 250 MG
1 CAPSULE ORAL 2 TIMES DAILY
Status: DISCONTINUED | OUTPATIENT
Start: 2022-03-24 | End: 2022-03-30 | Stop reason: HOSPADM

## 2022-03-24 RX ORDER — PROPOFOL 10 MG/ML
5-75 INJECTION, EMULSION INTRAVENOUS CONTINUOUS
Status: DISCONTINUED | OUTPATIENT
Start: 2022-03-24 | End: 2022-03-26

## 2022-03-24 RX ORDER — ASPIRIN 81 MG/1
162 TABLET, CHEWABLE ORAL DAILY
Status: DISCONTINUED | OUTPATIENT
Start: 2022-03-25 | End: 2022-03-30 | Stop reason: HOSPADM

## 2022-03-24 RX ORDER — PANTOPRAZOLE SODIUM 40 MG/1
40 TABLET, DELAYED RELEASE ORAL DAILY
Status: DISCONTINUED | OUTPATIENT
Start: 2022-03-24 | End: 2022-03-30 | Stop reason: HOSPADM

## 2022-03-24 RX ORDER — DEXTROSE MONOHYDRATE, SODIUM CHLORIDE, AND POTASSIUM CHLORIDE 50; 1.49; 4.5 G/1000ML; G/1000ML; G/1000ML
INJECTION, SOLUTION INTRAVENOUS CONTINUOUS
Status: DISCONTINUED | OUTPATIENT
Start: 2022-03-24 | End: 2022-03-30 | Stop reason: HOSPADM

## 2022-03-24 RX ORDER — NALOXONE HYDROCHLORIDE 0.4 MG/ML
0.2 INJECTION, SOLUTION INTRAMUSCULAR; INTRAVENOUS; SUBCUTANEOUS
Status: DISCONTINUED | OUTPATIENT
Start: 2022-03-24 | End: 2022-03-30 | Stop reason: HOSPADM

## 2022-03-24 RX ORDER — NICOTINE POLACRILEX 4 MG
15-30 LOZENGE BUCCAL
Status: DISCONTINUED | OUTPATIENT
Start: 2022-03-24 | End: 2022-03-30 | Stop reason: HOSPADM

## 2022-03-24 RX ORDER — CHLORHEXIDINE GLUCONATE ORAL RINSE 1.2 MG/ML
15 SOLUTION DENTAL EVERY 12 HOURS
Status: DISCONTINUED | OUTPATIENT
Start: 2022-03-24 | End: 2022-03-28

## 2022-03-24 RX ORDER — POLYETHYLENE GLYCOL 3350 17 G/17G
17 POWDER, FOR SOLUTION ORAL DAILY
Status: DISCONTINUED | OUTPATIENT
Start: 2022-03-25 | End: 2022-03-30 | Stop reason: HOSPADM

## 2022-03-24 RX ORDER — NALOXONE HYDROCHLORIDE 0.4 MG/ML
0.4 INJECTION, SOLUTION INTRAMUSCULAR; INTRAVENOUS; SUBCUTANEOUS
Status: DISCONTINUED | OUTPATIENT
Start: 2022-03-24 | End: 2022-03-30 | Stop reason: HOSPADM

## 2022-03-24 RX ORDER — HYDROMORPHONE HCL IN WATER/PF 6 MG/30 ML
0.2 PATIENT CONTROLLED ANALGESIA SYRINGE INTRAVENOUS
Status: DISCONTINUED | OUTPATIENT
Start: 2022-03-24 | End: 2022-03-26

## 2022-03-24 RX ORDER — NITROGLYCERIN 20 MG/100ML
10-200 INJECTION INTRAVENOUS CONTINUOUS PRN
Status: DISCONTINUED | OUTPATIENT
Start: 2022-03-24 | End: 2022-03-26

## 2022-03-24 RX ORDER — ONDANSETRON 2 MG/ML
4 INJECTION INTRAMUSCULAR; INTRAVENOUS EVERY 6 HOURS PRN
Status: DISCONTINUED | OUTPATIENT
Start: 2022-03-24 | End: 2022-03-30 | Stop reason: HOSPADM

## 2022-03-24 RX ORDER — OXYCODONE HYDROCHLORIDE 5 MG/1
10 TABLET ORAL EVERY 4 HOURS PRN
Status: DISCONTINUED | OUTPATIENT
Start: 2022-03-24 | End: 2022-03-29

## 2022-03-24 RX ORDER — CHLORHEXIDINE GLUCONATE ORAL RINSE 1.2 MG/ML
10 SOLUTION DENTAL ONCE
Status: COMPLETED | OUTPATIENT
Start: 2022-03-24 | End: 2022-03-24

## 2022-03-24 RX ORDER — BISACODYL 10 MG
10 SUPPOSITORY, RECTAL RECTAL DAILY PRN
Status: DISCONTINUED | OUTPATIENT
Start: 2022-03-24 | End: 2022-03-30 | Stop reason: HOSPADM

## 2022-03-24 RX ORDER — ASPIRIN 81 MG/1
162 TABLET, CHEWABLE ORAL
Status: COMPLETED | OUTPATIENT
Start: 2022-03-24 | End: 2022-03-24

## 2022-03-24 RX ORDER — METHOCARBAMOL 500 MG/1
500 TABLET, FILM COATED ORAL EVERY 6 HOURS PRN
Status: DISCONTINUED | OUTPATIENT
Start: 2022-03-24 | End: 2022-03-30 | Stop reason: HOSPADM

## 2022-03-24 RX ORDER — GLYCOPYRROLATE 0.2 MG/ML
INJECTION, SOLUTION INTRAMUSCULAR; INTRAVENOUS PRN
Status: DISCONTINUED | OUTPATIENT
Start: 2022-03-24 | End: 2022-03-24

## 2022-03-24 RX ORDER — HYDROMORPHONE HCL IN WATER/PF 6 MG/30 ML
0.4 PATIENT CONTROLLED ANALGESIA SYRINGE INTRAVENOUS
Status: DISCONTINUED | OUTPATIENT
Start: 2022-03-24 | End: 2022-03-26

## 2022-03-24 RX ORDER — CALCIUM GLUCONATE 20 MG/ML
1 INJECTION, SOLUTION INTRAVENOUS
Status: ACTIVE | OUTPATIENT
Start: 2022-03-24 | End: 2022-03-27

## 2022-03-24 RX ORDER — ONDANSETRON 4 MG/1
4 TABLET, ORALLY DISINTEGRATING ORAL EVERY 6 HOURS PRN
Status: DISCONTINUED | OUTPATIENT
Start: 2022-03-24 | End: 2022-03-30 | Stop reason: HOSPADM

## 2022-03-24 RX ORDER — NITROGLYCERIN 10 MG/100ML
INJECTION INTRAVENOUS PRN
Status: DISCONTINUED | OUTPATIENT
Start: 2022-03-24 | End: 2022-03-24

## 2022-03-24 RX ORDER — SODIUM CHLORIDE, SODIUM LACTATE, POTASSIUM CHLORIDE, CALCIUM CHLORIDE 600; 310; 30; 20 MG/100ML; MG/100ML; MG/100ML; MG/100ML
INJECTION, SOLUTION INTRAVENOUS CONTINUOUS
Status: DISCONTINUED | OUTPATIENT
Start: 2022-03-24 | End: 2022-03-24 | Stop reason: HOSPADM

## 2022-03-24 RX ORDER — FENTANYL CITRATE 0.05 MG/ML
50 INJECTION, SOLUTION INTRAMUSCULAR; INTRAVENOUS
Status: DISCONTINUED | OUTPATIENT
Start: 2022-03-24 | End: 2022-03-24 | Stop reason: HOSPADM

## 2022-03-24 RX ORDER — FENTANYL CITRATE 50 UG/ML
25-50 INJECTION, SOLUTION INTRAMUSCULAR; INTRAVENOUS
Status: DISCONTINUED | OUTPATIENT
Start: 2022-03-24 | End: 2022-03-26

## 2022-03-24 RX ORDER — EPHEDRINE SULFATE 50 MG/ML
INJECTION, SOLUTION INTRAMUSCULAR; INTRAVENOUS; SUBCUTANEOUS PRN
Status: DISCONTINUED | OUTPATIENT
Start: 2022-03-24 | End: 2022-03-24

## 2022-03-24 RX ORDER — HEPARIN SODIUM 1000 [USP'U]/ML
INJECTION, SOLUTION INTRAVENOUS; SUBCUTANEOUS PRN
Status: DISCONTINUED | OUTPATIENT
Start: 2022-03-24 | End: 2022-03-24

## 2022-03-24 RX ORDER — PROPOFOL 10 MG/ML
INJECTION, EMULSION INTRAVENOUS PRN
Status: DISCONTINUED | OUTPATIENT
Start: 2022-03-24 | End: 2022-03-24

## 2022-03-24 RX ORDER — DEXTROSE MONOHYDRATE 25 G/50ML
25-50 INJECTION, SOLUTION INTRAVENOUS
Status: DISCONTINUED | OUTPATIENT
Start: 2022-03-24 | End: 2022-03-30 | Stop reason: HOSPADM

## 2022-03-24 RX ORDER — VECURONIUM BROMIDE 1 MG/ML
INJECTION, POWDER, LYOPHILIZED, FOR SOLUTION INTRAVENOUS PRN
Status: DISCONTINUED | OUTPATIENT
Start: 2022-03-24 | End: 2022-03-24

## 2022-03-24 RX ORDER — CEFAZOLIN SODIUM/WATER 2 G/20 ML
2 SYRINGE (ML) INTRAVENOUS SEE ADMIN INSTRUCTIONS
Status: DISCONTINUED | OUTPATIENT
Start: 2022-03-24 | End: 2022-03-24 | Stop reason: HOSPADM

## 2022-03-24 RX ORDER — DEXMEDETOMIDINE HYDROCHLORIDE 4 UG/ML
.2-.7 INJECTION, SOLUTION INTRAVENOUS CONTINUOUS
Status: DISCONTINUED | OUTPATIENT
Start: 2022-03-24 | End: 2022-03-26

## 2022-03-24 RX ORDER — LIDOCAINE HYDROCHLORIDE 20 MG/ML
INJECTION, SOLUTION INFILTRATION; PERINEURAL PRN
Status: DISCONTINUED | OUTPATIENT
Start: 2022-03-24 | End: 2022-03-24

## 2022-03-24 RX ORDER — GABAPENTIN 100 MG/1
100 CAPSULE ORAL AT BEDTIME
Status: DISCONTINUED | OUTPATIENT
Start: 2022-03-24 | End: 2022-03-26

## 2022-03-24 RX ORDER — PROTAMINE SULFATE 10 MG/ML
INJECTION, SOLUTION INTRAVENOUS PRN
Status: DISCONTINUED | OUTPATIENT
Start: 2022-03-24 | End: 2022-03-24

## 2022-03-24 RX ORDER — FENTANYL CITRATE 50 UG/ML
INJECTION, SOLUTION INTRAMUSCULAR; INTRAVENOUS PRN
Status: DISCONTINUED | OUTPATIENT
Start: 2022-03-24 | End: 2022-03-24

## 2022-03-24 RX ADMIN — VECURONIUM BROMIDE 5 MG: 1 INJECTION, POWDER, LYOPHILIZED, FOR SOLUTION INTRAVENOUS at 11:15

## 2022-03-24 RX ADMIN — FENTANYL CITRATE 50 MCG: 50 INJECTION, SOLUTION INTRAMUSCULAR; INTRAVENOUS at 13:02

## 2022-03-24 RX ADMIN — VECURONIUM BROMIDE 5 MG: 1 INJECTION, POWDER, LYOPHILIZED, FOR SOLUTION INTRAVENOUS at 08:31

## 2022-03-24 RX ADMIN — PHENYLEPHRINE HYDROCHLORIDE 50 MCG: 10 INJECTION INTRAVENOUS at 13:17

## 2022-03-24 RX ADMIN — MIDAZOLAM HYDROCHLORIDE 1 MG: 1 INJECTION, SOLUTION INTRAMUSCULAR; INTRAVENOUS at 12:42

## 2022-03-24 RX ADMIN — POTASSIUM CHLORIDE, DEXTROSE MONOHYDRATE AND SODIUM CHLORIDE: 150; 5; 450 INJECTION, SOLUTION INTRAVENOUS at 14:28

## 2022-03-24 RX ADMIN — FENTANYL CITRATE 50 MCG: 50 INJECTION, SOLUTION INTRAMUSCULAR; INTRAVENOUS at 09:49

## 2022-03-24 RX ADMIN — SODIUM CHLORIDE, POTASSIUM CHLORIDE, SODIUM LACTATE AND CALCIUM CHLORIDE: 600; 310; 30; 20 INJECTION, SOLUTION INTRAVENOUS at 07:58

## 2022-03-24 RX ADMIN — VECURONIUM BROMIDE 5 MG: 1 INJECTION, POWDER, LYOPHILIZED, FOR SOLUTION INTRAVENOUS at 10:16

## 2022-03-24 RX ADMIN — NITROGLYCERIN 20 MCG: 10 INJECTION INTRAVENOUS at 10:41

## 2022-03-24 RX ADMIN — AMINOCAPROIC ACID 1 G/HR: 250 INJECTION, SOLUTION INTRAVENOUS at 08:57

## 2022-03-24 RX ADMIN — HYDROMORPHONE HYDROCHLORIDE 0.2 MG: 0.2 INJECTION, SOLUTION INTRAMUSCULAR; INTRAVENOUS; SUBCUTANEOUS at 21:25

## 2022-03-24 RX ADMIN — ACETAMINOPHEN 975 MG: 325 TABLET, FILM COATED ORAL at 15:23

## 2022-03-24 RX ADMIN — Medication 2 G: at 11:39

## 2022-03-24 RX ADMIN — NITROGLYCERIN 20 MCG: 10 INJECTION INTRAVENOUS at 08:48

## 2022-03-24 RX ADMIN — FENTANYL CITRATE 50 MCG: 50 INJECTION, SOLUTION INTRAMUSCULAR; INTRAVENOUS at 12:48

## 2022-03-24 RX ADMIN — NITROGLYCERIN 20 MCG: 10 INJECTION INTRAVENOUS at 09:43

## 2022-03-24 RX ADMIN — CHLORHEXIDINE GLUCONATE 10 ML: 1.2 SOLUTION ORAL at 06:01

## 2022-03-24 RX ADMIN — PROPOFOL 50 MG: 10 INJECTION, EMULSION INTRAVENOUS at 07:53

## 2022-03-24 RX ADMIN — OXYCODONE HYDROCHLORIDE 5 MG: 5 TABLET ORAL at 19:10

## 2022-03-24 RX ADMIN — Medication 5 MG: at 09:53

## 2022-03-24 RX ADMIN — METHOCARBAMOL 500 MG: 500 TABLET ORAL at 19:10

## 2022-03-24 RX ADMIN — NITROGLYCERIN 20 MCG: 10 INJECTION INTRAVENOUS at 09:49

## 2022-03-24 RX ADMIN — MIDAZOLAM HYDROCHLORIDE 1 MG: 1 INJECTION, SOLUTION INTRAMUSCULAR; INTRAVENOUS at 07:43

## 2022-03-24 RX ADMIN — PHENYLEPHRINE HYDROCHLORIDE 0.3 MCG/KG/MIN: 10 INJECTION INTRAVENOUS at 12:26

## 2022-03-24 RX ADMIN — Medication 5 MG: at 10:10

## 2022-03-24 RX ADMIN — FENTANYL CITRATE 50 MCG: 50 INJECTION, SOLUTION INTRAMUSCULAR; INTRAVENOUS at 10:42

## 2022-03-24 RX ADMIN — MIDAZOLAM HYDROCHLORIDE 1 MG: 1 INJECTION, SOLUTION INTRAMUSCULAR; INTRAVENOUS at 12:26

## 2022-03-24 RX ADMIN — Medication 2 G: at 21:23

## 2022-03-24 RX ADMIN — FENTANYL CITRATE 100 MCG: 50 INJECTION, SOLUTION INTRAMUSCULAR; INTRAVENOUS at 08:42

## 2022-03-24 RX ADMIN — PROPOFOL 25 MCG/KG/MIN: 10 INJECTION, EMULSION INTRAVENOUS at 13:14

## 2022-03-24 RX ADMIN — FENTANYL CITRATE 50 MCG: 50 INJECTION, SOLUTION INTRAMUSCULAR; INTRAVENOUS at 06:50

## 2022-03-24 RX ADMIN — GABAPENTIN 100 MG: 100 CAPSULE ORAL at 22:14

## 2022-03-24 RX ADMIN — NEOSTIGMINE METHYLSULFATE 5 MG: 1 INJECTION, SOLUTION INTRAVENOUS at 14:18

## 2022-03-24 RX ADMIN — MIDAZOLAM HYDROCHLORIDE 1 MG: 1 INJECTION, SOLUTION INTRAMUSCULAR; INTRAVENOUS at 08:40

## 2022-03-24 RX ADMIN — ROCURONIUM BROMIDE 50 MG: 50 INJECTION, SOLUTION INTRAVENOUS at 07:53

## 2022-03-24 RX ADMIN — PROPOFOL 40 MG: 10 INJECTION, EMULSION INTRAVENOUS at 10:43

## 2022-03-24 RX ADMIN — VECURONIUM BROMIDE 5 MG: 1 INJECTION, POWDER, LYOPHILIZED, FOR SOLUTION INTRAVENOUS at 08:53

## 2022-03-24 RX ADMIN — PHENYLEPHRINE HYDROCHLORIDE 50 MCG: 10 INJECTION INTRAVENOUS at 10:59

## 2022-03-24 RX ADMIN — SODIUM CHLORIDE: 9 INJECTION, SOLUTION INTRAVENOUS at 13:10

## 2022-03-24 RX ADMIN — FENTANYL CITRATE 150 MCG: 50 INJECTION, SOLUTION INTRAMUSCULAR; INTRAVENOUS at 08:21

## 2022-03-24 RX ADMIN — MIDAZOLAM HYDROCHLORIDE 1 MG: 1 INJECTION, SOLUTION INTRAMUSCULAR; INTRAVENOUS at 10:42

## 2022-03-24 RX ADMIN — SENNOSIDES AND DOCUSATE SODIUM 1 TABLET: 50; 8.6 TABLET ORAL at 19:11

## 2022-03-24 RX ADMIN — FENTANYL CITRATE 100 MCG: 50 INJECTION, SOLUTION INTRAMUSCULAR; INTRAVENOUS at 08:41

## 2022-03-24 RX ADMIN — HEPARIN SODIUM 44000 UNITS: 1000 INJECTION INTRAVENOUS; SUBCUTANEOUS at 10:32

## 2022-03-24 RX ADMIN — Medication 5 MG: at 10:19

## 2022-03-24 RX ADMIN — MIDAZOLAM 2 MG: 1 INJECTION INTRAMUSCULAR; INTRAVENOUS at 06:50

## 2022-03-24 RX ADMIN — PHENYLEPHRINE HYDROCHLORIDE 50 MCG: 10 INJECTION INTRAVENOUS at 13:44

## 2022-03-24 RX ADMIN — EPINEPHRINE 0.03 MCG/KG/MIN: 1 INJECTION INTRAMUSCULAR; INTRAVENOUS; SUBCUTANEOUS at 12:28

## 2022-03-24 RX ADMIN — FENTANYL CITRATE 100 MCG: 50 INJECTION, SOLUTION INTRAMUSCULAR; INTRAVENOUS at 08:40

## 2022-03-24 RX ADMIN — Medication 2 G: at 07:43

## 2022-03-24 RX ADMIN — FENTANYL CITRATE 50 MCG: 50 INJECTION, SOLUTION INTRAMUSCULAR; INTRAVENOUS at 12:49

## 2022-03-24 RX ADMIN — GLYCOPYRROLATE 0.8 MG: 0.2 INJECTION, SOLUTION INTRAMUSCULAR; INTRAVENOUS at 14:18

## 2022-03-24 RX ADMIN — HYDROMORPHONE HYDROCHLORIDE 0.2 MG: 0.2 INJECTION, SOLUTION INTRAMUSCULAR; INTRAVENOUS; SUBCUTANEOUS at 18:10

## 2022-03-24 RX ADMIN — AMINOCAPROIC ACID 5 G/HR: 250 INJECTION, SOLUTION INTRAVENOUS at 08:05

## 2022-03-24 RX ADMIN — ACETAMINOPHEN 975 MG: 325 TABLET, FILM COATED ORAL at 22:14

## 2022-03-24 RX ADMIN — FENTANYL CITRATE 100 MCG: 50 INJECTION, SOLUTION INTRAMUSCULAR; INTRAVENOUS at 08:50

## 2022-03-24 RX ADMIN — NITROGLYCERIN 20 MCG: 10 INJECTION INTRAVENOUS at 12:49

## 2022-03-24 RX ADMIN — SODIUM CHLORIDE 1 UNITS/HR: 9 INJECTION, SOLUTION INTRAVENOUS at 15:55

## 2022-03-24 RX ADMIN — SODIUM CHLORIDE: 9 INJECTION, SOLUTION INTRAVENOUS at 07:43

## 2022-03-24 RX ADMIN — PROPOFOL 30 MG: 10 INJECTION, EMULSION INTRAVENOUS at 08:41

## 2022-03-24 RX ADMIN — PHENYLEPHRINE HYDROCHLORIDE 50 MCG: 10 INJECTION INTRAVENOUS at 12:55

## 2022-03-24 RX ADMIN — NITROGLYCERIN 20 MCG: 10 INJECTION INTRAVENOUS at 10:47

## 2022-03-24 RX ADMIN — Medication 40 MG: at 15:47

## 2022-03-24 RX ADMIN — NITROGLYCERIN 20 MCG: 10 INJECTION INTRAVENOUS at 08:51

## 2022-03-24 RX ADMIN — FENTANYL CITRATE 50 MCG: 50 INJECTION, SOLUTION INTRAMUSCULAR; INTRAVENOUS at 09:33

## 2022-03-24 RX ADMIN — NITROGLYCERIN 20 MCG: 10 INJECTION INTRAVENOUS at 08:46

## 2022-03-24 RX ADMIN — VECURONIUM BROMIDE 5 MG: 1 INJECTION, POWDER, LYOPHILIZED, FOR SOLUTION INTRAVENOUS at 12:17

## 2022-03-24 RX ADMIN — MIDAZOLAM HYDROCHLORIDE 3 MG: 1 INJECTION, SOLUTION INTRAMUSCULAR; INTRAVENOUS at 07:53

## 2022-03-24 RX ADMIN — PHENYLEPHRINE HYDROCHLORIDE 50 MCG: 10 INJECTION INTRAVENOUS at 12:57

## 2022-03-24 RX ADMIN — ALBUMIN HUMAN: 0.05 INJECTION, SOLUTION INTRAVENOUS at 12:59

## 2022-03-24 RX ADMIN — ALBUMIN HUMAN: 0.05 INJECTION, SOLUTION INTRAVENOUS at 13:01

## 2022-03-24 RX ADMIN — LIDOCAINE HYDROCHLORIDE 100 MG: 20 INJECTION, SOLUTION INFILTRATION; PERINEURAL at 07:53

## 2022-03-24 RX ADMIN — LIDOCAINE HYDROCHLORIDE 1 ML: 10 INJECTION, SOLUTION INFILTRATION; PERINEURAL at 07:14

## 2022-03-24 RX ADMIN — PROPOFOL 20 MG: 10 INJECTION, EMULSION INTRAVENOUS at 09:36

## 2022-03-24 RX ADMIN — FENTANYL CITRATE 100 MCG: 50 INJECTION, SOLUTION INTRAMUSCULAR; INTRAVENOUS at 07:53

## 2022-03-24 RX ADMIN — MIDAZOLAM HYDROCHLORIDE 1 MG: 1 INJECTION, SOLUTION INTRAMUSCULAR; INTRAVENOUS at 11:03

## 2022-03-24 RX ADMIN — PROPOFOL 20 MG: 10 INJECTION, EMULSION INTRAVENOUS at 08:44

## 2022-03-24 RX ADMIN — OXYCODONE HYDROCHLORIDE 5 MG: 5 TABLET ORAL at 15:23

## 2022-03-24 RX ADMIN — MIDAZOLAM HYDROCHLORIDE 1 MG: 1 INJECTION, SOLUTION INTRAMUSCULAR; INTRAVENOUS at 07:48

## 2022-03-24 RX ADMIN — FENTANYL CITRATE 50 MCG: 50 INJECTION, SOLUTION INTRAMUSCULAR; INTRAVENOUS at 08:45

## 2022-03-24 RX ADMIN — Medication 5 MG: at 09:05

## 2022-03-24 RX ADMIN — ASPIRIN 81 MG CHEWABLE TABLET 162 MG: 81 TABLET CHEWABLE at 06:01

## 2022-03-24 RX ADMIN — PROTAMINE SULFATE 300 MG: 10 INJECTION, SOLUTION INTRAVENOUS at 12:46

## 2022-03-24 RX ADMIN — PHENYLEPHRINE HYDROCHLORIDE 50 MCG: 10 INJECTION INTRAVENOUS at 12:54

## 2022-03-24 ASSESSMENT — ACTIVITIES OF DAILY LIVING (ADL)
ADLS_ACUITY_SCORE: 12
ADLS_ACUITY_SCORE: 3
ADLS_ACUITY_SCORE: 6
ADLS_ACUITY_SCORE: 6
ADLS_ACUITY_SCORE: 3
ADLS_ACUITY_SCORE: 6
ADLS_ACUITY_SCORE: 3
ADLS_ACUITY_SCORE: 6
ADLS_ACUITY_SCORE: 3
ADLS_ACUITY_SCORE: 3
ADLS_ACUITY_SCORE: 6
ADLS_ACUITY_SCORE: 3
ADLS_ACUITY_SCORE: 3
ADLS_ACUITY_SCORE: 5
ADLS_ACUITY_SCORE: 10
ADLS_ACUITY_SCORE: 6

## 2022-03-24 ASSESSMENT — COPD QUESTIONNAIRES: COPD: 0

## 2022-03-24 NOTE — ANESTHESIA POSTPROCEDURE EVALUATION
Patient: Roger Bonilla    Procedure: Procedure(s):  CORONARY ARTERY BYPASS GRAFT x 3 (LIMA - LAD; SV - OM2; SV - PDA) WITH ENDOSCOPIC SAPHENOUS VEIN HARVEST ON BILATERAL LOWER EXTREMITY, AND ON CARDIOPULMONARY PUMP OXYGENATOR  (INTRAOPERATIVE TRANSESOPHAGEAL ECHOCARDIOGRAM BY ANESTHESIOLOGIST)       Anesthesia Type:  General    Note:  Disposition: ICU            ICU Sign Out: Anesthesiologist/ICU physician sign out WAS performed   Postop Pain Control: Uneventful            Sign Out: Well controlled pain   PONV: No   Neuro/Psych:             Sign Out: PLANNED postop sedation   Airway/Respiratory: Uneventful            Sign Out: AIRWAY IN SITU/Resp. Support               Airway in situ/Resp. Support: ETT                 Reason: Planned Pre-op   CV/Hemodynamics: Uneventful            Sign Out: Acceptable CV status; No obvious hypovolemia; No obvious fluid overload   Other NRE: NONE   DID A NON-ROUTINE EVENT OCCUR? No    Event details/Postop Comments:  The patient was transported to the ICU intubated, sedated, monitored, and hand ventilated. No issues noted during transfer. Report was given to the ICU staff.            Last vitals:  Vitals Value Taken Time   /66 03/24/22 1500   Temp     Pulse 69 03/24/22 1524   Resp 9 03/24/22 1524   SpO2 100 % 03/24/22 1524   Vitals shown include unvalidated device data.    Electronically Signed By: Rigoberto Saul MD  March 24, 2022  3:24 PM

## 2022-03-24 NOTE — ANESTHESIA CARE TRANSFER NOTE
Patient: Roger Bonilla    Procedure: Procedure(s):  CORONARY ARTERY BYPASS GRAFT x 3 (LIMA - LAD; SV - OM2; SV - PDA) WITH ENDOSCOPIC SAPHENOUS VEIN HARVEST ON BILATERAL LOWER EXTREMITY, AND ON CARDIOPULMONARY PUMP OXYGENATOR  (INTRAOPERATIVE TRANSESOPHAGEAL ECHOCARDIOGRAM BY ANESTHESIOLOGIST)       Diagnosis: CAD (coronary artery disease) [I25.10]  Diagnosis Additional Information: No value filed.    Anesthesia Type:   General     Note:    Oropharynx: endotracheal tube in place  Level of Consciousness: iatrogenic sedation      Independent Airway: airway patency not satisfactory and stable  Dentition: dentition unchanged  Vital Signs Stable: post-procedure vital signs reviewed and stable  Report to RN Given: handoff report given  Patient transferred to: ICU    ICU Handoff: Call for PAUSE to initiate/utilize ICU HANDOFF, Identified Patient, Identified Responsible Provider, Reviewed the Pertinent Medical History, Discussed Surgical Course, Reviewed Intra-OP Anesthesia Management and Issues during Anesthesia, Set Expectations for Post Procedure Period and Allowed Opportunity for Questions and Acknowledgement of Understanding      Vitals:  Vitals Value Taken Time   /60    Temp     Pulse 87    Resp 16    SpO2 100        Electronically Signed By: SAL Alba CRNA  March 24, 2022  2:24 PM

## 2022-03-24 NOTE — CONSULTS
CARDIAC SURGERY NUTRITION CONSULT    Received standing order to assess and educate patient.    Will follow and complete assessment once patient is extubated and/or is transferred to medical unit.    Patient will receive nutrition education during the Outpatient Cardiac Rehab Program (nutrition classes/dietitian counseling).    Johanna Buitrago, SIN, LD, [facility]

## 2022-03-24 NOTE — INTERVAL H&P NOTE
"I have reviewed the surgical (or preoperative) H&P that is linked to this encounter, and examined the patient. There are no significant changes, but it is really incomplete. Reviewed other notes and interviewed patient to complete this part of the patient evaluation.    Clinical Conditions Present on Arrival:  SECTIONPRESENTONADMISSIONBEGIN@                 # Platelet Defect: home medication list includes an antiplatelet medication  # Diabetes, type II: last A1C 6.6 % (Ref range: 0.0 - 5.6 %)  # Obesity: Estimated body mass index is 39.22 kg/m  as calculated from the following:    Height as of this encounter: 1.676 m (5' 6\").    Weight as of this encounter: 110.2 kg (243 lb).       " Vaccine Information Statement    Influenza (Flu) Vaccine (Inactivated or Recombinant): What you need to know    Many Vaccine Information Statements are available in Romansh and other languages. See www.immunize.org/vis  Hojas de Información Sobre Vacunas están disponibles en Español y en muchos otros idiomas. Visite www.immunize.org/vis    1. Why get vaccinated? Influenza (flu) is a contagious disease that spreads around the [place] every year, usually between October and May. Flu is caused by influenza viruses, and is spread mainly by coughing, sneezing, and close contact. Anyone can get flu. Flu strikes suddenly and can last several days. Symptoms vary by age, but can include:   fever/chills   sore throat   muscle aches   fatigue   cough   headache    runny or stuffy nose    Flu can also lead to pneumonia and blood infections, and cause diarrhea and seizures in children. If you have a medical condition, such as heart or lung disease, flu can make it worse. Flu is more dangerous for some people. Infants and young children, people 72years of age and older, pregnant women, and people with certain health conditions or a weakened immune system are at greatest risk. Each year thousands of people in the [facility] die from flu, and many more are hospitalized. Flu vaccine can:   keep you from getting flu,   make flu less severe if you do get it, and   keep you from spreading flu to your family and other people. 2. Inactivated and recombinant flu vaccines    A dose of flu vaccine is recommended every flu season. Children 6 months through 6years of age may need two doses during the same flu season. Everyone else needs only one dose each flu season.        Some inactivated flu vaccines contain a very small amount of a mercury-based preservative called thimerosal. Studies have not shown thimerosal in vaccines to be harmful, but flu vaccines that do not contain thimerosal are available. There is no live flu virus in flu shots. They cannot cause the flu. There are many flu viruses, and they are always changing. Each year a new flu vaccine is made to protect against three or four viruses that are likely to cause disease in the upcoming flu season. But even when the vaccine doesnt exactly match these viruses, it may still provide some protection    Flu vaccine cannot prevent:   flu that is caused by a virus not covered by the vaccine, or   illnesses that look like flu but are not. It takes about 2 weeks for protection to develop after vaccination, and protection lasts through the flu season. 3. Some people should not get this vaccine    Tell the person who is giving you the vaccine:     If you have any severe, life-threatening allergies. If you ever had a life-threatening allergic reaction after a dose of flu vaccine, or have a severe allergy to any part of this vaccine, you may be advised not to get vaccinated. Most, but not all, types of flu vaccine contain a small amount of egg protein.  If you ever had Guillain-Barré Syndrome (also called GBS). Some people with a history of GBS should not get this vaccine. This should be discussed with your doctor.  If you are not feeling well. It is usually okay to get flu vaccine when you have a mild illness, but you might be asked to come back when you feel better. 4. Risks of a vaccine reaction    With any medicine, including vaccines, there is a chance of reactions. These are usually mild and go away on their own, but serious reactions are also possible. Most people who get a flu shot do not have any problems with it.      Minor problems following a flu shot include:    soreness, redness, or swelling where the shot was given     hoarseness   sore, red or itchy eyes   cough   fever   aches   headache   itching   fatigue  If these problems occur, they usually begin soon after the shot and last 1 or 2 days. More serious problems following a flu shot can include the following:     There may be a small increased risk of Guillain-Barré Syndrome (GBS) after inactivated flu vaccine. This risk has been estimated at 1 or 2 additional cases per million people vaccinated. This is much lower than the risk of severe complications from flu, which can be prevented by flu vaccine.  Young children who get the flu shot along with pneumococcal vaccine (PCV13) and/or DTaP vaccine at the same time might be slightly more likely to have a seizure caused by fever. Ask your doctor for more information. Tell your doctor if a child who is getting flu vaccine has ever had a seizure. Problems that could happen after any injected vaccine:      People sometimes faint after a medical procedure, including vaccination. Sitting or lying down for about 15 minutes can help prevent fainting, and injuries caused by a fall. Tell your doctor if you feel dizzy, or have vision changes or ringing in the ears.  Some people get severe pain in the shoulder and have difficulty moving the arm where a shot was given. This happens very rarely.  Any medication can cause a severe allergic reaction. Such reactions from a vaccine are very rare, estimated at about 1 in a million doses, and would happen within a few minutes to a few hours after the vaccination. As with any medicine, there is a very remote chance of a vaccine causing a serious injury or death. The safety of vaccines is always being monitored. For more information, visit: www.cdc.gov/vaccinesafety/    5. What if there is a serious reaction? What should I look for?  Look for anything that concerns you, such as signs of a severe allergic reaction, very high fever, or unusual behavior.     Signs of a severe allergic reaction can include hives, swelling of the face and throat, difficulty breathing, a fast heartbeat, dizziness, and weakness - usually within a few minutes to a few hours after the vaccination. What should I do?  If you think it is a severe allergic reaction or other emergency that cant wait, call 9-1-1 and get the person to the nearest hospital. Otherwise, call your doctor.  Reactions should be reported to the Vaccine Adverse Event Reporting System (VAERS). Your doctor should file this report, or you can do it yourself through  the VAERS web site at www.vaers. [facility].gov, or by calling 9-685.625.3900. VAERS does not give medical advice. 6. The National Vaccine Injury Compensation Program    The [facility] Vaccine Injury Compensation Program (VICP) is a federal program that was created to compensate people who may have been injured by certain vaccines. Persons who believe they may have been injured by a vaccine can learn about the program and about filing a claim by calling 0-549.364.9957 or visiting the [employer] website at www.[facility].gov/vaccinecompensation. There is a time limit to file a claim for compensation. 7. How can I learn more?  Ask your healthcare provider. He or she can give you the vaccine package insert or suggest other sources of information.  Call your local or state health department.  Contact the Centers for Disease Control and Prevention (CDC):  - Call 4-316.318.8378 (1-800-CDC-INFO) or  - Visit CDCs website at www.cdc.gov/flu    Vaccine Information Statement   Inactivated Influenza Vaccine   8/7/2015  42 ISAIAS Montoya [id number]    Department of Health and Human Services  Centers for Disease Control and Prevention    Office Use Only       Anxiety Disorder: Care Instructions  Your Care Instructions    Anxiety is a normal reaction to stress. Difficult situations can cause you to have symptoms such as sweaty palms and a nervous feeling. In an anxiety disorder, the symptoms are far more severe.  Constant worry, muscle tension, trouble sleeping, nausea and diarrhea, and other symptoms can make normal daily activities difficult or impossible. These symptoms may occur for no reason, and they can affect your work, school, or social life. Medicines, counseling, and self-care can all help. Follow-up care is a key part of your treatment and safety. Be sure to make and go to all appointments, and call your doctor if you are having problems. It's also a good idea to know your test results and keep a list of the medicines you take. How can you care for yourself at home? · Take medicines exactly as directed. Call your doctor if you think you are having a problem with your medicine. · Go to your counseling sessions and follow-up appointments. · Recognize and accept your anxiety. Then, when you are in a situation that makes you anxious, say to yourself, \"This is not an emergency. I feel uncomfortable, but I am not in danger. I can keep going even if I feel anxious. \"  · Be kind to your body:  ¨ Relieve tension with exercise or a massage. ¨ Get enough rest.  ¨ Avoid alcohol, caffeine, nicotine, and illegal drugs. They can increase your anxiety level and cause sleep problems. ¨ Learn and do relaxation techniques. See below for more about these techniques. · Engage your mind. Get out and do something you enjoy. Go to a funny movie, or take a walk or hike. Plan your day. Having too much or too little to do can make you anxious. · Keep a record of your symptoms. Discuss your fears with a good friend or family member, or join a support group for people with similar problems. Talking to others sometimes relieves stress. · Get involved in social groups, or volunteer to help others. Being alone sometimes makes things seem worse than they are. · Get at least 30 minutes of exercise on most days of the week to relieve stress. Walking is a good choice. You also may want to do other activities, such as running, swimming, cycling, or playing tennis or team sports. Relaxation techniques  Do relaxation exercises 10 to 20 minutes a day.  You can play soothing, relaxing music while you do them, if you wish. · Tell others in your house that you are going to do your relaxation exercises. Ask them not to disturb you. · Find a comfortable place, away from all distractions and noise. · Lie down on your back, or sit with your back straight. · Focus on your breathing. Make it slow and steady. · Breathe in through your nose. Breathe out through either your nose or mouth. · Breathe deeply, filling up the area between your navel and your rib cage. Breathe so that your belly goes up and down. · Do not hold your breath. · Breathe like this for 5 to 10 minutes. Notice the feeling of calmness throughout your whole body. As you continue to breathe slowly and deeply, relax by doing the following for another 5 to 10 minutes:  · Tighten and relax each muscle group in your body. You can begin at your toes and work your way up to your head. · Imagine your muscle groups relaxing and becoming heavy. · Empty your mind of all thoughts. · Let yourself relax more and more deeply. · Become aware of the state of calmness that surrounds you. · When your relaxation time is over, you can bring yourself back to alertness by moving your fingers and toes and then your hands and feet and then stretching and moving your entire body. Sometimes people fall asleep during relaxation, but they usually wake up shortly afterward. · Always give yourself time to return to full alertness before you drive a car or do anything that might cause an accident if you are not fully alert. Never play a relaxation tape while you drive a car. When should you call for help? Call 911 anytime you think you may need emergency care. For example, call if:    · You feel you cannot stop from hurting yourself or someone else.   Chetan Betters the numbers for these national suicide hotlines: 6-153-363-TALK (4-567.113.1479) and 0-465-ZYHEQRH (3-712.952.5770).  If you or someone you know talks about suicide or feeling hopeless, get help right away.   Watch closely for changes in your health, and be sure to contact your doctor if:    · You have anxiety or fear that affects your life.     · You have symptoms of anxiety that are new or different from those you had before. Where can you learn more? Go to http://ivette-nida.info/. Enter P754 in the search box to learn more about \"Anxiety Disorder: Care Instructions. \"  Current as of: December 7, 2017  Content Version: 11.7  © 8697-0160 [employer]. Care instructions adapted under license by [employer] (which disclaims liability or warranty for this information). If you have questions about a medical condition or this instruction, always ask your healthcare professional. [address] any warranty or liability for your use of this information. Recovering From Depression: Care Instructions  Your Care Instructions    Taking good care of yourself is important as you recover from depression. In time, your symptoms will fade as your treatment takes hold. Do not give up. Instead, focus your energy on getting better. Your mood will improve. It just takes some time. Focus on things that can help you feel better, such as being with friends and family, eating well, and getting enough rest. But take things slowly. Do not do too much too soon. You will begin to feel better gradually. Follow-up care is a key part of your treatment and safety. Be sure to make and go to all appointments, and call your doctor if you are having problems. It's also a good idea to know your test results and keep a list of the medicines you take. How can you care for yourself at home? Be realistic  · If you have a large task to do, break it up into smaller steps you can handle, and just do what you can. · You may want to put off important decisions until your depression has lifted.  If you have plans that will have a major impact on your life, such as marriage, divorce, or a job change, try to wait a bit. Talk it over with friends and loved ones who can help you look at the overall picture first.  · Reaching out to people for help is important. Do not isolate yourself. Let your family and friends help you. Find someone you can trust and confide in, and talk to that person. · Be patient, and be kind to yourself. Remember that depression is not your fault and is not something you can overcome with willpower alone. Treatment is necessary for depression, just like for any other illness. Feeling better takes time, and your mood will improve little by little. Stay active  · Stay busy and get outside. Take a walk, or try some other light exercise. · Talk with your doctor about an exercise program. Exercise can help with mild depression. · Go to a movie or concert. Take part in a Yazidi activity or other social gathering. Go to a ball game. · Ask a friend to have dinner with you. Take care of yourself  · Eat a balanced diet with plenty of fresh fruits and vegetables, whole grains, and lean protein. If you have lost your appetite, eat small snacks rather than large meals. · Avoid drinking alcohol or using illegal drugs. Do not take medicines that have not been prescribed for you. They may interfere with medicines you may be taking for depression, or they may make your depression worse. · Take your medicines exactly as they are prescribed. You may start to feel better within 1 to 3 weeks of taking antidepressant medicine. But it can take as many as 6 to 8 weeks to see more improvement. If you have questions or concerns about your medicines, or if you do not notice any improvement by 3 weeks, talk to your doctor. · If you have any side effects from your medicine, tell your doctor. Antidepressants can make you feel tired, dizzy, or nervous. Some people have dry mouth, constipation, headaches, sexual problems, or diarrhea.  Many of these side effects are mild and will go away on their own after you have been taking the medicine for a few weeks. Some may last longer. Talk to your doctor if side effects are bothering you too much. You might be able to try a different medicine. · Get enough sleep. If you have problems sleeping:  ¨ Go to bed at the same time every night, and get up at the same time every morning. ¨ Keep your bedroom dark and quiet. ¨ Do not exercise after 5:00 p.m. ¨ Avoid drinks with caffeine after 5:00 p.m. · Avoid sleeping pills unless they are prescribed by the doctor treating your depression. Sleeping pills may make you groggy during the day, and they may interact with other medicine you are taking. · If you have any other illnesses, such as diabetes, heart disease, or high blood pressure, make sure to continue with your treatment. Tell your doctor about all of the medicines you take, including those with or without a prescription. · Keep the numbers for these national suicide hotlines: 1-760-299-TALK (7-424.593.3595) and 4-471-FRNAHIH (2-450.973.6476). If you or someone you know talks about suicide or feeling hopeless, get help right away. When should you call for help? Call 911 anytime you think you may need emergency care. For example, call if:    · You feel like hurting yourself or someone else.     · Someone you know has depression and is about to attempt or is attempting suicide.   [facility] your doctor now or seek immediate medical care if:    · You hear voices.     · Someone you know has depression and:  ¨ Starts to give away his or her possessions. ¨ Uses illegal drugs or drinks alcohol heavily. ¨ Talks or writes about death, including writing suicide notes or talking about guns, knives, or pills. ¨ Starts to spend a lot of time alone. ¨ Acts very aggressively or suddenly appears calm.    Watch closely for changes in your health, and be sure to contact your doctor if:    · You do not get better as expected.    Where can you learn more?  Go to http://ivette-nida.info/. Enter U985 in the search box to learn more about \"Recovering From Depression: Care Instructions. \"  Current as of: December 7, 2017  Content Version: 11.7  © 2270-7791 [employer]. Care instructions adapted under license by [employer] (which disclaims liability or warranty for this information). If you have questions about a medical condition or this instruction, always ask your healthcare professional. [place] [address] any warranty or liability for your use of this information.

## 2022-03-24 NOTE — BRIEF OP NOTE
[facility]    Brief Operative Note    Pre-operative diagnosis: CAD (coronary artery disease) [I25.10]  Post-operative diagnosis Same as pre-operative diagnosis    Procedure: Procedure(s):  CORONARY ARTERY BYPASS GRAFT x 3 (LIMA - LAD; SV - OM2; SV - PDA) WITH ENDOSCOPIC SAPHENOUS VEIN HARVEST ON BILATERAL LOWER EXTREMITY, AND ON CARDIOPULMONARY PUMP OXYGENATOR  (INTRAOPERATIVE TRANSESOPHAGEAL ECHOCARDIOGRAM BY ANESTHESIOLOGIST)  Surgeon: Surgeon(s) and Role:     * Glenna Waters MD - Primary     * Barby Horn PA-C - Assisting  Anesthesia: General   Estimated Blood Loss: 400 ml    Drains: Mediastinal x2, left pleural  Specimens:   ID Type Source Tests Collected by Time Destination   A : STAT LABS (POST CARDIOPULMONARY BYPASS) Blood Line, arterial CBC WITH PLATELETS, BASIC METABOLIC PANEL, FIBRINOGEN ACTIVITY, PARTIAL THROMBOPLASTIN TIME, INR Colette Mcgraw APRN CRNA 3/24/2022  1:01 PM      Findings:   severe coronary atherosclerosis.  Complications: None.  Implants: * No implants in log *

## 2022-03-24 NOTE — PROGRESS NOTES
[facility]  Critical Care Service  Progress Note  Date of Service (when I saw the patient): 03/24/2022  Main Plans for Today  extubate on pathway if able  Assessment & Plan   Roger Bonilla is a 66 year old male who was admitted on 3/24/2022. He is s/p 3 vessel CAB today  Neuro  1. Acute pain  2. Sedation  Plan:  -- Scheduled acetaminophen,   -- Propofol for sedation as needed until extubation  -- Delirium prevention as able    CV  1. S/p 3 vessel CAB  2. HTN  Plan:  -- Per pathway  -- Cardiac meds per CV surgery    Resp:  1. Post operative ventilator management    Vent Mode: CPAP/PS  (Continuous positive airway pressure with Pressure Support)  FiO2 (%): 50 %  Resp Rate (Set): 16 breaths/min  Tidal Volume (Set, mL): 550 mL  PEEP (cm H2O): 5 cmH2O  Pressure Support (cm H2O): 5 cmH2O  Resp: 12      2. Acute respiratory failure  Plan:  -- Current settings are:  -- PST when hemodynamically stable    GI/Nutrition  1. No prior hx  Plan:  -- NPO  -- PPI    Renal  1. No prior hx.  Baseline creatinine appears to be 0.95  Plan:  --monitor function and electrolytes as needed with replacement per ICU protocols. - generally avoid nephrotoxic agents such as NSAID, IV contrast unless specifically required  -- adjust medications as needed for renal clearance  -- follow I/O's as appropriate.  -- maintain euvolemia      Endocrine  1. Stress Hyperglycemia  Plan:  --  Insulin gtt per protocol if indicated  -- Keep BG  <180 for optimal healing    Heme:  1. Acute blood loss anemia  2. Coagulopathy   Plan:  -- Monitor hemoglobin.  -- Transfuse to keep > 7.0      General cares:  DVT Prophylaxis: Pneumatic Compression Devices  GI Prophylaxis: PPI  Restraints: Restraints for medical healing needed: YES  Family update by me today: No  Current lines are required for patient management  Access:  Laith Colby  Time Spent on this Encounter   Billing:  I spent 35 minutes bedside and on the inpatient unit today  managing the critical care of Roger Bonilla in relation to the issues listed in this note.  Interval History   S/p surgery today  Physical Exam   Temp: 98  F (36.7  C) Temp src: Temporal Temp  Min: 98  F (36.7  C)  Max: 98  F (36.7  C) BP: 105/59 Pulse: 59   Resp: 20 SpO2: 99 % O2 Device: None (Room air) Oxygen Delivery: 3 LPM  Vitals:    03/24/22 0621   Weight: 110.2 kg (243 lb)     I/O last 3 completed shifts:  In: 3495 [I.V.:2800; Other:195]  Out: 400 [Urine:400]    GEN: sedated on ventilator  EYES: PERRL, Anicteric sclera.   HEENT:  Normocephalic, atraumatic, trachea midline, ETT secure  CV: RRR, no gallops, rubs, or murmurs  PULM/CHEST: Clear breath sounds bilaterally without rhonchi, crackles or wheeze, symmetric chest rise  GI: soft, non-distended  : da silva catheter in place, urine yellow and clear  EXTREMITIES: no peripheral edema, moving all extremities, peripheral pulses intact  NEURO: sedated  SKIN: No rashes, sores or ulcerations    Data   Recent Labs   Lab 03/24/22  1437 03/24/22  1430 03/24/22  1301 03/24/22  0554   WBC 15.5*  --  20.2*  --    HGB 10.2*  --  10.4*  --    MCV 94  --  94  --      --  152  --    INR 1.31*  --  1.41*  --      --  142  --    POTASSIUM 4.5  4.5  --  4.3 4.3   CHLORIDE 111*  --  110*  --    CO2 26  --  26  --    BUN 20  --  20  --    CR 0.97  --  0.98  --    ANIONGAP 4  --  6  --    SUE 8.2*  --  8.9  --    * 143* 170* 118*   ALBUMIN 3.2*  --   --   --    PROTTOTAL 5.6*  --   --   --    BILITOTAL 0.7  --   --   --    ALKPHOS 58  --   --   --    ALT 19  --   --   --    AST 30  --   --   --        Laith Colby MD  [facility] Intensivist Service

## 2022-03-24 NOTE — ANESTHESIA PROCEDURE NOTES
Arterial Line Procedure Note    Pre-Procedure   Staff -        Anesthesiologist:  Rigoberto Saul MD       Performed By: anesthesiologist       Location: pre-op       Pre-Anesthestic Checklist: patient identified, IV checked, risks and benefits discussed, informed consent, monitors and equipment checked and pre-op evaluation  Timeout:       Correct Patient: Yes        Correct Procedure: Yes        Correct Site: Yes        Correct Position: Yes   Line Placement:   This line was placed Pre Induction starting at 3/24/2022 7:14 AM and ending at 3/24/2022 7:14 AM  Procedure   Procedure: arterial line       Laterality: left       Insertion Site: radial.  Sterile Prep        Standard elements of sterile barrier followed       Skin prep: Chloraprep  Insertion/Injection        Technique: ultrasound guided        1. Ultrasound was used to evaluate the access site.       2. Artery evaluated via ultrasound for patency/adequacy.       3. Using real-time ultrasound the needle/catheter was observed entering the artery/vein.       5. The visualized structures were anatomically normal.       6. There were no apparent abnormal pathologic findings.       Catheter Type/Size: 20 G, 1.75 in/4.5 cm quick cath (integral wire)  Narrative         Secured by: other       Tegaderm dressing used.       Complications: None apparent,      Arterial waveform: Yes    Comments:  Arterial line secured with a Tegaderm and tape. No images saved

## 2022-03-24 NOTE — PROGRESS NOTES
Extubation Note    Successful completion of SBT (Yes or No): Yes  Extubation time: 3/23/22 7749    Patient assessment:  Lung sounds: clear; coarse  Stridor Present (Yes or No): No  Patient tolerance: Pt tolerated extubation    Oxygen device:  Liter flow: 5 Liters  FiO2: n/a  SpO2: 99% SpO2    Plan: Continue to monitor patient.

## 2022-03-24 NOTE — ANESTHESIA PROCEDURE NOTES
Airway       Patient location during procedure: OR       Procedure Start/Stop Times: 3/24/2022 7:57 AM  Staff -        Anesthesiologist:  Rigoberto Saul MD       CRNA: Colette Mcgraw APRN CRNA       Performed By: CRNAIndications and Patient Condition       Indications for airway management: saravanan-procedural       Induction type:intravenous       Mask difficulty assessment: 3 - difficult mask (inadequate, unstable, or two providers) +/- NMBA    Final Airway Details       Final airway type: endotracheal airway       Successful airway: ETT - single  Endotracheal Airway Details        ETT size (mm): 8.0       Cuffed: yes       Successful intubation technique: video laryngoscopy       VL Blade Size: Glidescope 4       Grade View of Cords: 1       Adjucts: stylet       Position: Right       Measured from: gums/teeth       Secured at (cm): 23       Bite block used: None    Post intubation assessment        Placement verified by: capnometry, equal breath sounds and chest rise        Number of attempts at approach: 1       Secured with: pink tape       Ease of procedure: easy       Dentition: Intact and Unchanged

## 2022-03-24 NOTE — ANESTHESIA PROCEDURE NOTES
Central Line/PA Catheter Placement    Pre-Procedure   Staff -        Anesthesiologist:  Rigoberto Saul MD       Performed By: anesthesiologist       Location: pre-op       Pre-Anesthestic Checklist: patient identified, IV checked, site marked, risks and benefits discussed, informed consent, monitors and equipment checked and pre-op evaluation  Timeout:       Correct Patient: Yes        Correct Procedure: Yes        Correct Site: Yes        Correct Position: Yes        Correct Laterality: N/A   Line Placement:   This line was placed Pre Induction starting at 3/24/2022 7:15 AM and ending at 3/24/2022 7:15 AM    Procedure   Procedure: central line       Laterality: left       Insertion Site: right, internal jugular.       Patient Position: Trendelenburg  Sterile Prep        All elements of maximal sterile barrier technique followed       Patient Prep/Sterile Barriers: draped, patient draped, hand hygiene, gloves , hat , mask , draped, gown, sterile gel and probe cover       Skin prep: Chloraprep  Insertion/Injection       Local skin infiltrated with mL of 1% lidocaine.        Technique: ultrasound guided and Seldinger Technique        1. Ultrasound was used to evaluate the access site.       2. Vein evaluated via ultrasound for patency/adequacy.       3. Using real-time ultrasound the needle/catheter was observed entering the artery/vein.       4. Permanent image was captured and entered into the patient's record.       5. The visualized structures were anatomically normal.       6. There were no apparent abnormal pathologic findings.       Introducer Type: 9 Fr, 10 cm        Type: PA/CVC with Introducer       PA Catheter Type: CCO         Appropriate RV, RA and PA waveforms noted:  Yes            Distance to Wedge Position cm: 84            Withdrawn and Locked at cm: 79  Narrative         Secured by: suture       Tegaderm and Biopatch dressing used.       Complications: None apparent,        blood aspirated  from all lumens,        All lumens flushed: Yes       Verification method: Placement to be verified post-op, X-ray and Ultrasound   Comments:  No complications noted.

## 2022-03-24 NOTE — OP NOTE
OPERATIVE DATE: 3/24/2022    PRE-OPERATIVE DIAGNOSIS:  1) Severe multi-vessel coronary artery disease  2) Hyperlipidemia  3) Type 2 Diabetes  4) Hypertension  5) Carotid stenosis    POST-OPERATIVE DIAGNOSIS:  1) Severe multi-vessel coronary artery disease  2) Hyperlipidemia  3) Type 2 Diabetes  4) Hypertension  5) Carotid stenosis    PROCEDURE:  1) Coronary artery bypass grafting x 3.   - Left internal mammary artery to left anterior descending artery   - Reversed saphenous vein graft to right posterior descending artery   - Reversed saphenous vein graft to obtuse marginal artery   2) Endoscopic vein harvest left and right lower extremity  3) Transesophageal echocardiogram    SURGEON: Glenna Waters MD    ASSISTANT: Barby Horn PAC  FELLOW: Skylar Wu MD    ANESTHESIA: GETA    ESTIMATED BLOOD LOSS: 379cc    OPERATIVE FINDINGS:  1) Ejection fraction: 60%  2) Left internal mammary artery 2.25mm and excellent flow.  3) Left and right greater saphenous vein 3.5-4mm and suitable for bypass.  4) Left anterior descending artery 2.5mm and free of disease at anastomosis.  5) Right posterior descending artery 1.75mm and free of disease at anastomosis.  6) Obtuse marginal artery 2mm and free of disease at anastomosis.    CARDIOPULMONARY BYPASS TIME: 102 minutes    AORTIC CROSS CLAMP TIME: 82 minutes    INDICATIONS:  Mr. JOSEFINA WHEELER is a 66 year old male admitted with shortness of breath found to have multi vessel coronary disease and also severe right carotid disease. His case was discussed with the Heart and vascular team. We offered the patient right carotid endarterectomy followed by CAB. Risks and benefits of the operation were explained to the patient and their family including, but not limited to, bleeding, infection, stroke and even death. They understood these risks and agreed to proceed electively.    OPERATIVE REPORT:  The patient was transferred to the operating room and positioned supine on the OR  table. General anesthesia was initiated by the anesthesia team. Endotracheal intubation and central venous access was performed by anesthesia. The patient's neck, chest, abdomen and bilateral lower extremities were clipped, prepped and draped in sterile fashion. A pre-procedure time-out was performed confirming the correct patient, correct site and correct procedure.    Simultaneous median sternotomy and left and right lower extremity skin incisions were made. Endoscopic vein harvest of the left/right greater saphenous vein was performed. The vein was ligated at the proximal and distal ends, harvested from the leg, cannulated in reverse orientation, and flushed with heparinized saline. Branches of the vein were doubly clipped with metal clips. The vein was then stored in heparinized saline.    Median sternotomy was made with reciprocating saw. The bone was made hemostatic with cautery and bone wax. A mammary retractor was placed. The left pleural space was opened.  The left internal mammary artery was mobilized. Branches of the artery were clipped with metal clips and divided with cautery.      The patient was given 300 mg/kg IV heparin. The mammary pedicle was clamped with a tonsil clamp. The mammary artery was divided with metzenbaum scissors. There was excellent pulsatile flow from the mammary artery. This was controlled with a bulldog clamp. The distal aspect was tied with 0-silk tie and double clipped. The proximal end was spatulated in preparation for anastomosis and flushed with papavarine.      Pledgeted 3-0 ethibond pursestring was made in the ascending aorta. A EOPA arterial cannula was placed here and connected to the bypass circuit. A 2-0 ethibond pursestring was made in the right atrial appendage. A dual stage venous cannula was placed here and connected to the cardiopulmonary bypass circuit. A 4-0 prolene pursestring was made in the right atrium. A stab incision was made here. A retrograde cardioplegia  catheter was placed and connected to the cardioplegia circuit.  Next the aorto-pulmonary window was developed.  A 4-0 prolene pursestring was made in the ascending aorta. A DLP root vent / antegrade cardioplegia catheter was placed here and connected to the cardioplegia circuit.    Cardiopulmonary bypass was initiated with good flows. Flow on the circuit was decreased. A large aortic cross clamp was applied. Flow on the circuit was resumed. 1000cc of antegrade cold blood cardioplegia was delivered with good diastolic arrest. An additional 300cc of retrograde cold blood cardioplegia was used to test the retrograde.    We focused our attention on the distal bypass anastomoses next.    The following bypasses were constructed using reversed saphenous vein in end-to-side fashion with running 7-0 prolene:  1) Reversed saphenous vein graft to posterior descending artery.  2) Reversed saphenous vein graft to obtuse marginal artery.  The anastomoses were tested by flushing with cold blood cardioplegia to ensure hemostasis. An additional dose of retrograde cold blood cardioplegia was delivered between completion of each anastomosis.    We focused our attention on the proximal vein graft anastomoses next. The vein grafts were sized to fit to the ascending aorta. 2 additional aortotomies were made with 11-blade knife. The aortotomies were extended with 4mm punch. The vein grafts were anastomosed to the ascending aorta in end-to-side fashion using running 6-0 prolene.    Next a rent was made in the left pericardium. The left internal mammary artery was then anastomosed to the left anterior descending artery in end-to-side fashion using running 7-0 prolene.  The mammary pedicle clamp was removed. The anastomosis was hemostatic. The clamp was replaced. The pedicle was tacked to the epicardium using 6-0 prolene.    A retrograde hot shot of warm blood cardiplegia was delivered. The clamp on the mammary pedicle was removed. A pavan  clamp was placed on the proximal vein grafts. After completion of the hot shot, flow on the bypass circuit was decreased and the aortic cross clamp was removed. Flow on the bypass circuit was resumed. The proximal vein grafts were de-aired using an insulin needle. The bulldog clamp on the vein grafts was removed. The retrograde cardioplegia catheter was removed. The pursestring was tied. The site was over-sewn with a 4-0 prolene.  The distal anastomoses were inspected and hemostatic. Ventricular pacing wires were placed and delivered through the anterior abdominal wall and secured to the skin with 0-ethibond stitches. The patient had sinus rhythm and was backup paced at 50 bpm.      The left pleural space was suctioned dry. The lungs were ventilated bilaterally. KASSIE showed no intra-cardiac air. The DLP root vent / antegrade cardioplegia catheter was removed. Intravenous calcium was administered.  Flow on the bypass circuit was weaned to off. The patient was stable on low dose epinephrine. The venous cannula was removed.  Pursestring at this site was tied. This was over-sewed with 4-0 prolene. The remaining pump blood volume was returned via the arterial cannula. A test dose of protamine was administered. The arterial cannula was removed. The pursestrings at this site were tied. This was oversewn with pledgeted 4-0 prolene.     The sternal retractor was removed. Two 32F straight argyle mediastinal chest tubes were placed and one 28F left pleural chest tube was placed. These were delivered through the anterior abdominal wall and secured to the skin with 0-ethibond stitches.      The wound was made hemostatic with cautery. The subcutaneous tissue, sternal edges, thymic fat, pericardial edges and all anastomotic and cannulation sites were inspected and hemostatic.    The wound was irrigated with warm antibiotic saline. The sternum was reapproximated with sternal wires.    The wound was made hemostatic then closed in  layers using Stratafix sutures. The subcutaneous tissue was closed with 2-0 Stratafix stitches. The skin was closed with 4-0 Stratafix.  Dermabond skin glue was applied. A sterile dressing was applied.      The patient was then transferred from the operating bed to an ICU bed and transferred to the ICU in critical, but stable, condition.    All needle, sponge and instrument counts were correct at the end of the case.    Glenna Waters MD  Cardiothoracic Surgery  Pager: [phone]

## 2022-03-25 ENCOUNTER — APPOINTMENT (OUTPATIENT)
Dept: GENERAL RADIOLOGY | Facility: CLINIC | Age: 67
DRG: 235 | End: 2022-03-25
Attending: SURGERY
Payer: COMMERCIAL

## 2022-03-25 ENCOUNTER — APPOINTMENT (OUTPATIENT)
Dept: PHYSICAL THERAPY | Facility: CLINIC | Age: 67
DRG: 235 | End: 2022-03-25
Attending: SURGERY
Payer: COMMERCIAL

## 2022-03-25 LAB
ALBUMIN SERPL-MCNC: 3.1 G/DL (ref 3.4–5)
ALP SERPL-CCNC: 54 U/L (ref 40–150)
ALT SERPL W P-5'-P-CCNC: 16 U/L (ref 0–70)
ANION GAP SERPL CALCULATED.3IONS-SCNC: 2 MMOL/L (ref 3–14)
AST SERPL W P-5'-P-CCNC: 34 U/L (ref 0–45)
BILIRUB SERPL-MCNC: 0.6 MG/DL (ref 0.2–1.3)
BUN SERPL-MCNC: 25 MG/DL (ref 7–30)
CA-I BLD-MCNC: 4.6 MG/DL (ref 4.4–5.2)
CALCIUM SERPL-MCNC: 8.2 MG/DL (ref 8.5–10.1)
CHLORIDE BLD-SCNC: 109 MMOL/L (ref 94–109)
CO2 SERPL-SCNC: 29 MMOL/L (ref 20–32)
CREAT SERPL-MCNC: 1.19 MG/DL (ref 0.66–1.25)
ERYTHROCYTE [DISTWIDTH] IN BLOOD BY AUTOMATED COUNT: 15.2 % (ref 10–15)
GFR SERPL CREATININE-BSD FRML MDRD: 67 ML/MIN/1.73M2
GLUCOSE BLD-MCNC: 153 MG/DL (ref 70–99)
GLUCOSE BLDC GLUCOMTR-MCNC: 128 MG/DL (ref 70–99)
GLUCOSE BLDC GLUCOMTR-MCNC: 133 MG/DL (ref 70–99)
GLUCOSE BLDC GLUCOMTR-MCNC: 133 MG/DL (ref 70–99)
GLUCOSE BLDC GLUCOMTR-MCNC: 137 MG/DL (ref 70–99)
GLUCOSE BLDC GLUCOMTR-MCNC: 142 MG/DL (ref 70–99)
GLUCOSE BLDC GLUCOMTR-MCNC: 145 MG/DL (ref 70–99)
GLUCOSE BLDC GLUCOMTR-MCNC: 147 MG/DL (ref 70–99)
GLUCOSE BLDC GLUCOMTR-MCNC: 149 MG/DL (ref 70–99)
GLUCOSE BLDC GLUCOMTR-MCNC: 151 MG/DL (ref 70–99)
GLUCOSE BLDC GLUCOMTR-MCNC: 154 MG/DL (ref 70–99)
HCT VFR BLD AUTO: 31.6 % (ref 40–53)
HGB BLD-MCNC: 9.9 G/DL (ref 13.3–17.7)
MAGNESIUM SERPL-MCNC: 2.4 MG/DL (ref 1.6–2.3)
MCH RBC QN AUTO: 29.7 PG (ref 26.5–33)
MCHC RBC AUTO-ENTMCNC: 31.3 G/DL (ref 31.5–36.5)
MCV RBC AUTO: 95 FL (ref 78–100)
PHOSPHATE SERPL-MCNC: 4.7 MG/DL (ref 2.5–4.5)
PLATELET # BLD AUTO: 174 10E3/UL (ref 150–450)
POTASSIUM BLD-SCNC: 5 MMOL/L (ref 3.4–5.3)
PROT SERPL-MCNC: 5.9 G/DL (ref 6.8–8.8)
RBC # BLD AUTO: 3.33 10E6/UL (ref 4.4–5.9)
SODIUM SERPL-SCNC: 140 MMOL/L (ref 133–144)
WBC # BLD AUTO: 16.2 10E3/UL (ref 4–11)

## 2022-03-25 PROCEDURE — 200N000001 HC R&B ICU

## 2022-03-25 PROCEDURE — 82330 ASSAY OF CALCIUM: CPT | Performed by: SURGERY

## 2022-03-25 PROCEDURE — 93005 ELECTROCARDIOGRAM TRACING: CPT

## 2022-03-25 PROCEDURE — 80053 COMPREHEN METABOLIC PANEL: CPT | Performed by: SURGERY

## 2022-03-25 PROCEDURE — 250N000011 HC RX IP 250 OP 636: Performed by: SURGERY

## 2022-03-25 PROCEDURE — 250N000013 HC RX MED GY IP 250 OP 250 PS 637: Performed by: SURGERY

## 2022-03-25 PROCEDURE — 97530 THERAPEUTIC ACTIVITIES: CPT | Mod: GP | Performed by: PHYSICAL THERAPIST

## 2022-03-25 PROCEDURE — 258N000003 HC RX IP 258 OP 636: Performed by: SURGERY

## 2022-03-25 PROCEDURE — 83735 ASSAY OF MAGNESIUM: CPT | Performed by: SURGERY

## 2022-03-25 PROCEDURE — 94799 UNLISTED PULMONARY SVC/PX: CPT

## 2022-03-25 PROCEDURE — 97161 PT EVAL LOW COMPLEX 20 MIN: CPT | Mod: GP | Performed by: PHYSICAL THERAPIST

## 2022-03-25 PROCEDURE — 258N000003 HC RX IP 258 OP 636: Performed by: NURSE PRACTITIONER

## 2022-03-25 PROCEDURE — 93010 ELECTROCARDIOGRAM REPORT: CPT | Performed by: INTERNAL MEDICINE

## 2022-03-25 PROCEDURE — 999N000157 HC STATISTIC RCP TIME EA 10 MIN

## 2022-03-25 PROCEDURE — 999N000156 HC STATISTIC RCP CONSULT EA 30 MIN

## 2022-03-25 PROCEDURE — 71045 X-RAY EXAM CHEST 1 VIEW: CPT

## 2022-03-25 PROCEDURE — 85027 COMPLETE CBC AUTOMATED: CPT | Performed by: SURGERY

## 2022-03-25 PROCEDURE — 84100 ASSAY OF PHOSPHORUS: CPT | Performed by: SURGERY

## 2022-03-25 RX ORDER — CEFAZOLIN SODIUM 2 G/100ML
2 INJECTION, SOLUTION INTRAVENOUS EVERY 8 HOURS
Status: COMPLETED | OUTPATIENT
Start: 2022-03-25 | End: 2022-03-25

## 2022-03-25 RX ADMIN — POTASSIUM CHLORIDE, DEXTROSE MONOHYDRATE AND SODIUM CHLORIDE: 150; 5; 450 INJECTION, SOLUTION INTRAVENOUS at 22:34

## 2022-03-25 RX ADMIN — OXYCODONE HYDROCHLORIDE 5 MG: 5 TABLET ORAL at 18:20

## 2022-03-25 RX ADMIN — PANTOPRAZOLE SODIUM 40 MG: 40 TABLET, DELAYED RELEASE ORAL at 07:56

## 2022-03-25 RX ADMIN — ATORVASTATIN CALCIUM 80 MG: 40 TABLET, FILM COATED ORAL at 07:56

## 2022-03-25 RX ADMIN — PHENYLEPHRINE HYDROCHLORIDE 0.2 MCG/KG/MIN: 10 INJECTION INTRAVENOUS at 12:13

## 2022-03-25 RX ADMIN — ASPIRIN 81 MG CHEWABLE TABLET 162 MG: 81 TABLET CHEWABLE at 07:56

## 2022-03-25 RX ADMIN — GABAPENTIN 100 MG: 100 CAPSULE ORAL at 22:13

## 2022-03-25 RX ADMIN — SODIUM CHLORIDE, POTASSIUM CHLORIDE, SODIUM LACTATE AND CALCIUM CHLORIDE 500 ML: 600; 310; 30; 20 INJECTION, SOLUTION INTRAVENOUS at 11:21

## 2022-03-25 RX ADMIN — ACETAMINOPHEN 975 MG: 325 TABLET, FILM COATED ORAL at 22:13

## 2022-03-25 RX ADMIN — HEPARIN SODIUM 5000 UNITS: 5000 INJECTION, SOLUTION INTRAVENOUS; SUBCUTANEOUS at 22:13

## 2022-03-25 RX ADMIN — ACETAMINOPHEN 975 MG: 325 TABLET, FILM COATED ORAL at 06:02

## 2022-03-25 RX ADMIN — CEFAZOLIN SODIUM 2 G: 2 INJECTION, SOLUTION INTRAVENOUS at 14:02

## 2022-03-25 RX ADMIN — ACETAMINOPHEN 975 MG: 325 TABLET, FILM COATED ORAL at 14:02

## 2022-03-25 RX ADMIN — CEFAZOLIN SODIUM 2 G: 2 INJECTION, SOLUTION INTRAVENOUS at 04:48

## 2022-03-25 RX ADMIN — SENNOSIDES AND DOCUSATE SODIUM 1 TABLET: 50; 8.6 TABLET ORAL at 07:56

## 2022-03-25 RX ADMIN — HEPARIN SODIUM 5000 UNITS: 5000 INJECTION, SOLUTION INTRAVENOUS; SUBCUTANEOUS at 14:02

## 2022-03-25 RX ADMIN — OXYCODONE HYDROCHLORIDE 5 MG: 5 TABLET ORAL at 06:02

## 2022-03-25 RX ADMIN — POLYETHYLENE GLYCOL 3350 17 G: 17 POWDER, FOR SOLUTION ORAL at 07:56

## 2022-03-25 RX ADMIN — SENNOSIDES AND DOCUSATE SODIUM 1 TABLET: 50; 8.6 TABLET ORAL at 20:03

## 2022-03-25 RX ADMIN — CHLORHEXIDINE GLUCONATE 15 ML: 1.2 SOLUTION ORAL at 07:56

## 2022-03-25 ASSESSMENT — ACTIVITIES OF DAILY LIVING (ADL)
ADLS_ACUITY_SCORE: 6
ADLS_ACUITY_SCORE: 7
ADLS_ACUITY_SCORE: 7
ADLS_ACUITY_SCORE: 6
ADLS_ACUITY_SCORE: 7
ADLS_ACUITY_SCORE: 6
ADLS_ACUITY_SCORE: 7
ADLS_ACUITY_SCORE: 6
ADLS_ACUITY_SCORE: 7
ADLS_ACUITY_SCORE: 6
ADLS_ACUITY_SCORE: 7
ADLS_ACUITY_SCORE: 6
ADLS_ACUITY_SCORE: 7
ADLS_ACUITY_SCORE: 6
ADLS_ACUITY_SCORE: 7
ADLS_ACUITY_SCORE: 6
ADLS_ACUITY_SCORE: 7
ADLS_ACUITY_SCORE: 7

## 2022-03-25 NOTE — PLAN OF CARE
Goal Outcome Evaluation:        Neuro: AOx4. Pain controlled  CV: SR MAP goal 65 and SBP goal of under 140.   Respiratory: 3L oxymask   GI/: Bowel sounds hypoactive. Urine output 60-75mls every 2 hours  Skin: Midline sternal incision. Bilateral graft sites.   Activity: Up to chair with assist of 2.   Diet: clear liquids  Drips: CURT @ 0.2 for blood pressure goals  Other: MT x2 and Pleural x1 with modest output   Plan: Wean off vasoactive drips. Possible transfer.

## 2022-03-25 NOTE — PROGRESS NOTES
[facility] RCAT Note    Date:3/25/22  Admission Diagnosis:S/p CABG x3  Pulmonary History:Current smoker  Home Nebulizer/ MDI Use:None  Home Oxygen Use:None  Acuity Level (from RT Assessment flow sheet):3    Aerosol Therapy Initiated:Duoneb Q4prn per MD order      Pulmonary Hygiene Initiated:Aerobika TID per RCAT protocol. Pt has a weak non productive cough.      Volume Expansion Therapy Initiated:IS at bedside. Pt is achieving 750 ml on IS with fair pt effort.      Current Oxygen Requirement:3L NC  Current SpO2:95%    Re-evaluation date:3/28/22    See 'RT Assessments' flow sheet for patient assessment scoring and Acuity Level Details.  Kamryn Rojas, RT

## 2022-03-25 NOTE — PROGRESS NOTES
[facility]  Cardiovascular and Thoracic Surgery Daily Note          Assessment and Plan:   Roger Bonilla is a 66 year old gentleman admitted with shortness of breath found to have multivessel coronary artery disease and also severe right carotid disease. His case was discussed with the Heart and vascular team. We offered the patient right carotid endarterectomy followed by CAB.    PMH includes: CAD, HLD, DM2, HTN, carotid stenosis  Most recent echocardiogram demonstrates LVEF 60-65%, normal RV function, no valvulopathies     POD#1 s/p   1) Coronary artery bypass grafting x 3.              - Left internal mammary artery to left anterior descending artery              - Reversed saphenous vein graft to right posterior descending artery              - Reversed saphenous vein graft to obtuse marginal artery   2) Endoscopic vein harvest left and right lower extremity  3) Transesophageal echocardiogram on 3/24/22 with Dr. Glenna Waters    CVS:   Severe multivessel CAD s/p CABG x2  HTN  HLD  Carotid Stenosis s/p right carotid endarterectomy  Currently on phenyl gtt to maintain MAP >65, in NSR rate 80-90s  - Titrate pressor to maintain MAP >65, SBP <140  [PTA meds on hold: Imdur, lisinopril, toprol]  -  mg daily  - Atorvastatin 80 mg daily  - Defer BB given need for pressor  - 500 mL LR bolus x1 for orthostatic hypotension  - CTs in to suction with ssang output, leave in; cap TPWs    Resp:   Extubated POD 0, now saturating well on 3 LPM cannula  - Titrate O2 to maintain sats >92%  - Pulm hygiene    Neuro:    Acute postoperative pain  Carotid stenosis s/p right carotid endarterectomy  - Well controlled with current regimen    Renal/Electrolytes:   Creat stable at 1.19, no weight today  - No diuresis today  - Strict I/O, daily weights  - Avoid/limit nephrotoxins as able  - Replete lytes per protocol    GI/Nutrition:    GERD  - ADAT  - Continue bowel regimen, no BM yet  - PTA omeprazole on hold  -  "PPI    :    Peter in place for strict I/O    Endo:   Stress induced hyperglycemia  DM2  Obesity  A1c 6.6%  [PTA meds on hold: metformin]  - Continue insulin gtt for 48 hours, then transition to sliding scale insulin tomorrow  - Goal BG <180 for optimal healing    ID:   Stress induced leukocytosis  WBC 16.2, tmax 99.9; no s/sx infection  - Completing perioperative ABX  - Continue to monitor fever curve, inflammatory markers, WBC    Heme:   Acute blood loss anemia  Hgb stable at 9.9, no s/sx bleeding  - CBC in AM  - Goal hgb >7    -Proph: PPI, subcutaneous heparin, SCDs  -Dispo: Continue in ICU while on pressors          Interval History:   No acute events overnight. States pain is well managed on current regimen, slept fairly well. Breathing is stable on NC. Tolerating clears, is not yet passing flatus, noBM. Denies chest pain, palpitations, dizziness, syncopal symptoms, chills, myalgias, sternal popping/clicking. Remains on phenyl gtt with orthostatic changes this AM.         Medications:       acetaminophen  975 mg Oral Q8H     aspirin  162 mg Oral or NG Tube Daily     atorvastatin  80 mg Oral QAM     ceFAZolin  2 g Intravenous Q8H     chlorhexidine  15 mL Mouth/Throat Q12H     gabapentin  100 mg Oral At Bedtime     heparin ANTICOAGULANT  5,000 Units Subcutaneous Q8H     lactated ringers  250 mL Intravenous Once     pantoprazole  40 mg Oral or NG Tube Daily    Or     pantoprazole  40 mg Oral Daily     polyethylene glycol  17 g Oral Daily     senna-docusate  1 tablet Oral BID     sodium chloride (PF)  3 mL Intracatheter Q8H             Physical Exam:   Vitals were reviewed  Blood pressure 122/80, pulse 87, temperature 99.7  F (37.6  C), resp. rate 15, height 1.676 m (5' 6\"), weight 110.2 kg (243 lb), SpO2 98 %.  Rhythm: NSR    Lungs: CTA/dim bases, +O2    Cardiovascular: S1, S2, RRR, no m/r/g    Abdomen: S/NT/ND, rare BS    Extremeties: Mild edema, non pitting, warm and well perfused    Incision: LINDA    CT: in to " suction, ssang output, no air leak or crepitus    Weight:   Vitals:    03/24/22 0621   Weight: 110.2 kg (243 lb)            Data:   Labs:   Lab Results   Component Value Date    WBC 16.2 03/25/2022    WBC 12.4 07/01/2021     Lab Results   Component Value Date    RBC 3.33 03/25/2022    RBC 4.36 07/01/2021     Lab Results   Component Value Date    HGB 9.9 03/25/2022    HGB 14.1 07/01/2021     Lab Results   Component Value Date    HCT 31.6 03/25/2022    HCT 42.9 07/01/2021     No components found for: MCT  Lab Results   Component Value Date    MCV 95 03/25/2022    MCV 98 07/01/2021     Lab Results   Component Value Date    MCH 29.7 03/25/2022    MCH 32.3 07/01/2021     Lab Results   Component Value Date    MCHC 31.3 03/25/2022    MCHC 32.9 07/01/2021     Lab Results   Component Value Date    RDW 15.2 03/25/2022    RDW 13.7 07/01/2021     Lab Results   Component Value Date     03/25/2022     07/01/2021       Last Basic Metabolic Panel:  Lab Results   Component Value Date     03/25/2022     07/01/2021      Lab Results   Component Value Date    POTASSIUM 5.0 03/25/2022    POTASSIUM 4.6 07/01/2021     Lab Results   Component Value Date    CHLORIDE 109 03/25/2022    CHLORIDE 105 07/01/2021     Lab Results   Component Value Date    SUE 8.2 03/25/2022    SUE 9.3 07/01/2021     Lab Results   Component Value Date    CO2 29 03/25/2022    CO2 27 07/01/2021     Lab Results   Component Value Date    BUN 25 03/25/2022    BUN 13 07/01/2021     Lab Results   Component Value Date    CR 1.19 03/25/2022    CR 1.09 07/01/2021     Lab Results   Component Value Date     03/25/2022     03/25/2022    GLC 85 07/01/2021       Recent Results (from the past 24 hour(s))   XR Abdomen Port 1 View    Narrative    XR ABDOMEN PORT 1 VIEWS 3/24/2022 3:12 PM    HISTORY: Check NG/OG tube placement    COMPARISON: None.      Impression    IMPRESSION: Enteric tube in good position in the stomach.    NAINA JON MD          SYSTEM ID:  [id number]   XR Chest Port 1 View    Narrative    CHEST ONE VIEW PORTABLE  3/24/2022 3:13 PM       INDICATION: Postop cardiovascular thoracic surgery.    COMPARISON: None.      Impression    IMPRESSION: The left internal jugular Baggs-Christal catheter is coiled in  the right atrium and ventricle. Tip appears to be in the right  ventricle. Endotracheal tube tip is at the luz. Enteric tube  courses below the diaphragm. Mediastinal and left chest tubes present.  No pneumothorax. Mild left basilar atelectasis. Lungs are otherwise  clear.    NAINA OJN MD         SYSTEM ID:  [id number]   XR Chest Port 1 View    Narrative    CHEST ONE VIEW PORTABLE  3/24/2022 3:17 PM       INDICATION: PA catheter placement.    COMPARISON: 3/24/2022       Impression    IMPRESSION: The pulmonary artery catheter has been partially pulled  back. The catheter still loops once in the right ventricle with tip in  the right ventricle. Remainder unchanged.    NAINA JON MD         SYSTEM ID:  [id number]       SAL Fuchs, ACN-AG, CCRN  Nurse Practitioner  Cardiothoracic Surgery  Pager: [phone]

## 2022-03-25 NOTE — PROGRESS NOTES
"   03/25/22 0830   Quick Adds   Type of Visit Initial PT Evaluation   Living Environment   People in Home spouse  (And his dog )   Current Living Arrangements house   Home Accessibility stairs to enter home   Number of Stairs, Main Entrance 2   Stair Railings, Main Entrance railings safe and in good condition   Transportation Anticipated car, drives self;family or friend will provide  ([place] does not drive, his son would drive him if needed.)   Self-Care   Usual Activity Tolerance good   Current Activity Tolerance fair   Regular Exercise No   Equipment Currently Used at Home none   Fall history within last six months no   Number of times patient has fallen within last six months   (1 Fall in July of '21)   Activity/Exercise/Self-Care Comment Works full time when able, lately \"about half time,\" as a . 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   General Information   Onset of Illness/Injury or Date of Surgery 03/24/22   Referring Physician Skylar Pardo MD   Patient/Family Therapy Goals Statement (PT) Home. REports his son will stay with him for a bit after surgery. His son is from [place]   Pertinent History of Current Problem (include personal factors and/or comorbidities that impact the POC) 65 yo male POD #1 CABG x 3. Pt with previous s/p right carotid endarterectomy for high-grade asymptomatic right carotid stenosis earlier in the month.   Existing Precautions/Restrictions cardiac;fall;oxygen therapy device and L/min  (2L via NC)   Cognition   Affect/Mental Status (Cognition) WNL   Orientation Status (Cognition) oriented x 4   Cognitive Status Comments Somewhat stoic; reports \"I just don't really talk much.\"   Pain Assessment   Patient Currently in Pain Yes, see Vital Sign flowsheet  (L flank/rib pain with deep breaths and mobility)   Integumentary/Edema   Integumentary/Edema Comments Mediastianla nd plural CT, single chamber, da silva. IV. Cordis.    Posture    Posture Forward head " "position;Protracted shoulders;Kyphosis   Range of Motion (ROM)   ROM Comment B LEs puffy with edema, R meadial incision at the calf with blood drips. Reports the feel \"tight.\" B LEs WF: as obs via functional mobility`   Strength (Manual Muscle Testing)   Strength Comments Demonstrates antigravity strength wtih mobility, but does fatigue quickly. Soft BP.   Bed Mobility   Comment, (Bed Mobility) Sup>sit via sit pivot /partial roll with Min A.   Transfers   Comment, (Transfers) Sit<>Stand Min A   Gait/Stairs (Locomotion)   Comment, (Gait/Stairs) Bedside gait with Min A secondary to decreased balance and weakness.   Balance   Balance Comments Sitting balance, static good. Dynamic needs A for trun k support, standing blaance improves with UE support.   Sensory Examination   Sensory Perception Comments B LEs intact to light touch. Reports tingling in the L arm, notes indicate tingling in the R LE as well.    Clinical Impression   Criteria for Skilled Therapeutic Intervention Yes, treatment indicated   PT Diagnosis (PT) Impaired functional activity tolerance   Influenced by the following impairments Weakness, fatigue, impaired balance, sternal precautions, fall risk   Functional limitations due to impairments Decreased functional independence with mobility   Clinical Presentation (PT Evaluation Complexity) Stable/Uncomplicated   Clinical Presentation Rationale see MR   Clinical Decision Making (Complexity) low complexity   Planned Therapy Interventions (PT) balance training;bed mobility training;gait training;home exercise program;neuromuscular re-education;patient/family education;ROM (range of motion);stair training;strengthening;stretching;thermotherapy;transfer training   Risk & Benefits of therapy have been explained evaluation/treatment results reviewed;care plan/treatment goals reviewed;risks/benefits reviewed;current/potential barriers reviewed;participants voiced agreement with care plan;participants " "included;patient   PT Discharge Planning   PT Discharge Recommendation (DC Rec) home with assist;home with outpatient cardiac rehab  (Lives in [facility], reports [place] is the closest [facility])   PT Rationale for DC Rec Anticipate pt to be SBA or less with bed mobility, transfers and gait by time of dicharge. Pt reports his Son, from [place], will stay with him \"for awhle\" at discharge. Pt reports his wife is not able to assist at home. Pt does drive, his wife does not. Will benefit from OP CR phase II for continued close monitoring of VS with activity progression and VS assessment.   PT Brief overview of current status Sup>sit Mod A. Sit<>stand Min A. Stand pivot with Min A.    Total Evaluation Time   Total Evaluation Time (Minutes) 10   Physical Therapy Goals   PT Frequency 2x/day   PT Predicated Duration/Target Date for Goal Attainment 03/31/22   PT Goals Bed Mobility;Cardiac Phase 1   PT: Bed Mobility Supervision/stand-by assist;Supine to/from sit;Within precautions  (sternal; pt has a bed that elevates at the head)   PT: Understanding of cardiac education to maximize quality of life, condition management, and health outcomes Patient   PT: Perform aerobic activity with stable cardiovascular response continuous;10 minutes;ambulation;NuStep;treadmill   PT: Functional/aerobic ambulation tolerance with stable cardiovascular response in order to return to home and community environment Supervision/SBA;Rolling walker;Greater than 300 feet   PT: Navigation of stairs simulating home set up with stable cardiovascular response in order to return to home and community environment Supervision/SBA;4 stairs;Rail on both sides     "

## 2022-03-25 NOTE — PROGRESS NOTES
Patients arterial line dampening and having poor waveform. Will titrate medications based off of NIBPs.

## 2022-03-25 NOTE — PLAN OF CARE
Pt arrived on floor @ 1414, extubated @ 1652, and up to the chair at 1845. Neuro intact, no signs of delirium. Complaints of some numbness/tingling in the lower extremities. Head tremors (baseline). Pressors titrated to keep SBP <140. Pacer capped at bedside. Adequate output from chest tubes. Tolerating 5L nc. Bowel regimen started. Peter with good UOP. Periorbital edema noted. Labs unremarkable. Wife updated at bedside. Will continue to monitor.

## 2022-03-25 NOTE — PROVIDER NOTIFICATION
Low BP and low UO, increasing Phenylephrine,  CV Surg paged and fluids ordered. Continuing to monitor.     Sabrina Cortés RN

## 2022-03-25 NOTE — PROVIDER NOTIFICATION
MD Notification    Notified Person: MD    Notified Person Name: Jt     Notification Date/Time: 3/24 @ 2055    Notification Interaction:  page    Purpose of Notification: Pt complaining of numbness and tingling in RLE. CV surg PA mentioned to leave wrap on on RLE for 24 hrs. Would like order to take off if able.     Orders Received: Verbal order to unwrap RLE    Comments:

## 2022-03-25 NOTE — PROGRESS NOTES
Patient alert and oriented, flat affect, John turned off, 500 ml bolus given to help with pressures and low UO. Both improved with bolus. 3 L NC, pain managed well with Tylenol and oxycodone. Pt has little apatite and only had popsicles. Will be staying in the ICU tonight. Continue to monitor.     Sabrina Cortés RN

## 2022-03-26 ENCOUNTER — APPOINTMENT (OUTPATIENT)
Dept: PHYSICAL THERAPY | Facility: CLINIC | Age: 67
DRG: 235 | End: 2022-03-26
Attending: SURGERY
Payer: COMMERCIAL

## 2022-03-26 LAB
ALBUMIN SERPL-MCNC: 2.6 G/DL (ref 3.4–5)
ALP SERPL-CCNC: 55 U/L (ref 40–150)
ALT SERPL W P-5'-P-CCNC: 10 U/L (ref 0–70)
ANION GAP SERPL CALCULATED.3IONS-SCNC: 2 MMOL/L (ref 3–14)
AST SERPL W P-5'-P-CCNC: 18 U/L (ref 0–45)
BILIRUB SERPL-MCNC: 0.9 MG/DL (ref 0.2–1.3)
BUN SERPL-MCNC: 22 MG/DL (ref 7–30)
CA-I BLD-MCNC: 4.8 MG/DL (ref 4.4–5.2)
CALCIUM SERPL-MCNC: 8.5 MG/DL (ref 8.5–10.1)
CHLORIDE BLD-SCNC: 104 MMOL/L (ref 94–109)
CO2 SERPL-SCNC: 30 MMOL/L (ref 20–32)
CREAT SERPL-MCNC: 1.12 MG/DL (ref 0.66–1.25)
ERYTHROCYTE [DISTWIDTH] IN BLOOD BY AUTOMATED COUNT: 14.9 % (ref 10–15)
GFR SERPL CREATININE-BSD FRML MDRD: 72 ML/MIN/1.73M2
GLUCOSE BLD-MCNC: 134 MG/DL (ref 70–99)
GLUCOSE BLDC GLUCOMTR-MCNC: 119 MG/DL (ref 70–99)
GLUCOSE BLDC GLUCOMTR-MCNC: 121 MG/DL (ref 70–99)
GLUCOSE BLDC GLUCOMTR-MCNC: 127 MG/DL (ref 70–99)
GLUCOSE BLDC GLUCOMTR-MCNC: 135 MG/DL (ref 70–99)
GLUCOSE BLDC GLUCOMTR-MCNC: 141 MG/DL (ref 70–99)
GLUCOSE BLDC GLUCOMTR-MCNC: 173 MG/DL (ref 70–99)
HCT VFR BLD AUTO: 27.8 % (ref 40–53)
HGB BLD-MCNC: 8.5 G/DL (ref 13.3–17.7)
MAGNESIUM SERPL-MCNC: 2.3 MG/DL (ref 1.6–2.3)
MCH RBC QN AUTO: 29.6 PG (ref 26.5–33)
MCHC RBC AUTO-ENTMCNC: 30.6 G/DL (ref 31.5–36.5)
MCV RBC AUTO: 97 FL (ref 78–100)
PHOSPHATE SERPL-MCNC: 3.8 MG/DL (ref 2.5–4.5)
PLATELET # BLD AUTO: 125 10E3/UL (ref 150–450)
POTASSIUM BLD-SCNC: 4.5 MMOL/L (ref 3.4–5.3)
PROT SERPL-MCNC: 5.7 G/DL (ref 6.8–8.8)
RBC # BLD AUTO: 2.87 10E6/UL (ref 4.4–5.9)
SODIUM SERPL-SCNC: 136 MMOL/L (ref 133–144)
WBC # BLD AUTO: 14.3 10E3/UL (ref 4–11)

## 2022-03-26 PROCEDURE — 999N000128 HC STATISTIC PERIPHERAL IV START W/O US GUIDANCE

## 2022-03-26 PROCEDURE — 97530 THERAPEUTIC ACTIVITIES: CPT | Mod: GP | Performed by: PHYSICAL THERAPIST

## 2022-03-26 PROCEDURE — 250N000013 HC RX MED GY IP 250 OP 250 PS 637: Performed by: SURGERY

## 2022-03-26 PROCEDURE — 250N000011 HC RX IP 250 OP 636: Performed by: SURGERY

## 2022-03-26 PROCEDURE — 250N000011 HC RX IP 250 OP 636: Performed by: PHYSICIAN ASSISTANT

## 2022-03-26 PROCEDURE — 120N000001 HC R&B MED SURG/OB

## 2022-03-26 PROCEDURE — 82330 ASSAY OF CALCIUM: CPT | Performed by: SURGERY

## 2022-03-26 PROCEDURE — 80053 COMPREHEN METABOLIC PANEL: CPT | Performed by: SURGERY

## 2022-03-26 PROCEDURE — 250N000013 HC RX MED GY IP 250 OP 250 PS 637: Performed by: PHYSICIAN ASSISTANT

## 2022-03-26 PROCEDURE — 250N000012 HC RX MED GY IP 250 OP 636 PS 637: Performed by: PHYSICIAN ASSISTANT

## 2022-03-26 PROCEDURE — 83735 ASSAY OF MAGNESIUM: CPT | Performed by: SURGERY

## 2022-03-26 PROCEDURE — 85027 COMPLETE CBC AUTOMATED: CPT | Performed by: SURGERY

## 2022-03-26 PROCEDURE — 97110 THERAPEUTIC EXERCISES: CPT | Mod: GP | Performed by: PHYSICAL THERAPIST

## 2022-03-26 PROCEDURE — 84100 ASSAY OF PHOSPHORUS: CPT | Performed by: SURGERY

## 2022-03-26 RX ORDER — METOPROLOL TARTRATE 25 MG/1
25 TABLET, FILM COATED ORAL 2 TIMES DAILY
Status: DISCONTINUED | OUTPATIENT
Start: 2022-03-26 | End: 2022-03-26

## 2022-03-26 RX ORDER — DEXTROSE MONOHYDRATE 25 G/50ML
25-50 INJECTION, SOLUTION INTRAVENOUS
Status: DISCONTINUED | OUTPATIENT
Start: 2022-03-26 | End: 2022-03-26

## 2022-03-26 RX ORDER — GABAPENTIN 300 MG/1
300 CAPSULE ORAL 3 TIMES DAILY
Status: DISCONTINUED | OUTPATIENT
Start: 2022-03-26 | End: 2022-03-29

## 2022-03-26 RX ORDER — NICOTINE POLACRILEX 4 MG
15-30 LOZENGE BUCCAL
Status: DISCONTINUED | OUTPATIENT
Start: 2022-03-26 | End: 2022-03-26

## 2022-03-26 RX ORDER — FUROSEMIDE 10 MG/ML
40 INJECTION INTRAMUSCULAR; INTRAVENOUS DAILY
Status: DISCONTINUED | OUTPATIENT
Start: 2022-03-26 | End: 2022-03-30 | Stop reason: HOSPADM

## 2022-03-26 RX ADMIN — CHLORHEXIDINE GLUCONATE 15 ML: 1.2 SOLUTION ORAL at 09:07

## 2022-03-26 RX ADMIN — HEPARIN SODIUM 5000 UNITS: 5000 INJECTION, SOLUTION INTRAVENOUS; SUBCUTANEOUS at 14:04

## 2022-03-26 RX ADMIN — OXYCODONE HYDROCHLORIDE 5 MG: 5 TABLET ORAL at 16:23

## 2022-03-26 RX ADMIN — METHOCARBAMOL 500 MG: 500 TABLET ORAL at 09:05

## 2022-03-26 RX ADMIN — GABAPENTIN 300 MG: 300 CAPSULE ORAL at 16:00

## 2022-03-26 RX ADMIN — ACETAMINOPHEN 975 MG: 325 TABLET, FILM COATED ORAL at 14:04

## 2022-03-26 RX ADMIN — ATORVASTATIN CALCIUM 80 MG: 40 TABLET, FILM COATED ORAL at 09:07

## 2022-03-26 RX ADMIN — GABAPENTIN 300 MG: 300 CAPSULE ORAL at 21:12

## 2022-03-26 RX ADMIN — OXYCODONE HYDROCHLORIDE 5 MG: 5 TABLET ORAL at 02:13

## 2022-03-26 RX ADMIN — HEPARIN SODIUM 5000 UNITS: 5000 INJECTION, SOLUTION INTRAVENOUS; SUBCUTANEOUS at 21:13

## 2022-03-26 RX ADMIN — ACETAMINOPHEN 975 MG: 325 TABLET, FILM COATED ORAL at 06:45

## 2022-03-26 RX ADMIN — PANTOPRAZOLE SODIUM 40 MG: 40 TABLET, DELAYED RELEASE ORAL at 09:07

## 2022-03-26 RX ADMIN — METOPROLOL TARTRATE 25 MG: 25 TABLET, FILM COATED ORAL at 09:39

## 2022-03-26 RX ADMIN — ACETAMINOPHEN 975 MG: 325 TABLET, FILM COATED ORAL at 22:51

## 2022-03-26 RX ADMIN — FUROSEMIDE 40 MG: 10 INJECTION, SOLUTION INTRAMUSCULAR; INTRAVENOUS at 09:39

## 2022-03-26 RX ADMIN — OXYCODONE HYDROCHLORIDE 5 MG: 5 TABLET ORAL at 09:06

## 2022-03-26 RX ADMIN — POLYETHYLENE GLYCOL 3350 17 G: 17 POWDER, FOR SOLUTION ORAL at 09:07

## 2022-03-26 RX ADMIN — METHOCARBAMOL 500 MG: 500 TABLET ORAL at 16:22

## 2022-03-26 RX ADMIN — SENNOSIDES AND DOCUSATE SODIUM 1 TABLET: 50; 8.6 TABLET ORAL at 09:07

## 2022-03-26 RX ADMIN — METOPROLOL TARTRATE 75 MG: 50 TABLET, FILM COATED ORAL at 21:12

## 2022-03-26 RX ADMIN — HEPARIN SODIUM 5000 UNITS: 5000 INJECTION, SOLUTION INTRAVENOUS; SUBCUTANEOUS at 06:45

## 2022-03-26 RX ADMIN — INSULIN ASPART 1 UNITS: 100 INJECTION, SOLUTION INTRAVENOUS; SUBCUTANEOUS at 13:10

## 2022-03-26 RX ADMIN — SENNOSIDES AND DOCUSATE SODIUM 1 TABLET: 50; 8.6 TABLET ORAL at 21:13

## 2022-03-26 RX ADMIN — ASPIRIN 81 MG CHEWABLE TABLET 162 MG: 81 TABLET CHEWABLE at 09:07

## 2022-03-26 ASSESSMENT — ACTIVITIES OF DAILY LIVING (ADL)
ADLS_ACUITY_SCORE: 7
ADLS_ACUITY_SCORE: 9
ADLS_ACUITY_SCORE: 7
ADLS_ACUITY_SCORE: 7
ADLS_ACUITY_SCORE: 9
ADLS_ACUITY_SCORE: 7
ADLS_ACUITY_SCORE: 9
ADLS_ACUITY_SCORE: 7
ADLS_ACUITY_SCORE: 9
ADLS_ACUITY_SCORE: 7
ADLS_ACUITY_SCORE: 9
ADLS_ACUITY_SCORE: 9
ADLS_ACUITY_SCORE: 7

## 2022-03-26 NOTE — PROGRESS NOTES
"NUTRITION ASSESSMENT      REASON FOR ASSESSMENT:  Cardiac Surgery Nutrition Consult    CURRENT DIET / INTAKE:  Regular diet  - Ordered 1 meal since surgery, pt stated it didn't go great.   - Feels \"better than this morning\".   - Pt and visitor have little to say during visit. Assisted them with finding a menu and teaching them how to order meals.      ANTHROPOMETRICS:   Ht: 5 ft 6 in   Admit Wt: 110.2 kg   IBW: 64.5 kg   BMI: 39.2 (171% IBW)     MALNUTRITION:  Patient does not meet two of the following criteria necessary for diagnosing malnutrition:  significant weight loss, reduced intake, subcutaneous fat loss, muscle loss or fluid retention. Nutrition Focused Physical Assessment (NFPA) not appropriate at this time.    NUTRITION DIAGNOSIS:   Inadequate oral intake r/t poor appetite and increased needs post op AEB patient has eaten just 1 meal since CABG x3 on 3/24.     INTERVENTIONS:    Nutrition Prescription:  Regular diet  Trial Gelatein Plus w/ dinner  Pt declined ensure     Implementation:  Nutrition Education (Content):  Discussed the importance of adequate nutrition post-op for healing and energy, emphasizing protein foods, and encouraged patient to order a protein food at each meal.    Goals:  Patient to consume ~75% at meals in the next 3 - 5 days    Follow Up/Monitoring (InPatient):  Food and Fluid intake -  Monitor for adequacy    Follow Up/Monitoring (OutPatient):  Patient will participate in out-patient cardiac rehab and attend nutrition classes during the program    Vidya Dunn RD, LD  [facility]  Pager: [phone]  Weekend Pager: [phone]    "

## 2022-03-26 NOTE — PROGRESS NOTES
[facility]  Cardiovascular and Thoracic Surgery Daily Note          Assessment and Plan:   POD#2 s/p CORONARY ARTERY BYPASS GRAFT x 3 (LIMA - LAD; SV - OM2; SV - PDA) WITH ENDOSCOPIC SAPHENOUS VEIN HARVEST ON BILATERAL LOWER EXTREMITY, AND ON CARDIOPULMONARY PUMP OXYGENATOR  (INTRAOPERATIVE TRANSESOPHAGEAL ECHOCARDIOGRAM BY ANESTHESIOLOGIST) by Dr. Glenna Waters  -CVS: HR: 90s, start BB this am (held due to pressor needs), SBP: 120s/60s, ASA, Statin, sub q heparin  -Resp: extubated per protocol, sating well on 3L NC, encourage IS  -Neuro: grossly intact, pain controlled with current regimen  -Renal: adequate uo, Crea: 1.12, up 2 kg above pre-op weight   -GI:  Continue bowel regimen  -: remove Peter later this am  -Endo: sliding scale insulin, pre-op A1C 6.6  -FEN: Na: 136, K: 4.5, Orders Placed This Encounter      Regular Diet Adult  -ID: WBC: 14.3, Temp (24hrs), Av.9  F (37.2  C), Min:98.1  F (36.7  C), Max:99.3  F (37.4  C), completing epri-op abx  -Heme: Hgb: 125, plt: 125, acute blood loss anemia, thrombocytopenia   -Proph: PCDs, ASA, BB, statin, sub q heparin  -Dispo: ICU-->St [address], continue therapies, encourage IS    Seen and discussed with Dr. Lancaster          Interval History:   Sitting up in bed, looking over menu for breakfast, pain controlled, breathing stable, tolerating diet, will remove CTs later this am          Medications:       acetaminophen  975 mg Oral Q8H     aspirin  162 mg Oral or NG Tube Daily     atorvastatin  80 mg Oral QAM     chlorhexidine  15 mL Mouth/Throat Q12H     furosemide  40 mg Intravenous Daily     gabapentin  300 mg Oral TID     heparin ANTICOAGULANT  5,000 Units Subcutaneous Q8H     insulin aspart  1-7 Units Subcutaneous TID AC     insulin aspart  1-5 Units Subcutaneous At Bedtime     lactated ringers  250 mL Intravenous Once     metoprolol tartrate  25 mg Oral BID     pantoprazole  40 mg Oral or NG Tube Daily    Or     pantoprazole  40 mg Oral Daily      "polyethylene glycol  17 g Oral Daily     senna-docusate  1 tablet Oral BID     sodium chloride (PF)  3 mL Intracatheter Q8H     @[facility]-@          Physical Exam:   Vitals were reviewed  Blood pressure 121/53, pulse 93, temperature 98.2  F (36.8  C), resp. rate 10, height 1.676 m (5' 6\"), weight 112.9 kg (248 lb 14.4 oz), SpO2 97 %.  Rhythm: NSR    Lungs: course    Cardiovascular: s1/s2, no m/r/g    Abdomen: s/nt/nd    Extremeties: no LE edema    Incision: cdi    CT: na    Weight:   Vitals:    03/24/22 0621 03/26/22 0400   Weight: 110.2 kg (243 lb) 112.9 kg (248 lb 14.4 oz)            Data:   Labs:   Lab Results   Component Value Date    WBC 14.3 03/26/2022    WBC 12.4 07/01/2021     Lab Results   Component Value Date    RBC 2.87 03/26/2022    RBC 4.36 07/01/2021     Lab Results   Component Value Date    HGB 8.5 03/26/2022    HGB 14.1 07/01/2021     Lab Results   Component Value Date    HCT 27.8 03/26/2022    HCT 42.9 07/01/2021     No components found for: MCT  Lab Results   Component Value Date    MCV 97 03/26/2022    MCV 98 07/01/2021     Lab Results   Component Value Date    MCH 29.6 03/26/2022    MCH 32.3 07/01/2021     Lab Results   Component Value Date    MCHC 30.6 03/26/2022    MCHC 32.9 07/01/2021     Lab Results   Component Value Date    RDW 14.9 03/26/2022    RDW 13.7 07/01/2021     Lab Results   Component Value Date     03/26/2022     07/01/2021       Last Basic Metabolic Panel:  Lab Results   Component Value Date     03/26/2022     07/01/2021      Lab Results   Component Value Date    POTASSIUM 4.5 03/26/2022    POTASSIUM 4.6 07/01/2021     Lab Results   Component Value Date    CHLORIDE 104 03/26/2022    CHLORIDE 105 07/01/2021     Lab Results   Component Value Date    SUE 8.5 03/26/2022    SUE 9.3 07/01/2021     Lab Results   Component Value Date    CO2 30 03/26/2022    CO2 27 07/01/2021     Lab Results   Component Value Date    BUN 22 03/26/2022    BUN 13 07/01/2021     Lab " Results   Component Value Date    CR 1.12 03/26/2022    CR 1.09 07/01/2021     Lab Results   Component Value Date     03/26/2022     03/26/2022    GLC 85 07/01/2021       Barby Horn PA-C  Pager #: [phone]

## 2022-03-26 NOTE — PROGRESS NOTES
Patient complains of left flank pain and increasing numbness in the left hand with pain. Oxy and robaxin given and helped some, CV Surg made aware. Chest tubes removed, Peter removed, internal jugular introducer removed. Patient ate breakfast and participated in theray before the left sided pain began. Transferred to [facility] after pain resolved and lines pulled. New PIVs placed. Continued numbness in his feet and left hand. CV Surg aware. Transferred with chart and all belongings.     Sabrina Cortés RN

## 2022-03-26 NOTE — PLAN OF CARE
Goal Outcome Evaluation:    Pt alert and oriented x4. Vital signs stable on room air. SBA, pt walked x1, up in chair x2. Tolerating regular diet. Lung sounds diminished. LLL crackles- IS encouraged. Bowel sounds normoactive, +flatus. Last BM before surgery. Peter out at 1300, DTV. Sternal incisions well approximated and ARIANA. Chest tube removal site with dressing in place, CDI. LLE harvest sites ARIANA. Pain managed with PRN oxycodone and robaxin. Tele NSR. K/Mag/Phos all WNL, rechecks in AM.

## 2022-03-26 NOTE — PLAN OF CARE
Neuro: Alert and orientedx4. Flat affect, appears unmotivated but is cooperative with cares.  CV: NSR. 3x chest tubes to one atrium with good output. Pacer wires on standby.  Respiratory: On 3-4L while asleep, can likely be weaned more when awake.   GI/: Peter in place, now having good urine output. No BM since admission.  Skin: Sternal incision, chest tube sites, BLE harvest sites all WDL.   Activity: Up with 2  Diet: Clear liquids advance as tolerated, poor appetite.   Drips: John off all night with stable blood pressures. Insulin drip at algorithm 1 in goal blood sugar range.   Plan: Likely transfer out of ICU today.

## 2022-03-27 ENCOUNTER — APPOINTMENT (OUTPATIENT)
Dept: OCCUPATIONAL THERAPY | Facility: CLINIC | Age: 67
DRG: 235 | End: 2022-03-27
Attending: PHYSICIAN ASSISTANT
Payer: COMMERCIAL

## 2022-03-27 ENCOUNTER — APPOINTMENT (OUTPATIENT)
Dept: PHYSICAL THERAPY | Facility: CLINIC | Age: 67
DRG: 235 | End: 2022-03-27
Attending: SURGERY
Payer: COMMERCIAL

## 2022-03-27 ENCOUNTER — APPOINTMENT (OUTPATIENT)
Dept: GENERAL RADIOLOGY | Facility: CLINIC | Age: 67
DRG: 235 | End: 2022-03-27
Attending: PHYSICIAN ASSISTANT
Payer: COMMERCIAL

## 2022-03-27 LAB
ALBUMIN SERPL-MCNC: 2.3 G/DL (ref 3.4–5)
ALP SERPL-CCNC: 59 U/L (ref 40–150)
ALT SERPL W P-5'-P-CCNC: 13 U/L (ref 0–70)
ANION GAP SERPL CALCULATED.3IONS-SCNC: 3 MMOL/L (ref 3–14)
AST SERPL W P-5'-P-CCNC: 17 U/L (ref 0–45)
BASE EXCESS BLDA CALC-SCNC: 1.2 MMOL/L (ref -9.6–2)
BASE EXCESS BLDA CALC-SCNC: 2.7 MMOL/L (ref -9.6–2)
BASE EXCESS BLDA CALC-SCNC: 3 MMOL/L (ref -9.6–2)
BASE EXCESS BLDA CALC-SCNC: 3.3 MMOL/L (ref -9.6–2)
BASE EXCESS BLDV CALC-SCNC: 4.7 MMOL/L (ref -8.1–1.9)
BILIRUB SERPL-MCNC: 0.5 MG/DL (ref 0.2–1.3)
BUN SERPL-MCNC: 27 MG/DL (ref 7–30)
CA-I BLD-MCNC: 4.2 MG/DL (ref 4.4–5.2)
CA-I BLD-MCNC: 4.3 MG/DL (ref 4.4–5.2)
CA-I BLD-MCNC: 4.3 MG/DL (ref 4.4–5.2)
CA-I BLD-MCNC: 4.6 MG/DL (ref 4.4–5.2)
CA-I BLD-MCNC: 4.7 MG/DL (ref 4.4–5.2)
CA-I BLD-MCNC: 5 MG/DL (ref 4.4–5.2)
CALCIUM SERPL-MCNC: 8.7 MG/DL (ref 8.5–10.1)
CHLORIDE BLD-SCNC: 104 MMOL/L (ref 94–109)
CO2 SERPL-SCNC: 28 MMOL/L (ref 20–32)
CREAT SERPL-MCNC: 1.15 MG/DL (ref 0.66–1.25)
ERYTHROCYTE [DISTWIDTH] IN BLOOD BY AUTOMATED COUNT: 15 % (ref 10–15)
GFR SERPL CREATININE-BSD FRML MDRD: 70 ML/MIN/1.73M2
GLUCOSE BLD-MCNC: 113 MG/DL (ref 70–99)
GLUCOSE BLD-MCNC: 124 MG/DL (ref 70–99)
GLUCOSE BLD-MCNC: 140 MG/DL (ref 70–99)
GLUCOSE BLD-MCNC: 146 MG/DL (ref 70–99)
GLUCOSE BLD-MCNC: 159 MG/DL (ref 70–99)
GLUCOSE BLD-MCNC: 163 MG/DL (ref 70–99)
GLUCOSE BLDC GLUCOMTR-MCNC: 117 MG/DL (ref 70–99)
GLUCOSE BLDC GLUCOMTR-MCNC: 125 MG/DL (ref 70–99)
GLUCOSE BLDC GLUCOMTR-MCNC: 131 MG/DL (ref 70–99)
GLUCOSE BLDC GLUCOMTR-MCNC: 188 MG/DL (ref 70–99)
HCO3 BLDA-SCNC: 27 MMOL/L (ref 21–28)
HCO3 BLDA-SCNC: 27 MMOL/L (ref 21–28)
HCO3 BLDA-SCNC: 28 MMOL/L (ref 21–28)
HCO3 BLDA-SCNC: 29 MMOL/L (ref 21–28)
HCO3 BLDV-SCNC: 30 MMOL/L (ref 21–28)
HCT VFR BLD AUTO: 27 % (ref 40–53)
HGB BLD-MCNC: 10.5 G/DL (ref 13.3–17.7)
HGB BLD-MCNC: 10.5 G/DL (ref 13.3–17.7)
HGB BLD-MCNC: 10.6 G/DL (ref 13.3–17.7)
HGB BLD-MCNC: 10.8 G/DL (ref 13.3–17.7)
HGB BLD-MCNC: 12.6 G/DL (ref 13.3–17.7)
HGB BLD-MCNC: 8.5 G/DL (ref 13.3–17.7)
LACTATE BLD-SCNC: 0.7 MMOL/L
LACTATE BLD-SCNC: 0.8 MMOL/L
LACTATE BLD-SCNC: 1.1 MMOL/L
MAGNESIUM SERPL-MCNC: 2.4 MG/DL (ref 1.6–2.3)
MCH RBC QN AUTO: 29.4 PG (ref 26.5–33)
MCHC RBC AUTO-ENTMCNC: 31.5 G/DL (ref 31.5–36.5)
MCV RBC AUTO: 93 FL (ref 78–100)
O2/TOTAL GAS SETTING VFR VENT: 100 %
O2/TOTAL GAS SETTING VFR VENT: 100 %
O2/TOTAL GAS SETTING VFR VENT: 80 %
PCO2 BLDA: 45 MM HG (ref 35–45)
PCO2 BLDA: 46 MM HG (ref 35–45)
PCO2 BLDA: 48 MM HG (ref 35–45)
PCO2 BLDA: 53 MM HG (ref 35–45)
PCO2 BLDV: 50 MM HG (ref 40–50)
PH BLDA: 7.35 [PH] (ref 7.35–7.45)
PH BLDA: 7.38 [PH] (ref 7.35–7.45)
PH BLDA: 7.39 [PH] (ref 7.35–7.45)
PH BLDA: 7.4 [PH] (ref 7.35–7.45)
PH BLDV: 7.39 [PH] (ref 7.32–7.43)
PHOSPHATE SERPL-MCNC: 3.5 MG/DL (ref 2.5–4.5)
PLATELET # BLD AUTO: 144 10E3/UL (ref 150–450)
PO2 BLDA: 332 MM HG (ref 80–105)
PO2 BLDA: 366 MM HG (ref 80–105)
PO2 BLDA: 375 MM HG (ref 80–105)
PO2 BLDA: 506 MM HG (ref 80–105)
PO2 BLDV: 44 MM HG (ref 25–47)
POTASSIUM BLD-SCNC: 4.4 MMOL/L (ref 3.4–5.3)
POTASSIUM BLD-SCNC: 4.4 MMOL/L (ref 3.5–5)
POTASSIUM BLD-SCNC: 4.4 MMOL/L (ref 3.5–5)
POTASSIUM BLD-SCNC: 4.8 MMOL/L (ref 3.5–5)
POTASSIUM BLD-SCNC: 4.8 MMOL/L (ref 3.5–5)
POTASSIUM BLD-SCNC: 5 MMOL/L (ref 3.5–5)
PROT SERPL-MCNC: 5.9 G/DL (ref 6.8–8.8)
RBC # BLD AUTO: 2.89 10E6/UL (ref 4.4–5.9)
SODIUM BLD-SCNC: 141 MMOL/L (ref 133–144)
SODIUM BLD-SCNC: 141 MMOL/L (ref 133–144)
SODIUM BLD-SCNC: 142 MMOL/L (ref 133–144)
SODIUM BLD-SCNC: 142 MMOL/L (ref 133–144)
SODIUM BLD-SCNC: 143 MMOL/L (ref 133–144)
SODIUM SERPL-SCNC: 135 MMOL/L (ref 133–144)
WBC # BLD AUTO: 13.1 10E3/UL (ref 4–11)

## 2022-03-27 PROCEDURE — 97165 OT EVAL LOW COMPLEX 30 MIN: CPT | Mod: GO | Performed by: OCCUPATIONAL THERAPIST

## 2022-03-27 PROCEDURE — 82330 ASSAY OF CALCIUM: CPT | Performed by: PHYSICIAN ASSISTANT

## 2022-03-27 PROCEDURE — 85014 HEMATOCRIT: CPT | Performed by: PHYSICIAN ASSISTANT

## 2022-03-27 PROCEDURE — 250N000011 HC RX IP 250 OP 636: Performed by: PHYSICIAN ASSISTANT

## 2022-03-27 PROCEDURE — 97535 SELF CARE MNGMENT TRAINING: CPT | Mod: GO | Performed by: OCCUPATIONAL THERAPIST

## 2022-03-27 PROCEDURE — 84100 ASSAY OF PHOSPHORUS: CPT | Performed by: PHYSICIAN ASSISTANT

## 2022-03-27 PROCEDURE — 83735 ASSAY OF MAGNESIUM: CPT | Performed by: PHYSICIAN ASSISTANT

## 2022-03-27 PROCEDURE — 250N000013 HC RX MED GY IP 250 OP 250 PS 637: Performed by: PHYSICIAN ASSISTANT

## 2022-03-27 PROCEDURE — 80053 COMPREHEN METABOLIC PANEL: CPT | Performed by: PHYSICIAN ASSISTANT

## 2022-03-27 PROCEDURE — 120N000001 HC R&B MED SURG/OB

## 2022-03-27 PROCEDURE — 36415 COLL VENOUS BLD VENIPUNCTURE: CPT | Performed by: PHYSICIAN ASSISTANT

## 2022-03-27 PROCEDURE — 71046 X-RAY EXAM CHEST 2 VIEWS: CPT

## 2022-03-27 PROCEDURE — 97110 THERAPEUTIC EXERCISES: CPT | Mod: GP | Performed by: PHYSICAL THERAPIST

## 2022-03-27 PROCEDURE — 97530 THERAPEUTIC ACTIVITIES: CPT | Mod: GP | Performed by: PHYSICAL THERAPIST

## 2022-03-27 RX ORDER — SIMETHICONE 80 MG
80 TABLET,CHEWABLE ORAL 4 TIMES DAILY
Status: DISCONTINUED | OUTPATIENT
Start: 2022-03-27 | End: 2022-03-30 | Stop reason: HOSPADM

## 2022-03-27 RX ORDER — MAGNESIUM CARB/ALUMINUM HYDROX 105-160MG
148 TABLET,CHEWABLE ORAL ONCE
Status: DISCONTINUED | OUTPATIENT
Start: 2022-03-27 | End: 2022-03-28

## 2022-03-27 RX ADMIN — HEPARIN SODIUM 5000 UNITS: 5000 INJECTION, SOLUTION INTRAVENOUS; SUBCUTANEOUS at 13:17

## 2022-03-27 RX ADMIN — GABAPENTIN 300 MG: 300 CAPSULE ORAL at 08:51

## 2022-03-27 RX ADMIN — SENNOSIDES AND DOCUSATE SODIUM 1 TABLET: 50; 8.6 TABLET ORAL at 08:51

## 2022-03-27 RX ADMIN — SIMETHICONE 80 MG: 80 TABLET, CHEWABLE ORAL at 21:29

## 2022-03-27 RX ADMIN — ASPIRIN 81 MG CHEWABLE TABLET 162 MG: 81 TABLET CHEWABLE at 08:51

## 2022-03-27 RX ADMIN — GABAPENTIN 300 MG: 300 CAPSULE ORAL at 16:14

## 2022-03-27 RX ADMIN — POLYETHYLENE GLYCOL 3350 17 G: 17 POWDER, FOR SOLUTION ORAL at 08:51

## 2022-03-27 RX ADMIN — HEPARIN SODIUM 5000 UNITS: 5000 INJECTION, SOLUTION INTRAVENOUS; SUBCUTANEOUS at 21:31

## 2022-03-27 RX ADMIN — METOPROLOL TARTRATE 75 MG: 50 TABLET, FILM COATED ORAL at 21:00

## 2022-03-27 RX ADMIN — FUROSEMIDE 40 MG: 10 INJECTION, SOLUTION INTRAMUSCULAR; INTRAVENOUS at 08:50

## 2022-03-27 RX ADMIN — SENNOSIDES AND DOCUSATE SODIUM 1 TABLET: 50; 8.6 TABLET ORAL at 21:00

## 2022-03-27 RX ADMIN — METOPROLOL TARTRATE 75 MG: 50 TABLET, FILM COATED ORAL at 08:51

## 2022-03-27 RX ADMIN — GABAPENTIN 300 MG: 300 CAPSULE ORAL at 21:30

## 2022-03-27 RX ADMIN — PANTOPRAZOLE SODIUM 40 MG: 40 TABLET, DELAYED RELEASE ORAL at 08:52

## 2022-03-27 RX ADMIN — SIMETHICONE 80 MG: 80 TABLET, CHEWABLE ORAL at 13:17

## 2022-03-27 RX ADMIN — HEPARIN SODIUM 5000 UNITS: 5000 INJECTION, SOLUTION INTRAVENOUS; SUBCUTANEOUS at 06:17

## 2022-03-27 RX ADMIN — ATORVASTATIN CALCIUM 80 MG: 40 TABLET, FILM COATED ORAL at 08:51

## 2022-03-27 RX ADMIN — ACETAMINOPHEN 975 MG: 325 TABLET, FILM COATED ORAL at 06:17

## 2022-03-27 ASSESSMENT — ACTIVITIES OF DAILY LIVING (ADL)
ADLS_ACUITY_SCORE: 9
ADLS_ACUITY_SCORE: 8
ADLS_ACUITY_SCORE: 8
ADLS_ACUITY_SCORE: 9
ADLS_ACUITY_SCORE: 9
ADLS_ACUITY_SCORE: 8
ADLS_ACUITY_SCORE: 9
ADLS_ACUITY_SCORE: 8
ADLS_ACUITY_SCORE: 5
ADLS_ACUITY_SCORE: 9
ADLS_ACUITY_SCORE: 7
ADLS_ACUITY_SCORE: 8
ADLS_ACUITY_SCORE: 9
ADLS_ACUITY_SCORE: 9
ADLS_ACUITY_SCORE: 5
ADLS_ACUITY_SCORE: 5
ADLS_ACUITY_SCORE: 9
ADLS_ACUITY_SCORE: 8
ADLS_ACUITY_SCORE: 9
ADLS_ACUITY_SCORE: 7
ADLS_ACUITY_SCORE: 5

## 2022-03-27 NOTE — PROGRESS NOTES
03/27/22 1002   Quick Adds   Type of Visit Initial Occupational Therapy Evaluation   Living Environment   People in Home spouse   Current Living Arrangements mobile home   Home Accessibility stairs to enter home   Number of Stairs, Main Entrance 5   Stair Railings, Main Entrance railings safe and in good condition   Transportation Anticipated car, drives self;family or friend will provide   Living Environment Comments Pt lives in mobile home w/ spouse w/ walk in shower and standard toilet w/ vainty on right side for needed support. No grab bars in shower.    Self-Care   Usual Activity Tolerance good   Current Activity Tolerance moderate   Equipment Currently Used at Home none   Fall history within last six months no   Activity/Exercise/Self-Care Comment Pt reports ind in all ADLs at baseline    Instrumental Activities of Daily Living (IADL)   Previous Responsibilities work;driving;finances;medication management;housekeeping;laundry;shopping;meal prep   IADL Comments Pt reports ind in all IADLs at baseline    General Information   Onset of Illness/Injury or Date of Surgery 03/21/22   Referring Physician Barby Horn PA-C   Patient/Family Therapy Goal Statement (OT) Return home   Additional Occupational Profile Info/Pertinent History of Current Problem 67 yo male POD #3 CABG x 3. Pt with previous s/p right carotid endarterectomy for high-grade asymptomatic right carotid stenosis earlier in the month.   Existing Precautions/Restrictions cardiac;sternal   Cognitive Status Examination   Orientation Status orientation to person, place and time   Visual Perception   Visual Impairment/Limitations WNL   Sensory   Sensory Comments Pt reports tingling in L hand following surgery w/ limited ROM and  strength    Pain Assessment   Patient Currently in Pain No   Range of Motion Comprehensive   Comment, General Range of Motion unable to assess d/t sternal precautions    Strength Comprehensive (MMT)   Comment, General  Manual Muscle Testing (MMT) Assessment unable to assess d/t sternal precautions    Coordination   Upper Extremity Coordination No deficits were identified   Transfers   Transfers sit-stand transfer;toilet transfer   Sit-Stand Transfer   Sit-Stand [place] (Transfers) contact guard   Toilet Transfer   Type (Toilet Transfer) stand-sit;sit-stand   [place] Level (Toilet Transfer) contact guard   Activities of Daily Living   BADL Assessment/Intervention lower body dressing;toileting;grooming   Lower Body Dressing Assessment/Training   [place] Level (Lower Body Dressing) maximum assist (25% patient effort)   Grooming Assessment/Training   [place] Level (Grooming) supervision   Toileting   [place] Level (Toileting) minimum assist (75% patient effort)   Clinical Impression   Criteria for Skilled Therapeutic Interventions Met (OT) Yes, treatment indicated   OT Diagnosis Decreased ADL/IADL ind    Influenced by the following impairments post cardiac surgery, sternal precautions    OT Problem List-Impairments impacting ADL activity tolerance impaired;problems related to;mobility;post-surgical precautions   Assessment of Occupational Performance 1-3 Performance Deficits   Identified Performance Deficits LB dressing, toileting and transfers   Planned Therapy Interventions (OT) ADL retraining;cognition;transfer training;strengthening   Clinical Decision Making Complexity (OT) low complexity   Anticipated Equipment Needs Upon Discharge (OT) raised toilet seat   Risk & Benefits of therapy have been explained evaluation/treatment results reviewed;care plan/treatment goals reviewed;risks/benefits reviewed;current/potential barriers reviewed;participants voiced agreement with care plan;participants included;patient   OT Discharge Planning   OT Discharge Recommendation (DC Rec) home with assist;home with outpatient cardiac rehab   OT Rationale for DC Rec Pt currently requiring SBA in mobility, transfers, and all  ADLs. Pt lives with spouse who is not able to assist at home but pt reports son has time off and will be able to assist 24/7 as needed. Reccomend pt d/c home w/ 24/7 assist from son for LB dressing and transfers to maintaing sternal precautions. Plan to complete SLUMS prior to d/c, will continue to update dicharge recs    Total Evaluation Time (Minutes)   Total Evaluation Time (Minutes) 10   OT Goals   Therapy Frequency (OT) 5 times/wk   OT Predicated Duration/Target Date for Goal Attainment 04/03/22   OT Goals Upper Body Dressing;Lower Body Dressing;Toilet Transfer/Toileting;Cognition   OT: Upper Body Dressing within precautions;including set-up/clothing retrieval;Independent   OT: Lower Body Dressing Modified independent;within precautions;including set-up/clothing retrieval;using adaptive equipment   OT: Toilet Transfer/Toileting Supervision/stand-by assist;cleaning and garment management;toilet transfer;within precautions   OT: Cognitive Patient/caregiver will verbalize understanding of cognitive assessment results/recommendations as needed for safe discharge planning

## 2022-03-27 NOTE — PROGRESS NOTES
[facility]  Cardiovascular and Thoracic Surgery Daily Note          Assessment and Plan:   POD#3 s/p CORONARY ARTERY BYPASS GRAFT x 3 (LIMA - LAD; SV - OM2; SV - PDA) WITH ENDOSCOPIC SAPHENOUS VEIN HARVEST ON BILATERAL LOWER EXTREMITY, AND ON CARDIOPULMONARY PUMP OXYGENATOR  (INTRAOPERATIVE TRANSESOPHAGEAL ECHOCARDIOGRAM BY ANESTHESIOLOGIST) by Dr. Glenna Waters  -CVS: HR: 60-80s, metoprolol 75 mg bid, SBP: 120s/60s, ASA, Statin, sub q heparin  -Resp: extubated per protocol, sating well on RA<3L NC, encourage IS  -Neuro: grossly intact, pain controlled with current regimen  -Renal: adequate uo, Crea: 1.15<1.12, up 2 kg above pre-op weight, BUN 27, last dose of lasix 40 mg IVP   -GI:  Continue bowel regimen, pt states he had a BM  -: voiding on own  -Endo: sliding scale insulin, pre-op A1C 6.6  -FEN: Na: 135, K: 4.4, Orders Placed This Encounter      Regular Diet Adult  -ID: WBC: 13.1<14.3, Temp (24hrs), Av.4  F (36.9  C), Min:97.8  F (36.6  C), Max:99.7  F (37.6  C), completed epri-op abx  -Heme: Hgb: 8.5, plt: 144, acute blood loss anemia, thrombocytopenia   -Proph: PCDs, ASA, BB, statin, sub q heparin  -Dispo: [facility], continue therapies, encourage IS, home 2-3 days    Seen and discussed with Dr. Lancaster          Interval History:   Sitting up in chair, breathing stable, tolerating diet, working with rehab, breathing stable, head tremor-baseline from previous TIA         Medications:       aspirin  162 mg Oral or NG Tube Daily     atorvastatin  80 mg Oral QAM     chlorhexidine  15 mL Mouth/Throat Q12H     furosemide  40 mg Intravenous Daily     gabapentin  300 mg Oral TID     heparin ANTICOAGULANT  5,000 Units Subcutaneous Q8H     insulin aspart  1-7 Units Subcutaneous TID AC     insulin aspart  1-5 Units Subcutaneous At Bedtime     lactated ringers  250 mL Intravenous Once     magnesium citrate  148 mL Oral Once     metoprolol tartrate  75 mg Oral BID     pantoprazole  40 mg Oral or NG Tube  "Daily    Or     pantoprazole  40 mg Oral Daily     polyethylene glycol  17 g Oral Daily     senna-docusate  1 tablet Oral BID     simethicone  80 mg Oral 4x Daily     sodium chloride (PF)  3 mL Intracatheter Q8H     @[facility]-@          Physical Exam:   Vitals were reviewed  Blood pressure 125/63, pulse 81, temperature 97.8  F (36.6  C), temperature source Axillary, resp. rate 15, height 1.676 m (5' 6\"), weight 112.9 kg (249 lb), SpO2 95 %.  Rhythm: NSR    Lungs: course    Cardiovascular: s1/s2, no m/r/g    Abdomen: s/nt/nd    Extremeties: no LE edema    Incision: cdi    CT: na    Weight:   Vitals:    03/24/22 0621 03/26/22 0400 03/27/22 0617   Weight: 110.2 kg (243 lb) 112.9 kg (248 lb 14.4 oz) 112.9 kg (249 lb)            Data:   Labs:   Lab Results   Component Value Date    WBC 13.1 03/27/2022    WBC 12.4 07/01/2021     Lab Results   Component Value Date    RBC 2.89 03/27/2022    RBC 4.36 07/01/2021     Lab Results   Component Value Date    HGB 8.5 03/27/2022    HGB 14.1 07/01/2021     Lab Results   Component Value Date    HCT 27.0 03/27/2022    HCT 42.9 07/01/2021     No components found for: MCT  Lab Results   Component Value Date    MCV 93 03/27/2022    MCV 98 07/01/2021     Lab Results   Component Value Date    MCH 29.4 03/27/2022    MCH 32.3 07/01/2021     Lab Results   Component Value Date    MCHC 31.5 03/27/2022    MCHC 32.9 07/01/2021     Lab Results   Component Value Date    RDW 15.0 03/27/2022    RDW 13.7 07/01/2021     Lab Results   Component Value Date     03/27/2022     07/01/2021       Last Basic Metabolic Panel:  Lab Results   Component Value Date     03/27/2022     07/01/2021      Lab Results   Component Value Date    POTASSIUM 4.4 03/27/2022    POTASSIUM 4.6 07/01/2021     Lab Results   Component Value Date    CHLORIDE 104 03/27/2022    CHLORIDE 105 07/01/2021     Lab Results   Component Value Date    SUE 8.7 03/27/2022    SUE 9.3 07/01/2021     Lab Results   Component " Value Date    CO2 28 03/27/2022    CO2 27 07/01/2021     Lab Results   Component Value Date    BUN 27 03/27/2022    BUN 13 07/01/2021     Lab Results   Component Value Date    CR 1.15 03/27/2022    CR 1.09 07/01/2021     Lab Results   Component Value Date     03/27/2022     03/27/2022    GLC 85 07/01/2021       CXR: IMPRESSION: Poststernotomy changes. Mediastinal and left pleural chest tubes have been removed. No pneumothorax. There are trace bilateral pleural effusions, left greater than right with some linear areas of atelectasis in the left base. Heart is   slightly enlarged. No signs of failure.    Barby Hron PA-C  Pager #: [phone]

## 2022-03-27 NOTE — PLAN OF CARE
POD #3 CABBx3. A/Ox4, VSS on RA ex BP soft. TELE NSR.  C/o 3-4/10 incisional pain, declines pain meds. Up in chair for breakfast and lunch, ambulated in wilkins x3 w/ SBA/W. Sternal incision and previous chest tube sites x3 ARIANA, CDI. LLE harvest sites ecchymotic, WNL. LLE pulses heard with doppler. Pt has baseline neuropathy BLE, also some new weakness and neuropathy in L hand, MD aware. +2 BLE edema, R leg ace wrapped. Voiding adequately via urinal. Pt states he had a BM early this am but nothing in chart, refuses milk of magnesia but took senna, miralax and simethicone. Abdomen rounded, BS active, + flatus. Good appetite, blood glucose checks WNL. Discharge pending progress.

## 2022-03-27 NOTE — PLAN OF CARE
Patient is alert and oriented x4. Vital within normal limit. Crackles noted to LLL, incentive spirometer in used. 3 liters oxygen via N/C. Sternal incisions open to air, well approximated. CT site with dressing, clean and intact. Pain managed with Tylenol, tolerated well. Heparin subcutaneous. Blood sugar within normal range, no coverage. In bed sleeping. No discomfort verbalized. Safety provided.

## 2022-03-28 ENCOUNTER — APPOINTMENT (OUTPATIENT)
Dept: PHYSICAL THERAPY | Facility: CLINIC | Age: 67
DRG: 235 | End: 2022-03-28
Attending: SURGERY
Payer: COMMERCIAL

## 2022-03-28 ENCOUNTER — APPOINTMENT (OUTPATIENT)
Dept: OCCUPATIONAL THERAPY | Facility: CLINIC | Age: 67
DRG: 235 | End: 2022-03-28
Attending: SURGERY
Payer: COMMERCIAL

## 2022-03-28 LAB
ALBUMIN SERPL-MCNC: 2.4 G/DL (ref 3.4–5)
ALBUMIN UR-MCNC: 20 MG/DL
ALP SERPL-CCNC: 65 U/L (ref 40–150)
ALT SERPL W P-5'-P-CCNC: 20 U/L (ref 0–70)
ANION GAP SERPL CALCULATED.3IONS-SCNC: 5 MMOL/L (ref 3–14)
APPEARANCE UR: CLEAR
AST SERPL W P-5'-P-CCNC: 24 U/L (ref 0–45)
BACTERIA #/AREA URNS HPF: ABNORMAL /HPF
BACTERIA SPT CULT: NORMAL
BILIRUB SERPL-MCNC: 0.7 MG/DL (ref 0.2–1.3)
BILIRUB UR QL STRIP: NEGATIVE
BUN SERPL-MCNC: 37 MG/DL (ref 7–30)
CA-I BLD-MCNC: 4.6 MG/DL (ref 4.4–5.2)
CALCIUM SERPL-MCNC: 8.9 MG/DL (ref 8.5–10.1)
CHLORIDE BLD-SCNC: 103 MMOL/L (ref 94–109)
CO2 SERPL-SCNC: 28 MMOL/L (ref 20–32)
COLOR UR AUTO: ABNORMAL
CREAT SERPL-MCNC: 1.27 MG/DL (ref 0.66–1.25)
ERYTHROCYTE [DISTWIDTH] IN BLOOD BY AUTOMATED COUNT: 14.7 % (ref 10–15)
GFR SERPL CREATININE-BSD FRML MDRD: 62 ML/MIN/1.73M2
GLUCOSE BLD-MCNC: 127 MG/DL (ref 70–99)
GLUCOSE BLDC GLUCOMTR-MCNC: 113 MG/DL (ref 70–99)
GLUCOSE BLDC GLUCOMTR-MCNC: 128 MG/DL (ref 70–99)
GLUCOSE BLDC GLUCOMTR-MCNC: 146 MG/DL (ref 70–99)
GLUCOSE UR STRIP-MCNC: NEGATIVE MG/DL
GRAM STAIN RESULT: NORMAL
HCT VFR BLD AUTO: 26 % (ref 40–53)
HGB BLD-MCNC: 8.4 G/DL (ref 13.3–17.7)
HGB UR QL STRIP: NEGATIVE
KETONES UR STRIP-MCNC: NEGATIVE MG/DL
LEUKOCYTE ESTERASE UR QL STRIP: NEGATIVE
MAGNESIUM SERPL-MCNC: 2.3 MG/DL (ref 1.6–2.3)
MCH RBC QN AUTO: 30.1 PG (ref 26.5–33)
MCHC RBC AUTO-ENTMCNC: 32.3 G/DL (ref 31.5–36.5)
MCV RBC AUTO: 93 FL (ref 78–100)
NITRATE UR QL: NEGATIVE
PH UR STRIP: 6 [PH] (ref 5–7)
PHOSPHATE SERPL-MCNC: 2.9 MG/DL (ref 2.5–4.5)
PLATELET # BLD AUTO: 193 10E3/UL (ref 150–450)
POTASSIUM BLD-SCNC: 4.5 MMOL/L (ref 3.4–5.3)
PROCALCITONIN SERPL-MCNC: 0.24 NG/ML
PROT SERPL-MCNC: 5.9 G/DL (ref 6.8–8.8)
RBC # BLD AUTO: 2.79 10E6/UL (ref 4.4–5.9)
RBC URINE: 2 /HPF
SODIUM SERPL-SCNC: 136 MMOL/L (ref 133–144)
SP GR UR STRIP: 1.02 (ref 1–1.03)
UROBILINOGEN UR STRIP-MCNC: NORMAL MG/DL
WBC # BLD AUTO: 14.1 10E3/UL (ref 4–11)
WBC URINE: 3 /HPF

## 2022-03-28 PROCEDURE — 97110 THERAPEUTIC EXERCISES: CPT | Mod: GP | Performed by: PHYSICAL THERAPIST

## 2022-03-28 PROCEDURE — 82330 ASSAY OF CALCIUM: CPT | Performed by: PHYSICIAN ASSISTANT

## 2022-03-28 PROCEDURE — 85027 COMPLETE CBC AUTOMATED: CPT | Performed by: PHYSICIAN ASSISTANT

## 2022-03-28 PROCEDURE — 120N000001 HC R&B MED SURG/OB

## 2022-03-28 PROCEDURE — 36415 COLL VENOUS BLD VENIPUNCTURE: CPT | Performed by: PHYSICIAN ASSISTANT

## 2022-03-28 PROCEDURE — 250N000013 HC RX MED GY IP 250 OP 250 PS 637: Performed by: PHYSICIAN ASSISTANT

## 2022-03-28 PROCEDURE — 83735 ASSAY OF MAGNESIUM: CPT | Performed by: PHYSICIAN ASSISTANT

## 2022-03-28 PROCEDURE — 250N000011 HC RX IP 250 OP 636: Performed by: PHYSICIAN ASSISTANT

## 2022-03-28 PROCEDURE — 87205 SMEAR GRAM STAIN: CPT | Performed by: NURSE PRACTITIONER

## 2022-03-28 PROCEDURE — 81003 URINALYSIS AUTO W/O SCOPE: CPT | Performed by: NURSE PRACTITIONER

## 2022-03-28 PROCEDURE — 36415 COLL VENOUS BLD VENIPUNCTURE: CPT | Performed by: NURSE PRACTITIONER

## 2022-03-28 PROCEDURE — 84100 ASSAY OF PHOSPHORUS: CPT | Performed by: PHYSICIAN ASSISTANT

## 2022-03-28 PROCEDURE — 87040 BLOOD CULTURE FOR BACTERIA: CPT | Performed by: NURSE PRACTITIONER

## 2022-03-28 PROCEDURE — 97535 SELF CARE MNGMENT TRAINING: CPT | Mod: GO | Performed by: OCCUPATIONAL THERAPIST

## 2022-03-28 PROCEDURE — 97530 THERAPEUTIC ACTIVITIES: CPT | Mod: GP | Performed by: PHYSICAL THERAPIST

## 2022-03-28 PROCEDURE — 82040 ASSAY OF SERUM ALBUMIN: CPT | Performed by: PHYSICIAN ASSISTANT

## 2022-03-28 PROCEDURE — 80053 COMPREHEN METABOLIC PANEL: CPT | Performed by: PHYSICIAN ASSISTANT

## 2022-03-28 PROCEDURE — 84145 PROCALCITONIN (PCT): CPT | Performed by: NURSE PRACTITIONER

## 2022-03-28 RX ADMIN — ACETAMINOPHEN 650 MG: 325 TABLET ORAL at 15:50

## 2022-03-28 RX ADMIN — SIMETHICONE 80 MG: 80 TABLET, CHEWABLE ORAL at 17:43

## 2022-03-28 RX ADMIN — GABAPENTIN 300 MG: 300 CAPSULE ORAL at 15:50

## 2022-03-28 RX ADMIN — HEPARIN SODIUM 5000 UNITS: 5000 INJECTION, SOLUTION INTRAVENOUS; SUBCUTANEOUS at 13:27

## 2022-03-28 RX ADMIN — ATORVASTATIN CALCIUM 80 MG: 40 TABLET, FILM COATED ORAL at 09:03

## 2022-03-28 RX ADMIN — HEPARIN SODIUM 5000 UNITS: 5000 INJECTION, SOLUTION INTRAVENOUS; SUBCUTANEOUS at 21:00

## 2022-03-28 RX ADMIN — METOPROLOL TARTRATE 75 MG: 50 TABLET, FILM COATED ORAL at 09:03

## 2022-03-28 RX ADMIN — FUROSEMIDE 40 MG: 10 INJECTION, SOLUTION INTRAMUSCULAR; INTRAVENOUS at 13:39

## 2022-03-28 RX ADMIN — METOPROLOL TARTRATE 75 MG: 50 TABLET, FILM COATED ORAL at 20:58

## 2022-03-28 RX ADMIN — SIMETHICONE 80 MG: 80 TABLET, CHEWABLE ORAL at 13:27

## 2022-03-28 RX ADMIN — ACETAMINOPHEN 650 MG: 325 TABLET ORAL at 09:21

## 2022-03-28 RX ADMIN — SENNOSIDES AND DOCUSATE SODIUM 1 TABLET: 50; 8.6 TABLET ORAL at 20:58

## 2022-03-28 RX ADMIN — SIMETHICONE 80 MG: 80 TABLET, CHEWABLE ORAL at 09:03

## 2022-03-28 RX ADMIN — GABAPENTIN 300 MG: 300 CAPSULE ORAL at 21:00

## 2022-03-28 RX ADMIN — GABAPENTIN 300 MG: 300 CAPSULE ORAL at 09:03

## 2022-03-28 RX ADMIN — SIMETHICONE 80 MG: 80 TABLET, CHEWABLE ORAL at 21:00

## 2022-03-28 RX ADMIN — METHOCARBAMOL 500 MG: 500 TABLET ORAL at 12:23

## 2022-03-28 RX ADMIN — HEPARIN SODIUM 5000 UNITS: 5000 INJECTION, SOLUTION INTRAVENOUS; SUBCUTANEOUS at 05:53

## 2022-03-28 RX ADMIN — PANTOPRAZOLE SODIUM 40 MG: 40 TABLET, DELAYED RELEASE ORAL at 09:03

## 2022-03-28 RX ADMIN — ASPIRIN 81 MG CHEWABLE TABLET 162 MG: 81 TABLET CHEWABLE at 09:03

## 2022-03-28 ASSESSMENT — ACTIVITIES OF DAILY LIVING (ADL)
ADLS_ACUITY_SCORE: 7
ADLS_ACUITY_SCORE: 7
ADLS_ACUITY_SCORE: 8
ADLS_ACUITY_SCORE: 6
ADLS_ACUITY_SCORE: 8
ADLS_ACUITY_SCORE: 7
ADLS_ACUITY_SCORE: 6
ADLS_ACUITY_SCORE: 7
ADLS_ACUITY_SCORE: 6
ADLS_ACUITY_SCORE: 8
ADLS_ACUITY_SCORE: 8
ADLS_ACUITY_SCORE: 6
ADLS_ACUITY_SCORE: 8
ADLS_ACUITY_SCORE: 6
ADLS_ACUITY_SCORE: 6
ADLS_ACUITY_SCORE: 7
ADLS_ACUITY_SCORE: 7
ADLS_ACUITY_SCORE: 6
ADLS_ACUITY_SCORE: 8
ADLS_ACUITY_SCORE: 7

## 2022-03-28 NOTE — PROGRESS NOTES
[facility]  Cardiovascular and Thoracic Surgery Daily Note          Assessment and Plan:   Roger Bonilla is a 66 year old gentleman admitted with shortness of breath found to have multivessel coronary artery disease and also severe right carotid disease. His case was discussed with the Heart and vascular team. We offered the patient right carotid endarterectomy followed by CAB.    PMH includes: CAD, HLD, DM2, HTN, carotid stenosis  Most recent echocardiogram demonstrates LVEF 60-65%, normal RV function, no valvulopathies     POD #4 s/p   1) Coronary artery bypass grafting x 3.              - Left internal mammary artery to left anterior descending artery              - Reversed saphenous vein graft to right posterior descending artery              - Reversed saphenous vein graft to obtuse marginal artery   2) Endoscopic vein harvest left and right lower extremity  3) Transesophageal echocardiogram on 3/24/22 with Dr. Glenna Waters    CVS:   Severe multivessel CAD s/p CABG x2  HTN  HLD  Carotid Stenosis s/p right carotid endarterectomy  HD stable overnight in NSR  [PTA meds on hold: Imdur, lisinopril, toprol]  -  mg daily  - Atorvastatin 80 mg daily  - Metoprolol tartrate 75 mg BID  - CT and TPWs removed    Resp:   Extubated POD 0, now saturating well on room air  - Titrate O2 to maintain sats >92%  - Pulm hygiene    Neuro:    Acute postoperative pain  Carotid stenosis s/p right carotid endarterectomy  Left ulnar nerve neuropathy  Left arm numbness likely related to OR positioning   - Having some hallucinations overnight 3/28  - Pain well controlled with current regimen  - Delirium precautions  - ID as below    Renal/Electrolytes:   Volume overload  Creat stable at 1.27, is 8 lbs above preoperative weight  - Continue lasix 40 mg IV daily  - Strict I/O, daily weights  - Avoid/limit nephrotoxins as able  - Replete lytes per protocol    GI/Nutrition:    GERD  - Tolerating current diet:  Orders  "Placed This Encounter      Regular Diet Adult  - Continue bowel regimen, last BM 3/28  - PTA omeprazole on hold  - PPI    :    Voiding well on own    Endo:   Stress induced hyperglycemia  DM2  Obesity  A1c 6.6%  [PTA meds on hold: metformin]  - Completed insulin gtt for 48 hours, now transitioned to sliding scale insulin  - Look to resume PTA metformin in coming days  - Goal BG <180 for optimal healing    ID:   Stress induced leukocytosis  WBC 14.1, tmax 98.4; was having some hallucinations overnight 3/28  - CXR on 3/27 with e/o infiltrate  - UA 3/28 bland  - Procalcitonin 0.24  - Blood and sputum cultures 3/28  - Completing perioperative ABX  - Continue to monitor fever curve, inflammatory markers, WBC    Heme:   Acute blood loss anemia  Hgb stable at 8.4, platelets 193K, no s/sx bleeding  - CBC in AM  - Goal hgb >7    -Proph: PPI, subcutaneous heparin, SCDs  -Dispo: Continue on station 33          Interval History:   No acute events overnight. States pain is well managed on current regimen, slept poorly. Had some hallucinations overnight seeing smoke and \"story\". Breathing is stable on room air. Tolerating diet, is passing flatus, +BM. Denies chest pain, palpitations, dizziness, syncopal symptoms, chills, myalgias, sternal popping/clicking.         Medications:       aspirin  162 mg Oral or NG Tube Daily     atorvastatin  80 mg Oral QAM     [Held by provider] furosemide  40 mg Intravenous Daily     gabapentin  300 mg Oral TID     heparin ANTICOAGULANT  5,000 Units Subcutaneous Q8H     insulin aspart  1-7 Units Subcutaneous TID AC     insulin aspart  1-5 Units Subcutaneous At Bedtime     lactated ringers  250 mL Intravenous Once     magnesium citrate  148 mL Oral Once     metoprolol tartrate  75 mg Oral BID     pantoprazole  40 mg Oral or NG Tube Daily    Or     pantoprazole  40 mg Oral Daily     polyethylene glycol  17 g Oral Daily     senna-docusate  1 tablet Oral BID     simethicone  80 mg Oral 4x Daily     " "sodium chloride (PF)  3 mL Intracatheter Q8H             Physical Exam:   Vitals were reviewed  Blood pressure (Abnormal) 154/64, pulse 73, temperature 97.4  F (36.3  C), temperature source Oral, resp. rate 15, height 1.676 m (5' 6\"), weight 114.2 kg (251 lb 12.8 oz), SpO2 94 %.  Rhythm: NSR    Lungs: CTA/dim bases, on room air    Cardiovascular: S1, S2, RRR, no m/r/g    Abdomen: S/NT/ND, rare BS    Extremeties: Mild edema, non pitting, warm and well perfused    Incision: CDI, eccymotic    CT: N/A    Weight:   Vitals:    03/24/22 0621 03/26/22 0400 03/27/22 0617 03/28/22 0549   Weight: 110.2 kg (243 lb) 112.9 kg (248 lb 14.4 oz) 112.9 kg (249 lb) 114.2 kg (251 lb 12.8 oz)            Data:   Labs:   Lab Results   Component Value Date    WBC 16.2 03/25/2022    WBC 12.4 07/01/2021     Lab Results   Component Value Date    RBC 3.33 03/25/2022    RBC 4.36 07/01/2021     Lab Results   Component Value Date    HGB 9.9 03/25/2022    HGB 14.1 07/01/2021     Lab Results   Component Value Date    HCT 31.6 03/25/2022    HCT 42.9 07/01/2021     No components found for: MCT  Lab Results   Component Value Date    MCV 95 03/25/2022    MCV 98 07/01/2021     Lab Results   Component Value Date    MCH 29.7 03/25/2022    MCH 32.3 07/01/2021     Lab Results   Component Value Date    MCHC 31.3 03/25/2022    MCHC 32.9 07/01/2021     Lab Results   Component Value Date    RDW 15.2 03/25/2022    RDW 13.7 07/01/2021     Lab Results   Component Value Date     03/25/2022     07/01/2021       Last Basic Metabolic Panel:  Lab Results   Component Value Date     03/25/2022     07/01/2021      Lab Results   Component Value Date    POTASSIUM 5.0 03/25/2022    POTASSIUM 4.6 07/01/2021     Lab Results   Component Value Date    CHLORIDE 109 03/25/2022    CHLORIDE 105 07/01/2021     Lab Results   Component Value Date    SUE 8.2 03/25/2022    SUE 9.3 07/01/2021     Lab Results   Component Value Date    CO2 29 03/25/2022    CO2 27 " 07/01/2021     Lab Results   Component Value Date    BUN 25 03/25/2022    BUN 13 07/01/2021     Lab Results   Component Value Date    CR 1.19 03/25/2022    CR 1.09 07/01/2021     Lab Results   Component Value Date     03/25/2022     03/25/2022    GLC 85 07/01/2021       No results found for this or any previous visit (from the past 24 hour(s)).    SAL Fuchs, ACNPC-AG, CCRN  Nurse Practitioner  Cardiothoracic Surgery  Pager: [phone]

## 2022-03-28 NOTE — PLAN OF CARE
DATE & TIME: 3/28/2022 6575-4299  Cognitive Concerns/ Orientation : A&O x4, forgetful, flat affect. Thought he was seeing smoke on the ceiling yesterday around 1900 (had recorded a video and showed to writer, writer did not observe any smoke). Pt also thought he saw spark under the bed and took a photo-- it was the light under the bed, and it might be flickering in the photo. work order placed. Bed functions properly for now.   BEHAVIOR & AGGRESSION TOOL COLOR: green   CIWA SCORE: na    ABNL VS/O2: vss, on RA. Lungs clear.   MOBILITY: SBA with GB and WK. Left hand weakness, not new, NP aware.   PAIN MANAGMENT: incision and old CT tube pain 4/10, PRN tylenol and Robaxin given with some relief.   DIET: regular, good appetite.   BOWEL/BLADDER: continent.   ABNL LAB/BG: /113, procal 0.24. WBC 14.1. UA bland, BC x2 done. Sputum sample sent. Cr. 1.27.   DRAIN/DEVICES: PIV SL.   TELEMETRY RHYTHM: NSR   SKIN: Sternal incision CDI, slight redness, no drainage, Old CT sites open to air. Graft sites CDI. BLE 3+ edema, bruising on BLE. Showered today.

## 2022-03-28 NOTE — PROGRESS NOTES
Pt resting in bed thru 4 hour shift, able to make position changes independ. No changes to previous assessments.  Denies pain and the need for PRN meds.   Pt to call RN with needs.

## 2022-03-28 NOTE — PROGRESS NOTES
03/28/22 1135   Cognitive Assessments   Cognitive Assessments Completed [facility] Mental Status Exam (SLUMS):  Total Score out of /30 16/30   Norms 1-20 equals dementia   Domains assessed: orientation, memory, attention, executive functions   Supervision   Supervision On-site supervision provided   OT Discharge Planning   OT Discharge Recommendation (DC Rec) home with assist;home with outpatient cardiac rehab;Transitional Care Facility;home with home care occupational therapy;Leaving home requires significant taxing effort   OT Rationale for DC Rec Pt currently requiring SBA in mobility, transfers, and all ADLs. Pt lives with spouse who is not able to assist at home but pt reports son has time off and will be able to assist 24/7 as needed. Reccomend pt d/c home w/ 24/7 assist from son for LB dressing and transfers to maintain sternal precautions. Pt SLUMS score 16/30 indicates severe cognitive deficits that would impair safety in ADL/IADLs at home. Pt requires 24/7 assist in IADLs and ADLs id discharging home. If assist is not abailable, pt would benefit from further skilled therapy to progress cognition and safety in ADL/IADLs. If going home pt would benefit from HH OT to monitor cognition and increasesafety in ADLs

## 2022-03-29 ENCOUNTER — APPOINTMENT (OUTPATIENT)
Dept: OCCUPATIONAL THERAPY | Facility: CLINIC | Age: 67
DRG: 235 | End: 2022-03-29
Attending: SURGERY
Payer: COMMERCIAL

## 2022-03-29 ENCOUNTER — APPOINTMENT (OUTPATIENT)
Dept: PHYSICAL THERAPY | Facility: CLINIC | Age: 67
DRG: 235 | End: 2022-03-29
Attending: SURGERY
Payer: COMMERCIAL

## 2022-03-29 LAB
ANION GAP SERPL CALCULATED.3IONS-SCNC: 5 MMOL/L (ref 3–14)
ATRIAL RATE - MUSE: 77 BPM
BUN SERPL-MCNC: 35 MG/DL (ref 7–30)
CA-I BLD-MCNC: 4.5 MG/DL (ref 4.4–5.2)
CALCIUM SERPL-MCNC: 8.8 MG/DL (ref 8.5–10.1)
CHLORIDE BLD-SCNC: 103 MMOL/L (ref 94–109)
CO2 SERPL-SCNC: 29 MMOL/L (ref 20–32)
CREAT SERPL-MCNC: 1.28 MG/DL (ref 0.66–1.25)
DIASTOLIC BLOOD PRESSURE - MUSE: NORMAL MMHG
ERYTHROCYTE [DISTWIDTH] IN BLOOD BY AUTOMATED COUNT: 14.8 % (ref 10–15)
GFR SERPL CREATININE-BSD FRML MDRD: 62 ML/MIN/1.73M2
GLUCOSE BLD-MCNC: 117 MG/DL (ref 70–99)
GLUCOSE BLDC GLUCOMTR-MCNC: 144 MG/DL (ref 70–99)
GLUCOSE BLDC GLUCOMTR-MCNC: 149 MG/DL (ref 70–99)
GLUCOSE BLDC GLUCOMTR-MCNC: 158 MG/DL (ref 70–99)
HCT VFR BLD AUTO: 25.4 % (ref 40–53)
HGB BLD-MCNC: 8.1 G/DL (ref 13.3–17.7)
INTERPRETATION ECG - MUSE: NORMAL
MAGNESIUM SERPL-MCNC: 2.2 MG/DL (ref 1.6–2.3)
MCH RBC QN AUTO: 30.2 PG (ref 26.5–33)
MCHC RBC AUTO-ENTMCNC: 31.9 G/DL (ref 31.5–36.5)
MCV RBC AUTO: 95 FL (ref 78–100)
P AXIS - MUSE: 74 DEGREES
PHOSPHATE SERPL-MCNC: 3.9 MG/DL (ref 2.5–4.5)
PLATELET # BLD AUTO: 248 10E3/UL (ref 150–450)
POTASSIUM BLD-SCNC: 4 MMOL/L (ref 3.4–5.3)
PR INTERVAL - MUSE: 168 MS
PROCALCITONIN SERPL-MCNC: 0.2 NG/ML
QRS DURATION - MUSE: 98 MS
QT - MUSE: 420 MS
QTC - MUSE: 475 MS
R AXIS - MUSE: 72 DEGREES
RBC # BLD AUTO: 2.68 10E6/UL (ref 4.4–5.9)
SODIUM SERPL-SCNC: 137 MMOL/L (ref 133–144)
SYSTOLIC BLOOD PRESSURE - MUSE: NORMAL MMHG
T AXIS - MUSE: 41 DEGREES
VENTRICULAR RATE- MUSE: 77 BPM
WBC # BLD AUTO: 12.7 10E3/UL (ref 4–11)

## 2022-03-29 PROCEDURE — 82330 ASSAY OF CALCIUM: CPT | Performed by: PHYSICIAN ASSISTANT

## 2022-03-29 PROCEDURE — 85027 COMPLETE CBC AUTOMATED: CPT | Performed by: NURSE PRACTITIONER

## 2022-03-29 PROCEDURE — 250N000011 HC RX IP 250 OP 636: Performed by: PHYSICIAN ASSISTANT

## 2022-03-29 PROCEDURE — 82310 ASSAY OF CALCIUM: CPT | Performed by: NURSE PRACTITIONER

## 2022-03-29 PROCEDURE — 250N000013 HC RX MED GY IP 250 OP 250 PS 637: Performed by: PHYSICIAN ASSISTANT

## 2022-03-29 PROCEDURE — 250N000013 HC RX MED GY IP 250 OP 250 PS 637: Performed by: NURSE PRACTITIONER

## 2022-03-29 PROCEDURE — 97110 THERAPEUTIC EXERCISES: CPT | Mod: GP | Performed by: PHYSICAL THERAPIST

## 2022-03-29 PROCEDURE — 83735 ASSAY OF MAGNESIUM: CPT | Performed by: PHYSICIAN ASSISTANT

## 2022-03-29 PROCEDURE — 84145 PROCALCITONIN (PCT): CPT | Performed by: NURSE PRACTITIONER

## 2022-03-29 PROCEDURE — 97535 SELF CARE MNGMENT TRAINING: CPT | Mod: GO | Performed by: OCCUPATIONAL THERAPIST

## 2022-03-29 PROCEDURE — 120N000001 HC R&B MED SURG/OB

## 2022-03-29 PROCEDURE — 84100 ASSAY OF PHOSPHORUS: CPT | Performed by: PHYSICIAN ASSISTANT

## 2022-03-29 PROCEDURE — 97530 THERAPEUTIC ACTIVITIES: CPT | Mod: GP | Performed by: PHYSICAL THERAPIST

## 2022-03-29 PROCEDURE — 250N000011 HC RX IP 250 OP 636: Performed by: NURSE PRACTITIONER

## 2022-03-29 PROCEDURE — 36415 COLL VENOUS BLD VENIPUNCTURE: CPT | Performed by: NURSE PRACTITIONER

## 2022-03-29 RX ORDER — FUROSEMIDE 10 MG/ML
40 INJECTION INTRAMUSCULAR; INTRAVENOUS ONCE
Status: COMPLETED | OUTPATIENT
Start: 2022-03-29 | End: 2022-03-29

## 2022-03-29 RX ORDER — METOPROLOL TARTRATE 100 MG
100 TABLET ORAL 2 TIMES DAILY
Status: DISCONTINUED | OUTPATIENT
Start: 2022-03-29 | End: 2022-03-30 | Stop reason: HOSPADM

## 2022-03-29 RX ORDER — QUETIAPINE FUMARATE 25 MG/1
50 TABLET, FILM COATED ORAL ONCE
Status: COMPLETED | OUTPATIENT
Start: 2022-03-29 | End: 2022-03-29

## 2022-03-29 RX ORDER — METOPROLOL TARTRATE 25 MG/1
25 TABLET, FILM COATED ORAL ONCE
Status: COMPLETED | OUTPATIENT
Start: 2022-03-29 | End: 2022-03-29

## 2022-03-29 RX ORDER — TRAMADOL HYDROCHLORIDE 50 MG/1
50 TABLET ORAL EVERY 6 HOURS PRN
Status: DISCONTINUED | OUTPATIENT
Start: 2022-03-29 | End: 2022-03-30 | Stop reason: HOSPADM

## 2022-03-29 RX ORDER — LANOLIN ALCOHOL/MO/W.PET/CERES
3 CREAM (GRAM) TOPICAL
Status: DISCONTINUED | OUTPATIENT
Start: 2022-03-29 | End: 2022-03-30 | Stop reason: HOSPADM

## 2022-03-29 RX ADMIN — FUROSEMIDE 40 MG: 10 INJECTION, SOLUTION INTRAMUSCULAR; INTRAVENOUS at 09:18

## 2022-03-29 RX ADMIN — METOPROLOL TARTRATE 100 MG: 100 TABLET, FILM COATED ORAL at 21:00

## 2022-03-29 RX ADMIN — SIMETHICONE 80 MG: 80 TABLET, CHEWABLE ORAL at 18:11

## 2022-03-29 RX ADMIN — SIMETHICONE 80 MG: 80 TABLET, CHEWABLE ORAL at 09:19

## 2022-03-29 RX ADMIN — HEPARIN SODIUM 5000 UNITS: 5000 INJECTION, SOLUTION INTRAVENOUS; SUBCUTANEOUS at 21:46

## 2022-03-29 RX ADMIN — METOPROLOL TARTRATE 25 MG: 25 TABLET, FILM COATED ORAL at 10:15

## 2022-03-29 RX ADMIN — FUROSEMIDE 40 MG: 10 INJECTION, SOLUTION INTRAVENOUS at 15:07

## 2022-03-29 RX ADMIN — ASPIRIN 81 MG CHEWABLE TABLET 162 MG: 81 TABLET CHEWABLE at 09:19

## 2022-03-29 RX ADMIN — SIMETHICONE 80 MG: 80 TABLET, CHEWABLE ORAL at 22:15

## 2022-03-29 RX ADMIN — HEPARIN SODIUM 5000 UNITS: 5000 INJECTION, SOLUTION INTRAVENOUS; SUBCUTANEOUS at 05:30

## 2022-03-29 RX ADMIN — QUETIAPINE FUMARATE 50 MG: 25 TABLET ORAL at 21:00

## 2022-03-29 RX ADMIN — SIMETHICONE 80 MG: 80 TABLET, CHEWABLE ORAL at 13:09

## 2022-03-29 RX ADMIN — INSULIN ASPART 1 UNITS: 100 INJECTION, SOLUTION INTRAVENOUS; SUBCUTANEOUS at 18:12

## 2022-03-29 RX ADMIN — ATORVASTATIN CALCIUM 80 MG: 40 TABLET, FILM COATED ORAL at 09:19

## 2022-03-29 RX ADMIN — SENNOSIDES AND DOCUSATE SODIUM 1 TABLET: 50; 8.6 TABLET ORAL at 21:00

## 2022-03-29 RX ADMIN — HEPARIN SODIUM 5000 UNITS: 5000 INJECTION, SOLUTION INTRAVENOUS; SUBCUTANEOUS at 13:09

## 2022-03-29 RX ADMIN — PANTOPRAZOLE SODIUM 40 MG: 40 TABLET, DELAYED RELEASE ORAL at 09:19

## 2022-03-29 RX ADMIN — METOPROLOL TARTRATE 75 MG: 50 TABLET, FILM COATED ORAL at 09:18

## 2022-03-29 RX ADMIN — INSULIN ASPART 1 UNITS: 100 INJECTION, SOLUTION INTRAVENOUS; SUBCUTANEOUS at 12:17

## 2022-03-29 ASSESSMENT — ACTIVITIES OF DAILY LIVING (ADL)
ADLS_ACUITY_SCORE: 7

## 2022-03-29 NOTE — PLAN OF CARE
Occupational Therapy Discharge Summary    Reason for therapy discharge:    All goals and outcomes met, no further needs identified.    Progress towards therapy goal(s). See goals on Care Plan in [facility] electronic health record for goal details.  Goals met    Therapy recommendation(s):    Continued therapy is recommended.  Rationale/Recommendations:  Pt currently requiring SBA in mobility, transfers, and all ADLs. Pt lives with spouse who is not able to assist at home but pt reports son has time off and will be able to assist 24/7 as needed. Reccomend pt d/c home w/ 24/7 assist from son for LB dressing and transfers to maintain sternal precautions. Pt SLUMS score 16/30 indicates severe cognitive deficits that would impair safety in ADL/IADLs at home. Pt requires 24/7 assist in IADLs and ADLs if discharging home. Pt would benefit from OP OT to monitor cognition to progress safety in ADLs .

## 2022-03-29 NOTE — PLAN OF CARE
Patient is alert and oriented but forgetful. Vital WNL. Breath sounds clear. incentive spirometer in used. RA, no concerns. Sternal incisions open to air, well approximated. Right wrist peripheral IV, saline lock flush and blood return. Site clean and intact. Denied pain. Heparin subcutaneous. Blood sugar within normal range, no coverage. Continent with bowel/bladder. Voiding adequately, using urinal. No discomfort verbalized.

## 2022-03-29 NOTE — PROGRESS NOTES
[facility]  Cardiovascular and Thoracic Surgery Daily Note          Assessment and Plan:   Roger Bonilla is a 66 year old gentleman admitted with shortness of breath found to have multivessel coronary artery disease and also severe right carotid disease. His case was discussed with the Heart and vascular team. We offered the patient right carotid endarterectomy followed by CAB.    PMH includes: CAD, HLD, DM2, HTN, carotid stenosis  Most recent echocardiogram demonstrates LVEF 60-65%, normal RV function, no valvulopathies     POD #5 s/p   1) Coronary artery bypass grafting x 3.              - Left internal mammary artery to left anterior descending artery              - Reversed saphenous vein graft to right posterior descending artery              - Reversed saphenous vein graft to obtuse marginal artery   2) Endoscopic vein harvest left and right lower extremity  3) Transesophageal echocardiogram on 3/24/22 with Dr. Glenna Waters    CVS:   Severe multivessel CAD s/p CABG x2  HTN  HLD  Carotid Stenosis s/p right carotid endarterectomy  HD stable overnight in NSR  [PTA meds on hold: Imdur, lisinopril, toprol]  -  mg daily  - Atorvastatin 80 mg daily  - Metoprolol tartrate increased to 100 mg BID  - CT and TPWs removed    Resp:   Extubated POD 0, now saturating well on room air  - Titrate O2 to maintain sats >92%  - Pulm hygiene    Neuro:    Acute postoperative pain  Carotid stenosis s/p right carotid endarterectomy  Left ulnar nerve neuropathy  Delirium  Baseline tremor  Left arm numbness likely related to OR positioning; Existing right facial numbness 2/2 prior CEA  Having some hallucinations overnight 3/28 and again this AM 3/29, non focal.  Tremor in head/neck is not new to patient.  - Discontinue gabapentin and change oxycodone to tramadol  - Pain well controlled with current regimen  - Delirium precautions: lights on and up OOB during day, sleep hygiene  - Melatonin at HS  - ID workup thus  "far unrevealing    Renal/Electrolytes:   Volume overload, improving  Creat stable at 1.28, is 6 lbs above preoperative weight  - Continue lasix 40 mg IV daily  - Strict I/O, daily weights  - Avoid/limit nephrotoxins as able  - Replete lytes per protocol    GI/Nutrition:    GERD  - Tolerating current diet:  Orders Placed This Encounter      Regular Diet Adult  - Continue bowel regimen, last BM 3/28  - PTA omeprazole on hold  - PPI    :    Voiding well on own    Endo:   Stress induced hyperglycemia  DM2  Obesity  A1c 6.6%  [PTA meds on hold: metformin]  - Completed insulin gtt for 48 hours, now transitioned to sliding scale insulin  - Look to resume PTA metformin tomorrow  - Goal BG <180 for optimal healing    ID:   Stress induced leukocytosis, improving  WBC 14.1-->12.7, tmax 98.9; continue to have some hallucinations  - CXR on 3/27 with e/o infiltrate  - UA 3/28 bland  - Procalcitonin 0.24-->0.2  - Blood and sputum cultures 3/28 with NGTD  - Completedperioperative ABX  - Continue to monitor fever curve, inflammatory markers, WBC    Heme:   Acute blood loss anemia  Hgb stable at 8.1, platelets 248K, no s/sx bleeding  - CBC in AM  - Goal hgb >7    -Proph: PPI, subcutaneous heparin, SCDs  -Dispo: Continue on station 33          Interval History:   No acute events overnight. States pain is well managed on current regimen, slept poorly. Continues to have hallucinations, seeing smoke and \"story\" which is actually the night light under the bed. Breathing is stable on room air. Tolerating diet, is passing flatus, +BM. Denies chest pain, palpitations, dizziness, syncopal symptoms, chills, myalgias, sternal popping/clicking.         Medications:       aspirin  162 mg Oral or NG Tube Daily     atorvastatin  80 mg Oral QAM     furosemide  40 mg Intravenous Daily     heparin ANTICOAGULANT  5,000 Units Subcutaneous Q8H     insulin aspart  1-7 Units Subcutaneous TID AC     insulin aspart  1-5 Units Subcutaneous At Bedtime     " "lactated ringers  250 mL Intravenous Once     metoprolol tartrate  75 mg Oral BID     pantoprazole  40 mg Oral or NG Tube Daily    Or     pantoprazole  40 mg Oral Daily     polyethylene glycol  17 g Oral Daily     senna-docusate  1 tablet Oral BID     simethicone  80 mg Oral 4x Daily     sodium chloride (PF)  3 mL Intracatheter Q8H             Physical Exam:   Vitals were reviewed  Blood pressure (Abnormal) 154/67, pulse 74, temperature 98.7  F (37.1  C), temperature source Oral, resp. rate 18, height 1.676 m (5' 6\"), weight 113.2 kg (249 lb 9.6 oz), SpO2 97 %.  Rhythm: NSR    Lungs: CTA/dim bases, on room air    Cardiovascular: S1, S2, RRR, no m/r/g    Abdomen: S/NT/ND, rare BS    Extremeties: Mild edema, non pitting, warm and well perfused    Incision: CDI, eccymotic    CT: N/A    Weight:   Vitals:    03/24/22 0621 03/26/22 0400 03/27/22 0617 03/28/22 0549   Weight: 110.2 kg (243 lb) 112.9 kg (248 lb 14.4 oz) 112.9 kg (249 lb) 114.2 kg (251 lb 12.8 oz)    03/29/22 0439   Weight: 113.2 kg (249 lb 9.6 oz)            Data:   Labs:   Lab Results   Component Value Date    WBC 16.2 03/25/2022    WBC 12.4 07/01/2021     Lab Results   Component Value Date    RBC 3.33 03/25/2022    RBC 4.36 07/01/2021     Lab Results   Component Value Date    HGB 9.9 03/25/2022    HGB 14.1 07/01/2021     Lab Results   Component Value Date    HCT 31.6 03/25/2022    HCT 42.9 07/01/2021     No components found for: MCT  Lab Results   Component Value Date    MCV 95 03/25/2022    MCV 98 07/01/2021     Lab Results   Component Value Date    MCH 29.7 03/25/2022    MCH 32.3 07/01/2021     Lab Results   Component Value Date    MCHC 31.3 03/25/2022    MCHC 32.9 07/01/2021     Lab Results   Component Value Date    RDW 15.2 03/25/2022    RDW 13.7 07/01/2021     Lab Results   Component Value Date     03/25/2022     07/01/2021       Last Basic Metabolic Panel:  Lab Results   Component Value Date     03/25/2022     07/01/2021    "   Lab Results   Component Value Date    POTASSIUM 5.0 03/25/2022    POTASSIUM 4.6 07/01/2021     Lab Results   Component Value Date    CHLORIDE 109 03/25/2022    CHLORIDE 105 07/01/2021     Lab Results   Component Value Date    SUE 8.2 03/25/2022    SUE 9.3 07/01/2021     Lab Results   Component Value Date    CO2 29 03/25/2022    CO2 27 07/01/2021     Lab Results   Component Value Date    BUN 25 03/25/2022    BUN 13 07/01/2021     Lab Results   Component Value Date    CR 1.19 03/25/2022    CR 1.09 07/01/2021     Lab Results   Component Value Date     03/25/2022     03/25/2022    GLC 85 07/01/2021       No results found for this or any previous visit (from the past 24 hour(s)).    SAL Fuchs, ACNPC-AG, CCRN  Nurse Practitioner  Cardiothoracic Surgery  Pager: [phone]

## 2022-03-30 ENCOUNTER — APPOINTMENT (OUTPATIENT)
Dept: PHYSICAL THERAPY | Facility: CLINIC | Age: 67
DRG: 235 | End: 2022-03-30
Attending: SURGERY
Payer: COMMERCIAL

## 2022-03-30 VITALS
BODY MASS INDEX: 39.73 KG/M2 | SYSTOLIC BLOOD PRESSURE: 125 MMHG | HEART RATE: 74 BPM | OXYGEN SATURATION: 97 % | TEMPERATURE: 99 F | DIASTOLIC BLOOD PRESSURE: 63 MMHG | WEIGHT: 247.2 LBS | HEIGHT: 66 IN | RESPIRATION RATE: 20 BRPM

## 2022-03-30 LAB
ANION GAP SERPL CALCULATED.3IONS-SCNC: 3 MMOL/L (ref 3–14)
BUN SERPL-MCNC: 31 MG/DL (ref 7–30)
CALCIUM SERPL-MCNC: 8.7 MG/DL (ref 8.5–10.1)
CHLORIDE BLD-SCNC: 102 MMOL/L (ref 94–109)
CO2 SERPL-SCNC: 32 MMOL/L (ref 20–32)
CREAT SERPL-MCNC: 1.25 MG/DL (ref 0.66–1.25)
ERYTHROCYTE [DISTWIDTH] IN BLOOD BY AUTOMATED COUNT: 14.8 % (ref 10–15)
GFR SERPL CREATININE-BSD FRML MDRD: 64 ML/MIN/1.73M2
GLUCOSE BLD-MCNC: 115 MG/DL (ref 70–99)
GLUCOSE BLDC GLUCOMTR-MCNC: 137 MG/DL (ref 70–99)
GLUCOSE BLDC GLUCOMTR-MCNC: 166 MG/DL (ref 70–99)
HCT VFR BLD AUTO: 24.3 % (ref 40–53)
HGB BLD-MCNC: 7.8 G/DL (ref 13.3–17.7)
MAGNESIUM SERPL-MCNC: 2.1 MG/DL (ref 1.6–2.3)
MCH RBC QN AUTO: 29.2 PG (ref 26.5–33)
MCHC RBC AUTO-ENTMCNC: 32.1 G/DL (ref 31.5–36.5)
MCV RBC AUTO: 91 FL (ref 78–100)
PHOSPHATE SERPL-MCNC: 3.5 MG/DL (ref 2.5–4.5)
PLATELET # BLD AUTO: 293 10E3/UL (ref 150–450)
POTASSIUM BLD-SCNC: 3.7 MMOL/L (ref 3.4–5.3)
PROCALCITONIN SERPL-MCNC: 0.18 NG/ML
RBC # BLD AUTO: 2.67 10E6/UL (ref 4.4–5.9)
SODIUM SERPL-SCNC: 137 MMOL/L (ref 133–144)
WBC # BLD AUTO: 13.9 10E3/UL (ref 4–11)

## 2022-03-30 PROCEDURE — 97530 THERAPEUTIC ACTIVITIES: CPT | Mod: GP | Performed by: PHYSICAL THERAPIST

## 2022-03-30 PROCEDURE — 97110 THERAPEUTIC EXERCISES: CPT | Mod: GP | Performed by: PHYSICAL THERAPIST

## 2022-03-30 PROCEDURE — 83735 ASSAY OF MAGNESIUM: CPT | Performed by: SURGERY

## 2022-03-30 PROCEDURE — 250N000013 HC RX MED GY IP 250 OP 250 PS 637: Performed by: PHYSICIAN ASSISTANT

## 2022-03-30 PROCEDURE — 250N000011 HC RX IP 250 OP 636: Performed by: PHYSICIAN ASSISTANT

## 2022-03-30 PROCEDURE — 85027 COMPLETE CBC AUTOMATED: CPT | Performed by: NURSE PRACTITIONER

## 2022-03-30 PROCEDURE — 250N000013 HC RX MED GY IP 250 OP 250 PS 637: Performed by: NURSE PRACTITIONER

## 2022-03-30 PROCEDURE — 250N000013 HC RX MED GY IP 250 OP 250 PS 637: Performed by: SURGERY

## 2022-03-30 PROCEDURE — 82310 ASSAY OF CALCIUM: CPT | Performed by: NURSE PRACTITIONER

## 2022-03-30 PROCEDURE — 84100 ASSAY OF PHOSPHORUS: CPT | Performed by: SURGERY

## 2022-03-30 PROCEDURE — 84145 PROCALCITONIN (PCT): CPT | Performed by: NURSE PRACTITIONER

## 2022-03-30 PROCEDURE — 36415 COLL VENOUS BLD VENIPUNCTURE: CPT | Performed by: NURSE PRACTITIONER

## 2022-03-30 RX ORDER — POTASSIUM CHLORIDE 1500 MG/1
20 TABLET, EXTENDED RELEASE ORAL DAILY
Qty: 7 TABLET | Refills: 0 | Status: SHIPPED | OUTPATIENT
Start: 2022-03-30 | End: 2022-04-06

## 2022-03-30 RX ORDER — METHOCARBAMOL 500 MG/1
500 TABLET, FILM COATED ORAL EVERY 6 HOURS PRN
Qty: 60 TABLET | Refills: 0 | Status: SHIPPED | OUTPATIENT
Start: 2022-03-30 | End: 2022-09-28

## 2022-03-30 RX ORDER — METOPROLOL TARTRATE 100 MG
100 TABLET ORAL 2 TIMES DAILY
Qty: 60 TABLET | Refills: 3 | Status: SHIPPED | OUTPATIENT
Start: 2022-03-30 | End: 2022-08-01

## 2022-03-30 RX ORDER — FUROSEMIDE 40 MG
40 TABLET ORAL DAILY
Qty: 7 TABLET | Refills: 0 | Status: SHIPPED | OUTPATIENT
Start: 2022-03-30 | End: 2022-04-13

## 2022-03-30 RX ORDER — AMOXICILLIN 250 MG
1 CAPSULE ORAL 2 TIMES DAILY PRN
Qty: 60 TABLET | Refills: 0 | Status: SHIPPED | OUTPATIENT
Start: 2022-03-30 | End: 2022-05-17

## 2022-03-30 RX ORDER — POLYETHYLENE GLYCOL 3350 17 G/17G
17 POWDER, FOR SOLUTION ORAL DAILY PRN
Qty: 510 G | Refills: 0 | Status: SHIPPED | OUTPATIENT
Start: 2022-03-30 | End: 2022-04-13 | Stop reason: ALTCHOICE

## 2022-03-30 RX ORDER — POTASSIUM CHLORIDE 1500 MG/1
20 TABLET, EXTENDED RELEASE ORAL ONCE
Status: COMPLETED | OUTPATIENT
Start: 2022-03-30 | End: 2022-03-30

## 2022-03-30 RX ORDER — ASPIRIN 81 MG/1
162 TABLET, CHEWABLE ORAL DAILY
Qty: 60 TABLET | Refills: 0 | Status: SHIPPED | OUTPATIENT
Start: 2022-03-31 | End: 2024-04-15

## 2022-03-30 RX ORDER — ACETAMINOPHEN 325 MG/1
650 TABLET ORAL EVERY 4 HOURS PRN
DISCHARGE
Start: 2022-03-30 | End: 2022-09-28

## 2022-03-30 RX ADMIN — ASPIRIN 81 MG CHEWABLE TABLET 162 MG: 81 TABLET CHEWABLE at 08:47

## 2022-03-30 RX ADMIN — SIMETHICONE 80 MG: 80 TABLET, CHEWABLE ORAL at 12:05

## 2022-03-30 RX ADMIN — SIMETHICONE 80 MG: 80 TABLET, CHEWABLE ORAL at 08:47

## 2022-03-30 RX ADMIN — ATORVASTATIN CALCIUM 80 MG: 40 TABLET, FILM COATED ORAL at 08:47

## 2022-03-30 RX ADMIN — POTASSIUM CHLORIDE 20 MEQ: 1500 TABLET, EXTENDED RELEASE ORAL at 08:48

## 2022-03-30 RX ADMIN — METOPROLOL TARTRATE 100 MG: 100 TABLET, FILM COATED ORAL at 08:48

## 2022-03-30 RX ADMIN — FUROSEMIDE 40 MG: 10 INJECTION, SOLUTION INTRAMUSCULAR; INTRAVENOUS at 08:47

## 2022-03-30 RX ADMIN — PANTOPRAZOLE SODIUM 40 MG: 40 TABLET, DELAYED RELEASE ORAL at 08:47

## 2022-03-30 RX ADMIN — HEPARIN SODIUM 5000 UNITS: 5000 INJECTION, SOLUTION INTRAVENOUS; SUBCUTANEOUS at 06:19

## 2022-03-30 RX ADMIN — INSULIN ASPART 1 UNITS: 100 INJECTION, SOLUTION INTRAVENOUS; SUBCUTANEOUS at 12:05

## 2022-03-30 ASSESSMENT — ACTIVITIES OF DAILY LIVING (ADL)
ADLS_ACUITY_SCORE: 7
DEPENDENT_IADLS:: INDEPENDENT
ADLS_ACUITY_SCORE: 7

## 2022-03-30 NOTE — PLAN OF CARE
Discharge Note    Patient discharged to home, transport ride arriving at door 2.   PIV discontinued.   Prescriptions sent with patient.   Belongings reviewed and sent with patient.   Patient verbalizes understanding of discharge instructions. AVS given to patient.

## 2022-03-30 NOTE — PLAN OF CARE
Upon round patient received in bed. Alert and oriented but forgetful. Vital WNL. Breath sounds clear. incentive spirometer in used. RA, no concerns. Sternal incisions open to air, well approximated. Right wrist peripheral IV, saline lock flush and blood return. Site clean and intact. Denied pain. Heparin subcutaneous. Blood sugar within normal range, no coverage. Continent with bowel/bladder. Voiding adequately, using urinal. No distress nor pain verbalized. Safety maintained.

## 2022-03-30 NOTE — PLAN OF CARE
A+Ox4 but forgetful VSS on room air. Tele NSR. Lung sounds clear. Sternotomy and vein harvest sites bruised. Bowels active, flatus+, BM+. Voiding adequately. Denies pain. Up SBA. Tolerating diet.

## 2022-03-30 NOTE — DISCHARGE SUMMARY
[facility]  Cardiothoracic Surgery Hospital Discharge Summary     Roger Bonilla MRN# [id number]   Age: 66 year old YOB: 1955     Admitting Physician:  Glenna Waters MD  Discharge Physician:  Kylie Thayer NP  Primary Care Physician:        Maria De Jesus Hay     DATE OF ADMISSION: 3/24/2022      DATE OF DISCHARGE: March 30, 2022     Admit Wt: 243 lbs  Discharge Wt: 247 lbs          Primary Diagnoses:     Severe multivessel coronary artery disease s/p CABG x3          Secondary Diagnoses:     HTN    HLD    Carotid stenosis s/p previous right carotid endarterectomy    Delirium, resolved    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  Surgeon: Dr. Glenna Waters  1) Coronary artery bypass grafting x 3.              - Left internal mammary artery to left anterior descending artery              - Reversed saphenous vein graft to right posterior descending artery              - Reversed saphenous vein graft to obtuse marginal artery   2) Endoscopic vein harvest left and right lower extremity  3) Transesophageal echocardiogram on 3/24/22 with Dr. Glenna Waters    INTRAOPERATIVE FINDINGS:    ) Ejection fraction: 60%  2) Left internal mammary artery 2.25mm and excellent flow.  3) Left and right greater saphenous vein 3.5-4mm and suitable for bypass.  4) Left anterior descending artery 2.5mm and free of disease at anastomosis.  5) Right posterior descending artery 1.75mm and free of disease at anastomosis.  6) Obtuse marginal artery 2mm and free of disease at anastomosis.    PATHOLOGY RESULTS:  none     CULTURE RESULTS:  none    CONSULTS:      PT/OT    Intensivist    BRIEF HISTORY OF ILLNESS:    Roger  Chad is a 66 year old gentleman admitted with shortness of breath found to have multivessel coronary artery disease and also severe right carotid disease. His case was discussed with the Heart and vascular team. We offered the patient right carotid endarterectomy followed by CAB which he did undergo.    PMH includes: CAD, HLD, DM2, HTN, carotid stenosis  Most recent echocardiogram demonstrates LVEF 60-65%, normal RV function, no valvulopathies.    HOSPITAL COURSE: Roger Bonilla is a 66 year old male who on 3/24/2022 underwent the above-named procedures and tolerated the operation well.     Postoperatively was admitted to the ICU.  Patient was extubated within protocol on POD #0.  Blood pressure and cardiac index were managed with vasopressors and inotropic agents which were continuously weaned until no longer needed.  Patient was subsequently  transferred to the surgical telemetry floor.    While on the surgical unit, the patient continued to progress well. Chest tubes and temporary pacemaker wires were removed when deemed appropriate.     Patient was fluid overloaded and treated with diuretics. He remains up about 4lbs from preoperative weight and will discharge with 7 days of diuretic therapy and potassium supplementation; will re-evaluate need in clinic follow-up.      Patient was transiently hyperglycemic and treated with insulin infusion then transitioned to sliding scale insulin per protocol. Blood sugars remained stable. No further glycemic control agents needed at this time.    Mr. Bonilla did develop postoperative delirium with associated hallucinations. Once delirium precautions were optimized and he was able to sleep better with one dose of seroquel, this resolved. He does have a baseline tremor of head/neck. He also developed left ulnar neuropathy secondary to OR positioning. This should resolve within 1-3 months. We will reevaluate at time of postoperative appointment.    Roger had a persistent  "postoperative leukocytosis betweek 12-14 with no associated fevers, chills, myalgias or other s/sx of infection. Procalcitonin 0.2 and 0.18 respectively. UA bland, sputum culture negative and blood cultures with NGTD. CXR without infiltrate or effusion warranting thoracentesis. This is likely reactive secondary to surgery.     Prior to discharge, his pain was controlled well, he was working well with therapies, able to perform most ADLs, ambulate without difficulty, and had full return of bowel and bladder function.  On March 30, 2022, he was discharged to home with the help of wife and son in stable condition. Follow up with cardiology and cardiac surgery have been arranged. Pt encouraged to follow up with PCP and cardiac rehab upon discharge.    Patient discharged on aspirin:  Yes 162 mg  Patient discharged on beta blocker: yes    Patient discharged on ACE Inhibitor/ARB: no      Patient discharged on statin: yes          Discharge Disposition:     Discharged to home            Condition on Discharge:     Discharge condition: Stable   Discharge vitals: Blood pressure 125/63, pulse 74, temperature 99  F (37.2  C), temperature source Oral, resp. rate (Abnormal) 126, height 1.676 m (5' 6\"), weight 112.1 kg (247 lb 3.2 oz), SpO2 97 %.     Code status on discharge: Full Code     Vitals:    03/29/22 0439 03/30/22 0427 03/30/22 0500   Weight: 113.2 kg (249 lb 9.6 oz) 113.2 kg (249 lb 9.6 oz) 112.1 kg (247 lb 3.2 oz)       DAY OF DISCHARGE PHYSICAL EXAM:    Gen: A&Ox4, NAD  Neuro: Intact, no focal deficits   CV: RRR, normal S1 S2, no murmurs, rubs or gallops. no JVD  Pulm: CTA, no wheezing or rhonchi, normal breathing on RA  Abd: nondistended, normal BS, soft, nontender  Ext: 2+ peripheral edema, no pitting  Incision: clean, dry, intact, no erythema, sternum stable  Tubes/drain sites: dressing clean and dry    LABS  Most Recent 3 CBC's:Recent Labs   Lab Test 03/30/22  0527 03/29/22  0554 03/28/22  0414   WBC 13.9* 12.7* " 14.1*   HGB 7.8* 8.1* 8.4*   MCV 91 95 93    248 193      Most Recent 3 BMP's:  Recent Labs   Lab Test 03/30/22  0725 03/30/22  0527 03/29/22  2135 03/29/22  1214 03/29/22  0554 03/28/22  1224 03/28/22  0414   NA  --  137  --   --  137  --  136   POTASSIUM  --  3.7  --   --  4.0  --  4.5   CHLORIDE  --  102  --   --  103  --  103   CO2  --  32  --   --  29  --  28   BUN  --  31*  --   --  35*  --  37*   CR  --  1.25  --   --  1.28*  --  1.27*   ANIONGAP  --  3  --   --  5  --  5   SUE  --  8.7  --   --  8.8  --  8.9   * 115* 158*   < > 117*   < > 127*    < > = values in this interval not displayed.     Most Recent 2 LFT's:  Recent Labs   Lab Test 03/28/22  0414 03/27/22  0514   AST 24 17   ALT 20 13   ALKPHOS 65 59   BILITOTAL 0.7 0.5     Most Recent INR's and Anticoagulation Dosing History:  Anticoagulation Dose History     Recent Dosing and Labs Latest Ref Rng & Units 10/27/2008 9/13/2013 12/29/2021 2/19/2022 3/24/2022 3/24/2022    INR 0.85 - 1.15 0.94 0.94 0.98 1.03 1.41(H) 1.31(H)        Most Recent 3 Troponin's:No lab results found.  Most Recent Cholesterol Panel:  Recent Labs   Lab Test 03/11/22  0949   CHOL 136   LDL 80   HDL 39*   TRIG 86     Most Recent 6 Bacteria Isolates From Any Culture (See EPIC Reports for Culture Details):No lab results found.  Most Recent TSH, T4 and A1c Labs:  Recent Labs   Lab Test 03/11/22  0949 07/20/21  0437 06/23/21  1156   TSH  --   --  0.35*   T4  --   --  1.32   A1C 6.6*   < >  --     < > = values in this interval not displayed.     Recent Labs   Lab 03/30/22  0725 03/30/22  0527 03/29/22  2135 03/29/22  1729 03/29/22  1214 03/29/22  0554   * 115* 158* 149* 144* 117*     Results for orders placed or performed during the hospital encounter of 03/24/22   XR Abdomen Port 1 View    Narrative    XR ABDOMEN PORT 1 VIEWS 3/24/2022 3:12 PM    HISTORY: Check NG/OG tube placement    COMPARISON: None.      Impression    IMPRESSION: Enteric tube in good position in  the stomach.    NAINA JON MD         SYSTEM ID:  [id number]   XR Chest Port 1 View    Narrative    CHEST ONE VIEW PORTABLE  3/24/2022 3:13 PM       INDICATION: Postop cardiovascular thoracic surgery.    COMPARISON: None.      Impression    IMPRESSION: The left internal jugular [place] catheter is coiled in  the right atrium and ventricle. Tip appears to be in the right  ventricle. Endotracheal tube tip is at the luz. Enteric tube  courses below the diaphragm. Mediastinal and left chest tubes present.  No pneumothorax. Mild left basilar atelectasis. Lungs are otherwise  clear.    NAINA JON MD         SYSTEM ID:  [id number]   XR Chest Port 1 View    Narrative    CHEST ONE VIEW  3/25/2022 5:45 AM     HISTORY: Postop cardiovascular thoracic surgery.    COMPARISON: March 24, 2021      Impression    IMPRESSION: [place] catheter removed, sheath remains in place.  Endotracheal and orogastric tubes removed. Chest tubes remain in  place. Tiny apical pneumothorax on the left. Small amount of probable  atelectasis at the left base. Question minimal pleural fluid  bilaterally.    LOLITA PRITCHETT MD         SYSTEM ID:  [id number]   XR Chest Port 1 View    Narrative    CHEST ONE VIEW PORTABLE  3/24/2022 3:17 PM       INDICATION: PA catheter placement.    COMPARISON: 3/24/2022       Impression    IMPRESSION: The pulmonary artery catheter has been partially pulled  back. The catheter still loops once in the right ventricle with tip in  the right ventricle. Remainder unchanged.    NAINA JON MD         SYSTEM ID:  [id number]   XR Chest 2 Views    Narrative    EXAM: XR CHEST 2 VW  LOCATION: [facility]  DATE/TIME: 3/27/2022 7:30 AM    INDICATION: s p cabg, chest tubes removed  COMPARISON: 03/25/2022 and older studies.      Impression    IMPRESSION: Poststernotomy changes. Mediastinal and left pleural chest tubes have been removed. No pneumothorax. There are trace bilateral pleural effusions, left  greater than right with some linear areas of atelectasis in the left base. Heart is   slightly enlarged. No signs of failure.       PRE-ADMISSION MEDICATIONS:  Medications Prior to Admission   Medication Sig Dispense Refill Last Dose     atorvastatin (LIPITOR) 80 MG tablet Take 80 mg by mouth every morning   3/23/2022 at am     ezetimibe (ZETIA) 10 MG tablet Take 1 tablet (10 mg) by mouth daily 30 tablet 11 3/23/2022 at am     metFORMIN (GLUCOPHAGE) 1000 MG tablet Take 1,000 mg by mouth daily (with breakfast)   3/23/2022 at am     omeprazole (PRILOSEC) 40 MG DR capsule Take 40 mg by mouth every morning   3/24/2022 at 0300     [DISCONTINUED] aspirin 81 MG EC tablet Take 1 tablet (81 mg) by mouth daily 90 tablet 3 3/23/2022 at am     [DISCONTINUED] isosorbide mononitrate (IMDUR) 30 MG 24 hr tablet Take 1 tablet (30 mg) by mouth daily 90 tablet 3 3/24/2022 at 0300     [DISCONTINUED] lisinopril (ZESTRIL) 40 MG tablet Take 40 mg by mouth every morning   3/23/2022 at am     [DISCONTINUED] metoprolol succinate ER (TOPROL-XL) 50 MG 24 hr tablet Take 50 mg by mouth every morning   3/24/2022 at 0300       DISCHARGE MEDICATIONS:      Review of your medicines      START taking      Dose / Directions   acetaminophen 325 MG tablet  Commonly known as: TYLENOL  Used for: S/P CABG x 3   Dose: 650 mg  Take 2 tablets (650 mg) by mouth every 4 hours as needed for other (For optimal non-opioid multimodal pain management to improve pain control.)  Refills: 0     aspirin 81 MG chewable tablet  Commonly known as: ASA  Used for: S/P CABG x 3  Replaces: aspirin 81 MG EC tablet   Dose: 162 mg  Start taking on: March 31, 2022  2 tablets (162 mg) by Oral or NG Tube route daily  Quantity: 60 tablet  Refills: 0     furosemide 40 MG tablet  Commonly known as: LASIX  Used for: S/P CABG x 3   Dose: 40 mg  Take 1 tablet (40 mg) by mouth daily for 7 days  Quantity: 7 tablet  Refills: 0     methocarbamol 500 MG tablet  Commonly known as: ROBAXIN  Used  for: S/P CABG x 3   Dose: 500 mg  Take 1 tablet (500 mg) by mouth every 6 hours as needed for muscle spasms or other (pain)  Quantity: 60 tablet  Refills: 0     metoprolol tartrate 100 MG tablet  Commonly known as: LOPRESSOR  Used for: S/P CABG x 3   Dose: 100 mg  Take 1 tablet (100 mg) by mouth 2 times daily  Quantity: 60 tablet  Refills: 3     polyethylene glycol 17 GM/Dose powder  Commonly known as: MIRALAX  Used for: S/P CABG x 3   Dose: 17 g  Take 17 g by mouth daily as needed for constipation  Quantity: 510 g  Refills: 0     potassium chloride ER 20 MEQ CR tablet  Commonly known as: KLOR-CON M  Used for: S/P CABG x 3   Dose: 20 mEq  Take 1 tablet (20 mEq) by mouth daily for 7 days  Quantity: 7 tablet  Refills: 0     senna-docusate 8.6-50 MG tablet  Commonly known as: SENOKOT-S/PERICOLACE  Used for: S/P CABG x 3   Dose: 1 tablet  Take 1 tablet by mouth 2 times daily as needed for constipation  Quantity: 60 tablet  Refills: 0        CONTINUE these medicines which have NOT CHANGED      Dose / Directions   atorvastatin 80 MG tablet  Commonly known as: LIPITOR   Dose: 80 mg  Take 80 mg by mouth every morning  Refills: 0     ezetimibe 10 MG tablet  Commonly known as: ZETIA  Used for: Hyperlipidemia LDL goal <70   Dose: 10 mg  Take 1 tablet (10 mg) by mouth daily  Quantity: 30 tablet  Refills: 11     metFORMIN 1000 MG tablet  Commonly known as: GLUCOPHAGE   Dose: 1,000 mg  Take 1,000 mg by mouth daily (with breakfast)  Refills: 0     omeprazole 40 MG DR capsule  Commonly known as: priLOSEC   Dose: 40 mg  Take 40 mg by mouth every morning  Refills: 0        STOP taking    aspirin 81 MG EC tablet  Commonly known as: ASA  Replaced by: aspirin 81 MG chewable tablet     isosorbide mononitrate 30 MG 24 hr tablet  Commonly known as: IMDUR     lisinopril 40 MG tablet  Commonly known as: ZESTRIL     metoprolol succinate ER 50 MG 24 hr tablet  Commonly known as: TOPROL-XL           Where to get your medicines      These  medications were sent to [facility], [place] - [address] [address]  [address] [address], [facility] [address]    Phone: [phone]     aspirin 81 MG chewable tablet    furosemide 40 MG tablet    methocarbamol 500 MG tablet    metoprolol tartrate 100 MG tablet    polyethylene glycol 17 GM/Dose powder    potassium chloride ER 20 MEQ CR tablet    senna-docusate 8.6-50 MG tablet          CC:[facility]   Cardiothoracic Surgery  Office phone: [phone]  Office fax: [phone]

## 2022-03-30 NOTE — CARE PLAN
Physical Therapy Discharge Summary    Reason for therapy discharge:    Discharged to home with OP CR phase II with family support. RN reports pt discharging very soon.     Progress towards therapy goal(s). See goals on Care Plan in [facility] electronic health record for goal details.  Goals partially met.  Barriers to achieving goals:   discharge from facility.    Therapy recommendation(s):    Continued therapy is recommended.  Rationale/Recommendations:  Pt will benefit from OP CR phase II for continued close monitoring of VS with activity progression and VS assessment. . **Pt not seen by discharging therapist on this date, note written based on previous treating therapist's notes and recommendations.

## 2022-03-30 NOTE — CONSULTS
Care Management Initial Consult    General Information  Assessment completed with: Patient,    Type of CM/SW Visit: Offer D/C Planning    Primary Care Provider verified and updated as needed: Yes   Readmission within the last 30 days: no previous admission in last 30 days      Reason for Consult: discharge planning  Advance Care Planning:            Communication Assessment  Patient's communication style: spoken language (English or Bilingual)    Hearing Difficulty or Deaf: no   Wear Glasses or Blind: no    Cognitive  Cognitive/Neuro/Behavioral: .WDL except  Level of Consciousness: alert  Arousal Level: opens eyes spontaneously  Orientation: oriented x 4  Mood/Behavior: flat affect, calm, cooperative  Best Language: 0 - No aphasia  Speech: spontaneous, logical, hoarse    Living Environment:   People in home: child(alberta), adult, spouse     Current living Arrangements: house      Able to return to prior arrangements: yes       Family/Social Support:  Care provided by: self  Provides care for: no one  Marital Status:   Wife, Children          Description of Support System: Involved         Current Resources:   Patient receiving home care services: No     Community Resources:    Equipment currently used at home: none  Supplies currently used at home:      Employment/Financial:  Employment Status: retired        Financial Concerns:             Lifestyle & Psychosocial Needs:  Social Determinants of Health     Tobacco Use: High Risk     Smoking Tobacco Use: Current Every Day Smoker     Smokeless Tobacco Use: Never Used   Alcohol Use: Not on file   Financial Resource Strain: Not on file   Food Insecurity: Not on file   Transportation Needs: Not on file   Physical Activity: Not on file   Stress: Not on file   Social Connections: Not on file   Intimate Partner Violence: Not on file   Depression: Not at risk     PHQ-2 Score: 2   Housing Stability: Not on file       Functional Status:  Prior to admission patient needed  assistance:   Dependent ADLs:: Independent  Dependent IADLs:: Independent  Assesssment of Functional Status: Not at baseline with ADL Functioning, Not at baseline with mobility, Not at  functional baseline    Mental Health Status:  Mental Health Status: No Current Concerns       Chemical Dependency Status:  Chemical Dependency Status: No Current Concerns             Values/Beliefs:  Spiritual, Cultural Beliefs, Zoroastrian Practices, Values that affect care: no               Care Management Discharge Note    Discharge Date: 03/30/2022       Discharge Disposition: Home    Discharge Services: None    Discharge DME: None    Discharge Transportation: family or friend will provide    Private pay costs discussed: Not applicable    PAS Confirmation Code:    Patient/family educated on Medicare website which has current facility and service quality ratings:      Education Provided on the Discharge Plan:  yes  Persons Notified of Discharge Plans: Son  Patient/Family in Agreement with the Plan: yes    Handoff Referral Completed: Yes    Additional Information:  Met with patient in room, introduced self and role in discharge planning.  Patient stataing he lives with spouse and son who will be able to assist if needed. Patient states he is not at baseline with mobility but confident that with therapy he will be able to manage. Discussed having son manage his medications as patient states his r hand fingers are numb at this time and will take a few weeks to recover. Noted OT notes that patients  SLUMS score 16/30 indicates severe cognitive deficits that would impair safety in ADL/IADLs at home. Pt requires 24/7 assist in IADLs and ADLs. Spoke with patients julia Guo who is in agreement with the plan. Follow up appointment completed. Son will be in hospital around 1:30 pm       Farhat Cheney RN  Inpatient Care Coordinator  [facility]  # [phone]

## 2022-03-31 ENCOUNTER — PATIENT OUTREACH (OUTPATIENT)
Dept: NURSING | Facility: CLINIC | Age: 67
End: 2022-03-31
Payer: COMMERCIAL

## 2022-03-31 LAB
ATRIAL RATE - MUSE: 87 BPM
DIASTOLIC BLOOD PRESSURE - MUSE: NORMAL MMHG
INTERPRETATION ECG - MUSE: NORMAL
P AXIS - MUSE: 62 DEGREES
PR INTERVAL - MUSE: 170 MS
QRS DURATION - MUSE: 92 MS
QT - MUSE: 368 MS
QTC - MUSE: 442 MS
R AXIS - MUSE: 69 DEGREES
SYSTOLIC BLOOD PRESSURE - MUSE: NORMAL MMHG
T AXIS - MUSE: 53 DEGREES
VENTRICULAR RATE- MUSE: 87 BPM

## 2022-03-31 NOTE — LETTER
[facility] CARE COORDINATION    March 31, 2022    Roger Bonilla  [address]   [facility] [place] [address]      Dear Roger,    I am a clinic community health worker who works with SAL Kc CNP at [facility].  I wanted to thank you for spending the time to talk with me and I also wanted to provide you with my contact information to call me with any support and/or resource needs.  Below is a description of clinic care coordination and how we can further assist you.      The clinic care coordination team is made up of a registered nurse,  and community health worker who understand the health care system. The goal of clinic care coordination is to help you manage your health and improve access to the health care system in the most efficient manner. The team can assist you in meeting your health care goals by providing education, coordinating services, strengthening the communication among your providers and supporting you with any resource needs.    Please feel free to utilize [facility] Nurse Advice Line at [phone], 24 hours a day, 7 days a week.    We are focused on providing you with the highest-quality healthcare experience possible and that all starts with you.     Sincerely,       Madalyn TAMAYO  Community Health Worker  [facility]  Clinic Care Coordination  [facility]  [email or web address]  [email or web address]   Office: [phone]

## 2022-03-31 NOTE — PROGRESS NOTES
Clinic Care Coordination Contact  Community Health Worker Initial Outreach    3/24/2022 - 3/30/2022 (6 days)  [facility]    Reason for Referral:  per OT SLUMS score 16/30 indicates severe cognitive deficits in ADL/IADLs at home. Pt requires 24/7 assist in IADLs and ADLs if discharging Spoke with patients son regarding medication management    Notes:  TCM d/c 3/30, see IP SW note 3/30    Patient accepts CC: No, patient has OP rehab in place and stated that he has caregiver support. Patient was sent Care Coordination introduction letter for future reference.   _______________________________________  [facility]: Post-Discharge Note  SITUATION                                                      Admission:    Admission Date: 03/24/22   Reason for Admission: Severe multivessel coronary artery disease s/p CABG x3  Discharge:   Discharge Date: 03/30/22  Discharge Diagnosis: Severe multivessel coronary artery disease s/p CABG x3    BACKGROUND                                                      HOSPITAL COURSE: Roger Bonilla is a 66 year old male who on 3/24/2022 underwent the above-named procedures and tolerated the operation well.      Postoperatively was admitted to the ICU.  Patient was extubated within protocol on POD #0.  Blood pressure and cardiac index were managed with vasopressors and inotropic agents which were continuously weaned until no longer needed.  Patient was subsequently  transferred to the surgical telemetry floor.     While on the surgical unit, the patient continued to progress well. Chest tubes and temporary pacemaker wires were removed when deemed appropriate.     Patient was fluid overloaded and treated with diuretics. He remains up about 4lbs from preoperative weight and will discharge with 7 days of diuretic therapy and potassium supplementation; will re-evaluate need in clinic follow-up.      Patient was transiently hyperglycemic and treated with insulin infusion  then transitioned to sliding scale insulin per protocol. Blood sugars remained stable. No further glycemic control agents needed at this time.     Mr. Bonilla did develop postoperative delirium with associated hallucinations. Once delirium precautions were optimized and he was able to sleep better with one dose of seroquel, this resolved. He does have a baseline tremor of head/neck. He also developed left ulnar neuropathy secondary to OR positioning. This should resolve within 1-3 months. We will reevaluate at time of postoperative appointment.     Roger had a persistent postoperative leukocytosis betweek 12-14 with no associated fevers, chills, myalgias or other s/sx of infection. Procalcitonin 0.2 and 0.18 respectively. UA bland, sputum culture negative and blood cultures with NGTD. CXR without infiltrate or effusion warranting thoracentesis. This is likely reactive secondary to surgery.     Prior to discharge, his pain was controlled well, he was working well with therapies, able to perform most ADLs, ambulate without difficulty, and had full return of bowel and bladder function.  On March 30, 2022, he was discharged to home with the help of wife and son in stable condition. Follow up with cardiology and cardiac surgery have been arranged. Pt encouraged to follow up with PCP and cardiac rehab upon discharge.    ASSESSMENT      Enrollment  Primary Care Care Coordination Status: Declined    Discharge Assessment  How are you doing now that you are home?: Patient reports he is fine  How are your symptoms? (Red Flag symptoms escalate to triage hotline per guidelines): Improved  Do you feel your condition is stable enough to be safe at home until your provider visit?: Yes  Does the patient have their discharge instructions? : Yes  Does the patient have questions regarding their discharge instructions? : No  Were you started on any new medications or were there changes to any of your previous medications? : Yes  Does the  patient have all of their medications?: Yes  Do you have questions regarding any of your medications? : No  Discharge follow-up appointment scheduled within 14 calendar days? : Yes  Discharge Follow Up Appointment Date: 04/08/22  Discharge Follow Up Appointment Scheduled with?: Primary Care Provider    Post-op ([facility] CTA Only)  If the patient had a surgery or procedure, do they have any questions for a nurse?: No    Post-op (Clinicians Only)  Did the patient have surgery or a procedure: Yes  Fever: No  Chills: No  Eating & Drinking: eating and drinking without complaints/concerns    PLAN                                                      Outpatient Plan:      Future Appointments   Date Time Provider Department Center   4/6/2022  8:00 AM [facility]ARDIAC REHAB EVALUATIONS [facility]   4/8/2022 10:00 AM Maria De Jesus Hay APRN CNP [facility]   4/13/2022  3:30 PM [facility] CARDIOTHORACIC SURGERY, DIANDRA GAINES   5/25/2022 11:30 AM Parveen Ramirez MD [facility] PSA CLIN       For any urgent concerns, please contact our 24 hour nurse triage line: [phone] ([phone])       [facility] also provided [place]'s Nurse Advice Line at [phone],. 24 hours a day, 7 days a week.    Madalyn TAMAYO  Community Health Worker  [facility]  Clinic Care Coordination  [facility]  [email or web address]   Office: [phone]

## 2022-04-01 ENCOUNTER — TELEPHONE (OUTPATIENT)
Dept: CARDIOLOGY | Facility: CLINIC | Age: 67
End: 2022-04-01
Payer: COMMERCIAL

## 2022-04-01 NOTE — TELEPHONE ENCOUNTER
48 hour post op call    ACTIVITY  How are you doing with activity? Slow but thinks he is walking at least 30 minutes per day  Are you continuing to use your IS 3-4 times a day and do you have any shortness of breath? Yes using it 3-4 times a day, no shortness of breath    Are you weighing yourself daily and has it been stable? Yes weighing daily, weight has been stable    PAIN  How is your pain, what are you doing for your pain? Pain is tolerable and have not taken anything for pain    Are you still doing sternal precautions? yes  Do you hear any clicking when you are moving or taking a deep breath? No only clicking and popping in my knees     INCISION:  Took a shower yesterday. Discussed cleaning incision daily with antibacterial soap      ASSISTANCE  Do you have someone at home to help you with your daily activities and transportation needs? My son and wife      MEDICATIONS  I would like to review your discharge medications and answer any questions you may have.  My son and wife are helping me with that.      Are you on a blood thinner/Coumadin  no    Who is managing your Coumadin dosing/INR? n/a       FOLLOW UP       Scheduled for cardiac rehab?  4/6/22  Scheduled to see our PA or Surgeon? 4/13  Scheduled to see your cardiologist ? 5/25  Scheduled to see your primary care physician? 4/8  Do you have our contact information? yes    STOPLIGHT TOOL- patient reports he has never seen it

## 2022-04-02 LAB
BACTERIA BLD CULT: NO GROWTH
BACTERIA BLD CULT: NO GROWTH

## 2022-04-04 ENCOUNTER — TELEPHONE (OUTPATIENT)
Dept: FAMILY MEDICINE | Facility: CLINIC | Age: 67
End: 2022-04-04
Payer: COMMERCIAL

## 2022-04-04 ENCOUNTER — NURSE TRIAGE (OUTPATIENT)
Dept: NURSING | Facility: CLINIC | Age: 67
End: 2022-04-04
Payer: COMMERCIAL

## 2022-04-04 NOTE — TELEPHONE ENCOUNTER
[employer] RN reports patient complaining of leg pain, can barely walk around. Pt's son is staying with him, but no home care or TCU. RN wanted PCP to know.    Sridevi Cantor, ROSY Vallejo

## 2022-04-04 NOTE — TELEPHONE ENCOUNTER
I called the patient and no answer. It is 6pm so I left him a message to call the Nurse Advise line as soon as possible.

## 2022-04-04 NOTE — TELEPHONE ENCOUNTER
Covering for PCP (out of clinic today): Could we please contact the patient for additional information and triage?  Looks like he recently had a CABG- is this possibly a complication from the leg vein harvest?  On 4/1 telephone call he reported pain was tolerable.      Thanks,  John Martinez MD

## 2022-04-04 NOTE — TELEPHONE ENCOUNTER
"\"Spanky\" is returning a VM from the clinic.    - Both legs are swollen - Increased since coming home on 3/30/22  - Denies weeping fluid  - He states that his able to walk without difficulty.  - He has been elevating his legs  - He has sensation in both legs  - Pain, (chest & legs), well controlled - only takes med at night  - Walking every 3-4 hours, (around the house)  - Urinating - more frequently than every 12 hours  - Feels progressively, though minimally, improving since hospital discharge    Denies shortness of breath, chest pressure or chest pain    Advised to see PCP within 24 hours; Call Surgeon within 24 hours  Care Advice reviewed    Notified that message would be sent to PCP & Surgery team    COVID 19 Nurse Triage Plan/Patient Instructions    Please be aware that novel coronavirus (COVID-19) may be circulating in the community. If you develop symptoms such as fever, cough, or SOB or if you have concerns about the presence of another infection including coronavirus (COVID-19), please contact your health care provider or visit [email or web address].     Disposition/Instructions    In-Person Visit with provider recommended. Reference Visit Selection Guide.    Thank you for taking steps to prevent the spread of this virus.  o Limit your contact with others.  o Wear a simple mask to cover your cough.  o Wash your hands well and often.    Resources    [facility]: About COVID-19: [email or web address]    CDC: What to Do If You're Sick: www.cdc.gov/coronavirus/2019-ncov/about/steps-when-sick.html    CDC: Ending Home Isolation: www.cdc.gov/coronavirus/2019-ncov/hcp/disposition-in-home-patients.html     CDC: Caring for Someone: www.cdc.gov/coronavirus/2019-ncov/if-you-are-sick/care-for-someone.html     [facility]: Interim Guidance for Hospital Discharge to Home: www.health.[facility].mn.us/diseases/coronavirus/hcp/hospdischarge.pdf    [facility] clinical trials (COVID-19 research " studies): [email or web address]     Below are the [employer]-19 hotlines at the [place] Department of Health ([facility]). Interpreters are available.   o For health questions: Call 906-384-0500 or 1-223.514.5389 (7 a.m. to 7 p.m.)  o For questions about schools and childcare: Call 345-616-4481 or 1-354.391.4259 (7 a.m. to 7 p.m.)     Chica Gonsales RN  [facility] Nurse Advisors      Reason for Disposition    [1] MODERATE leg swelling (e.g., swelling extends up to knees) AND [2] new onset or worsening    [1] Caller has NON-URGENT question AND [2] triager unable to answer question    Additional Information    Negative: [1] Difficulty breathing AND [2] severe (struggling for each breath, unable to speak or cry, grunting sounds,  severe retractions)    Negative: Sounds like a life-threatening emergency to the triager    Negative: Severe difficulty breathing (e.g., struggling for each breath, speaks in single words)    Negative: Looks like a broken bone or dislocated joint (e.g., crooked or deformed)    Negative: Sounds like a life-threatening emergency to the triager    Negative: Difficulty breathing at rest    Negative: Entire foot is cool or blue in comparison to other side    Negative: [1] Can't walk or can barely walk AND [2] new onset    Negative: [1] Difficulty breathing with exertion (e.g., walking) AND [2] new onset or worsening    Negative: [1] Red area or streak AND [2] fever    Negative: [1] Swelling is painful to touch AND [2] fever    Negative: [1] Cast on leg or ankle AND [2] now increased pain    Negative: Patient sounds very sick or weak to the triager    Negative: SEVERE leg swelling (e.g., swelling extends above knee, entire leg is swollen, weeping fluid)    Negative: [1] Red area or streak [2] large (> 2 in. or 5 cm)    Negative: [1] Thigh or calf pain AND [2] only 1 side AND [3] present > 1 hour    Negative: [1] Thigh, calf, or ankle swelling AND [2] only 1 side    Negative:  [1] Thigh, calf, or ankle swelling AND [2] bilateral AND [3] 1 side is more swollen    Negative: Fever > 100.4 F (38.0 C)    Negative: [1] Incision looks infected (spreading redness, pain) AND [2] fever > 99.5 F (37.5 C)    Negative: [1] Incision looks infected (spreading redness, pain) AND [2] large red area (> 2 in. or 5 cm)    Negative: [1] Incision looks infected (spreading redness, pain) AND [2] face wound    Negative: [1] Red streak runs from the incision AND [2] longer than 1 inch (2.5 cm)    Negative: [1] Pus or bad-smelling fluid draining from incision AND [2] no fever    Negative: Severe pain in the incision    Negative: [1] Incision gaping open AND [2] < 48 hours since wound re-opened    Negative: [1] Incision gaping open AND [2] length of opening > 2 inches (5 cm)    Negative: Patient sounds very sick or weak to the triager    Negative: Sounds like a serious complication to the triager    Negative: [1] Bleeding from incision AND [2] won't stop after 10 minutes of direct pressure    Negative: [1] Widespread rash AND [2] bright red, sunburn-like    Negative: [1] Major abdominal surgical incision AND [2] wound gaping open AND [3] visible internal organs    Negative: Sounds like a life-threatening emergency to the triager    Negative: [1] Post-op pain AND [2] not controlled with pain medications    Negative: Dressing soaked with blood or body fluid (e.g., drainage)    Negative: [1] Raised bruise and [2] size > 2 inches (5 cm) and expanding    Negative: [1] Caller has URGENT question AND [2] triager unable to answer question    Negative: [1] INCREASING pain in incision AND [2] > 2 days (48 hours) since surgery    Negative: [1] Small red area or streak AND [2] no fever    Negative: [1] Clear or blood-tinged fluid draining from wound AND [2] no fever    Negative: [1] Incision gaping open AND [2] > 48 hours since wound re-opened AND [3] length of opening > 1/2 inch (12 mm)    Negative: [1] Incision on face  gaping open after skin glue AND [2] > 48 hours since wound re-opened 3] length of opening > 1/4 inch (6 mm)    Negative: Suture or staple removal is overdue    Negative: [1] Suture or staple came out early AND [2] caller wants wound checked    Protocols used: LEG SWELLING AND EDEMA-A-AH, POST-OP INCISION SYMPTOMS-A-AH, LEG OR FOOT SWELLING-P-AH

## 2022-04-05 ENCOUNTER — HOSPITAL ENCOUNTER (EMERGENCY)
Facility: CLINIC | Age: 67
Discharge: HOME | End: 2022-04-05
Attending: FAMILY MEDICINE | Admitting: FAMILY MEDICINE
Payer: COMMERCIAL

## 2022-04-05 ENCOUNTER — APPOINTMENT (OUTPATIENT)
Dept: ULTRASOUND IMAGING | Facility: CLINIC | Age: 67
End: 2022-04-05
Attending: FAMILY MEDICINE
Payer: COMMERCIAL

## 2022-04-05 VITALS
OXYGEN SATURATION: 95 % | WEIGHT: 254 LBS | TEMPERATURE: 97.6 F | RESPIRATION RATE: 20 BRPM | SYSTOLIC BLOOD PRESSURE: 145 MMHG | DIASTOLIC BLOOD PRESSURE: 81 MMHG | HEART RATE: 65 BPM | BODY MASS INDEX: 41 KG/M2

## 2022-04-05 DIAGNOSIS — R60.0 BILATERAL LOWER EXTREMITY EDEMA: ICD-10-CM

## 2022-04-05 DIAGNOSIS — L08.9 WOUND INFECTION: ICD-10-CM

## 2022-04-05 DIAGNOSIS — I80.02 THROMBOPHLEBITIS OF SUPERFICIAL VEINS OF LEFT LOWER EXTREMITY: ICD-10-CM

## 2022-04-05 DIAGNOSIS — T14.8XXA WOUND INFECTION: ICD-10-CM

## 2022-04-05 LAB
ALBUMIN SERPL-MCNC: 2.7 G/DL (ref 3.4–5)
ALP SERPL-CCNC: 111 U/L (ref 40–150)
ALT SERPL W P-5'-P-CCNC: 77 U/L (ref 0–70)
ANION GAP SERPL CALCULATED.3IONS-SCNC: 7 MMOL/L (ref 3–14)
AST SERPL W P-5'-P-CCNC: 26 U/L (ref 0–45)
BASOPHILS # BLD AUTO: 0.1 10E3/UL (ref 0–0.2)
BASOPHILS NFR BLD AUTO: 0 %
BILIRUB SERPL-MCNC: 0.9 MG/DL (ref 0.2–1.3)
BUN SERPL-MCNC: 23 MG/DL (ref 7–30)
CALCIUM SERPL-MCNC: 9.2 MG/DL (ref 8.5–10.1)
CHLORIDE BLD-SCNC: 102 MMOL/L (ref 94–109)
CO2 SERPL-SCNC: 30 MMOL/L (ref 20–32)
CREAT SERPL-MCNC: 1.22 MG/DL (ref 0.66–1.25)
EOSINOPHIL # BLD AUTO: 0.4 10E3/UL (ref 0–0.7)
EOSINOPHIL NFR BLD AUTO: 2 %
ERYTHROCYTE [DISTWIDTH] IN BLOOD BY AUTOMATED COUNT: 15.4 % (ref 10–15)
GFR SERPL CREATININE-BSD FRML MDRD: 65 ML/MIN/1.73M2
GLUCOSE BLD-MCNC: 101 MG/DL (ref 70–99)
HCT VFR BLD AUTO: 28.1 % (ref 40–53)
HGB BLD-MCNC: 8.9 G/DL (ref 13.3–17.7)
IMM GRANULOCYTES # BLD: 0.2 10E3/UL
IMM GRANULOCYTES NFR BLD: 1 %
LYMPHOCYTES # BLD AUTO: 2.5 10E3/UL (ref 0.8–5.3)
LYMPHOCYTES NFR BLD AUTO: 13 %
MCH RBC QN AUTO: 29.7 PG (ref 26.5–33)
MCHC RBC AUTO-ENTMCNC: 31.7 G/DL (ref 31.5–36.5)
MCV RBC AUTO: 94 FL (ref 78–100)
MONOCYTES # BLD AUTO: 1.6 10E3/UL (ref 0–1.3)
MONOCYTES NFR BLD AUTO: 8 %
NEUTROPHILS # BLD AUTO: 14.9 10E3/UL (ref 1.6–8.3)
NEUTROPHILS NFR BLD AUTO: 76 %
NRBC # BLD AUTO: 0 10E3/UL
NRBC BLD AUTO-RTO: 0 /100
NT-PROBNP SERPL-MCNC: 3469 PG/ML (ref 0–900)
PLATELET # BLD AUTO: 647 10E3/UL (ref 150–450)
POTASSIUM BLD-SCNC: 4.5 MMOL/L (ref 3.4–5.3)
PROT SERPL-MCNC: 7 G/DL (ref 6.8–8.8)
RBC # BLD AUTO: 3 10E6/UL (ref 4.4–5.9)
SODIUM SERPL-SCNC: 139 MMOL/L (ref 133–144)
WBC # BLD AUTO: 19.5 10E3/UL (ref 4–11)

## 2022-04-05 PROCEDURE — 36415 COLL VENOUS BLD VENIPUNCTURE: CPT | Performed by: FAMILY MEDICINE

## 2022-04-05 PROCEDURE — 83880 ASSAY OF NATRIURETIC PEPTIDE: CPT | Performed by: FAMILY MEDICINE

## 2022-04-05 PROCEDURE — 99285 EMERGENCY DEPT VISIT HI MDM: CPT | Mod: 25 | Performed by: FAMILY MEDICINE

## 2022-04-05 PROCEDURE — 93970 EXTREMITY STUDY: CPT

## 2022-04-05 PROCEDURE — 80053 COMPREHEN METABOLIC PANEL: CPT | Performed by: FAMILY MEDICINE

## 2022-04-05 PROCEDURE — 93005 ELECTROCARDIOGRAM TRACING: CPT | Performed by: FAMILY MEDICINE

## 2022-04-05 PROCEDURE — 85004 AUTOMATED DIFF WBC COUNT: CPT | Performed by: FAMILY MEDICINE

## 2022-04-05 PROCEDURE — 250N000013 HC RX MED GY IP 250 OP 250 PS 637: Performed by: FAMILY MEDICINE

## 2022-04-05 PROCEDURE — 93010 ELECTROCARDIOGRAM REPORT: CPT | Performed by: FAMILY MEDICINE

## 2022-04-05 RX ORDER — FUROSEMIDE 40 MG
60 TABLET ORAL DAILY
Qty: 15 TABLET | Refills: 0 | Status: SHIPPED | OUTPATIENT
Start: 2022-04-05 | End: 2022-05-10

## 2022-04-05 RX ORDER — CEPHALEXIN 500 MG/1
1000 CAPSULE ORAL ONCE
Status: COMPLETED | OUTPATIENT
Start: 2022-04-05 | End: 2022-04-05

## 2022-04-05 RX ORDER — CEPHALEXIN 500 MG/1
500 CAPSULE ORAL 4 TIMES DAILY
Qty: 40 CAPSULE | Refills: 0 | Status: SHIPPED | OUTPATIENT
Start: 2022-04-05 | End: 2022-04-15

## 2022-04-05 RX ADMIN — RIVAROXABAN 10 MG: 10 TABLET, FILM COATED ORAL at 17:39

## 2022-04-05 RX ADMIN — CEPHALEXIN 1000 MG: 500 CAPSULE ORAL at 15:57

## 2022-04-05 NOTE — TELEPHONE ENCOUNTER
Covering:  Patient currently has appointment with PCP on 4/8; triage below suggests he should be seen today.  Could we please call to check on how he is doing today and if he feels symptoms can't wait until 4/8 visit, either schedule him in clinic for today or advise UC visit.    Thanks,  John Martinez MD

## 2022-04-05 NOTE — TELEPHONE ENCOUNTER
Patient is reached and he is somewhat better but sounds rough.  Still with leg swelling and chest and leg pain.  Advised he be seen sooner.  No openings here today.  Advised ADRIANA MARQUEZ or [place]. Saadia SCOTT RN

## 2022-04-05 NOTE — ED PROVIDER NOTES
History     Chief Complaint   Patient presents with     Leg Swelling     bilateral leg swelling past two days. Hx of bypass. Denies chest pain and or SOB     HPI  Roger Bonilla is a 66 year old male, past medical history is significant for ASCVD status post three-vessel CABG on 3/24/2022 with bilateral great saphenous vein harvesting, abdominal aortic aneurysm, type 2 diabetes, PAD, hypertension, tobacco abuse, morbid obesity, ABIGAIL, hyperlipidemia, peripheral vascular disease, presents to the emergency department at the request of his care team with concerns of bilateral lower extremity edema by the patient's account for approximately the last 2 days.  This is nonpainful, the patient states that he has been elevating his legs at night as directed.  He has not done leg wraps since leaving hospital.  He has central chest pain that he relates to the sternotomy which is gradually improving since being discharged home from hospital.  He has no increase in chest pain and he denies any shortness of breath above baseline.  It is uncomfortable with any movement of his chest not unexpectedly status post sternotomy.  He denies any fever chills or sweats.  No nausea or vomiting.  Has been eating and drinking normally    Allergies:  Allergies   Allergen Reactions     Nkda [No Known Drug Allergies]        Problem List:    Patient Active Problem List    Diagnosis Date Noted     S/P CABG (coronary artery bypass graft) 04/08/2022     Priority: Medium     x3       S/P carotid endarterectomy 04/08/2022     Priority: Medium     right       Atherosclerosis of native coronary artery of native heart with stable angina pectoris (H) 03/24/2022     Priority: Medium     Status post coronary angiogram 12/29/2021     Priority: Medium     Epigastric pain 07/19/2021     Priority: Medium     AAA (abdominal aortic aneurysm) (H) 06/25/2021     Priority: Medium     3.3 cm, consider repeat imaging in 2-3 years from 6/2021       Dehydration 06/23/2021      Priority: Medium     Hypomagnesemia 06/23/2021     Priority: Medium     Hypophosphatemia 06/23/2021     Priority: Medium     Atherosclerosis of both carotid arteries 06/20/2019     Priority: Medium     Coronary artery disease of native artery of native heart with stable angina pectoris (H) 08/04/2017     Priority: Medium     Type 2 diabetes mellitus without complication (H) 10/21/2015     Priority: Medium     PAD (peripheral artery disease) (H) 04/14/2014     Priority: Medium     Benign essential hypertension 08/05/2013     Priority: Medium     Tobacco abuse 11/13/2012     Priority: Medium     Morbid obesity (H) 12/01/2011     Priority: Medium     ABIGAIL (obstructive sleep apnea) 11/28/2011     Priority: Medium     Severe ABIGAIL with significant oxygen desaturations in REM (AHI 87.2, ba desat 66% in REM)  Incomplete titration with remaining events in supine sleep on CPAP 19 cm H2O.  Looked significantly improved during brief trial of bilevel PAP at 16/12 including lateral NREM / REM and supine NREM.         Hyperlipidemia LDL goal <70 09/30/2011     Priority: Medium     Disorder of bursae and tendons in shoulder region 11/10/2005     Priority: Medium     Problem list name updated by automated process. Provider to review       PVD (peripheral vascular disease) (H) 05/01/2001     Priority: Medium     MI -- Angioplasty two stents RCA & OM2  Problem list name updated by automated process. Provider to review          Past Medical History:    Past Medical History:   Diagnosis Date     Acute kidney injury (H) 6/23/2021     Coronary artery disease may 1 2005     Obese      Pure hypercholesterolemia      Sleep apnea      Type II or unspecified type diabetes mellitus without mention of complication, not stated as uncontrolled      Unspecified cardiovascular disease 5-2001     Unspecified essential hypertension        Past Surgical History:    Past Surgical History:   Procedure Laterality Date     BYPASS GRAFT ARTERY CORONARY  N/A 3/24/2022    Procedure: CORONARY ARTERY BYPASS GRAFT x 3 (LIMA - LAD; SV - OM2; SV - PDA) WITH ENDOSCOPIC SAPHENOUS VEIN HARVEST ON BILATERAL LOWER EXTREMITY, AND ON CARDIOPULMONARY PUMP OXYGENATOR  (INTRAOPERATIVE TRANSESOPHAGEAL ECHOCARDIOGRAM BY ANESTHESIOLOGIST);  Surgeon: Glenna Waters MD;  Location:  OR     CARDIAC SURGERY  may 1, 2005     COLONOSCOPY  11/30/2011    Procedure:COLONOSCOPY; Screening Colonoscopy; Surgeon:LUTHER FLORES; Location:[facility]     CV CORONARY ANGIOGRAM N/A 12/29/2021    Procedure: Coronary Angiogram;  Surgeon: Jonny Moore MD;  Location:  [facility]     ENDARTERECTOMY CAROTID Right 2/18/2022    Procedure: Right carotid endarterectomy with ELECTROENCEPHALOGRAM(EEG);  Surgeon: Karma Adorno MD;  Location: [facility]     ESOPHAGOSCOPY, GASTROSCOPY, DUODENOSCOPY (EGD), COMBINED N/A 7/21/2021    Procedure: ESOPHAGOGASTRODUODENOSCOPY, WITH BIOPSY;  Surgeon: Jeff Cunningham DO;  Location: [facility]     PHACOEMULSIFICATION WITH STANDARD INTRAOCULAR LENS IMPLANT Right 7/28/2021    Procedure: Right eye Cataract Extraction with Implant;  Surgeon: Reyes Valdez MD;  Location: [facility]     PHACOEMULSIFICATION WITH STANDARD INTRAOCULAR LENS IMPLANT Left 8/18/2021    Procedure: left eye Cataract Extraction with Implant;  Surgeon: Reyes Valdez MD;  Location: [facility]     SURGICAL HISTORY OF -       None     VASCULAR SURGERY  oct 2013    stent put in leg       Family History:    Family History   Problem Relation Age of Onset     Cerebrovascular Disease Maternal Grandmother      Arthritis Other         hip fracture     Respiratory Father         emphysema     Alzheimer Disease Maternal Grandfather      Breast Cancer Sister      Cancer - colorectal No family hx of      Prostate Cancer No family hx of        Social History:  Marital Status:   [2]  Social History     Tobacco Use     Smoking status: Current Every Day Smoker     Packs/day:  0.50     Years: 50.00     Pack years: 25.00     Types: Cigarettes     Start date: 1972     Last attempt to quit: 2022     Years since quittin.1     Smokeless tobacco: Never Used   Vaping Use     Vaping Use: Never used   Substance Use Topics     Alcohol use: Not Currently     Comment: 2 beers a week      Drug use: No        Medications:    cephALEXin (KEFLEX) 500 MG capsule  furosemide (LASIX) 40 MG tablet  rivaroxaban ANTICOAGULANT (XARELTO ANTICOAGULANT) 10 MG TABS tablet  acetaminophen (TYLENOL) 325 MG tablet  aspirin (ASA) 81 MG chewable tablet  atorvastatin (LIPITOR) 80 MG tablet  ezetimibe (ZETIA) 10 MG tablet  furosemide (LASIX) 40 MG tablet  metFORMIN (GLUCOPHAGE) 1000 MG tablet  methocarbamol (ROBAXIN) 500 MG tablet  metoprolol tartrate (LOPRESSOR) 100 MG tablet  omeprazole (PRILOSEC) 40 MG DR capsule  polyethylene glycol (MIRALAX) 17 GM/Dose powder  senna-docusate (SENOKOT-S/PERICOLACE) 8.6-50 MG tablet          Review of Systems   All other systems reviewed and are negative.      Physical Exam   BP: 118/67  Pulse: 69  Temp: 97.6  F (36.4  C)  Resp: 20  Weight: 115.2 kg (254 lb)  SpO2: 99 %      Physical Exam  Vitals and nursing note reviewed.   Constitutional:       Appearance: Normal appearance.   HENT:      Head: Normocephalic and atraumatic.      Right Ear: Tympanic membrane normal.      Left Ear: Tympanic membrane normal.      Nose: Nose normal.      Mouth/Throat:      Mouth: Mucous membranes are dry.      Pharynx: Oropharynx is clear.   Eyes:      Extraocular Movements: Extraocular movements intact.      Conjunctiva/sclera: Conjunctivae normal.      Pupils: Pupils are equal, round, and reactive to light.   Cardiovascular:      Rate and Rhythm: Normal rate and regular rhythm.      Pulses: Normal pulses.      Heart sounds: Normal heart sounds.   Pulmonary:      Effort: Pulmonary effort is normal.      Comments: Sternotomy site appears clean, skin adhesive intact.  No signs of  infection.  Chest:      Chest wall: Tenderness present.   Abdominal:      General: Bowel sounds are normal.      Palpations: Abdomen is soft.   Musculoskeletal:      Cervical back: Normal range of motion and neck supple.      Comments: Mildly edematous bilateral lower extremities with saphenous vein harvesting primarily on the right side but also the left.  Moderate erythema at several incisions and closure sites.   Skin:     Capillary Refill: Capillary refill takes less than 2 seconds.   Neurological:      General: No focal deficit present.      Mental Status: He is alert and oriented to person, place, and time.   Psychiatric:         Mood and Affect: Mood normal.         Behavior: Behavior normal.         ED Course             EKG Interpretation:      Interpreted by Jg Allan MD  Time reviewed: Sinus rhythm 66 bpm, incomplete right bundle branch block, nonspecific ST-T wave changes.  No definite ischemia or infarct           Procedures              Critical Care time:  none               No results found for this or any previous visit (from the past 24 hour(s)).3:44 PM  I reviewed this patient with on-call cardiology Dr. Farooq who felt that my thought towards placing this patient on a low-dose diuretic and compression stockings was appropriate.  He would not recommend any additional anticoagulation or antiplatelet agent for the patient given the superficial thrombophlebitis with occlusive clot identified on ultrasound.  Follow-up in clinic with cardiology as planned.  5:24 PM  Reviewed the patient with on-call hematology Barby Owen; she recommended with this location of superficial thrombophlebitis and an occlusive thrombus in the accessory saphenous but the patient would be a good candidate to put on Xarelto at prophylactic dosage is 10 mg p.o. daily.  Repeat ultrasound in 10 days.  Given the patient's recent proximity to CABG surgery I spoke with his surgeon of record Dr. Patrick Waters.  He felt  that this was appropriate and agreed with the plan as well as with respect to Lasix, compression stockings and oral antibiotics for the infection appreciated clinically.  The patient is dispositioned to home after I discussed all of the above with him and answered his questions.  He is received his initial dose of antibiotic here tonight as well as initial dose of Xarelto.  He has completed the course of Lasix and I have given him prescriptions for Xarelto, Keflex, Lasix all sent to the pharmacy of his choice.        Medications   cephALEXin (KEFLEX) capsule 1,000 mg (1,000 mg Oral Given 4/5/22 7941)   rivaroxaban ANTICOAGULANT (XARELTO) tablet 10 mg (10 mg Oral Given 4/5/22 6306)       Assessments & Plan (with Medical Decision Making)   Assessments and plan with medical decision making at the time stamp above.    Disclaimer: This note consists of symbols derived from keyboarding, dictation and/or voice recognition software. As a result, there may be errors in the script that have gone undetected. Please consider this when interpreting information found in this chart.      I have reviewed the nursing notes.    I have reviewed the findings, diagnosis, plan and need for follow up with the patient.       Discharge Medication List as of 4/5/2022  5:36 PM          Final diagnoses:   Wound infection   Bilateral lower extremity edema - Primarily related to decreased venous return due to saphenous vein harvesting   Thrombophlebitis of superficial veins of left lower extremity - With occlusive thrombus       4/5/2022   [facility] EMERGENCY DEPT     Jg Allan MD  04/05/22 8233       Jg Allan MD  04/08/22 7353

## 2022-04-05 NOTE — DISCHARGE INSTRUCTIONS
Compression stockings on during the day and off at night leg elevation at night.  Keflex 500 mg p.o. 4 times daily x10 days.  Xarelto 10 mg p.o. daily x10 days.  Lasix 60 mg p.o. daily every morning x10 days.  Please contact your primary care provider to arrange for ultrasound of the left lower extremity at around 10 days from now, and to follow-up in clinic.  Return to the emergency department if worse or changes.  Follow-up per cardiology/CVT surgery

## 2022-04-05 NOTE — ED TRIAGE NOTES
bilateral leg swelling past two days. Hx of bypass 3/24/22. Denies chest pain and or SOB. Pt told to come by his care team.

## 2022-04-06 ENCOUNTER — HOSPITAL ENCOUNTER (OUTPATIENT)
Dept: CARDIAC REHAB | Facility: CLINIC | Age: 67
Discharge: HOME | End: 2022-04-06
Attending: SURGERY
Payer: COMMERCIAL

## 2022-04-06 ENCOUNTER — PATIENT OUTREACH (OUTPATIENT)
Dept: CARE COORDINATION | Facility: CLINIC | Age: 67
End: 2022-04-06
Payer: COMMERCIAL

## 2022-04-06 DIAGNOSIS — Z71.89 OTHER SPECIFIED COUNSELING: ICD-10-CM

## 2022-04-06 DIAGNOSIS — Z95.1 S/P CABG X 3: ICD-10-CM

## 2022-04-06 PROCEDURE — 93798 PHYS/QHP OP CAR RHAB W/ECG: CPT | Performed by: CLINICAL EXERCISE PHYSIOLOGIST

## 2022-04-06 PROCEDURE — 93797 PHYS/QHP OP CAR RHAB WO ECG: CPT | Performed by: CLINICAL EXERCISE PHYSIOLOGIST

## 2022-04-06 NOTE — PROGRESS NOTES
Clinic Care Coordination Contact  [facility]: Post-Discharge Note  SITUATION                                                      Admission:    Admission Date: 04/05/22   Reason for Admission: leg swelling  Discharge:   Discharge Date: 04/05/22  Discharge Diagnosis: wound infection, bilateral lower extremity edema, thrombophlebitis of superficial veins of left lower extremity    BACKGROUND                                                      Per hospital discharge summary and inpatient provider notes:    Roger Bonilla is a 66 year old male, past medical history is significant for ASCVD status post three-vessel CABG on 3/24/2022 with bilateral great saphenous vein harvesting, abdominal aortic aneurysm, type 2 diabetes, PAD, hypertension, tobacco abuse, morbid obesity, ABIGAIL, hyperlipidemia, peripheral vascular disease, presents to the emergency department at the request of his care team with concerns of bilateral lower extremity edema by the patient's account for approximately the last 2 days.  This is nonpainful, the patient states that he has been elevating his legs at night as directed.  He has not done leg wraps since leaving hospital.  He has central chest pain that he relates to the sternotomy which is gradually improving since being discharged home from hospital.  He has no increase in chest pain and he denies any shortness of breath above baseline.  It is uncomfortable with any movement of his chest not unexpectedly status post sternotomy.  He denies any fever chills or sweats.  No nausea or vomiting.  Has been eating and drinking normally    ASSESSMENT      Enrollment  Primary Care Care Coordination Status: Declined    Discharge Assessment  How are you doing now that you are home?: I just got back from rehab. Pretty much okay, still pretty swollen but it has gone down a little bit.  How are your symptoms? (Red Flag symptoms escalate to triage hotline per guidelines): Unchanged (Swelling has gone down  slightly)  Do you feel your condition is stable enough to be safe at home until your provider visit?: Yes  Does the patient have their discharge instructions? : Yes  Does the patient have questions regarding their discharge instructions? : Yes (see comment) (Patient stated he had not had a chance to review his AVS, so CHW briefly reviewed it with him and answered any questions about the instructions)  Were you started on any new medications or were there changes to any of your previous medications? : Yes  Does the patient have all of their medications?: Yes  Do you have questions regarding any of your medications? : No  Do you have all of your needed medical supplies or equipment (DME)?  (i.e. oxygen tank, CPAP, cane, etc.): Yes (walker, cane, reclining chair. He purchased compression stocking but at this point they are too tight to put on and said he has instead wrapped his feet in ACE bandage. CHW reccemended using the compression stocking once the swelling goes down a bit more)  Discharge follow-up appointment scheduled within 14 calendar days? : Yes  Discharge Follow Up Appointment Date: 04/08/22  Discharge Follow Up Appointment Scheduled with?: Primary Care Provider    Post-op (CHW CTA Only)  If the patient had a surgery or procedure, do they have any questions for a nurse?: No    PLAN                                                      Outpatient Plan:      Contact your primary care provider to arrange for ultrasound of the left lower extremity at around 10 days from now, and to follow-up in clinic.  Return to the emergency department if worse or changes.  Follow-up per cardiology/CVT surgery.    Future Appointments   Date Time Provider Department Center   4/8/2022 10:00 AM Maria De Jesus Hay APRN CNP [facility]   4/13/2022  3:30 PM [facility] CARDIOTHORACIC SURGERY, DIANDRA CASTRO EDER   4/25/2022  1:00 PM Lucy Alvarez OT [facility]   5/25/2022 11:30 AM [facility]          For any urgent concerns, please contact our 24 hour nurse triage line: [phone] ([phone])       Skylar Camacho  Community Health Worker  [facility] [place]: [phone]

## 2022-04-07 NOTE — PROGRESS NOTES
Assessment & Plan     Thrombophlebitis of superficial veins of left lower extremity  Continue rivaroxaban per hematology.  Repeat ultrasound in 10 days per hematology.  - US Lower Extremity Venous Duplex Left; Future    S/P CABG (coronary artery bypass graft)  CABG incisions healing well without signs of infection.  Continue monitoring vital signs and daily weights at home.  No changes to medications today.  Cardiology follow-up scheduled for next week.  Continue cardiac rehab.    Wound infection  Complete full course of antibiotics    S/P carotid endarterectomy  Incision is healing well.  Follows with vascular surgery.    Hospital discharge follow-up               Tobacco Cessation:   reports that he has been smoking cigarettes. He started smoking about 50 years ago. He has a 25.00 pack-year smoking history. He has never used smokeless tobacco.  Tobacco Cessation Action Plan: Information offered: Patient not interested at this time      The risks, benefits and treatment options of prescribed medications or other treatments have been discussed with the patient. The patient verbalized their understanding and should call or follow up if no improvement or if they develop further problems.    [facility] JANE Mccord is a 66 year old who presents for the following health issues     HPI     Post Discharge Outreach 4/6/2022   Admission Date 4/5/2022   Reason for Admission leg swelling   Discharge Date 4/5/2022   Discharge Diagnosis wound infection, bilateral lower extremity edema, thrombophlebitis of superficial veins of left lower extremity   How are you doing now that you are home? I just got back from rehab. Pretty much okay, still pretty swollen but it has gone down a little bit.   How are your symptoms? (Red Flag symptoms escalate to triage hotline per guidelines) Unchanged   Do you feel your condition is stable enough to be safe at home until your  provider visit? Yes   Does the patient have their discharge instructions?  Yes   Does the patient have questions regarding their discharge instructions?  Yes (see comment)   Were you started on any new medications or were there changes to any of your previous medications?  Yes   Does the patient have all of their medications? Yes   Do you have questions regarding any of your medications?  No   Do you have all of your needed medical supplies or equipment (DME)?  (i.e. oxygen tank, CPAP, cane, etc.) Yes   Discharge follow-up appointment scheduled within 14 calendar days?  Yes   Discharge Follow Up Appointment Date 4/8/2022   Discharge Follow Up Appointment Scheduled with? Primary Care Provider     Hospital Follow-up Visit:    Hospital/Nursing Home/IP Rehab Facility: [facility]  Date of Admission: 3/24/22  Date of Discharge: 3/30/22  Reason(s) for Admission: [facility] , severe       Was your hospitalization related to COVID-19? No   Problems taking medications regularly:  None  Medication changes since discharge: None  Problems adhering to non-medication therapy:  None    Summary of hospitalization:  [facility] discharge summary reviewed  Diagnostic Tests/Treatments reviewed.  Follow up needed: cardiology  Other Healthcare Providers Involved in Patient s Care:         cardiac rehab  Update since discharge: stable.       Post Discharge Medication Reconciliation: discharge medications reconciled, continue medications without change.  Plan of care communicated with patient and family                HPI: 66-year-old male with past medical history significant for diabetes, tobacco abuse, PAD, PVD, CAD, morbid obesity, hypertension, AAA, carotid atherosclerosis.  Patient underwent a right carotid enterectomy on February 18.  He had no postop complications.  He was then admitted to hospital on March 24 for coronary artery bypass graft x3.  Postop patient had fluid overload that was  successfully treated with diuretics.  Transiently hyperglycemic which was treated with insulin (no insulin needed at time of discharge).  He had postop delirium with associated hallucinations.  This was treated with Seroquel at bedtime and resolved quickly.  Otherwise, he had no postop complications and was discharged home on March 30.  Wife and son have been providing care at home.  Patient had his first appointment with cardiac rehab last week.      Patient presented to the ER on April 5 with concerns of worsening bilateral lower extremity edema.  Right leg graft sites were red and painful.  ER physician contacted cardiology who recommended diuretics and compression stockings.  Lower extremity ultrasound was obtained and patient was noted to have a superficial thrombophlebitis with occlusive clot in the left leg.  Hematology was called and recommended placing patient on rivaroxaban and repeating ultrasound in 10 days.  Cardiology was in agreement with treating with rivaroxaban.  Patient was also placed on antibiotics for wound infection.    Today patient reports that he continues to not feel well.  He reports feeling fatigued and having low energy levels.  Has some discomfort in his chest from his CABG, but states it is not severe enough to take any pain medication.  He denies any shortness of breath.  Has some leg discomfort due to the wound infection and the thrombophlebitis.  States that the edema is much improved with the diuretics.  Denies any fevers.  He is weighing himself daily.  Reports a poor appetite, but is eating without any nausea or vomiting.  Denies any bladder or bowel dysfunction.  He continues to smoke; has decreased amount and is down to 5 cigarettes/day.        Review of Systems   Constitutional, HEENT, cardiovascular, pulmonary, GI, , musculoskeletal, neuro, skin, endocrine and psych systems are negative, except as otherwise noted.      Objective    /68 (BP Location: Right arm, Cuff  "Size: Adult Large)   Pulse 65   Temp 98.9  F (37.2  C) (Tympanic)   Resp 20   Ht 1.676 m (5' 6\")   Wt 109.3 kg (241 lb)   SpO2 96%   BMI 38.90 kg/m    Body mass index is 38.9 kg/m .  Physical Exam   GENERAL: healthy, alert and no distress  NECK: Right-sided carotid endarterectomy incision site is clean and intact.  No drainage noted.  No erythema.  RESP: lungs clear to auscultation - no rales, rhonchi or wheezes  CV: regular rate and rhythm, normal S1 S2, no S3 or S4, no murmur, click or rub, no peripheral edema and peripheral pulses strong.  Sternal incision from CABG and 3 lap sites are clean and intact.  No drainage or erythema.  SKIN: Left leg graft site clean and intact; no drainage or erythema.  Right leg graft site x3 with some surrounding erythema. Picture below:                        "

## 2022-04-08 ENCOUNTER — OFFICE VISIT (OUTPATIENT)
Dept: FAMILY MEDICINE | Facility: CLINIC | Age: 67
End: 2022-04-08
Payer: COMMERCIAL

## 2022-04-08 VITALS
WEIGHT: 241 LBS | SYSTOLIC BLOOD PRESSURE: 110 MMHG | BODY MASS INDEX: 38.73 KG/M2 | DIASTOLIC BLOOD PRESSURE: 68 MMHG | OXYGEN SATURATION: 96 % | HEART RATE: 65 BPM | TEMPERATURE: 98.9 F | HEIGHT: 66 IN | RESPIRATION RATE: 20 BRPM

## 2022-04-08 DIAGNOSIS — Z98.890 S/P CAROTID ENDARTERECTOMY: ICD-10-CM

## 2022-04-08 DIAGNOSIS — T14.8XXA WOUND INFECTION: ICD-10-CM

## 2022-04-08 DIAGNOSIS — Z95.1 S/P CABG (CORONARY ARTERY BYPASS GRAFT): ICD-10-CM

## 2022-04-08 DIAGNOSIS — L08.9 WOUND INFECTION: ICD-10-CM

## 2022-04-08 DIAGNOSIS — I80.02 THROMBOPHLEBITIS OF SUPERFICIAL VEINS OF LEFT LOWER EXTREMITY: Primary | ICD-10-CM

## 2022-04-08 DIAGNOSIS — Z09 HOSPITAL DISCHARGE FOLLOW-UP: ICD-10-CM

## 2022-04-08 PROBLEM — N17.9 ACUTE KIDNEY INJURY (H): Status: RESOLVED | Noted: 2021-06-23 | Resolved: 2022-04-08

## 2022-04-08 PROCEDURE — 99495 TRANSJ CARE MGMT MOD F2F 14D: CPT | Performed by: NURSE PRACTITIONER

## 2022-04-08 ASSESSMENT — PAIN SCALES - GENERAL: PAINLEVEL: NO PAIN (1)

## 2022-04-13 ENCOUNTER — OFFICE VISIT (OUTPATIENT)
Dept: CARDIOLOGY | Facility: CLINIC | Age: 67
End: 2022-04-13
Payer: COMMERCIAL

## 2022-04-13 ENCOUNTER — LAB (OUTPATIENT)
Dept: LAB | Facility: CLINIC | Age: 67
End: 2022-04-13
Payer: COMMERCIAL

## 2022-04-13 VITALS
DIASTOLIC BLOOD PRESSURE: 83 MMHG | OXYGEN SATURATION: 97 % | HEART RATE: 94 BPM | BODY MASS INDEX: 38.63 KG/M2 | WEIGHT: 240.4 LBS | SYSTOLIC BLOOD PRESSURE: 120 MMHG | HEIGHT: 66 IN

## 2022-04-13 DIAGNOSIS — Z95.1 S/P CABG (CORONARY ARTERY BYPASS GRAFT): Primary | ICD-10-CM

## 2022-04-13 DIAGNOSIS — Z95.1 S/P CABG (CORONARY ARTERY BYPASS GRAFT): ICD-10-CM

## 2022-04-13 PROCEDURE — 99024 POSTOP FOLLOW-UP VISIT: CPT | Performed by: PHYSICIAN ASSISTANT

## 2022-04-13 PROCEDURE — 87077 CULTURE AEROBIC IDENTIFY: CPT

## 2022-04-13 NOTE — PROGRESS NOTES
CV Surgery Post Op Follow Up Note:     S:  From discharge summary:   PROCEDURES PERFORMED:   Date: 3/24/22.  Surgeon: Dr. Glenna Waters  1) Coronary artery bypass grafting x 3.              - Left internal mammary artery to left anterior descending artery              - Reversed saphenous vein graft to right posterior descending artery              - Reversed saphenous vein graft to obtuse marginal artery   2) Endoscopic vein harvest left and right lower extremity  3) Transesophageal echocardiogram on 3/24/22 with Dr. Glenna Waters    Roger Bonilla is a 66 year old male who on 3/24/2022 underwent the above-named procedures and tolerated the operation well.      Postoperatively was admitted to the ICU.  Patient was extubated within protocol on POD #0.  Blood pressure and cardiac index were managed with vasopressors and inotropic agents which were continuously weaned until no longer needed.  Patient was subsequently  transferred to the surgical telemetry floor.     While on the surgical unit, the patient continued to progress well. Chest tubes and temporary pacemaker wires were removed when deemed appropriate.     Patient was fluid overloaded and treated with diuretics. He remains up about 4lbs from preoperative weight and will discharge with 7 days of diuretic therapy and potassium supplementation; will re-evaluate need in clinic follow-up.      Patient was transiently hyperglycemic and treated with insulin infusion then transitioned to sliding scale insulin per protocol. Blood sugars remained stable. No further glycemic control agents needed at this time.     Mr. Bonilla did develop postoperative delirium with associated hallucinations. Once delirium precautions were optimized and he was able to sleep better with one dose of seroquel, this resolved. He does have a baseline tremor of head/neck. He also developed left ulnar neuropathy secondary to OR positioning. This should resolve within 1-3 months. We will  reevaluate at time of postoperative appointment.     Roger had a persistent postoperative leukocytosis betweek 12-14 with no associated fevers, chills, myalgias or other s/sx of infection. Procalcitonin 0.2 and 0.18 respectively. UA bland, sputum culture negative and blood cultures with NGTD. CXR without infiltrate or effusion warranting thoracentesis. This is likely reactive secondary to surgery.     Prior to discharge, his pain was controlled well, he was working well with therapies, able to perform most ADLs, ambulate without difficulty, and had full return of bowel and bladder function.  On March 30, 2022, he was discharged to home with the help of wife and son in stable condition. Follow up with cardiology and cardiac surgery have been arranged. Pt encouraged to follow up with PCP and cardiac rehab upon discharge.     Patient discharged on aspirin:  Yes 162 mg  Patient discharged on beta blocker: yes    Patient discharged on ACE Inhibitor/ARB: no      Patient discharged on statin: yes     Since being discharged from hospital, patient is recovering at well at home. Patient was seen recently in the ED due to increased bilateral lower extremity edema and wound infection of his lower extremity vein harvest sites. He was started on additional lasix and 10 days worth of Keflex which he is still completing. He was prescribed Xarelto with plan to follow up outpaitent with family practice and repeat ultrasound. Patient reports his pain is controlled without antibiotics. He has been monitoring his weight and that has been improving. He has been using his incentive spirometer and that is improving. He has continued smoking and estimates he is smoking roughly 5 cigarettes per day. He is not interested in anything to help with smoking cessation. He reports he has been able to increase his activity well. He has been to cardiac rehab but does not think he will continue due to cost. We reviewed his medications and all  "questions were answered.     Allergies:   Allergies   Allergen Reactions     Nkda [No Known Drug Allergies]        Medications:   Current Outpatient Medications:      acetaminophen (TYLENOL) 325 MG tablet, Take 2 tablets (650 mg) by mouth every 4 hours as needed for other (For optimal non-opioid multimodal pain management to improve pain control.), Disp: , Rfl:      aspirin (ASA) 81 MG chewable tablet, 2 tablets (162 mg) by Oral or NG Tube route daily, Disp: 60 tablet, Rfl: 0     atorvastatin (LIPITOR) 80 MG tablet, Take 80 mg by mouth every morning, Disp: , Rfl:      cephALEXin (KEFLEX) 500 MG capsule, Take 1 capsule (500 mg) by mouth 4 times daily for 10 days, Disp: 40 capsule, Rfl: 0     ezetimibe (ZETIA) 10 MG tablet, Take 1 tablet (10 mg) by mouth daily, Disp: 30 tablet, Rfl: 11     furosemide (LASIX) 40 MG tablet, Take 1.5 tablets (60 mg) by mouth daily for 10 days, Disp: 15 tablet, Rfl: 0     metFORMIN (GLUCOPHAGE) 1000 MG tablet, Take 1,000 mg by mouth daily (with breakfast), Disp: , Rfl:      methocarbamol (ROBAXIN) 500 MG tablet, Take 1 tablet (500 mg) by mouth every 6 hours as needed for muscle spasms or other (pain), Disp: 60 tablet, Rfl: 0     metoprolol tartrate (LOPRESSOR) 100 MG tablet, Take 1 tablet (100 mg) by mouth 2 times daily, Disp: 60 tablet, Rfl: 3     omeprazole (PRILOSEC) 40 MG DR capsule, Take 40 mg by mouth every morning, Disp: , Rfl:      rivaroxaban ANTICOAGULANT (XARELTO ANTICOAGULANT) 10 MG TABS tablet, Take 1 tablet (10 mg) by mouth daily (with dinner), Disp: 30 tablet, Rfl: 0     senna-docusate (SENOKOT-S/PERICOLACE) 8.6-50 MG tablet, Take 1 tablet by mouth 2 times daily as needed for constipation, Disp: 60 tablet, Rfl: 0    Physical Exam:   /83   Pulse 94   Ht 1.676 m (5' 6\")   Wt 109 kg (240 lb 6.4 oz)   SpO2 97%   BMI 38.80 kg/m    Gen: NAD  CV: RRR normal s1 and s2  Lungs: diminished bases  Sternum: CDI  Extremities: 1+ lower extremity edema bilateral, right lower " extremity vein harvest incisions with purulent drainage and visible pustules around incision. Wounds debrided and cleaned in clinic.     Assessment/Plan:     Lower extremity incisions debrided and wound culture obtained  Complete antibiotics and lasix as prescribed by ED and will call patient with wound culture results  Continue current cardiac medications.   Encouraged patient to pursue smoking cessation and to continue cardiac rehab.   Discussed normal expectations for surgical recovery  Discussed timeline for restrictions.    All of the patient's questions were answered. Our contact information was given to him/her if they should have any further questions/concerns. No further follow-up is needed with us.      Tiffanie Green PA-C  CV Surgery  [facility]  Pager: [phone]

## 2022-04-13 NOTE — PATIENT INSTRUCTIONS
Your surgery was on 3/24/22    Ok to start driving on 4/21/22 as long as you are no longer taking narcotic pain medications.    No lifting greater than 10 pounds until 5/19/22, then    No lifting greater than 20-30 pounds until 6/16/22    Do not soak your incisions until fully healed over with no scabbing; this includes hot tubs, baths, pools etc.    If you have questions or concerns, please call us:    Flori Hagan  [phone]

## 2022-04-15 DIAGNOSIS — Z95.1 S/P CABG (CORONARY ARTERY BYPASS GRAFT): Primary | ICD-10-CM

## 2022-04-15 RX ORDER — SULFAMETHOXAZOLE/TRIMETHOPRIM 800-160 MG
1 TABLET ORAL 2 TIMES DAILY
Qty: 20 TABLET | Refills: 0 | Status: SHIPPED | OUTPATIENT
Start: 2022-04-15 | End: 2022-04-25

## 2022-04-15 NOTE — PROGRESS NOTES
CVTS    Patient called inquiring about culture results. Cultures are positive for enterobacter. Sensitive to cephalosporins but patient completing keflex today and incisions are still red with drainage.   Will transition antibiotics to bactrim.   New prescription sent to preferred pharmacy    Tiffanie Green PA-C  CV surgery

## 2022-04-16 LAB
BACTERIA WND CULT: ABNORMAL
BACTERIA WND CULT: ABNORMAL

## 2022-04-20 ENCOUNTER — HOSPITAL ENCOUNTER (OUTPATIENT)
Dept: ULTRASOUND IMAGING | Facility: CLINIC | Age: 67
Discharge: HOME | End: 2022-04-20
Attending: NURSE PRACTITIONER | Admitting: NURSE PRACTITIONER
Payer: COMMERCIAL

## 2022-04-20 DIAGNOSIS — Z95.1 S/P CABG (CORONARY ARTERY BYPASS GRAFT): ICD-10-CM

## 2022-04-20 DIAGNOSIS — I80.02 THROMBOPHLEBITIS OF SUPERFICIAL VEINS OF LEFT LOWER EXTREMITY: ICD-10-CM

## 2022-04-20 PROCEDURE — 93971 EXTREMITY STUDY: CPT | Mod: LT

## 2022-04-22 DIAGNOSIS — I80.9 THROMBOPHLEBITIS: Primary | ICD-10-CM

## 2022-05-02 ENCOUNTER — OFFICE VISIT (OUTPATIENT)
Dept: HEMATOLOGY | Facility: CLINIC | Age: 67
End: 2022-05-02
Attending: NURSE PRACTITIONER
Payer: COMMERCIAL

## 2022-05-02 VITALS
HEIGHT: 66 IN | BODY MASS INDEX: 38.89 KG/M2 | TEMPERATURE: 98.1 F | DIASTOLIC BLOOD PRESSURE: 83 MMHG | OXYGEN SATURATION: 93 % | WEIGHT: 242 LBS | HEART RATE: 96 BPM | SYSTOLIC BLOOD PRESSURE: 135 MMHG

## 2022-05-02 DIAGNOSIS — I80.9 THROMBOPHLEBITIS: ICD-10-CM

## 2022-05-02 DIAGNOSIS — I80.02 THROMBOPHLEBITIS OF SUPERFICIAL VEINS OF LEFT LOWER EXTREMITY: Primary | ICD-10-CM

## 2022-05-02 PROCEDURE — 99203 OFFICE O/P NEW LOW 30 MIN: CPT | Performed by: PHYSICIAN ASSISTANT

## 2022-05-02 PROCEDURE — G0463 HOSPITAL OUTPT CLINIC VISIT: HCPCS

## 2022-05-02 NOTE — Clinical Note
2022       RE: Roger Bonilla  [address]  [facility] [address]     Dear Colleague,    Thank you for referring your patient, Roger Bonilla, to the [facility] at [facility]. Please see a copy of my visit note below.        [facility]  [address] [place] [id number]  Main: [phone], Fax: [phone]    Patient seen at: [facility] at [address] [place] [address]    Outpatient Visit Note:    Patient: Roger Bonilla  MRN: [id number]  : 1955  SANIA: May 2, 2022    Reason for visit:  Left leg superficial thrombophlebitis with occlusive thrombus in the left accessory saphenous vein in the proximal thigh in 2022.     HPI:  Flex is a 66 year old male with a history of ASCVD, who underwent 3 vessel CABG on 3/24/2022 with bilateral great saphenous vein harvesting, abdominal aortic aneurysm, type 2 diabetes, PAD, hypertension, tobacco abuse, morbid obesity, ABIGAIL, hyperlipidemia, peripheral vascular disease, who is found to have superficial thrombophlebitis on the left leg back on 2022, referred by Maria De Jesus Hay CNP, for consultation.     As mentioned, Flex underwent three-vessels CABG on 3/24/2022 with bilateral great saphenous vein harvesting. Then on 2022, he presented to the emergency department with 2 days history of bilateral lower extremity edema. Ultrasound was done at the time and showed superficial thrombophlebitis with occlusive thrombus in the left accessory saphenous vein in the proximal thigh (9mm from common femoral vein junction). For this reason, the patient was placed on rivaroxaban at 10 mg PO Qday dosing.     Repeat ultrasound on 2022 showed: nonocclusive superficial thrombophlebitis in the proximal left accessory saphenous vein.     He reports that he has been dealing with cellulitis at the incision  "sites of his right lower extremity. His left leg incision sites are well healed without evidence of infection. He denies any pain of his left leg.     ROS:  Denies any bleeding complications while on rivaroxaban at 10 mg PO Qday dosing along with aspirin 162 mg Q day.       Medications:  Current Outpatient Medications   Medication     acetaminophen (TYLENOL) 325 MG tablet     aspirin (ASA) 81 MG chewable tablet     atorvastatin (LIPITOR) 80 MG tablet     ezetimibe (ZETIA) 10 MG tablet     furosemide (LASIX) 40 MG tablet     metFORMIN (GLUCOPHAGE) 1000 MG tablet     methocarbamol (ROBAXIN) 500 MG tablet     metoprolol tartrate (LOPRESSOR) 100 MG tablet     omeprazole (PRILOSEC) 40 MG DR capsule     rivaroxaban ANTICOAGULANT (XARELTO ANTICOAGULANT) 10 MG TABS tablet     senna-docusate (SENOKOT-S/PERICOLACE) 8.6-50 MG tablet     No current facility-administered medications for this visit.     Allergies:  Allergies   Allergen Reactions     Nkda [No Known Drug Allergies]      PmHx:  Past Medical History:   Diagnosis Date     Acute kidney injury (H) 6/23/2021     Coronary artery disease may 1 2005    2 stents put in heart     Obese      Pure hypercholesterolemia      Sleep apnea      Type II or unspecified type diabetes mellitus without mention of complication, not stated as uncontrolled      Unspecified cardiovascular disease 5-2001    MI -- Angioplasty two stents RCA & OM2     Unspecified essential hypertension      Social History:   Deferred.    Family History:  Deferred.    Objective:  Vitals: /83 (BP Location: Right leg, Patient Position: Sitting, Cuff Size: Adult Regular)   Pulse 96   Temp 98.1  F (36.7  C) (Oral)   Ht 1.676 m (5' 6\")   Wt 109.8 kg (242 lb)   SpO2 93%   BMI 39.06 kg/m    Exam:   No significant swelling in either lower extremities is noted on today's exam.     Labs:  Component      Latest Ref Rng & Units 4/5/2022   WBC      4.0 - 11.0 10e3/uL 19.5 (H)   RBC Count      4.40 - 5.90 10e6/uL " 3.00 (L)   Hemoglobin      13.3 - 17.7 g/dL 8.9 (L)   Hematocrit      40.0 - 53.0 % 28.1 (L)   MCV      78 - 100 fL 94   MCH      26.5 - 33.0 pg 29.7   MCHC      31.5 - 36.5 g/dL 31.7   RDW      10.0 - 15.0 % 15.4 (H)   Platelet Count      150 - 450 10e3/uL 647 (H)   % Neutrophils      % 76   % Lymphocytes      % 13   % Monocytes      % 8   % Eosinophils      % 2   % Basophils      % 0   % Immature Granulocytes      % 1   NRBCs per 100 WBC      <1 /100 0   Absolute Neutrophils      1.6 - 8.3 10e3/uL 14.9 (H)   Absolute Lymphocytes      0.8 - 5.3 10e3/uL 2.5   Absolute Monocytes      0.0 - 1.3 10e3/uL 1.6 (H)   Absolute Eosinophils      0.0 - 0.7 10e3/uL 0.4   Absolute Basophils      0.0 - 0.2 10e3/uL 0.1   Absolute Immature Granulocytes      <=0.4 10e3/uL 0.2   Absolute NRBCs      10e3/uL 0.0   Sodium      133 - 144 mmol/L 139   Potassium      3.4 - 5.3 mmol/L 4.5   Chloride      94 - 109 mmol/L 102   Carbon Dioxide      20 - 32 mmol/L 30   Anion Gap      3 - 14 mmol/L 7   Urea Nitrogen      7 - 30 mg/dL 23   Creatinine      0.66 - 1.25 mg/dL 1.22   Calcium      8.5 - 10.1 mg/dL 9.2   Glucose      70 - 99 mg/dL 101 (H)   Alkaline Phosphatase      40 - 150 U/L 111   AST      0 - 45 U/L 26   ALT      0 - 70 U/L 77 (H)   Protein Total      6.8 - 8.8 g/dL 7.0   Albumin      3.4 - 5.0 g/dL 2.7 (L)   Bilirubin Total      0.2 - 1.3 mg/dL 0.9   GFR Estimate      >60 mL/min/1.73m2 65       Imaging:  Reviewed and are as described above.     Assessment:  In summary, Flex is a 66 year old male with a history of ASCVD, who underwent 3 vessel CABG on 3/24/2022 with bilateral great saphenous vein harvesting, abdominal aortic aneurysm, type 2 diabetes, PAD, hypertension, tobacco abuse, morbid obesity, ABIGAIL, hyperlipidemia, peripheral vascular disease, who is found to have superficial thrombophlebitis on the left leg back on 4/5/2022, referred by Maria De Jesus Hay CNP, for consultation.    Although his thrombus was a superficial  thrombus, it was only 9 mm from the common femoral vein (according to the actual imaging report by the technologist which did not made it to the radiologist report).     Diagnosis:    Superficial thrombophlebitis of the left leg that is 9 mm from the common femoral vein.     Plan:  I explain to Flex that in general, superficial thrombophlebitis usually does not require any treatments with anticoagulation therapy and management usually is symptomatic management. However, if the superficial thrombophlebitis is close to the deep vein junction, in general, we do recommend 3 months of anticoagulation therapy to ensure that the thrombus does not enter the deep vein system.     Thus in this particular case, I would recommend treating Flex for 3 months of anticoagulation therapy with rivaroxaban at his current dose of 10 mg PO Qday as he is also on daily aspirin. I will repeat another ultrasound at the end of this 3 months anticoagulation therapy in July 2022 to re-evaluate his superficial thrombus at that time.     I will contact him once the repeat ultrasound result is back. I will likely stop his anticoagulation therapy at that point as long as the report does not show extension of the thrombus.     Patient is in agreement with this plan.       Bulmaro Zavaleta PA-C, MPAS  Physician Assistant  [facility].     33 minutes spent on the date of the encounter doing chart review, history and exam, documentation and further activities per the note.    Time IN: 13:35  Time OUT: 13:55          Again, thank you for allowing me to participate in the care of your patient.      Sincerely,    Bulmaro Zavaleta PA-C

## 2022-05-02 NOTE — LETTER
[facility]  [address] [place] [address]  Main: [phone], Fax: [phone]    Patient seen at: [facility] at [address] [place] [address]    Outpatient Visit Note:    Patient: Roger Bonilla  MRN: [id number]  : 1955  SANIA: May 2, 2022    Reason for visit:  Left leg superficial thrombophlebitis with occlusive thrombus in the left accessory saphenous vein in the proximal thigh in 2022.     HPI:  Flex is a 66 year old male with a history of ASCVD, who underwent 3 vessel CABG on 3/24/2022 with bilateral great saphenous vein harvesting, abdominal aortic aneurysm, type 2 diabetes, PAD, hypertension, tobacco abuse, morbid obesity, ABIGAIL, hyperlipidemia, peripheral vascular disease, who is found to have superficial thrombophlebitis on the left leg back on 2022, referred by Maria De Jesus Hay CNP, for consultation.     As mentioned, Flex underwent three-vessels CABG on 3/24/2022 with bilateral great saphenous vein harvesting. Then on 2022, he presented to the emergency department with 2 days history of bilateral lower extremity edema. Ultrasound was done at the time and showed superficial thrombophlebitis with occlusive thrombus in the left accessory saphenous vein in the proximal thigh (9mm from common femoral vein junction). For this reason, the patient was placed on rivaroxaban at 10 mg PO Qday dosing.     Repeat ultrasound on 2022 showed: nonocclusive superficial thrombophlebitis in the proximal left accessory saphenous vein.     He reports that he has been dealing with cellulitis at the incision sites of his right lower extremity. His left leg incision sites are well healed without evidence of infection. He denies any pain of his left leg.     ROS:  Denies any bleeding complications while on rivaroxaban at 10 mg PO Qday dosing along with aspirin 162 mg Q day.       Medications:  Current Outpatient  "Medications   Medication     acetaminophen (TYLENOL) 325 MG tablet     aspirin (ASA) 81 MG chewable tablet     atorvastatin (LIPITOR) 80 MG tablet     ezetimibe (ZETIA) 10 MG tablet     furosemide (LASIX) 40 MG tablet     metFORMIN (GLUCOPHAGE) 1000 MG tablet     methocarbamol (ROBAXIN) 500 MG tablet     metoprolol tartrate (LOPRESSOR) 100 MG tablet     omeprazole (PRILOSEC) 40 MG DR capsule     rivaroxaban ANTICOAGULANT (XARELTO ANTICOAGULANT) 10 MG TABS tablet     senna-docusate (SENOKOT-S/PERICOLACE) 8.6-50 MG tablet     No current facility-administered medications for this visit.     Allergies:  Allergies   Allergen Reactions     Nkda [No Known Drug Allergies]      PmHx:  Past Medical History:   Diagnosis Date     Acute kidney injury (H) 6/23/2021     Coronary artery disease may 1 2005    2 stents put in heart     Obese      Pure hypercholesterolemia      Sleep apnea      Type II or unspecified type diabetes mellitus without mention of complication, not stated as uncontrolled      Unspecified cardiovascular disease 5-2001    MI -- Angioplasty two stents RCA & OM2     Unspecified essential hypertension      Social History:   Deferred.    Family History:  Deferred.    Objective:  Vitals: /83 (BP Location: Right leg, Patient Position: Sitting, Cuff Size: Adult Regular)   Pulse 96   Temp 98.1  F (36.7  C) (Oral)   Ht 1.676 m (5' 6\")   Wt 109.8 kg (242 lb)   SpO2 93%   BMI 39.06 kg/m    Exam:   No significant swelling in either lower extremities is noted on today's exam.     Labs:  Component      Latest Ref Rng & Units 4/5/2022   WBC      4.0 - 11.0 10e3/uL 19.5 (H)   RBC Count      4.40 - 5.90 10e6/uL 3.00 (L)   Hemoglobin      13.3 - 17.7 g/dL 8.9 (L)   Hematocrit      40.0 - 53.0 % 28.1 (L)   MCV      78 - 100 fL 94   MCH      26.5 - 33.0 pg 29.7   MCHC      31.5 - 36.5 g/dL 31.7   RDW      10.0 - 15.0 % 15.4 (H)   Platelet Count      150 - 450 10e3/uL 647 (H)   % Neutrophils      % 76   % " Lymphocytes      % 13   % Monocytes      % 8   % Eosinophils      % 2   % Basophils      % 0   % Immature Granulocytes      % 1   NRBCs per 100 WBC      <1 /100 0   Absolute Neutrophils      1.6 - 8.3 10e3/uL 14.9 (H)   Absolute Lymphocytes      0.8 - 5.3 10e3/uL 2.5   Absolute Monocytes      0.0 - 1.3 10e3/uL 1.6 (H)   Absolute Eosinophils      0.0 - 0.7 10e3/uL 0.4   Absolute Basophils      0.0 - 0.2 10e3/uL 0.1   Absolute Immature Granulocytes      <=0.4 10e3/uL 0.2   Absolute NRBCs      10e3/uL 0.0   Sodium      133 - 144 mmol/L 139   Potassium      3.4 - 5.3 mmol/L 4.5   Chloride      94 - 109 mmol/L 102   Carbon Dioxide      20 - 32 mmol/L 30   Anion Gap      3 - 14 mmol/L 7   Urea Nitrogen      7 - 30 mg/dL 23   Creatinine      0.66 - 1.25 mg/dL 1.22   Calcium      8.5 - 10.1 mg/dL 9.2   Glucose      70 - 99 mg/dL 101 (H)   Alkaline Phosphatase      40 - 150 U/L 111   AST      0 - 45 U/L 26   ALT      0 - 70 U/L 77 (H)   Protein Total      6.8 - 8.8 g/dL 7.0   Albumin      3.4 - 5.0 g/dL 2.7 (L)   Bilirubin Total      0.2 - 1.3 mg/dL 0.9   GFR Estimate      >60 mL/min/1.73m2 65       Imaging:  Reviewed and are as described above.     Assessment:  In summary, Flex is a 66 year old male with a history of ASCVD, who underwent 3 vessel CABG on 3/24/2022 with bilateral great saphenous vein harvesting, abdominal aortic aneurysm, type 2 diabetes, PAD, hypertension, tobacco abuse, morbid obesity, ABIGAIL, hyperlipidemia, peripheral vascular disease, who is found to have superficial thrombophlebitis on the left leg back on 4/5/2022, referred by Maria De Jesus Hay CNP, for consultation.    Although his thrombus was a superficial thrombus, it was only 9 mm from the common femoral vein (according to the actual imaging report by the technologist which did not made it to the radiologist report).     Diagnosis:    Superficial thrombophlebitis of the left leg that is 9 mm from the common femoral vein.     Plan:  I explain to  Flex that in general, superficial thrombophlebitis usually does not require any treatments with anticoagulation therapy and management usually is symptomatic management. However, if the superficial thrombophlebitis is close to the deep vein junction, in general, we do recommend 3 months of anticoagulation therapy to ensure that the thrombus does not enter the deep vein system.     Thus in this particular case, I would recommend treating Flex for 3 months of anticoagulation therapy with rivaroxaban at his current dose of 10 mg PO Qday as he is also on daily aspirin. I will repeat another ultrasound at the end of this 3 months anticoagulation therapy in July 2022 to re-evaluate his superficial thrombus at that time.     I will contact him once the repeat ultrasound result is back. I will likely stop his anticoagulation therapy at that point as long as the report does not show extension of the thrombus.     Patient is in agreement with this plan.       Bulmaro Zavaleta PA-C, MPAS  Physician Assistant  [facility].     33 minutes spent on the date of the encounter doing chart review, history and exam, documentation and further activities per the note.    Time IN: 13:35  Time OUT: 13:55

## 2022-05-02 NOTE — PATIENT INSTRUCTIONS
[facility]  [facility]  [facility] [address], [place] [address]  Main: [phone], Fax: [phone]    It was a pleasure seeing you today.  Thank you for allowing us to be involved in your care. Please let us know if there is anything else we can do for you, so that we can be sure you are leaving completely satisfied with your care experience.      Please stay on your blood thinner:  Xarelto  Please call us with any bleeding issues that you may have.    We would like you to repeat your imaging in 2 months.  Friday July 8th @ 10am with a 945am check in at the [facility] in [place]    Wear compression stockings if you have swelling in your leg. Take them off while you are sleeping. You may need to get new stockings every 3-6 months with regular wear.    Patient Resources:   For additional information, please see the following web links:  www.stoptheclot.org, www.clotconnect.org.    Call the [facility]  at [phone].     -If surgeries or procedures are planned (for holding instructions).     -If off anticoagulation, please call during high risk times (long-distance travel, broken bones or trauma, immobilization, surgery, pregnancy, or taking estrogen).     -Any new symptoms of DVT (deep vein thrombosis) or PE (pulmonary embolism)    -pain     -swelling     -redness    -warmth    -shortness of breath    -chest pain    -coughing up blood    We would like a provider on our team to see you at least annually for optimal care and to allow us to continue to prescribe for you.  Bulmaro Zavaleta PA-C, Minda Waldrop, MPH, PAJoeyC  and Yeimi Rossi PA-C are our physician assistants that are specialized in bleeding and clotting disorders that you may be able to see more readily.    Return to clinic in  2 months.  Please schedule return appointment with Bulmaro Zavaleta PA-C .    Your nurse clinician is Alexsandra Mckenna RN, PHN  [phone] .   If she is unavailable and you have immediate concerns, please call [phone] and ask for a nurse.

## 2022-05-02 NOTE — PROGRESS NOTES
[facility]  [address] [place] [id number]  Main: [phone], Fax: [phone]    Patient seen at: [facility] at [address] [place] [address]    Outpatient Visit Note:    Patient: Roger Bonilla  MRN: [id number]  : 1955  SANIA: May 2, 2022    Reason for visit:  Left leg superficial thrombophlebitis with occlusive thrombus in the left accessory saphenous vein in the proximal thigh in 2022.     HPI:  Flex is a 66 year old male with a history of ASCVD, who underwent 3 vessel CABG on 3/24/2022 with bilateral great saphenous vein harvesting, abdominal aortic aneurysm, type 2 diabetes, PAD, hypertension, tobacco abuse, morbid obesity, ABIGAIL, hyperlipidemia, peripheral vascular disease, who is found to have superficial thrombophlebitis on the left leg back on 2022, referred by Maria De Jesus Hay CNP, for consultation.     As mentioned, Flex underwent three-vessels CABG on 3/24/2022 with bilateral great saphenous vein harvesting. Then on 2022, he presented to the emergency department with 2 days history of bilateral lower extremity edema. Ultrasound was done at the time and showed superficial thrombophlebitis with occlusive thrombus in the left accessory saphenous vein in the proximal thigh (9mm from common femoral vein junction). For this reason, the patient was placed on rivaroxaban at 10 mg PO Qday dosing.     Repeat ultrasound on 2022 showed: nonocclusive superficial thrombophlebitis in the proximal left accessory saphenous vein.     He reports that he has been dealing with cellulitis at the incision sites of his right lower extremity. His left leg incision sites are well healed without evidence of infection. He denies any pain of his left leg.     ROS:  Denies any bleeding complications while on rivaroxaban at 10 mg PO Qday dosing along with aspirin 162 mg Q day.       Medications:  Current Outpatient Medications  "  Medication     acetaminophen (TYLENOL) 325 MG tablet     aspirin (ASA) 81 MG chewable tablet     atorvastatin (LIPITOR) 80 MG tablet     ezetimibe (ZETIA) 10 MG tablet     furosemide (LASIX) 40 MG tablet     metFORMIN (GLUCOPHAGE) 1000 MG tablet     methocarbamol (ROBAXIN) 500 MG tablet     metoprolol tartrate (LOPRESSOR) 100 MG tablet     omeprazole (PRILOSEC) 40 MG DR capsule     rivaroxaban ANTICOAGULANT (XARELTO ANTICOAGULANT) 10 MG TABS tablet     senna-docusate (SENOKOT-S/PERICOLACE) 8.6-50 MG tablet     No current facility-administered medications for this visit.     Allergies:  Allergies   Allergen Reactions     Nkda [No Known Drug Allergies]      PmHx:  Past Medical History:   Diagnosis Date     Acute kidney injury (H) 6/23/2021     Coronary artery disease may 1 2005    2 stents put in heart     Obese      Pure hypercholesterolemia      Sleep apnea      Type II or unspecified type diabetes mellitus without mention of complication, not stated as uncontrolled      Unspecified cardiovascular disease 5-2001    MI -- Angioplasty two stents RCA & OM2     Unspecified essential hypertension      Social History:   Deferred.    Family History:  Deferred.    Objective:  Vitals: /83 (BP Location: Right leg, Patient Position: Sitting, Cuff Size: Adult Regular)   Pulse 96   Temp 98.1  F (36.7  C) (Oral)   Ht 1.676 m (5' 6\")   Wt 109.8 kg (242 lb)   SpO2 93%   BMI 39.06 kg/m    Exam:   No significant swelling in either lower extremities is noted on today's exam.     Labs:  Component      Latest Ref Rng & Units 4/5/2022   WBC      4.0 - 11.0 10e3/uL 19.5 (H)   RBC Count      4.40 - 5.90 10e6/uL 3.00 (L)   Hemoglobin      13.3 - 17.7 g/dL 8.9 (L)   Hematocrit      40.0 - 53.0 % 28.1 (L)   MCV      78 - 100 fL 94   MCH      26.5 - 33.0 pg 29.7   MCHC      31.5 - 36.5 g/dL 31.7   RDW      10.0 - 15.0 % 15.4 (H)   Platelet Count      150 - 450 10e3/uL 647 (H)   % Neutrophils      % 76   % Lymphocytes      % 13 "   % Monocytes      % 8   % Eosinophils      % 2   % Basophils      % 0   % Immature Granulocytes      % 1   NRBCs per 100 WBC      <1 /100 0   Absolute Neutrophils      1.6 - 8.3 10e3/uL 14.9 (H)   Absolute Lymphocytes      0.8 - 5.3 10e3/uL 2.5   Absolute Monocytes      0.0 - 1.3 10e3/uL 1.6 (H)   Absolute Eosinophils      0.0 - 0.7 10e3/uL 0.4   Absolute Basophils      0.0 - 0.2 10e3/uL 0.1   Absolute Immature Granulocytes      <=0.4 10e3/uL 0.2   Absolute NRBCs      10e3/uL 0.0   Sodium      133 - 144 mmol/L 139   Potassium      3.4 - 5.3 mmol/L 4.5   Chloride      94 - 109 mmol/L 102   Carbon Dioxide      20 - 32 mmol/L 30   Anion Gap      3 - 14 mmol/L 7   Urea Nitrogen      7 - 30 mg/dL 23   Creatinine      0.66 - 1.25 mg/dL 1.22   Calcium      8.5 - 10.1 mg/dL 9.2   Glucose      70 - 99 mg/dL 101 (H)   Alkaline Phosphatase      40 - 150 U/L 111   AST      0 - 45 U/L 26   ALT      0 - 70 U/L 77 (H)   Protein Total      6.8 - 8.8 g/dL 7.0   Albumin      3.4 - 5.0 g/dL 2.7 (L)   Bilirubin Total      0.2 - 1.3 mg/dL 0.9   GFR Estimate      >60 mL/min/1.73m2 65       Imaging:  Reviewed and are as described above.     Assessment:  In summary, Flex is a 66 year old male with a history of ASCVD, who underwent 3 vessel CABG on 3/24/2022 with bilateral great saphenous vein harvesting, abdominal aortic aneurysm, type 2 diabetes, PAD, hypertension, tobacco abuse, morbid obesity, ABIGAIL, hyperlipidemia, peripheral vascular disease, who is found to have superficial thrombophlebitis on the left leg back on 4/5/2022, referred by Maria De Jesus Hay CNP, for consultation.    Although his thrombus was a superficial thrombus, it was only 9 mm from the common femoral vein (according to the actual imaging report by the technologist which did not made it to the radiologist report).     Diagnosis:    Superficial thrombophlebitis of the left leg that is 9 mm from the common femoral vein.     Plan:  I explain to Flex that in general,  superficial thrombophlebitis usually does not require any treatments with anticoagulation therapy and management usually is symptomatic management. However, if the superficial thrombophlebitis is close to the deep vein junction, in general, we do recommend 3 months of anticoagulation therapy to ensure that the thrombus does not enter the deep vein system.     Thus in this particular case, I would recommend treating Flex for 3 months of anticoagulation therapy with rivaroxaban at his current dose of 10 mg PO Qday as he is also on daily aspirin. I will repeat another ultrasound at the end of this 3 months anticoagulation therapy in July 2022 to re-evaluate his superficial thrombus at that time.     I will contact him once the repeat ultrasound result is back. I will likely stop his anticoagulation therapy at that point as long as the report does not show extension of the thrombus.     Patient is in agreement with this plan.       Bulmaro Zavaleta PA-C, MPAS  Physician Assistant  [facility].     33 minutes spent on the date of the encounter doing chart review, history and exam, documentation and further activities per the note.    Time IN: 13:35  Time OUT: 13:55

## 2022-05-06 ENCOUNTER — OFFICE VISIT (OUTPATIENT)
Dept: CARDIOLOGY | Facility: CLINIC | Age: 67
End: 2022-05-06
Payer: COMMERCIAL

## 2022-05-06 ENCOUNTER — TELEPHONE (OUTPATIENT)
Dept: CARDIOLOGY | Facility: CLINIC | Age: 67
End: 2022-05-06
Payer: COMMERCIAL

## 2022-05-06 VITALS — BODY MASS INDEX: 42.11 KG/M2 | WEIGHT: 262 LBS | HEIGHT: 66 IN

## 2022-05-06 DIAGNOSIS — I73.9 PVD (PERIPHERAL VASCULAR DISEASE) (H): ICD-10-CM

## 2022-05-06 DIAGNOSIS — I25.118 CORONARY ARTERY DISEASE OF NATIVE ARTERY OF NATIVE HEART WITH STABLE ANGINA PECTORIS (H): ICD-10-CM

## 2022-05-06 DIAGNOSIS — Z95.1 S/P CABG (CORONARY ARTERY BYPASS GRAFT): Primary | ICD-10-CM

## 2022-05-06 DIAGNOSIS — E78.5 HYPERLIPIDEMIA LDL GOAL <70: ICD-10-CM

## 2022-05-06 PROCEDURE — 99024 POSTOP FOLLOW-UP VISIT: CPT | Performed by: PHYSICIAN ASSISTANT

## 2022-05-06 PROCEDURE — 87205 SMEAR GRAM STAIN: CPT

## 2022-05-06 RX ORDER — FUROSEMIDE 40 MG
40 TABLET ORAL DAILY
Qty: 5 TABLET | Refills: 0 | Status: SHIPPED | OUTPATIENT
Start: 2022-05-06 | End: 2022-05-11

## 2022-05-06 RX ORDER — SULFAMETHOXAZOLE/TRIMETHOPRIM 800-160 MG
1 TABLET ORAL 2 TIMES DAILY
Qty: 20 TABLET | Refills: 0 | Status: SHIPPED | OUTPATIENT
Start: 2022-05-06 | End: 2022-05-17

## 2022-05-06 RX ORDER — POTASSIUM CHLORIDE 750 MG/1
10 TABLET, EXTENDED RELEASE ORAL DAILY
Qty: 5 TABLET | Refills: 0 | Status: SHIPPED | OUTPATIENT
Start: 2022-05-06 | End: 2022-05-11

## 2022-05-06 NOTE — TELEPHONE ENCOUNTER
Patient was called by [facility] as a check up; they called me and connected with patient. Patient reports right leg wound graft incision complications; top right leg wound appears open and has yellow tissue, pain reported around the wound; patient states wound has been open for 1 week, he has not called or followed up with anyone until now. Middle graft wound is healed. Distal graft incision reported really red around the wound edges, scab fell off and yellow pus is present in the wound. Scant drainage; no fever reported, minimal pain reported. Both wounds present for 1 week; has bandages over the wound.   Tiffanie JIM consulted regarding the information above; plan is to have patient come to clinic today 5/6 at 1330 to clinic is located at [address] [place] [address].   Patient called and informed of the appointment time and location, Patient verbalized understanding and agreed to the plan.

## 2022-05-08 DIAGNOSIS — I10 ESSENTIAL HYPERTENSION WITH GOAL BLOOD PRESSURE LESS THAN 140/90: ICD-10-CM

## 2022-05-08 DIAGNOSIS — E78.5 HYPERLIPIDEMIA, UNSPECIFIED: ICD-10-CM

## 2022-05-08 DIAGNOSIS — E11.9 TYPE 2 DIABETES MELLITUS WITHOUT COMPLICATION, WITHOUT LONG-TERM CURRENT USE OF INSULIN (H): Primary | ICD-10-CM

## 2022-05-08 DIAGNOSIS — E11.9 TYPE 2 DIABETES MELLITUS WITHOUT COMPLICATIONS (H): ICD-10-CM

## 2022-05-08 DIAGNOSIS — I25.10 CORONARY ARTERY DISEASE INVOLVING NATIVE CORONARY ARTERY OF NATIVE HEART WITHOUT ANGINA PECTORIS: ICD-10-CM

## 2022-05-08 LAB
BACTERIA WND CULT: ABNORMAL
GRAM STAIN RESULT: ABNORMAL
GRAM STAIN RESULT: ABNORMAL

## 2022-05-10 RX ORDER — METOPROLOL SUCCINATE 50 MG/1
TABLET, EXTENDED RELEASE ORAL
Qty: 90 TABLET | Refills: 3 | OUTPATIENT
Start: 2022-05-10

## 2022-05-10 RX ORDER — ATORVASTATIN CALCIUM 80 MG/1
TABLET, FILM COATED ORAL
Qty: 90 TABLET | Refills: 3 | Status: SHIPPED | OUTPATIENT
Start: 2022-05-10 | End: 2023-03-15

## 2022-05-10 RX ORDER — LISINOPRIL 40 MG/1
TABLET ORAL
Qty: 90 TABLET | Refills: 3 | OUTPATIENT
Start: 2022-05-10

## 2022-05-10 NOTE — TELEPHONE ENCOUNTER
Patient called. RN please call pharmacy.  Elza Landry [facility] on 5/10/2022 at 11:32 AM

## 2022-05-10 NOTE — TELEPHONE ENCOUNTER
Lisinopril was discontinued.  The metoprolol succinate was also discontinued.    Patient should be taking metoprolol tartrate.    RN should call patient's pharmacy and clarify these changes.  Maria De Jesus Hay, CNP

## 2022-05-11 ENCOUNTER — OFFICE VISIT (OUTPATIENT)
Dept: CARDIOLOGY | Facility: CLINIC | Age: 67
End: 2022-05-11
Payer: COMMERCIAL

## 2022-05-11 DIAGNOSIS — Z95.1 S/P CABG (CORONARY ARTERY BYPASS GRAFT): Primary | ICD-10-CM

## 2022-05-11 PROCEDURE — 99024 POSTOP FOLLOW-UP VISIT: CPT | Performed by: PHYSICIAN ASSISTANT

## 2022-05-11 RX ORDER — FUROSEMIDE 40 MG
40 TABLET ORAL DAILY
Qty: 5 TABLET | Refills: 0 | Status: SHIPPED | OUTPATIENT
Start: 2022-05-11 | End: 2022-05-25

## 2022-05-11 RX ORDER — POTASSIUM CHLORIDE 750 MG/1
10 TABLET, EXTENDED RELEASE ORAL DAILY
Qty: 5 TABLET | Refills: 0 | Status: SHIPPED | OUTPATIENT
Start: 2022-05-11 | End: 2022-06-08

## 2022-05-11 NOTE — PROGRESS NOTES
CVTS follow up    Patient seen with Dr. Waters.  Patient seen in follow up for wound.  Patient continues to smoke about 7-8 cigarettes per day  Today is his last dose of lasix and he reports his weight is stable  He is still taking bactrim and has about 5 days left.       Exam:  Right lower extremity wound   Wound packing removed, wound edges debrided and probed. No tunneling appreciated  4cm x 2cm x 1cm  With granulation tissue  No purulent material noted  2+ bilateral lower extremity edema    Wound cleaned, packing reapplied and redressed.   Will continue 5 more days of lasix with potassium supplement  Complete antibiotics as ordered  Continue wound cares as discussed.  Will have patient get labs drawn today to evaluate creatinine  Discussed smoking cessation with patient     Tiffanie Green PA-C

## 2022-05-17 ENCOUNTER — OFFICE VISIT (OUTPATIENT)
Dept: FAMILY MEDICINE | Facility: CLINIC | Age: 67
End: 2022-05-17
Payer: COMMERCIAL

## 2022-05-17 VITALS
HEIGHT: 66 IN | OXYGEN SATURATION: 97 % | HEART RATE: 90 BPM | BODY MASS INDEX: 38.09 KG/M2 | WEIGHT: 237 LBS | DIASTOLIC BLOOD PRESSURE: 88 MMHG | SYSTOLIC BLOOD PRESSURE: 136 MMHG | RESPIRATION RATE: 14 BRPM | TEMPERATURE: 97.9 F

## 2022-05-17 DIAGNOSIS — D64.9 POSTOPERATIVE ANEMIA: ICD-10-CM

## 2022-05-17 DIAGNOSIS — Z23 COVID-19 VACCINE ADMINISTERED: ICD-10-CM

## 2022-05-17 DIAGNOSIS — Z95.1 S/P CABG (CORONARY ARTERY BYPASS GRAFT): Primary | ICD-10-CM

## 2022-05-17 LAB
ANION GAP SERPL CALCULATED.3IONS-SCNC: 4 MMOL/L (ref 3–14)
BUN SERPL-MCNC: 27 MG/DL (ref 7–30)
CALCIUM SERPL-MCNC: 9.2 MG/DL (ref 8.5–10.1)
CHLORIDE BLD-SCNC: 101 MMOL/L (ref 94–109)
CO2 SERPL-SCNC: 29 MMOL/L (ref 20–32)
CREAT SERPL-MCNC: 1.3 MG/DL (ref 0.66–1.25)
ERYTHROCYTE [DISTWIDTH] IN BLOOD BY AUTOMATED COUNT: 17.3 % (ref 10–15)
GFR SERPL CREATININE-BSD FRML MDRD: 61 ML/MIN/1.73M2
GLUCOSE BLD-MCNC: 112 MG/DL (ref 70–99)
HCT VFR BLD AUTO: 38.1 % (ref 40–53)
HGB BLD-MCNC: 11.6 G/DL (ref 13.3–17.7)
MCH RBC QN AUTO: 26.1 PG (ref 26.5–33)
MCHC RBC AUTO-ENTMCNC: 30.4 G/DL (ref 31.5–36.5)
MCV RBC AUTO: 86 FL (ref 78–100)
PLATELET # BLD AUTO: 349 10E3/UL (ref 150–450)
POTASSIUM BLD-SCNC: 5.1 MMOL/L (ref 3.4–5.3)
RBC # BLD AUTO: 4.45 10E6/UL (ref 4.4–5.9)
SODIUM SERPL-SCNC: 134 MMOL/L (ref 133–144)
WBC # BLD AUTO: 12.3 10E3/UL (ref 4–11)

## 2022-05-17 PROCEDURE — 99213 OFFICE O/P EST LOW 20 MIN: CPT | Mod: 25 | Performed by: NURSE PRACTITIONER

## 2022-05-17 PROCEDURE — 36415 COLL VENOUS BLD VENIPUNCTURE: CPT | Performed by: NURSE PRACTITIONER

## 2022-05-17 PROCEDURE — 80048 BASIC METABOLIC PNL TOTAL CA: CPT | Performed by: NURSE PRACTITIONER

## 2022-05-17 PROCEDURE — 91305 COVID-19,PF,PFIZER (12+ YRS): CPT | Performed by: NURSE PRACTITIONER

## 2022-05-17 PROCEDURE — 85027 COMPLETE CBC AUTOMATED: CPT | Performed by: NURSE PRACTITIONER

## 2022-05-17 PROCEDURE — 0054A COVID-19,PF,PFIZER (12+ YRS): CPT | Performed by: NURSE PRACTITIONER

## 2022-05-17 ASSESSMENT — PAIN SCALES - GENERAL: PAINLEVEL: MILD PAIN (2)

## 2022-05-17 NOTE — PROGRESS NOTES
"  Assessment & Plan     S/P CABG (coronary artery bypass graft)  - Basic metabolic panel  (Ca, Cl, CO2, Creat, Gluc, K, Na, BUN)    Postoperative anemia  - CBC with platelets; Future  - CBC with platelets    COVID-19 vaccine administered  - COVID-19,PF,PFIZER (12+ YRS)      The risks, benefits and treatment options of prescribed medications or other treatments have been discussed with the patient. The patient verbalized their understanding and should call or follow up if no improvement or if they develop further problems.    [facility] 66 year old who presents for the following health issues     HPI     Due for lab recheck today:  Needs potassium and kidney function rechecked.  Also should be rechecked for anemia - hemoglobin 8.9 post op.    Recheck leg would - graft site opened up and was infected  Following with cards PA - wound debrided. Had course of antibiotics.            Review of Systems   Constitutional, HEENT, cardiovascular, pulmonary, gi and gu systems are negative, except as otherwise noted.      Objective    /88   Pulse 90   Temp 97.9  F (36.6  C) (Tympanic)   Resp 14   Ht 1.676 m (5' 6\")   Wt 107.5 kg (237 lb)   SpO2 97%   BMI 38.25 kg/m    Body mass index is 38.25 kg/m .  Physical Exam   GENERAL: healthy, alert and no distress  MS: right lower leg graft site - area is packed. Skin surrounding area is not erythematous or edematous, no drainage.    Results for orders placed or performed in visit on 05/17/22 (from the past 24 hour(s))   Basic metabolic panel  (Ca, Cl, CO2, Creat, Gluc, K, Na, BUN)   Result Value Ref Range    Sodium 134 133 - 144 mmol/L    Potassium 5.1 3.4 - 5.3 mmol/L    Chloride 101 94 - 109 mmol/L    Carbon Dioxide (CO2) 29 20 - 32 mmol/L    Anion Gap 4 3 - 14 mmol/L    Urea Nitrogen 27 7 - 30 mg/dL    Creatinine 1.30 (H) 0.66 - 1.25 mg/dL    Calcium 9.2 8.5 - 10.1 mg/dL    Glucose " 112 (H) 70 - 99 mg/dL    GFR Estimate 61 >60 mL/min/1.73m2   CBC with platelets   Result Value Ref Range    WBC Count 12.3 (H) 4.0 - 11.0 10e3/uL    RBC Count 4.45 4.40 - 5.90 10e6/uL    Hemoglobin 11.6 (L) 13.3 - 17.7 g/dL    Hematocrit 38.1 (L) 40.0 - 53.0 %    MCV 86 78 - 100 fL    MCH 26.1 (L) 26.5 - 33.0 pg    MCHC 30.4 (L) 31.5 - 36.5 g/dL    RDW 17.3 (H) 10.0 - 15.0 %    Platelet Count 349 150 - 450 10e3/uL

## 2022-05-18 ENCOUNTER — OFFICE VISIT (OUTPATIENT)
Dept: CARDIOLOGY | Facility: CLINIC | Age: 67
End: 2022-05-18
Payer: COMMERCIAL

## 2022-05-18 DIAGNOSIS — Z95.1 S/P CABG (CORONARY ARTERY BYPASS GRAFT): Primary | ICD-10-CM

## 2022-05-18 PROCEDURE — 99024 POSTOP FOLLOW-UP VISIT: CPT | Performed by: PHYSICIAN ASSISTANT

## 2022-05-18 NOTE — PROGRESS NOTES
CVTS    Patient continues to smoke but reports he is cutting back  Finished his bactrim and lasix and has lost some weight  Labs done yesterday show a slight elevation in creatinine to 1.3  Will hold additional lasix at this time    Exam:  Right lower extremity wound   Wound packing removed, wound edges debrided and probed. No tunneling appreciated  3.5cm x 1.5cm x 1cm  With granulation tissue  No purulent material noted  1+ bilateral lower extremity edema    Wound cleaned, packing reapplied and redressed  Discussed smoking cessation  Wound is stable without infection. Patient would like to space next appointment out by 3 weeks due to long drive time to come to clinic, which I think is reasonable.   Will plan to continue cleaning and packing wound and see back on 6/8 at 1:30pm  Patient instructed to call our office if there are any acute signs of infection.      Tiffanie Green PA-C  CV surgery

## 2022-05-24 NOTE — PROGRESS NOTES
CV Surgery    Patient seen and examined in clinic for leg wounds. Back to smoking which I emphasized needs to stop to allow for healing and prevention of future heart attacks. Bottom 2 leg wounds opened and irrigated and packed with iodoform packing. Cultures taken.     Our service will follow up with him.    Barby Horn PA-C

## 2022-05-25 ENCOUNTER — OFFICE VISIT (OUTPATIENT)
Dept: CARDIOLOGY | Facility: CLINIC | Age: 67
End: 2022-05-25
Attending: NURSE PRACTITIONER
Payer: COMMERCIAL

## 2022-05-25 VITALS
OXYGEN SATURATION: 97 % | HEART RATE: 84 BPM | SYSTOLIC BLOOD PRESSURE: 144 MMHG | TEMPERATURE: 97.8 F | BODY MASS INDEX: 40.29 KG/M2 | WEIGHT: 249.6 LBS | DIASTOLIC BLOOD PRESSURE: 81 MMHG

## 2022-05-25 DIAGNOSIS — E78.5 HYPERLIPIDEMIA LDL GOAL <70: ICD-10-CM

## 2022-05-25 DIAGNOSIS — I25.118 CORONARY ARTERY DISEASE OF NATIVE ARTERY OF NATIVE HEART WITH STABLE ANGINA PECTORIS (H): ICD-10-CM

## 2022-05-25 DIAGNOSIS — I73.9 PVD (PERIPHERAL VASCULAR DISEASE) (H): ICD-10-CM

## 2022-05-25 DIAGNOSIS — I10 BENIGN ESSENTIAL HYPERTENSION: ICD-10-CM

## 2022-05-25 DIAGNOSIS — Z95.1 S/P CABG (CORONARY ARTERY BYPASS GRAFT): ICD-10-CM

## 2022-05-25 PROCEDURE — 99213 OFFICE O/P EST LOW 20 MIN: CPT | Performed by: INTERNAL MEDICINE

## 2022-05-25 RX ORDER — FUROSEMIDE 40 MG
40 TABLET ORAL DAILY
Qty: 30 TABLET | Refills: 3 | Status: SHIPPED | OUTPATIENT
Start: 2022-05-25 | End: 2022-09-12

## 2022-05-25 NOTE — LETTER
5/25/2022    Maria De Jesus Hay, SAL CNP  [address] [facility] [address]    RE: Roger SARGENT Chad       Dear Colleague,     I had the pleasure of seeing [facility]illa [facility] the [facility].  HPI and Plan:   Today I had the pleasure of seeing Roger Bonilla at [facility]. He is a pleasant 66 year old patient who presents to the clinic for a follow-up visit with his significant other.    I had initially seen the patient a few months ago for preoperative risk stratification before undergoing a right carotid endarterectomy.  Because he has history of CAD/multiple PCI's and because he reported angina I decided to proceed with coronary angiogram for further restratification.  He was noted to have significant coronary disease on angiogram and hence a referral to CT surgery was made.  He was seen by Dr. Gambino who suggested getting CEA done before CABG.  The patient then underwent right CEA and 6 weeks later underwent coronary artery bypass grafting with LIMA to LAD, reverse SVG to RPDA and OM.  The patient is still recovering from both these surgeries.  He tells me that his right side of face and neck still feels numb from carotid endarterectomy.  Since CABG medial side of both his arms feel numb and tingly.  He was told that this is most likely secondary to compression of the nerve from the position he was in for 6 hours during bypass surgery.  He is also told that these symptoms will resolve in some time.  Other than that, his sternal wound has healed quite well although, one of the sites from where vein was harvested is still raw and is being treated by wound clinic.  He has also been diagnosed with superficial thrombophlebitis recently and was started on Xarelto for 3 months.  He finds it expensive but is glad that he only has 1 more month of therapy left.  He does not participate in cardiac rehabilitation as he is unable to afford co-pay.  However, he continues to smoke and I could  clearly smell cigarette as soon as I entered the room.  His ejection fraction is normal and he does not have any significant wall motion abnormalities or valvular disease on echocardiogram.    Assessment and plan  1.  Severe three-vessel coronary disease s/p CABG on 3/24/2022 and history of multiple PCI's in the past  2.  Peripheral arterial disease status post right carotid endarterectomy  3.  Essential hypertension  4.  Type 2 diabetes  5.  Hyperlipidemia  6.  Ongoing smoking  7.  Anemia    I told the patient that even though his postoperative course has been not as per his liking things have been improving steadily and he should expect to continue to do well.  However, at the same time I also told him that he should completely stop smoking.  I do not believe he is very keen on doing it at this time.  As far as cardiac rehabilitation is concerned we tried to enroll him into the well program but unfortunately the program is still on hold..  He tells me that he has's treadmill at home and I told him to start to use it gently.  He is on all the appropriate medications from heart standpoint which I will continue today.  I will have him come back and see us in a couple of months for close follow-up.  He does have mild bilateral lower extremity edema and hence I will resume 40 mg of Lasix.  I instructed him to take it daily for the next 3 days and thereafter take it only on as-needed basis.    Thank you for allowing me to participate in the care of Roger Bonilla    This note was completed in part using Dragon voice recognition software. Although reviewed after completion, some word and grammatical errors may occur.    Parveen Ramirez MD  Cardiology    Orders Placed This Encounter   Procedures     Follow-Up with Cardiology DANNA       Orders Placed This Encounter   Medications     furosemide (LASIX) 40 MG tablet     Sig: Take 1 tablet (40 mg) by mouth daily     Dispense:  30 tablet     Refill:  3       Medications  Discontinued During This Encounter   Medication Reason     furosemide (LASIX) 40 MG tablet Reorder       Encounter Diagnoses   Name Primary?     Coronary artery disease of native artery of native heart with stable angina pectoris (H)      Benign essential hypertension      Hyperlipidemia LDL goal <70      PVD (peripheral vascular disease) (H)      S/P CABG (coronary artery bypass graft)        CURRENT MEDICATIONS:  Current Outpatient Medications   Medication Sig Dispense Refill     acetaminophen (TYLENOL) 325 MG tablet Take 2 tablets (650 mg) by mouth every 4 hours as needed for other (For optimal non-opioid multimodal pain management to improve pain control.)       aspirin (ASA) 81 MG chewable tablet 2 tablets (162 mg) by Oral or NG Tube route daily 60 tablet 0     atorvastatin (LIPITOR) 80 MG tablet Take 1 Tablet BY MOUTH EVERY DAY 90 tablet 3     ezetimibe (ZETIA) 10 MG tablet Take 1 tablet (10 mg) by mouth daily 30 tablet 11     furosemide (LASIX) 40 MG tablet Take 1 tablet (40 mg) by mouth daily 30 tablet 3     metFORMIN (GLUCOPHAGE) 1000 MG tablet Take 1 Tablet BY MOUTH EVERY DAY 90 tablet 3     methocarbamol (ROBAXIN) 500 MG tablet Take 1 tablet (500 mg) by mouth every 6 hours as needed for muscle spasms or other (pain) 60 tablet 0     metoprolol tartrate (LOPRESSOR) 100 MG tablet Take 1 tablet (100 mg) by mouth 2 times daily 60 tablet 3     omeprazole (PRILOSEC) 40 MG DR capsule Take 40 mg by mouth every morning       potassium chloride ER (KLOR-CON M) 10 MEQ CR tablet Take 1 tablet (10 mEq) by mouth daily 5 tablet 0     rivaroxaban ANTICOAGULANT (XARELTO) 10 MG TABS tablet Take 1 tablet (10 mg) by mouth daily (with dinner) 30 tablet 1       ALLERGIES     Allergies   Allergen Reactions     Nkda [No Known Drug Allergies]        PAST MEDICAL HISTORY:  Past Medical History:   Diagnosis Date     Acute kidney injury (H) 6/23/2021     Coronary artery disease may 1 2005    2 stents put in heart     Obese      Pure  hypercholesterolemia      Sleep apnea      Type II or unspecified type diabetes mellitus without mention of complication, not stated as uncontrolled      Unspecified cardiovascular disease 5-2001    MI -- Angioplasty two stents RCA & OM2     Unspecified essential hypertension        PAST SURGICAL HISTORY:  Past Surgical History:   Procedure Laterality Date     BYPASS GRAFT ARTERY CORONARY N/A 3/24/2022    Procedure: CORONARY ARTERY BYPASS GRAFT x 3 (LIMA - LAD; SV - OM2; SV - PDA) WITH ENDOSCOPIC SAPHENOUS VEIN HARVEST ON BILATERAL LOWER EXTREMITY, AND ON CARDIOPULMONARY PUMP OXYGENATOR  (INTRAOPERATIVE TRANSESOPHAGEAL ECHOCARDIOGRAM BY ANESTHESIOLOGIST);  Surgeon: Glenna Waters MD;  Location:  OR     CARDIAC SURGERY  may 1, 2005     COLONOSCOPY  11/30/2011    Procedure:COLONOSCOPY; Screening Colonoscopy; Surgeon:LUTHER FLORES; Location:[facility]     CV CORONARY ANGIOGRAM N/A 12/29/2021    Procedure: Coronary Angiogram;  Surgeon: Jonny Moore MD;  Location:  HEART CARDIAC CATH LAB     ENDARTERECTOMY CAROTID Right 2/18/2022    Procedure: Right carotid endarterectomy with ELECTROENCEPHALOGRAM(EEG);  Surgeon: Karma Adorno MD;  [facility]     ESOPHAGOSCOPY, GASTROSCOPY, DUODENOSCOPY (EGD), COMBINED N/A 7/21/2021    Procedure: ESOPHAGOGASTRODUODENOSCOPY, WITH BIOPSY;  Surgeon: Jeff Cunningham DO;  Location: [facility]     PHACOEMULSIFICATION WITH STANDARD INTRAOCULAR LENS IMPLANT Right 7/28/2021    Procedure: Right eye Cataract Extraction with Implant;  Surgeon: Reyes Valdez MD;  Location: [facility]     PHACOEMULSIFICATION WITH STANDARD INTRAOCULAR LENS IMPLANT Left 8/18/2021    Procedure: left eye Cataract Extraction with Implant;  Surgeon: Reyes Valdez MD;  Location: [facility]     SURGICAL HISTORY OF -       None     VASCULAR SURGERY  oct 2013    stent put in leg       FAMILY HISTORY:  Family History   Problem Relation Age of Onset     Cerebrovascular Disease  Maternal Grandmother      Arthritis Other         hip fracture     Respiratory Father         emphysema     Alzheimer Disease Maternal Grandfather      Breast Cancer Sister      Cancer - colorectal No family hx of      Prostate Cancer No family hx of        SOCIAL HISTORY:  Social History     Socioeconomic History     Marital status:      Spouse name: None     Number of children: None     Years of education: None     Highest education level: None   Tobacco Use     Smoking status: Current Every Day Smoker     Packs/day: 0.50     Years: 50.00     Pack years: 25.00     Types: Cigarettes     Start date: 1972     Last attempt to quit: 2022     Years since quittin.3     Smokeless tobacco: Never Used     Tobacco comment: 5-6 cigs daily   Vaping Use     Vaping Use: Never used   Substance and Sexual Activity     Alcohol use: Not Currently     Comment: 2 beers a week      Drug use: No     Sexual activity: Yes     Partners: Female     Birth control/protection: Condom   Other Topics Concern     Parent/sibling w/ CABG, MI or angioplasty before 65F 55M? No       Review of Systems:  Skin:  Positive for bruising     Eyes:  Negative      ENT:  Negative      Respiratory:  Negative       Cardiovascular:    Positive for;lower extremity symptoms;edema    Gastroenterology: Negative      Genitourinary:  Negative      Musculoskeletal:  Negative      Neurologic:  Negative      Psychiatric:  Negative      Heme/Lymph/Imm:  Negative      Endocrine:  Positive for diabetes      Physical Exam:  Vitals: BP (!) 144/81   Pulse 84   Temp 97.8  F (36.6  C) (Tympanic)   Wt 113.2 kg (249 lb 9.6 oz)   SpO2 97%   BMI 40.29 kg/m    Eyes: No icterus.  Pulmonary: Chest symmetric, lungs clear bilaterally and no crackles, wheezes or rales.  Cardiovascular: RRR with normal S1 and S2, no murmur, JVP normal.  Musculoskeletal: Edema of the lower extremities: None.  Neurologic: Oriented and appropriate without obvious focal deficits.    Psychiatric: Normal affect.     Recent Lab Results:  LIPID RESULTS:  Lab Results   Component Value Date    CHOL 136 03/11/2022    CHOL 139 06/04/2020    HDL 39 (L) 03/11/2022    HDL 36 (L) 06/04/2020    LDL 80 03/11/2022    LDL 86 06/04/2020    TRIG 86 03/11/2022    TRIG 83 06/04/2020    CHOLHDLRATIO 3.0 07/24/2015       LIVER ENZYME RESULTS:  Lab Results   Component Value Date    AST 26 04/05/2022    AST 26 06/23/2021    ALT 77 (H) 04/05/2022    ALT 40 06/23/2021       CBC RESULTS:  Lab Results   Component Value Date    WBC 12.3 (H) 05/17/2022    WBC 12.4 (H) 07/01/2021    RBC 4.45 05/17/2022    RBC 4.36 (L) 07/01/2021    HGB 11.6 (L) 05/17/2022    HGB 14.1 07/01/2021    HCT 38.1 (L) 05/17/2022    HCT 42.9 07/01/2021    MCV 86 05/17/2022    MCV 98 07/01/2021    MCH 26.1 (L) 05/17/2022    MCH 32.3 07/01/2021    MCHC 30.4 (L) 05/17/2022    MCHC 32.9 07/01/2021    RDW 17.3 (H) 05/17/2022    RDW 13.7 07/01/2021     05/17/2022     07/01/2021       BMP RESULTS:  Lab Results   Component Value Date     05/17/2022     07/01/2021    POTASSIUM 5.1 05/17/2022    POTASSIUM 4.6 07/01/2021    CHLORIDE 101 05/17/2022    CHLORIDE 105 07/01/2021    CO2 29 05/17/2022    CO2 27 07/01/2021    ANIONGAP 4 05/17/2022    ANIONGAP 6 07/01/2021     (H) 05/17/2022    GLC 85 07/01/2021    BUN 27 05/17/2022    BUN 13 07/01/2021    CR 1.30 (H) 05/17/2022    CR 1.09 07/01/2021    GFRESTIMATED 61 05/17/2022    GFRESTIMATED 70 07/01/2021    GFRESTBLACK 82 07/01/2021    SUE 9.2 05/17/2022    SUE 9.3 07/01/2021        A1C RESULTS:  Lab Results   Component Value Date    A1C 6.6 (H) 03/11/2022    A1C 6.4 (H) 03/25/2021       INR RESULTS:  Lab Results   Component Value Date    INR 1.31 (H) 03/24/2022    INR 1.41 (H) 03/24/2022    INR 0.94 09/13/2013    INR 0.94 10/27/2008     CC  Kamryn Carroll, APRN CNP  [address]  [place] [address]    All medical records were reviewed in detail and discussed  with the patient. Greater than 30 mins were spent with the patient, 50% of this time was spent on counseling and coordination of care.  After visit summary was printed and given to the patient.    Thank you for allowing me to participate in the care of your patient.    Sincerely,   Parveen Ramirez MD   [facility]

## 2022-05-25 NOTE — PROGRESS NOTES
HPI and Plan:   Today I had the pleasure of seeing Roger Bonilla at [facility]. He is a pleasant 66 year old patient who presents to the clinic for a follow-up visit with his significant other.    I had initially seen the patient a few months ago for preoperative risk stratification before undergoing a right carotid endarterectomy.  Because he has history of CAD/multiple PCI's and because he reported angina I decided to proceed with coronary angiogram for further restratification.  He was noted to have significant coronary disease on angiogram and hence a referral to CT surgery was made.  He was seen by Dr. Gambino who suggested getting CEA done before CABG.  The patient then underwent right CEA and 6 weeks later underwent coronary artery bypass grafting with LIMA to LAD, reverse SVG to RPDA and OM.  The patient is still recovering from both these surgeries.  He tells me that his right side of face and neck still feels numb from carotid endarterectomy.  Since CABG medial side of both his arms feel numb and tingly.  He was told that this is most likely secondary to compression of the nerve from the position he was in for 6 hours during bypass surgery.  He is also told that these symptoms will resolve in some time.  Other than that, his sternal wound has healed quite well although, one of the sites from where vein was harvested is still raw and is being treated by wound clinic.  He has also been diagnosed with superficial thrombophlebitis recently and was started on Xarelto for 3 months.  He finds it expensive but is glad that he only has 1 more month of therapy left.  He does not participate in cardiac rehabilitation as he is unable to afford co-pay.  However, he continues to smoke and I could clearly smell cigarette as soon as I entered the room.  His ejection fraction is normal and he does not have any significant wall motion abnormalities or valvular disease on echocardiogram.    Assessment  and plan  1.  Severe three-vessel coronary disease s/p CABG on 3/24/2022 and history of multiple PCI's in the past  2.  Peripheral arterial disease status post right carotid endarterectomy  3.  Essential hypertension  4.  Type 2 diabetes  5.  Hyperlipidemia  6.  Ongoing smoking  7.  Anemia    I told the patient that even though his postoperative course has been not as per his liking things have been improving steadily and he should expect to continue to do well.  However, at the same time I also told him that he should completely stop smoking.  I do not believe he is very keen on doing it at this time.  As far as cardiac rehabilitation is concerned we tried to enroll him into the well program but unfortunately the program is still on hold..  He tells me that he has's treadmill at home and I told him to start to use it gently.  He is on all the appropriate medications from heart standpoint which I will continue today.  I will have him come back and see us in a couple of months for close follow-up.  He does have mild bilateral lower extremity edema and hence I will resume 40 mg of Lasix.  I instructed him to take it daily for the next 3 days and thereafter take it only on as-needed basis.    Thank you for allowing me to participate in the care of Roger Bonilla    This note was completed in part using Dragon voice recognition software. Although reviewed after completion, some word and grammatical errors may occur.    Parveen Ramirez MD  Cardiology    Orders Placed This Encounter   Procedures     Follow-Up with Cardiology DANNA       Orders Placed This Encounter   Medications     furosemide (LASIX) 40 MG tablet     Sig: Take 1 tablet (40 mg) by mouth daily     Dispense:  30 tablet     Refill:  3       Medications Discontinued During This Encounter   Medication Reason     furosemide (LASIX) 40 MG tablet Reorder       Encounter Diagnoses   Name Primary?     Coronary artery disease of native artery of native heart with stable  angina pectoris (H)      Benign essential hypertension      Hyperlipidemia LDL goal <70      PVD (peripheral vascular disease) (H)      S/P CABG (coronary artery bypass graft)        CURRENT MEDICATIONS:  Current Outpatient Medications   Medication Sig Dispense Refill     acetaminophen (TYLENOL) 325 MG tablet Take 2 tablets (650 mg) by mouth every 4 hours as needed for other (For optimal non-opioid multimodal pain management to improve pain control.)       aspirin (ASA) 81 MG chewable tablet 2 tablets (162 mg) by Oral or NG Tube route daily 60 tablet 0     atorvastatin (LIPITOR) 80 MG tablet Take 1 Tablet BY MOUTH EVERY DAY 90 tablet 3     ezetimibe (ZETIA) 10 MG tablet Take 1 tablet (10 mg) by mouth daily 30 tablet 11     furosemide (LASIX) 40 MG tablet Take 1 tablet (40 mg) by mouth daily 30 tablet 3     metFORMIN (GLUCOPHAGE) 1000 MG tablet Take 1 Tablet BY MOUTH EVERY DAY 90 tablet 3     methocarbamol (ROBAXIN) 500 MG tablet Take 1 tablet (500 mg) by mouth every 6 hours as needed for muscle spasms or other (pain) 60 tablet 0     metoprolol tartrate (LOPRESSOR) 100 MG tablet Take 1 tablet (100 mg) by mouth 2 times daily 60 tablet 3     omeprazole (PRILOSEC) 40 MG DR capsule Take 40 mg by mouth every morning       potassium chloride ER (KLOR-CON M) 10 MEQ CR tablet Take 1 tablet (10 mEq) by mouth daily 5 tablet 0     rivaroxaban ANTICOAGULANT (XARELTO) 10 MG TABS tablet Take 1 tablet (10 mg) by mouth daily (with dinner) 30 tablet 1       ALLERGIES     Allergies   Allergen Reactions     Nkda [No Known Drug Allergies]        PAST MEDICAL HISTORY:  Past Medical History:   Diagnosis Date     Acute kidney injury (H) 6/23/2021     Coronary artery disease may 1 2005    2 stents put in heart     Obese      Pure hypercholesterolemia      Sleep apnea      Type II or unspecified type diabetes mellitus without mention of complication, not stated as uncontrolled      Unspecified cardiovascular disease 5-2001    MI --  Angioplasty two stents RCA & OM2     Unspecified essential hypertension        PAST SURGICAL HISTORY:  Past Surgical History:   Procedure Laterality Date     BYPASS GRAFT ARTERY CORONARY N/A 3/24/2022    Procedure: CORONARY ARTERY BYPASS GRAFT x 3 (LIMA - LAD; SV - OM2; SV - PDA) WITH ENDOSCOPIC SAPHENOUS VEIN HARVEST ON BILATERAL LOWER EXTREMITY, AND ON CARDIOPULMONARY PUMP OXYGENATOR  (INTRAOPERATIVE TRANSESOPHAGEAL ECHOCARDIOGRAM BY ANESTHESIOLOGIST);  Surgeon: Glenna Waters MD;  Location:  OR     CARDIAC SURGERY  may 1, 2005     COLONOSCOPY  11/30/2011    Procedure:COLONOSCOPY; Screening Colonoscopy; Surgeon:LUTHER FLORES; [facility]     CV CORONARY ANGIOGRAM N/A 12/29/2021    Procedure: Coronary Angiogram;  Surgeon: Jonny Moore MD;  Location:  HEART CARDIAC CATH LAB     ENDARTERECTOMY CAROTID Right 2/18/2022    Procedure: Right carotid endarterectomy with ELECTROENCEPHALOGRAM(EEG);  Surgeon: Karma Adorno MD;  [facility]     ESOPHAGOSCOPY, GASTROSCOPY, DUODENOSCOPY (EGD), COMBINED N/A 7/21/2021    Procedure: ESOPHAGOGASTRODUODENOSCOPY, WITH BIOPSY;  Surgeon: Jeff Cunningham DO;  [facility]     PHACOEMULSIFICATION WITH STANDARD INTRAOCULAR LENS IMPLANT Right 7/28/2021    Procedure: Right eye Cataract Extraction with Implant;  Surgeon: Reyes Valdez MD;  [facility]     PHACOEMULSIFICATION WITH STANDARD INTRAOCULAR LENS IMPLANT Left 8/18/2021    Procedure: left eye Cataract Extraction with Implant;  Surgeon: Reyes Valdez MD;  [facility]: [facility]     SURGICAL HISTORY OF -       None     VASCULAR SURGERY  oct 2013    stent put in leg       FAMILY HISTORY:  Family History   Problem Relation Age of Onset     Cerebrovascular Disease Maternal Grandmother      Arthritis Other         hip fracture     Respiratory Father         emphysema     Alzheimer Disease Maternal Grandfather      Breast Cancer Sister      Cancer - colorectal No family hx  of      Prostate Cancer No family hx of        SOCIAL HISTORY:  Social History     Socioeconomic History     Marital status:      Spouse name: None     Number of children: None     Years of education: None     Highest education level: None   Tobacco Use     Smoking status: Current Every Day Smoker     Packs/day: 0.50     Years: 50.00     Pack years: 25.00     Types: Cigarettes     Start date: 1972     Last attempt to quit: 2022     Years since quittin.3     Smokeless tobacco: Never Used     Tobacco comment: 5-6 cigs daily   Vaping Use     Vaping Use: Never used   Substance and Sexual Activity     Alcohol use: Not Currently     Comment: 2 beers a week      Drug use: No     Sexual activity: Yes     Partners: Female     Birth control/protection: Condom   Other Topics Concern     Parent/sibling w/ CABG, MI or angioplasty before 65F 55M? No       Review of Systems:  Skin:  Positive for bruising     Eyes:  Negative      ENT:  Negative      Respiratory:  Negative       Cardiovascular:    Positive for;lower extremity symptoms;edema    Gastroenterology: Negative      Genitourinary:  Negative      Musculoskeletal:  Negative      Neurologic:  Negative      Psychiatric:  Negative      Heme/Lymph/Imm:  Negative      Endocrine:  Positive for diabetes      Physical Exam:  Vitals: BP (!) 144/81   Pulse 84   Temp 97.8  F (36.6  C) (Tympanic)   Wt 113.2 kg (249 lb 9.6 oz)   SpO2 97%   BMI 40.29 kg/m    Eyes: No icterus.  Pulmonary: Chest symmetric, lungs clear bilaterally and no crackles, wheezes or rales.  Cardiovascular: RRR with normal S1 and S2, no murmur, JVP normal.  Musculoskeletal: Edema of the lower extremities: None.  Neurologic: Oriented and appropriate without obvious focal deficits.   Psychiatric: Normal affect.     Recent Lab Results:  LIPID RESULTS:  Lab Results   Component Value Date    CHOL 136 2022    CHOL 139 2020    HDL 39 (L) 2022    HDL 36 (L) 2020    LDL 80  03/11/2022    LDL 86 06/04/2020    TRIG 86 03/11/2022    TRIG 83 06/04/2020    CHOLHDLRATIO 3.0 07/24/2015       LIVER ENZYME RESULTS:  Lab Results   Component Value Date    AST 26 04/05/2022    AST 26 06/23/2021    ALT 77 (H) 04/05/2022    ALT 40 06/23/2021       CBC RESULTS:  Lab Results   Component Value Date    WBC 12.3 (H) 05/17/2022    WBC 12.4 (H) 07/01/2021    RBC 4.45 05/17/2022    RBC 4.36 (L) 07/01/2021    HGB 11.6 (L) 05/17/2022    HGB 14.1 07/01/2021    HCT 38.1 (L) 05/17/2022    HCT 42.9 07/01/2021    MCV 86 05/17/2022    MCV 98 07/01/2021    MCH 26.1 (L) 05/17/2022    MCH 32.3 07/01/2021    MCHC 30.4 (L) 05/17/2022    MCHC 32.9 07/01/2021    RDW 17.3 (H) 05/17/2022    RDW 13.7 07/01/2021     05/17/2022     07/01/2021       BMP RESULTS:  Lab Results   Component Value Date     05/17/2022     07/01/2021    POTASSIUM 5.1 05/17/2022    POTASSIUM 4.6 07/01/2021    CHLORIDE 101 05/17/2022    CHLORIDE 105 07/01/2021    CO2 29 05/17/2022    CO2 27 07/01/2021    ANIONGAP 4 05/17/2022    ANIONGAP 6 07/01/2021     (H) 05/17/2022    GLC 85 07/01/2021    BUN 27 05/17/2022    BUN 13 07/01/2021    CR 1.30 (H) 05/17/2022    CR 1.09 07/01/2021    GFRESTIMATED 61 05/17/2022    GFRESTIMATED 70 07/01/2021    GFRESTBLACK 82 07/01/2021    SUE 9.2 05/17/2022    SUE 9.3 07/01/2021        A1C RESULTS:  Lab Results   Component Value Date    A1C 6.6 (H) 03/11/2022    A1C 6.4 (H) 03/25/2021       INR RESULTS:  Lab Results   Component Value Date    INR 1.31 (H) 03/24/2022    INR 1.41 (H) 03/24/2022    INR 0.94 09/13/2013    INR 0.94 10/27/2008       CC  Kamryn Carroll, SAL CNP  [address]  [place] [address]    All medical records were reviewed in detail and discussed with the patient. Greater than 30 mins were spent with the patient, 50% of this time was spent on counseling and coordination of care.  After visit summary was printed and given to the patient.

## 2022-06-08 ENCOUNTER — OFFICE VISIT (OUTPATIENT)
Dept: CARDIOLOGY | Facility: CLINIC | Age: 67
End: 2022-06-08
Payer: COMMERCIAL

## 2022-06-08 VITALS
WEIGHT: 243.1 LBS | HEART RATE: 93 BPM | HEIGHT: 66 IN | OXYGEN SATURATION: 97 % | BODY MASS INDEX: 39.07 KG/M2 | DIASTOLIC BLOOD PRESSURE: 86 MMHG | SYSTOLIC BLOOD PRESSURE: 126 MMHG

## 2022-06-08 DIAGNOSIS — I25.118 CORONARY ARTERY DISEASE OF NATIVE ARTERY OF NATIVE HEART WITH STABLE ANGINA PECTORIS (H): Primary | ICD-10-CM

## 2022-06-08 PROCEDURE — 99024 POSTOP FOLLOW-UP VISIT: CPT | Performed by: NURSE PRACTITIONER

## 2022-06-08 NOTE — PROGRESS NOTES
"CVTS Follow Up    A/P: Roger Bonilla is a 66 year old gentleman with a PMH significant for DM2, HTN, HLD, carotid stenosis and CAD s/p CABG x2 with Dr. Waters on 3/24/22.  His postoperative course has been complicated by leg wounds at SVG site on RLE requiring BID dressing changes at home with iodoform packing. He has completed a course of bactrim as initial cultures taken back on 4/16 demonstrated 4+ Enterobacter cloacae complex resistant to ampicillin and augmentin. Repeat culture on 5/6/22 with 4+ GPC on gram stain but negative culture. He returns to clinic today to follow up on wound healing.     Patient continues to smoke but states that he has cut back from 1 PPD to 5 cigarettes per day and continues to try to cut back in hopes of quitting. He denies fevers, chills, myalgias, purulent drainage from wound, erythema or pain. Trying to be careful to keep area clean and is adhering to the BID dressing changes with packing. Has not participated in cardiac rehab and does not plan to do so.    Right lower extremity wound: packing removed, wound edges and wound bed debrided and probed. Slough removed and subsequent healthy, bleeding tissue demonstrated. Good granulation noted. No e/o purulence, cellulitis or warmth to touch. No tunneling noted. Area measures 3.5 cm x 1.5 cm x 0.5 cm in depth. Continues to have BLE edema but states his weights have been stable. The area was cleansed with wound cleanser, re-packed and redressed with 4x4.    1. Continue BID dressing changes with packing.  2. Wound care order placed in [place]. Given his long drive, this would make sense for ongoing follow up.  3. Pt given our contact information and instructed to call if he develops s/sx of infection or other concerns.    SAL Fuchs, ACNPC-AG, CCRN  Nurse Practitioner  Cardiothoracic Surgery  Pager: [phone]    Blood Pressure 126/86   Pulse 93   Height 1.676 m (5' 6\")   Weight 110.3 kg (243 lb 1.6 oz)   Oxygen Saturation " 97%   Body Mass Index 39.24 kg/m

## 2022-06-08 NOTE — PATIENT INSTRUCTIONS
Continue with twice per day dressing changes as you are. Let us know if you have fevers, chills, or any signs of infection including infected drainage.    Wound care referral placed in [place], they will call you.    Our number is:    [phone]  Call with concerns

## 2022-06-22 ENCOUNTER — HOSPITAL ENCOUNTER (OUTPATIENT)
Dept: WOUND CARE | Facility: CLINIC | Age: 67
Discharge: HOME | End: 2022-06-22
Attending: NURSE PRACTITIONER | Admitting: NURSE PRACTITIONER
Payer: COMMERCIAL

## 2022-06-22 DIAGNOSIS — I25.118 CORONARY ARTERY DISEASE OF NATIVE ARTERY OF NATIVE HEART WITH STABLE ANGINA PECTORIS (H): ICD-10-CM

## 2022-06-22 PROCEDURE — 97602 WOUND(S) CARE NON-SELECTIVE: CPT

## 2022-06-22 PROCEDURE — G0463 HOSPITAL OUTPT CLINIC VISIT: HCPCS | Mod: 25

## 2022-06-22 NOTE — DISCHARGE INSTRUCTIONS
"Visit Summary/General Recommendations    Continue to elevate your leg when sitting, ideally above heart level.    The pink bullets have saline; you can put this in a ziplock bag between uses. It won't come out of the bullet unless you are squeezing it.        Bandage Change  Wash your hands before and after the dressing change. You may wear gloves if you choose. Gloves are available over the counter at [facility], [facility], [facility], etc.   Use scissors at home that you can designate solely for wound care and cleanse well with each use, (rubbing alcohol or alcohol pads work well for this).    Wash hands.  Remove old dressing.  Wash hands.    1.) Cleanse wound and surrounding skin with spray wound cleanser on stream setting and wipe with gauze. Make sure to spray into the upper wound that's really small. Pat wound and surrounding skin dry with gauze.   2.) Apply Medihoney with a cotton tipped applicator to fill the upper wound and apply enough Medihoney to fill the lower wound. Then cut a strip of 1/4\" packing strip about 2 inches in length. Moisten the strip with a small amount of saline (pink bullet). Then fill the wound bed gently with the strip. You can apply additional honey on top of the strip if needed.  3.) Cover the wound with a Mepilex Border 4x4 dressing.    Apply the Tubigrip Size F first thing in the morning and then take off at bedtime. This can be washed with mild soap and water and hung to dry.    Change dressing every other day.    Signs and symptoms of infection:  Bright green drainage  Foul odor  Increasing pain in area  New redness or swelling at the site of the wound or streaks up from wound  New warmth to touch around wound accompanied by redness and swelling  Fever    Need to be seen as soon as possible for infection concerns.    Follow up: 1 week    Please call with any questions or concerns.      Stefany Bowman RN, CWOCN  [phone]      "

## 2022-06-29 ENCOUNTER — HOSPITAL ENCOUNTER (OUTPATIENT)
Dept: WOUND CARE | Facility: CLINIC | Age: 67
Discharge: HOME | End: 2022-06-29
Attending: NURSE PRACTITIONER | Admitting: NURSE PRACTITIONER
Payer: COMMERCIAL

## 2022-06-29 PROCEDURE — A6211 FOAM DRG > 48 SQ IN W/O BRDR: HCPCS

## 2022-06-29 PROCEDURE — 97602 WOUND(S) CARE NON-SELECTIVE: CPT

## 2022-06-29 NOTE — PROGRESS NOTES
[facility]    Start of Care in [facility]: 6/22/2022   Referring Doctor: SAL Bui, ACNPC-AG, CCRN - Cardiothoracic Surgery  Primary Care Provider: Maria De Jesus Hay   Wound Location: Right lower leg      Wound Clinic Visit: initial    Pt ambulated to clinic appointment independently. Pt quiet with relatively flat affect.    Subjective: Pt denies concerns with the wounds except for slow healing.     Wound History:   From last clinic note with cardiothoracic surgery:  A/P: Roger Bonilla is a 66 year old gentleman with a PMH significant for DM2, HTN, HLD, carotid stenosis and CAD s/p CABG x2 with Dr. Waters on 3/24/22.  His postoperative course has been complicated by leg wounds at SVG site on RLE requiring BID dressing changes at home with iodoform packing. He has completed a course of bactrim as initial cultures taken back on 4/16 demonstrated 4+ Enterobacter cloacae complex resistant to ampicillin and augmentin. Repeat culture on 5/6/22 with 4+ GPC on gram stain but negative culture. He returns to clinic today to follow up on wound healing.      Patient continues to smoke but states that he has cut back from 1 PPD to 5 cigarettes per day and continues to try to cut back in hopes of quitting. He denies fevers, chills, myalgias, purulent drainage from wound, erythema or pain. Trying to be careful to keep area clean and is adhering to the BID dressing changes with packing. Has not participated in cardiac rehab and does not plan to do so.     Right lower extremity wound: packing removed, wound edges and wound bed debrided and probed. Slough removed and subsequent healthy, bleeding tissue demonstrated. Good granulation noted. No e/o purulence, cellulitis or warmth to touch. No tunneling noted. Area measures 3.5 cm x 1.5 cm x 0.5 cm in depth. Continues to have BLE edema but states his weights have been stable. The area was cleansed with wound cleanser, re-packed and redressed with  4x4.     1. Continue BID dressing changes with packing.  2. Wound care order placed in [place]. Given his long drive, this would make sense for ongoing follow up.  3. Pt given our contact information and instructed to call if he develops s/sx of infection or other concerns.    Past Medical History:  Patient Active Problem List   Diagnosis     Disorder of bursae and tendons in shoulder region     PVD (peripheral vascular disease) (H)     Hyperlipidemia LDL goal <70     ABIGAIL (obstructive sleep apnea)     Morbid obesity (H)     Tobacco abuse     Benign essential hypertension     PAD (peripheral artery disease) (H)     Type 2 diabetes mellitus without complication (H)     Coronary artery disease of native artery of native heart with stable angina pectoris (H)     Atherosclerosis of both carotid arteries     Dehydration     Hypomagnesemia     Hypophosphatemia     AAA (abdominal aortic aneurysm) (H)     Epigastric pain     Status post coronary angiogram     Atherosclerosis of native coronary artery of native heart with stable angina pectoris (H)     S/P CABG (coronary artery bypass graft)     S/P carotid endarterectomy      ABIs  IR MARGARITA US MARGARITA DOPPLER NO EXERCISE, 1-2 LEVELS, BILAT   11/16/2021  11:09 AM      HISTORY: PAD (peripheral artery disease) (H)     COMPARISON: 9/18/2019     FINDINGS:  Right MARGARITA:   DP: 0.61  PT: 0.64.     Left MARGARITA:   DP: 0.64   PT: 0.63.     Waveforms: Monophasic waveforms in the distal tibial arteries                                                             IMPRESSION: Moderate arterial insufficiency in the lower extremities.  This is not significantly changed when compared to prior exam.     MARGARITA CRITERIA:  >0.95 Normal  0.90 - 0.94 Mild  0.5 - 0.89 Moderate  0.2 - 0.49 Severe  <0.2 Critical     JAYA PUENTE MD                  Tobacco Use:     Tobacco Use      Smoking status: Current Every Day Smoker        Packs/day: 0.50        Years: 50.00        Pack years: 25        Types:  Cigarettes        Start date: 1972        Quit date: 2022        Years since quittin.3      Smokeless tobacco: Never Used      Tobacco comment: 5-6 cigs daily       Diabetic: yes  HgbA1C:   Hemoglobin A1C POCT   Date Value Ref Range Status   2021 6.4 (H) 0 - 5.6 % Final     Comment:     Normal <5.7% Prediabetes 5.7-6.4%  Diabetes 6.5% or higher - adopted from ADA   consensus guidelines.       Hemoglobin A1C   Date Value Ref Range Status   2022 6.6 (H) 0.0 - 5.6 % Final     Comment:     Normal <5.7%   Prediabetes 5.7-6.4%    Diabetes 6.5% or higher     Note: Adopted from ADA consensus guidelines.     Checks Blood Glucose?: Average Readings:      Personal/social history:  employed    Objective:   Current treatment plan: plain packing strip with gauze/tape  Last changed: today    Wound #1 Right Lower Extremity      Stage/tissue depth: full thickness  2 wounds noted with proximal measuring 0.4cm x 0.2cm x 0.2cm with estimated 0.1cm undermining (challenging to measure r/t small wound size) and larger/distal wound measuring 1.9cm L x 0.8cm W x 0.6cm D  Tunneling: no  Undermining: see above  Wound bed type/amount: after removing some yellow softening slough vs coagulum with Adsons, visible wound bed is 60% red granular and 40% yellow adherent slough; non fluctuant  Wound Edges: attached  Periwound: intact  Drainage: slight serous on packing strip but feels dry  Odor: no  Pain: denied    Dorsalis Pedal Pulse: palpable: yes (R only) doppler: faintly biphasic, slushy  Posterior Tibial Pulse: palpable: no doppler: monphasic, slushy  Hair growth: yes  Capillary Refill: <3 seconds  Feet/toes color: normal for ethnicity  Nails: WNL  Non-pitting edema, appears slightly swollen compared to LLE and pt states he can feel that it is swollen; minimal telangiectasia    Mobility: ambulatory  Current offloading/footwear:  Sensation: decreased    Other callusing/areas of concern: 2 stable scabs from same  "surgery    Proximal measures 6mm x 5mm x flush and distal measures 1.5cm x 0.4cm x flush  Diet:    Discussed/Education: plan of care with rationale; he is able to perform cares/has been caring for wound    Assessment:  Post-surgical right lower leg wound with some necrotic tissue in need of autolytic debridement    Pt with moderate arterial insufficiency, tobacco use, diabetes (A1C 6.6); with non-pitting edema would benefit from mild compression to assist with healing    Factors impacting wound healing:  Delayed healing as part of normal aging process  Reduced Blood Supply: decreased perfusion for wound healing  Psychological stress: recent hospitalization, CABG  Obesity: decreases tissue perfusion  Infection: prolongs inflammatory phase, uses vital nutrients, impairs epithelialization and releases toxins  Underlying Disease: diabetes mellitis (A1C 6.6)  Substance abuse: tobacco (has decreased from a pack a day to 5/day    Plan:  Initiating Medihoney for its low pH, osmolarity to assist with autolytic debridement, and for promotion of granulation tissue. Using 1/4\" plain packing strip to fill the depth/keep Medihoney to contact with the wound bed. Mepilex Border 4x4 dressing for occlusion, protection, and drainage absorption. To be changed every other day.    Initiating single layer of tubular compression, Size F for compression (being conservative in amount of compression r/t MARGARITA status.)    Topical care: Wound/surrounding skin cleansed with Vashe and gauze. Patted dry. Cares then performed as noted above.    Additional recommendations: Elevating leg, ideally above heart level, when sitting    The following discharge instructions were reviewed with and sent home with the patient:  Visit Summary/General Recommendations    Continue to elevate your leg when sitting, ideally above heart level.    The pink bullets have saline; you can put this in a ziplock bag between uses. It won't come out of the bullet unless you are " "squeezing it.    Bandage Change  Wash your hands before and after the dressing change. You may wear gloves if you choose. Gloves are available over the counter at [facility], [facility], [facility], etc.   Use scissors at home that you can designate solely for wound care and cleanse well with each use, (rubbing alcohol or alcohol pads work well for this).    Wash hands.  Remove old dressing.  Wash hands.    1.) Cleanse wound and surrounding skin with spray wound cleanser on stream setting and wipe with gauze. Make sure to spray into the upper wound that's really small. Pat wound and surrounding skin dry with gauze.   2.) Apply Medihoney with a cotton tipped applicator to fill the upper wound and apply enough Medihoney to fill the lower wound. Then cut a strip of 1/4\" packing strip about 2 inches in length. Moisten the strip with a small amount of saline (pink bullet). Then fill the wound bed gently with the strip. You can apply additional honey on top of the strip if needed.  3.) Cover the wound with a Mepilex Border 4x4 dressing.    Apply the Tubigrip Size F first thing in the morning and then take off at bedtime. This can be washed with mild soap and water and hung to dry.    Change dressing every other day.    Signs and symptoms of infection:  Bright green drainage  Foul odor  Increasing pain in area  New redness or swelling at the site of the wound or streaks up from wound  New warmth to touch around wound accompanied by redness and swelling  Fever    Need to be seen as soon as possible for infection concerns.    Follow up: 1 week    Please call with any questions or concerns.    The following supplies were sent home with the patient:  Enough supplies to last until next appointment (remainder of Medihoney tube and packing strip; several Mepilex Border 4x4 dressings)    Return visit: 1 week    Verbal, written, & demonstrative education provided.  Face to face time: approximately 55 minutes  Procedure: autolytic " debridement    Stefany Bowman RN, CWOCN  [phone]

## 2022-06-29 NOTE — PROGRESS NOTES
[facility]    Start of Care in [facility]: 6/22/2022   Referring Doctor: SAL Bui, ACNPC-AG, CCRN - Cardiothoracic Surgery  Primary Care Provider: Maria De Jesus Hay   Wound Location: Right lower leg      Wound Clinic Visit: 2nd    Pt ambulated to clinic appointment independently. Pt quiet with relatively flat affect.    Subjective: Pt denies concerns with the wounds since last visit.     Wound History:   From last clinic note with cardiothoracic surgery:  A/P: Roger Bonilla is a 66 year old gentleman with a PMH significant for DM2, HTN, HLD, carotid stenosis and CAD s/p CABG x2 with Dr. Waters on 3/24/22.  His postoperative course has been complicated by leg wounds at SVG site on RLE requiring BID dressing changes at home with iodoform packing. He has completed a course of bactrim as initial cultures taken back on 4/16 demonstrated 4+ Enterobacter cloacae complex resistant to ampicillin and augmentin. Repeat culture on 5/6/22 with 4+ GPC on gram stain but negative culture. He returns to clinic today to follow up on wound healing.      Patient continues to smoke but states that he has cut back from 1 PPD to 5 cigarettes per day and continues to try to cut back in hopes of quitting. He denies fevers, chills, myalgias, purulent drainage from wound, erythema or pain. Trying to be careful to keep area clean and is adhering to the BID dressing changes with packing. Has not participated in cardiac rehab and does not plan to do so.     Right lower extremity wound: packing removed, wound edges and wound bed debrided and probed. Slough removed and subsequent healthy, bleeding tissue demonstrated. Good granulation noted. No e/o purulence, cellulitis or warmth to touch. No tunneling noted. Area measures 3.5 cm x 1.5 cm x 0.5 cm in depth. Continues to have BLE edema but states his weights have been stable. The area was cleansed with wound cleanser, re-packed and redressed with  4x4.     1. Continue BID dressing changes with packing.  2. Wound care order placed in [place]. Given his long drive, this would make sense for ongoing follow up.  3. Pt given our contact information and instructed to call if he develops s/sx of infection or other concerns.    Past Medical History:  Patient Active Problem List   Diagnosis     Disorder of bursae and tendons in shoulder region     PVD (peripheral vascular disease) (H)     Hyperlipidemia LDL goal <70     ABIGAIL (obstructive sleep apnea)     Morbid obesity (H)     Tobacco abuse     Benign essential hypertension     PAD (peripheral artery disease) (H)     Type 2 diabetes mellitus without complication (H)     Coronary artery disease of native artery of native heart with stable angina pectoris (H)     Atherosclerosis of both carotid arteries     Dehydration     Hypomagnesemia     Hypophosphatemia     AAA (abdominal aortic aneurysm) (H)     Epigastric pain     Status post coronary angiogram     Atherosclerosis of native coronary artery of native heart with stable angina pectoris (H)     S/P CABG (coronary artery bypass graft)     S/P carotid endarterectomy      ABIs  IR MARGARITA US MARGARITA DOPPLER NO EXERCISE, 1-2 LEVELS, BILAT   11/16/2021  11:09 AM      HISTORY: PAD (peripheral artery disease) (H)     COMPARISON: 9/18/2019     FINDINGS:  Right MARGARITA:   DP: 0.61  PT: 0.64.     Left MARGARITA:   DP: 0.64   PT: 0.63.     Waveforms: Monophasic waveforms in the distal tibial arteries                                                             IMPRESSION: Moderate arterial insufficiency in the lower extremities.  This is not significantly changed when compared to prior exam.     MARGARITA CRITERIA:  >0.95 Normal  0.90 - 0.94 Mild  0.5 - 0.89 Moderate  0.2 - 0.49 Severe  <0.2 Critical     JAYA PUENTE MD                  Tobacco Use:     Tobacco Use      Smoking status: Current Every Day Smoker        Packs/day: 0.50        Years: 50.00        Pack years: 25        Types:  Cigarettes        Start date: 1972        Quit date: 2022        Years since quittin.3      Smokeless tobacco: Never Used      Tobacco comment: 5-6 cigs daily       Diabetic: yes  HgbA1C:   Hemoglobin A1C POCT   Date Value Ref Range Status   2021 6.4 (H) 0 - 5.6 % Final     Comment:     Normal <5.7% Prediabetes 5.7-6.4%  Diabetes 6.5% or higher - adopted from ADA   consensus guidelines.       Hemoglobin A1C   Date Value Ref Range Status   2022 6.6 (H) 0.0 - 5.6 % Final     Comment:     Normal <5.7%   Prediabetes 5.7-6.4%    Diabetes 6.5% or higher     Note: Adopted from ADA consensus guidelines.     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    Objective:   Current treatment plan: plain packing strip with saline/medihoney to wound bed with Mepilex Border 4x4  Last changed: yesterday    Wound #1 Right Lower Extremity    Photo taken but not saved by system    Stage/tissue depth: full thickness  Proxmal wound has healed; distal wound measuring 1.5cm L x 0.7cm W x 0.5cm D  Tunneling: no  Undermininmm at 12:00 and 1-2mm from 2-3:00  Wound bed type/amount: after removing some yellow softening slough vs coagulum with Adsons, visible wound bed is 20% red/pink and 80% pale yellow adherent slough; non fluctuant  Wound Edges: attached except as noted above  Periwound: intact  Drainage: moderate serosanguineous with strike through drainage smaller than size of wound to Mepilex dressing; coloring also impacted by use of Medihoney  Odor: no  Pain: denied    Dorsalis Pedal Pulse: palpable: yes (R only) doppler: faintly biphasic, slushy  Posterior Tibial Pulse: palpable: no doppler: monphasic, slushy  Hair growth: yes  Capillary Refill: <3 seconds  Feet/toes color: normal for ethnicity  Nails: WNL  Non-pitting edema, appears slightly swollen compared to LLE and pt states he can feel that it is  "swollen; minimal telangiectasia    Mobility: ambulatory  Current offloading/footwear: athletic shoes  Sensation: numb in area of wounds    Other callusing/areas of concern: 2 stable scabs from same surgery    Proximal measures 6mm x 5mm x flush and distal measures 1.5cm x 0.4cm x flush - not remeasured    Diet:    Discussed/Education: plan of care with rationale; discussed options for bandaging and limitations from insurance on product coverage; he is able to perform cares/has been caring for wound    Educated him about checking feet daily for skin integrity (r/t diabetes) and that insurance will cover 1 pair of footwear with orthotic inserts annually since pt is diabetic; he acknowledged education    Assessment:  Post-surgical right lower leg wound with some necrotic tissue in need of autolytic debridement - measuring smaller and necrotic tissue is softening with use of Medihoney    Pt with moderate arterial insufficiency, tobacco use, diabetes (A1C 6.6); with non-pitting edema would benefit from mild compression to assist with healing    Factors impacting wound healing:  Delayed healing as part of normal aging process  Reduced Blood Supply: decreased perfusion for wound healing  Psychological stress: recent hospitalization, CABG  Obesity: decreases tissue perfusion  Infection: prolongs inflammatory phase, uses vital nutrients, impairs epithelialization and releases toxins  Underlying Disease: diabetes mellitis (A1C 6.6)  Substance abuse: tobacco (has decreased from a pack a day to 5/day    Plan:  As patient's insurance may not cover Mepilex Border 4x4 dressings fully, offered option of using Mepilex Ag 8x8 which can be cut to fit and then taped in place - will use this option for cover dressing vs ordering Mepilex Border 4x4 dressings.    Continuing Medihoney for its low pH, osmolarity to assist with autolytic debridement, and for promotion of granulation tissue. Using 1/4\" plain packing strip to fill the " depth/keep Medihoney to contact with the wound bed. Mepilex Ag cut to fit for occlusion, protection, and drainage absorption. Taped in place using Medipore tape. To be changed every other day.    Continuing single layer of tubular compression, Size F for compression (being conservative in amount of compression r/t MARGARITA status.) He is tolerating this well.    Topical care: Wound/surrounding skin cleansed with Vashe and gauze. Patted dry. Cares then performed as noted above.    Additional recommendations: Elevating leg, ideally above heart level, when sitting    The following discharge instructions were reviewed with and sent home with the patient:  See prior AVS - verbal instruction on switching to Mepilex Ag 8x8 sheet cut to fit wound and securing with Medipore tape. He voiced understanding, declined printed instructions    The following supplies were sent home with the patient:  Remainder of Mepilex Ag 8x8 dressing  Sample roll of Medipore tape (he has more at home from prior wound cares)    Return visit: 2 weeks    Verbal, written (prior visit), & demonstrative education provided.  Face to face time: approximately 30 minutes  Procedure: autolytic debridement    Stefany Bowman RN, CWOCN  [phone]

## 2022-07-08 ENCOUNTER — TELEPHONE (OUTPATIENT)
Dept: HEMATOLOGY | Facility: CLINIC | Age: 67
End: 2022-07-08

## 2022-07-08 ENCOUNTER — HOSPITAL ENCOUNTER (OUTPATIENT)
Dept: ULTRASOUND IMAGING | Facility: CLINIC | Age: 67
Discharge: HOME | End: 2022-07-08
Attending: PHYSICIAN ASSISTANT | Admitting: PHYSICIAN ASSISTANT
Payer: COMMERCIAL

## 2022-07-08 DIAGNOSIS — I80.9 THROMBOPHLEBITIS: ICD-10-CM

## 2022-07-08 DIAGNOSIS — I80.02 THROMBOPHLEBITIS OF SUPERFICIAL VEINS OF LEFT LOWER EXTREMITY: ICD-10-CM

## 2022-07-08 PROCEDURE — 93971 EXTREMITY STUDY: CPT | Mod: LT

## 2022-07-08 NOTE — TELEPHONE ENCOUNTER
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     Telephone Conversation:  This writer call the patient on 2022 at 15:20 to communicate the repeat left leg ultrasound report that was done this morning to him.     Left leg venous ultrasound done on 2022:  No DVT is demonstrated. Previously noted thrombus in the left accessory saphenous vein has cleared.    He has already finished his course of rivaroxaban on 2022. No indications for further anticoagulation therapy.       Bulmaro Zavaleta PA-C, MPAS  Physician Assistant  [facility].

## 2022-07-11 DIAGNOSIS — K21.9 GASTROESOPHAGEAL REFLUX DISEASE WITHOUT ESOPHAGITIS: Primary | ICD-10-CM

## 2022-07-11 RX ORDER — OMEPRAZOLE 40 MG/1
40 CAPSULE, DELAYED RELEASE ORAL EVERY MORNING
Qty: 90 CAPSULE | Refills: 3 | Status: SHIPPED | OUTPATIENT
Start: 2022-07-11 | End: 2023-06-20

## 2022-07-11 NOTE — TELEPHONE ENCOUNTER
"Ale Kam routed conversation to [facility] Provider Careteam Vikram 2 days ago     Roger Bonilla \"Spanky\"  Patient Customer Service Request Pool 2 days ago     SR      Topic: Non-Medical Question.     I would like to get my stomach fixed, need more OMEPRAZOLE 40 MG , no refills left.   Foward to TYLER MOORE MD    "

## 2022-07-11 NOTE — TELEPHONE ENCOUNTER
Maria De Jesus,     Routing refill request to provider for review/approval because:  Medication is reported/historical Saadia SCOTT RN

## 2022-07-12 ENCOUNTER — HOSPITAL ENCOUNTER (OUTPATIENT)
Dept: WOUND CARE | Facility: CLINIC | Age: 67
Discharge: HOME | End: 2022-07-12
Attending: NURSE PRACTITIONER | Admitting: NURSE PRACTITIONER
Payer: COMMERCIAL

## 2022-07-12 PROCEDURE — 97602 WOUND(S) CARE NON-SELECTIVE: CPT

## 2022-07-12 NOTE — DISCHARGE INSTRUCTIONS
Continue with the Medihoney and packing strip in wound but cover with telfa or similar over the counter bandage and tape if needed with bandage.  Change every 1-2 days depending on drainage.      If not healing/resolved in next several weeks then can follow-up by calling number below.    Continue compression sleeve and leg elevation for healing.    Dipti Bates RN, CWOCN [phone]

## 2022-07-12 NOTE — PROGRESS NOTES
[facility]    Start of Care in [facility]: 6/22/2022   Referring Doctor: SAL Bui, ACNPC-AG, CCRN - Cardiothoracic Surgery  Primary Care Provider: Maria De Jesus Hay   Wound Location: Right lower leg      Wound Clinic Visit: 3rd    Pt ambulated to clinic appointment independently. Pt quiet with relatively flat affect.    Subjective: Pt denies concerns with the wounds since last visit.     Wound History:   From last clinic note with cardiothoracic surgery:  A/P: Roger Bonilla is a 66 year old gentleman with a PMH significant for DM2, HTN, HLD, carotid stenosis and CAD s/p CABG x2 with Dr. Waters on 3/24/22.  His postoperative course has been complicated by leg wounds at SVG site on RLE requiring BID dressing changes at home with iodoform packing. He has completed a course of bactrim as initial cultures taken back on 4/16 demonstrated 4+ Enterobacter cloacae complex resistant to ampicillin and augmentin. Repeat culture on 5/6/22 with 4+ GPC on gram stain but negative culture. He returns to clinic today to follow up on wound healing.      Patient continues to smoke but states that he has cut back from 1 PPD to 5 cigarettes per day and continues to try to cut back in hopes of quitting. He denies fevers, chills, myalgias, purulent drainage from wound, erythema or pain. Trying to be careful to keep area clean and is adhering to the BID dressing changes with packing. Has not participated in cardiac rehab and does not plan to do so.     Right lower extremity wound: packing removed, wound edges and wound bed debrided and probed. Slough removed and subsequent healthy, bleeding tissue demonstrated. Good granulation noted. No e/o purulence, cellulitis or warmth to touch. No tunneling noted. Area measures 3.5 cm x 1.5 cm x 0.5 cm in depth. Continues to have BLE edema but states his weights have been stable. The area was cleansed with wound cleanser, re-packed and redressed with  4x4.     1. Continue BID dressing changes with packing.  2. Wound care order placed in [place]. Given his long drive, this would make sense for ongoing follow up.  3. Pt given our contact information and instructed to call if he develops s/sx of infection or other concerns.    Past Medical History:  Patient Active Problem List   Diagnosis     Disorder of bursae and tendons in shoulder region     PVD (peripheral vascular disease) (H)     Hyperlipidemia LDL goal <70     ABIGALI (obstructive sleep apnea)     Morbid obesity (H)     Tobacco abuse     Benign essential hypertension     PAD (peripheral artery disease) (H)     Type 2 diabetes mellitus without complication (H)     Coronary artery disease of native artery of native heart with stable angina pectoris (H)     Atherosclerosis of both carotid arteries     Dehydration     Hypomagnesemia     Hypophosphatemia     AAA (abdominal aortic aneurysm) (H)     Epigastric pain     Status post coronary angiogram     Atherosclerosis of native coronary artery of native heart with stable angina pectoris (H)     S/P CABG (coronary artery bypass graft)     S/P carotid endarterectomy      ABIs  IR MARGARITA US MARGARITA DOPPLER NO EXERCISE, 1-2 LEVELS, BILAT   11/16/2021  11:09 AM      HISTORY: PAD (peripheral artery disease) (H)     COMPARISON: 9/18/2019     FINDINGS:  Right MARGARITA:   DP: 0.61  PT: 0.64.     Left MARGARITA:   DP: 0.64   PT: 0.63.     Waveforms: Monophasic waveforms in the distal tibial arteries                                                             IMPRESSION: Moderate arterial insufficiency in the lower extremities.  This is not significantly changed when compared to prior exam.     MARGARITA CRITERIA:  >0.95 Normal  0.90 - 0.94 Mild  0.5 - 0.89 Moderate  0.2 - 0.49 Severe  <0.2 Critical     JAYA PUENTE MD                  Tobacco Use:     Tobacco Use      Smoking status: Current Every Day Smoker        Packs/day: 0.50        Years: 50.00        Pack years: 25        Types:  Cigarettes        Start date: 1972        Quit date: 2022        Years since quittin.4      Smokeless tobacco: Never Used      Tobacco comment: 5-6 cigs daily       Diabetic: yes  HgbA1C:   Hemoglobin A1C POCT   Date Value Ref Range Status   2021 6.4 (H) 0 - 5.6 % Final     Comment:     Normal <5.7% Prediabetes 5.7-6.4%  Diabetes 6.5% or higher - adopted from ADA   consensus guidelines.       Hemoglobin A1C   Date Value Ref Range Status   2022 6.6 (H) 0.0 - 5.6 % Final     Comment:     Normal <5.7%   Prediabetes 5.7-6.4%    Diabetes 6.5% or higher     Note: Adopted from ADA consensus guidelines.     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    Objective:   Current treatment plan: plain packing strip with saline/medihoney to wound bed with Mepilex ag foam and tape  Last changed: yesterday    Wound #1 Right Lower Extremity      Stage/tissue depth: full thickness  Proxmal wound has healed; distal wound measuring 1cm L x 0.3cm W x 0.2cm D (distal) proximal is about [id number]  Tunneling: no  Undermining: none   Wound bed type/amount: after removing some yellow softening slough vs coagulum with Adsons, visible wound bed is 15% red/pink and 74% white adherent slough; non fluctuant  Wound Edges: rolling  Periwound: intact  Drainage: noted medihoney, did not seem to any drainage  Odor: no  Pain: denied    Dorsalis Pedal Pulse: palpable: yes (R only) doppler: faintly biphasic, slushy  Posterior Tibial Pulse: palpable: no doppler: monphasic, slushy  Hair growth: yes  Capillary Refill: <3 seconds  Feet/toes color: normal for ethnicity  Nails: WNL  Edema appears controlled    Mobility: ambulatory  Current offloading/footwear: athletic shoes  Sensation: numb in area of wounds    Assessment:  Post-surgical right lower leg wound with some necrotic tissue in need of autolytic debridement - measuring  "smaller and necrotic tissue is softening with use of Medihoney- foam may be drawing up too much moisture that is needed to facilitate debridement    Pt with moderate arterial insufficiency, tobacco use, diabetes (A1C 6.6); with non-pitting edema would benefit from mild compression to assist with healing    Factors impacting wound healing:  Delayed healing as part of normal aging process  Reduced Blood Supply: decreased perfusion for wound healing  Psychological stress: recent hospitalization, CABG  Obesity: decreases tissue perfusion  Infection: prolongs inflammatory phase, uses vital nutrients, impairs epithelialization and releases toxins  Underlying Disease: diabetes mellitis (A1C 6.6)  Substance abuse: tobacco (has decreased from a pack a day to 5/day    Plan:  Continuing Medihoney for its low pH, osmolarity to assist with autolytic debridement, and for promotion of granulation tissue. Using 1/4\" plain packing strip to fill the depth/keep Medihoney to contact with the wound bed. Cover with telfa, secure with Medipore tape. To be changed every 1-2 days.    Continuing single layer of tubular compression, Size F for compression (being conservative in amount of compression r/t MARGARITA status.) He is tolerating this well.    Topical care: Wound/surrounding skin cleansed with Vashe and gauze. Patted dry. Cares then performed as noted above.    Additional recommendations: Elevating leg, ideally above heart level, when sitting    The following discharge instructions were reviewed with and sent home with the patient:  Continue with the Medihoney and packing strip in wound but cover with telfa or similar over the counter bandage and tape if needed with bandage.  Change every 1-2 days depending on drainage.      If not healing/resolved in next several weeks then can follow-up by calling number below.    Continue compression sleeve and leg elevation for healing.The following supplies were sent home with the patient:  Remainder " telfa, he can buy more over the counter    Return visit: PRN- pt's preference     Verbal, written (prior visit), & demonstrative education provided.  Face to face time: approximately 30 minutes  Procedure: autolytic debridement    Dipti Bates RN, CWOCN   [phone]

## 2022-08-01 DIAGNOSIS — Z95.1 S/P CABG X 3: ICD-10-CM

## 2022-08-01 RX ORDER — METOPROLOL TARTRATE 100 MG
100 TABLET ORAL 2 TIMES DAILY
Qty: 60 TABLET | Refills: 3 | Status: SHIPPED | OUTPATIENT
Start: 2022-08-01 | End: 2022-11-21

## 2022-09-12 ENCOUNTER — TELEPHONE (OUTPATIENT)
Dept: FAMILY MEDICINE | Facility: CLINIC | Age: 67
End: 2022-09-12

## 2022-09-12 DIAGNOSIS — Z95.1 S/P CABG (CORONARY ARTERY BYPASS GRAFT): ICD-10-CM

## 2022-09-12 DIAGNOSIS — E11.9 TYPE 2 DIABETES MELLITUS WITHOUT COMPLICATION, WITHOUT LONG-TERM CURRENT USE OF INSULIN (H): Primary | ICD-10-CM

## 2022-09-12 RX ORDER — FUROSEMIDE 40 MG
40 TABLET ORAL PRN
Qty: 30 TABLET | Refills: 0 | Status: SHIPPED | OUTPATIENT
Start: 2022-09-12 | End: 2022-11-28

## 2022-09-12 NOTE — TELEPHONE ENCOUNTER
Patient is due for a diabetes follow up appointment with me.      Non-fasting labs due:  A1C and Microalbumin    Orders were placed, please have lab work done before visit.      Please ask patient to bring in their glucometer so we can download the data.    Please call patient to schedule.      Patient also due for:    Annual Medicare Wellness       Maria De Jesus Hay, CNP

## 2022-09-12 NOTE — TELEPHONE ENCOUNTER
LF: 8/19/22  Qty: 90  Last Cardiology Visit: 6/8/22   Next Scheduled Cardiology Visit: none scheduled

## 2022-09-14 NOTE — TELEPHONE ENCOUNTER
Patient Quality Outreach    Patient is due for the following:   Diabetes -  A1C, Microalbumin and Diabetic Follow-Up Visit  Physical Annual Wellness Visit    Next Steps:   Schedule a office visit for Diabetes/Wellness    Type of outreach:    Sent [employer] message. will postpone a few days for him to view       Questions for provider review:    None     Maco Mackay, CMA

## 2022-09-16 ENCOUNTER — LAB (OUTPATIENT)
Dept: LAB | Facility: CLINIC | Age: 67
End: 2022-09-16
Payer: COMMERCIAL

## 2022-09-16 DIAGNOSIS — E11.9 TYPE 2 DIABETES MELLITUS WITHOUT COMPLICATION, WITHOUT LONG-TERM CURRENT USE OF INSULIN (H): ICD-10-CM

## 2022-09-16 LAB
CREAT UR-MCNC: 108.7 MG/DL
HBA1C MFR BLD: 7.1 % (ref 0–5.6)
MICROALBUMIN UR-MCNC: 57.9 MG/L
MICROALBUMIN/CREAT UR: 53.27 MG/G CR (ref 0–17)

## 2022-09-16 PROCEDURE — 36415 COLL VENOUS BLD VENIPUNCTURE: CPT

## 2022-09-16 PROCEDURE — 82043 UR ALBUMIN QUANTITATIVE: CPT

## 2022-09-16 PROCEDURE — 83036 HEMOGLOBIN GLYCOSYLATED A1C: CPT

## 2022-09-23 ENCOUNTER — HOSPITAL ENCOUNTER (OUTPATIENT)
Dept: ULTRASOUND IMAGING | Facility: CLINIC | Age: 67
Discharge: HOME | End: 2022-09-23
Attending: SURGERY | Admitting: SURGERY
Payer: COMMERCIAL

## 2022-09-23 DIAGNOSIS — I65.22 ASYMPTOMATIC STENOSIS OF LEFT CAROTID ARTERY: ICD-10-CM

## 2022-09-23 DIAGNOSIS — Z98.890 STATUS POST CAROTID ENDARTERECTOMY: ICD-10-CM

## 2022-09-23 PROCEDURE — 93880 EXTRACRANIAL BILAT STUDY: CPT

## 2022-09-28 ENCOUNTER — VIRTUAL VISIT (OUTPATIENT)
Dept: VASCULAR SURGERY | Facility: CLINIC | Age: 67
End: 2022-09-28
Payer: COMMERCIAL

## 2022-09-28 DIAGNOSIS — I73.9 PERIPHERAL VASCULAR DISEASE (H): ICD-10-CM

## 2022-09-28 DIAGNOSIS — I65.23 ATHEROSCLEROSIS OF BOTH CAROTID ARTERIES: ICD-10-CM

## 2022-09-28 DIAGNOSIS — Z98.890 STATUS POST CAROTID ENDARTERECTOMY: Primary | ICD-10-CM

## 2022-09-28 PROCEDURE — 99215 OFFICE O/P EST HI 40 MIN: CPT | Mod: 95 | Performed by: SURGERY

## 2022-09-28 NOTE — LETTER
9/28/2022       RE: Roger Bonilla  [address]  [facility] [address]     Dear Colleague,    Thank you for referring your patient, Roger Bonilla, to the [facility]. Please see a copy of my visit note below.      Vascular Surgery Consultation Note     Patient:  Roger Bonilla   Date of birth 1955, Medical record number [id number]  Date of Visit:  09/28/2022  Consult Requester:No att. providers found            Assessment and Recommendations:   ASSESSMENT / RECOMMENDATION:    Patent functioning carotid endarterectomy bed without evidence of restenosis.  His carotid duplex study shows no appreciable disease bilaterally.  We will have him follow-up with a carotid duplex study next year.  He has a history of an MARGARITA of 0.69 on the right and 0.58 on the left with nondisabling, non-lifestyle limiting claudication.  We will check a MARGARITA at that time as well.  I did reach out to his primary care physician regarding possible cessation of his Lasix which appears to have been prescribed after his coronary bypass.  He will continue on his antiplatelet and statin therapy.  I encouraged him to reach out if any questions arise.    Many thanks for involving me in the care of this very pleasant patient. Should any questions or concerns arise, please don't hesitate to contact me.    Warm Regards,    Karma Adorno MD, DFSVS, RPVI  Director, [facility]ascular Services  Professor and Chief, Vascular and Endovascular Surgery  [facility]  Pager: 1. Send message or 10 digit call back number Securely via [employer] with the [employer] Web Console (learn more here)              2. Outside of [facility]? Call [phone]        45 minutes spent on the date of the encounter doing chart review, review of outside records, review of test results, interpretation of tests, patient visit and documentation           HPI: Roger follows up today  "for carotid disease.  He had a right carotid endarterectomy by me in February of this year followed shortly thereafter by a coronary artery bypass graft.  He had issues with the saphenous vein harvest site but this eventually healed.  He has had difficulties with excessive voiding after Lasix was started earlier this year.  Overall it has been difficult for him to return to work and has forced him into FPC.  He denies stroke, amaurosis or TIA.      Review of Systems   Constitutional, HEENT, cardiovascular, pulmonary, gi and gu systems are negative, except as otherwise noted.    Imaging: Patent functioning right carotid endarterectomy without evidence of restenosis.  Less than 50% stenosis in the left internal carotid artery.          Flex is a 67 year old who is being evaluated via a billable telephone visit.      What phone number would you like to be contacted at? [phone]  How would you like to obtain your AVS? Dorcasharlizett   Patient states is currently in the [facility]: Yes    Anisha Rossi, Virtual Facilitator/CMA, 9/28/2022         Review of Systems   Constitutional, HEENT, cardiovascular, pulmonary, gi and gu systems are negative, except as otherwise noted.      Objective    Vitals - Patient Reported  Weight (Patient Reported): 108.9 kg (240 lb)  Height (Patient Reported): 167.6 cm (5' 6\")  BMI (Based on Pt Reported Ht/Wt): 38.74        Physical Exam   healthy, alert and no distress  PSYCH: Alert and oriented times 3; coherent speech, normal   rate and volume, able to articulate logical thoughts, able   to abstract reason, no tangential thoughts, no hallucinations   or delusions  His affect is normal  RESP: No cough, no audible wheezing, able to talk in full sentences  Remainder of exam unable to be completed due to telephone visits          Phone call duration: 20 minutes        Sincerely,    Karma Adorno MD  "

## 2022-09-28 NOTE — PATIENT INSTRUCTIONS
Thank you so much for choosing us for your care. It was a pleasure to see you at the vascular clinic today.     Follow-up recommendations: We will see you back in 1 year. Please let us know if any issues arise in the meantime.    Additional testing/imaging ordered today: ABIs and carotid ultrasound in 1 year.      Our scheduling team will get in touch with you to set up any follow-up testing/imaging and/or appointments. Please be aware that any testing/imaging recommended today will need to completed prior to your next visit with the provider. If testing/imaging is not completed prior to your next visit, your visit may be rescheduled.        If you have any questions, please call Giselle Caal RN at [phone] or contact our clinic at [phone]. We also encourage the use of [employer] to communicate with your HealthCare Provider.      If you have an urgent need after business hours (8:00 am to 4:30 pm) please call [phone], option 4, and ask for the vascular attending on call. For non-urgent after hours needs, please call the vascular nurse at [phone] and leave a detailed voicemail. For scheduling needs, please call our clinic directly at [phone].

## 2022-09-28 NOTE — PROGRESS NOTES
Vascular Surgery Consultation Note     Patient:  Roger Bonilla   Date of birth 1955, Medical record number [id number]  Date of Visit:  09/28/2022  Consult Requester:No att. providers found            Assessment and Recommendations:   ASSESSMENT / RECOMMENDATION:    Patent functioning carotid endarterectomy bed without evidence of restenosis.  His carotid duplex study shows no appreciable disease bilaterally.  We will have him follow-up with a carotid duplex study next year.  He has a history of an MARGARITA of 0.69 on the right and 0.58 on the left with nondisabling, non-lifestyle limiting claudication.  We will check a MARGARITA at that time as well.  I did reach out to his primary care physician regarding possible cessation of his Lasix which appears to have been prescribed after his coronary bypass.  He will continue on his antiplatelet and statin therapy.  I encouraged him to reach out if any questions arise.    Many thanks for involving me in the care of this very pleasant patient. Should any questions or concerns arise, please don't hesitate to contact me.    Warm Regards,    Karma Adorno MD, DFSVS, RPVI  Director, [facility] Vascular Services  Professor and Chief, Vascular and Endovascular Surgery  [facility]  Pager: 1. Send message or 10 digit call back number Securely via [employer] with the [employer] Web Console (learn more here)              2. Outside of [facility]? Call [phone]        45 minutes spent on the date of the encounter doing chart review, review of outside records, review of test results, interpretation of tests, patient visit and documentation           HPI: Roger follows up today for carotid disease.  He had a right carotid endarterectomy by me in February of this year followed shortly thereafter by a coronary artery bypass graft.  He had issues with the saphenous vein harvest site but this eventually healed.  He has had difficulties with excessive voiding after Lasix was  "started earlier this year.  Overall it has been difficult for him to return to work and has forced him into half-way.  He denies stroke, amaurosis or TIA.      Review of Systems   Constitutional, HEENT, cardiovascular, pulmonary, gi and gu systems are negative, except as otherwise noted.    Imaging: Patent functioning right carotid endarterectomy without evidence of restenosis.  Less than 50% stenosis in the left internal carotid artery.          Flex is a 67 year old who is being evaluated via a billable telephone visit.      What phone number would you like to be contacted at? [phone]  How would you like to obtain your AVS? Dorcasharlizett   Patient states is currently in the [facility]: Yes    Anisha Rossi, Virtual Facilitator/CMA, 9/28/2022         Review of Systems   Constitutional, HEENT, cardiovascular, pulmonary, gi and gu systems are negative, except as otherwise noted.      Objective    Vitals - Patient Reported  Weight (Patient Reported): 108.9 kg (240 lb)  Height (Patient Reported): 167.6 cm (5' 6\")  BMI (Based on Pt Reported Ht/Wt): 38.74        Physical Exam   healthy, alert and no distress  PSYCH: Alert and oriented times 3; coherent speech, normal   rate and volume, able to articulate logical thoughts, able   to abstract reason, no tangential thoughts, no hallucinations   or delusions  His affect is normal  RESP: No cough, no audible wheezing, able to talk in full sentences  Remainder of exam unable to be completed due to telephone visits          Phone call duration: 20 minutes    "

## 2022-09-28 NOTE — NURSING NOTE
Chief Complaint   Patient presents with     Follow Up       Patient confirms medications and allergies are accurate via patients echeck in completion, and or denies any changes since last reviewed/verified.       Anisha Rossi, Virtual Facilitator

## 2022-09-30 ENCOUNTER — TELEPHONE (OUTPATIENT)
Dept: FAMILY MEDICINE | Facility: CLINIC | Age: 67
End: 2022-09-30

## 2022-09-30 NOTE — TELEPHONE ENCOUNTER
Called pt & read providers message. Pt states swelling is gone. Advised pt to monitor & to call clinic if having increased swelling, weight gain. Pt verbalized understanding.     Nafisa Metcalf RN

## 2022-09-30 NOTE — TELEPHONE ENCOUNTER
----- Message from Karma Adorno MD sent at 9/28/2022 11:46 AM CDT -----  Regarding: Lasix  Good morning Maria De Jesus,    I believe this patient's Lasix was started after his cardiac surgery.  In my follow-up with him today on his carotid artery disease, all looking good, he complained regarding the diuretic effects prohibiting him from doing anything during the day but he because he is constantly voiding.  Any chance that Lasix can be discontinued?    We will be seeing him back in 1 year with a follow-up carotid duplex and MARGARITA.    Many thanks,    Karma Adorno MD, DFSVS, RPVI  Director, [facility] Vascular Services  Chief, Vascular and Endovascular Surgery  [facility]  [email or web address]  Pager: 1. Send message or 10 digit call back number Securely via [employer] with the Voc[employer] Web Console (learn more here)              2. Outside of [facility]? Call [phone]

## 2022-09-30 NOTE — TELEPHONE ENCOUNTER
Chart reviewed. This is from cardiology note:  He does have mild bilateral lower extremity edema and hence I will resume 40 mg of Lasix.  I instructed him to take it daily for the next 3 days and thereafter take it only on as-needed basis.      RN, please call patient and let him know that cardiology felt he could take the furosemide daily as needed for leg swelling.  If his leg swelling has resolved, he may stop the furosemide at this time and monitor.  Maria De Jesus Hay, CNP

## 2022-10-10 ENCOUNTER — HEALTH MAINTENANCE LETTER (OUTPATIENT)
Age: 67
End: 2022-10-10

## 2022-10-14 ENCOUNTER — OFFICE VISIT (OUTPATIENT)
Dept: FAMILY MEDICINE | Facility: CLINIC | Age: 67
End: 2022-10-14
Payer: COMMERCIAL

## 2022-10-14 VITALS
OXYGEN SATURATION: 95 % | HEART RATE: 96 BPM | WEIGHT: 266.9 LBS | SYSTOLIC BLOOD PRESSURE: 130 MMHG | RESPIRATION RATE: 20 BRPM | DIASTOLIC BLOOD PRESSURE: 77 MMHG | HEIGHT: 66 IN | TEMPERATURE: 98.3 F | BODY MASS INDEX: 42.89 KG/M2

## 2022-10-14 DIAGNOSIS — Z95.1 S/P CABG (CORONARY ARTERY BYPASS GRAFT): ICD-10-CM

## 2022-10-14 DIAGNOSIS — E11.9 TYPE 2 DIABETES MELLITUS WITHOUT COMPLICATION, WITHOUT LONG-TERM CURRENT USE OF INSULIN (H): Chronic | ICD-10-CM

## 2022-10-14 DIAGNOSIS — Z12.11 SCREEN FOR COLON CANCER: ICD-10-CM

## 2022-10-14 DIAGNOSIS — E78.5 HYPERLIPIDEMIA LDL GOAL <70: ICD-10-CM

## 2022-10-14 DIAGNOSIS — Z00.00 ENCOUNTER FOR MEDICARE ANNUAL WELLNESS EXAM: Primary | ICD-10-CM

## 2022-10-14 DIAGNOSIS — I10 BENIGN ESSENTIAL HYPERTENSION: ICD-10-CM

## 2022-10-14 PROCEDURE — 91312 COVID-19,PF,PFIZER BOOSTER BIVALENT: CPT | Performed by: NURSE PRACTITIONER

## 2022-10-14 PROCEDURE — 99213 OFFICE O/P EST LOW 20 MIN: CPT | Mod: 25 | Performed by: NURSE PRACTITIONER

## 2022-10-14 PROCEDURE — 90662 IIV NO PRSV INCREASED AG IM: CPT | Performed by: NURSE PRACTITIONER

## 2022-10-14 PROCEDURE — G0008 ADMIN INFLUENZA VIRUS VAC: HCPCS | Performed by: NURSE PRACTITIONER

## 2022-10-14 PROCEDURE — G0439 PPPS, SUBSEQ VISIT: HCPCS | Performed by: NURSE PRACTITIONER

## 2022-10-14 PROCEDURE — 0124A COVID-19,PF,PFIZER BOOSTER BIVALENT: CPT | Performed by: NURSE PRACTITIONER

## 2022-10-14 RX ORDER — EZETIMIBE 10 MG/1
10 TABLET ORAL DAILY
Qty: 90 TABLET | Refills: 3 | Status: SHIPPED | OUTPATIENT
Start: 2022-10-14 | End: 2023-10-03

## 2022-10-14 RX ORDER — FUROSEMIDE 40 MG
40 TABLET ORAL PRN
Qty: 30 TABLET | Refills: 0 | Status: CANCELLED | OUTPATIENT
Start: 2022-10-14

## 2022-10-14 ASSESSMENT — PAIN SCALES - GENERAL: PAINLEVEL: NO PAIN (0)

## 2022-10-14 NOTE — PATIENT INSTRUCTIONS
Patient Education   Personalized Prevention Plan  You are due for the preventive services outlined below.  Your care team is available to assist you in scheduling these services.  If you have already completed any of these items, please share that information with your care team to update in your medical record.  Health Maintenance Due   Topic Date Due     Discuss Advance Care Planning  Never done     Zoster (Shingles) Vaccine (1 of 2) Never done     Colorectal Cancer Screening  11/30/2021     Diabetic Foot Exam  03/25/2022     COVID-19 Vaccine (5 - Booster for Pfizer series) 07/12/2022     Flu Vaccine (1) 09/01/2022

## 2022-10-14 NOTE — PROGRESS NOTES
Assessment & Plan     Encounter for Medicare annual wellness exam      Type 2 diabetes mellitus without complication, without long-term current use of insulin (H)  Well controlled.  Continue metformin  Follow up in 6 months.    S/P CABG (coronary artery bypass graft)  Feeling well.  Asymptomatic   No recurrent symptoms since dc'ing the furosemide  Follows with cardiology    Hyperlipidemia LDL goal <70  Well controlled.  - ezetimibe (ZETIA) 10 MG tablet; Take 1 tablet (10 mg) by mouth daily    Benign essential hypertension  Well controlled.    Screen for colon cancer  - LUC[employer]A([employer])        Return in about 53 weeks (around 10/20/2023) for Annual Wellness Visit.    The risks, benefits and treatment options of prescribed medications or other treatments have been discussed with the patient. The patient verbalized their understanding and should call or follow up if no improvement or if they develop further problems.    [facility] JANE Mccord is a 67 year old, presenting for the following health issues:  Recheck Medication, Chronic Disease Management (Diabetes, heart disease), and Wellness Visit (Add on wellness exam)      HPI     Diabetes Follow-up      How often are you checking your blood sugar? Not at all    What concerns do you have today about your diabetes? None     Do you have any of these symptoms? (Select all that apply)  No numbness or tingling in feet.  No redness, sores or blisters on feet.  No complaints of excessive thirst.  No reports of blurry vision.  No significant changes to weight.              Hyperlipidemia Follow-Up      Are you regularly taking any medication or supplement to lower your cholesterol?   Yes- atorvastatin    Are you having muscle aches or other side effects that you think could be caused by your cholesterol lowering medication?  No    Hypertension Follow-up      Do you check your blood pressure  "regularly outside of the clinic? No     Are you following a low salt diet? Yes    Are your blood pressures ever more than 140 on the top number (systolic) OR more   than 90 on the bottom number (diastolic), for example 140/90?     BP Readings from Last 2 Encounters:   10/14/22 130/77   06/08/22 126/86     Hemoglobin A1C (%)   Date Value   09/16/2022 7.1 (H)   03/11/2022 6.6 (H)   03/25/2021 6.4 (H)   06/04/2020 7.0 (H)     LDL Cholesterol Calculated (mg/dL)   Date Value   03/11/2022 80   10/08/2021 85   06/04/2020 86   06/11/2019 87       Wt Readings from Last 4 Encounters:   10/14/22 121.1 kg (266 lb 14.4 oz)   06/08/22 110.3 kg (243 lb 1.6 oz)   05/25/22 113.2 kg (249 lb 9.6 oz)   05/17/22 107.5 kg (237 lb)           Vascular Disease Follow-up      How often do you take nitroglycerin? Never    Do you take an aspirin every day? Yes          Annual Wellness Visit    Patient has been advised of split billing requirements and indicates understanding: Yes     Are you in the first 12 months of your Medicare Part B coverage?  No    Physical Health:    In general, how would you rate your overall physical health? poor    Outside of work, how many days during the week do you exercise?2-3 days/week    Outside of work, approximately how many minutes a day do you exercise?less than 15 minutes    If you drink alcohol do you typically have >3 drinks per day or >7 drinks per week? Not Applicable    Do you usually eat at least 4 servings of fruit and vegetables a day, include whole grains & fiber and avoid regularly eating high fat or \"junk\" foods? NO    Do you have any problems taking medications regularly? No    Do you have any side effects from medications? none    Needs assistance for the following daily activities: no assistance needed    Which of the following safety concerns are present in your home?  throw rugs in the hallway and lack of grab bars in the bathroom     Hearing impairment: No    In the past 6 months, have you " "been bothered by leaking of urine? yes    Mental Health:    In general, how would you rate your overall mental or emotional health? good  PHQ-2 Score:  0    Do you feel safe in your environment? Yes    Have you ever done Advance Care Planning? (For example, a Health Directive, POLST, or a discussion with a medical provider or your loved ones about your wishes)? No, advance care planning information given to patient to review.  Advanced care planning was discussed at today's visit.    Fall risk:  Fallen 2 or more times in the past year?: No  Any fall with injury in the past year?: No    Cognitive Screenin) Repeat 3 items (Leader, Season, Table)    2) Clock draw: NORMAL  3) 3 item recall: Recalls 2 objects   Results: NORMAL clock, 1-2 items recalled: COGNITIVE IMPAIRMENT LESS LIKELY    Mini-CogTM Copyright S Naida. Licensed by the author for use in [facility] [employer]; reprinted with permission (emmett@[facility]). All rights reserved.          Current providers sharing in care for this patient include:   Patient Care Team:  Maria De Jesus Hay APRN CNP as PCP - General (Nurse Practitioner)  Maria De Jesus Hay APRN CNP as Assigned PCP  Karma Adorno MD as Assigned Heart and Vascular Provider  Bulmaro Zavaleta PA-C as Assigned Cancer Care Provider    Patient has been advised of split billing requirements and indicates understanding: Yes    Review of Systems   Constitutional, HEENT, cardiovascular, pulmonary, GI, , musculoskeletal, neuro, skin, endocrine and psych systems are negative, except as otherwise noted.      Objective    /77 (BP Location: Left arm, Cuff Size: Adult Large)   Pulse 96   Temp 98.3  F (36.8  C) (Tympanic)   Resp 20   Ht 1.676 m (5' 6\")   Wt 121.1 kg (266 lb 14.4 oz)   SpO2 95%   BMI 43.08 kg/m    Body mass index is 43.08 kg/m .  Physical Exam   GENERAL: healthy, alert and no distress  NECK: no adenopathy, no asymmetry, masses, or scars and thyroid normal to palpation  RESP: lungs " clear to auscultation - no rales, rhonchi or wheezes  CV: regular rate and rhythm, normal S1 S2, no S3 or S4, no murmur, click or rub, no peripheral edema and peripheral pulses strong  ABDOMEN: soft, nontender, no hepatosplenomegaly, no masses and bowel sounds normal  MS: no gross musculoskeletal defects noted, no edema  Diabetic foot exam: normal DP and PT pulses, no trophic changes or ulcerative lesions, normal sensory exam and normal monofilament exam    Lab on 09/16/2022   Component Date Value Ref Range Status     Hemoglobin A1C 09/16/2022 7.1 (H)  0.0 - 5.6 % Final    Normal <5.7%   Prediabetes 5.7-6.4%    Diabetes 6.5% or higher     Note: Adopted from ADA consensus guidelines.     Albumin Urine mg/L 09/16/2022 57.9  mg/L Final    The reference ranges have not been established in urine albumin. The results should be integrated into the clinical context for interpretation.     Albumin Urine mg/g Cr 09/16/2022 53.27 (H)  0.00 - 17.00 mg/g Cr Final    Microalbuminuria is defined as an albumin:creatinine ratio of 17 to 299 for males and 25 to 299 for females. A ratio of albumin:creatinine of 300 or higher is indicative of overt proteinuria.  Due to biologic variability, positive results should be confirmed by a second, first-morning random or 24-hour timed urine specimen. If there is discrepancy, a third specimen is recommended. When 2 out of 3 results are in the microalbuminuria range, this is evidence for incipient nephropathy and warrants increased efforts at glucose control, blood pressure control, and institution of therapy with an angiotensin-converting-enzyme (ACE) inhibitor (if the patient can tolerate it).       Creatinine Urine mg/dL 09/16/2022 108.7  mg/dL Final    The reference ranges have not been established in urine creatinine. The results should be integrated into the clinical context for interpretation.

## 2022-10-27 DIAGNOSIS — R19.5 POSITIVE COLORECTAL CANCER SCREENING USING COLOGUARD TEST: Primary | ICD-10-CM

## 2022-10-27 LAB — NONINV COLON CA DNA+OCC BLD SCRN STL QL: POSITIVE

## 2022-11-01 ENCOUNTER — TELEPHONE (OUTPATIENT)
Dept: FAMILY MEDICINE | Facility: CLINIC | Age: 67
End: 2022-11-01

## 2022-11-01 NOTE — TELEPHONE ENCOUNTER
Screening Questions  BLUE  KIND OF PREP RED  LOCATION [review exclusion criteria] GREEN  SEDATION TYPE        yes Are you active on mychart?       [place] Ordering/Referring Provider?        [employer] What type of coverage do you have?      No Have you had a positive covid test in the last 90 days?     yes 1. BMI  [BMI 40+ - review exclusion criteria]    Yes  2. Are you able to give consent for your medical care? [IF NO,RN REVIEW]        No  3. Are you taking any prescription pain medications on a routine schedule?      NA  3a. EXTENDED PREP What kind of prescription?     No 4. Do you have any chemical dependencies such as alcohol, street drugs, or methadone?    No 5. Do you have any history of post-traumatic stress syndrome, severe anxiety or history of psychosis?      **If yes 3- 5 , please schedule with MAC sedation.**          IF YES TO ANY 6 - 10 - HOSPITAL SETTING ONLY.     No 6.   Do you need assistance transferring?     No 7.   Have you had a heart or lung transplant?    No 8.   Are you currently on dialysis?   No 9.   Do you use daily home oxygen?   No 10. Do you take nitroglycerin?   10a. NA If yes, how often?     11. [FEMALES]  NA Are you currently pregnant?    11a. NA If yes, how many weeks? [ Greater than 12 weeks, OR NEEDED]    No 12. Do you have Pulmonary Hypertension? *NEED PAC APPT AT [facility]*     No 13. [review exclusion criteria]  Do you have any implantable devices in your body (pacemaker, defib, LVAD)?    yes 14. In the past 6 months, have you had any heart related issues including cardiomyopathy or heart attack?     14a.  If yes, did it require cardiac stenting if so when? March 2022 triple bypass    NO 15. Have you had a stroke or Transient ischemic attack (TIA - aka  mini stroke ) within 6 months?      No 16. Do you have mod to severe Obstructive Sleep Apnea?  [Hospital only - Ok at [place]]    No 17. Do you have SEVERE AND UNCONTROLLED asthma? *NEED PAC APPT AT [facility]*     yes 18. Are you  "currently taking any blood thinners?     18a. If yes, inform patient to \"follow up w/ ordering provider for bridging instructions.\" 2 aspiring a day    No 19. Do you take the medication Phentermine?    19a. If yes, \"Hold for 7 days before procedure.  Please consult your prescribing provider if you have questions about holding this medication.\"     No  20. Do you have chronic kidney disease?      Yes  21. Do you have a diagnosis of diabetes?     No  22. On a regular basis do you go 3-5 days between bowel movements?     [facility]. Preferred LOCAL Pharmacy for Pre Prescription    [ LIST ONLY ONE PHARMACY]        [facility] - [place] - [address] [facility] - [place], [place] - [address]        - CLOSING REMINDERS -    Informed patient they will need an adult    Cannot take any type of public or medical transportation alone    Conscious Sedation- Needs  for 6 hours after the procedure       MAC/General-Needs  for 24 hours after procedure    Pre-Procedure Covid test to be completed [[facility] PCR Testing Required]    Confirmed Nurse will call to complete assessment       - SCHEDULING DETAILS -     [place]  Surgeon    1-3-23  Date of Procedure  Lower Endoscopy [Colonoscopy]  Type of Procedure Scheduled   [facility] PREP-If you answer yes to questions #8, #20, #21Which Colonoscopy Prep was Sent?     GEN Sedation Type     NA PAC / Pre-op Required         Additional comments:  NA          "

## 2022-11-20 DIAGNOSIS — Z95.1 S/P CABG X 3: ICD-10-CM

## 2022-11-21 RX ORDER — METOPROLOL TARTRATE 100 MG
100 TABLET ORAL 2 TIMES DAILY
Qty: 180 TABLET | Refills: 3 | Status: SHIPPED | OUTPATIENT
Start: 2022-11-21 | End: 2023-10-03

## 2022-11-28 ENCOUNTER — OFFICE VISIT (OUTPATIENT)
Dept: CARDIOLOGY | Facility: CLINIC | Age: 67
End: 2022-11-28
Attending: INTERNAL MEDICINE
Payer: COMMERCIAL

## 2022-11-28 VITALS
WEIGHT: 269.58 LBS | OXYGEN SATURATION: 97 % | HEART RATE: 85 BPM | DIASTOLIC BLOOD PRESSURE: 86 MMHG | BODY MASS INDEX: 43.51 KG/M2 | SYSTOLIC BLOOD PRESSURE: 138 MMHG | TEMPERATURE: 97.3 F

## 2022-11-28 DIAGNOSIS — Z95.1 S/P CABG (CORONARY ARTERY BYPASS GRAFT): Primary | ICD-10-CM

## 2022-11-28 DIAGNOSIS — R06.02 SHORTNESS OF BREATH: ICD-10-CM

## 2022-11-28 PROCEDURE — 99214 OFFICE O/P EST MOD 30 MIN: CPT | Performed by: PHYSICIAN ASSISTANT

## 2022-11-28 RX ORDER — FUROSEMIDE 40 MG
20 TABLET ORAL DAILY
Qty: 30 TABLET | Refills: 0 | COMMUNITY
Start: 2022-11-28 | End: 2022-12-26

## 2022-11-28 NOTE — PATIENT INSTRUCTIONS
"TODAY'S RECOMMENDATIONS:   Medication Changes:  RESTART lasix a 20 mg (1/2 tablet) daily.   Follow-up:  -  Echocardiogram in the next few weeks to recheck the heart function given your shortness of breath.  -  I'll have my nurses call you in a week to see how you are feeling back on the water pill. If it's helping - we'll continue and put you on a small potassium supplement.     To schedule a future appointment, we kindly ask that you call cardiology scheduling at [phone] three months prior to requested revisit date.  Please call cardiology scheduling if you feel you need clinical evaluation with them at any time for any cardiac reason.   ~~~~~~~~~~~~~~~~~~~~~~~~~~~~~~~~~~~~~~~  \"[facility]\" [place] telephone numbers for reference:  Cardiology Scheduling~[phone]  Cardiology Clinic RN's~[phone]   ~~~~~~~~~~~~~~~~~~~~~~~~~~~~~~~~~~~~~~~~    "

## 2022-11-28 NOTE — PROGRESS NOTES
Cardiology Progress Note    Patient seen today in follow up of: CAD  Primary cardiologist: Dr. Ramirez    HPI:  Roger Bonilla is a very pleasant 67 year old male with a history of coronary artery disease, peripheral vascular disease, hypertension, hyperlipidemia, tobacco use, anemia, diabetes mellitus type 2, and obesity.    In regards to his coronary disease, he had an inferior MI in 2001.  He received 2 stents to the RCA and 1 to the OM.  Residual LAD disease which was medically managed.     About a year ago in December 2021, he saw Dr. Ramirez as he had been diagnosed with severe right internal carotid artery stenosis.  A carotid endarterectomy was planned.  He was referred for a stress test which showed a moderate area of ischemia.  He also was having some mild chest discomfort with exertion. Dr. Ramirez given his abnormal stress test, he was referred for coronary angiography which showed three-vessel coronary artery disease.  It was recommended his CEA be done before his CABG.  He underwent a right CEA and 6 weeks later underwent coronary angiography with he was referred to cardiovascular surgery and was seen by Dr. Gambino.  SALAMANCA to the LAD, reverse saphenous vein graft to the RPDA and OM on 3/24/2022.    He saw Dr. Ramirez in May and was still recovering from surgery at that time.    He has been on 40 mg of Lasix however it appears about 2 months ago this was stopped.    He presents today for routine follow-up.  He tells me he has noted increasing exertional dyspnea over the last few months.  It is unclear if this started after his Lasix was stopped for was going on before.  Regardless, he is short of breath walking around a store and he now which previously would not of been an issue for him.  No orthopnea or PND.  He has some mild peripheral edema on exam today.  His weight is also up 20 pounds from May.    ASSESSMENT/PLAN:  Roger Bonilla is a very pleasant 67 year old male   History of coronary artery disease  with prior PCI's and recent CABG x3 in May 2022, hypertension, hyperlipidemia, tobacco use, peripheral vascular disease, diabetes mellitus and obesity.    Today, he notes increasing dyspnea on exertion over the past few months.  No orthopnea or PND.  He does also have some mild pitting edema on exam today.  Additionally, his weight appears to be up 20 pounds from when he saw Dr. Ramirez in May.  His Lasix was stopped a few months ago.  I suspect he would benefit from reinitiation of that.  He responded very well to 40 mg daily.  We agreed to restart him on 20 mg of Lasix and see if his symptoms improve.  I will have the nurses call him in a week for an update.  If he is feeling better, I would continue the Lasix and prescribe a small potassium supplement.  We will monitor his renal function with a basic metabolic panel as well.  We also agreed to recheck an echocardiogram to reevaluate his LV function given his symptoms.  We will see how he does with the Lasix and what his echocardiogram shows.  If needed, we can consider an ischemic work-up.    He is on appropriate medical therapy for his coronary disease with aspirin, Lipitor and Zetia.  He also takes metoprolol 100 mg twice daily.  Blood pressure is reasonable in clinic today and we will continue his current medications unchanged.    It was a pleasure meeting Flex in clinic today. We will touch base with him next week. In the mean time to contact us with any new or worsening symptoms.time, I asked I asked him to contact us with any new or worsening symptoms.  He can return to see Dr. Ramirez next year for annual follow-up but we will certainly arrange for sooner follow-up if needed.    Orders this Visit:  Orders Placed This Encounter   Procedures     Follow-Up with Cardiology     Echocardiogram Complete     Orders Placed This Encounter   Medications     furosemide (LASIX) 40 MG tablet     Sig: Take 0.5 tablets (20 mg) by mouth daily     Dispense:  30 tablet      Refill:  0     Medications Discontinued During This Encounter   Medication Reason     furosemide (LASIX) 40 MG tablet Reorder       CURRENT MEDICATIONS:  Current Outpatient Medications   Medication Sig Dispense Refill     aspirin (ASA) 81 MG chewable tablet 2 tablets (162 mg) by Oral or NG Tube route daily 60 tablet 0     atorvastatin (LIPITOR) 80 MG tablet Take 1 Tablet BY MOUTH EVERY DAY 90 tablet 3     ezetimibe (ZETIA) 10 MG tablet Take 1 tablet (10 mg) by mouth daily 90 tablet 3     furosemide (LASIX) 40 MG tablet Take 0.5 tablets (20 mg) by mouth daily 30 tablet 0     metFORMIN (GLUCOPHAGE) 1000 MG tablet Take 1 Tablet BY MOUTH EVERY DAY 90 tablet 3     metoprolol tartrate (LOPRESSOR) 100 MG tablet Take 1 tablet (100 mg) by mouth 2 times daily 180 tablet 3     omeprazole (PRILOSEC) 40 MG DR capsule Take 1 capsule (40 mg) by mouth every morning 90 capsule 3     ALLERGIES  Allergies   Allergen Reactions     Nkda [No Known Drug Allergies]      PAST MEDICAL HISTORY:  Past Medical History:   Diagnosis Date     Acute kidney injury (H) 6/23/2021     Coronary artery disease may 1 2005    2 stents put in heart     Obese      Pure hypercholesterolemia      Sleep apnea      Type II or unspecified type diabetes mellitus without mention of complication, not stated as uncontrolled      Unspecified cardiovascular disease 5-2001    MI -- Angioplasty two stents RCA & OM2     Unspecified essential hypertension      PAST SURGICAL HISTORY:  Past Surgical History:   Procedure Laterality Date     BYPASS GRAFT ARTERY CORONARY N/A 3/24/2022    Procedure: CORONARY ARTERY BYPASS GRAFT x 3 (LIMA - LAD; SV - OM2; SV - PDA) WITH ENDOSCOPIC SAPHENOUS VEIN HARVEST ON BILATERAL LOWER EXTREMITY, AND ON CARDIOPULMONARY PUMP OXYGENATOR  (INTRAOPERATIVE TRANSESOPHAGEAL ECHOCARDIOGRAM BY ANESTHESIOLOGIST);  Surgeon: Glenna Waters MD;  Location:  OR     CARDIAC SURGERY  may 1, 2005     COLONOSCOPY  11/30/2011    Procedure:COLONOSCOPY;  Screening Colonoscopy; Surgeon:LUTHER FLORES; Location:[facility]     CV CORONARY ANGIOGRAM N/A 2021    Procedure: Coronary Angiogram;  Surgeon: Jonny Moore MD;  Location:  HEART CARDIAC CATH LAB     ENDARTERECTOMY CAROTID Right 2022    Procedure: Right carotid endarterectomy with ELECTROENCEPHALOGRAM(EEG);  Surgeon: Karma Adorno MD;  Location: [facility] OR     ESOPHAGOSCOPY, GASTROSCOPY, DUODENOSCOPY (EGD), COMBINED N/A 2021    Procedure: ESOPHAGOGASTRODUODENOSCOPY, WITH BIOPSY;  Surgeon: Jeff Cunningham DO;  Location: [facility]     PHACOEMULSIFICATION WITH STANDARD INTRAOCULAR LENS IMPLANT Right 2021    Procedure: Right eye Cataract Extraction with Implant;  Surgeon: Reyes Valdez MD;  Location: [facility]     PHACOEMULSIFICATION WITH STANDARD INTRAOCULAR LENS IMPLANT Left 2021    Procedure: left eye Cataract Extraction with Implant;  Surgeon: Reyes Valdez MD;  Location: [facility]     SURGICAL HISTORY OF -       None     VASCULAR SURGERY  oct 2013    stent put in leg     FAMILY HISTORY:  Family History   Problem Relation Age of Onset     Cerebrovascular Disease Mother      Respiratory Father         emphysema     Cerebrovascular Disease Maternal Grandmother      Alzheimer Disease Maternal Grandfather      Breast Cancer Sister      Arthritis Other         hip fracture     Cancer - colorectal No family hx of      Prostate Cancer No family hx of      SOCIAL HISTORY:  Social History     Socioeconomic History     Marital status:      Spouse name: None     Number of children: None     Years of education: None     Highest education level: None   Tobacco Use     Smoking status: Every Day     Packs/day: 0.50     Years: 50.00     Pack years: 25.00     Types: Cigarettes     Start date: 1972     Last attempt to quit: 2022     Years since quittin.8     Smokeless tobacco: Never     Tobacco comments:     5-10 cigs daily 22   Vaping Use     Vaping  Use: Never used   Substance and Sexual Activity     Alcohol use: Not Currently     Comment: 2 beers a week      Drug use: No     Sexual activity: Yes     Partners: Female     Birth control/protection: Condom   Other Topics Concern     Parent/sibling w/ CABG, MI or angioplasty before 65F 55M? No     Review of Systems:  Skin:        Eyes:       ENT:       Respiratory:  Positive for dyspnea on exertion;shortness of breath  Cardiovascular:  Negative for;palpitations;chest pain;edema;dizziness;lightheadedness;fatigue    Gastroenterology:      Genitourinary:       Musculoskeletal:       Neurologic:       Psychiatric:       Heme/Lymph/Imm:       Endocrine:          Physical Exam:  Vitals: /86 (BP Location: Right arm, Patient Position: Sitting, Cuff Size: Adult Large)   Pulse 85   Temp 97.3  F (36.3  C) (Tympanic)   Wt 122.3 kg (269 lb 9.3 oz)   SpO2 97%   BMI 43.51 kg/m     Wt Readings from Last 4 Encounters:   11/28/22 122.3 kg (269 lb 9.3 oz)   10/14/22 121.1 kg (266 lb 14.4 oz)   06/08/22 110.3 kg (243 lb 1.6 oz)   05/25/22 113.2 kg (249 lb 9.6 oz)     GEN: well nourished, in no acute distress.  HEENT:  Pupils equal, round. Sclerae nonicteric.   C/V:  Regular rate and rhythm, no murmur, rub or gallop.    RESP: Respirations are unlabored. Clear to auscultation bilaterally without wheezing, rales, or rhonchi.  GI: Abdomen soft, nontender.  EXTREM: 1+ LE edema.  NEURO: Alert and oriented, cooperative.  SKIN: Warm and dry.     Recent Lab Results:  LIPID RESULTS:  Lab Results   Component Value Date    CHOL 136 03/11/2022    CHOL 139 06/04/2020    HDL 39 (L) 03/11/2022    HDL 36 (L) 06/04/2020    LDL 80 03/11/2022    LDL 86 06/04/2020    TRIG 86 03/11/2022    TRIG 83 06/04/2020    CHOLHDLRATIO 3.0 07/24/2015     LIVER ENZYME RESULTS:  Lab Results   Component Value Date    AST 26 04/05/2022    AST 26 06/23/2021    ALT 77 (H) 04/05/2022    ALT 40 06/23/2021     CBC RESULTS:  Lab Results   Component Value Date    WBC  12.3 (H) 05/17/2022    WBC 12.4 (H) 07/01/2021    RBC 4.45 05/17/2022    RBC 4.36 (L) 07/01/2021    HGB 11.6 (L) 05/17/2022    HGB 14.1 07/01/2021    HCT 38.1 (L) 05/17/2022    HCT 42.9 07/01/2021    MCV 86 05/17/2022    MCV 98 07/01/2021    MCH 26.1 (L) 05/17/2022    MCH 32.3 07/01/2021    MCHC 30.4 (L) 05/17/2022    MCHC 32.9 07/01/2021    RDW 17.3 (H) 05/17/2022    RDW 13.7 07/01/2021     05/17/2022     07/01/2021     BMP RESULTS:  Lab Results   Component Value Date     05/17/2022     07/01/2021    POTASSIUM 5.1 05/17/2022    POTASSIUM 4.6 07/01/2021    CHLORIDE 101 05/17/2022    CHLORIDE 105 07/01/2021    CO2 29 05/17/2022    CO2 27 07/01/2021    ANIONGAP 4 05/17/2022    ANIONGAP 6 07/01/2021     (H) 05/17/2022    GLC 85 07/01/2021    BUN 27 05/17/2022    BUN 13 07/01/2021    CR 1.30 (H) 05/17/2022    CR 1.09 07/01/2021    GFRESTIMATED 61 05/17/2022    GFRESTIMATED 70 07/01/2021    GFRESTBLACK 82 07/01/2021    SUE 9.2 05/17/2022    SUE 9.3 07/01/2021      A1C RESULTS:  Lab Results   Component Value Date    A1C 7.1 (H) 09/16/2022    A1C 6.4 (H) 03/25/2021     INR RESULTS:  Lab Results   Component Value Date    INR 1.31 (H) 03/24/2022    INR 1.41 (H) 03/24/2022    INR 0.94 09/13/2013    INR 0.94 10/27/2008       Tammy Hernandez PA-C  [facility]

## 2022-11-28 NOTE — LETTER
11/28/2022    Maria De Jesus Hay, APRN CNP  [address] [facility] [address]    RE: Roger Bonilla       Dear Colleague,     I had the pleasure of seeing Roger Bonilla in the [facility].    Cardiology Progress Note    Patient seen today in follow up of: CAD  Primary cardiologist: Dr. Ramirez    HPI:  Roger Bonilla is a very pleasant 67 year old male with a history of coronary artery disease, peripheral vascular disease, hypertension, hyperlipidemia, tobacco use, anemia, diabetes mellitus type 2, and obesity.    In regards to his coronary disease, he had an inferior MI in 2001.  He received 2 stents to the RCA and 1 to the OM.  Residual LAD disease which was medically managed.     About a year ago in December 2021, he saw Dr. Ramirez as he had been diagnosed with severe right internal carotid artery stenosis.  A carotid endarterectomy was planned.  He was referred for a stress test which showed a moderate area of ischemia.  He also was having some mild chest discomfort with exertion. Dr. Ramirez given his abnormal stress test, he was referred for coronary angiography which showed three-vessel coronary artery disease.  It was recommended his CEA be done before his CABG.  He underwent a right CEA and 6 weeks later underwent coronary angiography with he was referred to cardiovascular surgery and was seen by Dr. Gambino.  SALAMANCA to the LAD, reverse saphenous vein graft to the RPDA and OM on 3/24/2022.    He saw Dr. Ramirez in May and was still recovering from surgery at that time.    He has been on 40 mg of Lasix however it appears about 2 months ago this was stopped.    He presents today for routine follow-up.  He tells me he has noted increasing exertional dyspnea over the last few months.  It is unclear if this started after his Lasix was stopped for was going on before.  Regardless, he is short of breath walking around a store and he now which previously would not of been an issue for him.  No orthopnea or  PND.  He has some mild peripheral edema on exam today.  His weight is also up 20 pounds from May.    ASSESSMENT/PLAN:  Roger Bonilla is a very pleasant 67 year old male   History of coronary artery disease with prior PCI's and recent CABG x3 in May 2022, hypertension, hyperlipidemia, tobacco use, peripheral vascular disease, diabetes mellitus and obesity.    Today, he notes increasing dyspnea on exertion over the past few months.  No orthopnea or PND.  He does also have some mild pitting edema on exam today.  Additionally, his weight appears to be up 20 pounds from when he saw Dr. Ramirez in May.  His Lasix was stopped a few months ago.  I suspect he would benefit from reinitiation of that.  He responded very well to 40 mg daily.  We agreed to restart him on 20 mg of Lasix and see if his symptoms improve.  I will have the nurses call him in a week for an update.  If he is feeling better, I would continue the Lasix and prescribe a small potassium supplement.  We will monitor his renal function with a basic metabolic panel as well.  We also agreed to recheck an echocardiogram to reevaluate his LV function given his symptoms.  We will see how he does with the Lasix and what his echocardiogram shows.  If needed, we can consider an ischemic work-up.    He is on appropriate medical therapy for his coronary disease with aspirin, Lipitor and Zetia.  He also takes metoprolol 100 mg twice daily.  Blood pressure is reasonable in clinic today and we will continue his current medications unchanged.    It was a pleasure meeting Flex in clinic today. We will touch base with him next week. In the mean time to contact us with any new or worsening symptoms.time, I asked I asked him to contact us with any new or worsening symptoms.  He can return to see Dr. Ramirez next year for annual follow-up but we will certainly arrange for sooner follow-up if needed.    Orders this Visit:  Orders Placed This Encounter   Procedures     Follow-Up  with Cardiology     Echocardiogram Complete     Orders Placed This Encounter   Medications     furosemide (LASIX) 40 MG tablet     Sig: Take 0.5 tablets (20 mg) by mouth daily     Dispense:  30 tablet     Refill:  0     Medications Discontinued During This Encounter   Medication Reason     furosemide (LASIX) 40 MG tablet Reorder       CURRENT MEDICATIONS:  Current Outpatient Medications   Medication Sig Dispense Refill     aspirin (ASA) 81 MG chewable tablet 2 tablets (162 mg) by Oral or NG Tube route daily 60 tablet 0     atorvastatin (LIPITOR) 80 MG tablet Take 1 Tablet BY MOUTH EVERY DAY 90 tablet 3     ezetimibe (ZETIA) 10 MG tablet Take 1 tablet (10 mg) by mouth daily 90 tablet 3     furosemide (LASIX) 40 MG tablet Take 0.5 tablets (20 mg) by mouth daily 30 tablet 0     metFORMIN (GLUCOPHAGE) 1000 MG tablet Take 1 Tablet BY MOUTH EVERY DAY 90 tablet 3     metoprolol tartrate (LOPRESSOR) 100 MG tablet Take 1 tablet (100 mg) by mouth 2 times daily 180 tablet 3     omeprazole (PRILOSEC) 40 MG DR capsule Take 1 capsule (40 mg) by mouth every morning 90 capsule 3     ALLERGIES  Allergies   Allergen Reactions     Nkda [No Known Drug Allergies]      PAST MEDICAL HISTORY:  Past Medical History:   Diagnosis Date     Acute kidney injury (H) 6/23/2021     Coronary artery disease may 1 2005    2 stents put in heart     Obese      Pure hypercholesterolemia      Sleep apnea      Type II or unspecified type diabetes mellitus without mention of complication, not stated as uncontrolled      Unspecified cardiovascular disease 5-2001    MI -- Angioplasty two stents RCA & OM2     Unspecified essential hypertension      PAST SURGICAL HISTORY:  Past Surgical History:   Procedure Laterality Date     BYPASS GRAFT ARTERY CORONARY N/A 3/24/2022    Procedure: CORONARY ARTERY BYPASS GRAFT x 3 (LIMA - LAD; SV - OM2; SV - PDA) WITH ENDOSCOPIC SAPHENOUS VEIN HARVEST ON BILATERAL LOWER EXTREMITY, AND ON CARDIOPULMONARY PUMP OXYGENATOR   (INTRAOPERATIVE TRANSESOPHAGEAL ECHOCARDIOGRAM BY ANESTHESIOLOGIST);  Surgeon: Glenna Waters MD;  Location:  OR     CARDIAC SURGERY  may 1, 2005     COLONOSCOPY  11/30/2011    Procedure:COLONOSCOPY; Screening Colonoscopy; Surgeon:LUTHER FLORES; Location:[facility]     CV CORONARY ANGIOGRAM N/A 12/29/2021    Procedure: Coronary Angiogram;  Surgeon: Jonny Moore MD;  Location:  HEART CARDIAC CATH LAB     ENDARTERECTOMY CAROTID Right 2/18/2022    Procedure: Right carotid endarterectomy with ELECTROENCEPHALOGRAM(EEG);  Surgeon: Karma Adorno MD;  [facility]     ESOPHAGOSCOPY, GASTROSCOPY, DUODENOSCOPY (EGD), COMBINED N/A 7/21/2021    Procedure: ESOPHAGOGASTRODUODENOSCOPY, WITH BIOPSY;  Surgeon: Jeff Cunningham DO;  Location: [facility]     PHACOEMULSIFICATION WITH STANDARD INTRAOCULAR LENS IMPLANT Right 7/28/2021    Procedure: Right eye Cataract Extraction with Implant;  Surgeon: Reyes Valdez MD;  Location: [facility]     PHACOEMULSIFICATION WITH STANDARD INTRAOCULAR LENS IMPLANT Left 8/18/2021    Procedure: left eye Cataract Extraction with Implant;  Surgeon: Reyes Valdez MD;  Location: [facility]     SURGICAL HISTORY OF -       None     VASCULAR SURGERY  oct 2013    stent put in leg     FAMILY HISTORY:  Family History   Problem Relation Age of Onset     Cerebrovascular Disease Mother      Respiratory Father         emphysema     Cerebrovascular Disease Maternal Grandmother      Alzheimer Disease Maternal Grandfather      Breast Cancer Sister      Arthritis Other         hip fracture     Cancer - colorectal No family hx of      Prostate Cancer No family hx of      SOCIAL HISTORY:  Social History     Socioeconomic History     Marital status:      Spouse name: None     Number of children: None     Years of education: None     Highest education level: None   Tobacco Use     Smoking status: Every Day     Packs/day: 0.50     Years: 50.00     Pack years: 25.00      Types: Cigarettes     Start date: 1972     Last attempt to quit: 2022     Years since quittin.8     Smokeless tobacco: Never     Tobacco comments:     5-10 cigs daily 22   Vaping Use     Vaping Use: Never used   Substance and Sexual Activity     Alcohol use: Not Currently     Comment: 2 beers a week      Drug use: No     Sexual activity: Yes     Partners: Female     Birth control/protection: Condom   Other Topics Concern     Parent/sibling w/ CABG, MI or angioplasty before 65F 55M? No     Review of Systems:  Skin:        Eyes:       ENT:       Respiratory:  Positive for dyspnea on exertion;shortness of breath  Cardiovascular:  Negative for;palpitations;chest pain;edema;dizziness;lightheadedness;fatigue    Gastroenterology:      Genitourinary:       Musculoskeletal:       Neurologic:       Psychiatric:       Heme/Lymph/Imm:       Endocrine:          Physical Exam:  Vitals: /86 (BP Location: Right arm, Patient Position: Sitting, Cuff Size: Adult Large)   Pulse 85   Temp 97.3  F (36.3  C) (Tympanic)   Wt 122.3 kg (269 lb 9.3 oz)   SpO2 97%   BMI 43.51 kg/m     Wt Readings from Last 4 Encounters:   22 122.3 kg (269 lb 9.3 oz)   10/14/22 121.1 kg (266 lb 14.4 oz)   22 110.3 kg (243 lb 1.6 oz)   22 113.2 kg (249 lb 9.6 oz)     GEN: well nourished, in no acute distress.  HEENT:  Pupils equal, round. Sclerae nonicteric.   C/V:  Regular rate and rhythm, no murmur, rub or gallop.    RESP: Respirations are unlabored. Clear to auscultation bilaterally without wheezing, rales, or rhonchi.  GI: Abdomen soft, nontender.  EXTREM: 1+ LE edema.  NEURO: Alert and oriented, cooperative.  SKIN: Warm and dry.     Recent Lab Results:  LIPID RESULTS:  Lab Results   Component Value Date    CHOL 136 2022    CHOL 139 2020    HDL 39 (L) 2022    HDL 36 (L) 2020    LDL 80 2022    LDL 86 2020    TRIG 86 2022    TRIG 83 2020    CHOLHDLRATIO 3.0  07/24/2015     LIVER ENZYME RESULTS:  Lab Results   Component Value Date    AST 26 04/05/2022    AST 26 06/23/2021    ALT 77 (H) 04/05/2022    ALT 40 06/23/2021     CBC RESULTS:  Lab Results   Component Value Date    WBC 12.3 (H) 05/17/2022    WBC 12.4 (H) 07/01/2021    RBC 4.45 05/17/2022    RBC 4.36 (L) 07/01/2021    HGB 11.6 (L) 05/17/2022    HGB 14.1 07/01/2021    HCT 38.1 (L) 05/17/2022    HCT 42.9 07/01/2021    MCV 86 05/17/2022    MCV 98 07/01/2021    MCH 26.1 (L) 05/17/2022    MCH 32.3 07/01/2021    MCHC 30.4 (L) 05/17/2022    MCHC 32.9 07/01/2021    RDW 17.3 (H) 05/17/2022    RDW 13.7 07/01/2021     05/17/2022     07/01/2021     BMP RESULTS:  Lab Results   Component Value Date     05/17/2022     07/01/2021    POTASSIUM 5.1 05/17/2022    POTASSIUM 4.6 07/01/2021    CHLORIDE 101 05/17/2022    CHLORIDE 105 07/01/2021    CO2 29 05/17/2022    CO2 27 07/01/2021    ANIONGAP 4 05/17/2022    ANIONGAP 6 07/01/2021     (H) 05/17/2022    GLC 85 07/01/2021    BUN 27 05/17/2022    BUN 13 07/01/2021    CR 1.30 (H) 05/17/2022    CR 1.09 07/01/2021    GFRESTIMATED 61 05/17/2022    GFRESTIMATED 70 07/01/2021    GFRESTBLACK 82 07/01/2021    SUE 9.2 05/17/2022    SUE 9.3 07/01/2021      A1C RESULTS:  Lab Results   Component Value Date    A1C 7.1 (H) 09/16/2022    A1C 6.4 (H) 03/25/2021     INR RESULTS:  Lab Results   Component Value Date    INR 1.31 (H) 03/24/2022    INR 1.41 (H) 03/24/2022    INR 0.94 09/13/2013    INR 0.94 10/27/2008       Tammy Hernandez PA-C  [facility]    Thank you for allowing me to participate in the care of your patient.      Sincerely,     Tammy Hernandez PA-C     [facility]  cc:   Parveen Ramirez MD  [address]  [place]  [place] [address]

## 2022-12-05 ENCOUNTER — TELEPHONE (OUTPATIENT)
Dept: CARDIOLOGY | Facility: CLINIC | Age: 67
End: 2022-12-05

## 2022-12-05 NOTE — TELEPHONE ENCOUNTER
Attempted to reach pt, no answer, left message to return call to discuss.     Ariana Archibald RN        Hi! I met Flex today who had increasing shortness of breath. I restarted him on lasix. Can you call him in a week to see how he's feeling and let me know. THanks! Radha

## 2022-12-07 NOTE — TELEPHONE ENCOUNTER
Called and spoke with pt. He states his breathing is a little better, wt is about the same and denies any swelling. Overall he states he feels a little better.     Ariana Archibald RN

## 2022-12-26 DIAGNOSIS — Z95.1 S/P CABG (CORONARY ARTERY BYPASS GRAFT): ICD-10-CM

## 2022-12-26 RX ORDER — FUROSEMIDE 40 MG
20 TABLET ORAL DAILY
Qty: 45 TABLET | Refills: 1 | Status: SHIPPED | OUTPATIENT
Start: 2022-12-26 | End: 2023-06-20

## 2022-12-26 NOTE — TELEPHONE ENCOUNTER
LF: 9/12/22  Qty: 30  Last Cardiology Visit: 11/28/22   Next Scheduled Cardiology Visit: 5/27/23

## 2022-12-26 NOTE — TELEPHONE ENCOUNTER
[facility] Cardiology Refill Guideline reviewed.  Medication meets criteria for refill. Refills sent. Emilia Araujo RN Cardiology December 26, 2022, 11:34 AM

## 2022-12-29 ENCOUNTER — TRANSFERRED RECORDS (OUTPATIENT)
Dept: HEALTH INFORMATION MANAGEMENT | Facility: CLINIC | Age: 67
End: 2022-12-29

## 2022-12-29 LAB — RETINOPATHY: NEGATIVE

## 2022-12-30 ENCOUNTER — ANESTHESIA EVENT (OUTPATIENT)
Dept: GASTROENTEROLOGY | Facility: CLINIC | Age: 67
End: 2022-12-30
Payer: COMMERCIAL

## 2022-12-30 RX ORDER — BISACODYL 5 MG
TABLET, DELAYED RELEASE (ENTERIC COATED) ORAL
Qty: 4 TABLET | Refills: 0 | Status: SHIPPED | OUTPATIENT
Start: 2022-12-30 | End: 2023-03-15

## 2022-12-30 ASSESSMENT — LIFESTYLE VARIABLES: TOBACCO_USE: 1

## 2022-12-30 NOTE — ANESTHESIA PREPROCEDURE EVALUATION
Anesthesia Pre-Procedure Evaluation    Patient: Roger Bonilla   MRN: [id number] : 1955        Procedure : Procedure(s):  COLONOSCOPY          Past Medical History:   Diagnosis Date     Acute kidney injury (H) 2021     Coronary artery disease may 1 2005    2 stents put in heart     Obese      Pure hypercholesterolemia      Sleep apnea      Type II or unspecified type diabetes mellitus without mention of complication, not stated as uncontrolled      Unspecified cardiovascular disease -    MI -- Angioplasty two stents RCA & OM2     Unspecified essential hypertension       Past Surgical History:   Procedure Laterality Date     BYPASS GRAFT ARTERY CORONARY N/A 3/24/2022    Procedure: CORONARY ARTERY BYPASS GRAFT x 3 (LIMA - LAD; SV - OM2; SV - PDA) WITH ENDOSCOPIC SAPHENOUS VEIN HARVEST ON BILATERAL LOWER EXTREMITY, AND ON CARDIOPULMONARY PUMP OXYGENATOR  (INTRAOPERATIVE TRANSESOPHAGEAL ECHOCARDIOGRAM BY ANESTHESIOLOGIST);  Surgeon: Glenna Watesr MD;  Location:  OR     CARDIAC SURGERY  may 1, 2005     COLONOSCOPY  2011    Procedure:COLONOSCOPY; Screening Colonoscopy; Surgeon:LUTHER FLORES; Location:[facility]     CV CORONARY ANGIOGRAM N/A 2021    Procedure: Coronary Angiogram;  Surgeon: Jonny Moore MD;  Location:  HEART CARDIAC CATH LAB     ENDARTERECTOMY CAROTID Right 2022    Procedure: Right carotid endarterectomy with ELECTROENCEPHALOGRAM(EEG);  Surgeon: Karma Adorno MD;  [facility]     ESOPHAGOSCOPY, GASTROSCOPY, DUODENOSCOPY (EGD), COMBINED N/A 2021    Procedure: ESOPHAGOGASTRODUODENOSCOPY, WITH BIOPSY;  Surgeon: Jeff Cunningham DO;  Location: [facility]     PHACOEMULSIFICATION WITH STANDARD INTRAOCULAR LENS IMPLANT Right 2021    Procedure: Right eye Cataract Extraction with Implant;  Surgeon: Reyes Valdez MD;  Location: [facility]     PHACOEMULSIFICATION WITH STANDARD INTRAOCULAR LENS IMPLANT Left 2021    Procedure: left  eye Cataract Extraction with Implant;  Surgeon: Reyes Valdez MD;  Location: [facility]     SURGICAL HISTORY OF -       None     VASCULAR SURGERY  oct 2013    stent put in leg      Allergies   Allergen Reactions     Nkda [No Known Drug Allergies]       Social History     Tobacco Use     Smoking status: Every Day     Packs/day: 0.50     Years: 50.00     Pack years: 25.00     Types: Cigarettes     Start date: 1972     Last attempt to quit: 2022     Years since quittin.9     Smokeless tobacco: Never     Tobacco comments:     5-10 cigs daily 22   Substance Use Topics     Alcohol use: Not Currently     Comment: 2 beers a week       Wt Readings from Last 1 Encounters:   22 122.3 kg (269 lb 9.3 oz)        Anesthesia Evaluation   Pt has had prior anesthetic. Type: General and MAC.    No history of anesthetic complications       ROS/MED HX  ENT/Pulmonary:     (+) sleep apnea, doesn't use CPAP, tobacco use, Current use, 25  Pack-Year Hx,      Neurologic:  - neg neurologic ROS   (+) peripheral neuropathy, - right side face numbness post carotid endarterectomy.     Cardiovascular: Comment: Currently electively limiting exertion  Subtotal occlusion of LCx stent   of RCA  AAA 3.3 cm, following    (+) Dyslipidemia hypertension-Peripheral Vascular Disease-- Non Symptomatic and Carotid Stenosis. CAD angina-with excertion. past MI CABG-date: 3/24/2022. stent-Drug Eluting Stent. Taking blood thinners Pt has received instructions: Previous cardiac testing   Echo: Date:  Results:    Interpretation Summary     1. Left ventricular systolic function is normal. The visual ejection fraction  is 60-65%.  2. No regional wall motion abnormalities noted.  3. The right ventricle is normal in structure, function and size.  4. No evidence for significant valvular pathology.  5. The ascending aorta is mildly dilated, 3.8 cm.  ______________________________________________________________________________  Left  Ventricle  The left ventricle is normal in size. There is mild concentric left  ventricular hypertrophy. Left ventricular systolic function is normal. The  visual ejection fraction is 60-65%. Grade I or early diastolic dysfunction. No  regional wall motion abnormalities noted.     Right Ventricle  The right ventricle is normal in structure, function and size.     Atria  Normal left atrial size. Right atrial size is normal. There is no color  Doppler evidence of an atrial shunt.     Mitral Valve  The mitral valve is normal in structure and function. There is trace mitral  regurgitation.     Tricuspid Valve  The tricuspid valve is normal in structure and function. There is trace  tricuspid regurgitation.     Aortic Valve  The aortic valve is trileaflet with aortic valve sclerosis.     Pulmonic Valve  The pulmonic valve is normal in structure and function.     Vessels  The ascending aorta is Mildly dilated. IVC diameter <2.1 cm collapsing >50%  with sniff suggests a normal RA pressure of 3 mmHg.     Pericardium  There is no pericardial effusion.     Rhythm  Sinus rhythm was noted.    Stress Test: Date: Results:    ECG Reviewed: Date: 04/05/2022 Results:  Sinus  Rhythm   -Incomplete right bundle branch block.    -  Nonspecific T-abnormality.   Cath:  Date: 12/21 Results:       -LM: Mild disease  -LAD: There is a 40-50% proximal-mid LAD stenosis at the takeoff of a large first diagonal branch.  This is not a critical stenosis, but given its location and the feeding of collaterals to the RCA, this is almost certainly flow-limiting.  -LCx: Previously placed OM2 stent has a 90% focal stenosis.  -RCA: There is a  of the mid-distal RCA with left to right collaterals.          Plan    -Remove radial band per protocol; wrist precautions  -Case was discussed with patient's primary cardiologist, Dr. Ramirez.  Given lack of critical, high risk lesions, as well as minimal cardiac symptoms (exertional chest discomfort only  occasionally with severe exertion), we agree that medical management would be most appropriate for now.  Patient's mobility is currently         METS/Exercise Tolerance: 3 - Able to walk 1-2 blocks without stopping    Hematologic:     (+) anemia,     Musculoskeletal:   (+) arthritis,     GI/Hepatic:     (+) GERD, Asymptomatic on medication,     Renal/Genitourinary:     (+) renal disease, type: CRI,     Endo: Comment: Morbid obesity    (+) type II DM, Last HgA1c: 7.1, date: 9/16/2022, Not using insulin, - not using insulin pump. Obesity,     Psychiatric/Substance Use:  - neg psychiatric ROS     Infectious Disease:       Malignancy:  - neg malignancy ROS     Other:            Physical Exam    Airway        Mallampati: III   TM distance: > 3 FB   Neck ROM: full   Mouth opening: > 3 cm    Respiratory Devices and Support         Dental  no notable dental history         Cardiovascular   cardiovascular exam normal          Pulmonary   pulmonary exam normal                OUTSIDE LABS:  CBC:   Lab Results   Component Value Date    WBC 12.3 (H) 05/17/2022    WBC 19.5 (H) 04/05/2022    HGB 11.6 (L) 05/17/2022    HGB 8.9 (L) 04/05/2022    HCT 38.1 (L) 05/17/2022    HCT 28.1 (L) 04/05/2022     05/17/2022     (H) 04/05/2022     BMP:   Lab Results   Component Value Date     05/17/2022     04/05/2022    POTASSIUM 5.1 05/17/2022    POTASSIUM 4.5 04/05/2022    CHLORIDE 101 05/17/2022    CHLORIDE 102 04/05/2022    CO2 29 05/17/2022    CO2 30 04/05/2022    BUN 27 05/17/2022    BUN 23 04/05/2022    CR 1.30 (H) 05/17/2022    CR 1.22 04/05/2022     (H) 05/17/2022     (H) 04/05/2022     COAGS:   Lab Results   Component Value Date    PTT 38 03/24/2022    INR 1.31 (H) 03/24/2022    FIBR 310 03/24/2022     POC:   Lab Results   Component Value Date     (H) 06/25/2021     HEPATIC:   Lab Results   Component Value Date    ALBUMIN 2.7 (L) 04/05/2022    PROTTOTAL 7.0 04/05/2022    ALT 77 (H)  04/05/2022    AST 26 04/05/2022    ALKPHOS 111 04/05/2022    BILITOTAL 0.9 04/05/2022     OTHER:   Lab Results   Component Value Date    PH 7.32 (L) 03/24/2022    LACT 0.9 03/24/2022    A1C 7.1 (H) 09/16/2022    SUE 9.2 05/17/2022    PHOS 3.5 03/30/2022    MAG 2.1 03/30/2022    LIPASE 156 07/19/2021    TSH 0.35 (L) 06/23/2021    T4 1.32 06/23/2021    SED 4 10/27/2008       Anesthesia Plan    ASA Status:  4   NPO Status:  NPO Appropriate    Anesthesia Type: General.     - Airway: Native airway      Maintenance: Balanced.        Consents    Anesthesia Plan(s) and associated risks, benefits, and realistic alternatives discussed. Questions answered and patient/representative(s) expressed understanding.     - Discussed: Risks, Benefits and Alternatives for BOTH SEDATION and the PROCEDURE were discussed     - Discussed with:  Patient, Spouse      - Extended Intubation/Ventilatory Support Discussed: No.      - Patient is DNR/DNI Status: No    Use of blood products discussed: No .     Postoperative Care            Comments:                SAL Richardson CRNA

## 2023-01-03 ENCOUNTER — ANESTHESIA (OUTPATIENT)
Dept: GASTROENTEROLOGY | Facility: CLINIC | Age: 68
End: 2023-01-03
Payer: COMMERCIAL

## 2023-01-03 ENCOUNTER — HOSPITAL ENCOUNTER (OUTPATIENT)
Facility: CLINIC | Age: 68
Discharge: HOME | End: 2023-01-03
Attending: SURGERY | Admitting: SURGERY
Payer: COMMERCIAL

## 2023-01-03 VITALS
RESPIRATION RATE: 12 BRPM | TEMPERATURE: 98.1 F | DIASTOLIC BLOOD PRESSURE: 95 MMHG | BODY MASS INDEX: 43.33 KG/M2 | SYSTOLIC BLOOD PRESSURE: 125 MMHG | OXYGEN SATURATION: 98 % | HEIGHT: 66 IN | WEIGHT: 269.62 LBS | HEART RATE: 117 BPM

## 2023-01-03 DIAGNOSIS — Z12.11 SPECIAL SCREENING FOR MALIGNANT NEOPLASMS, COLON: Primary | ICD-10-CM

## 2023-01-03 LAB
COLONOSCOPY: NORMAL
GLUCOSE BLDC GLUCOMTR-MCNC: 145 MG/DL (ref 70–99)

## 2023-01-03 PROCEDURE — 250N000011 HC RX IP 250 OP 636: Performed by: NURSE ANESTHETIST, CERTIFIED REGISTERED

## 2023-01-03 PROCEDURE — 88305 TISSUE EXAM BY PATHOLOGIST: CPT | Mod: TC | Performed by: SURGERY

## 2023-01-03 PROCEDURE — 45385 COLONOSCOPY W/LESION REMOVAL: CPT | Performed by: SURGERY

## 2023-01-03 PROCEDURE — 370N000017 HC ANESTHESIA TECHNICAL FEE, PER MIN: Performed by: SURGERY

## 2023-01-03 PROCEDURE — 45378 DIAGNOSTIC COLONOSCOPY: CPT | Performed by: SURGERY

## 2023-01-03 PROCEDURE — 258N000003 HC RX IP 258 OP 636: Performed by: SURGERY

## 2023-01-03 PROCEDURE — 250N000009 HC RX 250: Performed by: NURSE ANESTHETIST, CERTIFIED REGISTERED

## 2023-01-03 PROCEDURE — 82962 GLUCOSE BLOOD TEST: CPT

## 2023-01-03 RX ORDER — PROPOFOL 10 MG/ML
INJECTION, EMULSION INTRAVENOUS PRN
Status: DISCONTINUED | OUTPATIENT
Start: 2023-01-03 | End: 2023-01-03

## 2023-01-03 RX ORDER — SODIUM CHLORIDE, SODIUM LACTATE, POTASSIUM CHLORIDE, CALCIUM CHLORIDE 600; 310; 30; 20 MG/100ML; MG/100ML; MG/100ML; MG/100ML
INJECTION, SOLUTION INTRAVENOUS CONTINUOUS
Status: DISCONTINUED | OUTPATIENT
Start: 2023-01-03 | End: 2023-01-03 | Stop reason: HOSPADM

## 2023-01-03 RX ORDER — PROPOFOL 10 MG/ML
INJECTION, EMULSION INTRAVENOUS CONTINUOUS PRN
Status: DISCONTINUED | OUTPATIENT
Start: 2023-01-03 | End: 2023-01-03

## 2023-01-03 RX ORDER — GLYCOPYRROLATE 0.2 MG/ML
INJECTION, SOLUTION INTRAMUSCULAR; INTRAVENOUS PRN
Status: DISCONTINUED | OUTPATIENT
Start: 2023-01-03 | End: 2023-01-03

## 2023-01-03 RX ORDER — ONDANSETRON 2 MG/ML
4 INJECTION INTRAMUSCULAR; INTRAVENOUS EVERY 30 MIN PRN
Status: DISCONTINUED | OUTPATIENT
Start: 2023-01-03 | End: 2023-01-03 | Stop reason: HOSPADM

## 2023-01-03 RX ORDER — LIDOCAINE HYDROCHLORIDE 10 MG/ML
INJECTION, SOLUTION INFILTRATION; PERINEURAL PRN
Status: DISCONTINUED | OUTPATIENT
Start: 2023-01-03 | End: 2023-01-03

## 2023-01-03 RX ORDER — ONDANSETRON 4 MG/1
4 TABLET, ORALLY DISINTEGRATING ORAL EVERY 30 MIN PRN
Status: DISCONTINUED | OUTPATIENT
Start: 2023-01-03 | End: 2023-01-03 | Stop reason: HOSPADM

## 2023-01-03 RX ADMIN — PROPOFOL 200 MCG/KG/MIN: 10 INJECTION, EMULSION INTRAVENOUS at 13:22

## 2023-01-03 RX ADMIN — PROPOFOL 40 MG: 10 INJECTION, EMULSION INTRAVENOUS at 13:35

## 2023-01-03 RX ADMIN — GLYCOPYRROLATE 0.1 MG: 0.2 INJECTION, SOLUTION INTRAMUSCULAR; INTRAVENOUS at 13:22

## 2023-01-03 RX ADMIN — SODIUM CHLORIDE, POTASSIUM CHLORIDE, SODIUM LACTATE AND CALCIUM CHLORIDE: 600; 310; 30; 20 INJECTION, SOLUTION INTRAVENOUS at 11:39

## 2023-01-03 RX ADMIN — GLYCOPYRROLATE 0.1 MG: 0.2 INJECTION, SOLUTION INTRAMUSCULAR; INTRAVENOUS at 13:20

## 2023-01-03 RX ADMIN — PROPOFOL 40 MG: 10 INJECTION, EMULSION INTRAVENOUS at 13:32

## 2023-01-03 RX ADMIN — LIDOCAINE HYDROCHLORIDE 30 MG: 10 INJECTION, SOLUTION INFILTRATION; PERINEURAL at 13:23

## 2023-01-03 ASSESSMENT — ACTIVITIES OF DAILY LIVING (ADL)
ADLS_ACUITY_SCORE: 35
ADLS_ACUITY_SCORE: 35

## 2023-01-03 NOTE — LETTER
[facility]           [address] [facility]           [place] [address]           Tel [phone]  Roger SARGENT Ring  [address] [place] [id number]      January 5, 2023    Dear Roger,  This letter is to inform you of the results of your pathology report on your colonoscopy.  If you have questions please feel free to call:  Please call [phone], for  [facility] or  for [facility], to schedule a follow up appointment in 2 weeks.   Your pathology report was:  Showed an Adenomatous polyp. This is a benign (not cancerous) growth; however these can become cancer over time. This polyp is usually removed completely at the time of the biopsy. Because it is an Adenomatous polyp you do have a slight higher risk for colon cancer. This is why you will need a repeat colonoscopy in approximately 7 years.  If you do have further questions please don t hesitate to call the below number.    To make an appointment call:  Please call [phone], for  [facility] or  for [facility], to schedule a follow up appointment in 2 weeks.     Sincerely,     Angel Skinner M.D.  ___

## 2023-01-03 NOTE — ANESTHESIA CARE TRANSFER NOTE
Patient: Roger Bonilla    Procedure: Procedure(s):  COLONOSCOPY with polypectomy       Diagnosis: Positive colorectal cancer screening using Cologuard test [R19.5]  Diagnosis Additional Information: No value filed.    Anesthesia Type:   General     Note:    Oropharynx: oropharynx clear of all foreign objects and spontaneously breathing  Level of Consciousness: drowsy  Oxygen Supplementation: room air    Independent Airway: airway patency satisfactory and stable  Dentition: dentition unchanged  Vital Signs Stable: post-procedure vital signs reviewed and stable  Report to RN Given: handoff report given  Patient transferred to: Phase II    Handoff Report: Identifed the Patient, Identified the Reponsible Provider, Reviewed the pertinent medical history, Discussed the surgical course, Reviewed Intra-OP anesthesia mangement and issues during anesthesia, Set expectations for post-procedure period and Allowed opportunity for questions and acknowledgement of understanding      Vitals:  Vitals Value Taken Time   BP     Temp     Pulse     Resp     SpO2         Electronically Signed By: SAL Charles CRNA  January 3, 2023  1:42 PM

## 2023-01-03 NOTE — ANESTHESIA POSTPROCEDURE EVALUATION
Patient: Roger Bonilla    Procedure: Procedure(s):  COLONOSCOPY with polypectomy       Anesthesia Type:  General    Note:  Disposition: Outpatient   Postop Pain Control: Uneventful            Sign Out: Well controlled pain   PONV: No   Neuro/Psych: Uneventful            Sign Out: Acceptable/Baseline neuro status   Airway/Respiratory: Uneventful            Sign Out: Acceptable/Baseline resp. status   CV/Hemodynamics: Uneventful            Sign Out: Acceptable CV status; No obvious hypovolemia; No obvious fluid overload   Other NRE: NONE   DID A NON-ROUTINE EVENT OCCUR? No           Last vitals:  Vitals:    01/03/23 1108   BP: (!) 125/95   Pulse: 66   Resp: 18   Temp: 36.6  C (97.9  F)   SpO2: 96%       Electronically Signed By: SAL Charles CRNA  January 3, 2023  1:49 PM

## 2023-01-03 NOTE — H&P
ENDOSCOPY PRE-SEDATION H&P FOR OUTPATIENT PROCEDURES    Roger Bonilla  [id number]  1955    Procedure:   Colonoscopy possible biopsy, possible polypectomy, with MAC sedation.     Pre-procedure diagnosis: positive colo guard and last colonoscopy was 11 years ago and some bright red blood per rectum.     Past medical history:   Past Medical History:   Diagnosis Date     Acute kidney injury (H) 6/23/2021     Coronary artery disease may 1 2005    2 stents put in heart     Obese      Pure hypercholesterolemia      Sleep apnea      Type II or unspecified type diabetes mellitus without mention of complication, not stated as uncontrolled      Unspecified cardiovascular disease 5-2001    MI -- Angioplasty two stents RCA & OM2     Unspecified essential hypertension        Past surgical history:   Past Surgical History:   Procedure Laterality Date     BYPASS GRAFT ARTERY CORONARY N/A 3/24/2022    Procedure: CORONARY ARTERY BYPASS GRAFT x 3 (LIMA - LAD; SV - OM2; SV - PDA) WITH ENDOSCOPIC SAPHENOUS VEIN HARVEST ON BILATERAL LOWER EXTREMITY, AND ON CARDIOPULMONARY PUMP OXYGENATOR  (INTRAOPERATIVE TRANSESOPHAGEAL ECHOCARDIOGRAM BY ANESTHESIOLOGIST);  Surgeon: Glenna Waters MD;  Location:  OR     CARDIAC SURGERY  may 1, 2005     COLONOSCOPY  11/30/2011    Procedure:COLONOSCOPY; Screening Colonoscopy; Surgeon:LUTHER FLORES; Location:[facility]     CV CORONARY ANGIOGRAM N/A 12/29/2021    Procedure: Coronary Angiogram;  Surgeon: Jonny Moore MD;  Location:  HEART CARDIAC CATH LAB     ENDARTERECTOMY CAROTID Right 2/18/2022    Procedure: Right carotid endarterectomy with ELECTROENCEPHALOGRAM(EEG);  Surgeon: Karma Adorno MD;  Location: [facility] OR     ESOPHAGOSCOPY, GASTROSCOPY, DUODENOSCOPY (EGD), COMBINED N/A 7/21/2021    Procedure: ESOPHAGOGASTRODUODENOSCOPY, WITH BIOPSY;  Surgeon: Jeff Cunningham DO;  Location: [facility]     PHACOEMULSIFICATION WITH STANDARD INTRAOCULAR LENS IMPLANT Right 7/28/2021     Procedure: Right eye Cataract Extraction with Implant;  Surgeon: Reyes Valdez MD;  Location: [facility]     PHACOEMULSIFICATION WITH STANDARD INTRAOCULAR LENS IMPLANT Left 8/18/2021    Procedure: left eye Cataract Extraction with Implant;  Surgeon: Reyes Valdez MD;  Location: [facility]     SURGICAL HISTORY OF -       None     VASCULAR SURGERY  oct 2013    stent put in leg       Current Facility-Administered Medications   Medication     lactated ringers infusion       Allergies   Allergen Reactions     Nkda [No Known Drug Allergies]        History of Anesthesia/Sedation Problems: no    Physical Exam:    Mental status: alert  Heart: Normal  Lung: Normal  Assessment of patient's airway: Normal  Other as pertinent for procedure: None     ASA Score: See Provation note    Mallampati score:  II - Faucial pillars and soft palate may be seen, but uvula is masked by the base of the tongue    Assessment/Plan:     The patient is an appropriate candidate to receive sedation.    Informed consent was discussed with the patient/family, including the risks, benefits, potential complications and any alternative options associated with sedation.    Patient assessment completed just prior to sedation and while under constant observation by the provider. Condition determined to be adequate for proceeding with sedation.    The specific risks for the procedure were discussed with the patient at the time of informed consent and include but are not limited to perforation which could require surgery, missing significant neoplasm or lesion, hemorrhage and adverse sedative complication.      Angel Skinner MD

## 2023-01-05 PROBLEM — D36.9 TUBULAR ADENOMA: Status: ACTIVE | Noted: 2023-01-05

## 2023-01-05 LAB
PATH REPORT.COMMENTS IMP SPEC: NORMAL
PATH REPORT.COMMENTS IMP SPEC: NORMAL
PATH REPORT.FINAL DX SPEC: NORMAL
PATH REPORT.GROSS SPEC: NORMAL
PATH REPORT.MICROSCOPIC SPEC OTHER STN: NORMAL
PATH REPORT.RELEVANT HX SPEC: NORMAL
PHOTO IMAGE: NORMAL

## 2023-01-05 PROCEDURE — 88305 TISSUE EXAM BY PATHOLOGIST: CPT | Mod: 26 | Performed by: PATHOLOGY

## 2023-01-09 ENCOUNTER — HOSPITAL ENCOUNTER (OUTPATIENT)
Dept: CARDIOLOGY | Facility: CLINIC | Age: 68
Discharge: HOME | End: 2023-01-09
Attending: PHYSICIAN ASSISTANT | Admitting: PHYSICIAN ASSISTANT
Payer: COMMERCIAL

## 2023-01-09 DIAGNOSIS — R06.02 SHORTNESS OF BREATH: ICD-10-CM

## 2023-01-09 LAB — LVEF ECHO: NORMAL

## 2023-01-09 PROCEDURE — 93306 TTE W/DOPPLER COMPLETE: CPT

## 2023-01-09 PROCEDURE — 93306 TTE W/DOPPLER COMPLETE: CPT | Mod: 26 | Performed by: INTERNAL MEDICINE

## 2023-02-27 ENCOUNTER — TELEPHONE (OUTPATIENT)
Dept: FAMILY MEDICINE | Facility: CLINIC | Age: 68
End: 2023-02-27
Payer: COMMERCIAL

## 2023-02-27 DIAGNOSIS — E11.9 TYPE 2 DIABETES MELLITUS WITHOUT COMPLICATION, WITHOUT LONG-TERM CURRENT USE OF INSULIN (H): Primary | Chronic | ICD-10-CM

## 2023-02-27 NOTE — TELEPHONE ENCOUNTER
Patient is due for a diabetes follow up appointment with me.      Fasting labs due:  Lipid panel, A1C and Metabolic Panel    Orders were placed, please have lab work done before visit.      Please ask patient to bring in their glucometer so we can download the data.    Please call patient to schedule.  Maria De Jesus Hay, CNP

## 2023-02-28 NOTE — TELEPHONE ENCOUNTER
Patient Quality Outreach    Patient is due for the following:   Diabetes -  A1C, LDL (Fasting) and Diabetic Follow-Up Visit    Next Steps:   Patient was scheduled for lab and office visit     Type of outreach:    Phone, spoke to patient/parent. scheduled      Questions for provider review:    None     Maco Mackay, CMA

## 2023-03-15 ENCOUNTER — OFFICE VISIT (OUTPATIENT)
Dept: FAMILY MEDICINE | Facility: CLINIC | Age: 68
End: 2023-03-15
Payer: COMMERCIAL

## 2023-03-15 ENCOUNTER — LAB (OUTPATIENT)
Dept: LAB | Facility: CLINIC | Age: 68
End: 2023-03-15
Payer: COMMERCIAL

## 2023-03-15 VITALS
OXYGEN SATURATION: 95 % | HEIGHT: 66 IN | WEIGHT: 266 LBS | SYSTOLIC BLOOD PRESSURE: 124 MMHG | TEMPERATURE: 96.8 F | DIASTOLIC BLOOD PRESSURE: 84 MMHG | RESPIRATION RATE: 20 BRPM | BODY MASS INDEX: 42.75 KG/M2 | HEART RATE: 88 BPM

## 2023-03-15 DIAGNOSIS — E11.9 TYPE 2 DIABETES MELLITUS WITHOUT COMPLICATION, WITHOUT LONG-TERM CURRENT USE OF INSULIN (H): Chronic | ICD-10-CM

## 2023-03-15 DIAGNOSIS — I73.9 PERIPHERAL VASCULAR DISEASE (H): ICD-10-CM

## 2023-03-15 DIAGNOSIS — E66.01 MORBID OBESITY (H): ICD-10-CM

## 2023-03-15 DIAGNOSIS — E11.9 TYPE 2 DIABETES MELLITUS WITHOUT COMPLICATION, WITHOUT LONG-TERM CURRENT USE OF INSULIN (H): Primary | ICD-10-CM

## 2023-03-15 DIAGNOSIS — I10 BENIGN ESSENTIAL HYPERTENSION: ICD-10-CM

## 2023-03-15 DIAGNOSIS — L21.9 SEBORRHEIC DERMATITIS: ICD-10-CM

## 2023-03-15 DIAGNOSIS — I25.118 CORONARY ARTERY DISEASE OF NATIVE ARTERY OF NATIVE HEART WITH STABLE ANGINA PECTORIS (H): ICD-10-CM

## 2023-03-15 DIAGNOSIS — E78.5 HYPERLIPIDEMIA LDL GOAL <70: ICD-10-CM

## 2023-03-15 LAB
ANION GAP SERPL CALCULATED.3IONS-SCNC: 14 MMOL/L (ref 7–15)
BUN SERPL-MCNC: 18 MG/DL (ref 8–23)
CALCIUM SERPL-MCNC: 9.5 MG/DL (ref 8.8–10.2)
CHLORIDE SERPL-SCNC: 99 MMOL/L (ref 98–107)
CHOLEST SERPL-MCNC: 147 MG/DL
CREAT SERPL-MCNC: 1.03 MG/DL (ref 0.67–1.17)
DEPRECATED HCO3 PLAS-SCNC: 26 MMOL/L (ref 22–29)
GFR SERPL CREATININE-BSD FRML MDRD: 80 ML/MIN/1.73M2
GLUCOSE SERPL-MCNC: 164 MG/DL (ref 70–99)
HBA1C MFR BLD: 7.2 % (ref 0–5.6)
HDLC SERPL-MCNC: 33 MG/DL
LDLC SERPL CALC-MCNC: 83 MG/DL
NONHDLC SERPL-MCNC: 114 MG/DL
POTASSIUM SERPL-SCNC: 4.6 MMOL/L (ref 3.4–5.3)
SODIUM SERPL-SCNC: 139 MMOL/L (ref 136–145)
TRIGL SERPL-MCNC: 156 MG/DL

## 2023-03-15 PROCEDURE — 80048 BASIC METABOLIC PNL TOTAL CA: CPT

## 2023-03-15 PROCEDURE — 80061 LIPID PANEL: CPT

## 2023-03-15 PROCEDURE — 36415 COLL VENOUS BLD VENIPUNCTURE: CPT

## 2023-03-15 PROCEDURE — 99214 OFFICE O/P EST MOD 30 MIN: CPT | Performed by: NURSE PRACTITIONER

## 2023-03-15 PROCEDURE — 83036 HEMOGLOBIN GLYCOSYLATED A1C: CPT

## 2023-03-15 RX ORDER — ATORVASTATIN CALCIUM 80 MG/1
80 TABLET, FILM COATED ORAL DAILY
Qty: 90 TABLET | Refills: 3 | Status: SHIPPED | OUTPATIENT
Start: 2023-03-15 | End: 2024-04-15

## 2023-03-15 RX ORDER — KETOCONAZOLE 20 MG/ML
SHAMPOO TOPICAL
Qty: 120 ML | Refills: 1 | Status: SHIPPED | OUTPATIENT
Start: 2023-03-15 | End: 2023-07-07

## 2023-03-15 ASSESSMENT — PAIN SCALES - GENERAL: PAINLEVEL: NO PAIN (0)

## 2023-03-15 NOTE — PROGRESS NOTES
"  Assessment & Plan     Type 2 diabetes mellitus without complication, without long-term current use of insulin (H)  Well controlled.  - metFORMIN (GLUCOPHAGE) 1000 MG tablet; Take 1 Tablet BY MOUTH EVERY DAY    Coronary artery disease of native artery of native heart with stable angina pectoris (H)  Well controlled.  Encouraged smoking cessation  - atorvastatin (LIPITOR) 80 MG tablet; Take 1 tablet (80 mg) by mouth daily    Peripheral vascular disease (H)  Well controlled.  - atorvastatin (LIPITOR) 80 MG tablet; Take 1 tablet (80 mg) by mouth daily    Morbid obesity (H)  Encourage weight loss    Hyperlipidemia LDL goal <70  Well controlled.  - atorvastatin (LIPITOR) 80 MG tablet; Take 1 tablet (80 mg) by mouth daily    Benign essential hypertension  Well controlled    Seborrheic dermatitis  - ketoconazole (NIZORAL) 2 % external shampoo; Apply 5-10 mls. Leave on for 5 minutes and then rinse off. 2-3 times per week.             BMI:   Estimated body mass index is 42.93 kg/m  as calculated from the following:    Height as of this encounter: 1.676 m (5' 6\").    Weight as of this encounter: 120.7 kg (266 lb).           Return in about 6 months (around 9/15/2023) for Diabetes Recheck.    SAL Kc CNP  [facility] is a 67 year [facility], presenting for the following health issues:  Diabetes      History of Present Illness       Diabetes:   He presents for follow up of diabetes.  He is not checking blood glucose. He is concerned about other.  He is having weight gain.         Vascular Disease:  He presents for follow up of vascular disease.  He takes nitroglycerin occasionally. He takes daily aspirin.    He eats 2-3 servings of fruits and vegetables daily.He consumes 1 sweetened beverage(s) daily.He exercises with enough effort to increase his heart rate 10 to 19 minutes per day.  He exercises with enough effort to increase his heart rate 4 days per week.   He is " "taking medications regularly.       Hyperlipidemia Follow-Up      Are you regularly taking any medication or supplement to lower your cholesterol?   Yes- atorvastatin     Are you having muscle aches or other side effects that you think could be caused by your cholesterol lowering medication?  No    Hypertension Follow-up      Do you check your blood pressure regularly outside of the clinic? No     Are you following a low salt diet? No    Are your blood pressures ever more than 140 on the top number (systolic) OR more   than 90 on the bottom number (diastolic), for example 140/90? No      Weight:    Wt Readings from Last 4 Encounters:   03/15/23 120.7 kg (266 lb)   01/03/23 122.3 kg (269 lb 10 oz)   11/28/22 122.3 kg (269 lb 9.3 oz)   10/14/22 121.1 kg (266 lb 14.4 oz)     Itching scalp  Sometimes gets little pimple type spots that he pops    Review of Systems   Constitutional, HEENT, cardiovascular, pulmonary, GI, , musculoskeletal, neuro, skin, endocrine and psych systems are negative, except as otherwise noted.      Objective    /84 (Cuff Size: Adult Large)   Pulse 88   Temp 96.8  F (36  C) (Tympanic)   Resp 20   Ht 1.676 m (5' 6\")   Wt 120.7 kg (266 lb)   SpO2 95%   BMI 42.93 kg/m    Body mass index is 42.93 kg/m .  Physical Exam   GENERAL: healthy, alert and no distress  NECK: no adenopathy, no asymmetry, masses, or scars and thyroid normal to palpation  RESP: lungs clear to auscultation - no rales, rhonchi or wheezes  CV: regular rate and rhythm, normal S1 S2, no S3 or S4, no murmur, click or rub, no peripheral edema and peripheral pulses strong  MS: no gross musculoskeletal defects noted, no edema  SKIN: scalp - scaling patches    Results for orders placed or performed in visit on 03/15/23 (from the past 24 hour(s))   Hemoglobin A1c   Result Value Ref Range    Hemoglobin A1C 7.2 (H) 0.0 - 5.6 %                   "

## 2023-05-17 ENCOUNTER — OFFICE VISIT (OUTPATIENT)
Dept: CARDIOLOGY | Facility: CLINIC | Age: 68
End: 2023-05-17
Attending: PHYSICIAN ASSISTANT
Payer: COMMERCIAL

## 2023-05-17 VITALS
BODY MASS INDEX: 42.42 KG/M2 | WEIGHT: 262.8 LBS | OXYGEN SATURATION: 100 % | DIASTOLIC BLOOD PRESSURE: 87 MMHG | SYSTOLIC BLOOD PRESSURE: 131 MMHG | HEART RATE: 101 BPM

## 2023-05-17 DIAGNOSIS — Z95.1 S/P CABG (CORONARY ARTERY BYPASS GRAFT): ICD-10-CM

## 2023-05-17 DIAGNOSIS — E78.5 HYPERLIPIDEMIA LDL GOAL <70: Primary | ICD-10-CM

## 2023-05-17 DIAGNOSIS — R06.02 SHORTNESS OF BREATH: ICD-10-CM

## 2023-05-17 PROCEDURE — 99214 OFFICE O/P EST MOD 30 MIN: CPT | Performed by: INTERNAL MEDICINE

## 2023-05-17 NOTE — PROGRESS NOTES
CARDIOLOGY CLINIC CONSULTATION    PRIMARY CARE PHYSICIAN:  Mraia De Jesus Hay    Tests reviewed/interpreted independently in clinic today:   1. EKG: Normal sinus rhythm, incomplete RBBB, nonspecific ST changes  2. Echocardiogram: LV size and systolic function are normal, no significant valvular disease  3. Coronary angiogram: Stent in distal RCA and OM OM 2 are occluded.  90% proximal RCA, 50% proximal LAD disease.     The level of medical decision making during this visit was of moderate complexity.    HISTORY OF PRESENT ILLNESS:  Today, I had the pleasure of connecting with Roger Bonilla.  He is a pleasant 67-year-old gentleman with a past medical history of coronary disease s/p coronary bypass grafting, carotid stenosis s/p right CEA, hypertension, hyperlipidemia, sleep apnea and obesity who presents to the clinic for a follow-up visit.  He has been followed in clinic by myself and apical.  His last visit was 6 months ago.    Patient underwent  carotid endarterectomy and early 2022.  A few months later he underwent coronary bypass grafting (04/2022).  He received LIMA to LAD, SVG to RPDA, SVG to OM.  During his visit with Tammy in 11/2022 he was noted to be slightly volume overloaded for which dose of Lasix was uptitrated.  He tells me today that this is led to complete resolution of his symptoms.  No longer has lower extremity edema and shortness of breath is also significantly improved.  Weight has remained stable.  He takes half the dose of prescribed Lasix currently with good results.  Last echocardiogram showed normal ejection fraction and wall motion.    PAST MEDICAL HISTORY:  Past Medical History:   Diagnosis Date     Acute kidney injury (H) 6/23/2021     Coronary artery disease may 1 2005    2 stents put in heart     Obese      Pure hypercholesterolemia      Sleep apnea      Type II or unspecified type diabetes mellitus without mention of complication, not stated as uncontrolled      Unspecified  cardiovascular disease 5-2001    MI -- Angioplasty two stents RCA & OM2     Unspecified essential hypertension        MEDICATIONS:  Current Outpatient Medications   Medication     aspirin (ASA) 81 MG chewable tablet     atorvastatin (LIPITOR) 80 MG tablet     ezetimibe (ZETIA) 10 MG tablet     furosemide (LASIX) 40 MG tablet     metFORMIN (GLUCOPHAGE) 1000 MG tablet     metoprolol tartrate (LOPRESSOR) 100 MG tablet     omeprazole (PRILOSEC) 40 MG DR capsule     ketoconazole (NIZORAL) 2 % external shampoo     No current facility-administered medications for this visit.       ALLERGIES:  Allergies   Allergen Reactions     Nkda [No Known Drug Allergy]        SOCIAL HISTORY:  I have reviewed this patient's social history and updated it with pertinent information if needed. Roger Bonilla  reports that he has been smoking cigarettes. He started smoking about 51 years ago. He has a 25.00 pack-year smoking history. He has never used smokeless tobacco. He reports that he does not currently use alcohol. He reports that he does not use drugs.    FAMILY HISTORY:  I have reviewed this patient's family history and updated it with pertinent information if needed.   Family History   Problem Relation Age of Onset     Cerebrovascular Disease Mother      Respiratory Father         emphysema     Cerebrovascular Disease Maternal Grandmother      Alzheimer Disease Maternal Grandfather      Breast Cancer Sister      Arthritis Other         hip fracture     Cancer - colorectal No family hx of      Prostate Cancer No family hx of        REVIEW OF SYSTEMS:  Skin:        Eyes:       ENT:       Respiratory:  Negative    Cardiovascular:  Negative    Gastroenterology:      Genitourinary:       Musculoskeletal:       Neurologic:       Psychiatric:       Heme/Lymph/Imm:       Endocrine:           PHYSICAL EXAM:      BP: 131/87 Pulse: 101     SpO2: 100 %      Vital Signs with Ranges  Pulse:  [101] 101  BP: (131)/(87) 131/87  SpO2:  [100 %] 100  %  262 lbs 12.8 oz    Constitutional: alert, no distress  Respiratory: Good bilateral air entry  Cardiovascular: Normal S1-S2, no murmurs  GI: nondistended  Neuropsychiatric: appropriate affact    ASSESSMENT: Pertinent issues addressed/ reviewed during this cardiology visit    1. Coronary disease s/p coronary bypass 2022-LIMA to LAD, SVG to OM, SVG to RPDA  2. Carotid stenosis post endarterectomy in 2022  3. Essential hypertension  4. Hyperlipidemia  5. Obesity  6. Sleep apnea  7. Type 2 diabetes  8. Tobacco use    RECOMMENDATIONS:  1. It was a pleasure to see Elza in clinic today.  He is doing significantly better than at the time of his last visit.  He reports resolution of lower extremity edema and has cut back on Lasix as a result.  He also does not have exertional shortness of breath or chest tightness anymore.  He is going to continue all other cardiac medications unchanged.  I will have him come back and see us on a yearly basis going forward.  However if things change he can give us a call and we will see him sooner.  Since his LDL cholesterol is not at goal we will check fasting lipid profile before that visit and consider starting PCSK9 inhibitor if needed.  Until then he is going to continue maximum dose of Lipitor and Zetia.    No orders of the defined types were placed in this encounter.    It was a pleasure seeing this patient in clinic today. Please do not hesitate to contact me with any future questions.     Parveen YANEZ, FACC, FASE  Cardiology - [facility]  May 17, 2023    This note was completed in part using dictation via the Dragon voice recognition software. Some word and grammatical errors may occur and must be interpreted in the appropriate clinical context.  If there are any questions pertaining to this issue, please contact me for further clarification.

## 2023-05-17 NOTE — LETTER
5/17/2023    Maria De Jesus Hay, APRN CNP  [address] [facility] [address] [place] [address]    RE: Roger Bonilla       Dear Colleague,     [facility] the [facility]  CARDIOLOGY CLINIC CONSULTATION    PRIMARY CARE PHYSICIAN:  Maria De Jesus Hay    Tests reviewed/interpreted independently in clinic today:   EKG: Normal sinus rhythm, incomplete RBBB, nonspecific ST changes  Echocardiogram: LV size and systolic function are normal, no significant valvular disease  Coronary angiogram: Stent in distal RCA and OM OM 2 are occluded.  90% proximal RCA, 50% proximal LAD disease.     The level of medical decision making during this visit was of moderate complexity.    HISTORY OF PRESENT ILLNESS:  Today, I had the pleasure of connecting with Roger Bonilla.  He is a pleasant 67-year-old gentleman with a past medical history of coronary disease s/p coronary bypass grafting, carotid stenosis s/p right CEA, hypertension, hyperlipidemia, sleep apnea and obesity who presents to the clinic for a follow-up visit.  He has been followed in clinic by myself and apical.  His last visit was 6 months ago.    Patient underwent  carotid endarterectomy and early 2022.  A few months later he underwent coronary bypass grafting (04/2022).  He received LIMA to LAD, SVG to RPDA, SVG to OM.  During his visit with Tammy in 11/2022 he was noted to be slightly volume overloaded for which dose of Lasix was uptitrated.  He tells me today that this is led to complete resolution of his symptoms.  No longer has lower extremity edema and shortness of breath is also significantly improved.  Weight has remained stable.  He takes half the dose of prescribed Lasix currently with good results.  Last echocardiogram showed normal ejection fraction and wall motion.    PAST MEDICAL HISTORY:  Past Medical History:   Diagnosis Date    Acute kidney injury (H) 6/23/2021    Coronary artery disease may 1 2005    2 stents put in heart     Obese     Pure hypercholesterolemia     Sleep apnea     Type II or unspecified type diabetes mellitus without mention of complication, not stated as uncontrolled     Unspecified cardiovascular disease 5-2001    MI -- Angioplasty two stents RCA & OM2    Unspecified essential hypertension        MEDICATIONS:  Current Outpatient Medications   Medication    aspirin (ASA) 81 MG chewable tablet    atorvastatin (LIPITOR) 80 MG tablet    ezetimibe (ZETIA) 10 MG tablet    furosemide (LASIX) 40 MG tablet    metFORMIN (GLUCOPHAGE) 1000 MG tablet    metoprolol tartrate (LOPRESSOR) 100 MG tablet    omeprazole (PRILOSEC) 40 MG DR capsule    ketoconazole (NIZORAL) 2 % external shampoo     No current facility-administered medications for this visit.       ALLERGIES:  Allergies   Allergen Reactions    Nkda [No Known Drug Allergy]        SOCIAL HISTORY:  I have reviewed this patient's social history and updated it with pertinent information if needed. Roger Bonilla  reports that he has been smoking cigarettes. He started smoking about 51 years ago. He has a 25.00 pack-year smoking history. He has never used smokeless tobacco. He reports that he does not currently use alcohol. He reports that he does not use drugs.    FAMILY HISTORY:  I have reviewed this patient's family history and updated it with pertinent information if needed.   Family History   Problem Relation Age of Onset    Cerebrovascular Disease Mother     Respiratory Father         emphysema    Cerebrovascular Disease Maternal Grandmother     Alzheimer Disease Maternal Grandfather     Breast Cancer Sister     Arthritis Other         hip fracture    Cancer - colorectal No family hx of     Prostate Cancer No family hx of        REVIEW OF SYSTEMS:  Skin:        Eyes:       ENT:       Respiratory:  Negative    Cardiovascular:  Negative    Gastroenterology:      Genitourinary:       Musculoskeletal:       Neurologic:       Psychiatric:       Heme/Lymph/Imm:       Endocrine:            PHYSICAL EXAM:      BP: 131/87 Pulse: 101     SpO2: 100 %      Vital Signs with Ranges  Pulse:  [101] 101  BP: (131)/(87) 131/87  SpO2:  [100 %] 100 %  262 lbs 12.8 oz    Constitutional: alert, no distress  Respiratory: Good bilateral air entry  Cardiovascular: Normal S1-S2, no murmurs  GI: nondistended  Neuropsychiatric: appropriate affact    ASSESSMENT: Pertinent issues addressed/ reviewed during this cardiology visit    Coronary disease s/p coronary bypass 2022-LIMA to LAD, SVG to OM, SVG to RPDA  Carotid stenosis post endarterectomy in 2022  Essential hypertension  Hyperlipidemia  Obesity  Sleep apnea  Type 2 diabetes  Tobacco use    RECOMMENDATIONS:  It was a pleasure to see Elza in clinic today.  He is doing significantly better than at the time of his last visit.  He reports resolution of lower extremity edema and has cut back on Lasix as a result.  He also does not have exertional shortness of breath or chest tightness anymore.  He is going to continue all other cardiac medications unchanged.  I will have him come back and see us on a yearly basis going forward.  However if things change he can give us a call and we will see him sooner.  Since his LDL cholesterol is not at goal we will check fasting lipid profile before that visit and consider starting PCSK9 inhibitor if needed.  Until then he is going to continue maximum dose of Lipitor and Zetia.    No orders of the defined types were placed in this encounter.    It was a pleasure seeing this patient in clinic today. Please do not hesitate to contact me with any future questions.     Parveen YANEZ, FACC, FASE  Cardiology - [facility]  May 17, 2023    This note was completed in part using dictation via the Dragon voice recognition software. Some word and grammatical errors may occur and must be interpreted in the appropriate clinical context.  If there are any questions pertaining to this issue, please contact me for further  clarification.        Thank you for allowing me to participate in the care of your patient.      Sincerely,     Parveen Ramirez MD     [facility]  cc:   Tammy Hernandez PA-C  [address]  [place] [id number]

## 2023-05-26 ENCOUNTER — OFFICE VISIT (OUTPATIENT)
Dept: CARDIOLOGY | Facility: CLINIC | Age: 68
End: 2023-05-26
Payer: COMMERCIAL

## 2023-05-26 VITALS
BODY MASS INDEX: 42.11 KG/M2 | HEIGHT: 66 IN | WEIGHT: 262 LBS | DIASTOLIC BLOOD PRESSURE: 80 MMHG | SYSTOLIC BLOOD PRESSURE: 123 MMHG | HEART RATE: 88 BPM

## 2023-05-26 DIAGNOSIS — Z00.6 EXAMINATION OF PARTICIPANT IN CLINICAL TRIAL: Primary | ICD-10-CM

## 2023-05-26 PROCEDURE — 99207 PR NO CHARGE-RESEARCH SERVICE: CPT

## 2023-05-26 PROCEDURE — 36415 COLL VENOUS BLD VENIPUNCTURE: CPT

## 2023-05-26 NOTE — PROGRESS NOTES
A Double-blind, Randomized, Placebo-controlled, Multicenter Study Assessing the impact of Olpasiran on Major Cardiovascular Events in Patients with Atherosclerotic cardiovascular Disease and Elevated Lipoprotein (a)    Roger Bonilla was seen in clinic today for the screening visit.    Research nurse met with subject to discuss consent and participation in the above noted study.    The study discussion included the following:     Study purpose    Qualifications for participation    Length of study participation    Study procedures    Risks and side effects    Benefits (if any)    Voluntary nature of participation    Alternatives to participation    Confidentiality of records    Financial considerations     Subject asked questions and agreed that he received answers that satisfied him.    Consent form [Version 3 - Advarra version 18 Jan 2023] signed on 26 May 2023 . Patient verbalized understanding. A signed copy was given to the subject & forwarded to medical records. No study procedures were done prior to obtaining informed consent.      LUIS obtained.    Did Subject meet all inclusion criteria? Yes pending central lab results  Did Subject meet any Exclusion criteria? No pending central lab results    Central Labs obtained at 09.51  Serum Pregnancy for WOCBP NA        Current Outpatient Medications:      aspirin (ASA) 81 MG chewable tablet, 2 tablets (162 mg) by Oral or NG Tube route daily, Disp: 60 tablet, Rfl: 0     atorvastatin (LIPITOR) 80 MG tablet, Take 1 tablet (80 mg) by mouth daily, Disp: 90 tablet, Rfl: 3     ezetimibe (ZETIA) 10 MG tablet, Take 1 tablet (10 mg) by mouth daily, Disp: 90 tablet, Rfl: 3     furosemide (LASIX) 40 MG tablet, Take 0.5 tablets (20 mg) by mouth daily, Disp: 45 tablet, Rfl: 1     ketoconazole (NIZORAL) 2 % external shampoo, Apply 5-10 mls. Leave on for 5 minutes and then rinse off. 2-3 times per week. (Patient not taking: Reported on 5/17/2023), Disp: 120 mL, Rfl: 1     metFORMIN  "(GLUCOPHAGE) 1000 MG tablet, Take 1 Tablet BY MOUTH EVERY DAY, Disp: 90 tablet, Rfl: 3     metoprolol tartrate (LOPRESSOR) 100 MG tablet, Take 1 tablet (100 mg) by mouth 2 times daily, Disp: 180 tablet, Rfl: 3     omeprazole (PRILOSEC) 40 MG DR capsule, Take 1 capsule (40 mg) by mouth every morning, Disp: 90 capsule, Rfl: 3   /80 (BP Location: Right arm, Patient Position: Supine, Cuff Size: Adult Regular)   Pulse 88   Ht 1.676 m (5' 6\")   Wt 118.8 kg (262 lb)   BMI 42.29 kg/m      Hips Circumference: 45.5 inches  Waist Circumference: 50 inches       Demographics     [x]Not  or   [] or    []Not Reported     [] Unknown         Race:   [] or    []   []Black or    [] or Other   [x]White   []Other, specify      Will contact subject when central lab results are available.  Oumou Thompson RN    "

## 2023-05-26 NOTE — LETTER
5/26/2023    Maria De Jesus Hay, SAL CNP  [address] [facility] [address]    RE: Roger SARGENT Chad       Dear Colleague,     I had the pleasure of seeing Roger SARGENT Chad in the [facility].    A Double-blind, Randomized, Placebo-controlled, Multicenter Study Assessing the impact of Olpasiran on Major Cardiovascular Events in Patients with Atherosclerotic cardiovascular Disease and Elevated Lipoprotein (a)    Roger Bonilla was seen in clinic today for the screening visit.    Research nurse met with subject to discuss consent and participation in the above noted study.    The study discussion included the following:   Study purpose  Qualifications for participation  Length of study participation  Study procedures  Risks and side effects  Benefits (if any)  Voluntary nature of participation  Alternatives to participation  Confidentiality of records  Financial considerations     Subject asked questions and agreed that he received answers that satisfied him.    Consent form [Version 3 - Advarra version 18 Jan 2023] signed on 26 May 2023 . Patient verbalized understanding. A signed copy was given to the subject & forwarded to medical records. No study procedures were done prior to obtaining informed consent.      LUIS obtained.    Did Subject meet all inclusion criteria? Yes pending central lab results  Did Subject meet any Exclusion criteria? No pending central lab results    Central Labs obtained at 09.51  Serum Pregnancy for WOCBP NA        Current Outpatient Medications:     aspirin (ASA) 81 MG chewable tablet, 2 tablets (162 mg) by Oral or NG Tube route daily, Disp: 60 tablet, Rfl: 0    atorvastatin (LIPITOR) 80 MG tablet, Take 1 tablet (80 mg) by mouth daily, Disp: 90 tablet, Rfl: 3    ezetimibe (ZETIA) 10 MG tablet, Take 1 tablet (10 mg) by mouth daily, Disp: 90 tablet, Rfl: 3    furosemide (LASIX) 40 MG tablet, Take 0.5 tablets (20 mg) by mouth daily, Disp: 45 tablet, Rfl: 1    ketoconazole (NIZORAL) 2  "% external shampoo, Apply 5-10 mls. Leave on for 5 minutes and then rinse off. 2-3 times per week. (Patient not taking: Reported on 5/17/2023), Disp: 120 mL, Rfl: 1    metFORMIN (GLUCOPHAGE) 1000 MG tablet, Take 1 Tablet BY MOUTH EVERY DAY, Disp: 90 tablet, Rfl: 3    metoprolol tartrate (LOPRESSOR) 100 MG tablet, Take 1 tablet (100 mg) by mouth 2 times daily, Disp: 180 tablet, Rfl: 3    omeprazole (PRILOSEC) 40 MG DR capsule, Take 1 capsule (40 mg) by mouth every morning, Disp: 90 capsule, Rfl: 3   /80 (BP Location: Right arm, Patient Position: Supine, Cuff Size: Adult Regular)   Pulse 88   Ht 1.676 m (5' 6\")   Wt 118.8 kg (262 lb)   BMI 42.29 kg/m      Hips Circumference: 45.5 inches  Waist Circumference: 50 inches       Demographics     [x]Not  or   [] or    []Not Reported     [] Unknown         Race:   [] or    []   []Black or    [] or Other   [x]White   []Other, specify      Will contact subject when central lab results are available.  Oumou Thompson RN        Thank you for allowing me to participate in the care of your patient.      Sincerely,     Oumou Thompson RN     [facility]  cc:   No referring provider defined for this encounter.        "

## 2023-06-08 ENCOUNTER — TELEPHONE (OUTPATIENT)
Dept: VASCULAR SURGERY | Facility: CLINIC | Age: 68
End: 2023-06-08
Payer: COMMERCIAL

## 2023-06-08 NOTE — TELEPHONE ENCOUNTER
----- Message from Giselle Caal RN sent at 9/28/2022  4:17 PM CDT -----  Hello,    Please schedule this pt for a return/follow-up in person visit with [facility] in 1 year for CEA/PVD follow-up. Pt will need imaging prior to this appt. Imaging orders have been placed.    Thank you very much!    Giselle

## 2023-06-08 NOTE — TELEPHONE ENCOUNTER
The pt would like to do a virtual visit instead of an in person visit with the [facility].  Carlos Remy on 6/8/2023 at 10:46 AM     Sent Cubiez message   Carlos Remy on 5/8/2023 at 5:43 PM     Due Sept 2023

## 2023-06-20 ENCOUNTER — TRANSFERRED RECORDS (OUTPATIENT)
Dept: CARDIOLOGY | Facility: CLINIC | Age: 68
End: 2023-06-20
Payer: COMMERCIAL

## 2023-06-20 ENCOUNTER — DOCUMENTATION ONLY (OUTPATIENT)
Dept: CARDIOLOGY | Facility: CLINIC | Age: 68
End: 2023-06-20
Payer: COMMERCIAL

## 2023-06-20 DIAGNOSIS — Z00.6 EXAMINATION OF PARTICIPANT IN CLINICAL TRIAL: ICD-10-CM

## 2023-06-20 DIAGNOSIS — K21.9 GASTROESOPHAGEAL REFLUX DISEASE WITHOUT ESOPHAGITIS: ICD-10-CM

## 2023-06-20 DIAGNOSIS — Z95.1 S/P CABG (CORONARY ARTERY BYPASS GRAFT): ICD-10-CM

## 2023-06-20 DIAGNOSIS — Z95.1 S/P CABG (CORONARY ARTERY BYPASS GRAFT): Primary | ICD-10-CM

## 2023-06-20 PROCEDURE — 99207 PR NO CHARGE-RESEARCH SERVICE: CPT

## 2023-06-20 RX ORDER — FUROSEMIDE 40 MG
20 TABLET ORAL DAILY
Qty: 45 TABLET | Refills: 3 | Status: SHIPPED | OUTPATIENT
Start: 2023-06-20 | End: 2024-04-15

## 2023-06-20 RX ORDER — OMEPRAZOLE 40 MG/1
40 CAPSULE, DELAYED RELEASE ORAL EVERY MORNING
Qty: 90 CAPSULE | Refills: 2 | Status: SHIPPED | OUTPATIENT
Start: 2023-06-20 | End: 2023-10-03

## 2023-06-20 NOTE — PROGRESS NOTES
May 26 research labs not clinically significant, this includes low total cholesterol, low LDL, low APO A1, high calculated anion gap, high glucose, low lymphocyte, high neutrophil, high RDW, and high hemoglobin A1c.  LF

## 2023-06-20 NOTE — TELEPHONE ENCOUNTER
Last Office Visit: 5/26/23  Next Office Visit: 7/7/23  Last Fill Date: 3/31/23    Helen Francisco CMA 6/20/2023 1:08 PM

## 2023-06-20 NOTE — TELEPHONE ENCOUNTER
[facility] Cardiology Refill Guideline reviewed.  Medication meets criteria for refill.     Ariana Archibald RN

## 2023-06-20 NOTE — PROGRESS NOTES
A Double-blind, Randomized, Placebo-controlled, Multicenter Study Assessing the impact of Olpasiran on Major Cardiovascular Events in Patients with Atherosclerotic cardiovascular Disease and Elevated Lipoprotein (a)    Dr Tello to review      Olpasiran-CVOT INCLUSION/EXCLUSION CRITERIA     Criteria #   Inclusion Criteria: All must be answered  yes  to be eligible for study   Yes/No   101   Subject has provided informed consent prior to initiation of any study specific activities/procedures    Yes   102 Age 18 to 85 years (or locally applicable legal adult age, whichever is older) at signing of informed consent    Yes   103 Lp (a) ? 200 nmol/L during screening by central laboratory       Lp (a) to be assessed after > 2 weeks of stable, optimized lipid-lowering therapy    Yes   104 History of ASCVD as evidenced by history of either:    Myocardial infarction (MI) (presumed type 1 event due to plaque rupture)     and/or   Coronary revascularization by percutaneous coronary intervention (PCI) with stenting AND any of the following:    Age ? 65 years    Residual coronary artery stenosis >50% in any major vessel (including major branches)   Multivessel PCI (ie, ? 2 distinct vessels including branch arteries)   Symptomatic peripheral arterial disease with either (a) intermittent claudication with ankle-brachial index (MARGARITA) < 0.85, or (b) peripheral arterial revascularization or amputation due to atherosclerotic disease   Ischemic stroke (presumed atherosclerotic in origin)   Diabetes mellitus (type 1 or type 2)      Yes             EXCLUSION CRITERIA  Disease Related  201 Severe renal dysfunction, defined as an estimated glomerular filtration rate (eGFR) < 30 mL/min/1.73 m2 by central laboratory during screening    No   202 Active liver disease, known hepatitis, or any hepatic dysfunction, defined as aspartate aminotransferase (AST) or alanine aminotransferase (ALT) > 3 x upper limit of normal (ULN), or total bilirubin  (TBL) > 2 x ULN during screening    No   203 History of hemorrhagic stroke    No   204 History of major bleeding disorder (for example: hemophilia, von Willebrand disease, coagulation disorders, blood clotting disorders, clotting factor deficiencies, etc.)    No   205 Major cardiovascular event (eg, myocardial infarction, unstable angina, PCI, coronary artery bypass graft, or stroke) within 4 weeks prior to Lp(a) screening or during screening    No   206  Planned cardiac surgery or arterial revascularization    No     Other Medical Condition  207 Malignancy (except non-melanoma skin cancers, cervical in-situ carcinoma, breast ductal carcinoma in situ, or stage 1 prostate carcinoma) within the last 5 years prior to study day 1    No   208 Severe heart failure (New York Heart Association Functional Classification IV) or last known left ventricular ejection fraction < 30%    No   209 Uncontrolled or recurrent ventricular tachycardia in the past 3 months prior to study day 1    No   210 Atrial fibrillation or flutter not on therapeutic anticoagulation or having had placement of left atrial appendage closure device    No   211 Uncontrolled hypertension at screening, defined as systolic blood pressure of > 180 mmHg or diastolic blood pressure of >110 mmHg at rest despite antihypertensive therapy    No   212 Fasting triglycerides > 400 mg/dL (4.5 mmol/L) during screening    No   213 Poorly controlled diabetes mellitus (type 1 or type 2), defined as HbA1c > 10% by central laboratory at screening    No   214 Known major active infection, or a chronic disease or infection (eg, human immunodeficiency virus) that is not currently stable and appropriately managed in the judgment of the investigator    No     Prior/Concomitant Therapy  215 Current or planned lipoprotein apheresis or < 3 months since last apheresis treatment prior to study day 1    No   216 Subject has taken lomitapide in the last 12 months prior to study day 1     No   217 Changes in lipid-lowering therapy within 4 weeks prior to day 1 or between Lp(a) screening and day 1 (see Sections 6.1.6 and 6.7.2 for concomitant therapy requirements during study participation)    No   218 Previously received olpasiran or pelacarsen    No     Prior/Concurrent Clinical Study Experience  219 Currently receiving treatment in another investigational device or drug study, or less than 30 days since ending treatment on another investigational device or drug study(ies).  Other investigational procedures while participating in this study are excluded.   No         Other Exclusions  220 Female subjects of childbearing potential unwilling to use protocol specified method of contraception see Appendix 5 (Section 11.5) during treatment and for an additional 90 days after the last dose of investigational product.    No   221 Female subjects who are breastfeeding or who plan to breastfeed while on study through 90 days after the last dose of investigational product.    No   222 Female subjects planning to become pregnant while on study through 90 days after the last dose of investigational product.    No   223 Female subjects of childbearing potential with a positive pregnancy test assessed at day 1 by a highly sensitive urine or serum pregnancy test.    No   224 Subject has known sensitivity to any of the products to be administered during dosing.    No   225 Subject likely to not be available to complete all protocol-required study visits or procedures, and/or to comply with all required study procedures (eg, Clinical Outcome Assessments) to the best of the subject and investigator's knowledge.    No   226 History or evidence of any other clinically significant disorder, condition or disease (with the exception of those outlined above) that, in the opinion of the investigator or [facility] physician, if consulted, would pose a risk to subject safety or interfere with the study evaluation, procedures, or  completion.      No     I have reviewed Roger Bonilla's inclusions and exclusion criteria.   he has met all the inclusion criteria and none of the exclusion criteria.  They are ready to randomize in the Olpasiran CVOT ([facility] [id number]) trial.  Arcenio Tello MD

## 2023-06-22 NOTE — TELEPHONE ENCOUNTER
The pt is scheduled on 9/21 at [facility] for imaging anf on 9/28 for a telephone with the [place].  Carlos Remy on 6/22/2023 at 4:21 PM     The pt was on the phone during scheduling of the above appointments and agreed to the dates times and locations of the appointments.

## 2023-07-07 ENCOUNTER — OFFICE VISIT (OUTPATIENT)
Dept: CARDIOLOGY | Facility: CLINIC | Age: 68
End: 2023-07-07
Payer: COMMERCIAL

## 2023-07-07 VITALS
RESPIRATION RATE: 14 BRPM | WEIGHT: 257 LBS | BODY MASS INDEX: 41.48 KG/M2 | SYSTOLIC BLOOD PRESSURE: 106 MMHG | HEART RATE: 110 BPM | DIASTOLIC BLOOD PRESSURE: 62 MMHG

## 2023-07-07 VITALS
BODY MASS INDEX: 41.49 KG/M2 | SYSTOLIC BLOOD PRESSURE: 121 MMHG | WEIGHT: 257.06 LBS | DIASTOLIC BLOOD PRESSURE: 89 MMHG | HEART RATE: 98 BPM

## 2023-07-07 DIAGNOSIS — Z95.1 S/P CABG (CORONARY ARTERY BYPASS GRAFT): Primary | ICD-10-CM

## 2023-07-07 DIAGNOSIS — G47.33 OSA (OBSTRUCTIVE SLEEP APNEA): Chronic | ICD-10-CM

## 2023-07-07 DIAGNOSIS — I10 BENIGN ESSENTIAL HYPERTENSION: Primary | ICD-10-CM

## 2023-07-07 DIAGNOSIS — Z00.6 EXAMINATION OF PARTICIPANT IN CLINICAL TRIAL: ICD-10-CM

## 2023-07-07 DIAGNOSIS — E78.5 HYPERLIPIDEMIA LDL GOAL <70: ICD-10-CM

## 2023-07-07 DIAGNOSIS — I25.118 CORONARY ARTERY DISEASE OF NATIVE ARTERY OF NATIVE HEART WITH STABLE ANGINA PECTORIS (H): ICD-10-CM

## 2023-07-07 LAB
ATRIAL RATE - MUSE: 249 BPM
DIASTOLIC BLOOD PRESSURE - MUSE: NORMAL MMHG
INTERPRETATION ECG - MUSE: NORMAL
P AXIS - MUSE: 161 DEGREES
PR INTERVAL - MUSE: NORMAL MS
QRS DURATION - MUSE: 108 MS
QT - MUSE: 450 MS
QTC - MUSE: 571 MS
R AXIS - MUSE: 70 DEGREES
SYSTOLIC BLOOD PRESSURE - MUSE: NORMAL MMHG
T AXIS - MUSE: 97 DEGREES
VENTRICULAR RATE- MUSE: 97 BPM

## 2023-07-07 PROCEDURE — 93000 ELECTROCARDIOGRAM COMPLETE: CPT | Performed by: INTERNAL MEDICINE

## 2023-07-07 PROCEDURE — 99213 OFFICE O/P EST LOW 20 MIN: CPT | Performed by: NURSE PRACTITIONER

## 2023-07-07 PROCEDURE — 36415 COLL VENOUS BLD VENIPUNCTURE: CPT

## 2023-07-07 PROCEDURE — 99207 PR NO CHARGE-RESEARCH SERVICE: CPT

## 2023-07-07 NOTE — LETTER
7/7/2023    Maria De Jesus Hay, APRN CNP  [address] [facility] [place] [address]    RE: Roger Bonilla       Dear Colleague,     I had the pleasure of seeing Roger Bonilla in the [facility].  Assessment/Plan:     1.  Hypertension: Controlled.  Blood pressure 106/62   2.  CAD: Denies angina.  Status post CABG in 2022.  He continues aspirin, atorvastatin and Zetia.  3.  ABIGAIL: Does not wear CPAP.     No changes to his medications today. He will continue to follow-up with research per protocol for Olpasiran CVOT study.    Subjective:     Roger Bonilla is seen at [facility] today for Olpasiran OVOT study.  He has a past medical history significant for CAD, dyslipidemia, CABG, PAD, PVD, hypertension, ABIGAIL on CPAP, diabetes, tobacco abuse, obesity, right carotid endarterectomy.  He denies any symptoms today. He does have occasional lower extremity edema. Denies fatigue, lightheadedness, shortness of breath, dyspnea on exertion, orthopnea, PND, palpitations, chest pain, abdominal fullness/bloating and lower extremity edema.      Patient Active Problem List   Diagnosis    Disorder of bursae and tendons in shoulder region    PVD (peripheral vascular disease) (H)    Hyperlipidemia LDL goal <70    ABIGAIL (obstructive sleep apnea)    Morbid obesity (H)    Tobacco abuse    Benign essential hypertension    PAD (peripheral artery disease) (H)    Type 2 diabetes mellitus without complication (H)    Coronary artery disease of native artery of native heart with stable angina pectoris (H)    Atherosclerosis of both carotid arteries    Dehydration    Hypomagnesemia    Hypophosphatemia    AAA (abdominal aortic aneurysm) (H)    Epigastric pain    Status post coronary angiogram    Atherosclerosis of native coronary artery of native heart with stable angina pectoris (H)    S/P CABG (coronary artery bypass graft)    S/P carotid endarterectomy    Tubular adenoma       Past Medical History:   Diagnosis Date    Acute  kidney injury (H) 6/23/2021    Coronary artery disease may 1 2005    2 stents put in heart    Obese     Pure hypercholesterolemia     Sleep apnea     Type II or unspecified type diabetes mellitus without mention of complication, not stated as uncontrolled     Unspecified cardiovascular disease 5-2001    MI -- Angioplasty two stents RCA & OM2    Unspecified essential hypertension        Past Surgical History:   Procedure Laterality Date    BYPASS GRAFT ARTERY CORONARY N/A 3/24/2022    Procedure: CORONARY ARTERY BYPASS GRAFT x 3 (LIMA - LAD; SV - OM2; SV - PDA) WITH ENDOSCOPIC SAPHENOUS VEIN HARVEST ON BILATERAL LOWER EXTREMITY, AND ON CARDIOPULMONARY PUMP OXYGENATOR  (INTRAOPERATIVE TRANSESOPHAGEAL ECHOCARDIOGRAM BY ANESTHESIOLOGIST);  Surgeon: Glenna Waters MD;  Location:  OR    CARDIAC SURGERY  may 1, 2005    COLONOSCOPY  11/30/2011    Procedure:COLONOSCOPY; Screening Colonoscopy; Surgeon:LUTHER FLORES; Location:[facility]    COLONOSCOPY N/A 1/3/2023    Procedure: COLONOSCOPY with polypectomy;  Surgeon: Angel Skinner MD;  Location: [facility]    CV CORONARY ANGIOGRAM N/A 12/29/2021    Procedure: Coronary Angiogram;  Surgeon: Jonny Moore MD;  Location:  HEART CARDIAC CATH LAB    ENDARTERECTOMY CAROTID Right 2/18/2022    Procedure: Right carotid endarterectomy with ELECTROENCEPHALOGRAM(EEG);  Surgeon: Karma Adorno MD;  Location:  OR    ESOPHAGOSCOPY, GASTROSCOPY, DUODENOSCOPY (EGD), COMBINED N/A 7/21/2021    Procedure: ESOPHAGOGASTRODUODENOSCOPY, WITH BIOPSY;  Surgeon: Jeff Cunningham DO;  Location: [facility]    PHACOEMULSIFICATION WITH STANDARD INTRAOCULAR LENS IMPLANT Right 7/28/2021    Procedure: Right eye Cataract Extraction with Implant;  Surgeon: Reyes Valdez MD;  Location: [facility] OR    PHACOEMULSIFICATION WITH STANDARD INTRAOCULAR LENS IMPLANT Left 8/18/2021    Procedure: left eye Cataract Extraction with Implant;  Surgeon: Reyes Valdez MD;   Location: [facility] OR    SURGICAL HISTORY OF -       None    VASCULAR SURGERY  oct 2013    stent put in leg       Family History   Problem Relation Age of Onset    Cerebrovascular Disease Mother     Respiratory Father         emphysema    Cerebrovascular Disease Maternal Grandmother     Alzheimer Disease Maternal Grandfather     Breast Cancer Sister     Arthritis Other         hip fracture    Cancer - colorectal No family hx of     Prostate Cancer No family hx of        Social History     Socioeconomic History    Marital status:      Spouse name: Not on file    Number of children: Not on file    Years of education: Not on file    Highest education level: Not on file   Occupational History    Not on file   Tobacco Use    Smoking status: Every Day     Packs/day: 0.50     Years: 50.00     Pack years: 25.00     Types: Cigarettes     Start date: 1972     Last attempt to quit: 2022     Years since quittin.4    Smokeless tobacco: Never    Tobacco comments:     5-10 cigs daily 22   Vaping Use    Vaping Use: Never used   Substance and Sexual Activity    Alcohol use: Not Currently     Comment: 2 beers a week     Drug use: No    Sexual activity: Yes     Partners: Female     Birth control/protection: Condom   Other Topics Concern    Parent/sibling w/ CABG, MI or angioplasty before 65F 55M? No   Social History Narrative    Not on file     Social Determinants of Health     Financial Resource Strain: Not on file   Food Insecurity: Not on file   Transportation Needs: Not on file   Physical Activity: Not on file   Stress: Not on file   Social Connections: Not on file   Intimate Partner Violence: Not on file   Housing Stability: Not on file       Current Outpatient Medications   Medication Sig Dispense Refill    aspirin (ASA) 81 MG chewable tablet 2 tablets (162 mg) by Oral or NG Tube route daily 60 tablet 0    atorvastatin (LIPITOR) 80 MG tablet Take 1 tablet (80 mg) by mouth daily 90 tablet 3    ezetimibe (ZETIA)  10 MG tablet Take 1 tablet (10 mg) by mouth daily 90 tablet 3    furosemide (LASIX) 40 MG tablet Take 0.5 tablets (20 mg) by mouth daily 45 tablet 3    metFORMIN (GLUCOPHAGE) 1000 MG tablet Take 1 Tablet BY MOUTH EVERY DAY 90 tablet 3    metoprolol tartrate (LOPRESSOR) 100 MG tablet Take 1 tablet (100 mg) by mouth 2 times daily 180 tablet 3    omeprazole (PRILOSEC) 40 MG DR capsule Take 1 capsule (40 mg) by mouth every morning 90 capsule 2       Allergies   Allergen Reactions    Nkda [No Known Drug Allergy]        Objective:     /62 (BP Location: Right arm, Patient Position: Sitting, Cuff Size: Adult Large)   Pulse 110   Resp 14   Wt 116.6 kg (257 lb)   BMI 41.48 kg/m    Wt Readings from Last 3 Encounters:   07/07/23 116.6 kg (257 lb)   05/26/23 118.8 kg (262 lb)   05/17/23 119.2 kg (262 lb 12.8 oz)       General Appearance:   Alert, cooperative and in no acute distress.   HEENT:  No scleral icterus; the mucous membranes were pink and moist. Cervical lymph nodes nontender and nonpalpable.   Neck: JVP normal. No HJR.   Chest: The spine was straight. The chest was symmetric.   Lungs:   Respirations unlabored; the lungs are clear to auscultation.   Cardiovascular:   Regular rhythm. S1 and S2 without murmur, clicks or rubs. Radial, carotid and posterior tibial pulses are intact and symmetrical.  No carotid bruits noted   Abdomen:  Obese, soft, nontender, nondistended, bowel sounds present   Extremities: No cyanosis, clubbing, or edema.   Skin: No bruising or wounds   Neurologic: Mood and affect are appropriate.             Rima Encarnacion, RN, [facility] cardiology office      Thank you for allowing me to participate in the care of your patient.      Sincerely,   SAL Andrade [facility]  cc: No referring provider defined for this encounter.

## 2023-07-07 NOTE — LETTER
7/7/2023    Maria De Jesus Hay, APRN CNP  [address] [facility] [address]    RE: Roger SARGENT Chad       Dear Colleague,     I had the pleasure of seeing Roger Bonilla in the [facility].    A Double-blind, Randomized, Placebo-controlled, Multicenter Study Assessing the impact of Olpasiran on Major Cardiovascular Events in Patients with Atherosclerotic cardiovascular Disease and Elevated Lipoprotein (a)      Roger Bonilla was seen in clinic today for Visit Day 1    Inclusion/Exclusion Criteria reviewed, Patient is still eligible? Yes   Dr Tello confirmed eligibility prior to randomization.         Demographics     [x]Not  or   [] or    []Not Reported     [] Unknown         Race:   [] or    []   []Black or    [] or Other   [x]White   []Other, specify      Past Medical History:   Diagnosis Date    Acute kidney injury (H) 6/23/2021    Coronary artery disease may 1 2005    2 stents put in heart    Obese     Pure hypercholesterolemia     Sleep apnea     Type II or unspecified type diabetes mellitus without mention of complication, not stated as uncontrolled     Unspecified cardiovascular disease 5-2001    MI -- Angioplasty two stents RCA & OM2    Unspecified essential hypertension      Smoking Status- Smoking 1/2 pack daily for 50 years    ECG performed at 1040     Vital signs at 1044, subject supine  /89 (BP Location: Left arm, Patient Position: Supine, Cuff Size: Adult Large)   Pulse 98   Wt 116.6 kg (257 lb 0.9 oz)   BMI 41.49 kg/m       Physical Examination done by NP Rima Encarnacion, see documentation (Including Modified Ranking Scale if applicable)   Modified Ranking scale not applicable       Any Adverse events, Serious Adverse event, changes in medications,  (if any) listed below.        Did the patient have any of the following events (endpoints):   Did the patient have any of  the following events (endpoints):   [] Yes   [x]  No   Cardiovascular Death  [] Yes   [x]  No   Myocardial Infarction  [] Yes   [x]  No   Coronary revascularization  [] Yes   [x]  No   Any Coronary Artery Revascularization  [] Yes   [x]  No  Cerebrovascular Revascularization  [] Yes   [x]  No  Peripheral Artery Revascularization  [] Yes   [x]  No  Vascular amputation  [] Yes   [x]  No  Hospitalization for Unstable Angina  [] Yes   [x]  No  Stroke (Ischemic or Hemorrhagic) / Transient Ischemic Attack (TIA)  [] Yes   [x]  No  Deep Vein Thrombosis or Pulmonary Embolism  [] Yes   [x]  No  New onset or clinical deterioration of aortic stenosis  [] Yes   [x]  No  Limb Ischemia  [] Yes   [x]  No  New Onset Diabetes Mellitus   [] Yes   [x]  No  Hospitalization for Heart Failure         Central, Local labs  drawn? Yes Time 1015.    Post dose PK done at 1131      Urine Pregnancy test for WOCBP No         IP Lot Number: [id number]  IP Box Number: [id number]      Administered into RUQ of abdomen at 1025 by Research coordinator      Valarie Banda RN      Current Outpatient Medications:     aspirin (ASA) 81 MG chewable tablet, 2 tablets (162 mg) by Oral or NG Tube route daily, Disp: 60 tablet, Rfl: 0    atorvastatin (LIPITOR) 80 MG tablet, Take 1 tablet (80 mg) by mouth daily, Disp: 90 tablet, Rfl: 3    ezetimibe (ZETIA) 10 MG tablet, Take 1 tablet (10 mg) by mouth daily, Disp: 90 tablet, Rfl: 3    furosemide (LASIX) 40 MG tablet, Take 0.5 tablets (20 mg) by mouth daily, Disp: 45 tablet, Rfl: 3    metFORMIN (GLUCOPHAGE) 1000 MG tablet, Take 1 Tablet BY MOUTH EVERY DAY, Disp: 90 tablet, Rfl: 3    metoprolol tartrate (LOPRESSOR) 100 MG tablet, Take 1 tablet (100 mg) by mouth 2 times daily, Disp: 180 tablet, Rfl: 3    omeprazole (PRILOSEC) 40 MG DR capsule, Take 1 capsule (40 mg) by mouth every morning, Disp: 90 capsule, Rfl: 2          Thank you for allowing me to participate in the care of your patient.      Sincerely,     Valarie  WILLIAM Banda RN     [facility]  cc:   No referring provider defined for this encounter.

## 2023-07-07 NOTE — PROGRESS NOTES
Assessment/Plan:     1.  Hypertension: Controlled.  Blood pressure 106/62   2.  CAD: Denies angina.  Status post CABG in 2022.  He continues aspirin, atorvastatin and Zetia.  3.  ABIGAIL: Does not wear CPAP.     No changes to his medications today. He will continue to follow-up with research per protocol for Olpasiran CVOT study.    Subjective:     Roger Bonilla is seen at [facility] heart clinic today for Olpasiran OVOT study.  He has a past medical history significant for CAD, dyslipidemia, CABG, PAD, PVD, hypertension, ABIGAIL on CPAP, diabetes, tobacco abuse, obesity, right carotid endarterectomy.  He denies any symptoms today. He does have occasional lower extremity edema. Denies fatigue, lightheadedness, shortness of breath, dyspnea on exertion, orthopnea, PND, palpitations, chest pain, abdominal fullness/bloating and lower extremity edema.      Patient Active Problem List   Diagnosis     Disorder of bursae and tendons in shoulder region     PVD (peripheral vascular disease) (H)     Hyperlipidemia LDL goal <70     ABIGAIL (obstructive sleep apnea)     Morbid obesity (H)     Tobacco abuse     Benign essential hypertension     PAD (peripheral artery disease) (H)     Type 2 diabetes mellitus without complication (H)     Coronary artery disease of native artery of native heart with stable angina pectoris (H)     Atherosclerosis of both carotid arteries     Dehydration     Hypomagnesemia     Hypophosphatemia     AAA (abdominal aortic aneurysm) (H)     Epigastric pain     Status post coronary angiogram     Atherosclerosis of native coronary artery of native heart with stable angina pectoris (H)     S/P CABG (coronary artery bypass graft)     S/P carotid endarterectomy     Tubular adenoma       Past Medical History:   Diagnosis Date     Acute kidney injury (H) 6/23/2021     Coronary artery disease may 1 2005    2 stents put in heart     Obese      Pure hypercholesterolemia      Sleep apnea      Type II or unspecified type  diabetes mellitus without mention of complication, not stated as uncontrolled      Unspecified cardiovascular disease 5-2001    MI -- Angioplasty two stents RCA & OM2     Unspecified essential hypertension        Past Surgical History:   Procedure Laterality Date     BYPASS GRAFT ARTERY CORONARY N/A 3/24/2022    Procedure: CORONARY ARTERY BYPASS GRAFT x 3 (LIMA - LAD; SV - OM2; SV - PDA) WITH ENDOSCOPIC SAPHENOUS VEIN HARVEST ON BILATERAL LOWER EXTREMITY, AND ON CARDIOPULMONARY PUMP OXYGENATOR  (INTRAOPERATIVE TRANSESOPHAGEAL ECHOCARDIOGRAM BY ANESTHESIOLOGIST);  Surgeon: Glenna Waters MD;  Location:  OR     CARDIAC SURGERY  may 1, 2005     COLONOSCOPY  11/30/2011    Procedure:COLONOSCOPY; Screening Colonoscopy; Surgeon:LUTHER FLORES; Location:[facility]     COLONOSCOPY N/A 1/3/2023    Procedure: COLONOSCOPY with polypectomy;  Surgeon: Angel Skinner MD;  Location: [facility]     CV CORONARY ANGIOGRAM N/A 12/29/2021    Procedure: Coronary Angiogram;  Surgeon: Jonny Moore MD;  Location:  HEART CARDIAC CATH LAB     ENDARTERECTOMY CAROTID Right 2/18/2022    Procedure: Right carotid endarterectomy with ELECTROENCEPHALOGRAM(EEG);  Surgeon: Karma Adorno MD;  [facility]     ESOPHAGOSCOPY, GASTROSCOPY, DUODENOSCOPY (EGD), COMBINED N/A 7/21/2021    Procedure: ESOPHAGOGASTRODUODENOSCOPY, WITH BIOPSY;  Surgeon: Jeff Cunningham DO;  Location: [facility]     PHACOEMULSIFICATION WITH STANDARD INTRAOCULAR LENS IMPLANT Right 7/28/2021    Procedure: Right eye Cataract Extraction with Implant;  Surgeon: Reyes Valdez MD;  Location: [facility]     PHACOEMULSIFICATION WITH STANDARD INTRAOCULAR LENS IMPLANT Left 8/18/2021    Procedure: left eye Cataract Extraction with Implant;  Surgeon: Reyes Valdez MD;  Location: [facility]     SURGICAL HISTORY OF -       None     VASCULAR SURGERY  oct 2013    stent put in leg       Family History   Problem Relation Age of Onset      Cerebrovascular Disease Mother      Respiratory Father         emphysema     Cerebrovascular Disease Maternal Grandmother      Alzheimer Disease Maternal Grandfather      Breast Cancer Sister      Arthritis Other         hip fracture     Cancer - colorectal No family hx of      Prostate Cancer No family hx of        Social History     Socioeconomic History     Marital status:      Spouse name: Not on file     Number of children: Not on file     Years of education: Not on file     Highest education level: Not on file   Occupational History     Not on file   Tobacco Use     Smoking status: Every Day     Packs/day: 0.50     Years: 50.00     Pack years: 25.00     Types: Cigarettes     Start date: 1972     Last attempt to quit: 2022     Years since quittin.4     Smokeless tobacco: Never     Tobacco comments:     5-10 cigs daily 22   Vaping Use     Vaping Use: Never used   Substance and Sexual Activity     Alcohol use: Not Currently     Comment: 2 beers a week      Drug use: No     Sexual activity: Yes     Partners: Female     Birth control/protection: Condom   Other Topics Concern     Parent/sibling w/ CABG, MI or angioplasty before 65F 55M? No   Social History Narrative     Not on file     Social Determinants of Health     Financial Resource Strain: Not on file   Food Insecurity: Not on file   Transportation Needs: Not on file   Physical Activity: Not on file   Stress: Not on file   Social Connections: Not on file   Intimate Partner Violence: Not on file   Housing Stability: Not on file       Current Outpatient Medications   Medication Sig Dispense Refill     aspirin (ASA) 81 MG chewable tablet 2 tablets (162 mg) by Oral or NG Tube route daily 60 tablet 0     atorvastatin (LIPITOR) 80 MG tablet Take 1 tablet (80 mg) by mouth daily 90 tablet 3     ezetimibe (ZETIA) 10 MG tablet Take 1 tablet (10 mg) by mouth daily 90 tablet 3     furosemide (LASIX) 40 MG tablet Take 0.5 tablets (20 mg) by mouth  daily 45 tablet 3     metFORMIN (GLUCOPHAGE) 1000 MG tablet Take 1 Tablet BY MOUTH EVERY DAY 90 tablet 3     metoprolol tartrate (LOPRESSOR) 100 MG tablet Take 1 tablet (100 mg) by mouth 2 times daily 180 tablet 3     omeprazole (PRILOSEC) 40 MG DR capsule Take 1 capsule (40 mg) by mouth every morning 90 capsule 2       Allergies   Allergen Reactions     Nkda [No Known Drug Allergy]        Objective:     /62 (BP Location: Right arm, Patient Position: Sitting, Cuff Size: Adult Large)   Pulse 110   Resp 14   Wt 116.6 kg (257 lb)   BMI 41.48 kg/m    Wt Readings from Last 3 Encounters:   07/07/23 116.6 kg (257 lb)   05/26/23 118.8 kg (262 lb)   05/17/23 119.2 kg (262 lb 12.8 oz)       General Appearance:   Alert, cooperative and in no acute distress.   HEENT:  No scleral icterus; the mucous membranes were pink and moist. Cervical lymph nodes nontender and nonpalpable.   Neck: JVP normal. No HJR.   Chest: The spine was straight. The chest was symmetric.   Lungs:   Respirations unlabored; the lungs are clear to auscultation.   Cardiovascular:   Regular rhythm. S1 and S2 without murmur, clicks or rubs. Radial, carotid and posterior tibial pulses are intact and symmetrical.  No carotid bruits noted   Abdomen:  Obese, soft, nontender, nondistended, bowel sounds present   Extremities: No cyanosis, clubbing, or edema.   Skin: No bruising or wounds   Neurologic: Mood and affect are appropriate.             Rima Encarnacion RN, [facility] cardiology office

## 2023-07-07 NOTE — PROGRESS NOTES
A Double-blind, Randomized, Placebo-controlled, Multicenter Study Assessing the impact of Olpasiran on Major Cardiovascular Events in Patients with Atherosclerotic cardiovascular Disease and Elevated Lipoprotein (a)      Roger Bonilla was seen in clinic today for Visit Day 1    Inclusion/Exclusion Criteria reviewed, Patient is still eligible? Yes   Dr Tello confirmed eligibility prior to randomization.         Demographics     [x]Not  or   [] or    []Not Reported     [] Unknown         Race:   [] or    []   []Black or    [] or Other   [x]White   []Other, specify      Past Medical History:   Diagnosis Date     Acute kidney injury (H) 6/23/2021     Coronary artery disease may 1 2005    2 stents put in heart     Obese      Pure hypercholesterolemia      Sleep apnea      Type II or unspecified type diabetes mellitus without mention of complication, not stated as uncontrolled      Unspecified cardiovascular disease 5-2001    MI -- Angioplasty two stents RCA & OM2     Unspecified essential hypertension      Smoking Status- Smoking 1/2 pack daily for 50 years    ECG performed at 1040     Vital signs at 1044, subject supine  /89 (BP Location: Left arm, Patient Position: Supine, Cuff Size: Adult Large)   Pulse 98   Wt 116.6 kg (257 lb 0.9 oz)   BMI 41.49 kg/m       Physical Examination done by NP Rima Encarnacion, see documentation (Including Modified Ranking Scale if applicable)   Modified Ranking scale not applicable       Any Adverse events, Serious Adverse event, changes in medications,  (if any) listed below.        Did the patient have any of the following events (endpoints):   Did the patient have any of the following events (endpoints):   [] Yes   [x]  No   Cardiovascular Death  [] Yes   [x]  No   Myocardial Infarction  [] Yes   [x]  No   Coronary revascularization  [] Yes   [x]  No   Any Coronary  Artery Revascularization  [] Yes   [x]  No  Cerebrovascular Revascularization  [] Yes   [x]  No  Peripheral Artery Revascularization  [] Yes   [x]  No  Vascular amputation  [] Yes   [x]  No  Hospitalization for Unstable Angina  [] Yes   [x]  No  Stroke (Ischemic or Hemorrhagic) / Transient Ischemic Attack (TIA)  [] Yes   [x]  No  Deep Vein Thrombosis or Pulmonary Embolism  [] Yes   [x]  No  New onset or clinical deterioration of aortic stenosis  [] Yes   [x]  No  Limb Ischemia  [] Yes   [x]  No  New Onset Diabetes Mellitus   [] Yes   [x]  No  Hospitalization for Heart Failure         Central, Local labs  drawn? Yes Time 1015.    Post dose PK done at 1131      Urine Pregnancy test for WOCBP No         IP Lot Number: [id number]  IP Box Number: [id number]      Administered into RUQ of abdomen at 1025 by Research coordinator      Valarie Banda RN      Current Outpatient Medications:      aspirin (ASA) 81 MG chewable tablet, 2 tablets (162 mg) by Oral or NG Tube route daily, Disp: 60 tablet, Rfl: 0     atorvastatin (LIPITOR) 80 MG tablet, Take 1 tablet (80 mg) by mouth daily, Disp: 90 tablet, Rfl: 3     ezetimibe (ZETIA) 10 MG tablet, Take 1 tablet (10 mg) by mouth daily, Disp: 90 tablet, Rfl: 3     furosemide (LASIX) 40 MG tablet, Take 0.5 tablets (20 mg) by mouth daily, Disp: 45 tablet, Rfl: 3     metFORMIN (GLUCOPHAGE) 1000 MG tablet, Take 1 Tablet BY MOUTH EVERY DAY, Disp: 90 tablet, Rfl: 3     metoprolol tartrate (LOPRESSOR) 100 MG tablet, Take 1 tablet (100 mg) by mouth 2 times daily, Disp: 180 tablet, Rfl: 3     omeprazole (PRILOSEC) 40 MG DR capsule, Take 1 capsule (40 mg) by mouth every morning, Disp: 90 capsule, Rfl: 2

## 2023-08-02 ENCOUNTER — OFFICE VISIT (OUTPATIENT)
Dept: CARDIOLOGY | Facility: CLINIC | Age: 68
End: 2023-08-02
Payer: COMMERCIAL

## 2023-08-02 VITALS
WEIGHT: 253 LBS | BODY MASS INDEX: 40.84 KG/M2 | SYSTOLIC BLOOD PRESSURE: 106 MMHG | HEART RATE: 68 BPM | RESPIRATION RATE: 16 BRPM | OXYGEN SATURATION: 94 % | DIASTOLIC BLOOD PRESSURE: 65 MMHG

## 2023-08-02 DIAGNOSIS — Z95.1 S/P CABG (CORONARY ARTERY BYPASS GRAFT): Primary | ICD-10-CM

## 2023-08-02 DIAGNOSIS — Z00.6 EXAMINATION OF PARTICIPANT IN CLINICAL TRIAL: ICD-10-CM

## 2023-08-02 PROCEDURE — 36415 COLL VENOUS BLD VENIPUNCTURE: CPT

## 2023-08-02 PROCEDURE — 99207 PR NO CHARGE-RESEARCH SERVICE: CPT

## 2023-08-02 NOTE — LETTER
2023    Maria De Jesus Hay, APRN CNP  [address] [facility] [address]    RE: Roger Bonilla         Dear Dr Maria De Jesus Hay  Re: Patient Name: Roger Bonilla (Spanky)  : 1955  Address: [address] [address]  OCEAN(a)-Outcomes: A Double-blind, Randomized, Placebo-controlled, Multicenter Study Assessing the Impact of Olpasiran on Major Cardiovascular Events in Patients with Atherosclerotic Cardiovascular Disease and Elevated Lipoprotein(a)         Your patient Roger Bonilla has consented to participate in the OCEAN(a)-Outcomes trial.     OCEAN(a)-Outcomes is a double-blinded, randomized, placebo-controlled phase 3 trial that is evaluating the efficacy and safety of olpasiran, an investigational therapy that reduces lipoprotein(a) [Lp(a)]. Your patient was selected as an appropriate candidate for this study because they have a history of atherosclerotic cardiovascular disease and very elevated Lp(a) concentrations. Although Lp(a) is a presumed risk factor for coronary heart disease, there are currently no approved therapies to lower it.     Patients enrolled in the study will be randomized to olpasiran versus placebo. Olpasiran is a small interfering RNA molecule (siRNA) designed to lower the body's production of apolipoprotein(a), a key component of Lp(a). Olpasiran is administered as a subcutaneous injection every 12 weeks. It will not replace any medications that your patient may now be taking. OCEAN(a)-Outcomes is expected to enroll 6,000 participants globally and will follow participants for approximately 4 years.     Study participants are to be on stable and optimized background lipid lowering therapy prior to randomization. Lipid-lowering therapies should ideally remain unchanged throughout the study unless a change is clinically necessary. Investigators involved with this trial will not receive results of your patient's Lp(a) during the course of the study to maintain the study blind, and  we ask that you not share any Lp(a) results (if obtained) with the patient. Additionally, as a further precaution investigators will not receive the lipid panel performed during the study. You may continue to conduct regular lipid testing as you consider indicated, but we also request that you not share these results with the study team or the patient, unless absolutely necessary.     Lastly, we value your partnership in this long-term study. To answer the scientific question, it is very important that patients remain on study medication and attend as many study visits as possible for the entire duration of the study. As the patient's physician, you may be contacted if the participating patient loses contact with the study team to help obtain follow-up information on clinical events.      If you have any questions or wish to alter this patient's lipid-lowering therapy, we ask that you contact us as soon as possible.       If you have any queries about the trial, please feel free to contact me by phone at [phone] or by email at [email or web address]       Additional information about the study is also available at [email or web address], www.ClinicalTrials.gov (Identifier: [id number]) or www.clinicaltrialsregister.eu.      I greatly appreciate your time and assistance with this important scientific endeavor.       Sincerely,             Arcenio Tello MD,   [facility]  Heart [facility] Research, [phone]                                         Dear Colleague,     I had the pleasure of seeing Roger Bonilla in the [facility].    A Double-blind, Randomized, Placebo-controlled, Multicenter Study Assessing the impact of Olpasiran on Major Cardiovascular Events in Patients with Atherosclerotic cardiovascular Disease and Elevated Lipoprotein (a)      Roger Bonilla was seen in clinic today for Visit Week 4      Any new Adverse events No,   Serious  Adverse event No   Changes in medications No  Any follow up previously reported AE/SAEs: No  If any yes,  listed below:      Did the patient have any of the following events (endpoints):   [] Yes   [x]  No   Cardiovascular Death  [] Yes   [x]  No   Myocardial Infarction  [] Yes   [x]  No   Coronary revascularization       Central, Local labs  drawn? Yes  Time 0952.        Urine Pregnancy test for WOCBP REJI Banda RN      Current Outpatient Medications:      aspirin (ASA) 81 MG chewable tablet, 2 tablets (162 mg) by Oral or NG Tube route daily, Disp: 60 tablet, Rfl: 0     atorvastatin (LIPITOR) 80 MG tablet, Take 1 tablet (80 mg) by mouth daily, Disp: 90 tablet, Rfl: 3     ezetimibe (ZETIA) 10 MG tablet, Take 1 tablet (10 mg) by mouth daily, Disp: 90 tablet, Rfl: 3     furosemide (LASIX) 40 MG tablet, Take 0.5 tablets (20 mg) by mouth daily, Disp: 45 tablet, Rfl: 3     metFORMIN (GLUCOPHAGE) 1000 MG tablet, Take 1 Tablet BY MOUTH EVERY DAY, Disp: 90 tablet, Rfl: 3     metoprolol tartrate (LOPRESSOR) 100 MG tablet, Take 1 tablet (100 mg) by mouth 2 times daily, Disp: 180 tablet, Rfl: 3     omeprazole (PRILOSEC) 40 MG DR capsule, Take 1 capsule (40 mg) by mouth every morning, Disp: 90 capsule, Rfl: 2             Research nurse met with subject to discuss re-consent and participation in the above noted study.    The study discussion included the following:   Study purpose  Qualifications for participation  Length of study participation  Study procedures  Risks and side effects  Benefits (if any)  Voluntary nature of participation  Alternatives to participation  Confidentiality of records  Financial considerations     Subject asked questions and agreed that he received answers that satisfied him.    Consent form [Version 5 [id number] - Advarra version [id number]] signed on 08/02/23   . Patient verbalized understanding. A signed copy was given to the subject & forwarded to medical records. No study  procedures were done prior to obtaining informed consent.            Past Medical History:   Diagnosis Date     AAA (abdominal aortic aneurysm) (H) 06/23/2021     Acute kidney injury (H) 06/23/2021     Acute superficial gastritis without hemorrhage 07/19/2021     Atherosclerosis of both carotid arteries 10/27/2008     Benign essential hypertension 11/18/2002     Coronary artery disease 05/01/2005    2 stents put in heart     Disorder of bursae and tendons in shoulder region 09/01/2005     Hyperlipidemia LDL goal <70 11/18/2002     Morbid obesity (H) 09/16/2013     NSTEMI (non-ST elevated myocardial infarction) (H) 05/2001     Obstructive Sleep Apnea 11/23/2011     Peripheral artery disease (H) 11/23/2012     Pure hypercholesterolemia 11/18/2002     Tobacco abuse 1973     Tubular adenoma 01/05/2023     Type II or unspecified type diabetes mellitus without mention of complication, not stated as uncontrolled 11/18/2002     Unspecified cardiovascular disease 05/2001    MI -- Angioplasty two stents RCA & OM2     Unspecified essential hypertension 11/18/2002              Thank you for allowing me to participate in the care of your patient.      Sincerely,     Valarie Banda RN     [facility]  cc:   No referring provider defined for this encounter.

## 2023-08-02 NOTE — PROGRESS NOTES
A Double-blind, Randomized, Placebo-controlled, Multicenter Study Assessing the impact of Olpasiran on Major Cardiovascular Events in Patients with Atherosclerotic cardiovascular Disease and Elevated Lipoprotein (a)      Roger Bonilla was seen in clinic today for Visit Week 4      Any new Adverse events No,   Serious Adverse event No   Changes in medications No  Any follow up previously reported AE/SAEs: No  If any yes,  listed below:      Did the patient have any of the following events (endpoints):   [] Yes   [x]  No   Cardiovascular Death  [] Yes   [x]  No   Myocardial Infarction  [] Yes   [x]  No   Coronary revascularization       Central, Local labs  drawn? Yes  Time 0952.        Urine Pregnancy test for WOCBP NA             Valarie Banda, RN      Current Outpatient Medications:     aspirin (ASA) 81 MG chewable tablet, 2 tablets (162 mg) by Oral or NG Tube route daily, Disp: 60 tablet, Rfl: 0    atorvastatin (LIPITOR) 80 MG tablet, Take 1 tablet (80 mg) by mouth daily, Disp: 90 tablet, Rfl: 3    ezetimibe (ZETIA) 10 MG tablet, Take 1 tablet (10 mg) by mouth daily, Disp: 90 tablet, Rfl: 3    furosemide (LASIX) 40 MG tablet, Take 0.5 tablets (20 mg) by mouth daily, Disp: 45 tablet, Rfl: 3    metFORMIN (GLUCOPHAGE) 1000 MG tablet, Take 1 Tablet BY MOUTH EVERY DAY, Disp: 90 tablet, Rfl: 3    metoprolol tartrate (LOPRESSOR) 100 MG tablet, Take 1 tablet (100 mg) by mouth 2 times daily, Disp: 180 tablet, Rfl: 3    omeprazole (PRILOSEC) 40 MG DR capsule, Take 1 capsule (40 mg) by mouth every morning, Disp: 90 capsule, Rfl: 2             Research nurse met with subject to discuss re-consent and participation in the above noted study.    The study discussion included the following:   Study purpose  Qualifications for participation  Length of study participation  Study procedures  Risks and side effects  Benefits (if any)  Voluntary nature of participation  Alternatives to participation  Confidentiality of  records  Financial considerations     Subject asked questions and agreed that he received answers that satisfied him.    Consent form [Version 5 [id number] - Advarra version [id number]] signed on 08/02/23   . Patient verbalized understanding. A signed copy was given to the subject & forwarded to medical records. No study procedures were done prior to obtaining informed consent.            Past Medical History:   Diagnosis Date    AAA (abdominal aortic aneurysm) (H) 06/23/2021    Acute kidney injury (H) 06/23/2021    Acute superficial gastritis without hemorrhage 07/19/2021    Atherosclerosis of both carotid arteries 10/27/2008    Benign essential hypertension 11/18/2002    Coronary artery disease 05/01/2005    2 stents put in heart    Disorder of bursae and tendons in shoulder region 09/01/2005    Hyperlipidemia LDL goal <70 11/18/2002    Morbid obesity (H) 09/16/2013    NSTEMI (non-ST elevated myocardial infarction) (H) 05/2001    Obstructive Sleep Apnea 11/23/2011    Peripheral artery disease (H) 11/23/2012    Pure hypercholesterolemia 11/18/2002    Tobacco abuse 1973    Tubular adenoma 01/05/2023    Type II or unspecified type diabetes mellitus without mention of complication, not stated as uncontrolled 11/18/2002    Unspecified cardiovascular disease 05/2001    MI -- Angioplasty two stents RCA & OM2    Unspecified essential hypertension 11/18/2002

## 2023-09-11 ENCOUNTER — TELEPHONE (OUTPATIENT)
Dept: FAMILY MEDICINE | Facility: CLINIC | Age: 68
End: 2023-09-11
Payer: COMMERCIAL

## 2023-09-11 DIAGNOSIS — E11.9 TYPE 2 DIABETES MELLITUS WITHOUT COMPLICATION, WITHOUT LONG-TERM CURRENT USE OF INSULIN (H): Primary | Chronic | ICD-10-CM

## 2023-09-14 ENCOUNTER — PATIENT OUTREACH (OUTPATIENT)
Dept: CARE COORDINATION | Facility: CLINIC | Age: 68
End: 2023-09-14
Payer: COMMERCIAL

## 2023-09-21 ENCOUNTER — HOSPITAL ENCOUNTER (OUTPATIENT)
Dept: ULTRASOUND IMAGING | Facility: CLINIC | Age: 68
Discharge: HOME | End: 2023-09-21
Attending: SURGERY
Payer: COMMERCIAL

## 2023-09-21 DIAGNOSIS — I65.23 ATHEROSCLEROSIS OF BOTH CAROTID ARTERIES: ICD-10-CM

## 2023-09-21 DIAGNOSIS — Z98.890 STATUS POST CAROTID ENDARTERECTOMY: ICD-10-CM

## 2023-09-21 DIAGNOSIS — I73.9 PERIPHERAL VASCULAR DISEASE (H): ICD-10-CM

## 2023-09-21 PROCEDURE — 93880 EXTRACRANIAL BILAT STUDY: CPT

## 2023-09-21 PROCEDURE — 93922 UPR/L XTREMITY ART 2 LEVELS: CPT

## 2023-09-26 ENCOUNTER — OFFICE VISIT (OUTPATIENT)
Dept: CARDIOLOGY | Facility: CLINIC | Age: 68
End: 2023-09-26
Payer: COMMERCIAL

## 2023-09-26 VITALS
SYSTOLIC BLOOD PRESSURE: 102 MMHG | WEIGHT: 249 LBS | HEART RATE: 70 BPM | RESPIRATION RATE: 18 BRPM | BODY MASS INDEX: 40.19 KG/M2 | DIASTOLIC BLOOD PRESSURE: 72 MMHG

## 2023-09-26 DIAGNOSIS — Z00.6 EXAMINATION OF PARTICIPANT IN CLINICAL TRIAL: ICD-10-CM

## 2023-09-26 DIAGNOSIS — Z95.1 S/P CABG (CORONARY ARTERY BYPASS GRAFT): Primary | ICD-10-CM

## 2023-09-26 PROCEDURE — 99207 PR NO CHARGE-RESEARCH SERVICE: CPT

## 2023-09-26 PROCEDURE — 36415 COLL VENOUS BLD VENIPUNCTURE: CPT

## 2023-09-26 NOTE — LETTER
9/26/2023    Maria De Jesus Hay, SAL CNP  [address] [address]    RE: Roger SARGENT Chad       Dear Colleague,     I had the pleasure of seeing Roger SARGENT Chad in the [facility].        A Double-blind, Randomized, Placebo-controlled, Multicenter Study Assessing the impact of Olpasiran on Major Cardiovascular Events in Patients with Atherosclerotic cardiovascular Disease and Elevated Lipoprotein (a)          Roger Bonilla was seen in clinic today for Visit week 12        Any Adverse events, Serious Adverse event, changes in medications,  (if any) listed below.        Did the patient have any of the following events (endpoints):     [] Yes   [x]  No   Cardiovascular Death  [] Yes   [x]  No   Myocardial Infarction  [] Yes   [x]  No   Coronary revascularization    /72 (BP Location: Right arm, Patient Position: Supine, Cuff Size: Adult Large)   Pulse 70   Resp 18   Wt 112.9 kg (249 lb)   BMI 40.19 kg/m         Central, Local labs  drawn? YesTime 1028.      PK collected post IP dose  Time 1138    Urine Pregnancy test for WOCBP No     IP Dispensed? Yes     IP Lot Number: [id number]  IP Box Number: [id number]  Administered into right abd at 1038 by subject /Research coordinator          A Double-blind, Randomized, Placebo-controlled, Multicenter Study Assessing the impact of Olpasiran on Major Cardiovascular Events in Patients with Atherosclerotic cardiovascular Disease and Elevated Lipoprotein (a)      Diagnosis/event description (diagnosis preferred):  Bilateral Eye Redness     Serious adverse event?   [] Yes   [x] No  []Results in death  []Is life threatening  []Requires or prolongs hospitalization  []Congenital anomaly or birth defect  []Persistent or significant disability/incapacity  []Other medically important serious event    Start date/Change date: 24 Jul 2023     Stop date: ongoing  Date Study team became aware of event: 26 Sep 2023  Intensity: ([x] mild /[]  moderate / []  severe)    CTCAE grade: [x] 1 [] 2 [] 3 [] 4 [] 5    Action taken with Study Drug  [] Drug withdrawn  [] Drug interrupted  [] Dose reduced  [x] Dose not changed  [] Dose increased  [] Not applicable  [] Unknown  Outcome:  [] recovered/resolved  [] recovering/resolving  [] recovered/resolved with sequelae  [x] not recovered/not resolved  [] fatal  [] unknown    Was treatment given for this event:  [] Yes   [x] No If yes, provide details     Were there other actions/treatments of Adverse Events? [] Yes   [x] No If yes, comment:    Additional comments: Subject reports continual redness to eyes. Redness is not bothersome. Subject plans to see PCP for further evaluation.        Outcome event?  [] Yes   [x] No    Dr. Tello: Reasonable possibility that AE is related to study treatment    [] Yes   [] No    Dr. Tello: Reasonable causal relationship to protocol required procedure(s)  [] Yes   [] No          Valarie Banda RN      Current Outpatient Medications:     aspirin (ASA) 81 MG chewable tablet, 2 tablets (162 mg) by Oral or NG Tube route daily, Disp: 60 tablet, Rfl: 0    atorvastatin (LIPITOR) 80 MG tablet, Take 1 tablet (80 mg) by mouth daily, Disp: 90 tablet, Rfl: 3    ezetimibe (ZETIA) 10 MG tablet, Take 1 tablet (10 mg) by mouth daily, Disp: 90 tablet, Rfl: 3    furosemide (LASIX) 40 MG tablet, Take 0.5 tablets (20 mg) by mouth daily, Disp: 45 tablet, Rfl: 3    metFORMIN (GLUCOPHAGE) 1000 MG tablet, Take 1 Tablet BY MOUTH EVERY DAY, Disp: 90 tablet, Rfl: 3    metoprolol tartrate (LOPRESSOR) 100 MG tablet, Take 1 tablet (100 mg) by mouth 2 times daily, Disp: 180 tablet, Rfl: 3    omeprazole (PRILOSEC) 40 MG DR capsule, Take 1 capsule (40 mg) by mouth every morning, Disp: 90 capsule, Rfl: 2       Thank you for allowing me to participate in the care of your patient.      Sincerely,     Valarie Banda RN     [facility]  cc:   No referring provider  defined for this encounter.

## 2023-09-26 NOTE — PROGRESS NOTES
A Double-blind, Randomized, Placebo-controlled, Multicenter Study Assessing the impact of Olpasiran on Major Cardiovascular Events in Patients with Atherosclerotic cardiovascular Disease and Elevated Lipoprotein (a)          Roger Bonilla was seen in clinic today for Visit week 12        Any Adverse events, Serious Adverse event, changes in medications,  (if any) listed below.        Did the patient have any of the following events (endpoints):     [] Yes   [x]  No   Cardiovascular Death  [] Yes   [x]  No   Myocardial Infarction  [] Yes   [x]  No   Coronary revascularization    /72 (BP Location: Right arm, Patient Position: Supine, Cuff Size: Adult Large)   Pulse 70   Resp 18   Wt 112.9 kg (249 lb)   BMI 40.19 kg/m         Central, Local labs  drawn? YesTime 1028.      PK collected post IP dose  Time 1138    Urine Pregnancy test for WOCBP No     IP Dispensed? Yes     IP Lot Number: [id number]  IP Box Number: [id number]  Administered into right abd at 1038 by subject /Research coordinator          A Double-blind, Randomized, Placebo-controlled, Multicenter Study Assessing the impact of Olpasiran on Major Cardiovascular Events in Patients with Atherosclerotic cardiovascular Disease and Elevated Lipoprotein (a)      Diagnosis/event description (diagnosis preferred):  Bilateral Eye Redness     Serious adverse event?   [] Yes   [x] No  []Results in death  []Is life threatening  []Requires or prolongs hospitalization  []Congenital anomaly or birth defect  []Persistent or significant disability/incapacity  []Other medically important serious event    Start date/Change date: 24 Jul 2023     Stop date: ongoing  Date Study team became aware of event: 26 Sep 2023  Intensity: ([x] mild /[]  moderate / [] severe)    CTCAE grade: [x] 1 [] 2 [] 3 [] 4 [] 5    Action taken with Study Drug  [] Drug withdrawn  [] Drug interrupted  [] Dose reduced  [x] Dose not changed  [] Dose increased  [] Not applicable  []  Unknown  Outcome:  [] recovered/resolved  [] recovering/resolving  [] recovered/resolved with sequelae  [x] not recovered/not resolved  [] fatal  [] unknown    Was treatment given for this event:  [] Yes   [x] No If yes, provide details     Were there other actions/treatments of Adverse Events? [] Yes   [x] No If yes, comment:    Additional comments: Subject reports continual redness to eyes. Redness is not bothersome. Subject plans to see PCP for further evaluation.        Outcome event?  [] Yes   [x] No    Dr. Tello: Reasonable possibility that AE is related to study treatment    [] Yes   [x] No    Dr. Tello: Reasonable causal relationship to protocol required procedure(s)  [] Yes   [x] No          Valarie Banda RN      Current Outpatient Medications:     aspirin (ASA) 81 MG chewable tablet, 2 tablets (162 mg) by Oral or NG Tube route daily, Disp: 60 tablet, Rfl: 0    atorvastatin (LIPITOR) 80 MG tablet, Take 1 tablet (80 mg) by mouth daily, Disp: 90 tablet, Rfl: 3    ezetimibe (ZETIA) 10 MG tablet, Take 1 tablet (10 mg) by mouth daily, Disp: 90 tablet, Rfl: 3    furosemide (LASIX) 40 MG tablet, Take 0.5 tablets (20 mg) by mouth daily, Disp: 45 tablet, Rfl: 3    metFORMIN (GLUCOPHAGE) 1000 MG tablet, Take 1 Tablet BY MOUTH EVERY DAY, Disp: 90 tablet, Rfl: 3    metoprolol tartrate (LOPRESSOR) 100 MG tablet, Take 1 tablet (100 mg) by mouth 2 times daily, Disp: 180 tablet, Rfl: 3    omeprazole (PRILOSEC) 40 MG DR capsule, Take 1 capsule (40 mg) by mouth every morning, Disp: 90 capsule, Rfl: 2

## 2023-09-28 ENCOUNTER — PATIENT OUTREACH (OUTPATIENT)
Dept: CARE COORDINATION | Facility: CLINIC | Age: 68
End: 2023-09-28

## 2023-10-02 ENCOUNTER — VIRTUAL VISIT (OUTPATIENT)
Dept: VASCULAR SURGERY | Facility: CLINIC | Age: 68
End: 2023-10-02
Payer: COMMERCIAL

## 2023-10-02 VITALS — SYSTOLIC BLOOD PRESSURE: 124 MMHG | DIASTOLIC BLOOD PRESSURE: 83 MMHG | WEIGHT: 250 LBS | BODY MASS INDEX: 40.35 KG/M2

## 2023-10-02 DIAGNOSIS — I73.9 PAD (PERIPHERAL ARTERY DISEASE) (H): ICD-10-CM

## 2023-10-02 DIAGNOSIS — Z98.890 S/P CAROTID ENDARTERECTOMY: Primary | ICD-10-CM

## 2023-10-02 DIAGNOSIS — I65.23 ATHEROSCLEROSIS OF BOTH CAROTID ARTERIES: ICD-10-CM

## 2023-10-02 PROCEDURE — 99215 OFFICE O/P EST HI 40 MIN: CPT | Mod: 95 | Performed by: NURSE PRACTITIONER

## 2023-10-02 ASSESSMENT — PAIN SCALES - GENERAL: PAINLEVEL: NO PAIN (0)

## 2023-10-02 NOTE — PROGRESS NOTES
VASCULAR SURGERY PROGRESS NOTE    LOCATION: [facility]     Roger Bonilla  Medical Record #:  [id number]  YOB: 1955  Age:  68 year old     Date of Service: 10/2/2023    PRIMARY CARE PROVIDER: Maria De Jesus Hay    Reason for visit: Annual imaging follow up PAD and s/p carotid endarterectomy     IMPRESSION / RECOMMENDATION:   Roger Bonilla is a 68-year-old gentleman with a history of right carotid endarterectomy completed in February, 2022 with Dr. Adorno. Carotid duplex demonstrates patent, functioning right carotid without evidence of restenosis, no significant stenosis on left.  His ABIs are slightly lower compared to imaging from 2 years ago, Right - PT 0.58, DP 0.51 and Left - PT 0.58, DP 0.60. Continues to smoke, has been smoking for 57 years, declined smoking cessation referral. Pending recheck of his hemoglobin A1c this week, denies wounds on bilateral lower extremities, without overt discoloration and with normal temperature.    Recommend:  -Continue best medical therapy with aspirin and statin  -Treadmill exercises 30 minutes/day, this will be very important for him given the ongoing smoking and diabetes, patient noted he has a treadmill at home.  -Follow-up in 12 months with carotid ultrasound and ABIs    All questions were answered and support provided. He has our contact information and knows to reach out with any additional questions or concerns.     Thank you for involving vascular surgery in the care of Roger Bonilla. Should any questions or concerns arise, please don't hesitate to contact us.    Opal Baird CNP  Vascular Surgery  Pager: [phone]  [email or web address]  Send message or 10 digit call back number Securely via [employer] with the Vocera Web Console (learn more here)    50 minutes spent on the date of the encounter doing chart review, review of outside records, review of test results, interpretation of tests, patient visit, documentation and discussion  Great curve and height. with other provider(s)          HPI:  Roger Bonilla is a 68 year old male with past medical history significant for right carotid stenosis status post CEA, HTN, HLD, obesity, sleep apnea, CAD s/p CABG 4/2022.  Denies strokelike symptoms, without one-sided weakness, without facial droop, without difficulty swallowing, patient also denies pain at rest, continues to have discomfort with walking 1-2 blocks, calves tighten up bilaterally.  Denies fever chills abdominal discomfort notes he is on aspirin 161 mg and Lipitor 80 mg daily.  He is pending hemoglobin A1c lab work tomorrow.  No other concerns.    REVIEW OF SYSTEMS:    A 12 point ROS was reviewed and is negative except for what is listed above in HPI.    PHH:    Past Medical History:   Diagnosis Date     AAA (abdominal aortic aneurysm) (H24) 06/23/2021     Acute kidney injury (H24) 06/23/2021     Acute superficial gastritis without hemorrhage 07/19/2021     Atherosclerosis of both carotid arteries 10/27/2008     Coronary artery disease 05/01/2005    2 stents put in heart     Disorder of bursae and tendons in shoulder region 09/01/2005     Hyperlipidemia LDL goal <70 11/18/2002     Morbid obesity (H) 09/16/2013     NSTEMI (non-ST elevated myocardial infarction) (H) 05/2001     Obstructive Sleep Apnea 11/23/2011     Peripheral artery disease (H24) 11/23/2012     Pure hypercholesterolemia 11/18/2002     Tobacco abuse 1973     Tubular adenoma 01/05/2023     Type II or unspecified type diabetes mellitus without mention of complication, not stated as uncontrolled 11/18/2002     Unspecified cardiovascular disease 05/2001    MI -- Angioplasty two stents RCA & OM2     Unspecified essential hypertension 11/18/2002          Past Surgical History:   Procedure Laterality Date     BYPASS GRAFT ARTERY CORONARY N/A 3/24/2022    Procedure: CORONARY ARTERY BYPASS GRAFT x 3 (LIMA - LAD; SV - OM2; SV - PDA) WITH ENDOSCOPIC SAPHENOUS VEIN HARVEST ON BILATERAL LOWER EXTREMITY, AND ON  CARDIOPULMONARY PUMP OXYGENATOR  (INTRAOPERATIVE TRANSESOPHAGEAL ECHOCARDIOGRAM BY ANESTHESIOLOGIST);  Surgeon: Glenna Waters MD;  Location:  OR     CARDIAC SURGERY  may 1, 2005     COLONOSCOPY  11/30/2011    Procedure:COLONOSCOPY; Screening Colonoscopy; Surgeon:LUTHER FLORES; Location:[facility]     COLONOSCOPY N/A 1/3/2023    Procedure: COLONOSCOPY with polypectomy;  Surgeon: Angel Skinner MD;  Location: [facility]     CV CORONARY ANGIOGRAM N/A 12/29/2021    Procedure: Coronary Angiogram;  Surgeon: Jonny Moore MD;  Location:  [facility]ARD[facility] LAB     ENDARTERECTOMY CAROTID Right 2/18/2022    Procedure: Right carotid endarterectomy with ELECTROENCEPHALOGRAM(EEG);  Surgeon: Karma Adorno MD;  [facility]     ESOPHAGOSCOPY, GASTROSCOPY, DUODENOSCOPY (EGD), COMBINED N/A 7/21/2021    Procedure: ESOPHAGOGASTRODUODENOSCOPY, WITH BIOPSY;  Surgeon: Jeff Cunningham DO;  Location: [facility]     PHACOEMULSIFICATION WITH STANDARD INTRAOCULAR LENS IMPLANT Right 7/28/2021    Procedure: Right eye Cataract Extraction with Implant;  Surgeon: Reyes Valdez MD;  Location: [facility]     PHACOEMULSIFICATION WITH STANDARD INTRAOCULAR LENS IMPLANT Left 8/18/2021    Procedure: left eye Cataract Extraction with Implant;  Surgeon: Reyes Valdez MD;  Location: [facility]     SURGICAL HISTORY OF -       None     VASCULAR SURGERY  oct 2013    stent put in leg       ALLERGIES:  Nkda [no known drug allergy]    MEDS:    Current Outpatient Medications:      aspirin (ASA) 81 MG chewable tablet, 2 tablets (162 mg) by Oral or NG Tube route daily, Disp: 60 tablet, Rfl: 0     atorvastatin (LIPITOR) 80 MG tablet, Take 1 tablet (80 mg) by mouth daily, Disp: 90 tablet, Rfl: 3     ezetimibe (ZETIA) 10 MG tablet, Take 1 tablet (10 mg) by mouth daily, Disp: 90 tablet, Rfl: 3     furosemide (LASIX) 40 MG tablet, Take 0.5 tablets (20 mg) by mouth daily, Disp: 45 tablet, Rfl: 3     metFORMIN (GLUCOPHAGE)  1000 MG tablet, Take 1 Tablet BY MOUTH EVERY DAY, Disp: 90 tablet, Rfl: 3     metoprolol tartrate (LOPRESSOR) 100 MG tablet, Take 1 tablet (100 mg) by mouth 2 times daily, Disp: 180 tablet, Rfl: 3     omeprazole (PRILOSEC) 40 MG DR capsule, Take 1 capsule (40 mg) by mouth every morning, Disp: 90 capsule, Rfl: 2    SOCIAL HABITS:    History   Smoking Status     Every Day     Packs/day: 0.50     Years: 50.00     Types: Cigarettes     Start date: 1/1/1972     Last attempt to quit: 2/4/2022   Smokeless Tobacco     Never     Social History    Substance and Sexual Activity      Alcohol use: Not Currently        Comment: 2 beers a week       History   Drug Use No       FAMILY HISTORY:    Family History   Problem Relation Age of Onset     Cerebrovascular Disease Mother      Respiratory Father         emphysema     Cerebrovascular Disease Maternal Grandmother      Alzheimer Disease Maternal Grandfather      Breast Cancer Sister      Arthritis Other         hip fracture     Cancer - colorectal No family hx of      Prostate Cancer No family hx of        PE:  /83   Wt 113.4 kg (250 lb)   BMI 40.35 kg/m    Wt Readings from Last 1 Encounters:   10/02/23 113.4 kg (250 lb)     Body mass index is 40.35 kg/m .    EXAM:  GENERAL: Healthy, alert and no distress  RESP: No audible wheeze, cough or increased work of breathing.    NEURO:  Mentation and speech appropriate for age.  PSYCH: Mentation appears normal, affect normal/bright, judgement and insight intact, normal speech.   Limited exam due to virtual telephone visit.  However conducted extensive ROS.    DIAGNOSTIC STUDIES:     Images:  US MARGARITA with PPG wo Exercise    Result Date: 9/21/2023  US MARGARITA WITH PPG W/O EXERCISE BILAT 9/21/2023 10:50 AM CLINICAL HISTORY: Peripheral vascular disease (H) COMPARISON: None. MARGARITA FINDINGS: RIGHT(mmHg) Brachial: 188 Ankle (PT): 109; Index 0.58 Ankle (DP): 96; Index 0.51 1st digit: 64 LEFT (mmHg) Brachial: 172 Ankle (PT): 109; Index 0.58  Ankle (DP): 112; Index 0.60 1st digit: 87 Biphasic waveforms within the left posterior tibial and right dorsalis pedis. Monophasic waveforms within the right posterior tibial and left dorsalis pedis. Monophasic waveforms with low amplitudes within the bilateral digits.     IMPRESSION: 1.  RIGHT LOWER EXTREMITY: MARGARITA is low consistent with moderate peripheral arterial disease. Low monophasic waveforms and decreased first digit pressures is concerning for low wound healing potential. 2.  LEFT LOWER EXTREMITY: MARGARITA is low consistent with moderate peripheral arterial disease. Low monophasic waveforms and decreased first digit pressures is concerning for low wound healing potential. MARGARITA CRITERIA: >1.4 NC 0.95-1.4 Normal 0.90 - 0.94 Mild 0.5 - 0.89 Moderate 0.2 - 0.49 Severe <0.2 Critical HOLGER HOGAN MD   SYSTEM ID:  [id number]     Carotid Bilateral    Result Date: 9/21/2023   CAROTID BILATERAL 9/21/2023 10:10 AM CLINICAL HISTORY: Status post carotid endarterectomy; Atherosclerosis of both carotid arteries TECHNIQUE: Duplex exam performed utilizing 2D gray-scale imaging, Doppler interrogation with color-flow and spectral waveform analysis. COMPARISON: 9/23/2022 FINDINGS: RIGHT: There is no significant atheromatous plaque. Normal waveforms with no significant stenosis. LEFT: There is mild atheromatous plaque. Normal waveforms with no significant stenosis. Both vertebral arteries and subclavian artery waveforms are normal. VELOCITY CHART: The following velocities were obtained in the RIGHT carotid system. CCA: 50 cm/s ICA: 58 cm/s ECA: 103 cm/s ICA/CCA: PS 1.2 The following velocities were obtained in the LEFT carotid system. CCA: 71 cm/s ICA: 73 cm/s ECA: 111 cm/s ICA/CCA: PS 1.0     IMPRESSION: 1. Mild atheromatous plaque in the carotid arteries. 2. No significant stenosis on either side. Evaluation based on velocities and NASCET criteria. REYNALDO NIXON MD   SYSTEM ID:  [id number]

## 2023-10-02 NOTE — NURSING NOTE
Is the patient currently in the state of [place]? YES    Visit mode:TELEPHONE    If the visit is dropped, the patient can be reconnected by: TELEPHONE VISIT: Phone number:   Telephone Information:   Mobile [phone]       Will anyone else be joining the visit? NO  (If patient encounters technical issues they should call [phone]    How would you like to obtain your AVS? MyChart    Are changes needed to the allergy or medication list? No    Reason for visit: ISRAEL CorreaF

## 2023-10-02 NOTE — PATIENT INSTRUCTIONS
Thank you so much for choosing us for your care. It was a pleasure to see you at the vascular clinic today.     Recommend:  -Continue best medical therapy with aspirin and statin  -Treadmill exercises 30 minutes/day, this will be very important for him given the ongoing smoking and diabetes, patient noted he has a treadmill at home.  -Follow-up in 12 months with carotid ultrasound and ABIs        Our scheduling team will get in touch with you to set up any follow-up testing/imaging and/or appointments. Please be aware that any testing/imaging recommended today will need to completed prior to your next visit with the provider. If testing/imaging is not completed prior to your next visit, your visit may be rescheduled.        If you have any questions, please contact our clinic directly at [phone] and ask for the nurse. We also encourage the use of [employer] to communicate with your HealthCare Provider.        If you have an urgent need after business hours (8:00 am to 4:30 pm) please call [phone], option 4, and ask for the vascular attending on call. For non-urgent after hours needs, please call the vascular clinic at [phone]. For scheduling needs, please call our clinic directly at [phone].

## 2023-10-02 NOTE — PROGRESS NOTES
Virtual Visit Details    Type of service:  Telephone Visit   Phone call duration: 20 minutes     [facility]      Type of Visit: Virtual: Telephone    Patient is here for a return visit to discuss  follow up  .       Vitals:  /83   Wt 113.4 kg (250 lb)   BMI 40.35 kg/m        Questions patient would like addressed today are: Pt states having red eyes     Refills are needed: No    How would you like to obtain your AVS? Jak Dixon MA

## 2023-10-02 NOTE — LETTER
10/2/2023       RE: Roger Bonilla  [address]  [facility] [address]     Dear Colleague,    Thank you for referring your patient, Roger Bonilla, to the [facility] [place] at [facility]. Please see a copy of my visit note below.    Virtual Visit Details    Type of service:  Telephone Visit   Phone call duration: 20 minutes     [facility]      Type of Visit: Virtual: Telephone    Patient is here for a return visit to discuss  follow up  .       Vitals:  /83   Wt 113.4 kg (250 lb)   BMI 40.35 kg/m        Questions patient would like addressed today are: Pt states having red eyes     Refills are needed: No    How would you like to obtain your AVS? Jak Dixon MA            VASCULAR SURGERY PROGRESS NOTE    LOCATION: [facility]     Roger Bonilla  Medical Record #:  [id number]  YOB: 1955  Age:  68 year old     Date of Service: 10/2/2023    PRIMARY CARE PROVIDER: Maria De Jesus Hay    Reason for visit: Annual imaging follow up PAD and s/p carotid endarterectomy     IMPRESSION / RECOMMENDATION:   Roger Bonilla is a 68-year-old gentleman with a history of right carotid endarterectomy completed in February, 2022 with Dr. Adorno. Carotid duplex demonstrates patent, functioning right carotid without evidence of restenosis, no significant stenosis on left.  His ABIs are slightly lower compared to imaging from 2 years ago, Right - PT 0.58, DP 0.51 and Left - PT 0.58, DP 0.60. Continues to smoke, has been smoking for 57 years, declined smoking cessation referral. Pending recheck of his hemoglobin A1c this week, denies wounds on bilateral lower extremities, without overt discoloration and with normal temperature.    Recommend:  -Continue best medical therapy with aspirin and statin  -Treadmill exercises 30 minutes/day, this will be very important for him given the ongoing smoking and diabetes,  patient noted he has a treadmill at home.  -Follow-up in 12 months with carotid ultrasound and ABIs    All questions were answered and support provided. He has our contact information and knows to reach out with any additional questions or concerns.     Thank you for involving vascular surgery in the care of Roger Bonilla. Should any questions or concerns arise, please don't hesitate to contact us.    Opal Baird CNP  Vascular Surgery  Pager: [phone]  [email or web address]  Send message or 10 digit call back number Securely via Personal with the Vocera Web Console (learn more here)    50 minutes spent on the date of the encounter doing chart review, review of outside records, review of test results, interpretation of tests, patient visit, documentation and discussion with other provider(s)          HPI:  Roger Bonilla is a 68 year old male with past medical history significant for right carotid stenosis status post CEA, HTN, HLD, obesity, sleep apnea, CAD s/p CABG 4/2022.  Denies strokelike symptoms, without one-sided weakness, without facial droop, without difficulty swallowing, patient also denies pain at rest, continues to have discomfort with walking 1-2 blocks, calves tighten up bilaterally.  Denies fever chills abdominal discomfort notes he is on aspirin 161 mg and Lipitor 80 mg daily.  He is pending hemoglobin A1c lab work tomorrow.  No other concerns.    REVIEW OF SYSTEMS:    A 12 point ROS was reviewed and is negative except for what is listed above in HPI.    PHH:    Past Medical History:   Diagnosis Date    AAA (abdominal aortic aneurysm) (H24) 06/23/2021    Acute kidney injury (H24) 06/23/2021    Acute superficial gastritis without hemorrhage 07/19/2021    Atherosclerosis of both carotid arteries 10/27/2008    Coronary artery disease 05/01/2005    2 stents put in heart    Disorder of bursae and tendons in shoulder region 09/01/2005    Hyperlipidemia LDL goal <70 11/18/2002    Morbid obesity (H)  09/16/2013    NSTEMI (non-ST elevated myocardial infarction) (H) 05/2001    Obstructive Sleep Apnea 11/23/2011    Peripheral artery disease (H24) 11/23/2012    Pure hypercholesterolemia 11/18/2002    Tobacco abuse 1973    Tubular adenoma 01/05/2023    Type II or unspecified type diabetes mellitus without mention of complication, not stated as uncontrolled 11/18/2002    Unspecified cardiovascular disease 05/2001    MI -- Angioplasty two stents RCA & OM2    Unspecified essential hypertension 11/18/2002          Past Surgical History:   Procedure Laterality Date    BYPASS GRAFT ARTERY CORONARY N/A 3/24/2022    Procedure: CORONARY ARTERY BYPASS GRAFT x 3 (LIMA - LAD; SV - OM2; SV - PDA) WITH ENDOSCOPIC SAPHENOUS VEIN HARVEST ON BILATERAL LOWER EXTREMITY, AND ON CARDIOPULMONARY PUMP OXYGENATOR  (INTRAOPERATIVE TRANSESOPHAGEAL ECHOCARDIOGRAM BY ANESTHESIOLOGIST);  Surgeon: Glenna Waters MD;  Location:  OR    CARDIAC SURGERY  may 1, 2005    COLONOSCOPY  11/30/2011    Procedure:COLONOSCOPY; Screening Colonoscopy; Surgeon:LUTHER FLORES; Location:[facility]    COLONOSCOPY N/A 1/3/2023    Procedure: COLONOSCOPY with polypectomy;  Surgeon: Angel Skinner MD;  Location: [facility]    CV CORONARY ANGIOGRAM N/A 12/29/2021    Procedure: Coronary Angiogram;  Surgeon: Jonny Moore MD;  Location:  HEART CARDIAC CATH LAB    ENDARTERECTOMY CAROTID Right 2/18/2022    Procedure: Right carotid endarterectomy with ELECTROENCEPHALOGRAM(EEG);  Surgeon: Karma Adorno MD;  Location:  OR    ESOPHAGOSCOPY, GASTROSCOPY, DUODENOSCOPY (EGD), COMBINED N/A 7/21/2021    Procedure: ESOPHAGOGASTRODUODENOSCOPY, WITH BIOPSY;  Surgeon: Jeff Cunningham DO;  Location: [facility]    PHACOEMULSIFICATION WITH STANDARD INTRAOCULAR LENS IMPLANT Right 7/28/2021    Procedure: Right eye Cataract Extraction with Implant;  Surgeon: Reyes Valdez MD;  Location: [facility]    PHACOEMULSIFICATION WITH STANDARD INTRAOCULAR LENS  IMPLANT Left 8/18/2021    Procedure: left eye Cataract Extraction with Implant;  Surgeon: Reyes Valdez MD;  Location: [facility]    SURGICAL HISTORY OF -       None    VASCULAR SURGERY  oct 2013    stent put in leg       ALLERGIES:  Nkda [no known drug allergy]    MEDS:    Current Outpatient Medications:     aspirin (ASA) 81 MG chewable tablet, 2 tablets (162 mg) by Oral or NG Tube route daily, Disp: 60 tablet, Rfl: 0    atorvastatin (LIPITOR) 80 MG tablet, Take 1 tablet (80 mg) by mouth daily, Disp: 90 tablet, Rfl: 3    ezetimibe (ZETIA) 10 MG tablet, Take 1 tablet (10 mg) by mouth daily, Disp: 90 tablet, Rfl: 3    furosemide (LASIX) 40 MG tablet, Take 0.5 tablets (20 mg) by mouth daily, Disp: 45 tablet, Rfl: 3    metFORMIN (GLUCOPHAGE) 1000 MG tablet, Take 1 Tablet BY MOUTH EVERY DAY, Disp: 90 tablet, Rfl: 3    metoprolol tartrate (LOPRESSOR) 100 MG tablet, Take 1 tablet (100 mg) by mouth 2 times daily, Disp: 180 tablet, Rfl: 3    omeprazole (PRILOSEC) 40 MG DR capsule, Take 1 capsule (40 mg) by mouth every morning, Disp: 90 capsule, Rfl: 2    SOCIAL HABITS:    History   Smoking Status    Every Day    Packs/day: 0.50    Years: 50.00    Types: Cigarettes    Start date: 1/1/1972    Last attempt to quit: 2/4/2022   Smokeless Tobacco    Never     Social History    Substance and Sexual Activity      Alcohol use: Not Currently        Comment: 2 beers a week       History   Drug Use No       FAMILY HISTORY:    Family History   Problem Relation Age of Onset    Cerebrovascular Disease Mother     Respiratory Father         emphysema    Cerebrovascular Disease Maternal Grandmother     Alzheimer Disease Maternal Grandfather     Breast Cancer Sister     Arthritis Other         hip fracture    Cancer - colorectal No family hx of     Prostate Cancer No family hx of        PE:  /83   Wt 113.4 kg (250 lb)   BMI 40.35 kg/m    Wt Readings from Last 1 Encounters:   10/02/23 113.4 kg (250 lb)     Body mass index is  40.35 kg/m .    EXAM:  GENERAL: Healthy, alert and no distress  RESP: No audible wheeze, cough or increased work of breathing.    NEURO:  Mentation and speech appropriate for age.  PSYCH: Mentation appears normal, affect normal/bright, judgement and insight intact, normal speech.   Limited exam due to virtual telephone visit.  However conducted extensive ROS.    DIAGNOSTIC STUDIES:     Images:  US MARGARITA with PPG wo Exercise    Result Date: 9/21/2023  US MARGARITA WITH PPG W/O EXERCISE BILAT 9/21/2023 10:50 AM CLINICAL HISTORY: Peripheral vascular disease (H) COMPARISON: None. MARGARITA FINDINGS: RIGHT(mmHg) Brachial: 188 Ankle (PT): 109; Index 0.58 Ankle (DP): 96; Index 0.51 1st digit: 64 LEFT (mmHg) Brachial: 172 Ankle (PT): 109; Index 0.58 Ankle (DP): 112; Index 0.60 1st digit: 87 Biphasic waveforms within the left posterior tibial and right dorsalis pedis. Monophasic waveforms within the right posterior tibial and left dorsalis pedis. Monophasic waveforms with low amplitudes within the bilateral digits.     IMPRESSION: 1.  RIGHT LOWER EXTREMITY: MARGARITA is low consistent with moderate peripheral arterial disease. Low monophasic waveforms and decreased first digit pressures is concerning for low wound healing potential. 2.  LEFT LOWER EXTREMITY: MARGARITA is low consistent with moderate peripheral arterial disease. Low monophasic waveforms and decreased first digit pressures is concerning for low wound healing potential. MARGARITA CRITERIA: >1.4 NC 0.95-1.4 Normal 0.90 - 0.94 Mild 0.5 - 0.89 Moderate 0.2 - 0.49 Severe <0.2 Critical HOLGER HOGAN MD   SYSTEM ID:  [id number]    US Carotid Bilateral    Result Date: 9/21/2023  US CAROTID BILATERAL 9/21/2023 10:10 AM CLINICAL HISTORY: Status post carotid endarterectomy; Atherosclerosis of both carotid arteries TECHNIQUE: Duplex exam performed utilizing 2D gray-scale imaging, Doppler interrogation with color-flow and spectral waveform analysis. COMPARISON: 9/23/2022 FINDINGS: RIGHT: There is no  significant atheromatous plaque. Normal waveforms with no significant stenosis. LEFT: There is mild atheromatous plaque. Normal waveforms with no significant stenosis. Both vertebral arteries and subclavian artery waveforms are normal. VELOCITY CHART: The following velocities were obtained in the RIGHT carotid system. CCA: 50 cm/s ICA: 58 cm/s ECA: 103 cm/s ICA/CCA: PS 1.2 The following velocities were obtained in the LEFT carotid system. CCA: 71 cm/s ICA: 73 cm/s ECA: 111 cm/s ICA/CCA: PS 1.0     IMPRESSION: 1. Mild atheromatous plaque in the carotid arteries. 2. No significant stenosis on either side. Evaluation based on velocities and NASCET criteria. REYNALDO NIXON MD   SYSTEM ID:  [id number]          Again, thank you for allowing me to participate in the care of your patient.      Sincerely,    SAL Bhakta CNP

## 2023-10-03 ENCOUNTER — OFFICE VISIT (OUTPATIENT)
Dept: FAMILY MEDICINE | Facility: CLINIC | Age: 68
End: 2023-10-03
Payer: COMMERCIAL

## 2023-10-03 ENCOUNTER — LAB (OUTPATIENT)
Dept: LAB | Facility: CLINIC | Age: 68
End: 2023-10-03
Payer: COMMERCIAL

## 2023-10-03 VITALS
SYSTOLIC BLOOD PRESSURE: 110 MMHG | OXYGEN SATURATION: 91 % | BODY MASS INDEX: 39.82 KG/M2 | TEMPERATURE: 97.9 F | HEIGHT: 66 IN | HEART RATE: 115 BPM | DIASTOLIC BLOOD PRESSURE: 80 MMHG | WEIGHT: 247.8 LBS

## 2023-10-03 DIAGNOSIS — H57.9 EYE PROBLEM: ICD-10-CM

## 2023-10-03 DIAGNOSIS — R06.02 SOB (SHORTNESS OF BREATH): ICD-10-CM

## 2023-10-03 DIAGNOSIS — E11.9 TYPE 2 DIABETES MELLITUS WITHOUT COMPLICATION, WITHOUT LONG-TERM CURRENT USE OF INSULIN (H): Primary | Chronic | ICD-10-CM

## 2023-10-03 DIAGNOSIS — Z95.1 S/P CABG X 3: ICD-10-CM

## 2023-10-03 DIAGNOSIS — E78.5 HYPERLIPIDEMIA LDL GOAL <70: ICD-10-CM

## 2023-10-03 DIAGNOSIS — Z72.0 TOBACCO ABUSE: ICD-10-CM

## 2023-10-03 DIAGNOSIS — K21.9 GASTROESOPHAGEAL REFLUX DISEASE WITHOUT ESOPHAGITIS: ICD-10-CM

## 2023-10-03 DIAGNOSIS — E11.9 TYPE 2 DIABETES MELLITUS WITHOUT COMPLICATION, WITHOUT LONG-TERM CURRENT USE OF INSULIN (H): Chronic | ICD-10-CM

## 2023-10-03 LAB
CREAT UR-MCNC: 270.6 MG/DL
HBA1C MFR BLD: 7.1 % (ref 0–5.6)
MICROALBUMIN UR-MCNC: 328.2 MG/L
MICROALBUMIN/CREAT UR: 121.29 MG/G CR (ref 0–17)

## 2023-10-03 PROCEDURE — 82043 UR ALBUMIN QUANTITATIVE: CPT

## 2023-10-03 PROCEDURE — 83036 HEMOGLOBIN GLYCOSYLATED A1C: CPT

## 2023-10-03 PROCEDURE — 82570 ASSAY OF URINE CREATININE: CPT

## 2023-10-03 PROCEDURE — 36415 COLL VENOUS BLD VENIPUNCTURE: CPT

## 2023-10-03 PROCEDURE — 99214 OFFICE O/P EST MOD 30 MIN: CPT | Performed by: NURSE PRACTITIONER

## 2023-10-03 RX ORDER — OMEPRAZOLE 40 MG/1
40 CAPSULE, DELAYED RELEASE ORAL EVERY MORNING
Qty: 90 CAPSULE | Refills: 3 | Status: SHIPPED | OUTPATIENT
Start: 2023-10-03

## 2023-10-03 RX ORDER — METOPROLOL TARTRATE 100 MG
100 TABLET ORAL 2 TIMES DAILY
Qty: 180 TABLET | Refills: 3 | Status: SHIPPED | OUTPATIENT
Start: 2023-10-03

## 2023-10-03 RX ORDER — EZETIMIBE 10 MG/1
10 TABLET ORAL DAILY
Qty: 90 TABLET | Refills: 3 | Status: SHIPPED | OUTPATIENT
Start: 2023-10-03

## 2023-10-03 ASSESSMENT — PAIN SCALES - GENERAL: PAINLEVEL: NO PAIN (0)

## 2023-10-03 ASSESSMENT — ENCOUNTER SYMPTOMS: EYE PAIN: 1

## 2023-10-03 NOTE — PROGRESS NOTES
Assessment & Plan     Type 2 diabetes mellitus without complication, without long-term current use of insulin (H)  Well controlled.  Continue metformin  Follow up in 6 months.    S/P CABG x 3  Follows with cardiology.  Pulse elevated today - he ran out of his metoprolol  Restart:  - metoprolol tartrate (LOPRESSOR) 100 MG tablet; Take 1 tablet (100 mg) by mouth 2 times daily    Hyperlipidemia LDL goal <70  Well controlled.  - ezetimibe (ZETIA) 10 MG tablet; Take 1 tablet (10 mg) by mouth daily    Gastroesophageal reflux disease without esophagitis  Well controlled.  - omeprazole (PRILOSEC) 40 MG DR capsule; Take 1 capsule (40 mg) by mouth every morning    SOB (shortness of breath)  Wife reports that patient gets short of breath with any small activity.  Smoker for 51 years  Recommend PFTs to r/o COPD    Tobacco abuse  Encouraged cessation  - General PFT Lab (Please always keep checked); Future  - Pulmonary Function Test; Future    Eye problem  Recommend opthalmology consultation        The risks, benefits and treatment options of prescribed medications or other treatments have been discussed with the patient. The patient verbalized their understanding and should call or follow up if no improvement or if they develop further problems.  SAL [facility] JANE Mccord is a 68 year old, presenting for the following health issues:  Diabetes, Eye Problem, and Refill Request (ZETIA, OMEPRAZOLE, METOPROLOL )        10/3/2023    10:58 AM   Additional Questions   Roomed by ALEM gallegos cma   Accompanied by WIFE       Eye Problem     History of Present Illness       Diabetes:   He presents for follow up of diabetes.    He is not checking blood glucose.         He has no concerns regarding his diabetes at this time.   He is not experiencing numbness or burning in feet, excessive thirst, blurry vision, weight changes or redness, sores or blisters on feet.           He eats  2-3 servings of fruits and vegetables daily.He consumes 0 sweetened beverage(s) daily.He exercises with enough effort to increase his heart rate 9 or less minutes per day.  He exercises with enough effort to increase his heart rate 3 or less days per week.   He is taking medications regularly.         Hyperlipidemia Follow-Up    Are you regularly taking any medication or supplement to lower your cholesterol?   Yes- ATORVASTATIN   Are you having muscle aches or other side effects that you think could be caused by your cholesterol lowering medication?  No    Hypertension Follow-up    Do you check your blood pressure regularly outside of the clinic? No   Are you following a low salt diet? Yes  Are your blood pressures ever more than 140 on the top number (systolic) OR more   than 90 on the bottom number (diastolic), for example 140/90? No      BP Readings from Last 6 Encounters:   10/03/23 110/80   10/02/23 124/83   09/26/23 102/72   08/02/23 106/65   07/07/23 121/89   07/07/23 106/62         Eye(s) Problem  Onset/Duration: 2 YRS AGO AFTER HAVING CATARACT REPAIR  Description:   Location: Bilateral  Pain: No  Redness: YES  Accompanying Signs & Symptoms:  Discharge/mattering: No  Swelling: No  Visual changes: No  Fever: No  Nasal Congestion: No  Bothered by bright lights: No  History:  Trauma: No  Foreign body exposure: No  Wearing contacts: No  Precipitating or alleviating factors: None  Therapies tried and outcome: None      SHORTNESS OF BREATH:        Medication Followup of omeprazole for GERD  Taking Medication as prescribed: yes  Side Effects:  None  Medication Helping Symptoms:  yes      Review of Systems   Eyes:  Positive for pain.      Constitutional, HEENT, cardiovascular, pulmonary, GI, , musculoskeletal, neuro, skin, endocrine and psych systems are negative, except as otherwise noted.      Objective    /80 (BP Location: Right arm, Patient Position: Sitting, Cuff Size: Adult Large)   Pulse 115   Temp  "97.9  F (36.6  C) (Tympanic)   Ht 1.676 m (5' 6\")   Wt 112.4 kg (247 lb 12.8 oz)   SpO2 91%   BMI 40.00 kg/m    Body mass index is 40 kg/m .  Physical Exam   GENERAL: healthy, alert and no distress  NECK: no adenopathy, no asymmetry, masses, or scars and thyroid normal to palpation  RESP: lungs clear to auscultation - no rales, rhonchi or wheezes  CV: regular rate and rhythm, normal S1 S2, no S3 or S4, no murmur, click or rub, no peripheral edema and peripheral pulses strong  MS: no gross musculoskeletal defects noted, no edema    Results for orders placed or performed in visit on 10/03/23 (from the past 24 hour(s))   Hemoglobin A1c   Result Value Ref Range    Hemoglobin A1C 7.1 (H) 0.0 - 5.6 %   Albumin Random Urine Quantitative with Creat Ratio   Result Value Ref Range    Creatinine Urine mg/dL 270.6 mg/dL    Albumin Urine mg/L 328.2 mg/L    Albumin Urine mg/g Cr 121.29 (H) 0.00 - 17.00 mg/g Cr                     "

## 2023-10-06 ENCOUNTER — TELEPHONE (OUTPATIENT)
Dept: FAMILY MEDICINE | Facility: CLINIC | Age: 68
End: 2023-10-06
Payer: COMMERCIAL

## 2023-10-06 NOTE — TELEPHONE ENCOUNTER
I ordered PFTs on 10/3/2023  Please call respiratory therapy and ask them to schedule patient.  Maria De Jesus Hay, CNP

## 2023-10-06 NOTE — TELEPHONE ENCOUNTER
Pt's wife, Mil, called to follow up on status of PFT scheduling. Writer doesn't note an order for this procedure, see progress notes from 10/3/23 visit below. Order pended for PCP to review.    SOB (shortness of breath)  Wife reports that patient gets short of breath with any small activity.  Smoker for 51 years  Recommend PFTs to r/o COPD     Tobacco abuse  Encouraged cessation  - General PFT Lab (Please always keep checked); Future  - Pulmonary Function Test; Future    Yoana Whitten RN  [facility]

## 2023-10-09 NOTE — TELEPHONE ENCOUNTER
Patient notified and transferred to pulmonology department to schedule PFT's.     Julie Behrendt RN

## 2023-10-13 ENCOUNTER — HOSPITAL ENCOUNTER (OUTPATIENT)
Dept: RESPIRATORY THERAPY | Facility: CLINIC | Age: 68
Discharge: HOME | End: 2023-10-13
Admitting: NURSE PRACTITIONER
Payer: COMMERCIAL

## 2023-10-13 DIAGNOSIS — Z72.0 TOBACCO ABUSE: ICD-10-CM

## 2023-10-13 PROCEDURE — 94726 PLETHYSMOGRAPHY LUNG VOLUMES: CPT

## 2023-10-13 PROCEDURE — 94729 DIFFUSING CAPACITY: CPT

## 2023-10-13 PROCEDURE — 94060 EVALUATION OF WHEEZING: CPT

## 2023-10-13 RX ORDER — INHALER, ASSIST DEVICES
1 SPACER (EA) MISCELLANEOUS ONCE
Status: DISCONTINUED | OUTPATIENT
Start: 2023-10-13 | End: 2023-10-14 | Stop reason: HOSPADM

## 2023-10-13 NOTE — PROGRESS NOTES
Pre and post spirometry, DLCO, and Lung volumes completed.  Patient was given Albuterol MDI before post spirometry.

## 2023-10-14 LAB
DLCOUNC-%PRED-PRE: 71 %
DLCOUNC-PRE: 16.55 ML/MIN/MMHG
DLCOUNC-PRED: 23.27 ML/MIN/MMHG
ERV-%PRED-PRE: 53 %
ERV-PRE: 0.68 L
ERV-PRED: 1.27 L
EXPTIME-PRE: 7.13 SEC
FEF2575-%PRED-POST: 77 %
FEF2575-%PRED-PRE: 43 %
FEF2575-POST: 1.69 L/SEC
FEF2575-PRE: 0.95 L/SEC
FEF2575-PRED: 2.19 L/SEC
FEFMAX-%PRED-PRE: 66 %
FEFMAX-PRE: 5.1 L/SEC
FEFMAX-PRED: 7.67 L/SEC
FEV1-%PRED-PRE: 59 %
FEV1-PRE: 1.61 L
FEV1FEV6-PRE: 71 %
FEV1FEV6-PRED: 78 %
FEV1FVC-PRE: 70 %
FEV1FVC-PRED: 78 %
FEV1SVC-PRE: 67 %
FEV1SVC-PRED: 70 %
FIFMAX-PRE: 3.57 L/SEC
FRCPLETH-%PRED-PRE: 93 %
FRCPLETH-PRE: 3.23 L
FRCPLETH-PRED: 3.45 L
FVC-%PRED-PRE: 66 %
FVC-PRE: 2.3 L
FVC-PRED: 3.45 L
IC-%PRED-PRE: 67 %
IC-PRE: 1.72 L
IC-PRED: 2.54 L
RVPLETH-%PRED-PRE: 103 %
RVPLETH-PRE: 2.55 L
RVPLETH-PRED: 2.46 L
TLCPLETH-%PRED-PRE: 78 %
TLCPLETH-PRE: 4.95 L
TLCPLETH-PRED: 6.31 L
VA-%PRED-PRE: 65 %
VA-PRE: 3.68 L
VC-%PRED-PRE: 62 %
VC-PRE: 2.4 L
VC-PRED: 3.83 L

## 2023-10-16 DIAGNOSIS — J98.4 RESTRICTIVE LUNG DISEASE: Primary | ICD-10-CM

## 2023-10-20 ENCOUNTER — TRANSFERRED RECORDS (OUTPATIENT)
Dept: CARDIOLOGY | Facility: CLINIC | Age: 68
End: 2023-10-20
Payer: COMMERCIAL

## 2023-10-20 NOTE — PROGRESS NOTES
Research labs from August 2 reviewed, results not clinically significant, this includes high glucose, high calculated anion gap, low CK mildly, low MCV H, low MCHC, low lymphocytes, mildly high WBC, mildly high neutrophil count, mildly high ANC, and mildly high hemoglobin A1c.  LF

## 2023-10-20 NOTE — PROGRESS NOTES
Research labs from August 2 reviewed, results not clinically significant including mildly high glucose, mildly high calculated anion gap, mildly low CK, mildly low MCH, MCHC, high RDW, mildly high WBC, mildly high neutrophils, mildly low lymphocytes, mildly high ANC, and high hemoglobin A1c.  LF

## 2023-10-23 ENCOUNTER — HOSPITAL ENCOUNTER (EMERGENCY)
Facility: CLINIC | Age: 68
Discharge: HOME | End: 2023-10-23
Attending: NURSE PRACTITIONER | Admitting: NURSE PRACTITIONER
Payer: COMMERCIAL

## 2023-10-23 VITALS
BODY MASS INDEX: 41.65 KG/M2 | HEIGHT: 65 IN | DIASTOLIC BLOOD PRESSURE: 69 MMHG | HEART RATE: 114 BPM | WEIGHT: 250 LBS | TEMPERATURE: 98 F | SYSTOLIC BLOOD PRESSURE: 146 MMHG | OXYGEN SATURATION: 97 % | RESPIRATION RATE: 18 BRPM

## 2023-10-23 DIAGNOSIS — L03.113 CELLULITIS OF RIGHT UPPER EXTREMITY: ICD-10-CM

## 2023-10-23 PROCEDURE — 99214 OFFICE O/P EST MOD 30 MIN: CPT | Performed by: NURSE PRACTITIONER

## 2023-10-23 PROCEDURE — G0463 HOSPITAL OUTPT CLINIC VISIT: HCPCS

## 2023-10-23 RX ORDER — CEPHALEXIN 500 MG/1
500 CAPSULE ORAL 4 TIMES DAILY
Qty: 28 CAPSULE | Refills: 0 | Status: SHIPPED | OUTPATIENT
Start: 2023-10-23 | End: 2023-10-30

## 2023-10-23 NOTE — ED PROVIDER NOTES
ED Provider Note  [facility]      History   No chief complaint on file.    ADRIAN Bonilla is a 68 year old male who reports falling and tearing skin on right elbow Saturday, since this time has increased pain and drainage from wound.  Has a history of slow healing with previous wounds, has a history of diabetes.  He denies any loss of sensation in his arm or fingers and does have normal range of motion in his hand wrist and elbow.            Allergies:  Allergies   Allergen Reactions    Nkda [No Known Drug Allergy]        Problem List:    Patient Active Problem List    Diagnosis Date Noted    Tubular adenoma 01/05/2023     Priority: Medium    S/P CABG (coronary artery bypass graft) 04/08/2022     Priority: Medium     x3      S/P carotid endarterectomy 04/08/2022     Priority: Medium     right      Atherosclerosis of native coronary artery of native heart with stable angina pectoris (H24) 03/24/2022     Priority: Medium    Status post coronary angiogram 12/29/2021     Priority: Medium    Epigastric pain 07/19/2021     Priority: Medium    AAA (abdominal aortic aneurysm) (H24) 06/25/2021     Priority: Medium     3.3 cm, consider repeat imaging in 2-3 years from 6/2021      Dehydration 06/23/2021     Priority: Medium    Hypomagnesemia 06/23/2021     Priority: Medium    Hypophosphatemia 06/23/2021     Priority: Medium    Atherosclerosis of both carotid arteries 06/20/2019     Priority: Medium    Coronary artery disease of native artery of native heart with stable angina pectoris (H24) 08/04/2017     Priority: Medium    Type 2 diabetes mellitus without complication (H) 10/21/2015     Priority: Medium    PAD (peripheral artery disease) (H24) 04/14/2014     Priority: Medium    Benign essential hypertension 08/05/2013     Priority: Medium    Tobacco abuse 11/13/2012     Priority: Medium    Morbid obesity (H) 12/01/2011     Priority: Medium    ABIGAIL (obstructive sleep apnea) 11/28/2011      Priority: Medium     Severe ABIGAIL with significant oxygen desaturations in REM (AHI 87.2, ba desat 66% in REM)  Incomplete titration with remaining events in supine sleep on CPAP 19 cm H2O.  Looked significantly improved during brief trial of bilevel PAP at 16/12 including lateral NREM / REM and supine NREM.        Hyperlipidemia LDL goal <70 09/30/2011     Priority: Medium    Disorder of bursae and tendons in shoulder region 11/10/2005     Priority: Medium     Problem list name updated by automated process. Provider to review      PVD (peripheral vascular disease) (H24) 05/01/2001     Priority: Medium     MI -- Angioplasty two stents RCA & OM2  Problem list name updated by automated process. Provider to review          Past Medical History:    Past Medical History:   Diagnosis Date    AAA (abdominal aortic aneurysm) (H24) 06/23/2021    Acute kidney injury (H24) 06/23/2021    Acute superficial gastritis without hemorrhage 07/19/2021    Atherosclerosis of both carotid arteries 10/27/2008    Coronary artery disease 05/01/2005    Disorder of bursae and tendons in shoulder region 09/01/2005    Hyperlipidemia LDL goal <70 11/18/2002    Morbid obesity (H) 09/16/2013    NSTEMI (non-ST elevated myocardial infarction) (H) 05/2001    Obstructive Sleep Apnea 11/23/2011    Peripheral artery disease (H24) 11/23/2012    Pure hypercholesterolemia 11/18/2002    Tobacco abuse 1973    Tubular adenoma 01/05/2023    Type II or unspecified type diabetes mellitus without mention of complication, not stated as uncontrolled 11/18/2002    Unspecified cardiovascular disease 05/2001    Unspecified essential hypertension 11/18/2002       Past Surgical History:    Past Surgical History:   Procedure Laterality Date    BYPASS GRAFT ARTERY CORONARY N/A 3/24/2022    Procedure: CORONARY ARTERY BYPASS GRAFT x 3 (LIMA - LAD; SV - OM2; SV - PDA) WITH ENDOSCOPIC SAPHENOUS VEIN HARVEST ON BILATERAL LOWER EXTREMITY, AND ON CARDIOPULMONARY PUMP OXYGENATOR   (INTRAOPERATIVE TRANSESOPHAGEAL ECHOCARDIOGRAM BY ANESTHESIOLOGIST);  Surgeon: Glenna Waters MD;  Location:  OR    CARDIAC SURGERY  may 1, 2005    COLONOSCOPY  11/30/2011    Procedure:COLONOSCOPY; Screening Colonoscopy; Surgeon:LUTHER FLORES; Location:[facility]    COLONOSCOPY N/A 1/3/2023    Procedure: COLONOSCOPY with polypectomy;  Surgeon: Angel Skinner MD;  Location: [facility]    CV CORONARY ANGIOGRAM N/A 12/29/2021    Procedure: Coronary Angiogram;  Surgeon: Jonny Moore MD;  Location:  HEART CARDIAC CATH LAB    ENDARTERECTOMY CAROTID Right 2/18/2022    Procedure: Right carotid endarterectomy with ELECTROENCEPHALOGRAM(EEG);  Surgeon: Karma Adorno MD;  Location:  OR    ESOPHAGOSCOPY, GASTROSCOPY, DUODENOSCOPY (EGD), COMBINED N/A 7/21/2021    Procedure: ESOPHAGOGASTRODUODENOSCOPY, WITH BIOPSY;  Surgeon: Jeff Cunningham DO;  Location: [facility]    PHACOEMULSIFICATION WITH STANDARD INTRAOCULAR LENS IMPLANT Right 7/28/2021    Procedure: Right eye Cataract Extraction with Implant;  Surgeon: Reyes Valdez MD;  [facility]: [facility]    PHACOEMULSIFICATION WITH STANDARD INTRAOCULAR LENS IMPLANT Left 8/18/2021    Procedure: left eye Cataract Extraction with Implant;  Surgeon: Reyes Valdez MD;  Location: [facility]    SURGICAL HISTORY OF -       None    VASCULAR SURGERY  oct 2013    stent put in leg       Family History:    Family History   Problem Relation Age of Onset    Cerebrovascular Disease Mother     Respiratory Father         emphysema    Cerebrovascular Disease Maternal Grandmother     Alzheimer Disease Maternal Grandfather     Breast Cancer Sister     Arthritis Other         hip fracture    Cancer - colorectal No family hx of     Prostate Cancer No family hx of        Social History:  Marital Status:   [2]  Social History     Tobacco Use    Smoking status: Every Day     Packs/day: 0.50     Years: 51.00     Additional pack years: 0.00     Total pack  "years: 25.50     Types: Cigarettes     Start date: 1/1/1972    Smokeless tobacco: Never    Tobacco comments:     5-10 cigs daily 11/28/22   Vaping Use    Vaping Use: Never used   Substance Use Topics    Alcohol use: Not Currently     Comment: 2 beers a week     Drug use: No        Medications:    cephALEXin (KEFLEX) 500 MG capsule  aspirin (ASA) 81 MG chewable tablet  atorvastatin (LIPITOR) 80 MG tablet  ezetimibe (ZETIA) 10 MG tablet  furosemide (LASIX) 40 MG tablet  metFORMIN (GLUCOPHAGE) 1000 MG tablet  metoprolol tartrate (LOPRESSOR) 100 MG tablet  omeprazole (PRILOSEC) 40 MG DR capsule          Review of Systems  A medically appropriate review of systems was performed with pertinent positives and negatives noted in the HPI, and all other systems negative.    Physical Exam   Patient Vitals for the past 24 hrs:   BP Temp Temp src Pulse Resp SpO2 Height Weight   10/23/23 1326 (!) 146/69 98  F (36.7  C) Oral 114 18 97 % 1.651 m (5' 5\") 113.4 kg (250 lb)          Physical Exam  General: alert and in no acute distress on arrival  Head: atraumatic, normocephalic  Lungs:  nonlabored, CTA bilateral throat  CV:  RRR, extremities warm and perfused.  Skin: no rashes, no diaphoresis and skin color normal, right arm dorsal to elbow there is a 4 cm skin tear present that he has been keeping clean, drainage is not purulent.  It is not thickened, but is mildly erythemic.  Neuro: Patient awake, alert, speech is fluent, appropriate historian  Psychiatric: affect/mood normal, pleasant      ED Course                 Procedures                No results found for this or any previous visit (from the past 24 hour(s)).    MEDICATIONS GIVEN IN THE EMERGENCY DEPARTMENT:  Medications - No data to display    Due to history of diabetes and delayed wound healing in his past Keflex 100 mg 4 times daily for 7 days he should follow-up with his primary provider if not improving.         Assessments & Plan (with Medical Decision Making)  68 year " old male who presents to the Urgent Care for evaluation of cellulitis right upper extremity       I have reviewed the nursing notes.    I have reviewed the findings, diagnosis, plan and need for follow up with the patient.  Reviewed signs and symptoms of infection including swelling, increased pain, purulent drainage, recommend seeking care if these occur.      NEW PRESCRIPTIONS STARTED AT TODAY'S ER VISIT  New Prescriptions    CEPHALEXIN (KEFLEX) 500 MG CAPSULE    Take 1 capsule (500 mg) by mouth 4 times daily for 7 days       Final diagnoses:   Cellulitis of right upper extremity       10/23/2023   [facility] EMERGENCY DEPT       Brenda Giordano, APRN CNP  10/27/23 0225

## 2023-10-23 NOTE — DISCHARGE INSTRUCTIONS
Keflex antibiotic ordered orally please finish all of ordered antibiotic for skin infection.  If skin infection worsens despite recommended treatment plan please return or be seen by your primary office.  Symptoms of infection include increased redness, swelling, purulent drainage, fever if any of these occur please seek care.

## 2023-11-07 ENCOUNTER — TRANSFERRED RECORDS (OUTPATIENT)
Dept: HEALTH INFORMATION MANAGEMENT | Facility: CLINIC | Age: 68
End: 2023-11-07
Payer: COMMERCIAL

## 2023-11-07 ENCOUNTER — HOSPITAL ENCOUNTER (OUTPATIENT)
Dept: OUTPATIENT PROCEDURES | Facility: CLINIC | Age: 68
Discharge: HOME | End: 2023-11-07
Attending: OPHTHALMOLOGY
Payer: COMMERCIAL

## 2023-11-07 LAB — RETINOPATHY: NEGATIVE

## 2023-12-19 ENCOUNTER — OFFICE VISIT (OUTPATIENT)
Dept: CARDIOLOGY | Facility: CLINIC | Age: 68
End: 2023-12-19
Payer: COMMERCIAL

## 2023-12-19 VITALS
HEIGHT: 66 IN | RESPIRATION RATE: 16 BRPM | WEIGHT: 241 LBS | HEART RATE: 85 BPM | OXYGEN SATURATION: 96 % | SYSTOLIC BLOOD PRESSURE: 114 MMHG | BODY MASS INDEX: 38.73 KG/M2 | DIASTOLIC BLOOD PRESSURE: 80 MMHG

## 2023-12-19 VITALS
SYSTOLIC BLOOD PRESSURE: 104 MMHG | BODY MASS INDEX: 38.89 KG/M2 | HEART RATE: 88 BPM | WEIGHT: 240.96 LBS | DIASTOLIC BLOOD PRESSURE: 66 MMHG

## 2023-12-19 DIAGNOSIS — I10 BENIGN ESSENTIAL HYPERTENSION: ICD-10-CM

## 2023-12-19 DIAGNOSIS — Z95.1 S/P CABG (CORONARY ARTERY BYPASS GRAFT): Primary | ICD-10-CM

## 2023-12-19 DIAGNOSIS — E66.01 MORBID OBESITY (H): ICD-10-CM

## 2023-12-19 DIAGNOSIS — Z72.0 TOBACCO ABUSE: ICD-10-CM

## 2023-12-19 DIAGNOSIS — G47.33 OSA (OBSTRUCTIVE SLEEP APNEA): Chronic | ICD-10-CM

## 2023-12-19 DIAGNOSIS — I25.118 CORONARY ARTERY DISEASE OF NATIVE ARTERY OF NATIVE HEART WITH STABLE ANGINA PECTORIS (H): Primary | ICD-10-CM

## 2023-12-19 DIAGNOSIS — Z00.6 EXAMINATION OF PARTICIPANT IN CLINICAL TRIAL: ICD-10-CM

## 2023-12-19 DIAGNOSIS — E11.9 TYPE 2 DIABETES MELLITUS WITHOUT COMPLICATION, WITHOUT LONG-TERM CURRENT USE OF INSULIN (H): Chronic | ICD-10-CM

## 2023-12-19 LAB
ATRIAL RATE - MUSE: 242 BPM
DIASTOLIC BLOOD PRESSURE - MUSE: NORMAL MMHG
INTERPRETATION ECG - MUSE: NORMAL
P AXIS - MUSE: 70 DEGREES
PR INTERVAL - MUSE: NORMAL MS
QRS DURATION - MUSE: 96 MS
QT - MUSE: 356 MS
QTC - MUSE: 447 MS
R AXIS - MUSE: 64 DEGREES
SYSTOLIC BLOOD PRESSURE - MUSE: NORMAL MMHG
T AXIS - MUSE: 52 DEGREES
VENTRICULAR RATE- MUSE: 95 BPM

## 2023-12-19 PROCEDURE — 99207 PR NO CHARGE-RESEARCH SERVICE: CPT

## 2023-12-19 PROCEDURE — 99214 OFFICE O/P EST MOD 30 MIN: CPT | Performed by: NURSE PRACTITIONER

## 2023-12-19 PROCEDURE — 36415 COLL VENOUS BLD VENIPUNCTURE: CPT

## 2023-12-19 PROCEDURE — 93000 ELECTROCARDIOGRAM COMPLETE: CPT | Performed by: INTERNAL MEDICINE

## 2023-12-19 NOTE — LETTER
"12/19/2023    Maria De Jesus Hay, APRN CNP  [address] [facility] [place] [address]    RE: Roger Bonilla       Dear Colleague,     I had the [facility] of seeing Roger [facility] the [facility].        Assessment/Recommendations   Assessment:    1.  Hypertension: Controlled.  Blood pressure 114/80  2.  CAD: Denies angina.  Status post CABG in 2022.  He continues aspirin, atorvastatin and Zetia  3.  Hyperlipidemia: Continues atorvastatin and Zetia.  Continues OLPASIRAN- CVOT research study  4.  ABIGAIL: Does not wear CPAP  5.  Obesity: BMI 38.90  6.  Tobacco use: He continues to smoke half a pack per day.  No urged to quit  7.  Diabetes mellitus type 2: Most recent hemoglobin A1c 7.1.  He continues metformin.  Managed by primary.      Plan:  1.  Continue current medications  2.  Continue OLPASIRAN- CVOT research study  3.  Encourage tobacco cessation  4.  Work on weight loss       History of Present Illness/Subjective    Mr. Roger Bonilla is a 68 year old male seen at [facility] today for continued follow-up.  He follows up for the OLPASIRAN- CVOT research study. He has a past medical history significant for CAD, dyslipidemia, CABG, PAD, PVD, hypertension, ABIGAIL not on CPAP, diabetes, tobacco abuse, obesity, right carotid endarterectomy.  He denies any symptoms today. Denies fatigue, lightheadedness, shortness of breath, dyspnea on exertion, orthopnea, PND, palpitations, chest pain, abdominal fullness/bloating and lower extremity edema.           Physical Examination Review of Systems   /80 (BP Location: Left arm, Patient Position: Sitting, Cuff Size: Adult Regular)   Pulse 85   Resp 16   Ht 1.676 m (5' 6\")   Wt 109.3 kg (241 lb)   SpO2 96%   BMI 38.90 kg/m    Body mass index is 38.9 kg/m .  Wt Readings from Last 3 Encounters:   12/19/23 109.3 kg (241 lb)   10/23/23 113.4 kg (250 lb)   10/03/23 112.4 kg (247 lb 12.8 oz)       General Appearance:   no acute distress   ENT/Mouth: No " "abnormalities   EYES:  no scleral icterus, normal conjunctivae   Neck: no thyromegaly. Cervical lymph nodes nontender and nonpalpable.    Chest/Lungs:   lungs are clear to auscultation, no rales or wheezing, equal chest wall expansion    Cardiovascular:   Regular. Normal first and second heart sounds with no murmurs, rubs, or gallops, no edema bilaterally    Abdomen:  Obese, bowel sounds are present   Extremities: no cyanosis or clubbing   Skin: warm   Neurologic: no tremors     Psychiatric: alert and oriented x3    Enc Vitals  BP: 114/80  Pulse: 85  Resp: 16  SpO2: 96 %  Weight: 109.3 kg (241 lb)  Height: 167.6 cm (5' 6\")                                         Medical History  Surgical History Family History Social History   Past Medical History:   Diagnosis Date    AAA (abdominal aortic aneurysm) (H24) 06/23/2021    Acute kidney injury (H24) 06/23/2021    Acute superficial gastritis without hemorrhage 07/19/2021    Atherosclerosis of both carotid arteries 10/27/2008    Coronary artery disease 05/01/2005    2 stents put in heart    Disorder of bursae and tendons in shoulder region 09/01/2005    Hyperlipidemia LDL goal <70 11/18/2002    Morbid obesity (H) 09/16/2013    NSTEMI (non-ST elevated myocardial infarction) (H) 05/2001    Obstructive Sleep Apnea 11/23/2011    Peripheral artery disease (H24) 11/23/2012    Pure hypercholesterolemia 11/18/2002    Tobacco abuse 1973    Tubular adenoma 01/05/2023    Type II or unspecified type diabetes mellitus without mention of complication, not stated as uncontrolled 11/18/2002    Unspecified cardiovascular disease 05/2001    MI -- Angioplasty two stents RCA & OM2    Unspecified essential hypertension 11/18/2002    Past Surgical History:   Procedure Laterality Date    BYPASS GRAFT ARTERY CORONARY N/A 3/24/2022    Procedure: CORONARY ARTERY BYPASS GRAFT x 3 (LIMA - LAD; SV - OM2; SV - PDA) WITH ENDOSCOPIC SAPHENOUS VEIN HARVEST ON BILATERAL LOWER EXTREMITY, AND ON " CARDIOPULMONARY PUMP OXYGENATOR  (INTRAOPERATIVE TRANSESOPHAGEAL ECHOCARDIOGRAM BY ANESTHESIOLOGIST);  Surgeon: Glenna Waters MD;  Location:  OR    CARDIAC SURGERY  may 1, 2005    COLONOSCOPY  11/30/2011    Procedure:COLONOSCOPY; Screening Colonoscopy; Surgeon:LUTHER FLORES; [facility]    COLONOSCOPY N/A 1/3/2023    Procedure: COLONOSCOPY with polypectomy;  Surgeon: Angel Skinner MD;  [facility]: [facility]    CV CORONARY ANGIOGRAM N/A 12/29/2021    Procedure: Coronary Angiogram;  Surgeon: Jonny Moore MD;  Location:  HEART CARDIAC CATH LAB    ENDARTERECTOMY CAROTID Right 2/18/2022    Procedure: Right carotid endarterectomy with ELECTROENCEPHALOGRAM(EEG);  Surgeon: Karma Adorno MD;  [facility]    ESOPHAGOSCOPY, GASTROSCOPY, DUODENOSCOPY (EGD), COMBINED N/A 7/21/2021    Procedure: ESOPHAGOGASTRODUODENOSCOPY, WITH BIOPSY;  Surgeon: Jeff Cunningham DO;  Location: [facility]    PHACOEMULSIFICATION WITH STANDARD INTRAOCULAR LENS IMPLANT Right 7/28/2021    Procedure: Right eye Cataract Extraction with Implant;  Surgeon: Reyes Valdez MD;  [facility]    PHACOEMULSIFICATION WITH STANDARD INTRAOCULAR LENS IMPLANT Left 8/18/2021    Procedure: left eye Cataract Extraction with Implant;  Surgeon: Reyes Valdez MD;  Location: [facility]    SURGICAL HISTORY OF -       None    VASCULAR SURGERY  oct 2013    stent put in leg    Family History   Problem Relation Age of Onset    Cerebrovascular Disease Mother     Respiratory Father         emphysema    Cerebrovascular Disease Maternal Grandmother     Alzheimer Disease Maternal Grandfather     Breast Cancer Sister     Arthritis Other         hip fracture    Cancer - colorectal No family hx of     Prostate Cancer No family hx of     Social History     Socioeconomic History    Marital status:      Spouse name: Not on file    Number of children: Not on file    Years of education: Not on file    Highest education  level: Not on file   Occupational History    Not on file   Tobacco Use    Smoking status: Every Day     Packs/day: 0.50     Years: 51.00     Additional pack years: 0.00     Total pack years: 25.50     Types: Cigarettes     Start date: 1/1/1972    Smokeless tobacco: Never    Tobacco comments:     5-10 cigs daily 11/28/22   Vaping Use    Vaping Use: Never used   Substance and Sexual Activity    Alcohol use: Not Currently     Comment: 2 beers a week     Drug use: No    Sexual activity: Yes     Partners: Female     Birth control/protection: Condom   Other Topics Concern    Parent/sibling w/ CABG, MI or angioplasty before 65F 55M? No   Social History Narrative    Not on file     Social Determinants of Health     Financial Resource Strain: Unknown (9/26/2023)    Financial Resource Strain     Within the past 12 months, have you or your family members you live with been unable to get utilities (heat, electricity) when it was really needed?: Patient refused   Food Insecurity: Unknown (9/26/2023)    Food Insecurity     Within the past 12 months, did you worry that your food would run out before you got money to buy more?: Patient refused     Within the past 12 months, did the food you bought just not last and you didn t have money to get more?: Patient refused   Transportation Needs: Unknown (9/26/2023)    Transportation Needs     Within the past 12 months, has lack of transportation kept you from medical appointments, getting your medicines, non-medical meetings or appointments, work, or from getting things that you need?: Patient refused   Physical Activity: Not on file   Stress: Not on file   Social Connections: Not on file   Interpersonal Safety: Not on file   Housing Stability: Unknown (9/26/2023)    Housing Stability     Do you have housing? : Patient refused     Are you worried about losing your housing?: Patient refused          Medications  Allergies   Current Outpatient Medications   Medication Sig Dispense Refill     aspirin (ASA) 81 MG chewable tablet 2 tablets (162 mg) by Oral or NG Tube route daily 60 tablet 0    atorvastatin (LIPITOR) 80 MG tablet Take 1 tablet (80 mg) by mouth daily 90 tablet 3    ezetimibe (ZETIA) 10 MG tablet Take 1 tablet (10 mg) by mouth daily 90 tablet 3    furosemide (LASIX) 40 MG tablet Take 0.5 tablets (20 mg) by mouth daily 45 tablet 3    metFORMIN (GLUCOPHAGE) 1000 MG tablet Take 1 Tablet BY MOUTH EVERY DAY 90 tablet 3    metoprolol tartrate (LOPRESSOR) 100 MG tablet Take 1 tablet (100 mg) by mouth 2 times daily 180 tablet 3    omeprazole (PRILOSEC) 40 MG DR capsule Take 1 capsule (40 mg) by mouth every morning 90 capsule 3    Allergies   Allergen Reactions    Nkda [No Known Drug Allergy]          Lab Results    Chemistry/lipid CBC Cardiac Enzymes/BNP/TSH/INR   Lab Results   Component Value Date    CHOL 147 03/15/2023    HDL 33 (L) 03/15/2023    TRIG 156 (H) 03/15/2023    BUN 18.0 03/15/2023     03/15/2023    CO2 26 03/15/2023    Lab Results   Component Value Date    WBC 12.3 (H) 05/17/2022    HGB 11.6 (L) 05/17/2022    HCT 38.1 (L) 05/17/2022    MCV 86 05/17/2022     05/17/2022    Lab Results   Component Value Date    TSH 0.35 (L) 06/23/2021    INR 1.31 (H) 03/24/2022             This note has been dictated using voice recognition software. Any grammatical, typographical, or context distortions are unintentional and inherent to the software    20 minutes spent on the date of encounter doing chart review, review of outside records, review of test results, interpretation with above tests, patient visit, and documentation.                Thank you for allowing me to participate in the care of your patient.      Sincerely,     [facility]  cc:   No referring provider defined for this encounter.

## 2023-12-19 NOTE — LETTER
12/19/2023    Maria De Jesus Hay, SAL CNP  [address] [address]    RE: Roger SARGENT Chad       Dear Colleague,     I had the pleasure of seeing Roger SARGENT Chad in the [facility].  A Double-blind, Randomized, Placebo-controlled, Multicenter Study Assessing the impact of Olpasiran on Major Cardiovascular Events in Patients with Atherosclerotic cardiovascular Disease and Elevated Lipoprotein (a)          Roger Bonilla was seen in clinic today for Visit Week 24    Any Adverse events, Serious Adverse event, changes in medications,  (if any) listed below.      Did the patient have any of the following events (endpoints):   Did the patient have any of the following events (endpoints):   [] Yes   [x]  No   Cardiovascular Death  [] Yes   [x]  No   Myocardial Infarction  [] Yes   [x]  No   Coronary revascularization  [] Yes   [x]  No   Any Coronary Artery Revascularization  [] Yes   [x]  No  Cerebrovascular Revascularization  [] Yes   [x]  No  Peripheral Artery Revascularization  [] Yes   [x]  No  Vascular amputation  [] Yes   [x]  No  Hospitalization for Unstable Angina  [] Yes   [x]  No  Stroke (Ischemic or Hemorrhagic) / Transient Ischemic Attack (TIA)  [] Yes   [x]  No  Deep Vein Thrombosis or Pulmonary Embolism  [] Yes   [x]  No  New onset or clinical deterioration of aortic stenosis  [] Yes   [x]  No  Limb Ischemia  [] Yes   [x]  No  New Onset Diabetes Mellitus   [] Yes   [x]  No  Hospitalization for Heart Failure         Central, Local labs  drawn? Yes  Time 0837.    PK collected post IP dose  Time 1008    Urine Pregnancy test for WOCBP NA      IP Dispensed? [x] Yes   []  No      IP Lot Number: [id number]  IP Box Number: [id number]  Administered into LEFT abdomen at 0902 by Research coordinator  Administrations This Visit       study - olpasiran () vs placebo (IDS# [id number]) pre-filled syringe 142 mg       Admin Date  12/19/2023 Action  $Given Dose  142 mg Route  Subcutaneous Documented  By  Valarie Banda RN                       /66 (BP Location: Right arm, Patient Position: Supine, Cuff Size: Adult Large)   Pulse 88      Valarie Banda RN      Current Outpatient Medications:     aspirin (ASA) 81 MG chewable tablet, 2 tablets (162 mg) by Oral or NG Tube route daily, Disp: 60 tablet, Rfl: 0    atorvastatin (LIPITOR) 80 MG tablet, Take 1 tablet (80 mg) by mouth daily, Disp: 90 tablet, Rfl: 3    ezetimibe (ZETIA) 10 MG tablet, Take 1 tablet (10 mg) by mouth daily, Disp: 90 tablet, Rfl: 3    furosemide (LASIX) 40 MG tablet, Take 0.5 tablets (20 mg) by mouth daily, Disp: 45 tablet, Rfl: 3    metFORMIN (GLUCOPHAGE) 1000 MG tablet, Take 1 Tablet BY MOUTH EVERY DAY, Disp: 90 tablet, Rfl: 3    metoprolol tartrate (LOPRESSOR) 100 MG tablet, Take 1 tablet (100 mg) by mouth 2 times daily, Disp: 180 tablet, Rfl: 3    omeprazole (PRILOSEC) 40 MG DR capsule, Take 1 capsule (40 mg) by mouth every morning, Disp: 90 capsule, Rfl: 3  No current facility-administered medications for this visit.   Thank you for allowing me to participate in the care of your patient.    Sincerely,   Valarie Banda RN   [facility]

## 2023-12-19 NOTE — PROGRESS NOTES
A Double-blind, Randomized, Placebo-controlled, Multicenter Study Assessing the impact of Olpasiran on Major Cardiovascular Events in Patients with Atherosclerotic cardiovascular Disease and Elevated Lipoprotein (a)          Roger Bonilla was seen in clinic today for Visit Week 24    Any Adverse events, Serious Adverse event, changes in medications,  (if any) listed below.      Did the patient have any of the following events (endpoints):   Did the patient have any of the following events (endpoints):   [] Yes   [x]  No   Cardiovascular Death  [] Yes   [x]  No   Myocardial Infarction  [] Yes   [x]  No   Coronary revascularization  [] Yes   [x]  No   Any Coronary Artery Revascularization  [] Yes   [x]  No  Cerebrovascular Revascularization  [] Yes   [x]  No  Peripheral Artery Revascularization  [] Yes   [x]  No  Vascular amputation  [] Yes   [x]  No  Hospitalization for Unstable Angina  [] Yes   [x]  No  Stroke (Ischemic or Hemorrhagic) / Transient Ischemic Attack (TIA)  [] Yes   [x]  No  Deep Vein Thrombosis or Pulmonary Embolism  [] Yes   [x]  No  New onset or clinical deterioration of aortic stenosis  [] Yes   [x]  No  Limb Ischemia  [] Yes   [x]  No  New Onset Diabetes Mellitus   [] Yes   [x]  No  Hospitalization for Heart Failure         Central, Local labs  drawn? Yes  Time 0837.    PK collected post IP dose  Time 1008    Urine Pregnancy test for WOCBP NA      IP Dispensed? [x] Yes   []  No      IP Lot Number: [id number]  IP Box Number: [id number]  Administered into LEFT abdomen at 0902 by Research coordinator  Administrations This Visit       study - olpasiran () vs placebo (IDS# [id number]) pre-filled syringe 142 mg       Admin Date  12/19/2023 Action  $Given Dose  142 mg Route  Subcutaneous Documented By  Valarie Banda RN                       /66 (BP Location: Right arm, Patient Position: Supine, Cuff Size: Adult Large)   Pulse 88   Wt 109.3 kg (240 lb 15.4 oz)   BMI 38.89 kg/m       Valarie THOMAS  JASON Banda      Current Outpatient Medications:     aspirin (ASA) 81 MG chewable tablet, 2 tablets (162 mg) by Oral or NG Tube route daily, Disp: 60 tablet, Rfl: 0    atorvastatin (LIPITOR) 80 MG tablet, Take 1 tablet (80 mg) by mouth daily, Disp: 90 tablet, Rfl: 3    ezetimibe (ZETIA) 10 MG tablet, Take 1 tablet (10 mg) by mouth daily, Disp: 90 tablet, Rfl: 3    furosemide (LASIX) 40 MG tablet, Take 0.5 tablets (20 mg) by mouth daily, Disp: 45 tablet, Rfl: 3    metFORMIN (GLUCOPHAGE) 1000 MG tablet, Take 1 Tablet BY MOUTH EVERY DAY, Disp: 90 tablet, Rfl: 3    metoprolol tartrate (LOPRESSOR) 100 MG tablet, Take 1 tablet (100 mg) by mouth 2 times daily, Disp: 180 tablet, Rfl: 3    omeprazole (PRILOSEC) 40 MG DR capsule, Take 1 capsule (40 mg) by mouth every morning, Disp: 90 capsule, Rfl: 3  No current facility-administered medications for this visit.

## 2023-12-19 NOTE — PROGRESS NOTES
"      Assessment/Recommendations   Assessment:    1.  Hypertension: Controlled.  Blood pressure 114/80  2.  CAD: Denies angina.  Status post CABG in 2022.  He continues aspirin, atorvastatin and Zetia  3.  Hyperlipidemia: Continues atorvastatin and Zetia.  Continues OLPASIRAN- CVOT research study  4.  ABIGAIL: Does not wear CPAP  5.  Obesity: BMI 38.90  6.  Tobacco use: He continues to smoke half a pack per day.  No urged to quit  7.  Diabetes mellitus type 2: Most recent hemoglobin A1c 7.1.  He continues metformin.  Managed by primary.      Plan:  1.  Continue current medications  2.  Continue OLPASIRAN- CVOT research study  3.  Encourage tobacco cessation  4.  Work on weight loss       History of Present Illness/Subjective    Mr. Roger Bonilla is a 68 year old male seen at [facility] today for continued follow-up.  He follows up for the OLPASIRAN- CVOT research study. He has a past medical history significant for CAD, dyslipidemia, CABG, PAD, PVD, hypertension, ABIGAIL not on CPAP, diabetes, tobacco abuse, obesity, right carotid endarterectomy.  He denies any symptoms today. Denies fatigue, lightheadedness, shortness of breath, dyspnea on exertion, orthopnea, PND, palpitations, chest pain, abdominal fullness/bloating and lower extremity edema.           Physical Examination Review of Systems   /80 (BP Location: Left arm, Patient Position: Sitting, Cuff Size: Adult Regular)   Pulse 85   Resp 16   Ht 1.676 m (5' 6\")   Wt 109.3 kg (241 lb)   SpO2 96%   BMI 38.90 kg/m    Body mass index is 38.9 kg/m .  Wt Readings from Last 3 Encounters:   12/19/23 109.3 kg (241 lb)   10/23/23 113.4 kg (250 lb)   10/03/23 112.4 kg (247 lb 12.8 oz)       General Appearance:   no acute distress   ENT/Mouth: No abnormalities   EYES:  no scleral icterus, normal conjunctivae   Neck: no thyromegaly. Cervical lymph nodes nontender and nonpalpable.    Chest/Lungs:   lungs are clear to auscultation, no rales or wheezing, " "equal chest wall expansion    Cardiovascular:   Regular. Normal first and second heart sounds with no murmurs, rubs, or gallops, no edema bilaterally    Abdomen:  Obese, bowel sounds are present   Extremities: no cyanosis or clubbing   Skin: warm   Neurologic: no tremors     Psychiatric: alert and oriented x3    Enc Vitals  BP: 114/80  Pulse: 85  Resp: 16  SpO2: 96 %  Weight: 109.3 kg (241 lb)  Height: 167.6 cm (5' 6\")                                         Medical History  Surgical History Family History Social History   Past Medical History:   Diagnosis Date    AAA (abdominal aortic aneurysm) (H24) 06/23/2021    Acute kidney injury (H24) 06/23/2021    Acute superficial gastritis without hemorrhage 07/19/2021    Atherosclerosis of both carotid arteries 10/27/2008    Coronary artery disease 05/01/2005    2 stents put in heart    Disorder of bursae and tendons in shoulder region 09/01/2005    Hyperlipidemia LDL goal <70 11/18/2002    Morbid obesity (H) 09/16/2013    NSTEMI (non-ST elevated myocardial infarction) (H) 05/2001    Obstructive Sleep Apnea 11/23/2011    Peripheral artery disease (H24) 11/23/2012    Pure hypercholesterolemia 11/18/2002    Tobacco abuse 1973    Tubular adenoma 01/05/2023    Type II or unspecified type diabetes mellitus without mention of complication, not stated as uncontrolled 11/18/2002    Unspecified cardiovascular disease 05/2001    MI -- Angioplasty two stents RCA & OM2    Unspecified essential hypertension 11/18/2002    Past Surgical History:   Procedure Laterality Date    BYPASS GRAFT ARTERY CORONARY N/A 3/24/2022    Procedure: CORONARY ARTERY BYPASS GRAFT x 3 (LIMA - LAD; SV - OM2; SV - PDA) WITH ENDOSCOPIC SAPHENOUS VEIN HARVEST ON BILATERAL LOWER EXTREMITY, AND ON CARDIOPULMONARY PUMP OXYGENATOR  (INTRAOPERATIVE TRANSESOPHAGEAL ECHOCARDIOGRAM BY ANESTHESIOLOGIST);  Surgeon: Glenna Waters MD;  Location: [facility]    CARDIAC SURGERY  may 1, 2005    COLONOSCOPY  11/30/2011    " Procedure:COLONOSCOPY; Screening Colonoscopy; Surgeon:LUTHER FLORES; Location:[facility]    COLONOSCOPY N/A 1/3/2023    Procedure: COLONOSCOPY with polypectomy;  Surgeon: Angel Skinner MD;  Location: [facility]    CV CORONARY ANGIOGRAM N/A 12/29/2021    Procedure: Coronary Angiogram;  Surgeon: Jonny Moore MD;  Location:  [facility]    ENDARTERECTOMY CAROTID Right 2/18/2022    Procedure: Right carotid endarterectomy with ELECTROENCEPHALOGRAM(EEG);  Surgeon: Karma Adorno MD;  Location: [facility]    ESOPHAGOSCOPY, GASTROSCOPY, DUODENOSCOPY (EGD), COMBINED N/A 7/21/2021    Procedure: ESOPHAGOGASTRODUODENOSCOPY, WITH BIOPSY;  Surgeon: Jeff Cunningham DO;  Location: [facility]    PHACOEMULSIFICATION WITH STANDARD INTRAOCULAR LENS IMPLANT Right 7/28/2021    Procedure: Right eye Cataract Extraction with Implant;  Surgeon: Reyes Valdez MD;  Location: [facility]    PHACOEMULSIFICATION WITH STANDARD INTRAOCULAR LENS IMPLANT Left 8/18/2021    Procedure: left eye Cataract Extraction with Implant;  Surgeon: Reyes Valdez MD;  Location: [facility]    SURGICAL HISTORY OF -       None    VASCULAR SURGERY  oct 2013    stent put in leg    Family History   Problem Relation Age of Onset    Cerebrovascular Disease Mother     Respiratory Father         emphysema    Cerebrovascular Disease Maternal Grandmother     Alzheimer Disease Maternal Grandfather     Breast Cancer Sister     Arthritis Other         hip fracture    Cancer - colorectal No family hx of     Prostate Cancer No family hx of     Social History     Socioeconomic History    Marital status:      Spouse name: Not on file    Number of children: Not on file    Years of education: Not on file    Highest education level: Not on file   Occupational History    Not on file   Tobacco Use    Smoking status: Every Day     Packs/day: 0.50     Years: 51.00     Additional pack years: 0.00     Total pack years: 25.50     Types: Cigarettes      Start date: 1/1/1972    Smokeless tobacco: Never    Tobacco comments:     5-10 cigs daily 11/28/22   Vaping Use    Vaping Use: Never used   Substance and Sexual Activity    Alcohol use: Not Currently     Comment: 2 beers a week     Drug use: No    Sexual activity: Yes     Partners: Female     Birth control/protection: Condom   Other Topics Concern    Parent/sibling w/ CABG, MI or angioplasty before 65F 55M? No   Social History Narrative    Not on file     Social Determinants of Health     Financial Resource Strain: Unknown (9/26/2023)    Financial Resource Strain     Within the past 12 months, have you or your family members you live with been unable to get utilities (heat, electricity) when it was really needed?: Patient refused   Food Insecurity: Unknown (9/26/2023)    Food Insecurity     Within the past 12 months, did you worry that your food would run out before you got money to buy more?: Patient refused     Within the past 12 months, did the food you bought just not last and you didn t have money to get more?: Patient refused   Transportation Needs: Unknown (9/26/2023)    Transportation Needs     Within the past 12 months, has lack of transportation kept you from medical appointments, getting your medicines, non-medical meetings or appointments, work, or from getting things that you need?: Patient refused   Physical Activity: Not on file   Stress: Not on file   Social Connections: Not on file   Interpersonal Safety: Not on file   Housing Stability: Unknown (9/26/2023)    Housing Stability     Do you have housing? : Patient refused     Are you worried about losing your housing?: Patient refused          Medications  Allergies   Current Outpatient Medications   Medication Sig Dispense Refill    aspirin (ASA) 81 MG chewable tablet 2 tablets (162 mg) by Oral or NG Tube route daily 60 tablet 0    atorvastatin (LIPITOR) 80 MG tablet Take 1 tablet (80 mg) by mouth daily 90 tablet 3    ezetimibe (ZETIA) 10 MG  tablet Take 1 tablet (10 mg) by mouth daily 90 tablet 3    furosemide (LASIX) 40 MG tablet Take 0.5 tablets (20 mg) by mouth daily 45 tablet 3    metFORMIN (GLUCOPHAGE) 1000 MG tablet Take 1 Tablet BY MOUTH EVERY DAY 90 tablet 3    metoprolol tartrate (LOPRESSOR) 100 MG tablet Take 1 tablet (100 mg) by mouth 2 times daily 180 tablet 3    omeprazole (PRILOSEC) 40 MG DR capsule Take 1 capsule (40 mg) by mouth every morning 90 capsule 3    Allergies   Allergen Reactions    Nkda [No Known Drug Allergy]          Lab Results    Chemistry/lipid CBC Cardiac Enzymes/BNP/TSH/INR   Lab Results   Component Value Date    CHOL 147 03/15/2023    HDL 33 (L) 03/15/2023    TRIG 156 (H) 03/15/2023    BUN 18.0 03/15/2023     03/15/2023    CO2 26 03/15/2023    Lab Results   Component Value Date    WBC 12.3 (H) 05/17/2022    HGB 11.6 (L) 05/17/2022    HCT 38.1 (L) 05/17/2022    MCV 86 05/17/2022     05/17/2022    Lab Results   Component Value Date    TSH 0.35 (L) 06/23/2021    INR 1.31 (H) 03/24/2022             This note has been dictated using voice recognition software. Any grammatical, typographical, or context distortions are unintentional and inherent to the software    20 minutes spent on the date of encounter doing chart review, review of outside records, review of test results, interpretation with above tests, patient visit, and documentation.

## 2024-01-06 ENCOUNTER — HEALTH MAINTENANCE LETTER (OUTPATIENT)
Age: 69
End: 2024-01-06

## 2024-01-31 ENCOUNTER — TRANSFERRED RECORDS (OUTPATIENT)
Dept: CARDIOLOGY | Facility: CLINIC | Age: 69
End: 2024-01-31
Payer: COMMERCIAL

## 2024-01-31 NOTE — PROGRESS NOTES
Research laboratory data from December 19 reviewed and results not clinically significant including mildly high RDW and high WBC with high neutrophil count, mildly high ANC count, mildly high calculated anion gap with high glucose and high hemoglobin A1c.  LF

## 2024-03-19 ENCOUNTER — OFFICE VISIT (OUTPATIENT)
Dept: CARDIOLOGY | Facility: CLINIC | Age: 69
End: 2024-03-19
Payer: COMMERCIAL

## 2024-03-19 VITALS
WEIGHT: 238 LBS | OXYGEN SATURATION: 96 % | HEART RATE: 89 BPM | RESPIRATION RATE: 18 BRPM | BODY MASS INDEX: 38.41 KG/M2 | SYSTOLIC BLOOD PRESSURE: 126 MMHG | DIASTOLIC BLOOD PRESSURE: 84 MMHG

## 2024-03-19 DIAGNOSIS — Z95.1 S/P CABG (CORONARY ARTERY BYPASS GRAFT): Primary | ICD-10-CM

## 2024-03-19 DIAGNOSIS — Z00.6 EXAMINATION OF PARTICIPANT IN CLINICAL TRIAL: ICD-10-CM

## 2024-03-19 PROCEDURE — 36415 COLL VENOUS BLD VENIPUNCTURE: CPT

## 2024-03-19 PROCEDURE — 99207 PR NO CHARGE-RESEARCH SERVICE: CPT

## 2024-03-19 NOTE — PROGRESS NOTES
A Double-blind, Randomized, Placebo-controlled, Multicenter Study Assessing the impact of Olpasiran on Major Cardiovascular Events in Patients with Atherosclerotic cardiovascular Disease and Elevated Lipoprotein (a)      Roger Bonilla was seen in clinic today for Visit Week 36      /84 (BP Location: Left arm, Patient Position: Sitting, Cuff Size: Adult Large)   Pulse 89   Resp 18   Wt 108 kg (238 lb)   SpO2 96%   BMI 38.41 kg/m          Any Adverse events, Serious Adverse event, changes in medications,  (if any) listed below.      Patient reports that he skips 1 dose/week of his furosemide due to frequent need to urinate when taking this medication. I encouraged subject to reach out to PCP to follow up on this and monitor for any signs of weight gain or increased shortness of breath. Will notify PI.          Did the patient have any of the following events (endpoints):   Did the patient have any of the following events (endpoints):   [] Yes   [x]  No   Cardiovascular Death  [] Yes   [x]  No   Myocardial Infarction  [] Yes   [x]  No   Coronary revascularization  [] Yes   [x]  No   Any Coronary Artery Revascularization  [] Yes   [x]  No  Cerebrovascular Revascularization  [] Yes   [x]  No  Peripheral Artery Revascularization  [] Yes   [x]  No  Vascular amputation  [] Yes   [x]  No  Hospitalization for Unstable Angina  [] Yes   [x]  No  Stroke (Ischemic or Hemorrhagic) / Transient Ischemic Attack (TIA)  [] Yes   [x]  No  Deep Vein Thrombosis or Pulmonary Embolism  [] Yes   [x]  No  New onset or clinical deterioration of aortic stenosis  [] Yes   [x]  No  Limb Ischemia  [] Yes   [x]  No  New Onset Diabetes Mellitus   [] Yes   [x]  No  Hospitalization for Heart Failure    Central, Local labs  drawn?Yes  Time 1015.        Urine Pregnancy test for WOCBP No      IP Dispensed? [x] Yes   []  No      IP Lot Number: [id number]  IP Box Number: [id number]  Administered into right abdomen at 1021 by [employer]  coordinator      Valarie Banda, RN        Current Outpatient Medications:     aspirin (ASA) 81 MG chewable tablet, 2 tablets (162 mg) by Oral or NG Tube route daily, Disp: 60 tablet, Rfl: 0    atorvastatin (LIPITOR) 80 MG tablet, Take 1 tablet (80 mg) by mouth daily, Disp: 90 tablet, Rfl: 3    ezetimibe (ZETIA) 10 MG tablet, Take 1 tablet (10 mg) by mouth daily, Disp: 90 tablet, Rfl: 3    furosemide (LASIX) 40 MG tablet, Take 0.5 tablets (20 mg) by mouth daily, Disp: 45 tablet, Rfl: 3    metFORMIN (GLUCOPHAGE) 1000 MG tablet, Take 1 Tablet BY MOUTH EVERY DAY, Disp: 90 tablet, Rfl: 3    metoprolol tartrate (LOPRESSOR) 100 MG tablet, Take 1 tablet (100 mg) by mouth 2 times daily, Disp: 180 tablet, Rfl: 3    omeprazole (PRILOSEC) 40 MG DR capsule, Take 1 capsule (40 mg) by mouth every morning, Disp: 90 capsule, Rfl: 3

## 2024-03-19 NOTE — LETTER
3/19/2024    Maria De Jesus Hay, APRN CNP  [address] [address]    RE: Roger Bonilla       Dear Colleague,     I had the pleasure of seeing Roger Bonilla in the [facility].    A Double-blind, Randomized, Placebo-controlled, Multicenter Study Assessing the impact of Olpasiran on Major Cardiovascular Events in Patients with Atherosclerotic cardiovascular Disease and Elevated Lipoprotein (a)      Roger Bonilla was seen in clinic today for Visit Week 36      /84 (BP Location: Left arm, Patient Position: Sitting, Cuff Size: Adult Large)   Pulse 89   Resp 18   Wt 108 kg (238 lb)   SpO2 96%   BMI 38.41 kg/m          Any Adverse events, Serious Adverse event, changes in medications,  (if any) listed below.      Patient reports that he skips 1 dose/week of his furosemide due to frequent need to urinate when taking this medication. I encouraged subject to reach out to PCP to follow up on this and monitor for any signs of weight gain or increased shortness of breath. Will notify PI.          Did the patient have any of the following events (endpoints):   Did the patient have any of the following events (endpoints):   [] Yes   [x]  No   Cardiovascular Death  [] Yes   [x]  No   Myocardial Infarction  [] Yes   [x]  No   Coronary revascularization  [] Yes   [x]  No   Any Coronary Artery Revascularization  [] Yes   [x]  No  Cerebrovascular Revascularization  [] Yes   [x]  No  Peripheral Artery Revascularization  [] Yes   [x]  No  Vascular amputation  [] Yes   [x]  No  Hospitalization for Unstable Angina  [] Yes   [x]  No  Stroke (Ischemic or Hemorrhagic) / Transient Ischemic Attack (TIA)  [] Yes   [x]  No  Deep Vein Thrombosis or Pulmonary Embolism  [] Yes   [x]  No  New onset or clinical deterioration of aortic stenosis  [] Yes   [x]  No  Limb Ischemia  [] Yes   [x]  No  New Onset Diabetes Mellitus   [] Yes   [x]  No  Hospitalization for Heart Failure    Central, Local labs  drawn?Yes  Time  1015.        Urine Pregnancy test for WOCBP No      IP Dispensed? [x] Yes   []  No      IP Lot Number: [id number]  IP Box Number: [id number]  Administered into right abdomen at 1021 by Research coordinator      Valarie Banda RN        Current Outpatient Medications:     aspirin (ASA) 81 MG chewable tablet, 2 tablets (162 mg) by Oral or NG Tube route daily, Disp: 60 tablet, Rfl: 0    atorvastatin (LIPITOR) 80 MG tablet, Take 1 tablet (80 mg) by mouth daily, Disp: 90 tablet, Rfl: 3    ezetimibe (ZETIA) 10 MG tablet, Take 1 tablet (10 mg) by mouth daily, Disp: 90 tablet, Rfl: 3    furosemide (LASIX) 40 MG tablet, Take 0.5 tablets (20 mg) by mouth daily, Disp: 45 tablet, Rfl: 3    metFORMIN (GLUCOPHAGE) 1000 MG tablet, Take 1 Tablet BY MOUTH EVERY DAY, Disp: 90 tablet, Rfl: 3    metoprolol tartrate (LOPRESSOR) 100 MG tablet, Take 1 tablet (100 mg) by mouth 2 times daily, Disp: 180 tablet, Rfl: 3    omeprazole (PRILOSEC) 40 MG DR capsule, Take 1 capsule (40 mg) by mouth every morning, Disp: 90 capsule, Rfl: 3       Thank you for allowing me to participate in the care of your patient.      Sincerely,     Valarie Banda RN     [facility]  cc:   Arcenio Tello MD  [address], [address]  [place] [address]

## 2024-03-25 ENCOUNTER — TRANSFERRED RECORDS (OUTPATIENT)
Dept: CARDIOLOGY | Facility: CLINIC | Age: 69
End: 2024-03-25
Payer: COMMERCIAL

## 2024-03-25 NOTE — PROGRESS NOTES
Research laboratory data from March 19 reviewed and results not clinically significant including mildly high calculated anion gap, mildly increased RDW, mildly increased neutrophil count as well as mildly decreased lymphocyte count, mildly increased anucleated red cells.  LF    Research laboratory data from December 19 normal and thus not clinically significant.  LF

## 2024-04-08 ENCOUNTER — TELEPHONE (OUTPATIENT)
Dept: FAMILY MEDICINE | Facility: CLINIC | Age: 69
End: 2024-04-08
Payer: COMMERCIAL

## 2024-04-08 DIAGNOSIS — E11.9 TYPE 2 DIABETES MELLITUS WITHOUT COMPLICATION, WITHOUT LONG-TERM CURRENT USE OF INSULIN (H): Primary | Chronic | ICD-10-CM

## 2024-04-08 NOTE — TELEPHONE ENCOUNTER
Patient is due for a diabetes follow up appointment with me.      Fasting labs due:  Lipid panel, A1C, and Metabolic Panel    Orders were placed, please have lab work done before visit.      Please ask patient to bring in their glucometer so we can download the data.    Please call patient to schedule.    Patient also due for:    Annual Medicare Wellness  Lung cancer screening    Maria De Jesus Hay, CNP

## 2024-04-08 NOTE — TELEPHONE ENCOUNTER
Patient Quality Outreach    Patient is due for the following:   Diabetes -  Diabetic Follow-Up Visit  Physical Annual Wellness Visit    Next Steps:   Patient was scheduled for labs/office visit     Type of outreach:    Phone, spoke to patient/parent. See above      Questions for provider review:    None           Maco Mackay, CMA

## 2024-04-15 ENCOUNTER — TELEPHONE (OUTPATIENT)
Dept: CARDIOLOGY | Facility: CLINIC | Age: 69
End: 2024-04-15

## 2024-04-15 ENCOUNTER — OFFICE VISIT (OUTPATIENT)
Dept: FAMILY MEDICINE | Facility: CLINIC | Age: 69
End: 2024-04-15
Payer: COMMERCIAL

## 2024-04-15 ENCOUNTER — LAB (OUTPATIENT)
Dept: LAB | Facility: CLINIC | Age: 69
End: 2024-04-15
Payer: COMMERCIAL

## 2024-04-15 VITALS
WEIGHT: 237.7 LBS | OXYGEN SATURATION: 95 % | RESPIRATION RATE: 20 BRPM | SYSTOLIC BLOOD PRESSURE: 130 MMHG | HEART RATE: 96 BPM | DIASTOLIC BLOOD PRESSURE: 70 MMHG | TEMPERATURE: 98.2 F | HEIGHT: 66 IN | BODY MASS INDEX: 38.2 KG/M2

## 2024-04-15 DIAGNOSIS — I48.92 ATRIAL FIBRILLATION/FLUTTER (H): ICD-10-CM

## 2024-04-15 DIAGNOSIS — I48.91 ATRIAL FIBRILLATION/FLUTTER (H): ICD-10-CM

## 2024-04-15 DIAGNOSIS — Z12.5 SCREENING FOR PROSTATE CANCER: ICD-10-CM

## 2024-04-15 DIAGNOSIS — I73.9 PERIPHERAL VASCULAR DISEASE (H): ICD-10-CM

## 2024-04-15 DIAGNOSIS — Z87.891 PERSONAL HISTORY OF TOBACCO USE: ICD-10-CM

## 2024-04-15 DIAGNOSIS — E78.5 HYPERLIPIDEMIA LDL GOAL <70: ICD-10-CM

## 2024-04-15 DIAGNOSIS — E66.01 MORBID OBESITY (H): ICD-10-CM

## 2024-04-15 DIAGNOSIS — Z00.00 ENCOUNTER FOR MEDICARE ANNUAL WELLNESS EXAM: Primary | ICD-10-CM

## 2024-04-15 DIAGNOSIS — I25.118 CORONARY ARTERY DISEASE OF NATIVE ARTERY OF NATIVE HEART WITH STABLE ANGINA PECTORIS (H): ICD-10-CM

## 2024-04-15 DIAGNOSIS — I49.9 IRREGULAR HEART RATE: ICD-10-CM

## 2024-04-15 DIAGNOSIS — E11.9 TYPE 2 DIABETES MELLITUS WITHOUT COMPLICATION, WITHOUT LONG-TERM CURRENT USE OF INSULIN (H): Chronic | ICD-10-CM

## 2024-04-15 DIAGNOSIS — E11.9 TYPE 2 DIABETES MELLITUS WITHOUT COMPLICATION, WITHOUT LONG-TERM CURRENT USE OF INSULIN (H): ICD-10-CM

## 2024-04-15 DIAGNOSIS — Z95.1 S/P CABG (CORONARY ARTERY BYPASS GRAFT): ICD-10-CM

## 2024-04-15 LAB
ANION GAP SERPL CALCULATED.3IONS-SCNC: 13 MMOL/L (ref 7–15)
BUN SERPL-MCNC: 23.2 MG/DL (ref 8–23)
CALCIUM SERPL-MCNC: 9.7 MG/DL (ref 8.8–10.2)
CHLORIDE SERPL-SCNC: 96 MMOL/L (ref 98–107)
CHOLEST SERPL-MCNC: 163 MG/DL
CREAT SERPL-MCNC: 1.25 MG/DL (ref 0.67–1.17)
DEPRECATED HCO3 PLAS-SCNC: 29 MMOL/L (ref 22–29)
EGFRCR SERPLBLD CKD-EPI 2021: 63 ML/MIN/1.73M2
FASTING STATUS PATIENT QL REPORTED: YES
GLUCOSE SERPL-MCNC: 114 MG/DL (ref 70–99)
HBA1C MFR BLD: 6.8 % (ref 0–5.6)
HDLC SERPL-MCNC: 33 MG/DL
LDLC SERPL CALC-MCNC: 90 MG/DL
NONHDLC SERPL-MCNC: 130 MG/DL
POTASSIUM SERPL-SCNC: 4.8 MMOL/L (ref 3.4–5.3)
PSA SERPL DL<=0.01 NG/ML-MCNC: 3.17 NG/ML (ref 0–4.5)
SODIUM SERPL-SCNC: 138 MMOL/L (ref 135–145)
TRIGL SERPL-MCNC: 201 MG/DL

## 2024-04-15 PROCEDURE — 91320 SARSCV2 VAC 30MCG TRS-SUC IM: CPT | Performed by: NURSE PRACTITIONER

## 2024-04-15 PROCEDURE — 36415 COLL VENOUS BLD VENIPUNCTURE: CPT

## 2024-04-15 PROCEDURE — G0296 VISIT TO DETERM LDCT ELIG: HCPCS | Performed by: NURSE PRACTITIONER

## 2024-04-15 PROCEDURE — 80048 BASIC METABOLIC PNL TOTAL CA: CPT

## 2024-04-15 PROCEDURE — 80061 LIPID PANEL: CPT

## 2024-04-15 PROCEDURE — 99214 OFFICE O/P EST MOD 30 MIN: CPT | Mod: 25 | Performed by: NURSE PRACTITIONER

## 2024-04-15 PROCEDURE — 90662 IIV NO PRSV INCREASED AG IM: CPT | Mod: GZ | Performed by: NURSE PRACTITIONER

## 2024-04-15 PROCEDURE — 93000 ELECTROCARDIOGRAM COMPLETE: CPT | Performed by: NURSE PRACTITIONER

## 2024-04-15 PROCEDURE — G0103 PSA SCREENING: HCPCS

## 2024-04-15 PROCEDURE — G0008 ADMIN INFLUENZA VIRUS VAC: HCPCS | Mod: GZ | Performed by: NURSE PRACTITIONER

## 2024-04-15 PROCEDURE — 90677 PCV20 VACCINE IM: CPT | Mod: GZ | Performed by: NURSE PRACTITIONER

## 2024-04-15 PROCEDURE — G0439 PPPS, SUBSEQ VISIT: HCPCS | Performed by: NURSE PRACTITIONER

## 2024-04-15 PROCEDURE — 90480 ADMN SARSCOV2 VAC 1/ONLY CMP: CPT | Performed by: NURSE PRACTITIONER

## 2024-04-15 PROCEDURE — G0009 ADMIN PNEUMOCOCCAL VACCINE: HCPCS | Mod: GZ | Performed by: NURSE PRACTITIONER

## 2024-04-15 PROCEDURE — 83036 HEMOGLOBIN GLYCOSYLATED A1C: CPT

## 2024-04-15 RX ORDER — ATORVASTATIN CALCIUM 80 MG/1
80 TABLET, FILM COATED ORAL DAILY
Qty: 90 TABLET | Refills: 3 | Status: SHIPPED | OUTPATIENT
Start: 2024-04-15

## 2024-04-15 RX ORDER — FUROSEMIDE 40 MG
20 TABLET ORAL DAILY
Qty: 45 TABLET | Refills: 3 | Status: SHIPPED | OUTPATIENT
Start: 2024-04-15

## 2024-04-15 SDOH — HEALTH STABILITY: PHYSICAL HEALTH: ON AVERAGE, HOW MANY DAYS PER WEEK DO YOU ENGAGE IN MODERATE TO STRENUOUS EXERCISE (LIKE A BRISK WALK)?: 3 DAYS

## 2024-04-15 SDOH — HEALTH STABILITY: PHYSICAL HEALTH: ON AVERAGE, HOW MANY MINUTES DO YOU ENGAGE IN EXERCISE AT THIS LEVEL?: 10 MIN

## 2024-04-15 ASSESSMENT — PATIENT HEALTH QUESTIONNAIRE - PHQ9
10. IF YOU CHECKED OFF ANY PROBLEMS, HOW DIFFICULT HAVE THESE PROBLEMS MADE IT FOR YOU TO DO YOUR WORK, TAKE CARE OF THINGS AT HOME, OR GET ALONG WITH OTHER PEOPLE: SOMEWHAT DIFFICULT
SUM OF ALL RESPONSES TO PHQ QUESTIONS 1-9: 12
SUM OF ALL RESPONSES TO PHQ QUESTIONS 1-9: 12

## 2024-04-15 ASSESSMENT — PAIN SCALES - GENERAL: PAINLEVEL: NO PAIN (0)

## 2024-04-15 ASSESSMENT — SOCIAL DETERMINANTS OF HEALTH (SDOH): HOW OFTEN DO YOU GET TOGETHER WITH FRIENDS OR RELATIVES?: ONCE A WEEK

## 2024-04-15 NOTE — TELEPHONE ENCOUNTER
Rec call from Maria De Jesus Hay CNP (pt's PCP). She is seeing pt now and and EKG was done. Shows AFlutter. Appears pt is in a study for a lipid lowering medication through [facility] and had EKGs done on 7/7/23 and 12/19/23 both showing AFlutter but no anticoagulation was started. Maria De Jesus would like to speak with a cardiologist regarding this. Directed to call [place] and speak with the [facility]--number given. Will schedule pt with Kamryn Carroll NP on 4/18/24 at 4:15 pm for virtual appt. Scheduling will call pt tomorrow to make sure he can keep appt. Emilia Araujo RN Cardiology April 15, 2024, 3:53 PM

## 2024-04-15 NOTE — PROGRESS NOTES
Preventive Care Visit  [facility]  Maria De Jesus Hay, APRN CNP, Family Medicine  Apr 15, 2024          Assessment & Plan     Encounter for Medicare annual wellness exam      Atrial fibrillation/flutter (H)  New diagnosis.  CMA had difficulty obtaining blood pressure when patient arrived, stated pulse was irregular.  I was able to obtain a blood pressure manually, however her pulse was irregular.  Therefore EKG was obtained showing atrial flutter.  Patient has no known history of atrial flutter or atrial fibrillation.  He is participating in a lipid study.  An EKG was obtained in July 2023 and then again in December 2023 as part of the study protocol.  Patient had atrial flutter on both of these EKGs.  There is no documentation that these findings were addressed or that the patient was referred back to his cardiologist.  Atrial flutter present since at least July 2023 when it was first noted on EKG.  He is already beta blocked with metoprolol.  He is asymptomatic.  Recommend starting anticoagulation with Eliquis.  Stop aspirin.  I called the cardiology clinic and they will see him on Thursday.  He will likely need an echo but will defer to cardiology to decide what diagnostics are needed next.  - apixaban ANTICOAGULANT (ELIQUIS) 5 MG tablet; Take 1 tablet (5 mg) by mouth 2 times daily  - OFFICE/OUTPT VISIT,EST,LEVL IV    Type 2 diabetes mellitus without complication, without long-term current use of insulin (H)  Well-controlled  - metFORMIN (GLUCOPHAGE) 1000 MG tablet; Take 1 Tablet BY MOUTH EVERY DAY  - OFFICE/OUTPT VISIT,EST,LEVL IV    Peripheral vascular disease (H24)  Well-controlled  - atorvastatin (LIPITOR) 80 MG tablet; Take 1 tablet (80 mg) by mouth daily  - OFFICE/OUTPT VISIT,EST,LEVL IV    Morbid obesity (H)  Encouraged weight loss.  Would likely help with diabetes control and lipid control.  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 (H24)  Follows with cardiology  - atorvastatin (LIPITOR) 80 MG tablet; Take 1 tablet (80 mg) by mouth daily  - OFFICE/OUTPT VISIT,EST,LEVL IV    S/P CABG (coronary artery bypass graft)  Follows with cardiology  - furosemide (LASIX) 40 MG tablet; Take 0.5 tablets (20 mg) by mouth daily  - OFFICE/OUTPT VISIT,EST,LEVL IV    Hyperlipidemia LDL goal <70  Well-controlled  - atorvastatin (LIPITOR) 80 MG tablet; Take 1 tablet (80 mg) by mouth daily  - OFFICE/OUTPT VISIT,EST,LEVL IV    Screening for prostate cancer  - PSA, screen; Future    Personal history of tobacco use  - Prof fee: Shared Decision Making for Lung Cancer Screening  - CT Chest Lung Cancer Scrn Low Dose wo; Future    Irregular heart rate  - EKG 12-lead complete w/read - Clinics  - OFFICE/OUTPT VISIT,EST,LEVL IV              BMI  Estimated body mass index is 38.37 kg/m  as calculated from the following:    Height as of this encounter: 1.676 m (5' 6\").    Weight as of this encounter: 107.8 kg (237 lb 11.2 oz).   Weight management plan: Discussed healthy diet and exercise guidelines      Counseling  Appropriate preventive services were discussed with this patient, including applicable screening as appropriate for fall prevention, nutrition, physical activity, Tobacco-use cessation, weight loss and cognition.  Checklist reviewing preventive services available has been given to the patient.  Reviewed patient's diet, addressing concerns and/or questions.   He is at risk for lack of exercise and has been provided with information to increase physical activity for the benefit of his well-being.   Discussed possible causes of fatigue. Patient reported safety concerns were addressed today.The patient's PHQ-9 score is consistent with moderate depression. He was provided with information regarding depression.     The risks, benefits and treatment options of prescribed medications or other treatments have been discussed with the patient. " The patient verbalized their understanding and should call or follow up if no improvement or if they develop further problems.  Maria De Jesus Bharti, DILEEP                    Brittani Mccord is a 68 year old, presenting for the following:  Wellness Visit, Diabetes, Lipids, and Hypertension        4/15/2024     2:31 PM   Additional Questions   Roomed by Maco CHESTER CMA   Accompanied by self         Health Care Directive  Patient does not have a Health Care Directive or Living Will: Discussed advance care planning with patient; however, patient declined at this time.    HPI      Diabetes Follow-up    How often are you checking your blood sugar? Not at all  What concerns do you have today about your diabetes? None   Do you have any of these symptoms? (Select all that apply)  No numbness or tingling in feet.  No redness, sores or blisters on feet.  No complaints of excessive thirst.  No reports of blurry vision.  No significant changes to weight.          Hyperlipidemia Follow-Up    Are you regularly taking any medication or supplement to lower your cholesterol?   Yes- Lipitor   Are you having muscle aches or other side effects that you think could be caused by your cholesterol lowering medication?  No    Hypertension Follow-up    Do you check your blood pressure regularly outside of the clinic? No   Are you following a low salt diet? Yes  Are your blood pressures ever more than 140 on the top number (systolic) OR more   than 90 on the bottom number (diastolic), for example 140/90? No    BP Readings from Last 2 Encounters:   04/15/24 130/70   03/19/24 126/84     Hemoglobin A1C (%)   Date Value   04/15/2024 6.8 (H)   10/03/2023 7.1 (H)   03/25/2021 6.4 (H)   06/04/2020 7.0 (H)     LDL Cholesterol Calculated (mg/dL)   Date Value   04/15/2024 90   03/15/2023 83   06/04/2020 86   06/11/2019 87     Vascular Disease Follow-up    How often do you take nitroglycerin? Never  Do you take an aspirin every day? Yes      Obesity:  Trying  to eat less.    Wt Readings from Last 4 Encounters:   04/15/24 107.8 kg (237 lb 11.2 oz)   03/19/24 108 kg (238 lb)   12/19/23 109.3 kg (240 lb 15.4 oz)   12/19/23 109.3 kg (241 lb)             4/15/2024   General Health   How would you rate your overall physical health? (!) FAIR   Feel stress (tense, anxious, or unable to sleep) To some extent   (!) STRESS CONCERN      4/15/2024   Nutrition   Diet: Regular (no restrictions)    Low salt    Low fat/cholesterol    Diabetic         4/15/2024   Exercise   Days per week of moderate/strenous exercise 3 days   Average minutes spent exercising at this level 10 min         4/15/2024   Social Factors   Frequency of gathering with friends or relatives Once a week   Worry food won't last until get money to buy more No   Food not last or not have enough money for food? No   Do you have housing?  Yes   Are you worried about losing your housing? No   Lack of transportation? No   Unable to get utilities (heat,electricity)? No         4/15/2024   Fall Risk   Fallen 2 or more times in the past year? Yes   Trouble with walking or balance? No   Reason Gait Speed Test Not Completed Patient declines   Reason for decline answered fall risk in correctly per patient - was pushed out of bed by dog he states that is how he fell.          4/15/2024   Activities of Daily Living- Home Safety   Needs help with the following daily activites None of the above   Safety concerns in the home Throw rugs in the hallway    No grab bars in the bathroom         4/15/2024   Dental   Dentist two times every year? Yes         4/15/2024   Hearing Screening   Hearing concerns? None of the above         4/15/2024   Driving Risk Screening   Patient/family members have concerns about driving No         4/15/2024   General Alertness/Fatigue Screening   Have you been more tired than usual lately? (!) YES         4/15/2024   Urinary Incontinence Screening   Bothered by leaking urine in past 6 months No          4/15/2024   TB Screening   Were you born outside of the [place]? No       Today's PHQ-9 Score:       4/15/2024     2:30 PM   PHQ-9 SCORE   PHQ-9 Total Score MyChart 12 (Moderate depression)   PHQ-9 Total Score 12         4/15/2024   Substance Use   If I could quit smoking, I would Completely agree   I want to quit somking, worry about health affects Completely disagree   Willing to make a plan to quit smoking Completely disagree   Willing to cut down before quitting Completely agree   Alcohol more than 3/day or more than 7/wk No   Do you have a current opioid prescription? No   How severe/bad is pain from 1 to 10? 0/10 (No Pain)   Do you use any other substances recreationally? (!) CANNABIS PRODUCTS     Social History     Tobacco Use    Smoking status: Every Day     Current packs/day: 0.50     Average packs/day: 0.5 packs/day for 52.3 years (26.1 ttl pk-yrs)     Types: Cigarettes     Start date: 1/1/1972    Smokeless tobacco: Never    Tobacco comments:     5-10 cigs daily 11/28/22   Vaping Use    Vaping status: Never Used   Substance Use Topics    Alcohol use: Not Currently     Comment: 2 beers a week     Drug use: No       Last PSA:   PSA   Date Value Ref Range Status   09/30/2011 1.59 0 - 4 ug/L Final     ASCVD Risk   The ASCVD Risk score (Savage DK, et al., 2019) failed to calculate for the following reasons:    The patient has a prior MI or stroke diagnosis            Reviewed and updated as needed this visit by Provider   Tobacco  Allergies  Meds  Problems  Med Hx  Surg Hx  Fam Hx              Current providers sharing in care for this patient include:  Patient Care Team:  Maria De Jesus Hay APRN CNP as PCP - General (Nurse Practitioner)  Maria De Jesus Hay APRN CNP as Assigned PCP  Parveen Ramirez MD as MD (Cardiology)  Tammy Hernandez PA-C as Physician Assistant (Cardiovascular Disease)  Opal Baird APRN CNP as Assigned Surgical Provider  Rima Encarnacion APRN CNP as Assigned  "Heart and Vascular Provider    The following health maintenance items are reviewed in Epic and correct as of today:  Health Maintenance   Topic Date Due    RSV VACCINE (Pregnancy & 60+) (1 - 1-dose 60+ series) Never done    Pneumococcal Vaccine: 65+ Years (2 of 2 - PCV) 01/29/2017    LUNG CANCER SCREENING  03/17/2023    INFLUENZA VACCINE (1) 09/01/2023    COVID-19 Vaccine (6 - 2023-24 season) 09/01/2023    DIABETIC FOOT EXAM  10/14/2023    NICOTINE/TOBACCO CESSATION COUNSELING Q 1 YR  10/03/2024    MICROALBUMIN  10/03/2024    A1C  10/15/2024    MEDICARE ANNUAL WELLNESS VISIT  04/15/2025    BMP  04/15/2025    LIPID  04/15/2025    ANNUAL REVIEW OF HM ORDERS  04/15/2025    FALL RISK ASSESSMENT  04/15/2025    EYE EXAM  11/07/2025    ADVANCE CARE PLANNING  10/14/2027    COLORECTAL CANCER SCREENING  01/03/2030    DTAP/TDAP/TD IMMUNIZATION (3 - Td or Tdap) 10/23/2033    HEPATITIS C SCREENING  Completed    PHQ-2 (once per calendar year)  Completed    ZOSTER IMMUNIZATION  Completed    AORTIC ANEURYSM SCREENING (SYSTEM ASSIGNED)  Completed    IPV IMMUNIZATION  Aged Out    HPV IMMUNIZATION  Aged Out    MENINGITIS IMMUNIZATION  Aged Out    RSV MONOCLONAL ANTIBODY  Aged Out         Review of Systems  Constitutional, neuro, ENT, endocrine, pulmonary, cardiac, gastrointestinal, genitourinary, musculoskeletal, integument and psychiatric systems are negative, except as otherwise noted.     Objective    Exam  /70   Pulse 96   Temp 98.2  F (36.8  C) (Tympanic)   Resp 20   Ht 1.676 m (5' 6\")   Wt 107.8 kg (237 lb 11.2 oz)   SpO2 95%   BMI 38.37 kg/m     Estimated body mass index is 38.37 kg/m  as calculated from the following:    Height as of this encounter: 1.676 m (5' 6\").    Weight as of this encounter: 107.8 kg (237 lb 11.2 oz).    Physical Exam  GENERAL: alert and no distress  EYES: Eyes grossly normal to inspection, PERRL and conjunctivae and sclerae normal  HENT: ear canals and TM's normal, nose and mouth without " ulcers or lesions  NECK: no adenopathy, no asymmetry, masses, or scars  RESP: lungs clear to auscultation - no rales, rhonchi or wheezes  CV: irregularly irregular rhythm  MS: no gross musculoskeletal defects noted, no edema  SKIN: no suspicious lesions or rashes  NEURO: Normal strength and tone, mentation intact and speech normal  PSYCH: mentation appears normal, affect normal/bright         4/15/2024   Mini Cog   Clock Draw Score 2 Normal   3 Item Recall 3 objects recalled   Mini Cog Total Score 5              Signed Electronically by: SAL Kc CNP    Answers submitted by the patient for this visit:  Patient Health Questionnaire (Submitted on 4/15/2024)  If you checked off any problems, how difficult have these problems made it for you to do your work, take care of things at home, or get along with other people?: Somewhat difficult  PHQ9 TOTAL SCORE: 12  Lung Cancer Screening Shared Decision Making Visit     Roger Bonilla, a 68 year old male, is eligible for lung cancer screening    History   Smoking Status    Every Day    Packs/day: 0.50    Years: 51.00    Types: Cigarettes    Start date: 1/1/1972   Smokeless Tobacco    Never       I have discussed with patient the risks and benefits of screening for lung cancer with low-dose CT.     The risks include:    radiation exposure: one low dose chest CT has as much ionizing radiation as about 15 chest x-rays, or 6 months of background radiation living in [place]      false positives: most findings/nodules are NOT cancer, but some might still require additional diagnostic evaluation, including biopsy    over-diagnosis: some slow growing cancers that might never have been clinically significant will be detected and treated unnecessarily     The benefit of early detection of lung cancer is contingent upon adherence to annual screening or more frequent follow up if indicated.     Furthermore, to benefit from screening, Roger must be willing and able to  undergo diagnostic procedures, if indicated. Although no specific guide is available for determining severity of comorbidities, it is reasonable to withhold screening in patients who have greater mortality risk from other diseases.     We did discuss that the best way to prevent lung cancer is to not smoke.    Some patients may value a numeric estimation of lung cancer risk when evaluating if lung cancer screening is right for them, here is one calculator:    ShouldIScreen

## 2024-04-15 NOTE — PATIENT INSTRUCTIONS
Stop the aspirin.  Start Eliquis for blood thinner: 5 mg twice daily.      Preventive Care Advice   This is general advice given by our system to help you stay healthy. However, your care team may have specific advice just for you. Please talk to your care team about your preventive care needs.  Nutrition  Eat 5 or more servings of fruits and vegetables each day.  Try wheat bread, brown rice and whole grain pasta (instead of white bread, rice, and pasta).  Get enough calcium and vitamin D. Check the label on foods and aim for 100% of the RDA (recommended daily allowance).  Lifestyle  Exercise at least 150 minutes each week   (30 minutes a day, 5 days a week).  Do muscle strengthening activities 2 days a week. These help control your weight and prevent disease.  No smoking.  Wear sunscreen to prevent skin cancer.  Have a dental exam and cleaning every 6 months.  Yearly exams  See your health care team every year to talk about:  Any changes in your health.  Any medicines your care team has prescribed.  Preventive care, family planning, and ways to prevent chronic diseases.  Shots (vaccines)   HPV shots (up to age 26), if you've never had them before.  Hepatitis B shots (up to age 59), if you've never had them before.  COVID-19 shot: Get this shot when it's due.  Flu shot: Get a flu shot every year.  Tetanus shot: Get a tetanus shot every 10 years.  Pneumococcal, hepatitis A, and RSV shots: Ask your care team if you need these based on your risk.  Shingles shot (for age 50 and up).  General health tests  Diabetes screening:  Starting at age 35, Get screened for diabetes at least every 3 years.  If you are younger than age 35, ask your care team if you should be screened for diabetes.  Cholesterol test: At age 39, start having a cholesterol test every 5 years, or more often if advised.  Bone density scan (DEXA): At age 50, ask your care team if you should have this scan for osteoporosis (brittle bones).  Hepatitis C:  Get tested at least once in your life.  STIs (sexually transmitted infections)  Before age 24: Ask your care team if you should be screened for STIs.  After age 24: Get screened for STIs if you're at risk. You are at risk for STIs (including HIV) if:  You are sexually active with more than one person.  You don't use condoms every time.  You or a partner was diagnosed with a sexually transmitted infection.  If you are at risk for HIV, ask about PrEP medicine to prevent HIV.  Get tested for HIV at least once in your life, whether you are at risk for HIV or not.  Cancer screening tests  Cervical cancer screening: If you have a cervix, begin getting regular cervical cancer screening tests at age 21. Most people who have regular screenings with normal results can stop after age 65. Talk about this with your provider.  Breast cancer scan (mammogram): If you've ever had breasts, begin having regular mammograms starting at age 40. This is a scan to check for breast cancer.  Colon cancer screening: It is important to start screening for colon cancer at age 45.  Have a colonoscopy test every 10 years (or more often if you're at risk) Or, ask your provider about stool tests like a FIT test every year or Cologuard test every 3 years.  To learn more about your testing options, visit: [email or web address].  For help making a decision, visit: https://bit.ly/dk89203.  Prostate cancer screening test: If you have a prostate and are age 55 to 69, ask your provider if you would benefit from a yearly prostate cancer screening test.  Lung cancer screening: If you are a current or former smoker age 50 to 80, ask your care team if ongoing lung cancer screenings are right for you.  For informational purposes only. Not to replace the advice of your health care provider. Copyright   2023 [employer]. All rights reserved. Clinically reviewed by the [facility] Transitions [facility]. [employer] 436995 - REV  01/24.    Learning About Activities of Daily Living  What are activities of daily living?     Activities of daily living (ADLs) are the basic self-care tasks you do every day. These include eating, bathing, dressing, and moving around.  As you age, and if you have health problems, you may find that it's harder to do some of these tasks. If so, your doctor can suggest ideas that may help.  To measure what kind of help you may need, your doctor will ask how well you are able to do ADLs. Let your doctor know if there are any tasks that you are having trouble doing. This is an important first step to getting help. And when you have the help you need, you can stay as independent as possible.  How will a doctor assess your ADLs?  Asking about ADLs is part of a routine health checkup your doctor will likely do as you age. Your health check might be done in a doctor's office, in your home, or at a hospital. The goal is to find out if you are having any problems that could make it hard to care for yourself or that make it unsafe for you to be on your own.  To measure your ADLs, your doctor will ask how hard it is for you to do routine tasks. Your doctor may also want to know if you have changed the way you do a task because of a health problem. Your doctor may watch how you:  Walk back and forth.  Keep your balance while you stand or walk.  Move from sitting to standing or from a bed to a chair.  Button or unbutton a shirt or sweater.  Remove and put on your shoes.  It's common to feel a little worried or anxious if you find you can't do all the things you used to be able to do. Talking with your doctor about ADLs is a way to make sure you're as safe as possible and able to care for yourself as well as you can. You may want to bring a caregiver, friend, or family member to your checkup. They can help you talk to your doctor.  Follow-up care is a key part of your treatment and safety. Be sure to make and go to all  appointments, and call your doctor if you are having problems. It's also a good idea to know your test results and keep a list of the medicines you take.  Current as of: October 24, 2023               Content Version: 14.0    8263-3478 [employer].   Care instructions adapted under license by your healthcare professional. If you have questions about a medical condition or this instruction, always ask your healthcare professional. [employer] disclaims any warranty or liability for your use of this information.      Preventing Falls: Care Instructions  Injuries and health problems such as trouble walking or poor eyesight can increase your risk of falling. So can some medicines. But there are things you can do to help prevent falls. You can exercise to get stronger. You can also arrange your home to make it safer.    Talk to your doctor about the medicines you take. Ask if any of them increase the risk of falls and whether they can be changed or stopped.   Try to exercise regularly. It can help improve your strength and balance. This can help lower your risk of falling.     Practice fall safety and prevention.    Wear low-heeled shoes that fit well and give your feet good support. Talk to your doctor if you have foot problems that make this hard.  Carry a cellphone or wear a medical alert device that you can use to call for help.  Use stepladders instead of chairs to reach high objects. Don't climb if you're at risk for falls. Ask for help, if needed.  Wear the correct eyeglasses, if you need them.    Make your home safer.    Remove rugs, cords, clutter, and furniture from walkways.  Keep your house well lit. Use night-lights in hallways and bathrooms.  Install and use sturdy handrails on stairways.  Wear nonskid footwear, even inside. Don't walk barefoot or in socks without shoes.    Be safe outside.    Use handrails, curb cuts, and ramps whenever possible.  Keep your hands free by using a  "shoulder bag or backpack.  Try to walk in well-lit areas. Watch out for uneven ground, changes in pavement, and debris.  Be careful in the winter. Walk on the grass or gravel when sidewalks are slippery. Use de-icer on steps and walkways. Add non-slip devices to shoes.    Put grab bars and nonskid mats in your shower or tub and near the toilet. Try to use a shower chair or bath bench when bathing.   Get into a tub or shower by putting in your weaker leg first. Get out with your strong side first. Have a phone or medical alert device in the bathroom with you.   Where can you learn more?  Go to https://www.[employer].net/patiented  Enter G117 in the search box to learn more about \"Preventing Falls: Care Instructions.\"  Current as of: July 17, 2023               Content Version: 14.0    5433-3164 [employer].   Care instructions adapted under license by your healthcare professional. If you have questions about a medical condition or this instruction, always ask your healthcare professional. [employer] disclaims any warranty or liability for your use of this information.      Learning About Stress  What is stress?     Stress is your body's response to a hard situation. Your body can have a physical, emotional, or mental response. Stress is a fact of life for most people, and it affects everyone differently. What causes stress for you may not be stressful for someone else.  A lot of things can cause stress. You may feel stress when you go on a job interview, take a test, or run a race. This kind of short-term stress is normal and even useful. It can help you if you need to work hard or react quickly. For example, stress can help you finish an important job on time.  Long-term stress is caused by ongoing stressful situations or events. Examples of long-term stress include long-term health problems, ongoing problems at work, or conflicts in your family. Long-term stress can harm your health.  How " does stress affect your health?  When you are stressed, your body responds as though you are in danger. It makes hormones that speed up your heart, make you breathe faster, and give you a burst of energy. This is called the fight-or-flight stress response. If the stress is over quickly, your body goes back to normal and no harm is done.  But if stress happens too often or lasts too long, it can have bad effects. Long-term stress can make you more likely to get sick, and it can make symptoms of some diseases worse. If you tense up when you are stressed, you may develop neck, shoulder, or low back pain. Stress is linked to high blood pressure and heart disease.  Stress also harms your emotional health. It can make you segal, tense, or depressed. Your relationships may suffer, and you may not do well at work or school.  What can you do to manage stress?  You can try these things to help manage stress:   Do something active. Exercise or activity can help reduce stress. Walking is a great way to get started. Even everyday activities such as housecleaning or yard work can help.  Try yoga or steve chi. These techniques combine exercise and meditation. You may need some training at first to learn them.  Do something you enjoy. For example, listen to music or go to a movie. Practice your hobby or do volunteer work.  Meditate. This can help you relax, because you are not worrying about what happened before or what may happen in the future.  Do guided imagery. Imagine yourself in any setting that helps you feel calm. You can use online videos, books, or a teacher to guide you.  Do breathing exercises. For example:  From a standing position, bend forward from the waist with your knees slightly bent. Let your arms dangle close to the floor.  Breathe in slowly and deeply as you return to a standing position. Roll up slowly and lift your head last.  Hold your breath for just a few seconds in the standing position.  Breathe out  "slowly and bend forward from the waist.  Let your feelings out. Talk, laugh, cry, and express anger when you need to. Talking with supportive friends or family, a counselor, or a hung leader about your feelings is a healthy way to relieve stress. Avoid discussing your feelings with people who make you feel worse.  Write. It may help to write about things that are bothering you. This helps you find out how much stress you feel and what is causing it. When you know this, you can find better ways to cope.  What can you do to prevent stress?  You might try some of these things to help prevent stress:  Manage your time. This helps you find time to do the things you want and need to do.  Get enough sleep. Your body recovers from the stresses of the day while you are sleeping.  Get support. Your family, friends, and community can make a difference in how you experience stress.  Limit your news feed. Avoid or limit time on social media or news that may make you feel stressed.  Do something active. Exercise or activity can help reduce stress. Walking is a great way to get started.  Where can you learn more?  Go to https://www.[employer].net/patiented  Enter N032 in the search box to learn more about \"Learning About Stress.\"  Current as of: October 24, 2023               Content Version: 14.0    9569-2649 [employer]   Care instructions adapted under license by your healthcare professional. If you have questions about a medical condition or this instruction, always ask your healthcare professional. [employer]. disclaims any warranty or liability for your use of this information.      Learning About Sleeping Well  What does sleeping well mean?     Sleeping well means getting enough sleep to feel good and stay healthy. How much sleep is enough varies among people.  The number of hours you sleep and how you feel when you wake up are both important. If you do not feel refreshed, you probably need more " "sleep. Another sign of not getting enough sleep is feeling tired during the day.  Experts recommend that adults get at least 7 or more hours of sleep per day. Children and older adults need more sleep.  Why is getting enough sleep important?  Getting enough quality sleep is a basic part of good health. When your sleep suffers, your physical health, mood, and your thoughts can suffer too. You may find yourself feeling more grumpy or stressed. Not getting enough sleep also can lead to serious problems, including injury, accidents, anxiety, and depression.  What might cause poor sleeping?  Many things can cause sleep problems, including:  Changes to your sleep schedule.  Stress. Stress can be caused by fear about a single event, such as giving a speech. Or you may have ongoing stress, such as worry about work or school.  Depression, anxiety, and other mental or emotional conditions.  Changes in your sleep habits or surroundings. This includes changes that happen where you sleep, such as noise, light, or sleeping in a different bed. It also includes changes in your sleep pattern, such as having jet lag or working a late shift.  Health problems, such as pain, breathing problems, and restless legs syndrome.  Lack of regular exercise.  Using alcohol, nicotine, or caffeine before bed.  How can you help yourself?  Here are some tips that may help you sleep more soundly and wake up feeling more refreshed.  Your sleeping area   Use your bedroom only for sleeping and sex. A bit of light reading may help you fall asleep. But if it doesn't, do your reading elsewhere in the house. Try not to use your TV, computer, smartphone, or tablet while you are in bed.  Be sure your bed is big enough to stretch out comfortably, especially if you have a sleep partner.  Keep your bedroom quiet, dark, and cool. Use curtains, blinds, or a sleep mask to block out light. To block out noise, use earplugs, soothing music, or a \"white noise\" " "machine.  Your evening and bedtime routine   Create a relaxing bedtime routine. You might want to take a warm shower or bath, or listen to soothing music.  Go to bed at the same time every night. And get up at the same time every morning, even if you feel tired.  What to avoid   Limit caffeine (coffee, tea, caffeinated sodas) during the day, and don't have any for at least 6 hours before bedtime.  Avoid drinking alcohol before bedtime. Alcohol can cause you to wake up more often during the night.  Try not to smoke or use tobacco, especially in the evening. Nicotine can keep you awake.  Limit naps during the day, especially close to bedtime.  Avoid lying in bed awake for too long. If you can't fall asleep or if you wake up in the middle of the night and can't get back to sleep within about 20 minutes, get out of bed and go to another room until you feel sleepy.  Avoid taking medicine right before bed that may keep you awake or make you feel hyper or energized. Your doctor can tell you if your medicine may do this and if you can take it earlier in the day.  If you can't sleep   Imagine yourself in a peaceful, pleasant scene. Focus on the details and feelings of being in a place that is relaxing.  Get up and do a quiet or boring activity until you feel sleepy.  Avoid drinking any liquids before going to bed to help prevent waking up often to use the bathroom.  Where can you learn more?  Go to https://www.[employer].net/patiented  Enter J942 in the search box to learn more about \"Learning About Sleeping Well.\"  Current as of: July 10, 2023               Content Version: 14.0    4966-7024 [employer].   Care instructions adapted under license by your healthcare professional. If you have questions about a medical condition or this instruction, always ask your healthcare professional. [employer] disclaims any warranty or liability for your use of this information.      Learning About Depression " Screening  What is depression screening?  Depression screening is a way to see if you have depression symptoms. It may be done by a doctor or counselor. It's often part of a routine checkup. That's because your mental health is just as important as your physical health.  Depression is a mental health condition that affects how you feel, think, and act. You may:  Have less energy.  Lose interest in your daily activities.  Feel sad and grouchy for a long time.  Depression is very common. It affects people of all ages.  Many things can lead to depression. Some people become depressed after they have a stroke or find out they have a major illness like cancer or heart disease. The death of a loved one or a breakup may lead to depression. It can run in families. Most experts believe that a combination of inherited genes and stressful life events can cause it.  What happens during screening?  You may be asked to fill out a form about your depression symptoms. You and the doctor will discuss your answers. The doctor may ask you more questions to learn more about how you think, act, and feel.  What happens after screening?  If you have symptoms of depression, your doctor will talk to you about your options.  Doctors usually treat depression with medicines or counseling. Often, combining the two works best. Many people don't get help because they think that they'll get over the depression on their own. But people with depression may not get better unless they get treatment.  The cause of depression is not well understood. There may be many factors involved. But if you have depression, it's not your fault.  A serious symptom of depression is thinking about death or suicide. If you or someone you care about talks about this or about feeling hopeless, get help right away.  It's important to know that depression can be treated. Medicine, counseling, and self-care may help.  Where can you learn more?  Go to  "https://www.[employer].net/patiented  Enter T185 in the search box to learn more about \"Learning About Depression Screening.\"  Current as of: June 24, 2023               Content Version: 14.0    9283-1197 [employer].   Care instructions adapted under license by your healthcare professional. If you have questions about a medical condition or this instruction, always ask your healthcare professional. [employer] disclaims any warranty or liability for your use of this information.      Substance Use Disorder: Care Instructions  Overview     You can improve your life and health by stopping your use of alcohol or drugs. When you don't drink or use drugs, you may feel and sleep better. You may get along better with your family, friends, and coworkers. There are medicines and programs that can help with substance use disorder.  How can you care for yourself at home?  Here are some ways to help you stay sober and prevent relapse.  If you have been given medicine to help keep you sober or reduce your cravings, be sure to take it exactly as prescribed.  Talk to your doctor about programs that can help you stop using drugs or drinking alcohol.  Do not keep alcohol or drugs in your home.  Plan ahead. Think about what you'll say if other people ask you to drink or use drugs. Try not to spend time with people who drink or use drugs.  Use the time and money spent on drinking or drugs to do something that's important to you.  Preventing a relapse  Have a plan to deal with relapse. Learn to recognize changes in your thinking that lead you to drink or use drugs. Get help before you start to drink or use drugs again.  Try to stay away from situations, friends, or places that may lead you to drink or use drugs.  If you feel the need to drink alcohol or use drugs again, seek help right away. Call a trusted friend or family member. Some people get support from organizations such as Narcotics Anonymous or [facility] " Recovery or from treatment facilities.  If you relapse, get help as soon as you can. Some people make a plan with another person that outlines what they want that person to do for them if they relapse. The plan usually includes how to handle the relapse and who to notify in case of relapse.  Don't give up. Remember that a relapse doesn't mean that you have failed. Use the experience to learn the triggers that lead you to drink or use drugs. Then quit again. Recovery is a lifelong process. Many people have several relapses before they are able to quit for good.  Follow-up care is a key part of your treatment and safety. Be sure to make and go to all appointments, and call your doctor if you are having problems. It's also a good idea to know your test results and keep a list of the medicines you take.  When should you call for help?   Call 911  anytime you think you may need emergency care. For example, call if you or someone else:    Has overdosed or has withdrawal signs. Be sure to tell the emergency workers that you are or someone else is using or trying to quit using drugs. Overdose or withdrawal signs may include:  Losing consciousness.  Seizure.  Seeing or hearing things that aren't there (hallucinations).     Is thinking or talking about suicide or harming others.   Where to get help 24 hours a day, 7 days a week   If you or someone you know talks about suicide, self-harm, a mental health crisis, a substance use crisis, or any other kind of emotional distress, get help right away. You can:    Call the Suicide and Crisis Lifeline at 988.     Call 2-189-689-TALK (1-321.682.5701).     Text HOME to 210198 to access the Crisis Text Line.   Consider saving these numbers in your phone.  Go to [email or web address] for more information or to chat online.  Call your doctor now or seek immediate medical care if:    You are having withdrawal symptoms. These may include nausea or vomiting, sweating, shakiness, and anxiety.  "  Watch closely for changes in your health, and be sure to contact your doctor if:    You have a relapse.     You need more help or support to stop.   Where can you learn more?  Go to https://www.[employer].net/patiented  Enter H573 in the search box to learn more about \"Substance Use Disorder: Care Instructions.\"  Current as of: November 15, 2023               Content Version: 14.0    3160-4634 [employer].   Care instructions adapted under license by your healthcare professional. If you have questions about a medical condition or this instruction, always ask your healthcare professional. [employer] disclaims any warranty or liability for your use of this information.      Lung Cancer Screening   Frequently Asked Questions  If you are at high-risk for lung cancer, getting screened with low-dose computed tomography (LDCT) every year can help save your life. This handout offers answers to some of the most common questions about lung cancer screening. If you have other questions, please call [phone] ([phone]).     What is it?  Lung cancer screening uses special X-ray technology to create an image of your lung tissue. The exam is quick and easy and takes less than 10 seconds. We don t give you any medicine or use any needles. You can eat before and after the exam. You don t need to change your clothes as long as the clothing on your chest doesn t contain metal. But, you do need to be able to hold your breath for at least 6 seconds during the exam.    What is the goal of lung cancer screening?  The goal of lung cancer screening is to save lives. Many times, lung cancer is not found until a person starts having physical symptoms. Lung cancer screening can help detect lung cancer in the earliest stages when it may be easier to treat.    Who should be screened for lung cancer?  We suggest lung cancer screening for anyone who is at high-risk for lung cancer. You are in the " high-risk group if you:     are between the ages of 55 and 79, and   have smoked at least 1 pack of cigarettes a day for 20 or more years, and   still smoke or have quit within the past 15 years.    However, if you have a new cough or shortness of breath, you should talk to your doctor before being screened.    Why does it matter if I have symptoms?  Certain symptoms can be a sign that you have a condition in your lungs that should be checked and treated by your doctor. These symptoms include fever, chest pain, a new or changing cough, shortness of breath that you have never felt before, coughing up blood or unexplained weight loss. Having any of these symptoms can greatly affect the results of lung cancer screening.       Should all smokers get an LDCT lung cancer screening exam?  It depends. Lung cancer screening is for a very specific group of men and women who have a history of heavy smoking over a long period of time (see  Who should be screened for lung cancer  above).  I am in the high-risk group, but have been diagnosed with cancer in the past. Is LDCT lung cancer screening right for me?  In some cases, you should not have LDCT lung screening, such as when your doctor is already following your cancer with CT scan studies. Your doctor will help you decide if LDCT lung screening is right for you.  Do I need to have a screening exam every year?  Yes. If you are in the high-risk group described earlier, you should get an LDCT lung cancer screening exam every year until you are 79, or are no longer willing or able to undergo screening and possible procedures to diagnose and treat lung cancer.  How effective is LDCT at preventing death from lung cancer?  Studies have shown that LDCT lung cancer screening can lower the risk of death from lung cancer by 20 percent in people who are at high-risk.  What are the risks?  There are some risks and limitations of LDCT lung cancer screening. We want to make sure you  understand the risks and benefits, so please let us know if you have any questions. Your doctor may want to talk with you more about these risks.   Radiation exposure: As with any exam that uses radiation, there is a very small increased risk of cancer. The amount of radiation in LDCT is small--about the same amount a person would get from a mammogram. Your doctor orders the exam when he or she feels the potential benefits outweigh the risks.   False negatives: No test is perfect, including LDCT. It is possible that you may have a medical condition, including lung cancer, that is not found during your exam. This is called a false negative result.   False positives and more testing: LDCT very often finds something in the lung that could be cancer, but in fact is not. This is called a false positive result. False positive tests often cause anxiety. To make sure these findings are not cancer, you may need to have more tests. These tests will be done only if you give us permission. Sometimes patients need a treatment that can have side effects, such as a biopsy. For more information on false positives, see  What can I expect from the results?    Findings not related to lung cancer: Your LDCT exam also takes pictures of areas of your body next to your lungs. In a very small number of cases, the CT scan will show an abnormal finding in one of these areas, such as your kidneys, adrenal glands, liver or thyroid. This finding may not be serious, but you may need more tests. Your doctor can help you decide what other tests you may need, if any.  What can I expect from the results?  About 1 out of 4 LDCT exams will find something that may need more tests. Most of the time, these findings are lung nodules. Lung nodules are very small collections of tissue in the lung. These nodules are very common, and the vast majority--more than 97 percent--are not cancer (benign). Most are normal lymph nodes or small areas of scarring from  past infections.  But, if a small lung nodule is found to be cancer, the cancer can be cured more than 90 percent of the time. To know if the nodule is cancer, we may need to get more images before your next yearly screening exam. If the nodule has suspicious features (for example, it is large, has an odd shape or grows over time), we will refer you to a specialist for further testing.  Will my doctor also get the results?  Yes. Your doctor will get a copy of your results.  Is it okay to keep smoking now that there s a cancer screening exam?  No. Tobacco is one of the strongest cancer-causing agents. It causes not only lung cancer, but other cancers and cardiovascular (heart) diseases as well. The damage caused by smoking builds over time. This means that the longer you smoke, the higher your risk of disease. While it is never too late to quit, the sooner you quit, the better.  Where can I find help to quit smoking?  The best way to prevent lung cancer is to stop smoking. If you have already quit smoking, congratulations and keep it up! For help on quitting smoking, please call [employer] at 7-846-QUITNOW (1-405.230.7892) or the [employer] at 1-539.111.2741 to find local resources near you.  One-on-one health coaching:  If you d prefer to work individually with a health care provider on tobacco cessation, we offer:     Medication Therapy Management:  Our specially trained pharmacists work closely with you and your doctor to help you quit smoking.  Call [phone] or [phone] (toll free).

## 2024-04-16 ENCOUNTER — MYC MEDICAL ADVICE (OUTPATIENT)
Dept: FAMILY MEDICINE | Facility: CLINIC | Age: 69
End: 2024-04-16
Payer: COMMERCIAL

## 2024-04-16 DIAGNOSIS — I48.91 ATRIAL FIBRILLATION/FLUTTER (H): Primary | ICD-10-CM

## 2024-04-16 DIAGNOSIS — I48.92 ATRIAL FIBRILLATION/FLUTTER (H): Primary | ICD-10-CM

## 2024-04-17 ENCOUNTER — TELEPHONE (OUTPATIENT)
Dept: ANTICOAGULATION | Facility: CLINIC | Age: 69
End: 2024-04-17
Payer: COMMERCIAL

## 2024-04-17 DIAGNOSIS — I48.92 ATRIAL FIBRILLATION/FLUTTER (H): Primary | ICD-10-CM

## 2024-04-17 DIAGNOSIS — I48.91 ATRIAL FIBRILLATION/FLUTTER (H): Primary | ICD-10-CM

## 2024-04-17 RX ORDER — WARFARIN SODIUM 5 MG/1
5 TABLET ORAL DAILY
Qty: 30 TABLET | Refills: 1 | Status: SHIPPED | OUTPATIENT
Start: 2024-04-17 | End: 2024-05-10

## 2024-04-17 NOTE — TELEPHONE ENCOUNTER
Reviewed plan in detail with patient with understanding relayed back.   Will anticoagulation clinic be reaching out to patient to schedule INR, etc. then, or would PCP like to place lab order for this and have writing RN assist patient with scheduling lab for INR on Friday?      Teri Harris RN  [facility]

## 2024-04-17 NOTE — TELEPHONE ENCOUNTER
Anticoag clinic usually arranges for the INR draws.  However, RN could call anticoag clinic and make sure that they are able to reach out to patient this week  Maria De Jesus Hay, CNP

## 2024-04-17 NOTE — TELEPHONE ENCOUNTER
"ANTICOAGULATION  MANAGEMENT: NEW REFERRAL      SUBJECTIVE/OBJECTIVE     Roger Bonilla, a 68 year old male  is newly referred to [facility].    Anticoagulation:    Previously on warfarin: No, new to anticoagulation  Warfarin initiation date (approximate): 4/18/24   Indication(s): Atrial Fibrillation   Goal Range:  2.0-3.0   Anticoagulation Bridge/Overlap: No   Referring provider: from PCP    General Dietary/Social Hx:    Typical vitamin K intake: low; consistent     Other dietary considerations: None     Social History: Smokes Cigarettes  CBD/THC use: occasional use    In the past 2 weeks, patient estimates taking medications as instructed % of time: 100    Results:        No results for input(s): \"INR\", \"[id number]\", \"F2\", \"ALMWH\" in the last 168 hours.    Wt Readings from Last 2 Encounters:   04/15/24 107.8 kg (237 lb 11.2 oz)   03/19/24 108 kg (238 lb)      Estimated body mass index is 38.37 kg/m  as calculated from the following:    Height as of 4/15/24: 1.676 m (5' 6\").    Weight as of 4/15/24: 107.8 kg (237 lb 11.2 oz).  Lab Results   Component Value Date    AST 26 04/05/2022    ALT 77 (H) 04/05/2022    ALBUMIN 2.7 (L) 04/05/2022     Lab Results   Component Value Date    CR 1.25 (H) 04/15/2024     Estimated Creatinine Clearance: 65.1 mL/min (A) (based on SCr of 1.25 mg/dL (H)).    ASSESSMENT     Goal INR 2-3, standard for indication(s) above  Establishing initial warfarin maintenance dose (on warfarin < 30 days)   Factors that may increase sensitivity to warfarin: Low greens/vitamin K intake  Factors that may reduce sensitivity to warfarin: Male Gender and Weight > 90 kg  Potential significant drug interactions with home medications: None noted  Starting warfarin dose is appropriate for patient's anticipated sensitivity to warfarin    PLAN     Dosing Instructions: Start warfarin with INR in 4 days       Summary  As of 4/17/2024      Full warfarin instructions:  5 mg every day "   Next INR check:  4/19/2024               Education provided:   Please call back if any changes to your diet, medications or how you've been taking warfarin  Taking warfarin: purpose of warfarin and how it works, take warfarin at same time each day; preferably in the evening, prescribed tablet strength and color, and Importance of taking warfarin as instructed  Goal range and lab monitoring: goal range and significance of current result, Importance of therapeutic range, Importance of following up at instructed interval, and frequency of lab work when starting warfarin and importance of following up when instructed (extends after stability established)  Dietary considerations: importance of consistent vitamin K intake, impact of vitamin K foods on INR, and importance of notifying [facility] to changes in diet  Healthy lifestyle considerations: impact of smoking or tobacco on INR and impact of CBD, marijuana, or medical marijuana on INR  Symptom monitoring: monitoring for bleeding signs and symptoms, monitoring for clotting signs and symptoms, monitoring for stroke signs and symptoms, when to seek medical attention/emergency care, and if you hit your head or have a bad fall seek emergency care  Importance of notifying anticoagulation clinic for: changes in medications; a sooner lab recheck maybe needed and upcoming surgeries and procedures 2 weeks in advance    Education still needed:   None required      Telephone call with Flex who verbalizes understanding and agrees to plan    Lab visit scheduled    Standing orders placed in Epic: Point of Care INR (Lab 5000)    Plan made per [facility] anticoagulation protocol    Kori Ames RN  Anticoagulation Clinic  4/17/2024

## 2024-04-17 NOTE — TELEPHONE ENCOUNTER
Cheaper alternative is warfarin.  Prescription sent for warfarin 5 mg.  He should take 1 tablet for 3 days.  On the fourth day, he should have his INR checked.  I also placed a referral for the anticoagulation clinic.  Please call patient and discuss warfarin, need for INR checks, and anticoagulation clinic.  Maria De Jesus Hay, CNP

## 2024-04-17 NOTE — TELEPHONE ENCOUNTER
RN attempted to reach Anticoagulation via phone: [phone], but waited on hold several minutes.   Will forward this encounter to ensure they are able to reach out to patient this week so to set-up INR, etc.    Teri Harris RN  [facility]

## 2024-04-17 NOTE — TELEPHONE ENCOUNTER
Spoke with patient, scheduled INR appointment and put in orders for standing INR    Kori Ames RN   [facility]

## 2024-04-18 ENCOUNTER — VIRTUAL VISIT (OUTPATIENT)
Dept: CARDIOLOGY | Facility: CLINIC | Age: 69
End: 2024-04-18
Payer: COMMERCIAL

## 2024-04-18 ENCOUNTER — ORDERS ONLY (AUTO-RELEASED) (OUTPATIENT)
Dept: CARDIOLOGY | Facility: CLINIC | Age: 69
End: 2024-04-18
Payer: COMMERCIAL

## 2024-04-18 DIAGNOSIS — I10 BENIGN ESSENTIAL HYPERTENSION: ICD-10-CM

## 2024-04-18 DIAGNOSIS — I25.810 CORONARY ARTERY DISEASE INVOLVING CORONARY BYPASS GRAFT OF NATIVE HEART WITHOUT ANGINA PECTORIS: Primary | ICD-10-CM

## 2024-04-18 DIAGNOSIS — I48.91 ATRIAL FIBRILLATION/FLUTTER (H): ICD-10-CM

## 2024-04-18 DIAGNOSIS — I48.92 ATRIAL FIBRILLATION/FLUTTER (H): ICD-10-CM

## 2024-04-18 DIAGNOSIS — E78.5 HYPERLIPIDEMIA LDL GOAL <70: ICD-10-CM

## 2024-04-18 PROCEDURE — 99442 PR PHYSICIAN TELEPHONE EVALUATION 11-20 MIN: CPT | Mod: 93 | Performed by: NURSE PRACTITIONER

## 2024-04-18 NOTE — PROGRESS NOTES
Flex is a 68 year old who is being evaluated via a billable telephone visit.    What phone number would you like to be contacted at? [phone]  How would you like to obtain your AVS? Mail a copy  Originating Location (pt. Location): Home    Distant Location (provider location):  On-site  Phone call duration: 12 minutes  .      Cardiology Clinic Progress Note  Roger Bonilla MRN# [id number]   YOB: 1955 Age: 68 year old      Primary Cardiologist:   Dr. Ramirez  Patient presents today for atrial fibrillation        History of Presenting Illness:      Roger Bonilla is a pleasant 68 year old patient with a past cardiac history significant for   CAD with CABG  Angiogram 2001 with PCI of RCA and OM   CABG 4/2022 (LIMA to LAD, SVG to RPDA, SVG to OM)  Atrial fibrillation/flutter  Onset 4/2024  Asymptomatic-found at PCP visit  Elevated [id number] score  Continue rate control, anticoagulation  PVD (right carotid stenosis and lower extremity disease)  Right carotid endarterectomy 2022  Follows with vascular surgery s/p  Hypertension  Hyperlipidemia  Past medical history significant for obesity, untreated sleep apnea, DM type II, tobacco use.    Most recent lipid profile, BMP, ALT, hemoglobin reviewed today.  He does not have a way to check heart rates or blood pressures at home.  He remains asymptomatic with atrial fibrillation/flutter.  He is agreeable to Zio patch to see if this is paroxysmal versus persistent.  He has started warfarin and declines Eliquis or Xarelto at this time, due to cost.  Given slight dehydration on recent lab work I recommended staying well-hydrated.  He denies any anginal symptoms. Patient reports no chest pain, shortness of breath, PND, orthopnea, presyncope, syncope, edema, heart racing, or palpitations.                   Assessment and Plan:       Plan  Patient Instructions   Medication Changes:  Restart aspirin 81 mg daily along with anticoagulation    Recommendations:  Call if  heart rates are staying greater than 100 consistently   call if any bleeding concerns    Follow-up:  7 day Zio patch will be mailed out- assess if paroxysmal versus persistent A-fib/flutter and heart rates  Cardiology follow up at [facility]: Kamryn in 1 month. -Discuss cardioversion if persistent A-fib/flutter    Cardiology Scheduling~[phone]  Cardiology Clinic RN~[phone] (Emilia RN, Ariana RN; Kanchan RN)            Coronary artery disease involving coronary bypass graft of native heart without angina pectoris  No angina  Continue GDMT    Atrial fib/ flutter   Asymptomatic  Continue rate control, anticoagulation    Benign essential hypertension  Controlled at OV 4/15/24  Continue current medications    Hyperlipidemia LDL goal <70  LDL not controlled  Continue statin, Zetia  Currently enrolled in ongoing research study             Thank you for allowing me to participate in this delightful patient's care.   This note was completed in part using Dragon voice recognition software. Although reviewed after completion, some word and grammatical errors may occur.    Kamryn Chavez, APRN CNP

## 2024-04-18 NOTE — LETTER
4/18/2024    Maria De Jesus Hay, SAL CNP  [address] [facility] [address]    RE: Roger Bonilla       Dear Colleague,     I had the pleasure of seeing Roger Bonilla in the [facility].  Flex is a 68 year old who is being evaluated via a billable telephone visit.    What phone number would you like to be contacted at? [phone]  How would you like to obtain your AVS? Mail a copy  Originating Location (pt. Location): Home    Distant Location (provider location):  On-site  Phone call duration: 12 minutes  .      Cardiology Clinic Progress Note  Roger Bonilla MRN# [id number]   YOB: 1955 Age: 68 year old      Primary Cardiologist:   Dr. Ramirez  Patient presents today for atrial fibrillation        History of Presenting Illness:      Roger Bonilla is a pleasant 68 year old patient with a past cardiac history significant for   CAD with CABG  Angiogram 2001 with PCI of RCA and OM   CABG 4/2022 (LIMA to LAD, SVG to RPDA, SVG to OM)  Atrial fibrillation/flutter  Onset 4/2024  Asymptomatic-found at PCP visit  Elevated [id number] score  Continue rate control, anticoagulation  PVD (right carotid stenosis and lower extremity disease)  Right carotid endarterectomy 2022  Follows with vascular surgery s/p  Hypertension  Hyperlipidemia  Past medical history significant for obesity, untreated sleep apnea, DM type II, tobacco use.    Most recent lipid profile, BMP, ALT, hemoglobin reviewed today.  He does not have a way to check heart rates or blood pressures at home.  He remains asymptomatic with atrial fibrillation/flutter.  He is agreeable to Zio patch to see if this is paroxysmal versus persistent.  He has started warfarin and declines Eliquis or Xarelto at this time, due to cost.  Given slight dehydration on recent lab work I recommended staying well-hydrated.  He denies any anginal symptoms. Patient reports no chest pain, shortness of breath, PND, orthopnea, presyncope, syncope, edema, heart  racing, or palpitations.                   Assessment and Plan:       Plan  Patient Instructions   Medication Changes:  Restart aspirin 81 mg daily along with anticoagulation    Recommendations:  Call if heart rates are staying greater than 100 consistently   call if any bleeding concerns    Follow-up:  7 day Zio patch will be mailed out- assess if paroxysmal versus persistent A-fib/flutter and heart rates  Cardiology follow up at [facility]: Kamryn in 1 month. -Discuss cardioversion if persistent A-fib/flutter    Cardiology Scheduling~[phone]  Cardiology Clinic RN~[phone] (Emilia RN, Ariana RN; Kanchan RN)            Coronary artery disease involving coronary bypass graft of native heart without angina pectoris  No angina  Continue GDMT    Atrial fib/ flutter   Asymptomatic  Continue rate control, anticoagulation    Benign essential hypertension  Controlled at OV 4/15/24  Continue current medications    Hyperlipidemia LDL goal <70  LDL not controlled  Continue statin, Zetia  Currently enrolled in ongoing research study             Thank you for allowing me to participate in this delightful patient's care.   This note was completed in part using Dragon voice recognition software. Although reviewed after completion, some word and grammatical errors may occur.    SAL Willoughby CNP      Thank you for allowing me to participate in the care of your patient.      Sincerely,     SAL Willoughby CNP     [facility]  cc:   SAL Kc CNP  [address] [place] [address]

## 2024-04-18 NOTE — PATIENT INSTRUCTIONS
Medication Changes:  Restart aspirin 81 mg daily along with anticoagulation    Recommendations:  Call if heart rates are staying greater than 100 consistently   call if any bleeding concerns    Follow-up:  7 day Zio patch will be mailed out  Cardiology follow up at [facility]: Kamryn in 1 month.     Cardiology Scheduling~[phone]  Cardiology Clinic RN~[phone] (Emilia RN, Ariana RN; Kanchan RN)

## 2024-04-22 ENCOUNTER — LAB (OUTPATIENT)
Dept: LAB | Facility: CLINIC | Age: 69
End: 2024-04-22
Payer: COMMERCIAL

## 2024-04-22 ENCOUNTER — ANTICOAGULATION THERAPY VISIT (OUTPATIENT)
Dept: ANTICOAGULATION | Facility: CLINIC | Age: 69
End: 2024-04-22

## 2024-04-22 DIAGNOSIS — I48.91 ATRIAL FIBRILLATION/FLUTTER (H): Primary | ICD-10-CM

## 2024-04-22 DIAGNOSIS — I48.92 ATRIAL FIBRILLATION/FLUTTER (H): Primary | ICD-10-CM

## 2024-04-22 DIAGNOSIS — I48.92 ATRIAL FIBRILLATION/FLUTTER (H): ICD-10-CM

## 2024-04-22 DIAGNOSIS — I48.91 ATRIAL FIBRILLATION/FLUTTER (H): ICD-10-CM

## 2024-04-22 LAB — INR BLD: 1.3 (ref 0.9–1.1)

## 2024-04-22 PROCEDURE — 85610 PROTHROMBIN TIME: CPT

## 2024-04-22 PROCEDURE — 36416 COLLJ CAPILLARY BLOOD SPEC: CPT

## 2024-04-22 NOTE — PROGRESS NOTES
ANTICOAGULATION MANAGEMENT     Roger Bonilla 68 year old male is on warfarin with subtherapeutic INR result. (Goal INR 2.0-3.0)    Recent labs: (last 7 days)     04/22/24  1348   INR 1.3*       ASSESSMENT     Source(s): Chart Review and Patient/Caregiver Call     Warfarin doses taken: Warfarin taken as instructed  Diet: No new diet changes identified  Medication/supplement changes:  restarted ASA 81 mg daily  New illness, injury, or hospitalization: No  Signs or symptoms of bleeding or clotting: No  Previous result:  new start  Additional findings: New start on day 5 of warfarin     PLAN     Recommended plan for no diet, medication or health factor changes affecting INR     Dosing Instructions: Increase your warfarin dose (21.4% change) with next INR in 3 days       Summary  As of 4/22/2024      Full warfarin instructions:  7.5 mg every Mon, Wed, Fri; 5 mg all other days   Next INR check:  4/25/2024               Telephone call with Flex who verbalizes understanding and agrees to plan    Lab visit scheduled    Education provided:   Please call back if any changes to your diet, medications or how you've been taking warfarin    Plan made per [facility] anticoagulation protocol    Thu Zhang RN  Anticoagulation Clinic  4/22/2024    _______________________________________________________________________     Anticoagulation Episode Summary       Current INR goal:  2.0-3.0   TTR:  --   Target end date:  Indefinite   Send INR reminders to:  [facility]    Indications    Atrial fibrillation/flutter (H) [I48.91  I48.92]             Comments:               Anticoagulation Care Providers       Provider Role Specialty Phone number    Maria De Jesus Hay APRN CNP Referring Family Medicine [phone]

## 2024-04-25 ENCOUNTER — LAB (OUTPATIENT)
Dept: LAB | Facility: CLINIC | Age: 69
End: 2024-04-25
Payer: COMMERCIAL

## 2024-04-25 ENCOUNTER — ANTICOAGULATION THERAPY VISIT (OUTPATIENT)
Dept: ANTICOAGULATION | Facility: CLINIC | Age: 69
End: 2024-04-25

## 2024-04-25 DIAGNOSIS — I48.92 ATRIAL FIBRILLATION/FLUTTER (H): Primary | ICD-10-CM

## 2024-04-25 DIAGNOSIS — I48.91 ATRIAL FIBRILLATION/FLUTTER (H): Primary | ICD-10-CM

## 2024-04-25 DIAGNOSIS — I48.92 ATRIAL FIBRILLATION/FLUTTER (H): ICD-10-CM

## 2024-04-25 DIAGNOSIS — I48.91 ATRIAL FIBRILLATION/FLUTTER (H): ICD-10-CM

## 2024-04-25 LAB — INR BLD: 1.7 (ref 0.9–1.1)

## 2024-04-25 PROCEDURE — 36416 COLLJ CAPILLARY BLOOD SPEC: CPT

## 2024-04-25 PROCEDURE — 85610 PROTHROMBIN TIME: CPT

## 2024-04-25 NOTE — PROGRESS NOTES
ANTICOAGULATION MANAGEMENT     Roger Bonilla 68 year old male is on warfarin with subtherapeutic INR result. (Goal INR 2.0-3.0)    Recent labs: (last 7 days)     04/25/24  1331   INR 1.7*       ASSESSMENT     Source(s): Chart Review and Patient/Caregiver Call     Warfarin doses taken: Warfarin taken as instructed  Diet: No new diet changes identified  Medication/supplement changes: None noted  New illness, injury, or hospitalization: No  Signs or symptoms of bleeding or clotting: No  Previous result: Subtherapeutic  Additional findings: New start on day 8 of warfarin       PLAN     Recommended plan for no diet, medication or health factor changes affecting INR     Dosing Instructions: Continue your current warfarin dose with next INR in 4 days       Summary  As of 4/25/2024      Full warfarin instructions:  7.5 mg every Mon, Wed, Fri; 5 mg all other days   Next INR check:  4/29/2024               Telephone call with Flex who verbalizes understanding and agrees to plan    Lab visit scheduled    Education provided:   Please call back if any changes to your diet, medications or how you've been taking warfarin  Contact [phone]  with any changes, questions or concerns.     Plan made per [facility] anticoagulation protocol    Mary Clemente RN  Anticoagulation Clinic  4/25/2024    _______________________________________________________________________     Anticoagulation Episode Summary       Current INR goal:  2.0-3.0   TTR:  --   Target end date:  Indefinite   [facility]    Indications    Atrial fibrillation/flutter (H) [I48.91  I48.92]             Comments:               Anticoagulation Care Providers       Provider Role Specialty Phone number    Maria De Jesus Hay APRN CNP Referring Family Medicine [phone]

## 2024-04-29 ENCOUNTER — LAB (OUTPATIENT)
Dept: LAB | Facility: CLINIC | Age: 69
End: 2024-04-29
Payer: COMMERCIAL

## 2024-04-29 ENCOUNTER — ANTICOAGULATION THERAPY VISIT (OUTPATIENT)
Dept: ANTICOAGULATION | Facility: CLINIC | Age: 69
End: 2024-04-29

## 2024-04-29 DIAGNOSIS — I48.91 ATRIAL FIBRILLATION/FLUTTER (H): Primary | ICD-10-CM

## 2024-04-29 DIAGNOSIS — I48.92 ATRIAL FIBRILLATION/FLUTTER (H): Primary | ICD-10-CM

## 2024-04-29 DIAGNOSIS — I48.91 ATRIAL FIBRILLATION/FLUTTER (H): ICD-10-CM

## 2024-04-29 DIAGNOSIS — I48.92 ATRIAL FIBRILLATION/FLUTTER (H): ICD-10-CM

## 2024-04-29 LAB — INR BLD: 2.6 (ref 0.9–1.1)

## 2024-04-29 PROCEDURE — 36416 COLLJ CAPILLARY BLOOD SPEC: CPT

## 2024-04-29 PROCEDURE — 85610 PROTHROMBIN TIME: CPT

## 2024-04-29 NOTE — PROGRESS NOTES
ANTICOAGULATION MANAGEMENT     Roger Bonilla 68 year old male is on warfarin with therapeutic INR result. (Goal INR 2.0-3.0)    Recent labs: (last 7 days)     04/29/24  1255   INR 2.6*       ASSESSMENT     Source(s): Chart Review and Patient/Caregiver Call     Warfarin doses taken: Warfarin taken as instructed  Diet: No new diet changes identified  Medication/supplement changes: None noted  New illness, injury, or hospitalization: No  Signs or symptoms of bleeding or clotting: No  Previous result: Subtherapeutic (new start)  Additional findings: New start on day 12 of warfarin     PLAN     Recommended plan for no diet, medication or health factor changes affecting INR     Dosing Instructions: Continue your current warfarin dose with next INR in 3 days       Summary  As of 4/29/2024      Full warfarin instructions:  7.5 mg every Mon, Wed, Fri; 5 mg all other days   Next INR check:  5/2/2024               Telephone call with Flex who verbalizes understanding and agrees to plan    Lab visit scheduled    Education provided:   Please call back if any changes to your diet, medications or how you've been taking warfarin    Plan made per [facility] anticoagulation protocol    Thu Zhang RN  Anticoagulation Clinic  4/29/2024    _______________________________________________________________________     Anticoagulation Episode Summary       Current INR goal:  2.0-3.0   TTR:  100.0% (2 d)   Target end date:  Indefinite   Send INR reminders to:  [facility]    Indications    Atrial fibrillation/flutter (H) [I48.91  I48.92]             Comments:               Anticoagulation Care Providers       Provider Role Specialty Phone number    Maria De Jesus Hay APRN CNP Referring Family Medicine [phone]

## 2024-05-02 ENCOUNTER — ANTICOAGULATION THERAPY VISIT (OUTPATIENT)
Dept: ANTICOAGULATION | Facility: CLINIC | Age: 69
End: 2024-05-02

## 2024-05-02 ENCOUNTER — LAB (OUTPATIENT)
Dept: LAB | Facility: CLINIC | Age: 69
End: 2024-05-02
Payer: COMMERCIAL

## 2024-05-02 DIAGNOSIS — I48.91 ATRIAL FIBRILLATION/FLUTTER (H): ICD-10-CM

## 2024-05-02 DIAGNOSIS — I48.91 ATRIAL FIBRILLATION/FLUTTER (H): Primary | ICD-10-CM

## 2024-05-02 DIAGNOSIS — I48.92 ATRIAL FIBRILLATION/FLUTTER (H): Primary | ICD-10-CM

## 2024-05-02 DIAGNOSIS — I48.92 ATRIAL FIBRILLATION/FLUTTER (H): ICD-10-CM

## 2024-05-02 LAB — INR BLD: 3.2 (ref 0.9–1.1)

## 2024-05-02 PROCEDURE — 85610 PROTHROMBIN TIME: CPT

## 2024-05-02 PROCEDURE — 36416 COLLJ CAPILLARY BLOOD SPEC: CPT

## 2024-05-02 NOTE — PROGRESS NOTES
ANTICOAGULATION MANAGEMENT     Roger Bonilla 68 year old male is on warfarin with supratherapeutic INR result. (Goal INR 2.0-3.0)    Recent labs: (last 7 days)     05/02/24  1331   INR 3.2*       ASSESSMENT     Source(s): Chart Review and Patient/Caregiver Call     Warfarin doses taken: Warfarin taken as instructed  Diet: No new diet changes identified  Medication/supplement changes: None noted  New illness, injury, or hospitalization: No  Signs or symptoms of bleeding or clotting: No  Previous result: Therapeutic last visit; previously outside of goal range  Additional findings: None     PLAN     Recommended plan for no diet, medication or health factor changes affecting INR     Dosing Instructions: decrease your warfarin dose (5.9% change) with next INR in 5 days       Summary  As of 5/2/2024      Full warfarin instructions:  7.5 mg every Sun, Wed; 5 mg all other days   Next INR check:  5/7/2024               Telephone call with Flex who verbalizes understanding and agrees to plan    Lab visit scheduled    Education provided:   Please call back if any changes to your diet, medications or how you've been taking warfarin  Symptom monitoring: monitoring for bleeding signs and symptoms    Plan made per [facility] anticoagulation protocol    Thu Zhang RN  Anticoagulation Clinic  5/2/2024    _______________________________________________________________________     Anticoagulation Episode Summary       Current INR goal:  2.0-3.0   TTR:  81.9% (5 d)   Target end date:  Indefinite   Send INR reminders to:  [facility]    Indications    Atrial fibrillation/flutter (H) [I48.91  I48.92]             Comments:               Anticoagulation Care Providers       Provider Role Specialty Phone number    Maria De Jesus Hay APRN CNP Referring Family Medicine [phone]

## 2024-05-06 PROCEDURE — 93244 EXT ECG>48HR<7D REV&INTERPJ: CPT | Performed by: INTERNAL MEDICINE

## 2024-05-06 NOTE — RESULT ENCOUNTER NOTE
Persistent AFib (100% burden)  with an avg HR of 85; rare PVCs, 1 run VT lasting 10 beats; no symptoms reported. Pt on warfarin and Lopressor 100 mg BID. EF on 1/9/23 echo 60-65%. Follow up with Kamryn Carroll NP on 5/30/24. Will discuss with Kamryn for any recommendations prior to visit.

## 2024-05-07 ENCOUNTER — ANTICOAGULATION THERAPY VISIT (OUTPATIENT)
Dept: ANTICOAGULATION | Facility: CLINIC | Age: 69
End: 2024-05-07

## 2024-05-07 ENCOUNTER — LAB (OUTPATIENT)
Dept: LAB | Facility: CLINIC | Age: 69
End: 2024-05-07
Payer: COMMERCIAL

## 2024-05-07 DIAGNOSIS — I48.92 ATRIAL FIBRILLATION/FLUTTER (H): Primary | ICD-10-CM

## 2024-05-07 DIAGNOSIS — I48.91 ATRIAL FIBRILLATION/FLUTTER (H): Primary | ICD-10-CM

## 2024-05-07 DIAGNOSIS — I48.91 ATRIAL FIBRILLATION/FLUTTER (H): ICD-10-CM

## 2024-05-07 DIAGNOSIS — I48.92 ATRIAL FIBRILLATION/FLUTTER (H): ICD-10-CM

## 2024-05-07 LAB — INR BLD: 2.2 (ref 0.9–1.1)

## 2024-05-07 PROCEDURE — 85610 PROTHROMBIN TIME: CPT

## 2024-05-07 PROCEDURE — 36416 COLLJ CAPILLARY BLOOD SPEC: CPT

## 2024-05-07 NOTE — PROGRESS NOTES
ANTICOAGULATION MANAGEMENT     Roger Bonilla 68 year old male is on warfarin with therapeutic INR result. (Goal INR 2.0-3.0)    Recent labs: (last 7 days)     05/07/24  1443   INR 2.2*       ASSESSMENT     Source(s): Chart Review and Patient/Caregiver Call     Warfarin doses taken: Warfarin taken as instructed  Diet: No new diet changes identified  Medication/supplement changes: None noted  New illness, injury, or hospitalization: No  Signs or symptoms of bleeding or clotting: No  Previous result: Supratherapeutic  Additional findings: None       PLAN     Recommended plan for no diet, medication or health factor changes affecting INR     Dosing Instructions: Continue your current warfarin dose with next INR in 3 days       Summary  As of 5/7/2024      Full warfarin instructions:  7.5 mg every Sun, Wed; 5 mg all other days   Next INR check:  5/10/2024               Telephone call with Flex who verbalizes understanding and agrees to plan    Lab visit scheduled    Education provided:   Please call back if any changes to your diet, medications or how you've been taking warfarin  Contact [phone]  with any changes, questions or concerns.     Plan made per [facility] anticoagulation protocol    Mary Clemente RN  Anticoagulation Clinic  5/7/2024    _______________________________________________________________________     Anticoagulation Episode Summary       Current INR goal:  2.0-3.0   TTR:  81.0% (1.4 wk)   Target end date:  Indefinite   Send INR reminders to:  [facility]    Indications    Atrial fibrillation/flutter (H) [I48.91  I48.92]             Comments:               Anticoagulation Care Providers       Provider Role Specialty Phone number    Maria De Jesus Hay APRN CNP Referring Family Medicine [phone]

## 2024-05-10 ENCOUNTER — LAB (OUTPATIENT)
Dept: LAB | Facility: CLINIC | Age: 69
End: 2024-05-10
Payer: COMMERCIAL

## 2024-05-10 ENCOUNTER — ANTICOAGULATION THERAPY VISIT (OUTPATIENT)
Dept: ANTICOAGULATION | Facility: CLINIC | Age: 69
End: 2024-05-10

## 2024-05-10 DIAGNOSIS — I48.91 ATRIAL FIBRILLATION/FLUTTER (H): ICD-10-CM

## 2024-05-10 DIAGNOSIS — I48.92 ATRIAL FIBRILLATION/FLUTTER (H): Primary | ICD-10-CM

## 2024-05-10 DIAGNOSIS — I48.91 ATRIAL FIBRILLATION/FLUTTER (H): Primary | ICD-10-CM

## 2024-05-10 DIAGNOSIS — I48.92 ATRIAL FIBRILLATION/FLUTTER (H): ICD-10-CM

## 2024-05-10 DIAGNOSIS — Z95.1 S/P CABG (CORONARY ARTERY BYPASS GRAFT): ICD-10-CM

## 2024-05-10 DIAGNOSIS — E78.5 HYPERLIPIDEMIA LDL GOAL <70: ICD-10-CM

## 2024-05-10 DIAGNOSIS — R06.02 SHORTNESS OF BREATH: ICD-10-CM

## 2024-05-10 LAB
ALT SERPL W P-5'-P-CCNC: 11 U/L (ref 0–70)
CHOLEST SERPL-MCNC: 142 MG/DL
FASTING STATUS PATIENT QL REPORTED: YES
HDLC SERPL-MCNC: 30 MG/DL
INR BLD: 2.8 (ref 0.9–1.1)
LDLC SERPL CALC-MCNC: 78 MG/DL
NONHDLC SERPL-MCNC: 112 MG/DL
TRIGL SERPL-MCNC: 170 MG/DL

## 2024-05-10 PROCEDURE — 36415 COLL VENOUS BLD VENIPUNCTURE: CPT | Performed by: INTERNAL MEDICINE

## 2024-05-10 PROCEDURE — 84460 ALANINE AMINO (ALT) (SGPT): CPT | Performed by: INTERNAL MEDICINE

## 2024-05-10 PROCEDURE — 85610 PROTHROMBIN TIME: CPT

## 2024-05-10 PROCEDURE — 80061 LIPID PANEL: CPT | Performed by: INTERNAL MEDICINE

## 2024-05-10 PROCEDURE — 36416 COLLJ CAPILLARY BLOOD SPEC: CPT

## 2024-05-10 RX ORDER — WARFARIN SODIUM 5 MG/1
TABLET ORAL
Qty: 100 TABLET | Refills: 1 | Status: SHIPPED | OUTPATIENT
Start: 2024-05-10

## 2024-05-10 NOTE — PROGRESS NOTES
ANTICOAGULATION MANAGEMENT     Roger Bonilla 68 year old male is on warfarin with therapeutic INR result. (Goal INR 2.0-3.0)    Recent labs: (last 7 days)     05/10/24  0957   INR 2.8*       ASSESSMENT     Source(s): Chart Review and Patient/Caregiver Call     Warfarin doses taken: Warfarin taken as instructed  Diet: No new diet changes identified  Medication/supplement changes: None noted  New illness, injury, or hospitalization: No  Signs or symptoms of bleeding or clotting: No  Previous result: Therapeutic last visit; previously outside of goal range  Additional findings: None       PLAN     Recommended plan for no diet, medication or health factor changes affecting INR     Dosing Instructions: Continue your current warfarin dose with next INR in 1 week       Summary  As of 5/10/2024      Full warfarin instructions:  7.5 mg every Sun, Wed; 5 mg all other days   Next INR check:  5/17/2024               Telephone call with Flex who verbalizes understanding and agrees to plan    Lab visit scheduled    Education provided:   Goal range and lab monitoring: goal range and significance of current result    Plan made per [facility] anticoagulation protocol    Kori Ames RN  Anticoagulation Clinic  5/10/2024    _______________________________________________________________________     Anticoagulation Episode Summary       Current INR goal:  2.0-3.0   TTR:  85.0% (1.9 wk)   Target end date:  Indefinite   Send INR reminders to:  [facility]    Indications    Atrial fibrillation/flutter (H) [I48.91  I48.92]             Comments:               Anticoagulation Care Providers       Provider Role Specialty Phone number    Maria De Jesus Hay APRN CNP Referring Family Medicine [phone]

## 2024-05-17 ENCOUNTER — LAB (OUTPATIENT)
Dept: LAB | Facility: CLINIC | Age: 69
End: 2024-05-17
Payer: COMMERCIAL

## 2024-05-17 ENCOUNTER — ANTICOAGULATION THERAPY VISIT (OUTPATIENT)
Dept: ANTICOAGULATION | Facility: CLINIC | Age: 69
End: 2024-05-17

## 2024-05-17 DIAGNOSIS — I48.91 ATRIAL FIBRILLATION/FLUTTER (H): ICD-10-CM

## 2024-05-17 DIAGNOSIS — I48.92 ATRIAL FIBRILLATION/FLUTTER (H): ICD-10-CM

## 2024-05-17 DIAGNOSIS — I48.91 ATRIAL FIBRILLATION/FLUTTER (H): Primary | ICD-10-CM

## 2024-05-17 DIAGNOSIS — I48.92 ATRIAL FIBRILLATION/FLUTTER (H): Primary | ICD-10-CM

## 2024-05-17 LAB — INR BLD: 2.4 (ref 0.9–1.1)

## 2024-05-17 PROCEDURE — 85610 PROTHROMBIN TIME: CPT

## 2024-05-17 PROCEDURE — 36416 COLLJ CAPILLARY BLOOD SPEC: CPT

## 2024-05-17 NOTE — PROGRESS NOTES
ANTICOAGULATION MANAGEMENT     Roger Bonilla 68 year old male is on warfarin with therapeutic INR result. (Goal INR 2.0-3.0)    Recent labs: (last 7 days)     05/17/24  1330   INR 2.4*       ASSESSMENT     Source(s): Chart Review and Patient/Caregiver Call     Warfarin doses taken: Warfarin taken as instructed  Diet: No new diet changes identified  Medication/supplement changes: None noted  New illness, injury, or hospitalization: No  Signs or symptoms of bleeding or clotting: No  Previous result: Therapeutic last 2(+) visits  Additional findings: None       PLAN     Recommended plan for no diet, medication or health factor changes affecting INR     Dosing Instructions: Continue your current warfarin dose with next INR in 1 week       Summary  As of 5/17/2024      Full warfarin instructions:  7.5 mg every Sun, Wed; 5 mg all other days   Next INR check:  5/24/2024               Telephone call with Flex who verbalizes understanding and agrees to plan    Lab visit scheduled    Education provided:   Goal range and lab monitoring: goal range and significance of current result    Plan made per [facility] anticoagulation protocol    Kori Ames RN  Anticoagulation Clinic  5/17/2024    _______________________________________________________________________     Anticoagulation Episode Summary       Current INR goal:  2.0-3.0   TTR:  90.2% (2.9 wk)   Target end date:  Indefinite   Send INR reminders to:  [facility]    Indications    Atrial fibrillation/flutter (H) [I48.91  I48.92]             Comments:               Anticoagulation Care Providers       Provider Role Specialty Phone number    Maria De Jesus Hay APRN CNP Referring Family Medicine [phone]

## 2024-05-24 ENCOUNTER — ANTICOAGULATION THERAPY VISIT (OUTPATIENT)
Dept: ANTICOAGULATION | Facility: CLINIC | Age: 69
End: 2024-05-24

## 2024-05-24 ENCOUNTER — LAB (OUTPATIENT)
Dept: LAB | Facility: CLINIC | Age: 69
End: 2024-05-24
Payer: COMMERCIAL

## 2024-05-24 DIAGNOSIS — I48.92 ATRIAL FIBRILLATION/FLUTTER (H): ICD-10-CM

## 2024-05-24 DIAGNOSIS — I48.91 ATRIAL FIBRILLATION/FLUTTER (H): ICD-10-CM

## 2024-05-24 DIAGNOSIS — I48.91 ATRIAL FIBRILLATION/FLUTTER (H): Primary | ICD-10-CM

## 2024-05-24 DIAGNOSIS — I48.92 ATRIAL FIBRILLATION/FLUTTER (H): Primary | ICD-10-CM

## 2024-05-24 LAB — INR BLD: 2.2 (ref 0.9–1.1)

## 2024-05-24 PROCEDURE — 85610 PROTHROMBIN TIME: CPT

## 2024-05-24 PROCEDURE — 36416 COLLJ CAPILLARY BLOOD SPEC: CPT

## 2024-05-24 NOTE — PROGRESS NOTES
ANTICOAGULATION MANAGEMENT     Roger Bonilla 68 year old male is on warfarin with therapeutic INR result. (Goal INR 2.0-3.0)    Recent labs: (last 7 days)     05/24/24  1342   INR 2.2*       ASSESSMENT     Source(s): Chart Review and Patient/Caregiver Call     Warfarin doses taken: Missed dose(s) may be affecting INR  Diet: No new diet changes identified  Medication/supplement changes: None noted  New illness, injury, or hospitalization: No  Signs or symptoms of bleeding or clotting: No  Previous result: Therapeutic last 2(+) visits  Additional findings: None. Pt has labs on 6-4 at [facility]. He will ask for an INR also.       PLAN     Recommended plan for temporary change(s) affecting INR     Dosing Instructions: Continue your current warfarin dose with next INR in 10 days       Summary  As of 5/24/2024      Full warfarin instructions:  7.5 mg every Sun, Wed; 5 mg all other days   Next INR check:  6/4/2024               Telephone call with Flex who verbalizes understanding and agrees to plan    Check at provider office visit    Education provided:   Please call back if any changes to your diet, medications or how you've been taking warfarin  Taking warfarin: Importance of taking warfarin as instructed  Contact [phone]  with any changes, questions or concerns.     Plan made per [facility] anticoagulation protocol    Mary Clemente RN  Anticoagulation Clinic  5/24/2024    _______________________________________________________________________     Anticoagulation Episode Summary       Current INR goal:  2.0-3.0   TTR:  92.7% (3.9 wk)   Target end date:  Indefinite   Send INR reminders to:  [facility]    Indications    Atrial fibrillation/flutter (H) [I48.91  I48.92]             Comments:               Anticoagulation Care Providers       Provider Role Specialty Phone number    Maria De Jesus Hay APRN CNP Referring Family Medicine [phone]

## 2024-05-30 ENCOUNTER — OFFICE VISIT (OUTPATIENT)
Dept: CARDIOLOGY | Facility: CLINIC | Age: 69
End: 2024-05-30
Attending: NURSE PRACTITIONER
Payer: COMMERCIAL

## 2024-05-30 VITALS
SYSTOLIC BLOOD PRESSURE: 111 MMHG | HEIGHT: 66 IN | HEART RATE: 93 BPM | WEIGHT: 241 LBS | OXYGEN SATURATION: 96 % | DIASTOLIC BLOOD PRESSURE: 77 MMHG | BODY MASS INDEX: 38.73 KG/M2

## 2024-05-30 DIAGNOSIS — I48.92 ATRIAL FIBRILLATION/FLUTTER (H): ICD-10-CM

## 2024-05-30 DIAGNOSIS — I25.810 CORONARY ARTERY DISEASE INVOLVING CORONARY BYPASS GRAFT OF NATIVE HEART WITHOUT ANGINA PECTORIS: ICD-10-CM

## 2024-05-30 DIAGNOSIS — I10 BENIGN ESSENTIAL HYPERTENSION: ICD-10-CM

## 2024-05-30 DIAGNOSIS — I48.19 PERSISTENT ATRIAL FIBRILLATION (H): ICD-10-CM

## 2024-05-30 DIAGNOSIS — I48.91 ATRIAL FIBRILLATION/FLUTTER (H): ICD-10-CM

## 2024-05-30 DIAGNOSIS — I25.10 CORONARY ARTERY DISEASE INVOLVING NATIVE CORONARY ARTERY OF NATIVE HEART WITHOUT ANGINA PECTORIS: Primary | ICD-10-CM

## 2024-05-30 DIAGNOSIS — E78.5 HYPERLIPIDEMIA LDL GOAL <70: ICD-10-CM

## 2024-05-30 DIAGNOSIS — I47.29 NSVT (NONSUSTAINED VENTRICULAR TACHYCARDIA) (H): ICD-10-CM

## 2024-05-30 PROCEDURE — 99214 OFFICE O/P EST MOD 30 MIN: CPT | Performed by: NURSE PRACTITIONER

## 2024-05-30 NOTE — PATIENT INSTRUCTIONS
Medication Changes:  None     Recommendations:  Check blood pressure at least 1 hour after medications. Call the clinic if your blood pressure is consistently greater than 130/80.     Follow-up:  Cardiology follow up at [facility]: Dr. Ramirez 6 month    Cardiology Scheduling~[phone]  Cardiology Clinic RN~[phone] (Emilia RN, Ariana RN; Kanchan RN)

## 2024-05-30 NOTE — LETTER
5/30/2024    Maria De Jesus Hay, APRN CNP  [address] [address]    RE: Roger Bonilla       Dear Colleague,     I had the pleasure of seeing Roger Bonilla in the [facility].  Cardiology Clinic Progress Note  Roger Bonilla MRN# [id number]   YOB: 1955 Age: 68 year old      Primary Cardiologist:   Dr. Ramirez  Patient presents today for 1 month follow-up        History of Presenting Illness:      Roger Bonilla is a pleasant 68 year old patient with a past cardiac history significant for   CAD with CABG  Angiogram 2001 with PCI of RCA and OM   CABG 4/2022 (LIMA to LAD, SVG to RPDA, SVG to OM)  Persistent atrial fibrillation/flutter  Onset noted on EKG 7/2023 during a lipid study appt, but did not get notified and not treat.  4/2024 PCP noted on EKG and notified cardiology  Asymptomatic  Elevated [id number] score  Rate control, anticoagulation  DOAC cost prohibitive  NSVT  Asymptomatic 10 beat run on Zio patch 5/2024  Normal EF  Treated with beta-blocker  PVD (right carotid stenosis and lower extremity disease)  S/p Right carotid endarterectomy 2022  Follows with vascular surgery  Hypertension  Hyperlipidemia  Enrolled in ongoing research study  Past medical history significant for obesity, untreated sleep apnea, DM type II, tobacco use.      Patient was seen by me in April 2024.  He was incidentally noted to have atrial fibrillation at PCP office visit.  However, when they reviewed his prior EKGs there was one from July 2023 that also noted A-fib/a flutter.  That EKG was done at an appointment for research study and  patient was never notified of the new atrial fibrillation/flutter.  Therefore it was not treated.     Most recent lipid profile, BMP, ALT reviewed today. Zio patch May 2024 showing persistent atrial fibrillation with average heart rate 85, 1 run of VT lasting 10 beats and no symptoms reported.  Results reviewed today.    Heart racing or palpitations? None    Angina? None   BPs? Controlled   No bleeding concerns   Prefers rate control for afib management.   Declines stress test for NSVT   Declines follow-up with sleep medicine    Patient reports no chest pain, shortness of breath, PND, orthopnea, presyncope, syncope, edema, heart racing, or palpitations.                   Assessment and Plan:       Plan  Patient Instructions   Medication Changes:  None     Recommendations:  Check blood pressure at least 1 hour after medications. Call the clinic if your blood pressure is consistently greater than 130/80.     Follow-up:  Cardiology follow up at [facility]: Dr. Ramirez 6 month    Cardiology Scheduling~[phone]  Cardiology Clinic RN~[phone] (Emilia RN, Ariana RN; Kanchan RN)            Coronary artery disease involving native coronary artery of native heart without angina pectoris  No angina  Continue GDMT    Persistent atrial fibrillation (H)  Persistent on Zio patch 2024  Elevated [id number] score  Continue rate control, anticoagulation      Benign essential hypertension  Controlled  Continue current medications    NSVT (nonsustained ventricular tachycardia) (H)  Asymptomatic  Continue beta-blocker  Declines a stress test             Respiratory:  clear to auscultation; normal symmetry        Cardiac: regular rate and rhythm irregularly irregular   GI:  nondistended     Extremities and Muscular Skeletal:    mild lower extremity edema bilaterally       Thank you for allowing me to participate in this delightful patient's care.   This note was completed in part using Dragon voice recognition software. Although reviewed after completion, some word and grammatical errors may occur.    SAL Willoughby CNP      Thank you for allowing me to participate in the care of your patient.      Sincerely,     SAL Willoughby CNP     [facility]  cc:   Kamryn Carroll  APRN CNP  [address] [place] [address]

## 2024-06-04 ENCOUNTER — OFFICE VISIT (OUTPATIENT)
Dept: CARDIOLOGY | Facility: CLINIC | Age: 69
End: 2024-06-04
Payer: COMMERCIAL

## 2024-06-04 ENCOUNTER — LAB (OUTPATIENT)
Dept: CARDIOLOGY | Facility: CLINIC | Age: 69
End: 2024-06-04
Payer: COMMERCIAL

## 2024-06-04 ENCOUNTER — ANTICOAGULATION THERAPY VISIT (OUTPATIENT)
Dept: ANTICOAGULATION | Facility: CLINIC | Age: 69
End: 2024-06-04

## 2024-06-04 VITALS
WEIGHT: 239 LBS | DIASTOLIC BLOOD PRESSURE: 70 MMHG | SYSTOLIC BLOOD PRESSURE: 114 MMHG | BODY MASS INDEX: 38.58 KG/M2 | RESPIRATION RATE: 14 BRPM | HEART RATE: 89 BPM

## 2024-06-04 DIAGNOSIS — Z00.6 EXAMINATION OF PARTICIPANT IN CLINICAL TRIAL: ICD-10-CM

## 2024-06-04 DIAGNOSIS — I25.810 CORONARY ARTERY DISEASE INVOLVING CORONARY BYPASS GRAFT OF NATIVE HEART WITHOUT ANGINA PECTORIS: Primary | ICD-10-CM

## 2024-06-04 DIAGNOSIS — E78.5 HYPERLIPIDEMIA LDL GOAL <70: ICD-10-CM

## 2024-06-04 DIAGNOSIS — I48.91 ATRIAL FIBRILLATION/FLUTTER (H): Primary | ICD-10-CM

## 2024-06-04 DIAGNOSIS — I48.92 ATRIAL FIBRILLATION/FLUTTER (H): ICD-10-CM

## 2024-06-04 DIAGNOSIS — I10 BENIGN ESSENTIAL HYPERTENSION: ICD-10-CM

## 2024-06-04 DIAGNOSIS — I48.92 ATRIAL FIBRILLATION/FLUTTER (H): Primary | ICD-10-CM

## 2024-06-04 DIAGNOSIS — Z95.1 S/P CABG (CORONARY ARTERY BYPASS GRAFT): Primary | ICD-10-CM

## 2024-06-04 DIAGNOSIS — I48.91 ATRIAL FIBRILLATION/FLUTTER (H): ICD-10-CM

## 2024-06-04 LAB
ATRIAL RATE - MUSE: 240 BPM
DIASTOLIC BLOOD PRESSURE - MUSE: NORMAL MMHG
INR POINT OF CARE: 3.8 (ref 0.9–1.1)
INTERPRETATION ECG - MUSE: NORMAL
P AXIS - MUSE: NORMAL DEGREES
PR INTERVAL - MUSE: NORMAL MS
QRS DURATION - MUSE: 96 MS
QT - MUSE: 468 MS
QTC - MUSE: 566 MS
R AXIS - MUSE: 71 DEGREES
SYSTOLIC BLOOD PRESSURE - MUSE: NORMAL MMHG
T AXIS - MUSE: 64 DEGREES
VENTRICULAR RATE- MUSE: 88 BPM

## 2024-06-04 PROCEDURE — 99214 OFFICE O/P EST MOD 30 MIN: CPT | Performed by: NURSE PRACTITIONER

## 2024-06-04 PROCEDURE — 99207 PR NO CHARGE-RESEARCH SERVICE: CPT | Performed by: INTERNAL MEDICINE

## 2024-06-04 PROCEDURE — 85610 PROTHROMBIN TIME: CPT | Performed by: NURSE PRACTITIONER

## 2024-06-04 PROCEDURE — 36416 COLLJ CAPILLARY BLOOD SPEC: CPT | Performed by: NURSE PRACTITIONER

## 2024-06-04 PROCEDURE — 93000 ELECTROCARDIOGRAM COMPLETE: CPT | Performed by: STUDENT IN AN ORGANIZED HEALTH CARE EDUCATION/TRAINING PROGRAM

## 2024-06-04 PROCEDURE — 36415 COLL VENOUS BLD VENIPUNCTURE: CPT | Performed by: INTERNAL MEDICINE

## 2024-06-04 RX ORDER — ASPIRIN 81 MG/1
81 TABLET, CHEWABLE ORAL DAILY
COMMUNITY

## 2024-06-04 NOTE — PROGRESS NOTES
A Double-blind, Randomized, Placebo-controlled, Multicenter Study Assessing the impact of Olpasiran on Major Cardiovascular Events in Patients with Atherosclerotic cardiovascular Disease and Elevated Lipoprotein (a)      Roger Bonilla was seen in clinic today for Visit week 48  Study progress and consent discussion continues; Roger Bonilla still wishes to remain in the study.    ECG performed at 1038    /70 (BP Location: Right arm, Patient Position: Sitting, Cuff Size: Adult Regular)   Pulse 89   Wt 108.4 kg (238 lb 15.7 oz)   BMI 38.57 kg/m        Physical Examination done by Rima Encarnacion NP, see documentation (Including Modified Ranking Scale if applicable      Any Adverse events, Serious Adverse event, changes in medications,  (if any) listed below.    Asymptomatic Atrial fibrillation onset 04/15/2024  at PCP visit  Warfarin based on sliding scale per INR checks daily started on 16 APR 2024    Did the patient have any of the following events (endpoints):     No Cardiovascular Death  No Myocardial Infarction  No Coronary revascularization  Yes  Any Coronary Artery Revascularization  No  Cerebrovascular Revascularization  No  Peripheral Artery Revascularization  No  Vascular amputation  No Hospitalization for Unstable Angina  No Stroke (Ischemic or Hemorrhagic) / Transient Ischemic Attack (TIA)  No Deep Vein Thrombosis or Pulmonary Embolism  No New onset or clinical deterioration of aortic stenosis  No  Limb Ischemia  No  New Onset Diabetes Mellitus          Central, Local labs  drawn?  Yes Time 1017.    Post dose PK done at 1129      Urine Pregnancy test for WOCBP No       IP Dispensed? Yes      IP Lot Number: [id number]  IP Box Number: [id number]  Administered full dose into LEFT abdomen at 1026 by Research coordinator              A Double-blind, Randomized, Placebo-controlled, Multicenter Study Assessing the impact of Olpasiran on Major Cardiovascular Events in Patients with Atherosclerotic  cardiovascular Disease and Elevated Lipoprotein (a)      Diagnosis/event description (diagnosis preferred):  Atrial Fibrillation     Serious adverse event?   [] Yes   [x] No  []Results in death  []Is life threatening  []Requires or prolongs hospitalization  []Congenital anomaly or birth defect  []Persistent or significant disability/incapacity  []Other medically important serious event    Start date/Change date: 15 Apr 2024     Stop date: ongoing  Date Study team became aware of event: 04 Jun 2024  Intensity: ([] mild /[x]  moderate / [] severe)    CTCAE grade: [] 1 [x] 2 [] 3 [] 4 [] 5    Action taken with Study Drug  [] Drug withdrawn  [] Drug interrupted  [] Dose reduced  [x] Dose not changed  [] Dose increased  [] Not applicable  [] Unknown  Outcome:  [] recovered/resolved  [] recovering/resolving  [] recovered/resolved with sequelae  [x] not recovered/not resolved  [] fatal  [] unknown    Was treatment given for this event:  [x] Yes   [] No If yes, provide details   Warfarin started on 16 Apr 2024    Were there other actions/treatments of Adverse Events? [] Yes   [x] No If yes, comment:    Additional comments:   Outcome event?  [] Yes   [x] No    Dr. Tello: Reasonable possibility that AE is related to study treatment    [] Yes   [x] No    Dr. Tello: Reasonable causal relationship to protocol required procedure(s)  [] Yes   [x] No          Valarie Banda RN      Current Outpatient Medications:     aspirin (ASA) 81 MG chewable tablet, Take 81 mg by mouth daily, Disp: , Rfl:     atorvastatin (LIPITOR) 80 MG tablet, Take 1 tablet (80 mg) by mouth daily, Disp: 90 tablet, Rfl: 3    ezetimibe (ZETIA) 10 MG tablet, Take 1 tablet (10 mg) by mouth daily, Disp: 90 tablet, Rfl: 3    furosemide (LASIX) 40 MG tablet, Take 0.5 tablets (20 mg) by mouth daily, Disp: 45 tablet, Rfl: 3    metFORMIN (GLUCOPHAGE) 1000 MG tablet, Take 1 Tablet BY MOUTH EVERY DAY, Disp: 90 tablet, Rfl: 3    metoprolol tartrate (LOPRESSOR) 100  MG tablet, Take 1 tablet (100 mg) by mouth 2 times daily, Disp: 180 tablet, Rfl: 3    omeprazole (PRILOSEC) 40 MG DR capsule, Take 1 capsule (40 mg) by mouth every morning, Disp: 90 capsule, Rfl: 3    warfarin ANTICOAGULANT (COUMADIN) 5 MG tablet, Take 7.5 mg every Sun/Wed and 5 mg all other days of the week OR as directed by INR Clinic, Disp: 100 tablet, Rfl: 1

## 2024-06-04 NOTE — PROGRESS NOTES
ANTICOAGULATION MANAGEMENT     Roger Bonilla 68 year old male is on warfarin with supratherapeutic INR result. (Goal INR 2.0-3.0)    Recent labs: (last 7 days)     06/04/24  1020   INR 3.8*       ASSESSMENT     Source(s): Chart Review and Patient/Caregiver Call     Warfarin doses taken: Warfarin taken as instructed  Diet:  maybe less greens  Medication/supplement changes: None noted  New illness, injury, or hospitalization: No  Signs or symptoms of bleeding or clotting: No  Previous result: Therapeutic last 2(+) visits  Additional findings:  patient did miss his dose a week prior to last INR so it may have been elevated had he not missed. He is in a double blind study with a medication up to date does not list but he has been on that study for over a year now so not likely to be changing INR.       PLAN     Recommended plan for no diet, medication or health factor changes affecting INR     Dosing Instructions: partial hold then decrease your warfarin dose (12.5% change) with next INR in 1 week       Summary  As of 6/4/2024      Full warfarin instructions:  6/4: 2.5 mg; Otherwise 5 mg every day   Next INR check:  6/11/2024               Telephone call with Flex who verbalizes understanding and agrees to plan    Lab visit scheduled    Education provided:   Please call back if any changes to your diet, medications or how you've been taking warfarin  Symptom monitoring: monitoring for bleeding signs and symptoms, when to seek medical attention/emergency care, and if you hit your head or have a bad fall seek emergency care  Contact [phone]  with any changes, questions or concerns.     Plan made per [facility] anticoagulation protocol    Mary Clemente, RN  Anticoagulation Clinic  6/4/2024    _______________________________________________________________________     Anticoagulation Episode Summary       Current INR goal:  2.0-3.0   TTR:  80.6% (1.3 mo)   Target end date:  Indefinite   Send INR reminders to:  [facility]  [place]    Indications    Atrial fibrillation/flutter (H) [I48.91  I48.92]             Comments:               Anticoagulation Care Providers       Provider Role Specialty Phone number    Maria De Jesus Hay APRN [facility] [phone]

## 2024-06-04 NOTE — LETTER
6/4/2024    Maria De Jesus Hay, APRN CNP  [address] [facility] [address]    RE: Roger Bonilla       Dear Colleague,     I had the pleasure of seeing Roger [facility]illa in the [facility].    A Double-blind, Randomized, Placebo-controlled, Multicenter Study Assessing the impact of Olpasiran on Major Cardiovascular Events in Patients with Atherosclerotic cardiovascular Disease and Elevated Lipoprotein (a)      Roger Bonilla was seen in clinic today for Visit week 48  Study progress and consent discussion continues; Roger Bonilla still wishes to remain in the study.    ECG performed at 1038    /70 (BP Location: Left arm, Patient Position: Sitting, Cuff Size: Adult Regular)   Pulse 89   Wt 108.4 kg (238 lb 15.7 oz)   BMI 38.57 kg/m        Physical Examination done by Rima Encarnacion NP, see documentation (Including Modified Ranking Scale if applicable      Any Adverse events, Serious Adverse event, changes in medications,  (if any) listed below.    Asymptomatic Atrial fibrillation onset 04/15/2024  at PCP visit  Warfarin based on sliding scale per INR checks daily started on 16 APR 2024    Did the patient have any of the following events (endpoints):     No Cardiovascular Death  No Myocardial Infarction  No Coronary revascularization  Yes  Any Coronary Artery Revascularization  No  Cerebrovascular Revascularization  No  Peripheral Artery Revascularization  No  Vascular amputation  No Hospitalization for Unstable Angina  No Stroke (Ischemic or Hemorrhagic) / Transient Ischemic Attack (TIA)  No Deep Vein Thrombosis or Pulmonary Embolism  No New onset or clinical deterioration of aortic stenosis  No  Limb Ischemia  No  New Onset Diabetes Mellitus          Central, Local labs  drawn?  Yes Time 1017.    Post dose PK done at 1129      Urine Pregnancy test for WOCBP No       IP Dispensed? Yes      IP Lot Number: [id number]  IP Box Number: [id number]  Administered full dose into LEFT abdomen at 1026 by  Research coordinator              A Double-blind, Randomized, Placebo-controlled, Multicenter Study Assessing the impact of Olpasiran on Major Cardiovascular Events in Patients with Atherosclerotic cardiovascular Disease and Elevated Lipoprotein (a)      Diagnosis/event description (diagnosis preferred):  Atrial Fibrillation     Serious adverse event?   [] Yes   [x] No  []Results in death  []Is life threatening  []Requires or prolongs hospitalization  []Congenital anomaly or birth defect  []Persistent or significant disability/incapacity  []Other medically important serious event    Start date/Change date: 15 Apr 2024     Stop date: ongoing  Date Study team became aware of event: 04 Jun 2024  Intensity: ([] mild /[x]  moderate / [] severe)    CTCAE grade: [] 1 [x] 2 [] 3 [] 4 [] 5    Action taken with Study Drug  [] Drug withdrawn  [] Drug interrupted  [] Dose reduced  [x] Dose not changed  [] Dose increased  [] Not applicable  [] Unknown  Outcome:  [] recovered/resolved  [] recovering/resolving  [] recovered/resolved with sequelae  [x] not recovered/not resolved  [] fatal  [] unknown    Was treatment given for this event:  [x] Yes   [] No If yes, provide details   Warfarin started on 16 Apr 2024    Were there other actions/treatments of Adverse Events? [] Yes   [x] No If yes, comment:    Additional comments:   Outcome event?  [] Yes   [x] No    Dr. Tello: Reasonable possibility that AE is related to study treatment    [] Yes   [] No    Dr. Tello: Reasonable causal relationship to protocol required procedure(s)  [] Yes   [] No          Valarie Banda RN      Current Outpatient Medications:     aspirin (ASA) 81 MG chewable tablet, Take 81 mg by mouth daily, Disp: , Rfl:     atorvastatin (LIPITOR) 80 MG tablet, Take 1 tablet (80 mg) by mouth daily, Disp: 90 tablet, Rfl: 3    ezetimibe (ZETIA) 10 MG tablet, Take 1 tablet (10 mg) by mouth daily, Disp: 90 tablet, Rfl: 3    furosemide (LASIX) 40 MG tablet, Take 0.5  tablets (20 mg) by mouth daily, Disp: 45 tablet, Rfl: 3    metFORMIN (GLUCOPHAGE) 1000 MG tablet, Take 1 Tablet BY MOUTH EVERY DAY, Disp: 90 tablet, Rfl: 3    metoprolol tartrate (LOPRESSOR) 100 MG tablet, Take 1 tablet (100 mg) by mouth 2 times daily, Disp: 180 tablet, Rfl: 3    omeprazole (PRILOSEC) 40 MG DR capsule, Take 1 capsule (40 mg) by mouth every morning, Disp: 90 capsule, Rfl: 3    warfarin ANTICOAGULANT (COUMADIN) 5 MG tablet, Take 7.5 mg every Sun/Wed and 5 mg all other days of the week OR as directed by INR Clinic, Disp: 100 tablet, Rfl: 1         Thank you for allowing me to participate in the care of your patient.      Sincerely,     Valarie Banda RN     [facility]

## 2024-06-04 NOTE — LETTER
6/4/2024    Maria De Jesus Hay, SAL CNP  [address] [facility] [address]    RE: Roger Bonilla       Dear Colleague,     I had the [facility].        Assessment/Recommendations   Assessment:      OLPASIRAN-CVOT: A Double-blind, Randomized, Placebo-controlled, Multicenter Study Assessing the Impact of Olpasiran on Major Cardiovascular Events in Patients with Atherosclerotic Cardiovascular Disease and Elevated Lipoprotein (a)     1.  CAD: Denies angina. Status post CABG in 2022.   2.  Hypertension: Blood pressure 114/70  3.  Hyperlipidemia: He continues atorvastatin and Zetia    Plan:  1.  Continue current medications    Roger Bonilla will follow up per research protocol.     History of Present Illness/Subjective    Mr. Roger Bonilla is a 68 year old male seen at [facility] today for continued follow-up.  He follows up for the OLPASIRAN-CVOT: A Double-blind, Randomized, Placebo-controlled, Multicenter Study Assessing the Impact of Olpasiran on Major Cardiovascular Events in Patients with Atherosclerotic Cardiovascular Disease and Elevated Lipoprotein (a). He has a past medical history significant for CAD, atrial fibrillation, dyslipidemia, CABG, PAD, PVD, hypertension, ABIGAIL not on CPAP, diabetes, tobacco abuse, obesity, right carotid endarterectomy.      Today, he denies any acute symptoms.  He denies fatigue, lightheadedness, shortness of breath, dyspnea on exertion, orthopnea, PND, palpitations, chest pain, abdominal fullness/bloating, and lower extremity edema.         Physical Examination Review of Systems   /70 (BP Location: Right arm, Patient Position: Sitting, Cuff Size: Adult Regular)   Pulse 89   Resp 14   Wt 108.4 kg (239 lb)   BMI 38.58 kg/m    Body mass index is 38.58 kg/m .  Wt Readings from Last 3 Encounters:   06/04/24 108.4 kg (239 lb)   05/30/24 109.3 kg (241 lb)   04/15/24 107.8 kg (237 lb 11.2 oz)       General  Appearance:   no acute distress   ENT/Mouth: No abnormalities   EYES:  no scleral icterus, normal conjunctivae   Neck: no thyromegaly   Chest/Lungs:   lungs are clear to auscultation, no rales or wheezing, equal chest wall expansion    Cardiovascular:   Irregularly irregular. Normal first and second heart sounds with no murmurs, rubs, or gallops, no edema bilaterally    Abdomen:  bowel sounds are present   Extremities: no cyanosis or clubbing   Skin: warm   Neurologic: no tremors     Psychiatric: alert and oriented x3    Enc Vitals  BP: 114/70  Pulse: 89  Resp: 14  Weight: 108.4 kg (239 lb)                                         Medical History  Surgical History Family History Social History   Past Medical History:   Diagnosis Date    AAA (abdominal aortic aneurysm) (H24) 06/23/2021    Acute kidney injury (H24) 06/23/2021    Acute superficial gastritis without hemorrhage 07/19/2021    Atherosclerosis of both carotid arteries 10/27/2008    Coronary artery disease 05/01/2005    2 stents put in heart    Disorder of bursae and tendons in shoulder region 09/01/2005    Hyperlipidemia LDL goal <70 11/18/2002    Morbid obesity (H) 09/16/2013    NSTEMI (non-ST elevated myocardial infarction) (H) 05/2001    Obstructive Sleep Apnea 11/23/2011    Peripheral artery disease (H24) 11/23/2012    Pure hypercholesterolemia 11/18/2002    Tobacco abuse 1973    Tubular adenoma 01/05/2023    Type II or unspecified type diabetes mellitus without mention of complication, not stated as uncontrolled 11/18/2002    Unspecified cardiovascular disease 05/2001    MI -- Angioplasty two stents RCA & OM2    Unspecified essential hypertension 11/18/2002    Past Surgical History:   Procedure Laterality Date    BYPASS GRAFT ARTERY CORONARY N/A 3/24/2022    Procedure: CORONARY ARTERY BYPASS GRAFT x 3 (LIMA - LAD; SV - OM2; SV - PDA) WITH ENDOSCOPIC SAPHENOUS VEIN HARVEST ON BILATERAL LOWER EXTREMITY, AND ON CARDIOPULMONARY PUMP OXYGENATOR   (INTRAOPERATIVE TRANSESOPHAGEAL ECHOCARDIOGRAM BY ANESTHESIOLOGIST);  Surgeon: Glenna Waters MD;  Location:  OR    CARDIAC SURGERY  may 1, 2005    COLONOSCOPY  11/30/2011    Procedure:COLONOSCOPY; Screening Colonoscopy; Surgeon:LUTHER FLORES; Location:[facility]    COLONOSCOPY N/A 1/3/2023    Procedure: COLONOSCOPY with polypectomy;  Surgeon: Angel Skinner MD;  Location: [facility]    CV CORONARY ANGIOGRAM N/A 12/29/2021    Procedure: Coronary Angiogram;  Surgeon: Jonny Moore MD;  Location:  [facility]    ENDARTERECTOMY CAROTID Right 2/18/2022    Procedure: Right carotid endarterectomy with ELECTROENCEPHALOGRAM(EEG);  Surgeon: Karma Adorno MD;  [facility]    ESOPHAGOSCOPY, GASTROSCOPY, DUODENOSCOPY (EGD), COMBINED N/A 7/21/2021    Procedure: ESOPHAGOGASTRODUODENOSCOPY, WITH BIOPSY;  Surgeon: Jeff Cunningham DO;  Location: [facility]    PHACOEMULSIFICATION WITH STANDARD INTRAOCULAR LENS IMPLANT Right 7/28/2021    Procedure: Right eye Cataract Extraction with Implant;  Surgeon: Reyes Valdez MD;  Location: [facility]    PHACOEMULSIFICATION WITH STANDARD INTRAOCULAR LENS IMPLANT Left 8/18/2021    Procedure: left eye Cataract Extraction with Implant;  Surgeon: Reyes Valdez MD;  Location: [facility]    SURGICAL HISTORY OF -       None    VASCULAR SURGERY  oct 2013    stent put in leg    Family History   Problem Relation Age of Onset    Cerebrovascular Disease Mother     Respiratory Father         emphysema    Cerebrovascular Disease Maternal Grandmother     Alzheimer Disease Maternal Grandfather     Breast Cancer Sister     Arthritis Other         hip fracture    Cancer - colorectal No family hx of     Prostate Cancer No family hx of     Social History     Socioeconomic History    Marital status:      Spouse name: Not on file    Number of children: Not on file    Years of education: Not on file    Highest education level: Not on file    Occupational History    Not on file   Tobacco Use    Smoking status: Every Day     Current packs/day: 0.50     Average packs/day: 0.5 packs/day for 52.4 years (26.2 ttl pk-yrs)     Types: Cigarettes     Start date: 1/1/1972    Smokeless tobacco: Never    Tobacco comments:     5-10 cigs daily 11/28/22   Vaping Use    Vaping status: Never Used   Substance and Sexual Activity    Alcohol use: Not Currently     Comment: 2 beers a year    Drug use: No    Sexual activity: Yes     Partners: Female     Birth control/protection: Condom   Other Topics Concern    Parent/sibling w/ CABG, MI or angioplasty before 65F 55M? No   Social History Narrative    Not on file     Social Determinants of Health     Financial Resource Strain: Low Risk  (4/15/2024)    Financial Resource Strain     Within the past 12 months, have you or your family members you live with been unable to get utilities (heat, electricity) when it was really needed?: No   Food Insecurity: Low Risk  (4/15/2024)    Food Insecurity     Within the past 12 months, did you worry that your food would run out before you got money to buy more?: No     Within the past 12 months, did the food you bought just not last and you didn t have money to get more?: No   Transportation Needs: Low Risk  (4/15/2024)    Transportation Needs     Within the past 12 months, has lack of transportation kept you from medical appointments, getting your medicines, non-medical meetings or appointments, work, or from getting things that you need?: No   Physical Activity: Insufficiently Active (4/15/2024)    Exercise Vital Sign     Days of Exercise per Week: 3 days     Minutes of Exercise per Session: 10 min   Stress: Stress Concern Present (4/15/2024)    [employer] - Occupational Stress Questionnaire     Feeling of Stress : To some extent   Social Connections: Unknown (4/15/2024)    Social Connection and Isolation Panel [NHANES]     Frequency of Communication with  Friends and Family: Not on file     Frequency of Social Gatherings with Friends and Family: Once a week     Attends Taoist Services: Not on file     Active Member of Clubs or Organizations: Not on file     Attends Club or Organization Meetings: Not on file     Marital Status: Not on file   Interpersonal Safety: Low Risk  (4/15/2024)    Interpersonal Safety     Do you feel physically and emotionally safe where you currently live?: Yes     Within the past 12 months, have you been hit, slapped, kicked or otherwise physically hurt by someone?: No     Within the past 12 months, have you been humiliated or emotionally abused in other ways by your partner or ex-partner?: No   Housing Stability: Low Risk  (4/15/2024)    Housing Stability     Do you have housing? : Yes     Are you worried about losing your housing?: No          Medications  Allergies   Current Outpatient Medications   Medication Sig Dispense Refill    aspirin (ASA) 81 MG chewable tablet Take 81 mg by mouth daily      atorvastatin (LIPITOR) 80 MG tablet Take 1 tablet (80 mg) by mouth daily 90 tablet 3    ezetimibe (ZETIA) 10 MG tablet Take 1 tablet (10 mg) by mouth daily 90 tablet 3    furosemide (LASIX) 40 MG tablet Take 0.5 tablets (20 mg) by mouth daily 45 tablet 3    metFORMIN (GLUCOPHAGE) 1000 MG tablet Take 1 Tablet BY MOUTH EVERY DAY 90 tablet 3    metoprolol tartrate (LOPRESSOR) 100 MG tablet Take 1 tablet (100 mg) by mouth 2 times daily 180 tablet 3    omeprazole (PRILOSEC) 40 MG DR capsule Take 1 capsule (40 mg) by mouth every morning 90 capsule 3    warfarin ANTICOAGULANT (COUMADIN) 5 MG tablet Take 7.5 mg every Sun/Wed and 5 mg all other days of the week OR as directed by INR Clinic 100 tablet 1    Allergies   Allergen Reactions    Nkda [No Known Drug Allergy]          Lab Results    Chemistry/lipid CBC Cardiac Enzymes/BNP/TSH/INR   Lab Results   Component Value Date    CHOL 142 05/10/2024    HDL 30 (L) 05/10/2024    TRIG 170 (H) 05/10/2024     BUN 23.2 (H) 04/15/2024     04/15/2024    CO2 29 04/15/2024    Lab Results   Component Value Date    WBC 12.3 (H) 05/17/2022    HGB 11.6 (L) 05/17/2022    HCT 38.1 (L) 05/17/2022    MCV 86 05/17/2022     05/17/2022    Lab Results   Component Value Date    TSH 0.35 (L) 06/23/2021    INR 2.2 (H) 05/24/2024             This note has been dictated using voice recognition software. Any grammatical, typographical, or context distortions are unintentional and inherent to the software    20 minutes spent on the date of encounter doing chart review, review of outside records, review of test results, interpretation with above tests, patient visit, and documentation.                      Thank you for allowing me to participate in the care of your patient.      Sincerely,     SAL Andrade CNP     [facility]  cc:   SAL Andrade CNP  [address], [address]  [place] [address]

## 2024-06-04 NOTE — PROGRESS NOTES
Patient is returning the clinic's call, please call him at [phone].  Shahrzad Fuentes   [facility]  Central Scheduler

## 2024-06-04 NOTE — PROGRESS NOTES
Assessment/Recommendations   Assessment:      OLPASIRAN-CVOT: A Double-blind, Randomized, Placebo-controlled, Multicenter Study Assessing the Impact of Olpasiran on Major Cardiovascular Events in Patients with Atherosclerotic Cardiovascular Disease and Elevated Lipoprotein (a)     1.  CAD: Denies angina. Status post CABG in 2022.   2.  Hypertension: Blood pressure 114/70  3.  Hyperlipidemia: He continues atorvastatin and Zetia    Plan:  1.  Continue current medications    Roger Bonilla will follow up per research protocol.     History of Present Illness/Subjective    Mr. Roger Bonilla is a 68 year old male seen at [facility] today for continued follow-up.  He follows up for the OLPASIRAN-CVOT: A Double-blind, Randomized, Placebo-controlled, Multicenter Study Assessing the Impact of Olpasiran on Major Cardiovascular Events in Patients with Atherosclerotic Cardiovascular Disease and Elevated Lipoprotein (a). He has a past medical history significant for CAD, atrial fibrillation, dyslipidemia, CABG, PAD, PVD, hypertension, ABIGAIL not on CPAP, diabetes, tobacco abuse, obesity, right carotid endarterectomy.      Today, he denies any acute symptoms.  He denies fatigue, lightheadedness, shortness of breath, dyspnea on exertion, orthopnea, PND, palpitations, chest pain, abdominal fullness/bloating, and lower extremity edema.         Physical Examination Review of Systems   /70 (BP Location: Right arm, Patient Position: Sitting, Cuff Size: Adult Regular)   Pulse 89   Resp 14   Wt 108.4 kg (239 lb)   BMI 38.58 kg/m    Body mass index is 38.58 kg/m .  Wt Readings from Last 3 Encounters:   06/04/24 108.4 kg (239 lb)   05/30/24 109.3 kg (241 lb)   04/15/24 107.8 kg (237 lb 11.2 oz)       General Appearance:   no acute distress   ENT/Mouth: No abnormalities   EYES:  no scleral icterus, normal conjunctivae   Neck: no thyromegaly   Chest/Lungs:   lungs are clear to auscultation, no rales or wheezing,  equal chest wall expansion    Cardiovascular:   Irregularly irregular. Normal first and second heart sounds with no murmurs, rubs, or gallops, no edema bilaterally    Abdomen:  bowel sounds are present   Extremities: no cyanosis or clubbing   Skin: warm   Neurologic: no tremors     Psychiatric: alert and oriented x3    Enc Vitals  BP: 114/70  Pulse: 89  Resp: 14  Weight: 108.4 kg (239 lb)                                         Medical History  Surgical History Family History Social History   Past Medical History:   Diagnosis Date    AAA (abdominal aortic aneurysm) (H24) 06/23/2021    Acute kidney injury (H24) 06/23/2021    Acute superficial gastritis without hemorrhage 07/19/2021    Atherosclerosis of both carotid arteries 10/27/2008    Coronary artery disease 05/01/2005    2 stents put in heart    Disorder of bursae and tendons in shoulder region 09/01/2005    Hyperlipidemia LDL goal <70 11/18/2002    Morbid obesity (H) 09/16/2013    NSTEMI (non-ST elevated myocardial infarction) (H) 05/2001    Obstructive Sleep Apnea 11/23/2011    Peripheral artery disease (H24) 11/23/2012    Pure hypercholesterolemia 11/18/2002    Tobacco abuse 1973    Tubular adenoma 01/05/2023    Type II or unspecified type diabetes mellitus without mention of complication, not stated as uncontrolled 11/18/2002    Unspecified cardiovascular disease 05/2001    MI -- Angioplasty two stents RCA & OM2    Unspecified essential hypertension 11/18/2002    Past Surgical History:   Procedure Laterality Date    BYPASS GRAFT ARTERY CORONARY N/A 3/24/2022    Procedure: CORONARY ARTERY BYPASS GRAFT x 3 (LIMA - LAD; SV - OM2; SV - PDA) WITH ENDOSCOPIC SAPHENOUS VEIN HARVEST ON BILATERAL LOWER EXTREMITY, AND ON CARDIOPULMONARY PUMP OXYGENATOR  (INTRAOPERATIVE TRANSESOPHAGEAL ECHOCARDIOGRAM BY ANESTHESIOLOGIST);  Surgeon: Glenna Waters MD;  Location:  OR    CARDIAC SURGERY  may 1, 2005    COLONOSCOPY  11/30/2011    Procedure:COLONOSCOPY; Screening  Colonoscopy; Surgeon:LUTHER FLORES; Location:[facility]    COLONOSCOPY N/A 1/3/2023    Procedure: COLONOSCOPY with polypectomy;  Surgeon: Angel Skinner MD;  Location: [facility]    CV CORONARY ANGIOGRAM N/A 12/29/2021    Procedure: Coronary Angiogram;  Surgeon: Jonny Moore MD;  Location:  HEART CARDIAC CATH LAB    ENDARTERECTOMY CAROTID Right 2/18/2022    Procedure: Right carotid endarterectomy with ELECTROENCEPHALOGRAM(EEG);  Surgeon: Karma Adorno MD;  Location: [facility]    ESOPHAGOSCOPY, GASTROSCOPY, DUODENOSCOPY (EGD), COMBINED N/A 7/21/2021    Procedure: ESOPHAGOGASTRODUODENOSCOPY, WITH BIOPSY;  Surgeon: Jeff Cunningham DO;  Location: [facility]    PHACOEMULSIFICATION WITH STANDARD INTRAOCULAR LENS IMPLANT Right 7/28/2021    Procedure: Right eye Cataract Extraction with Implant;  Surgeon: Reyes Valdez MD;  Location: [facility]    PHACOEMULSIFICATION WITH STANDARD INTRAOCULAR LENS IMPLANT Left 8/18/2021    Procedure: left eye Cataract Extraction with Implant;  Surgeon: Reyes Valdez MD;  Location: [facility]    SURGICAL HISTORY OF -       None    VASCULAR SURGERY  oct 2013    stent put in leg    Family History   Problem Relation Age of Onset    Cerebrovascular Disease Mother     Respiratory Father         emphysema    Cerebrovascular Disease Maternal Grandmother     Alzheimer Disease Maternal Grandfather     Breast Cancer Sister     Arthritis Other         hip fracture    Cancer - colorectal No family hx of     Prostate Cancer No family hx of     Social History     Socioeconomic History    Marital status:      Spouse name: Not on file    Number of children: Not on file    Years of education: Not on file    Highest education level: Not on file   Occupational History    Not on file   Tobacco Use    Smoking status: Every Day     Current packs/day: 0.50     Average packs/day: 0.5 packs/day for 52.4 years (26.2 ttl pk-yrs)     Types: Cigarettes     Start date: 1/1/1972     Smokeless tobacco: Never    Tobacco comments:     5-10 cigs daily 11/28/22   Vaping Use    Vaping status: Never Used   Substance and Sexual Activity    Alcohol use: Not Currently     Comment: 2 beers a year    Drug use: No    Sexual activity: Yes     Partners: Female     Birth control/protection: Condom   Other Topics Concern    Parent/sibling w/ CABG, MI or angioplasty before 65F 55M? No   Social History Narrative    Not on file     Social Determinants of Health     Financial Resource Strain: Low Risk  (4/15/2024)    Financial Resource Strain     Within the past 12 months, have you or your family members you live with been unable to get utilities (heat, electricity) when it was really needed?: No   Food Insecurity: Low Risk  (4/15/2024)    Food Insecurity     Within the past 12 months, did you worry that your food would run out before you got money to buy more?: No     Within the past 12 months, did the food you bought just not last and you didn t have money to get more?: No   Transportation Needs: Low Risk  (4/15/2024)    Transportation Needs     Within the past 12 months, has lack of transportation kept you from medical appointments, getting your medicines, non-medical meetings or appointments, work, or from getting things that you need?: No   Physical Activity: Insufficiently Active (4/15/2024)    Exercise Vital Sign     Days of Exercise per Week: 3 days     Minutes of Exercise per Session: 10 min   Stress: Stress Concern Present (4/15/2024)    [employer] - Occupational Stress Questionnaire     Feeling of Stress : To some extent   Social Connections: Unknown (4/15/2024)    Social Connection and Isolation Panel [NHANES]     Frequency of Communication with Friends and Family: Not on file     Frequency of Social Gatherings with Friends and Family: Once a week     Attends Religion Services: Not on file     Active Member of Clubs or Organizations: Not on file     Attends Club or  Organization Meetings: Not on file     Marital Status: Not on file   Interpersonal Safety: Low Risk  (4/15/2024)    Interpersonal Safety     Do you feel physically and emotionally safe where you currently live?: Yes     Within the past 12 months, have you been hit, slapped, kicked or otherwise physically hurt by someone?: No     Within the past 12 months, have you been humiliated or emotionally abused in other ways by your partner or ex-partner?: No   Housing Stability: Low Risk  (4/15/2024)    Housing Stability     Do you have housing? : Yes     Are you worried about losing your housing?: No          Medications  Allergies   Current Outpatient Medications   Medication Sig Dispense Refill    aspirin (ASA) 81 MG chewable tablet Take 81 mg by mouth daily      atorvastatin (LIPITOR) 80 MG tablet Take 1 tablet (80 mg) by mouth daily 90 tablet 3    ezetimibe (ZETIA) 10 MG tablet Take 1 tablet (10 mg) by mouth daily 90 tablet 3    furosemide (LASIX) 40 MG tablet Take 0.5 tablets (20 mg) by mouth daily 45 tablet 3    metFORMIN (GLUCOPHAGE) 1000 MG tablet Take 1 Tablet BY MOUTH EVERY DAY 90 tablet 3    metoprolol tartrate (LOPRESSOR) 100 MG tablet Take 1 tablet (100 mg) by mouth 2 times daily 180 tablet 3    omeprazole (PRILOSEC) 40 MG DR capsule Take 1 capsule (40 mg) by mouth every morning 90 capsule 3    warfarin ANTICOAGULANT (COUMADIN) 5 MG tablet Take 7.5 mg every Sun/Wed and 5 mg all other days of the week OR as directed by INR Clinic 100 tablet 1    Allergies   Allergen Reactions    Nkda [No Known Drug Allergy]          Lab Results    Chemistry/lipid CBC Cardiac Enzymes/BNP/TSH/INR   Lab Results   Component Value Date    CHOL 142 05/10/2024    HDL 30 (L) 05/10/2024    TRIG 170 (H) 05/10/2024    BUN 23.2 (H) 04/15/2024     04/15/2024    CO2 29 04/15/2024    Lab Results   Component Value Date    WBC 12.3 (H) 05/17/2022    HGB 11.6 (L) 05/17/2022    HCT 38.1 (L) 05/17/2022    MCV 86 05/17/2022      05/17/2022    Lab Results   Component Value Date    TSH 0.35 (L) 06/23/2021    INR 2.2 (H) 05/24/2024             This note has been dictated using voice recognition software. Any grammatical, typographical, or context distortions are unintentional and inherent to the software    20 minutes spent on the date of encounter doing chart review, review of outside records, review of test results, interpretation with above tests, patient visit, and documentation.

## 2024-06-11 ENCOUNTER — APPOINTMENT (OUTPATIENT)
Dept: LAB | Facility: CLINIC | Age: 69
End: 2024-06-11
Payer: COMMERCIAL

## 2024-06-11 ENCOUNTER — ANTICOAGULATION THERAPY VISIT (OUTPATIENT)
Dept: ANTICOAGULATION | Facility: CLINIC | Age: 69
End: 2024-06-11

## 2024-06-11 DIAGNOSIS — I48.92 ATRIAL FIBRILLATION/FLUTTER (H): Primary | ICD-10-CM

## 2024-06-11 DIAGNOSIS — I48.91 ATRIAL FIBRILLATION/FLUTTER (H): Primary | ICD-10-CM

## 2024-06-11 LAB — INR BLD: 2.2 (ref 0.9–1.1)

## 2024-06-11 PROCEDURE — 85610 PROTHROMBIN TIME: CPT | Performed by: NURSE PRACTITIONER

## 2024-06-11 PROCEDURE — 36416 COLLJ CAPILLARY BLOOD SPEC: CPT | Performed by: NURSE PRACTITIONER

## 2024-06-11 NOTE — PROGRESS NOTES
ANTICOAGULATION MANAGEMENT     Roger Bonilla 68 year old male is on warfarin with therapeutic INR result. (Goal INR 2.0-3.0)    Recent labs: (last 7 days)     06/11/24  1328   INR 2.2*       ASSESSMENT     Source(s): Chart Review and Patient/Caregiver Call     Warfarin doses taken: Warfarin taken as instructed  Diet: No new diet changes identified  Medication/supplement changes: None noted  New illness, injury, or hospitalization: No  Signs or symptoms of bleeding or clotting: No  Previous result: Supratherapeutic  Additional findings: None       PLAN     Recommended plan for no diet, medication or health factor changes affecting INR     Dosing Instructions: Continue your current warfarin dose with next INR in 2 weeks       Summary  As of 6/11/2024      Full warfarin instructions:  5 mg every day   Next INR check:  6/25/2024               Telephone call with Flex who verbalizes understanding and agrees to plan    Lab visit scheduled    Education provided:   Goal range and lab monitoring: goal range and significance of current result    Plan made per [facility] anticoagulation protocol    Kori Ames RN  Anticoagulation Clinic  6/11/2024    _______________________________________________________________________     Anticoagulation Episode Summary       Current INR goal:  2.0-3.0   TTR:  75.8% (1.5 mo)   Target end date:  Indefinite   Send INR reminders to:  [facility]    Indications    Atrial fibrillation/flutter (H) [I48.91  I48.92]             Comments:               Anticoagulation Care Providers       Provider Role Specialty Phone number    Maria De Jesus Hay, SAL CNP Referring Family Medicine [phone]

## 2024-06-14 NOTE — PROGRESS NOTES
Called and spoke with patient's mom who stated she hadn't sent us a photo because she bought some A&D ointment and started using it on patient. Per mom, the rash has \"improved so much\" that mom didn't feel she needed Dr. Cheng to look at it.    Mom thanked us for the follow up phone call and states she will send a message/ photo if anything worsens or changes or if she has new questions or concerns.  Mom thanked us for our help and verbalized understanding of all information given.    Research laboratory data from June 4 reviewed and results not clinically significant including mildly high hemoglobin A1c as well as mildly high glucose, and  mildly high RDW with high neutrophil count, and low lymphocyte count, and high anucleated red cell count.  LF

## 2024-06-25 ENCOUNTER — ANTICOAGULATION THERAPY VISIT (OUTPATIENT)
Dept: ANTICOAGULATION | Facility: CLINIC | Age: 69
End: 2024-06-25

## 2024-06-25 ENCOUNTER — LAB (OUTPATIENT)
Dept: LAB | Facility: CLINIC | Age: 69
End: 2024-06-25
Payer: COMMERCIAL

## 2024-06-25 DIAGNOSIS — I48.91 ATRIAL FIBRILLATION/FLUTTER (H): ICD-10-CM

## 2024-06-25 DIAGNOSIS — I48.92 ATRIAL FIBRILLATION/FLUTTER (H): ICD-10-CM

## 2024-06-25 DIAGNOSIS — I48.92 ATRIAL FIBRILLATION/FLUTTER (H): Primary | ICD-10-CM

## 2024-06-25 DIAGNOSIS — I48.91 ATRIAL FIBRILLATION/FLUTTER (H): Primary | ICD-10-CM

## 2024-06-25 LAB — INR BLD: 4.9 (ref 0.9–1.1)

## 2024-06-25 PROCEDURE — 85610 PROTHROMBIN TIME: CPT

## 2024-06-25 PROCEDURE — 36416 COLLJ CAPILLARY BLOOD SPEC: CPT

## 2024-06-25 NOTE — PROGRESS NOTES
"ANTICOAGULATION MANAGEMENT     Roger Bonilla 68 year old male is on warfarin with supratherapeutic INR result. (Goal INR 2.0-3.0)    Recent labs: (last 7 days)     06/25/24  1332   INR 4.9*       ASSESSMENT     Source(s): Chart Review and Patient/Caregiver Call     Warfarin doses taken: Warfarin taken as instructed  Diet:  patient has been sick and not eating may be affecting diet and INR  Medication/supplement changes: None noted  New illness, injury, or hospitalization: Yes: patient reports over the last week he was \"sicker than a dog\" with a bad cold, was not eating much  Signs or symptoms of bleeding or clotting: No  Previous result: Therapeutic last visit; previously outside of goal range  Additional findings: None       PLAN     Recommended plan for temporary change(s) affecting INR     Dosing Instructions: hold dose then continue your current warfarin dose with next INR in 1 week       Summary  As of 6/25/2024      Full warfarin instructions:  6/25: Hold; Otherwise 5 mg every day   Next INR check:  7/2/2024               Telephone call with Flex who verbalizes understanding and agrees to plan and who agrees to plan and repeated back plan correctly    Lab visit scheduled    Education provided:   Symptom monitoring: monitoring for bleeding signs and symptoms, when to seek medical attention/emergency care, and if you hit your head or have a bad fall seek emergency care  Importance of notifying anticoagulation clinic for: changes in medications; a sooner lab recheck maybe needed and diarrhea, nausea/vomiting, reduced intake, cold/flu, and/or infections; a sooner lab recheck maybe needed  Contact [phone]  with any changes, questions or concerns.     Plan made per [facility] anticoagulation protocol    Giselle Rasheed, RN  Anticoagulation Clinic  6/25/2024    _______________________________________________________________________     Anticoagulation Episode Summary       Current INR goal:  2.0-3.0   TTR:  65.0% " (2 mo)   Target end date:  Indefinite   Send INR reminders to:  [facility]    Indications    Atrial fibrillation/flutter (H) [I48.91  I48.92]             Comments:               Anticoagulation Care Providers       Provider Role Specialty Phone number    Maria De Jesus Hay APRN [facility] Referring Family Medicine [phone]

## 2024-07-02 ENCOUNTER — ANTICOAGULATION THERAPY VISIT (OUTPATIENT)
Dept: ANTICOAGULATION | Facility: CLINIC | Age: 69
End: 2024-07-02

## 2024-07-02 ENCOUNTER — LAB (OUTPATIENT)
Dept: LAB | Facility: CLINIC | Age: 69
End: 2024-07-02
Payer: COMMERCIAL

## 2024-07-02 DIAGNOSIS — I48.92 ATRIAL FIBRILLATION/FLUTTER (H): Primary | ICD-10-CM

## 2024-07-02 DIAGNOSIS — I48.92 ATRIAL FIBRILLATION/FLUTTER (H): ICD-10-CM

## 2024-07-02 DIAGNOSIS — I48.91 ATRIAL FIBRILLATION/FLUTTER (H): ICD-10-CM

## 2024-07-02 DIAGNOSIS — I48.91 ATRIAL FIBRILLATION/FLUTTER (H): Primary | ICD-10-CM

## 2024-07-02 LAB — INR BLD: 4.5 (ref 0.9–1.1)

## 2024-07-02 PROCEDURE — 85610 PROTHROMBIN TIME: CPT

## 2024-07-02 PROCEDURE — 36416 COLLJ CAPILLARY BLOOD SPEC: CPT

## 2024-07-02 NOTE — PROGRESS NOTES
ANTICOAGULATION MANAGEMENT     Roger Bonilla 68 year old male is on warfarin with supratherapeutic INR result. (Goal INR 2.0-3.0)    Recent labs: (last 7 days)     07/02/24  1330   INR 4.5*       ASSESSMENT     Source(s): Chart Review and Patient/Caregiver Call     Warfarin doses taken: Warfarin taken as instructed - confirmed hold on 6/25 as advised   Diet: No new diet changes identified - has resumed his usual diet  Medication/supplement changes: None noted  New illness, injury, or hospitalization: Yes: still feeling unwell but symptoms have improved in the last week.   Signs or symptoms of bleeding or clotting: No  Previous result: Supratherapeutic  Additional findings: Advising smallest dosing adjustment possible as temp illness factor likely inflating today's result. INR barely decreased after hold from last week warranting maintenance dose reduction today.       PLAN     Recommended plan for temporary change(s) and ongoing change(s) affecting INR     Dosing Instructions: hold 1.5 doses then decrease your warfarin dose (7.1% change) with next INR in 1 week       Summary  As of 7/2/2024      Full warfarin instructions:  7/2: Hold; 7/3: 2.5 mg; Otherwise 2.5 mg every Fri; 5 mg all other days   Next INR check:  7/9/2024               Telephone call with Flex who verbalizes understanding and agrees to plan    Lab visit scheduled    Education provided: Please call back if any changes to your diet, medications or how you've been taking warfarin    Plan made per [facility] anticoagulation protocol; closest % change with current tablet size made per protocol for for INR 4-4.9 (goal 2-3)    Mercedes Bates RN  Anticoagulation Clinic  7/2/2024    _______________________________________________________________________     Anticoagulation Episode Summary       Current INR goal:  2.0-3.0   TTR:  58.1% (2.2 mo)   Target end date:  Indefinite   Send INR reminders to:  [facility]    Atrial  fibrillation/flutter (H) [I48.91  I48.92]             Comments:               Anticoagulation Care Providers       Provider Role Specialty Phone number    Maria De Jesus Hay APRN [facility] Referring Family Medicine [phone]

## 2024-07-09 ENCOUNTER — ANTICOAGULATION THERAPY VISIT (OUTPATIENT)
Dept: ANTICOAGULATION | Facility: CLINIC | Age: 69
End: 2024-07-09

## 2024-07-09 ENCOUNTER — LAB (OUTPATIENT)
Dept: LAB | Facility: CLINIC | Age: 69
End: 2024-07-09
Payer: COMMERCIAL

## 2024-07-09 ENCOUNTER — TELEPHONE (OUTPATIENT)
Dept: VASCULAR SURGERY | Facility: CLINIC | Age: 69
End: 2024-07-09

## 2024-07-09 VITALS
SYSTOLIC BLOOD PRESSURE: 114 MMHG | WEIGHT: 238.98 LBS | DIASTOLIC BLOOD PRESSURE: 70 MMHG | HEART RATE: 89 BPM | BODY MASS INDEX: 38.57 KG/M2

## 2024-07-09 DIAGNOSIS — I48.91 ATRIAL FIBRILLATION/FLUTTER (H): Primary | ICD-10-CM

## 2024-07-09 DIAGNOSIS — I48.92 ATRIAL FIBRILLATION/FLUTTER (H): Primary | ICD-10-CM

## 2024-07-09 DIAGNOSIS — I48.91 ATRIAL FIBRILLATION/FLUTTER (H): ICD-10-CM

## 2024-07-09 DIAGNOSIS — I48.92 ATRIAL FIBRILLATION/FLUTTER (H): ICD-10-CM

## 2024-07-09 LAB — INR BLD: 2.2 (ref 0.9–1.1)

## 2024-07-09 PROCEDURE — 85610 PROTHROMBIN TIME: CPT

## 2024-07-09 PROCEDURE — 36416 COLLJ CAPILLARY BLOOD SPEC: CPT

## 2024-07-09 NOTE — PROGRESS NOTES
ANTICOAGULATION MANAGEMENT     Roger Bonilla 68 year old male is on warfarin with therapeutic INR result. (Goal INR 2.0-3.0)    Recent labs: (last 7 days)     07/09/24  1341   INR 2.2*       ASSESSMENT     Source(s): Chart Review and Patient/Caregiver Call     Warfarin doses taken: Held for supratherapeutic  recently which may be affecting INR  Diet: No new diet changes identified  Medication/supplement changes: None noted  New illness, injury, or hospitalization: No  Signs or symptoms of bleeding or clotting: No  Previous result: Supratherapeutic  Additional findings: None       PLAN     Recommended plan for temporary change(s) affecting INR     Dosing Instructions: Continue your current warfarin dose with next INR in 2 weeks       Summary  As of 7/9/2024      Full warfarin instructions:  2.5 mg every Fri; 5 mg all other days   Next INR check:  7/23/2024               Telephone call with Flex who verbalizes understanding and agrees to plan    Lab visit scheduled    Education provided: Goal range and lab monitoring: goal range and significance of current result    Plan made per [facility] anticoagulation protocol    Kori Ames RN  Anticoagulation Clinic  7/9/2024    _______________________________________________________________________     Anticoagulation Episode Summary       Current INR goal:  2.0-3.0   TTR:  55.9% (2.4 mo)   Target end date:  Indefinite   Send INR reminders to:  [facility]    Indications    Atrial fibrillation/flutter (H) [I48.91  I48.92]             Comments:               Anticoagulation Care Providers       Provider Role Specialty Phone number    Maria De Jesus Hay APRN CNP Referring Family Medicine [phone]

## 2024-07-10 NOTE — TELEPHONE ENCOUNTER
Left Voicemail (1st Attempt) and Sent Mychart (1st Attempt) for the patient to call back and schedule the following:    Location: [facility]  Provider: Opal Baird    Appointment type:Return Vas Pt  Appointment mode in person   Return date: October 2024    Specialty phone number: [phone] or  [phone]      Is Imaging Needed: Carotid US & MARGARITA US  Imaging Phone Number: [phone]    Additional Notes: REJI Remy on 7/9/2024 at 7:23 PM

## 2024-07-23 ENCOUNTER — ANTICOAGULATION THERAPY VISIT (OUTPATIENT)
Dept: ANTICOAGULATION | Facility: CLINIC | Age: 69
End: 2024-07-23

## 2024-07-23 ENCOUNTER — LAB (OUTPATIENT)
Dept: LAB | Facility: CLINIC | Age: 69
End: 2024-07-23
Payer: COMMERCIAL

## 2024-07-23 DIAGNOSIS — I48.92 ATRIAL FIBRILLATION/FLUTTER (H): Primary | ICD-10-CM

## 2024-07-23 DIAGNOSIS — I48.91 ATRIAL FIBRILLATION/FLUTTER (H): Primary | ICD-10-CM

## 2024-07-23 DIAGNOSIS — I48.92 ATRIAL FIBRILLATION/FLUTTER (H): ICD-10-CM

## 2024-07-23 DIAGNOSIS — I48.91 ATRIAL FIBRILLATION/FLUTTER (H): ICD-10-CM

## 2024-07-23 LAB — INR BLD: 1.8 (ref 0.9–1.1)

## 2024-07-23 PROCEDURE — 85610 PROTHROMBIN TIME: CPT

## 2024-07-23 PROCEDURE — 36416 COLLJ CAPILLARY BLOOD SPEC: CPT

## 2024-07-23 NOTE — PROGRESS NOTES
ANTICOAGULATION MANAGEMENT     Roger Bonilla 68 year old male is on warfarin with subtherapeutic INR result. (Goal INR 2.0-3.0)    Recent labs: (last 7 days)     07/23/24  1334   INR 1.8*       ASSESSMENT     Source(s): Chart Review and Patient/Caregiver Call     Warfarin doses taken: Warfarin taken as instructed  Diet: Increased greens/vitamin K in diet; plans to resume previous intake  Medication/supplement changes: None noted  New illness, injury, or hospitalization: No  Signs or symptoms of bleeding or clotting: No  Previous result: Therapeutic last visit; previously outside of goal range  Additional findings: None       PLAN     Recommended plan for temporary change(s) affecting INR     Dosing Instructions: booster dose then continue your current warfarin dose with next INR in 2 weeks       Summary  As of 7/23/2024      Full warfarin instructions:  7/23: 7.5 mg; Otherwise 2.5 mg every Fri; 5 mg all other days   Next INR check:  8/6/2024               Telephone call with Flex who verbalizes understanding and agrees to plan    Lab visit scheduled    Education provided: Please call back if any changes to your diet, medications or how you've been taking warfarin  Contact [phone] with any changes, questions or concerns.     Plan made per [facility] anticoagulation protocol    Mary Clemente RN  Anticoagulation Clinic  7/23/2024    _______________________________________________________________________     Anticoagulation Episode Summary       Current INR goal:  2.0-3.0   TTR:  55.0% (2.9 mo)   Target end date:  Indefinite   Send INR reminders to:  [facility]    Indications    Atrial fibrillation/flutter (H) [I48.91  I48.92]             Comments:               Anticoagulation Care Providers       Provider Role Specialty Phone number    Maria De Jesus Hay APRN [facility] Referring Family Medicine [phone]

## 2024-08-06 ENCOUNTER — ANTICOAGULATION THERAPY VISIT (OUTPATIENT)
Dept: ANTICOAGULATION | Facility: CLINIC | Age: 69
End: 2024-08-06

## 2024-08-06 ENCOUNTER — LAB (OUTPATIENT)
Dept: LAB | Facility: CLINIC | Age: 69
End: 2024-08-06
Payer: COMMERCIAL

## 2024-08-06 DIAGNOSIS — I48.92 ATRIAL FIBRILLATION/FLUTTER (H): Primary | ICD-10-CM

## 2024-08-06 DIAGNOSIS — I48.91 ATRIAL FIBRILLATION/FLUTTER (H): ICD-10-CM

## 2024-08-06 DIAGNOSIS — I48.92 ATRIAL FIBRILLATION/FLUTTER (H): ICD-10-CM

## 2024-08-06 DIAGNOSIS — I48.91 ATRIAL FIBRILLATION/FLUTTER (H): Primary | ICD-10-CM

## 2024-08-06 LAB — INR BLD: 1.6 (ref 0.9–1.1)

## 2024-08-06 PROCEDURE — 36416 COLLJ CAPILLARY BLOOD SPEC: CPT

## 2024-08-06 PROCEDURE — 85610 PROTHROMBIN TIME: CPT

## 2024-08-20 ENCOUNTER — ANTICOAGULATION THERAPY VISIT (OUTPATIENT)
Dept: ANTICOAGULATION | Facility: CLINIC | Age: 69
End: 2024-08-20

## 2024-08-20 ENCOUNTER — LAB (OUTPATIENT)
Dept: LAB | Facility: CLINIC | Age: 69
End: 2024-08-20
Payer: COMMERCIAL

## 2024-08-20 DIAGNOSIS — I48.92 ATRIAL FIBRILLATION/FLUTTER (H): ICD-10-CM

## 2024-08-20 DIAGNOSIS — I48.91 ATRIAL FIBRILLATION/FLUTTER (H): ICD-10-CM

## 2024-08-20 DIAGNOSIS — I48.92 ATRIAL FIBRILLATION/FLUTTER (H): Primary | ICD-10-CM

## 2024-08-20 DIAGNOSIS — I48.91 ATRIAL FIBRILLATION/FLUTTER (H): Primary | ICD-10-CM

## 2024-08-20 LAB — INR BLD: 2.2 (ref 0.9–1.1)

## 2024-08-20 PROCEDURE — 36416 COLLJ CAPILLARY BLOOD SPEC: CPT

## 2024-08-20 PROCEDURE — 85610 PROTHROMBIN TIME: CPT

## 2024-08-20 NOTE — PROGRESS NOTES
ANTICOAGULATION MANAGEMENT     Roger Bonilla 68 year old male is on warfarin with therapeutic INR result. (Goal INR 2.0-3.0)    Recent labs: (last 7 days)     08/20/24  1331   INR 2.2*       ASSESSMENT     Source(s): Chart Review and Patient/Caregiver Call     Warfarin doses taken: Warfarin taken as instructed  Diet: No new diet changes identified  Medication/supplement changes: None noted  New illness, injury, or hospitalization: No  Signs or symptoms of bleeding or clotting: No  Previous result: Subtherapeutic increased by 15%  Additional findings: None       PLAN     Recommended plan for no diet, medication or health factor changes affecting INR     Dosing Instructions: Continue your current warfarin dose with next INR in 3 weeks       Summary  As of 8/20/2024      Full warfarin instructions:  7.5 mg every Tue; 5 mg all other days   Next INR check:  9/10/2024               Telephone call with Flex who verbalizes understanding and agrees to plan    Lab visit scheduled    Education provided: Goal range and lab monitoring: goal range and significance of current result  Contact [phone] with any changes, questions or concerns.     Plan made per [facility] anticoagulation protocol    Dulce Ocampo, RN  Anticoagulation Clinic  8/20/2024    _______________________________________________________________________     Anticoagulation Episode Summary       Current INR goal:  2.0-3.0   TTR:  45.7% (3.8 mo)   Target end date:  Indefinite   Send INR reminders to:  [facility]    Indications    Atrial fibrillation/flutter (H) [I48.91  I48.92]             Comments:               Anticoagulation Care Providers       Provider Role Specialty Phone number    Maria De Jesus Hay APRN CNP Referring Family Medicine [phone]

## 2024-08-27 ENCOUNTER — OFFICE VISIT (OUTPATIENT)
Dept: CARDIOLOGY | Facility: CLINIC | Age: 69
End: 2024-08-27
Payer: COMMERCIAL

## 2024-08-27 DIAGNOSIS — Z95.1 S/P CABG (CORONARY ARTERY BYPASS GRAFT): ICD-10-CM

## 2024-08-27 DIAGNOSIS — Z00.6 EXAMINATION OF PARTICIPANT IN CLINICAL TRIAL: ICD-10-CM

## 2024-08-27 DIAGNOSIS — Z00.6 EXAMINATION OF PARTICIPANT IN CLINICAL TRIAL: Primary | ICD-10-CM

## 2024-08-27 DIAGNOSIS — Z95.1 S/P CABG (CORONARY ARTERY BYPASS GRAFT): Primary | ICD-10-CM

## 2024-08-27 PROCEDURE — 99207 PR NO CHARGE-RESEARCH SERVICE: CPT | Performed by: INTERNAL MEDICINE

## 2024-08-27 NOTE — PROGRESS NOTES
A Double-blind, Randomized, Placebo-controlled, Multicenter Study Assessing the impact of Olpasiran on Major Cardiovascular Events in Patients with Atherosclerotic cardiovascular Disease and Elevated Lipoprotein (a)        Roger Bonilla was seen in clinic today for Visit Week 60      Any Adverse events, Serious Adverse event, changes in medications,  (if any) listed below.      Subject has been reminded that lipids should remain blinded for the duration of this study.     Central, Local labs  drawn? No, per protocol not needed this visit   Urine Pregnancy for WOCBP NA    IP Dispensed? [x] Yes   []  No    IP Lot Number: [id number]  IP Box Number: [id number]  Administered into RIGHT ABD at 1041 by Research coordinator        Did the patient have any of the following events (endpoints):   [] Yes   [x]  No   Cardiovascular Death  [] Yes   [x]  No   Myocardial Infarction  [] Yes   [x]  No   Coronary revascularization  [] Yes   [x]  No   Any Coronary Artery Revascularization  [] Yes   [x]  No  Cerebrovascular Revascularization  [] Yes   [x]  No  Peripheral Artery Revascularization  [] Yes   [x]  No  Vascular amputation  [] Yes   [x]  No  Hospitalization for Unstable Angina  [] Yes   [x]  No  Stroke (Ischemic or Hemorrhagic) / Transient Ischemic Attack (TIA)  [] Yes   [x]  No  Deep Vein Thrombosis or Pulmonary Embolism  [] Yes   [x]  No  New onset or clinical deterioration of aortic stenosis  [] Yes   [x]  No  Limb Ischemia  [] Yes   [x]  No  New Onset Diabetes Mellitus   [] Yes   [x]  No  Hospitalization for Heart Failure    Plan: See patient back in clinic as scheduled.     Valarie Banda RN      Current Outpatient Medications:     aspirin (ASA) 81 MG chewable tablet, Take 81 mg by mouth daily, Disp: , Rfl:     atorvastatin (LIPITOR) 80 MG tablet, Take 1 tablet (80 mg) by mouth daily, Disp: 90 tablet, Rfl: 3    ezetimibe (ZETIA) 10 MG tablet, Take 1 tablet (10 mg) by mouth daily, Disp: 90 tablet, Rfl: 3    furosemide  (LASIX) 40 MG tablet, Take 0.5 tablets (20 mg) by mouth daily, Disp: 45 tablet, Rfl: 3    metFORMIN (GLUCOPHAGE) 1000 MG tablet, Take 1 Tablet BY MOUTH EVERY DAY, Disp: 90 tablet, Rfl: 3    metoprolol tartrate (LOPRESSOR) 100 MG tablet, Take 1 tablet (100 mg) by mouth 2 times daily, Disp: 180 tablet, Rfl: 3    omeprazole (PRILOSEC) 40 MG DR capsule, Take 1 capsule (40 mg) by mouth every morning, Disp: 90 capsule, Rfl: 3    warfarin ANTICOAGULANT (COUMADIN) 5 MG tablet, Take 7.5 mg every Sun/Wed and 5 mg all other days of the week OR as directed by INR Clinic, Disp: 100 tablet, Rfl: 1

## 2024-08-27 NOTE — LETTER
8/27/2024    Maria De Jesus Hay, APRN CNP  [address] [facility] [address]    RE: Roger SARGENT Chad       Dear Colleague,     I had the pleasure of seeing Roger Bonilla in the [facility].    A Double-blind, Randomized, Placebo-controlled, Multicenter Study Assessing the impact of Olpasiran on Major Cardiovascular Events in Patients with Atherosclerotic cardiovascular Disease and Elevated Lipoprotein (a)        Roger Bonilla was seen in clinic today for Visit Week 60      Any Adverse events, Serious Adverse event, changes in medications,  (if any) listed below.      Subject has been reminded that lipids should remain blinded for the duration of this study.     Central, Local labs  drawn? No, per protocol not needed this visit   Urine Pregnancy for WOCBP NA    IP Dispensed? [x] Yes   []  No    IP Lot Number: [id number]  IP Box Number: [id number]  Administered into RIGHT ABD at 1041 by Research coordinator        Did the patient have any of the following events (endpoints):   [] Yes   [x]  No   Cardiovascular Death  [] Yes   [x]  No   Myocardial Infarction  [] Yes   [x]  No   Coronary revascularization  [] Yes   [x]  No   Any Coronary Artery Revascularization  [] Yes   [x]  No  Cerebrovascular Revascularization  [] Yes   [x]  No  Peripheral Artery Revascularization  [] Yes   [x]  No  Vascular amputation  [] Yes   [x]  No  Hospitalization for Unstable Angina  [] Yes   [x]  No  Stroke (Ischemic or Hemorrhagic) / Transient Ischemic Attack (TIA)  [] Yes   [x]  No  Deep Vein Thrombosis or Pulmonary Embolism  [] Yes   [x]  No  New onset or clinical deterioration of aortic stenosis  [] Yes   [x]  No  Limb Ischemia  [] Yes   [x]  No  New Onset Diabetes Mellitus   [] Yes   [x]  No  Hospitalization for Heart Failure    Plan: See patient back in clinic as scheduled.     Valarie Banda RN      Current Outpatient Medications:      aspirin (ASA) 81 MG chewable tablet, Take 81 mg by mouth daily, Disp: , Rfl:       atorvastatin (LIPITOR) 80 MG tablet, Take 1 tablet (80 mg) by mouth daily, Disp: 90 tablet, Rfl: 3     ezetimibe (ZETIA) 10 MG tablet, Take 1 tablet (10 mg) by mouth daily, Disp: 90 tablet, Rfl: 3     furosemide (LASIX) 40 MG tablet, Take 0.5 tablets (20 mg) by mouth daily, Disp: 45 tablet, Rfl: 3     metFORMIN (GLUCOPHAGE) 1000 MG tablet, Take 1 Tablet BY MOUTH EVERY DAY, Disp: 90 tablet, Rfl: 3     metoprolol tartrate (LOPRESSOR) 100 MG tablet, Take 1 tablet (100 mg) by mouth 2 times daily, Disp: 180 tablet, Rfl: 3     omeprazole (PRILOSEC) 40 MG DR capsule, Take 1 capsule (40 mg) by mouth every morning, Disp: 90 capsule, Rfl: 3     warfarin ANTICOAGULANT (COUMADIN) 5 MG tablet, Take 7.5 mg every Sun/Wed and 5 mg all other days of the week OR as directed by INR Clinic, Disp: 100 tablet, Rfl: 1       Thank you for allowing me to participate in the care of your patient.      Sincerely,     Valarie Banda RN     [facility]  cc:   Referred Self, MD  No address on file

## 2024-09-10 ENCOUNTER — LAB (OUTPATIENT)
Dept: LAB | Facility: CLINIC | Age: 69
End: 2024-09-10
Payer: COMMERCIAL

## 2024-09-10 ENCOUNTER — ANTICOAGULATION THERAPY VISIT (OUTPATIENT)
Dept: ANTICOAGULATION | Facility: CLINIC | Age: 69
End: 2024-09-10

## 2024-09-10 DIAGNOSIS — I48.92 ATRIAL FIBRILLATION/FLUTTER (H): Primary | ICD-10-CM

## 2024-09-10 DIAGNOSIS — E11.9 TYPE 2 DIABETES MELLITUS WITHOUT COMPLICATION (H): Primary | ICD-10-CM

## 2024-09-10 DIAGNOSIS — I48.92 ATRIAL FIBRILLATION/FLUTTER (H): ICD-10-CM

## 2024-09-10 DIAGNOSIS — I48.91 ATRIAL FIBRILLATION/FLUTTER (H): ICD-10-CM

## 2024-09-10 DIAGNOSIS — I48.91 ATRIAL FIBRILLATION/FLUTTER (H): Primary | ICD-10-CM

## 2024-09-10 LAB — INR BLD: 2.2 (ref 0.9–1.1)

## 2024-09-10 PROCEDURE — 85610 PROTHROMBIN TIME: CPT

## 2024-09-10 PROCEDURE — 36416 COLLJ CAPILLARY BLOOD SPEC: CPT

## 2024-09-10 NOTE — PROGRESS NOTES
ANTICOAGULATION MANAGEMENT     Roger Bonilla 69 year old male is on warfarin with therapeutic INR result. (Goal INR 2.0-3.0)    Recent labs: (last 7 days)     09/10/24  1049   INR 2.2*       ASSESSMENT     Source(s): Chart Review and Patient/Caregiver Call     Warfarin doses taken: Warfarin taken as instructed  Diet: No new diet changes identified  Medication/supplement changes: None noted  New illness, injury, or hospitalization: No  Signs or symptoms of bleeding or clotting: No  Previous result: Therapeutic last visit; previously outside of goal range  Additional findings: None       PLAN     Recommended plan for no diet, medication or health factor changes affecting INR     Dosing Instructions: Continue your current warfarin dose with next INR in 4 weeks       Summary  As of 9/10/2024      Full warfarin instructions:  7.5 mg every Tue; 5 mg all other days   Next INR check:  10/8/2024               Telephone call with Flex who verbalizes understanding and agrees to plan    Lab visit scheduled    Education provided: Goal range and lab monitoring: goal range and significance of current result    Plan made per [facility] anticoagulation protocol    Kori Ames RN  9/10/2024  Anticoagulation Clinic  [email or web address] for routing messages: tila LARA  [facility] patient phone line: [phone]        _______________________________________________________________________     Anticoagulation Episode Summary       Current INR goal:  2.0-3.0   TTR:  54.0% (4.5 mo)   Target end date:  Indefinite   Send INR reminders to:  AKOSUA LARA    Indications    Atrial fibrillation/flutter (H) [I48.91  I48.92]             Comments:               Anticoagulation Care Providers       Provider Role Specialty Phone number    Maria De Jesus Hay APRN [facility] Referring Family Medicine [phone]

## 2024-09-30 ENCOUNTER — HOSPITAL ENCOUNTER (OUTPATIENT)
Dept: ULTRASOUND IMAGING | Facility: CLINIC | Age: 69
Discharge: HOME | End: 2024-09-30
Attending: NURSE PRACTITIONER
Payer: COMMERCIAL

## 2024-09-30 DIAGNOSIS — Z98.890 S/P CAROTID ENDARTERECTOMY: ICD-10-CM

## 2024-09-30 DIAGNOSIS — I73.9 PAD (PERIPHERAL ARTERY DISEASE) (H): ICD-10-CM

## 2024-09-30 DIAGNOSIS — I65.23 ATHEROSCLEROSIS OF BOTH CAROTID ARTERIES: ICD-10-CM

## 2024-09-30 PROCEDURE — 93880 EXTRACRANIAL BILAT STUDY: CPT

## 2024-09-30 PROCEDURE — 93922 UPR/L XTREMITY ART 2 LEVELS: CPT

## 2024-10-02 ENCOUNTER — TELEPHONE (OUTPATIENT)
Dept: FAMILY MEDICINE | Facility: CLINIC | Age: 69
End: 2024-10-02
Payer: COMMERCIAL

## 2024-10-02 NOTE — TELEPHONE ENCOUNTER
Advised that for 1 tooth being pulled that we don't need to hold his warfarin for. Patient verbalized understanding and has no further questions or concerns.    Kori Ames RN   [facility]ticoagulation [facility]

## 2024-10-02 NOTE — TELEPHONE ENCOUNTER
Pt states has some questions would like to speak to inr nurse  having tooth pulled needs to discuss meds  please advise   [phone]

## 2024-10-08 ENCOUNTER — ANTICOAGULATION THERAPY VISIT (OUTPATIENT)
Dept: ANTICOAGULATION | Facility: CLINIC | Age: 69
End: 2024-10-08

## 2024-10-08 ENCOUNTER — LAB (OUTPATIENT)
Dept: LAB | Facility: CLINIC | Age: 69
End: 2024-10-08
Payer: COMMERCIAL

## 2024-10-08 DIAGNOSIS — I48.91 ATRIAL FIBRILLATION/FLUTTER (H): Primary | ICD-10-CM

## 2024-10-08 DIAGNOSIS — I48.92 ATRIAL FIBRILLATION/FLUTTER (H): ICD-10-CM

## 2024-10-08 DIAGNOSIS — I48.92 ATRIAL FIBRILLATION/FLUTTER (H): Primary | ICD-10-CM

## 2024-10-08 DIAGNOSIS — I48.91 ATRIAL FIBRILLATION/FLUTTER (H): ICD-10-CM

## 2024-10-08 LAB — INR BLD: 2.3 (ref 0.9–1.1)

## 2024-10-08 PROCEDURE — 36416 COLLJ CAPILLARY BLOOD SPEC: CPT

## 2024-10-08 PROCEDURE — 85610 PROTHROMBIN TIME: CPT

## 2024-10-08 NOTE — PROGRESS NOTES
ANTICOAGULATION MANAGEMENT     Roger Bonilla 69 year old male is on warfarin with therapeutic INR result. (Goal INR 2.0-3.0)    Recent labs: (last 7 days)     10/08/24  1333   INR 2.3*       ASSESSMENT     Source(s): Chart Review  Previous INR was Therapeutic last 2(+) visits  Medication, diet, health changes since last INR chart reviewed; none identified         PLAN     Recommended plan for no diet, medication or health factor changes affecting INR     Dosing Instructions: Continue your current warfarin dose with next INR in 5 weeks       Summary  As of 10/8/2024      Full warfarin instructions:  7.5 mg every Tue; 5 mg all other days   Next INR check:  11/12/2024               Detailed voice message left for Flex with dosing instructions and follow up date.     Contact [phone] to schedule and with any changes, questions or concerns.     Education provided: Please call back if any changes to your diet, medications or how you've been taking warfarin  Goal range and lab monitoring: goal range and significance of current result    Plan made per [facility] anticoagulation protocol    Kori Ames RN  10/8/2024  Anticoagulation Clinic  [employer] for routing messages: tila LARA  [facility] patient phone line: [phone]        _______________________________________________________________________     Anticoagulation Episode Summary       Current INR goal:  2.0-3.0   TTR:  61.9% (5.5 mo)   Target end date:  Indefinite   Send INR reminders to:  AKOSUA LARA    Indications    Atrial fibrillation/flutter (H) [I48.91  I48.92]             Comments:               Anticoagulation Care Providers       Provider Role Specialty Phone number    Maria De Jesus Hay APRN [facility] Referring Family Medicine [phone]

## 2024-10-21 ENCOUNTER — TELEPHONE (OUTPATIENT)
Dept: FAMILY MEDICINE | Facility: CLINIC | Age: 69
End: 2024-10-21
Payer: COMMERCIAL

## 2024-10-21 DIAGNOSIS — E11.9 TYPE 2 DIABETES MELLITUS WITHOUT COMPLICATION, WITHOUT LONG-TERM CURRENT USE OF INSULIN (H): Primary | Chronic | ICD-10-CM

## 2024-10-21 NOTE — TELEPHONE ENCOUNTER
Patient is due for a diabetes follow up appointment with me.      Non-fasting labs due:  A1C, Metabolic Panel, and Microalbumin    Orders were placed, please have lab work done before visit.      Please ask patient to bring in their glucometer so we can download the data.    Please call patient to schedule.  Maria De Jesus Hay, CNP

## 2024-10-21 NOTE — TELEPHONE ENCOUNTER
Patient Quality Outreach    Patient is due for the following:   Diabetes -  Diabetic Follow-Up Visit  Non-fasting labs due:  A1C, Metabolic Panel, and Microalbumin  Next Steps:   Patient was scheduled for apts    Type of outreach:    Phone, spoke to patient/parent. Scheduled       Questions for provider review:    None           Maco Mackay, CMA

## 2024-10-28 ENCOUNTER — VIRTUAL VISIT (OUTPATIENT)
Dept: VASCULAR SURGERY | Facility: CLINIC | Age: 69
End: 2024-10-28
Payer: COMMERCIAL

## 2024-10-28 VITALS — WEIGHT: 235 LBS | BODY MASS INDEX: 37.77 KG/M2 | HEIGHT: 66 IN

## 2024-10-28 DIAGNOSIS — Z98.890 STATUS POST CAROTID ENDARTERECTOMY: ICD-10-CM

## 2024-10-28 DIAGNOSIS — I73.9 PAD (PERIPHERAL ARTERY DISEASE) (H): ICD-10-CM

## 2024-10-28 DIAGNOSIS — F17.200 CURRENT SMOKER: ICD-10-CM

## 2024-10-28 DIAGNOSIS — I65.23 ATHEROSCLEROSIS OF BOTH CAROTID ARTERIES: Primary | ICD-10-CM

## 2024-10-28 PROCEDURE — 99213 OFFICE O/P EST LOW 20 MIN: CPT | Mod: 25 | Performed by: NURSE PRACTITIONER

## 2024-10-28 PROCEDURE — 99406 BEHAV CHNG SMOKING 3-10 MIN: CPT | Performed by: NURSE PRACTITIONER

## 2024-10-28 ASSESSMENT — PAIN SCALES - GENERAL: PAINLEVEL_OUTOF10: NO PAIN (0)

## 2024-10-28 NOTE — NURSING NOTE
Current patient location: [address] [place] [address]   [facility] [address]    Is the patient currently in the state of [place]? YES    Visit mode:TELEPHONE    If the visit is dropped, the patient can be reconnected by: VIDEO VISIT: Text to cell phone:   Telephone Information:   Mobile [phone]       Will anyone else be joining the visit? NO  (If patient encounters technical issues they should call [phone])    Are changes needed to the allergy or medication list? Pt stated no med changes    Are refills needed on medications prescribed by this physician? NO    Rooming Documentation:  Not applicable    Reason for visit: RECHECK    Kamryn MILLERF

## 2024-10-28 NOTE — PROGRESS NOTES
VASCULAR SURGERY PROGRESS NOTE    LOCATION: [facility]     Roger Bonilla  Medical Record #:  [id number]  YOB: 1955  Age:  69 year old     Date of Service: 10/28/2024    PRIMARY CARE PROVIDER: Maria De Jesus Hay    Reason for visit: PAD, Carotid stenosis     IMPRESSION / RECOMMENDATION:   Roger Bonilla is a pleasant 69-year-old gentleman with known PAD and carotid stenosis s/p R CEA who presents to clinic for annual surveillance. Carotid duplex demonstrates less than 50% stenosis, ABIs are stable, without significant changes.  Denies rest pain, claudication, open wounds on extremities.    -Recommend exercising at least twice per week (patient has treadmill however has been exercising very little). Declines barriers to exercising at home.  -Continue with aspirin 81 mg and statin daily, lifelong best medical therapy  -Recommend smoking cessation. Patient currently smokes approximately 10 cigarettes/day, has been smoking since he was 16 years old. Discussed available options for smoking cessation including lozenges, gums, cognitive therapy, patches, etc. Mr. Bonilla has upcoming follow-up with PCP and will address smoking cessation at that time.  -Follow-up with me in 1 year with repeat ABIs with toe pressures and carotid duplex.    Discussed warning signs including, but not limited to, acute limb ischemia, new leg pain, motor or sensory deficits, chronic limb threatening ischemia, ischemic rest pain, and nonhealing wounds.  If he experiences any of these, he has been advised to seek treatment and contact our team, patient verbalized clear understanding of the above. Information/Education: patient able to teach back, asks appropriate questions. Patient agreeable to plan of care: yes.    We spent 8 minutes today discussing the patient s current half-pack per day cigarette dependence; the effects of smoking on atherosclerosis, risk of limb loss; and a counseling plan for quitting. After further  discussion, the patient stated they are not ready at this time to quit however they have an upcoming follow up with PCP when he will discuss the process. Motivational treatments were discussed, and support provided for future quit attempts.    All questions were answered and support provided. He has our contact information and knows to reach out with any additional questions or concerns.     Opal Baird [facility]  Vascular Surgery  Pager: [phone]  [email or web address]  Send message or 10 digit call back number Securely via [employer] with the [employer] I/[employer] Web Console (learn more here)    30 minutes spent on the date of the encounter doing chart review, review of outside records, review of test results, interpretation of tests, patient visit, documentation and discussion with other provider(s), of which 8 minutes were spent on smoking cessation counseling.         HPI:  Roger Bonilla is a 69 year old male with past medical history significant for right carotid stenosis status post CEA, HTN, HLD, obesity, sleep apnea, CAD s/p CABG 4/2022.  Denies strokelike symptoms, without one-sided weakness, without facial droop, without difficulty swallowing, patient also denies pain at rest, no open wounds. Continues to have discomfort with walking about 2 blocks, calves become stiff bilaterally, no burning or pain.     REVIEW OF SYSTEMS:    A 12 point ROS was reviewed and is negative except for what is listed above in HPI.    PHH:    Past Medical History:   Diagnosis Date    AAA (abdominal aortic aneurysm) (H) 06/23/2021    Acute kidney injury (H) 06/23/2021    Acute superficial gastritis without hemorrhage 07/19/2021    Atherosclerosis of both carotid arteries 10/27/2008    Coronary artery disease 05/01/2005    2 stents put in heart    Disorder of bursae and tendons in shoulder region 09/01/2005    Hyperlipidemia LDL goal <70 11/18/2002    Morbid obesity (H) 09/16/2013    NSTEMI (non-ST elevated myocardial infarction) (H) 05/2001     Obstructive Sleep Apnea 11/23/2011    Peripheral artery disease (H) 11/23/2012    Pure hypercholesterolemia 11/18/2002    Tobacco abuse 1973    Tubular adenoma 01/05/2023    Type II or unspecified type diabetes mellitus without mention of complication, not stated as uncontrolled 11/18/2002    Unspecified cardiovascular disease 05/2001    MI -- Angioplasty two stents RCA & OM2    Unspecified essential hypertension 11/18/2002          Past Surgical History:   Procedure Laterality Date    BYPASS GRAFT ARTERY CORONARY N/A 3/24/2022    Procedure: CORONARY ARTERY BYPASS GRAFT x 3 (LIMA - LAD; SV - OM2; SV - PDA) WITH ENDOSCOPIC SAPHENOUS VEIN HARVEST ON BILATERAL LOWER EXTREMITY, AND ON CARDIOPULMONARY PUMP OXYGENATOR  (INTRAOPERATIVE TRANSESOPHAGEAL ECHOCARDIOGRAM BY ANESTHESIOLOGIST);  Surgeon: Glenna Waters MD;  Location:  OR    CARDIAC SURGERY  may 1, 2005    COLONOSCOPY  11/30/2011    Procedure:COLONOSCOPY; Screening Colonoscopy; Surgeon:LUTHER FLORES; Location:[facility]    COLONOSCOPY N/A 1/3/2023    Procedure: COLONOSCOPY with polypectomy;  Surgeon: Angel Skinner MD;  Location: [facility]    CV CORONARY ANGIOGRAM N/A 12/29/2021    Procedure: Coronary Angiogram;  Surgeon: Jonny Moore MD;  Location:  HEART CARDIAC CATH LAB    ENDARTERECTOMY CAROTID Right 2/18/2022    Procedure: Right carotid endarterectomy with ELECTROENCEPHALOGRAM(EEG);  Surgeon: Karma Adorno MD;  Location:  OR    ESOPHAGOSCOPY, GASTROSCOPY, DUODENOSCOPY (EGD), COMBINED N/A 7/21/2021    Procedure: ESOPHAGOGASTRODUODENOSCOPY, WITH BIOPSY;  Surgeon: Jeff Cunningham DO;  Location: [facility]    PHACOEMULSIFICATION WITH STANDARD INTRAOCULAR LENS IMPLANT Right 7/28/2021    Procedure: Right eye Cataract Extraction with Implant;  Surgeon: Reyes Valdez MD;  Location: [facility]    PHACOEMULSIFICATION WITH STANDARD INTRAOCULAR LENS IMPLANT Left 8/18/2021    Procedure: left eye Cataract Extraction with  "Implant;  Surgeon: Reyes Valdez MD;  Location: [facility]    SURGICAL HISTORY OF -       None    VASCULAR SURGERY  oct 2013    stent put in leg       ALLERGIES:  Nkda [no known drug allergy]    MEDS:    Current Outpatient Medications:     aspirin (ASA) 81 MG chewable tablet, Take 81 mg by mouth daily, Disp: , Rfl:     atorvastatin (LIPITOR) 80 MG tablet, Take 1 tablet (80 mg) by mouth daily, Disp: 90 tablet, Rfl: 3    ezetimibe (ZETIA) 10 MG tablet, Take 1 tablet (10 mg) by mouth daily, Disp: 90 tablet, Rfl: 3    furosemide (LASIX) 40 MG tablet, Take 0.5 tablets (20 mg) by mouth daily, Disp: 45 tablet, Rfl: 3    metFORMIN (GLUCOPHAGE) 1000 MG tablet, Take 1 Tablet BY MOUTH EVERY DAY, Disp: 90 tablet, Rfl: 3    metoprolol tartrate (LOPRESSOR) 100 MG tablet, Take 1 tablet (100 mg) by mouth 2 times daily, Disp: 180 tablet, Rfl: 3    omeprazole (PRILOSEC) 40 MG DR capsule, Take 1 capsule (40 mg) by mouth every morning, Disp: 90 capsule, Rfl: 3    warfarin ANTICOAGULANT (COUMADIN) 5 MG tablet, Take 7.5 mg every Sun/Wed and 5 mg all other days of the week OR as directed by INR Clinic, Disp: 100 tablet, Rfl: 1    SOCIAL HABITS:    History   Smoking Status    Every Day    Types: Cigarettes   Smokeless Tobacco    Never     Social History    Substance and Sexual Activity      Alcohol use: Not Currently        Comment: 2 beers a year      History   Drug Use No       FAMILY HISTORY:    Family History   Problem Relation Age of Onset    Cerebrovascular Disease Mother     Respiratory Father         emphysema    Cerebrovascular Disease Maternal Grandmother     Alzheimer Disease Maternal Grandfather     Breast Cancer Sister     Arthritis Other         hip fracture    Cancer - colorectal No family hx of     Prostate Cancer No family hx of        PE:  Ht 1.676 m (5' 6\")   Wt 106.6 kg (235 lb)   BMI 37.93 kg/m    Wt Readings from Last 1 Encounters:   10/28/24 106.6 kg (235 lb)     Body mass index is 37.93 " kg/m .    EXAM:  GENERAL: alert and no distress  RESP: No audible wheeze, cough  NEURO: Cranial nerves grossly intact.  Mentation and speech appropriate for age.  PSYCH: Appropriate affect, tone, and pace of words    DIAGNOSTIC STUDIES:     Images:  US MARGARITA Doppler No Exercise    Result Date: 9/30/2024  ULTRASOUND ANKLE-BRACHIAL INDEX DOPPLER WITHOUT EXERCISE, ONE TO TWO LEVELS, BILATERAL  9/30/2024 1:33 PM HISTORY: compare to prior, known PAD; S/P carotid endarterectomy; PAD (peripheral artery disease) (H); Atherosclerosis of both carotid arteries COMPARISON: November 16, 2021 FINDINGS: Right MARGARITA: PT: 129, index of 0.76, previously 0.64. DP: 125, index of 0.74, previously 0.61. Left MARGARITA: PT: 117, index of 0.69, previously 0.63. DP: 107, index of 0.63, previously 0.64. Waveforms: Distal posterior tibial and dorsalis pedis waveforms are monophasic bilaterally.     IMPRESSION: Moderate bilateral arterial insufficiency. Values slightly improved in the right lower extremity, relatively unchanged in the left lower extremity. MARGARITA CRITERIA: >1.4 NC 0.95-1.4 Normal 0.90 - 0.94 Mild 0.5 - 0.89 Moderate 0.2 - 0.49 Severe <0.2 Critical JHONY SHEEHAN MD   SYSTEM ID:  [id number]    US Carotid Bilateral    Result Date: 9/30/2024  [place] RADI[facility]Y DATE: 9/30/2024 EXAM: DUPLEX CAROTID ULTRASOUND INDICATION: Carotid stenosis status post carotid endarterectomy follow-up TECHNIQUE: Duplex imaging of the carotid arteries was performed, including gray-scale and color flow imaging, Doppler waveform analysis, and spectral Doppler imaging. COMPARISON: 9/21/2023. FINDINGS: RIGHT CAROTID SYSTEM: Postsurgical changes of carotid endarterectomy with no significant residual atheromatous plaque in the right carotid bulb and proximal bifurcation vessels.  The following velocities were obtained in the RIGHT carotid system (cm/s). CCA: PSV 50; EDV 9 ICA: PSV 65; EDV  12 ECA: PSV 83; EDV  16 ICA/CCA ratios: PS 1.7 LEFT CAROTID SYSTEM: Mild  atheromatous plaque in the left carotid bulb and proximal bifurcation vessels.  The following velocities were obtained in the LEFT carotid system (cm/s). CCA: PSV 57; EDV 16 ICA: PSV 84; EDV 30 ECA: ; EDV 19 ICA/CCA ratios: PS 1.5 SUBCLAVIAN AND VERTEBRAL SYSTEM: The subclavian and vertebral arteries are patent bilaterally with normal waveforms proximally and antegrade flow. CONCLUSION: 1.  RIGHT CAROTID: Postsurgical changes of endarterectomy without evidence of significant recurrent stenosis. 2.  LEFT CAROTID: Less than 50% stenosis based on NASCET criteria. 3.  Antegrade flow within the vertebral arteries bilaterally. TOSHA HERNANDEZ MD   SYSTEM ID:  [id number]      LABS:      Sodium   Date Value Ref Range Status   04/15/2024 138 135 - 145 mmol/L Final     Comment:     Reference intervals for this test were updated on 09/26/2023 to more accurately reflect our healthy population. There may be differences in the flagging of prior results with similar values performed with this method. Interpretation of those prior results can be made in the context of the updated reference intervals.    03/15/2023 139 136 - 145 mmol/L Final   05/17/2022 134 133 - 144 mmol/L Final   07/01/2021 138 133 - 144 mmol/L Final   06/25/2021 141 133 - 144 mmol/L Final   06/24/2021 138 133 - 144 mmol/L Final     Urea Nitrogen   Date Value Ref Range Status   04/15/2024 23.2 (H) 8.0 - 23.0 mg/dL Final   03/15/2023 18.0 8.0 - 23.0 mg/dL Final   05/17/2022 27 7 - 30 mg/dL Final   04/05/2022 23 7 - 30 mg/dL Final   03/30/2022 31 (H) 7 - 30 mg/dL Final   07/01/2021 13 7 - 30 mg/dL Final   06/25/2021 32 (H) 7 - 30 mg/dL Final   06/24/2021 56 (H) 7 - 30 mg/dL Final     Hemoglobin   Date Value Ref Range Status   05/17/2022 11.6 (L) 13.3 - 17.7 g/dL Final   04/05/2022 8.9 (L) 13.3 - 17.7 g/dL Final   03/30/2022 7.8 (L) 13.3 - 17.7 g/dL Final   07/01/2021 14.1 13.3 - 17.7 g/dL Final   06/25/2021 14.1 13.3 - 17.7 g/dL Final   06/24/2021 13.7 13.3 -  17.7 g/dL Final     Platelet Count   Date Value Ref Range Status   05/17/2022 349 150 - 450 10e3/uL Final   04/05/2022 647 (H) 150 - 450 10e3/uL Final   03/30/2022 293 150 - 450 10e3/uL Final   07/01/2021 212 150 - 450 10e9/L Final   06/25/2021 152 150 - 450 10e9/L Final   06/24/2021 172 150 - 450 10e9/L Final     INR   Date Value Ref Range Status   10/08/2024 2.3 (H) 0.9 - 1.1 Final   09/10/2024 2.2 (H) 0.9 - 1.1 Final   08/20/2024 2.2 (H) 0.9 - 1.1 Final   03/24/2022 1.31 (H) 0.85 - 1.15 Final   03/24/2022 1.41 (H) 0.85 - 1.15 Final   02/19/2022 1.03 0.85 - 1.15 Final   09/13/2013 0.94 0.86 - 1.14 Final   10/27/2008 0.94 0.86 - 1.14 Final     INR Point of Care   Date Value Ref Range Status   06/04/2024 3.8 (A) 0.9 - 1.1 Final

## 2024-10-28 NOTE — LETTER
10/28/2024       RE: Roger Bonilla  [phone]  [facility] [address]     Dear Colleague,    Thank you for referring your patient, Roger Bonilla, to the [facility] VASCULAR [facility]INIC [place] at [facility]. Please see a copy of my visit note below.        VASCULAR SURGERY PROGRESS NOTE    LOCATION: [facility]     Roger Bonilla  Medical Record #:  [id number]  YOB: 1955  Age:  69 year old     Date of Service: 10/28/2024    PRIMARY CARE PROVIDER: Maria De Jesus Hay    Reason for visit: PAD, Carotid stenosis     IMPRESSION / RECOMMENDATION:   Roger Bonilla is a pleasant 69-year-old gentleman with known PAD and carotid stenosis s/p R CEA who presents to clinic for annual surveillance. Carotid duplex demonstrates less than 50% stenosis, ABIs are stable, without significant changes.  Denies rest pain, claudication, open wounds on extremities.    -Recommend exercising at least twice per week (patient has treadmill however has been exercising very little). Declines barriers to exercising at home.  -Continue with aspirin 81 mg and statin daily, lifelong best medical therapy  -Recommend smoking cessation. Patient currently smokes approximately 10 cigarettes/day, has been smoking since he was 16 years old. Discussed available options for smoking cessation including lozenges, gums, cognitive therapy, patches, etc. Mr. Bonilla has upcoming follow-up with PCP and will address smoking cessation at that time.  -Follow-up with me in 1 year with repeat ABIs with toe pressures and carotid duplex.    Discussed warning signs including, but not limited to, acute limb ischemia, new leg pain, motor or sensory deficits, chronic limb threatening ischemia, ischemic rest pain, and nonhealing wounds.  If he experiences any of these, he has been advised to seek treatment and contact our team, patient verbalized clear understanding of the above. Information/Education: patient  able to teach back, asks appropriate questions. Patient agreeable to plan of care: yes.    We spent 8 minutes today discussing the patient s current half-pack per day cigarette dependence; the effects of smoking on atherosclerosis, risk of limb loss; and a counseling plan for quitting. After further discussion, the patient stated they are not ready at this time to quit however they have an upcoming follow up with PCP when he will discuss the process. Motivational treatments were discussed, and support provided for future quit attempts.    All questions were answered and support provided. He has our contact information and knows to reach out with any additional questions or concerns.     Opal Baird [facility]  Vascular Surgery  Pager: [phone]  [email or web address]  Send message or 10 digit call back number Securely via MedPageToday with the Vocera Web Console (learn more here)    30 minutes spent on the date of the encounter doing chart review, review of outside records, review of test results, interpretation of tests, patient visit, documentation and discussion with other provider(s), of which 8 minutes were spent on smoking cessation counseling.         HPI:  Roger Bonilla is a 69 year old male with past medical history significant for right carotid stenosis status post CEA, HTN, HLD, obesity, sleep apnea, CAD s/p CABG 4/2022.  Denies strokelike symptoms, without one-sided weakness, without facial droop, without difficulty swallowing, patient also denies pain at rest, no open wounds. Continues to have discomfort with walking about 2 blocks, calves become stiff bilaterally, no burning or pain.     REVIEW OF SYSTEMS:    A 12 point ROS was reviewed and is negative except for what is listed above in HPI.    PHH:    Past Medical History:   Diagnosis Date     AAA (abdominal aortic aneurysm) (H) 06/23/2021     Acute kidney injury (H) 06/23/2021     Acute superficial gastritis without hemorrhage 07/19/2021      Atherosclerosis of both carotid arteries 10/27/2008     Coronary artery disease 05/01/2005    2 stents put in heart     Disorder of bursae and tendons in shoulder region 09/01/2005     Hyperlipidemia LDL goal <70 11/18/2002     Morbid obesity (H) 09/16/2013     NSTEMI (non-ST elevated myocardial infarction) (H) 05/2001     Obstructive Sleep Apnea 11/23/2011     Peripheral artery disease (H) 11/23/2012     Pure hypercholesterolemia 11/18/2002     Tobacco abuse 1973     Tubular adenoma 01/05/2023     Type II or unspecified type diabetes mellitus without mention of complication, not stated as uncontrolled 11/18/2002     Unspecified cardiovascular disease 05/2001    MI -- Angioplasty two stents RCA & OM2     Unspecified essential hypertension 11/18/2002          Past Surgical History:   Procedure Laterality Date     BYPASS GRAFT ARTERY CORONARY N/A 3/24/2022    Procedure: CORONARY ARTERY BYPASS GRAFT x 3 (LIMA - LAD; SV - OM2; SV - PDA) WITH ENDOSCOPIC SAPHENOUS VEIN HARVEST ON BILATERAL LOWER EXTREMITY, AND ON CARDIOPULMONARY PUMP OXYGENATOR  (INTRAOPERATIVE TRANSESOPHAGEAL ECHOCARDIOGRAM BY ANESTHESIOLOGIST);  Surgeon: Glenna Waters MD;  Location:  OR     CARDIAC SURGERY  may 1, 2005     COLONOSCOPY  11/30/2011    Procedure:COLONOSCOPY; Screening Colonoscopy; Surgeon:LUTHER FLORES; Location:[facility]     COLONOSCOPY N/A 1/3/2023    Procedure: COLONOSCOPY with polypectomy;  Surgeon: Angel Skinner MD;  Location: [facility]     CV CORONARY ANGIOGRAM N/A 12/29/2021    Procedure: Coronary Angiogram;  Surgeon: Jonny Moore MD;  Location:  HEART CARDIAC CATH LAB     ENDARTERECTOMY CAROTID Right 2/18/2022    Procedure: Right carotid endarterectomy with ELECTROENCEPHALOGRAM(EEG);  Surgeon: Karma Adorno MD;  Location:  OR     ESOPHAGOSCOPY, GASTROSCOPY, DUODENOSCOPY (EGD), COMBINED N/A 7/21/2021    Procedure: ESOPHAGOGASTRODUODENOSCOPY, WITH BIOPSY;  Surgeon: Jeff Cunningham DO;   Location: [facility]     PHACOEMULSIFICATION WITH STANDARD INTRAOCULAR LENS IMPLANT Right 7/28/2021    Procedure: Right eye Cataract Extraction with Implant;  Surgeon: Reyes Valdez MD;  Location: [facility]     PHACOEMULSIFICATION WITH STANDARD INTRAOCULAR LENS IMPLANT Left 8/18/2021    Procedure: left eye Cataract Extraction with Implant;  Surgeon: Reyes Valdez MD;  Location: [facility]     SURGICAL HISTORY OF -       None     VASCULAR SURGERY  oct 2013    stent put in leg       ALLERGIES:  Nkda [no known drug allergy]    MEDS:    Current Outpatient Medications:      aspirin (ASA) 81 MG chewable tablet, Take 81 mg by mouth daily, Disp: , Rfl:      atorvastatin (LIPITOR) 80 MG tablet, Take 1 tablet (80 mg) by mouth daily, Disp: 90 tablet, Rfl: 3     ezetimibe (ZETIA) 10 MG tablet, Take 1 tablet (10 mg) by mouth daily, Disp: 90 tablet, Rfl: 3     furosemide (LASIX) 40 MG tablet, Take 0.5 tablets (20 mg) by mouth daily, Disp: 45 tablet, Rfl: 3     metFORMIN (GLUCOPHAGE) 1000 MG tablet, Take 1 Tablet BY MOUTH EVERY DAY, Disp: 90 tablet, Rfl: 3     metoprolol tartrate (LOPRESSOR) 100 MG tablet, Take 1 tablet (100 mg) by mouth 2 times daily, Disp: 180 tablet, Rfl: 3     omeprazole (PRILOSEC) 40 MG DR capsule, Take 1 capsule (40 mg) by mouth every morning, Disp: 90 capsule, Rfl: 3     warfarin ANTICOAGULANT (COUMADIN) 5 MG tablet, Take 7.5 mg every Sun/Wed and 5 mg all other days of the week OR as directed by INR Clinic, Disp: 100 tablet, Rfl: 1    SOCIAL HABITS:    History   Smoking Status     Every Day     Types: Cigarettes   Smokeless Tobacco     Never     Social History    Substance and Sexual Activity      Alcohol use: Not Currently        Comment: 2 beers a year      History   Drug Use No       FAMILY HISTORY:    Family History   Problem Relation Age of Onset     Cerebrovascular Disease Mother      Respiratory Father         emphysema     Cerebrovascular Disease Maternal Grandmother      Alzheimer Disease  "Maternal Grandfather      Breast Cancer Sister      Arthritis Other         hip fracture     Cancer - colorectal No family hx of      Prostate Cancer No family hx of        PE:  Ht 1.676 m (5' 6\")   Wt 106.6 kg (235 lb)   BMI 37.93 kg/m    Wt Readings from Last 1 Encounters:   10/28/24 106.6 kg (235 lb)     Body mass index is 37.93 kg/m .    EXAM:  GENERAL: alert and no distress  RESP: No audible wheeze, cough  NEURO: Cranial nerves grossly intact.  Mentation and speech appropriate for age.  PSYCH: Appropriate affect, tone, and pace of words    DIAGNOSTIC STUDIES:     Images:  US MARGARITA Doppler No Exercise    Result Date: 9/30/2024  ULTRASOUND ANKLE-BRACHIAL INDEX DOPPLER WITHOUT EXERCISE, ONE TO TWO LEVELS, BILATERAL  9/30/2024 1:33 PM HISTORY: compare to prior, known PAD; S/P carotid endarterectomy; PAD (peripheral artery disease) (H); Atherosclerosis of both carotid arteries COMPARISON: November 16, 2021 FINDINGS: Right MARGARITA: PT: 129, index of 0.76, previously 0.64. DP: 125, index of 0.74, previously 0.61. Left MARGARITA: PT: 117, index of 0.69, previously 0.63. DP: 107, index of 0.63, previously 0.64. Waveforms: Distal posterior tibial and dorsalis pedis waveforms are monophasic bilaterally.     IMPRESSION: Moderate bilateral arterial insufficiency. Values slightly improved in the right lower extremity, relatively unchanged in the left lower extremity. MARGARITA CRITERIA: >1.4 NC 0.95-1.4 Normal 0.90 - 0.94 Mild 0.5 - 0.89 Moderate 0.2 - 0.49 Severe <0.2 Critical JHONY SHEEHAN MD   SYSTEM ID:  [id number]    US Carotid Bilateral    Result Date: 9/30/2024  [facility] DATE: 9/30/2024 EXAM: DUPLEX CAROTID ULTRASOUND INDICATION: Carotid stenosis status post carotid endarterectomy follow-up TECHNIQUE: Duplex imaging of the carotid arteries was performed, including gray-scale and color flow imaging, Doppler waveform analysis, and spectral Doppler imaging. COMPARISON: 9/21/2023. FINDINGS: RIGHT CAROTID SYSTEM: Postsurgical " changes of carotid endarterectomy with no significant residual atheromatous plaque in the right carotid bulb and proximal bifurcation vessels.  The following velocities were obtained in the RIGHT carotid system (cm/s). CCA: PSV 50; EDV 9 ICA: PSV 65; EDV  12 ECA: PSV 83; EDV  16 ICA/CCA ratios: PS 1.7 LEFT CAROTID SYSTEM: Mild atheromatous plaque in the left carotid bulb and proximal bifurcation vessels.  The following velocities were obtained in the LEFT carotid system (cm/s). CCA: PSV 57; EDV 16 ICA: PSV 84; EDV 30 ECA: ; EDV 19 ICA/CCA ratios: PS 1.5 SUBCLAVIAN AND VERTEBRAL SYSTEM: The subclavian and vertebral arteries are patent bilaterally with normal waveforms proximally and antegrade flow. CONCLUSION: 1.  RIGHT CAROTID: Postsurgical changes of endarterectomy without evidence of significant recurrent stenosis. 2.  LEFT CAROTID: Less than 50% stenosis based on NASCET criteria. 3.  Antegrade flow within the vertebral arteries bilaterally. TOSHA HERNANDEZ MD   SYSTEM ID:  [id number]      LABS:      Sodium   Date Value Ref Range Status   04/15/2024 138 135 - 145 mmol/L Final     Comment:     Reference intervals for this test were updated on 09/26/2023 to more accurately reflect our healthy population. There may be differences in the flagging of prior results with similar values performed with this method. Interpretation of those prior results can be made in the context of the updated reference intervals.    03/15/2023 139 136 - 145 mmol/L Final   05/17/2022 134 133 - 144 mmol/L Final   07/01/2021 138 133 - 144 mmol/L Final   06/25/2021 141 133 - 144 mmol/L Final   06/24/2021 138 133 - 144 mmol/L Final     Urea Nitrogen   Date Value Ref Range Status   04/15/2024 23.2 (H) 8.0 - 23.0 mg/dL Final   03/15/2023 18.0 8.0 - 23.0 mg/dL Final   05/17/2022 27 7 - 30 mg/dL Final   04/05/2022 23 7 - 30 mg/dL Final   03/30/2022 31 (H) 7 - 30 mg/dL Final   07/01/2021 13 7 - 30 mg/dL Final   06/25/2021 32 (H) 7 - 30 mg/dL  Final   06/24/2021 56 (H) 7 - 30 mg/dL Final     Hemoglobin   Date Value Ref Range Status   05/17/2022 11.6 (L) 13.3 - 17.7 g/dL Final   04/05/2022 8.9 (L) 13.3 - 17.7 g/dL Final   03/30/2022 7.8 (L) 13.3 - 17.7 g/dL Final   07/01/2021 14.1 13.3 - 17.7 g/dL Final   06/25/2021 14.1 13.3 - 17.7 g/dL Final   06/24/2021 13.7 13.3 - 17.7 g/dL Final     Platelet Count   Date Value Ref Range Status   05/17/2022 349 150 - 450 10e3/uL Final   04/05/2022 647 (H) 150 - 450 10e3/uL Final   03/30/2022 293 150 - 450 10e3/uL Final   07/01/2021 212 150 - 450 10e9/L Final   06/25/2021 152 150 - 450 10e9/L Final   06/24/2021 172 150 - 450 10e9/L Final     INR   Date Value Ref Range Status   10/08/2024 2.3 (H) 0.9 - 1.1 Final   09/10/2024 2.2 (H) 0.9 - 1.1 Final   08/20/2024 2.2 (H) 0.9 - 1.1 Final   03/24/2022 1.31 (H) 0.85 - 1.15 Final   03/24/2022 1.41 (H) 0.85 - 1.15 Final   02/19/2022 1.03 0.85 - 1.15 Final   09/13/2013 0.94 0.86 - 1.14 Final   10/27/2008 0.94 0.86 - 1.14 Final     INR Point of Care   Date Value Ref Range Status   06/04/2024 3.8 (A) 0.9 - 1.1 Final       Again, thank you for allowing me to participate in the care of your patient.      Sincerely,    SAL Bhakta CNP

## 2024-10-30 NOTE — PATIENT INSTRUCTIONS
Thank you so much for choosing us for your care. It was a pleasure to see you at the vascular clinic today.     Follow-up recommendations: We will see you back in 1 year. Please do not hesitate to reach out to us in the meantime with any concerns. Our schedulers will get in touch with you to set up your follow-up visit.     Additional testing/imaging ordered today: ABIs with toe pressures and carotid duplex in 1 year     Our scheduling team will get in touch with you to set up any follow-up testing/imaging and/or appointments. Please be aware that any testing/imaging recommended today will need to completed prior to your next visit with the provider. If testing/imaging is not completed prior to your next visit, your visit may be rescheduled.        If you have any questions, please contact our clinic directly at [phone] and ask for the nurse. We also encourage the use of [facility] to communicate with your HealthCare Provider.        If you have an urgent need after business hours (8:00 am to 4:30 pm) please call [phone], option 4, and ask for the vascular attending on call. For non-urgent after hours needs, please call the vascular nurse at [phone] and leave a detailed voicemail. For scheduling needs, please call our clinic directly at [phone].

## 2024-10-31 ENCOUNTER — ANTICOAGULATION THERAPY VISIT (OUTPATIENT)
Dept: ANTICOAGULATION | Facility: CLINIC | Age: 69
End: 2024-10-31

## 2024-10-31 ENCOUNTER — MYC MEDICAL ADVICE (OUTPATIENT)
Dept: ANTICOAGULATION | Facility: CLINIC | Age: 69
End: 2024-10-31

## 2024-10-31 ENCOUNTER — LAB (OUTPATIENT)
Dept: LAB | Facility: CLINIC | Age: 69
End: 2024-10-31
Payer: COMMERCIAL

## 2024-10-31 ENCOUNTER — OFFICE VISIT (OUTPATIENT)
Dept: FAMILY MEDICINE | Facility: CLINIC | Age: 69
End: 2024-10-31
Payer: COMMERCIAL

## 2024-10-31 VITALS
DIASTOLIC BLOOD PRESSURE: 80 MMHG | BODY MASS INDEX: 37.57 KG/M2 | HEART RATE: 98 BPM | WEIGHT: 233.8 LBS | HEIGHT: 66 IN | RESPIRATION RATE: 20 BRPM | SYSTOLIC BLOOD PRESSURE: 130 MMHG | TEMPERATURE: 97.8 F | OXYGEN SATURATION: 97 %

## 2024-10-31 DIAGNOSIS — I48.92 ATRIAL FIBRILLATION/FLUTTER (H): ICD-10-CM

## 2024-10-31 DIAGNOSIS — K21.9 GASTROESOPHAGEAL REFLUX DISEASE WITHOUT ESOPHAGITIS: ICD-10-CM

## 2024-10-31 DIAGNOSIS — I48.91 ATRIAL FIBRILLATION/FLUTTER (H): Primary | ICD-10-CM

## 2024-10-31 DIAGNOSIS — E11.51 TYPE 2 DIABETES MELLITUS WITH DIABETIC PERIPHERAL ANGIOPATHY WITHOUT GANGRENE, WITHOUT LONG-TERM CURRENT USE OF INSULIN (H): Primary | ICD-10-CM

## 2024-10-31 DIAGNOSIS — I48.92 ATRIAL FIBRILLATION/FLUTTER (H): Primary | ICD-10-CM

## 2024-10-31 DIAGNOSIS — Z95.1 S/P CABG X 3: ICD-10-CM

## 2024-10-31 DIAGNOSIS — E11.9 TYPE 2 DIABETES MELLITUS WITHOUT COMPLICATION, WITHOUT LONG-TERM CURRENT USE OF INSULIN (H): Chronic | ICD-10-CM

## 2024-10-31 DIAGNOSIS — E78.5 HYPERLIPIDEMIA LDL GOAL <70: ICD-10-CM

## 2024-10-31 DIAGNOSIS — I48.91 ATRIAL FIBRILLATION/FLUTTER (H): ICD-10-CM

## 2024-10-31 DIAGNOSIS — E11.9 TYPE 2 DIABETES MELLITUS WITHOUT COMPLICATION (H): ICD-10-CM

## 2024-10-31 LAB
ANION GAP SERPL CALCULATED.3IONS-SCNC: 13 MMOL/L (ref 7–15)
BUN SERPL-MCNC: 13.4 MG/DL (ref 8–23)
CALCIUM SERPL-MCNC: 9.3 MG/DL (ref 8.8–10.4)
CHLORIDE SERPL-SCNC: 100 MMOL/L (ref 98–107)
CREAT SERPL-MCNC: 1.03 MG/DL (ref 0.67–1.17)
CREAT UR-MCNC: 39.1 MG/DL
EGFRCR SERPLBLD CKD-EPI 2021: 79 ML/MIN/1.73M2
EST. AVERAGE GLUCOSE BLD GHB EST-MCNC: 151 MG/DL
GLUCOSE SERPL-MCNC: 90 MG/DL (ref 70–99)
HBA1C MFR BLD: 6.9 % (ref 0–5.6)
HCO3 SERPL-SCNC: 27 MMOL/L (ref 22–29)
INR BLD: 2.3 (ref 0.9–1.1)
MICROALBUMIN UR-MCNC: 88.2 MG/L
MICROALBUMIN/CREAT UR: 225.58 MG/G CR (ref 0–17)
POTASSIUM SERPL-SCNC: 4.4 MMOL/L (ref 3.4–5.3)
SODIUM SERPL-SCNC: 140 MMOL/L (ref 135–145)

## 2024-10-31 PROCEDURE — 36415 COLL VENOUS BLD VENIPUNCTURE: CPT

## 2024-10-31 PROCEDURE — 80048 BASIC METABOLIC PNL TOTAL CA: CPT

## 2024-10-31 PROCEDURE — 85610 PROTHROMBIN TIME: CPT

## 2024-10-31 PROCEDURE — 83036 HEMOGLOBIN GLYCOSYLATED A1C: CPT

## 2024-10-31 PROCEDURE — 99214 OFFICE O/P EST MOD 30 MIN: CPT | Mod: 25 | Performed by: NURSE PRACTITIONER

## 2024-10-31 PROCEDURE — 90480 ADMN SARSCOV2 VAC 1/ONLY CMP: CPT | Performed by: NURSE PRACTITIONER

## 2024-10-31 PROCEDURE — G0008 ADMIN INFLUENZA VIRUS VAC: HCPCS | Performed by: NURSE PRACTITIONER

## 2024-10-31 PROCEDURE — 91320 SARSCV2 VAC 30MCG TRS-SUC IM: CPT | Performed by: NURSE PRACTITIONER

## 2024-10-31 PROCEDURE — 82570 ASSAY OF URINE CREATININE: CPT

## 2024-10-31 PROCEDURE — 82043 UR ALBUMIN QUANTITATIVE: CPT

## 2024-10-31 PROCEDURE — 90662 IIV NO PRSV INCREASED AG IM: CPT | Performed by: NURSE PRACTITIONER

## 2024-10-31 RX ORDER — OMEPRAZOLE 40 MG/1
40 CAPSULE, DELAYED RELEASE ORAL EVERY MORNING
Qty: 90 CAPSULE | Refills: 3 | Status: SHIPPED | OUTPATIENT
Start: 2024-10-31

## 2024-10-31 RX ORDER — METOPROLOL TARTRATE 100 MG/1
100 TABLET ORAL 2 TIMES DAILY
Qty: 180 TABLET | Refills: 3 | Status: SHIPPED | OUTPATIENT
Start: 2024-10-31

## 2024-10-31 RX ORDER — EZETIMIBE 10 MG/1
10 TABLET ORAL DAILY
Qty: 90 TABLET | Refills: 3 | Status: SHIPPED | OUTPATIENT
Start: 2024-10-31

## 2024-10-31 ASSESSMENT — PAIN SCALES - GENERAL: PAINLEVEL_OUTOF10: NO PAIN (0)

## 2024-10-31 NOTE — PROGRESS NOTES
ANTICOAGULATION MANAGEMENT     Roger Bonilla 69 year old male is on warfarin with therapeutic INR result. (Goal INR 2.0-3.0)    Recent labs: (last 7 days)     10/31/24  1333   INR 2.3*       ASSESSMENT     Source(s): Chart Review   Previous result: Therapeutic last 2(+) visits  Additional findings: None     PLAN     Recommended plan for no diet, medication or health factor changes affecting INR     Dosing Instructions: Continue your current warfarin dose with next INR in 6 weeks       Summary  As of 10/31/2024      Full warfarin instructions:  7.5 mg every Tue; 5 mg all other days   Next INR check:  12/12/2024               Detailed voice message left for Flex with dosing instructions and follow up date. [employer] sent.    Contact [phone] to schedule and with any changes, questions or concerns.     Education provided: Please call back if any changes to your diet, medications or how you've been taking warfarin    Plan made per [facility] anticoagulation protocol    Thu Zhang RN  10/31/2024  Anticoagulation Clinic  [facility] [place] for routing messages: tila LARA  [facility] patient phone line: [phone]        _______________________________________________________________________     Anticoagulation Episode Summary       Current INR goal:  2.0-3.0   TTR:  66.5% (6.2 mo)   Target end date:  Indefinite   Send INR reminders to:  AKOSUA LARA    Indications    Atrial fibrillation/flutter (H) [I48.91  I48.92]             Comments:  --             Anticoagulation Care Providers       Provider Role Specialty Phone number    Maria De Jesus Hay APRN [facility] Referring Family Medicine [phone]

## 2024-10-31 NOTE — PROGRESS NOTES
Assessment & Plan     Type 2 diabetes mellitus with diabetic peripheral angiopathy without gangrene, without long-term current use of insulin (H)  Well controlled.  Continue metformin    Hyperlipidemia LDL goal <70  Unable to check lipids as patient is part of a study.  Continue atorvastatin and ezetimide  - ezetimibe (ZETIA) 10 MG tablet; Take 1 tablet (10 mg) by mouth daily.    S/P CABG x 3  Asymptomatic   - metoprolol tartrate (LOPRESSOR) 100 MG tablet; Take 1 tablet (100 mg) by mouth 2 times daily.    Gastroesophageal reflux disease without esophagitis  Well controlled.  - omeprazole (PRILOSEC) 40 MG DR capsule; Take 1 capsule (40 mg) by mouth every morning.      The risks, benefits and treatment options of prescribed medications or other treatments have been discussed with the patient. The patient verbalized their understanding and should call or follow up if no improvement or if they develop further problems.  Maria De Jesus DILEEP Hay   Flex is a 69 year old, presenting for the following health issues:  Diabetes        10/31/2024     1:49 PM   Additional Questions   Roomed by NOHELIA De La Rosa CMA     History of Present Illness       Diabetes:   He presents for follow up of diabetes.    He is not checking blood glucose.         He has no concerns regarding his diabetes at this time.   He is not experiencing numbness or burning in feet, excessive thirst, blurry vision, weight changes or redness, sores or blisters on feet.           Heart Failure:  He presents for follow up of heart failure. He is experiencing shortness of breath with activity only, which is improved. He is not experiencing any lower extremity edema.   He denies orthopenea and coughs at night. Patient is not checking weight daily. He has recently had a None.  He has no side effects from medications.  He has has a medical visit for heart failure 2 times since the last visit.    Hyperlipidemia:  He presents for follow up of  "hyperlipidemia.   He is taking medication to lower cholesterol. He is not having myalgia or other side effects to statin medications.    Hypertension: He presents for follow up of hypertension.  He does not check blood pressure  regularly outside of the clinic. Outpatient blood pressures have not been over 140/90. He follows a low salt diet.     Vascular Disease:  He presents for follow up of vascular disease.     He never takes nitroglycerin. He takes daily aspirin.    He eats 2-3 servings of fruits and vegetables daily.He consumes 0 sweetened beverage(s) daily.He exercises with enough effort to increase his heart rate 9 or less minutes per day.  He exercises with enough effort to increase his heart rate 3 or less days per week.   He is taking medications regularly.     Last Echo:   Echo result w/o MOPS: Interpretation Summary 1. Left ventricular systolic function is normal. The visual ejection fractionis 60-65%. Mild LVH.2. No regional wall motion abnormalities noted.3. The right ventricle is normal in structure, function and size.4. No evidence for significant valvular pathology.5. The ascending aorta is mildly dilated, 3.9 cm.Compard to prior, no change.                Review of Systems  Constitutional, HEENT, cardiovascular, pulmonary, gi and gu systems are negative, except as otherwise noted.      Objective    /80   Pulse 98   Temp 97.8  F (36.6  C) (Oral)   Resp 20   Ht 1.676 m (5' 6\")   Wt 106.1 kg (233 lb 12.8 oz)   SpO2 97%   BMI 37.74 kg/m    Body mass index is 37.74 kg/m .  Physical Exam   GENERAL: alert and no distress  NECK: no adenopathy, no asymmetry, masses, or scars  RESP: lungs clear to auscultation - no rales, rhonchi or wheezes  CV: regular rate and rhythm, normal S1 S2, no S3 or S4, no murmur, click or rub, no peripheral edema  ABDOMEN: soft, nontender, no hepatosplenomegaly, no masses and bowel sounds normal  MS: no gross musculoskeletal defects noted, no edema    Results for " orders placed or performed in visit on 10/31/24 (from the past 24 hours)   INR point of care (finger stick)   Result Value Ref Range    INR 2.3 (H) 0.9 - 1.1    Narrative    This test is intended for monitoring Coumadin therapy. Results are not accurate in patients with prolonged INR due to factor deficiency.   Hemoglobin A1c   Result Value Ref Range    Estimated Average Glucose 151 (H) <117 mg/dL    Hemoglobin A1C 6.9 (H) 0.0 - 5.6 %   Basic metabolic panel  (Ca, Cl, CO2, Creat, Gluc, K, Na, BUN)   Result Value Ref Range    Sodium 140 135 - 145 mmol/L    Potassium 4.4 3.4 - 5.3 mmol/L    Chloride 100 98 - 107 mmol/L    Carbon Dioxide (CO2) 27 22 - 29 mmol/L    Anion Gap 13 7 - 15 mmol/L    Urea Nitrogen 13.4 8.0 - 23.0 mg/dL    Creatinine 1.03 0.67 - 1.17 mg/dL    GFR Estimate 79 >60 mL/min/1.73m2    Calcium 9.3 8.8 - 10.4 mg/dL    Glucose 90 70 - 99 mg/dL           Signed Electronically by: SAL Kc CNP

## 2024-11-03 DIAGNOSIS — I48.91 ATRIAL FIBRILLATION/FLUTTER (H): ICD-10-CM

## 2024-11-03 DIAGNOSIS — I48.92 ATRIAL FIBRILLATION/FLUTTER (H): ICD-10-CM

## 2024-11-04 RX ORDER — WARFARIN SODIUM 5 MG/1
TABLET ORAL
Qty: 100 TABLET | Refills: 1 | Status: SHIPPED | OUTPATIENT
Start: 2024-11-04

## 2024-11-04 NOTE — TELEPHONE ENCOUNTER
ANTICOAGULATION MANAGEMENT:  Medication Refill    Anticoagulation Summary  As of 10/31/2024      Warfarin maintenance plan:  7.5 mg (5 mg x 1.5) every Tue; 5 mg (5 mg x 1) all other days   Next INR check:  12/12/2024   Target end date:  Indefinite    Indications    Atrial fibrillation/flutter (H) [I48.91  I48.92]                 Anticoagulation Care Providers       Provider Role Specialty Phone number    Maria De Jesus Hay APRN [facility] Referring Family Medicine [phone]            Refill Criteria    Visit with referring provider/group: Meets criteria: visit within referring provider group in the last 15 months on 10/31/24    [facility] referral last signed: 04/17/2024; within last year: Yes    Lab monitoring not exceeding 2 weeks overdue: Yes    Roger meets all criteria for refill. Rx instructions and quantity supplied updated to match patient's current dosing plan. Warfarin 90 day supply with 1 refill granted per [facility] protocol     tEta Chung RN  Anticoagulation Clinic

## 2024-11-07 ENCOUNTER — TELEPHONE (OUTPATIENT)
Dept: ANTICOAGULATION | Facility: CLINIC | Age: 69
End: 2024-11-07
Payer: COMMERCIAL

## 2024-11-07 ENCOUNTER — HOSPITAL ENCOUNTER (EMERGENCY)
Facility: CLINIC | Age: 69
Discharge: HOME | End: 2024-11-07
Payer: COMMERCIAL

## 2024-11-07 VITALS — OXYGEN SATURATION: 96 % | SYSTOLIC BLOOD PRESSURE: 138 MMHG | HEART RATE: 110 BPM | DIASTOLIC BLOOD PRESSURE: 111 MMHG

## 2024-11-07 DIAGNOSIS — I48.91 ATRIAL FIBRILLATION/FLUTTER (H): Primary | ICD-10-CM

## 2024-11-07 DIAGNOSIS — L03.90 CELLULITIS, UNSPECIFIED CELLULITIS SITE: ICD-10-CM

## 2024-11-07 DIAGNOSIS — I48.92 ATRIAL FIBRILLATION/FLUTTER (H): Primary | ICD-10-CM

## 2024-11-07 PROCEDURE — 99283 EMERGENCY DEPT VISIT LOW MDM: CPT

## 2024-11-07 RX ORDER — CEPHALEXIN 500 MG/1
500 CAPSULE ORAL 4 TIMES DAILY
Qty: 28 CAPSULE | Refills: 0 | Status: SHIPPED | OUTPATIENT
Start: 2024-11-07 | End: 2024-11-14

## 2024-11-07 ASSESSMENT — ACTIVITIES OF DAILY LIVING (ADL): ADLS_ACUITY_SCORE: 0

## 2024-11-07 ASSESSMENT — COLUMBIA-SUICIDE SEVERITY RATING SCALE - C-SSRS
6. HAVE YOU EVER DONE ANYTHING, STARTED TO DO ANYTHING, OR PREPARED TO DO ANYTHING TO END YOUR LIFE?: NO
1. IN THE PAST MONTH, HAVE YOU WISHED YOU WERE DEAD OR WISHED YOU COULD GO TO SLEEP AND NOT WAKE UP?: NO
2. HAVE YOU ACTUALLY HAD ANY THOUGHTS OF KILLING YOURSELF IN THE PAST MONTH?: NO

## 2024-11-07 NOTE — DISCHARGE INSTRUCTIONS
Take Keflex four times a day for 7 days to treat the skin infection.    You should do warm compresses over the area 3-4x a day to help increase blood flow to promote healing of the infection.     Return to the ER immediately if you develop worsening pain, swelling and redness going into the groin/scrotum, fever, nausea/vomiting, or if other concerning symptoms develop.

## 2024-11-07 NOTE — ED TRIAGE NOTES
Pt presented with skin irritation with bleeding to left thigh/groin area. Pt stated it started this am. Afebrile. No pain.      Triage Assessment (Adult)       Row Name 11/07/24 1039          Triage Assessment    Airway WDL WDL        Respiratory WDL    Respiratory WDL WDL        Skin Circulation/Temperature WDL    Skin Circulation/Temperature WDL WDL        Cardiac WDL    Cardiac WDL WDL        Peripheral/Neurovascular WDL    Peripheral Neurovascular WDL WDL        Cognitive/Neuro/Behavioral WDL    Cognitive/Neuro/Behavioral WDL WDL

## 2024-11-07 NOTE — TELEPHONE ENCOUNTER
"ANTICOAGULATION  MANAGEMENT: Discharge Review    Roger Bonilla chart reviewed for anticoagulation continuity of care    Emergency room visit on 11/7/24 for cellulitis.    Discharge disposition: Home    Results:    No results for input(s): \"INR\", \"[id number]\", \"F2\", \"ALMWH\", \"AAUFH\" in the last 168 hours.  Anticoagulation inpatient management:     not applicable     Anticoagulation discharge instructions:     Warfarin dosing: home regimen continued   Bridging: No   INR goal change: No      Medication changes affecting anticoagulation: Yes: keflex for 7 days    Additional factors affecting anticoagulation: Yes: cellulitis/inflammation     PLAN     Recommend to check INR on 11/11/24    Left a detailed message for patient, also sent [employer].    Anticoagulation Calendar updated    Giselle Rasheed RN  11/7/2024  Anticoagulation Clinic  [facility] for routing messages: tila LARA  [facility] patient phone line: [phone]  "

## 2024-11-07 NOTE — ED PROVIDER NOTES
History     Chief Complaint   Patient presents with    Skin Ulcer       Roger Bonilla is a 69 year old male with significant pmhx of ABIGAIL, PAD, T2DM, CAD s/p CABG, afib on warfarin, T2DM who presents for evaluation of skin ulcer. Patient states he noticed a new wound/skin change to the inside of his left upper thigh this morning. It is mildly tender and was draining blood this morning. Prior to today he did not notice any skin changes. He denies associated fever, n/v, scrotal swelling or pain, urinary symptoms.      Allergies:  Allergies   Allergen Reactions    Nkda [No Known Drug Allergy]        Problem List:    Patient Active Problem List    Diagnosis Date Noted    Type 2 diabetes mellitus with diabetic peripheral angiopathy without gangrene, without long-term current use of insulin (H) 10/31/2024     Priority: Medium    Atrial fibrillation/flutter (H) 04/17/2024     Priority: Medium    Tubular adenoma 01/05/2023     Priority: Medium    S/P CABG (coronary artery bypass graft) 04/08/2022     Priority: Medium     x3      S/P carotid endarterectomy 04/08/2022     Priority: Medium     right      Atherosclerosis of native coronary artery of native heart with stable angina pectoris (H) 03/24/2022     Priority: Medium    Status post coronary angiogram 12/29/2021     Priority: Medium    Epigastric pain 07/19/2021     Priority: Medium    AAA (abdominal aortic aneurysm) (H) 06/25/2021     Priority: Medium     3.3 cm, consider repeat imaging in 2-3 years from 6/2021      Dehydration 06/23/2021     Priority: Medium    Hypomagnesemia 06/23/2021     Priority: Medium    Hypophosphatemia 06/23/2021     Priority: Medium    Atherosclerosis of both carotid arteries 06/20/2019     Priority: Medium    Coronary artery disease involving coronary bypass graft of native heart without angina pectoris 08/04/2017     Priority: Medium    Type 2 diabetes mellitus without complication (H) 10/21/2015     Priority: Medium    PAD (peripheral artery  disease) (H) 04/14/2014     Priority: Medium    Benign essential hypertension 08/05/2013     Priority: Medium    Tobacco abuse 11/13/2012     Priority: Medium    Morbid obesity (H) 12/01/2011     Priority: Medium    ABIGAIL (obstructive sleep apnea) 11/28/2011     Priority: Medium     Severe ABIGAIL with significant oxygen desaturations in REM (AHI 87.2, ba desat 66% in REM)  Incomplete titration with remaining events in supine sleep on CPAP 19 cm H2O.  Looked significantly improved during brief trial of bilevel PAP at 16/12 including lateral NREM / REM and supine NREM.        Hyperlipidemia LDL goal <70 09/30/2011     Priority: Medium    Disorder of bursae and tendons in shoulder region 11/10/2005     Priority: Medium     Problem list name updated by automated process. Provider to review      PVD (peripheral vascular disease) (H) 05/01/2001     Priority: Medium     MI -- Angioplasty two stents RCA & OM2  Problem list name updated by automated process. Provider to review          Past Medical History:    Past Medical History:   Diagnosis Date    AAA (abdominal aortic aneurysm) (H) 06/23/2021    Acute kidney injury (H) 06/23/2021    Acute superficial gastritis without hemorrhage 07/19/2021    Atherosclerosis of both carotid arteries 10/27/2008    Coronary artery disease 05/01/2005    Disorder of bursae and tendons in shoulder region 09/01/2005    Hyperlipidemia LDL goal <70 11/18/2002    Morbid obesity (H) 09/16/2013    NSTEMI (non-ST elevated myocardial infarction) (H) 05/2001    Obstructive Sleep Apnea 11/23/2011    Peripheral artery disease (H) 11/23/2012    Pure hypercholesterolemia 11/18/2002    Tobacco abuse 1973    Tubular adenoma 01/05/2023    Type II or unspecified type diabetes mellitus without mention of complication, not stated as uncontrolled 11/18/2002    Unspecified cardiovascular disease 05/2001    Unspecified essential hypertension 11/18/2002       Past Surgical History:    Past Surgical History:    Procedure Laterality Date    BYPASS GRAFT ARTERY CORONARY N/A 3/24/2022    Procedure: CORONARY ARTERY BYPASS GRAFT x 3 (LIMA - LAD; SV - OM2; SV - PDA) WITH ENDOSCOPIC SAPHENOUS VEIN HARVEST ON BILATERAL LOWER EXTREMITY, AND ON CARDIOPULMONARY PUMP OXYGENATOR  (INTRAOPERATIVE TRANSESOPHAGEAL ECHOCARDIOGRAM BY ANESTHESIOLOGIST);  Surgeon: Glenna Waters MD;  Location:  OR    CARDIAC SURGERY  may 1, 2005    COLONOSCOPY  11/30/2011    Procedure:COLONOSCOPY; Screening Colonoscopy; Surgeon:LUTHER FLORES; Location:[facility]    COLONOSCOPY N/A 1/3/2023    Procedure: COLONOSCOPY with polypectomy;  Surgeon: Angel Skinner MD;  Location: [facility]    CV CORONARY ANGIOGRAM N/A 12/29/2021    Procedure: Coronary Angiogram;  Surgeon: Jonny Moore MD;  Location:  [facility]ATH [facility]    ENDARTERECTOMY CAROTID Right 2/18/2022    Procedure: Right carotid endarterectomy with ELECTROENCEPHALOGRAM(EEG);  Surgeon: Karma Adorno MD;  Location:  OR    ESOPHAGOSCOPY, GASTROSCOPY, DUODENOSCOPY (EGD), COMBINED N/A 7/21/2021    Procedure: ESOPHAGOGASTRODUODENOSCOPY, WITH BIOPSY;  Surgeon: Jeff Cunningham DO;  Location: [facility]    PHACOEMULSIFICATION WITH STANDARD INTRAOCULAR LENS IMPLANT Right 7/28/2021    Procedure: Right eye Cataract Extraction with Implant;  Surgeon: Reyes Valdez MD;  Location: [facility]    PHACOEMULSIFICATION WITH STANDARD INTRAOCULAR LENS IMPLANT Left 8/18/2021    Procedure: left eye Cataract Extraction with Implant;  Surgeon: Reyes Valdez MD;  Location: [facility]    SURGICAL HISTORY OF -       None    VASCULAR SURGERY  oct 2013    stent put in leg       Family History:    Family History   Problem Relation Age of Onset    Cerebrovascular Disease Mother     Respiratory Father         emphysema    Cerebrovascular Disease Maternal Grandmother     Alzheimer Disease Maternal Grandfather     Breast Cancer Sister     Arthritis Other         hip fracture    Cancer -  colorectal No family hx of     Prostate Cancer No family hx of        Social History:  Marital Status:  Single [1]  Social History     Tobacco Use    Smoking status: Every Day     Current packs/day: 0.50     Average packs/day: 0.5 packs/day for 52.8 years (26.4 ttl pk-yrs)     Types: Cigarettes     Start date: 1/1/1972     Passive exposure: Never (per pt)    Smokeless tobacco: Never    Tobacco comments:     5-10 cigs daily 11/28/22   Vaping Use    Vaping status: Never Used   Substance Use Topics    Alcohol use: Not Currently     Comment: 2 beers a year    Drug use: No        Medications:    cephALEXin (KEFLEX) 500 MG capsule  aspirin (ASA) 81 MG chewable tablet  atorvastatin (LIPITOR) 80 MG tablet  ezetimibe (ZETIA) 10 MG tablet  furosemide (LASIX) 40 MG tablet  metFORMIN (GLUCOPHAGE) 1000 MG tablet  metoprolol tartrate (LOPRESSOR) 100 MG tablet  omeprazole (PRILOSEC) 40 MG DR capsule  warfarin ANTICOAGULANT (JANTOVEN ANTICOAGULANT) 5 MG tablet          Physical Exam   BP: (!) 142/91  Pulse: 111  SpO2: 96 %      Physical Exam  Vitals and nursing note reviewed.   Constitutional:       General: He is not in acute distress.     Appearance: He is not ill-appearing, toxic-appearing or diaphoretic.   HENT:      Head: Normocephalic and atraumatic.   Eyes:      Extraocular Movements: Extraocular movements intact.      Pupils: Pupils are equal, round, and reactive to light.   Pulmonary:      Effort: Pulmonary effort is normal.   Musculoskeletal:         General: Normal range of motion.      Cervical back: Normal range of motion.        Legs:       Comments: Small erythematous and indurated area of skin on the left upper inner thigh near the medial inferior buttock. Scant amount of bloody drainage expressed. No palpable fluctuance or obvious fluid collection. Minimally tender to palpation. No extension of redness or swelling into the inguinal area or down the leg.    Neurological:      General: No focal deficit present.       Mental Status: He is alert and oriented to person, place, and time.   Psychiatric:         Mood and Affect: Mood normal.         Behavior: Behavior normal.         ED Course        Procedures                    No results found for this or any previous visit (from the past 24 hours).    Medications - No data to display    Assessments & Plan (with Medical Decision Making)     I have reviewed the nursing notes.    I have reviewed the findings, diagnosis, plan and need for follow up with the patient.    Medical Decision Making  Roger Bonilla is a 69 year old male with significant pmhx of ABIGAIL, PAD, T2DM, CAD s/p CABG, afib on warfarin, T2DM  who presents for evaluation of skin wound. Ddx include cellulitis, cyst, abscess, ulceration, heydi's, nec fasc. Vital signs with normal blood pressure, patient is borderline/mildly tachy at 100-110.        On examination patient is well appearing, non-toxic, NAD. He has a small area of induration and erythema with scant bloody drainage on the inner upper left thigh near the inferior medial buttock. Minimal TTP. No palpable fluctuance or fluid collection warranting I&D attempt. No spreading erythema or warmth or swelling into the scrotal area. Patient denied systemic symptoms of infection such as fever, n/v, abdominal pain, dysuria, severe pain. Low suspicion for nec fasc or heydi's given localized appearance of infection, no crepitus, no surrounding soft tissue involvement, no systemic symptoms.     Plan to treat with a 7 day course of keflex and warm compresses given patient is diabetic. He was given very strict return precautions should he develop worsening pain, fever, n/v, swelling/redness in the groin, or if other concerning symptoms develop. Patient voiced understanding of the plan and had no further questions.       Discharge Medication List as of 11/7/2024 11:49 AM        START taking these medications    Details   cephALEXin (KEFLEX) 500 MG capsule Take 1 capsule (500  mg) by mouth 4 times daily for 7 days., Disp-28 capsule, R-0, E-Prescribe             Final diagnoses:   Cellulitis, unspecified cellulitis site       Katt Hurd PA-C  6/8/2024   [facility] EMERGENCY DEPT     Katt Hurd PA-C  11/07/24 1238

## 2024-11-08 ENCOUNTER — DOCUMENTATION ONLY (OUTPATIENT)
Dept: CARDIOLOGY | Facility: CLINIC | Age: 69
End: 2024-11-08
Payer: COMMERCIAL

## 2024-11-08 DIAGNOSIS — Z00.6 EXAMINATION OF PARTICIPANT IN CLINICAL TRIAL: Primary | ICD-10-CM

## 2024-11-08 PROCEDURE — 99207 PR NO CHARGE-RESEARCH SERVICE: CPT

## 2024-11-08 NOTE — PROGRESS NOTES
"  A Double-blind, Randomized, Placebo-controlled, Multicenter Study Assessing the impact of Olpasiran on Major Cardiovascular Events in Patients with Atherosclerotic cardiovascular Disease and Elevated Lipoprotein (a)      Diagnosis/event description (diagnosis preferred):  cellulgale Bonilla presented to ED with complaint of \"skin ulcer\". On examination, a small area of induration and erythema with scant bloody drainage on the inner upper left thigh near the inferior medial buttock. Minimal TTP. No palpable fluctuance or fluid collection warranting I&D attempt. No spreading erythema or warmth or swelling into the scrotal area. He states he noticed this same day as presentation to ED.  Discharge diagnosis: Celllulitis    Serious adverse event?   [] Yes   [x] No  []Results in death  []Is life threatening  []Requires or prolongs hospitalization  []Congenital anomaly or birth defect  []Persistent or significant disability/incapacity  []Other medically important serious event    Start date/Change date: 07NOV2024     Stop date: ongoing  Date Study team became aware of event: 08NOV2024  Intensity: ([] mild /[x]  moderate / [] severe)    CTCAE grade: [] 1 [x] 2 [] 3 [] 4 [] 5    Action taken with Study Drug  [] Drug withdrawn  [] Drug interrupted  [] Dose reduced  [x] Dose not changed  [] Dose increased  [] Not applicable  [] Unknown  Outcome:  [] recovered/resolved  [x] recovering/resolving  [] recovered/resolved with sequelae  [] not recovered/not resolved  [] fatal  [] unknown    Was treatment given for this event:  [x] Yes   [] No If yes, provide details    Cephalexin 500 mg PO QID for 7 days  Were there other actions/treatments of Adverse Events? [x] Yes   [] No If yes, comment: warm packs    Additional comments:   Outcome event?  [] Yes   [x] No    Dr. Tello: Reasonable possibility that AE is related to study treatment    [] Yes   [x] No    Dr. Tello: Reasonable causal relationship to protocol required " procedure(s)  [] Yes   [x] No     Nafisa Rendon RN, BSN  Clinical Trials Nurse

## 2024-11-12 ENCOUNTER — ANTICOAGULATION THERAPY VISIT (OUTPATIENT)
Dept: ANTICOAGULATION | Facility: CLINIC | Age: 69
End: 2024-11-12

## 2024-11-12 ENCOUNTER — LAB (OUTPATIENT)
Dept: LAB | Facility: CLINIC | Age: 69
End: 2024-11-12
Payer: COMMERCIAL

## 2024-11-12 DIAGNOSIS — I48.91 ATRIAL FIBRILLATION/FLUTTER (H): ICD-10-CM

## 2024-11-12 DIAGNOSIS — I48.91 ATRIAL FIBRILLATION/FLUTTER (H): Primary | ICD-10-CM

## 2024-11-12 DIAGNOSIS — I48.92 ATRIAL FIBRILLATION/FLUTTER (H): ICD-10-CM

## 2024-11-12 DIAGNOSIS — I48.92 ATRIAL FIBRILLATION/FLUTTER (H): Primary | ICD-10-CM

## 2024-11-12 LAB — INR BLD: 2.2 (ref 0.9–1.1)

## 2024-11-12 PROCEDURE — 36416 COLLJ CAPILLARY BLOOD SPEC: CPT

## 2024-11-12 PROCEDURE — 85610 PROTHROMBIN TIME: CPT

## 2024-11-12 NOTE — PROGRESS NOTES
ANTICOAGULATION MANAGEMENT     Roger Bonilla 69 year old male is on warfarin with therapeutic INR result. (Goal INR 2.0-3.0)    Recent labs: (last 7 days)     11/12/24  1307   INR 2.2*       ASSESSMENT     Source(s): Chart Review and Patient/Caregiver Call     Warfarin doses taken: Warfarin taken as instructed  Diet: No new diet changes identified  Medication/supplement changes:  on Antibiotic will be done on Thursday  New illness, injury, or hospitalization: Yes: Cellulitis  Signs or symptoms of bleeding or clotting: No  Previous result: Therapeutic last 2(+) visits  Additional findings: None       PLAN     Recommended plan for no diet, medication or health factor changes affecting INR     Dosing Instructions: Continue your current warfarin dose with next INR in 5 weeks       Summary  As of 11/12/2024      Full warfarin instructions:  7.5 mg every Tue; 5 mg all other days   Next INR check:  12/17/2024               Telephone call with Flex who verbalizes understanding and agrees to plan    Lab visit scheduled    Education provided: Goal range and lab monitoring: goal range and significance of current result    Plan made per [facility] anticoagulation protocol    Kori Ames RN  11/12/2024  Anticoagulation Clinic  Glider for routing messages: p AKOSUA LARA  ACC patient phone line: [phone]        _______________________________________________________________________     Anticoagulation Episode Summary       Current INR goal:  2.0-3.0   TTR:  68.5% (6.6 mo)   Target end date:  Indefinite   Send INR reminders to:  AKOSUA W[place]STEPHANIE    Indications    Atrial fibrillation/flutter (H) [I48.91  I48.92]             Comments:  --             Anticoagulation Care Providers       Provider Role Specialty Phone number    Maria De Jesus Hay APRN [facility] [phone]

## 2024-11-18 ENCOUNTER — TELEPHONE (OUTPATIENT)
Dept: CARDIOLOGY | Facility: CLINIC | Age: 69
End: 2024-11-18
Payer: COMMERCIAL

## 2024-11-18 DIAGNOSIS — Z00.6 EXAMINATION OF PARTICIPANT IN CLINICAL TRIAL: Primary | ICD-10-CM

## 2024-11-18 PROCEDURE — 99207 PR NO CHARGE-RESEARCH SERVICE: CPT

## 2024-11-18 NOTE — TELEPHONE ENCOUNTER
Confirmed appointment tomorrow at 1030, fasting.  No questions or concerns relayed.  Nafisa Rendon RN, BSN  Clinical Trials Nurse

## 2024-11-19 ENCOUNTER — OFFICE VISIT (OUTPATIENT)
Dept: CARDIOLOGY | Facility: CLINIC | Age: 69
End: 2024-11-19
Payer: COMMERCIAL

## 2024-11-19 ENCOUNTER — OFFICE VISIT (OUTPATIENT)
Dept: CARDIOLOGY | Facility: CLINIC | Age: 69
End: 2024-11-19
Attending: NURSE PRACTITIONER
Payer: COMMERCIAL

## 2024-11-19 VITALS
DIASTOLIC BLOOD PRESSURE: 90 MMHG | HEART RATE: 110 BPM | RESPIRATION RATE: 16 BRPM | BODY MASS INDEX: 38.76 KG/M2 | WEIGHT: 241.2 LBS | OXYGEN SATURATION: 98 % | HEIGHT: 66 IN | SYSTOLIC BLOOD PRESSURE: 137 MMHG

## 2024-11-19 VITALS
BODY MASS INDEX: 38.64 KG/M2 | OXYGEN SATURATION: 94 % | HEART RATE: 90 BPM | DIASTOLIC BLOOD PRESSURE: 88 MMHG | RESPIRATION RATE: 14 BRPM | SYSTOLIC BLOOD PRESSURE: 134 MMHG | WEIGHT: 239.4 LBS

## 2024-11-19 DIAGNOSIS — I25.810 CORONARY ARTERY DISEASE INVOLVING CORONARY BYPASS GRAFT OF NATIVE HEART WITHOUT ANGINA PECTORIS: ICD-10-CM

## 2024-11-19 DIAGNOSIS — I25.10 CORONARY ARTERY DISEASE INVOLVING NATIVE CORONARY ARTERY OF NATIVE HEART WITHOUT ANGINA PECTORIS: ICD-10-CM

## 2024-11-19 DIAGNOSIS — I48.91 ATRIAL FIBRILLATION/FLUTTER (H): ICD-10-CM

## 2024-11-19 DIAGNOSIS — E78.5 HYPERLIPIDEMIA LDL GOAL <70: ICD-10-CM

## 2024-11-19 DIAGNOSIS — Z00.6 CLINICAL TRIAL EXAM: Primary | ICD-10-CM

## 2024-11-19 DIAGNOSIS — Z95.1 S/P CABG (CORONARY ARTERY BYPASS GRAFT): ICD-10-CM

## 2024-11-19 DIAGNOSIS — I48.19 PERSISTENT ATRIAL FIBRILLATION (H): ICD-10-CM

## 2024-11-19 DIAGNOSIS — I48.92 ATRIAL FIBRILLATION/FLUTTER (H): ICD-10-CM

## 2024-11-19 DIAGNOSIS — I47.29 NSVT (NONSUSTAINED VENTRICULAR TACHYCARDIA) (H): ICD-10-CM

## 2024-11-19 DIAGNOSIS — I10 BENIGN ESSENTIAL HYPERTENSION: ICD-10-CM

## 2024-11-19 NOTE — PROGRESS NOTES
CARDIOLOGY CLINIC CONSULTATION    PRIMARY CARE PHYSICIAN:  Maria De Jesus Hay    Tests reviewed/interpreted independently in clinic today:   EKG: Normal sinus rhythm, incomplete RBBB, nonspecific ST changes  Echocardiogram: LV size and systolic function are normal, no significant valvular disease  Coronary angiogram: Stent in distal RCA and OM OM 2 are occluded.  90% proximal RCA, 50% proximal LAD disease.     The level of medical decision making during this visit was of moderate complexity.    HISTORY OF PRESENT ILLNESS:  Today, I had the pleasure of connecting with Roger Bonilla.  He is a pleasant 67-year-old gentleman with a past medical history of coronary disease s/p coronary bypass grafting, carotid stenosis s/p right CEA, hypertension, atrial fibrillation, hyperlipidemia, sleep apnea and obesity who presents to the clinic for a follow-up visit.  He has been followed in clinic by myself and  Kamryn Carroll.  His last visit was 6 months ago.  I had last seen him over a year ago.  Since then he has been diagnosed with atrial fibrillation.  He has also been enrolled in research study to study effect of Olpasiran on patients with atherosclerotic heart disease as well as elevated lipoprotein a.  He was started on warfarin after the diagnosis of atrial fibrillation and is tolerating well..  Today, he tells me that he has been doing well and does not have any major complaints.  He denies chest pain, shortness of breath, orthopnea, PND, lower extremity edema, presyncope or syncope.  His wife is scheduled to undergo surgical aortic valve replacement next Tuesday.    PAST MEDICAL HISTORY:  Past Medical History:   Diagnosis Date    AAA (abdominal aortic aneurysm) (H) 06/23/2021    Acute kidney injury (H) 06/23/2021    Acute superficial gastritis without hemorrhage 07/19/2021    Atherosclerosis of both carotid arteries 10/27/2008    Coronary artery disease 05/01/2005    2 stents put in heart    Disorder of bursae and  tendons in shoulder region 09/01/2005    Hyperlipidemia LDL goal <70 11/18/2002    Morbid obesity (H) 09/16/2013    NSTEMI (non-ST elevated myocardial infarction) (H) 05/2001    Obstructive Sleep Apnea 11/23/2011    Peripheral artery disease (H) 11/23/2012    Pure hypercholesterolemia 11/18/2002    Tobacco abuse 1973    Tubular adenoma 01/05/2023    Type II or unspecified type diabetes mellitus without mention of complication, not stated as uncontrolled 11/18/2002    Unspecified cardiovascular disease 05/2001    MI -- Angioplasty two stents RCA & OM2    Unspecified essential hypertension 11/18/2002       MEDICATIONS:  Current Outpatient Medications   Medication Sig Dispense Refill    aspirin (ASA) 81 MG chewable tablet Take 81 mg by mouth daily      atorvastatin (LIPITOR) 80 MG tablet Take 1 tablet (80 mg) by mouth daily 90 tablet 3    ezetimibe (ZETIA) 10 MG tablet Take 1 tablet (10 mg) by mouth daily. 90 tablet 3    furosemide (LASIX) 40 MG tablet Take 0.5 tablets (20 mg) by mouth daily 45 tablet 3    metFORMIN (GLUCOPHAGE) 1000 MG tablet Take 1 Tablet BY MOUTH EVERY DAY 90 tablet 3    metoprolol tartrate (LOPRESSOR) 100 MG tablet Take 1 tablet (100 mg) by mouth 2 times daily. 180 tablet 3    omeprazole (PRILOSEC) 40 MG DR capsule Take 1 capsule (40 mg) by mouth every morning. 90 capsule 3    study - olpavira, , vs placebo, IDS# [id number], 142 mg/mL pre-filled syringe Inject 1 mL (142 mg) subcutaneously once for 1 dose. . Inject contents of 1 syringe subcutaneously to clean and clear skin of upper arm, upper thigh, or abdomen. Allow syringe to warm to room temperature for 30 minutes prior to administration.      warfarin ANTICOAGULANT (JANTOVEN ANTICOAGULANT) 5 MG tablet Take 7.5 mg every Tues and 5 mg all other days of the week OR as directed by INR Clinic 100 tablet 1     No current facility-administered medications for this visit.       ALLERGIES:  Allergies   Allergen Reactions    Nkda [No Known Drug  Allergy]        SOCIAL HISTORY:  I have reviewed this patient's social history and updated it with pertinent information if needed. Roger Bonilla  reports that he has been smoking cigarettes. He started smoking about 52 years ago. He has a 26.4 pack-year smoking history. He has never been exposed to tobacco smoke. He has never used smokeless tobacco. He reports that he does not currently use alcohol. He reports that he does not use drugs.    FAMILY HISTORY:  I have reviewed this patient's family history and updated it with pertinent information if needed.   Family History   Problem Relation Age of Onset    Cerebrovascular Disease Mother     Respiratory Father         emphysema    Cerebrovascular Disease Maternal Grandmother     Alzheimer Disease Maternal Grandfather     Breast Cancer Sister     Arthritis Other         hip fracture    Cancer - colorectal No family hx of     Prostate Cancer No family hx of        REVIEW OF SYSTEMS:  Skin:        Eyes:       ENT:       Respiratory:  Negative    Cardiovascular:  Negative    Gastroenterology:      Genitourinary:       Musculoskeletal:       Neurologic:       Psychiatric:       Heme/Lymph/Imm:       Endocrine:           PHYSICAL EXAM:      BP: (!) 137/90 Pulse: 110   Resp: 16 SpO2: 98 %      Vital Signs with Ranges  Pulse:  [] 110  Resp:  [14-16] 16  BP: (120-137)/(81-90) 137/90  SpO2:  [94 %-98 %] 98 %  241 lbs 3.2 oz    Constitutional: alert, no distress  Respiratory: Good bilateral air entry  Cardiovascular: Normal S1-S2, no murmurs  GI: nondistended  Neuropsychiatric: appropriate affact    ASSESSMENT: Pertinent issues addressed/ reviewed during this cardiology visit    Coronary disease s/p coronary bypass 2022-LIMA to LAD, SVG to OM, SVG to RPDA  Carotid stenosis post endarterectomy in 2022  Essential hypertension  Hyperlipidemia  Obesity  Sleep apnea  Type 2 diabetes  Tobacco use    RECOMMENDATIONS:  It was a pleasure to see Elza in clinic today.  He is doing  significantly better than at the time of his last visit.  He also does not have exertional shortness of breath or chest tightness anymore.  He is going to continue all other cardiac medications unchanged.  I will have him come back and see us on a yearly basis going forward.  However if things change he can give us a call and we will see him sooner.  His LDL cholesterol when last checked last in 05/2024 was slightly on the higher side.  Since he is on a research study I will let them monitor this.    No orders of the defined types were placed in this encounter.    Parveen YANEZ, FACC, FASE  Cardiology - [facility]  May 17, 2023    This note was completed in part using dictation via the Dragon voice recognition software. Some word and grammatical errors may occur and must be interpreted in the appropriate clinical context.  If there are any questions pertaining to this issue, please contact me for further clarification.

## 2024-11-19 NOTE — PROGRESS NOTES
"  A Double-blind, Randomized, Placebo-controlled, Multicenter Study Assessing the impact of Olpasiran on Major Cardiovascular Events in Patients with Atherosclerotic cardiovascular Disease and Elevated Lipoprotein (a)      Roger \"Spanky\" ROSETTA Bonilla was seen in clinic today for Visit Week 72      Patient Vitals for the past 24 hrs:   BP Pulse Resp SpO2 Weight   11/19/24 1051 134/88 90 14 -- --   11/19/24 1048 120/81 89 14 94 % 108.6 kg (239 lb 6.4 oz)   BP done supine (supine @ 1034)    Any Adverse events, Serious Adverse event, changes in medications,  (if any) listed below. [x] Yes   []  No   AE: cellulitis-see note from 11/8/24   States the cellulitis resolved after 2 days of treatment.   Took Keflex (cephalexin) 500 mg PO QID for 7 days). Completed treatment on 11/16/24.    Subject has been reminded that lipids should remain blinded for the duration of this study.   Central labs drawn? Yes Time 1032.  Urine Pregnancy for WOCBP N/A        IP Dispensed? [x] Yes   []  No     IP Lot Number: [id number]  IP Box Number: [id number]    Administered into left arm at 1059 by Research coordinator       Did the patient have any of the following events (endpoints):   [] Yes   [x]  No   Cardiovascular Death  [] Yes   [x]  No   Myocardial Infarction  [] Yes   [x]  No   Coronary revascularization  [] Yes   [x]  No   Any Coronary Artery Revascularization  [] Yes   [x]  No  Cerebrovascular Revascularization  [] Yes   [x]  No  Peripheral Artery Revascularization  [] Yes   [x]  No  Vascular amputation  [] Yes   [x]  No  Hospitalization for Unstable Angina  [] Yes   [x]  No  Stroke (Ischemic or Hemorrhagic) / Transient Ischemic Attack (TIA)  [] Yes   [x]  No  Deep Vein Thrombosis or Pulmonary Embolism  [] Yes   [x]  No  New onset or clinical deterioration of aortic stenosis  [] Yes   [x]  No  Limb Ischemia  [] Yes   [x]  No  New Onset Diabetes Mellitus   [] Yes   [x]  No  Hospitalization for Heart Failure    Plan: Next study visit week 84 " due ~2/15/2025. Non fasting.    Will call to schedule    Nafisa Rendon RN      Current Outpatient Medications:     aspirin (ASA) 81 MG chewable tablet, Take 81 mg by mouth daily, Disp: , Rfl:     atorvastatin (LIPITOR) 80 MG tablet, Take 1 tablet (80 mg) by mouth daily, Disp: 90 tablet, Rfl: 3    ezetimibe (ZETIA) 10 MG tablet, Take 1 tablet (10 mg) by mouth daily., Disp: 90 tablet, Rfl: 3    furosemide (LASIX) 40 MG tablet, Take 0.5 tablets (20 mg) by mouth daily, Disp: 45 tablet, Rfl: 3    metFORMIN (GLUCOPHAGE) 1000 MG tablet, Take 1 Tablet BY MOUTH EVERY DAY, Disp: 90 tablet, Rfl: 3    metoprolol tartrate (LOPRESSOR) 100 MG tablet, Take 1 tablet (100 mg) by mouth 2 times daily., Disp: 180 tablet, Rfl: 3    omeprazole (PRILOSEC) 40 MG DR capsule, Take 1 capsule (40 mg) by mouth every morning., Disp: 90 capsule, Rfl: 3    study - olpasiran, , vs placebo, IDS# [id number], 142 mg/mL pre-filled syringe, Inject 1 mL (142 mg) subcutaneously once for 1 dose. . Inject contents of 1 syringe subcutaneously to clean and clear skin of upper arm, upper thigh, or abdomen. Allow syringe to warm to room temperature for 30 minutes prior to administration., Disp: , Rfl:     warfarin ANTICOAGULANT (JANTOVEN ANTICOAGULANT) 5 MG tablet, Take 7.5 mg every Tues and 5 mg all other days of the week OR as directed by INR Clinic, Disp: 100 tablet, Rfl: 1

## 2024-11-19 NOTE — LETTER
"11/19/2024    Maria De Jesus Hay, APRN CNP  [address] [facility] [address]    RE: Roger SARGENT Chad       Dear Colleague,     I had the pleasure of seeing Roger ROSETTA Bonilla in the [facility].    A Double-blind, Randomized, Placebo-controlled, Multicenter Study Assessing the impact of Olpasiran on Major Cardiovascular Events in Patients with Atherosclerotic cardiovascular Disease and Elevated Lipoprotein (a)      Roger \"Spanky\" ROSETTA Bonilla was seen in clinic today for Visit Week 72      Patient Vitals for the past 24 hrs:   BP Pulse Resp SpO2 Weight   11/19/24 1051 134/88 90 14 -- --   11/19/24 1048 120/81 89 14 94 % 108.6 kg (239 lb 6.4 oz)   BP done supine (supine @ 1034)    Any Adverse events, Serious Adverse event, changes in medications,  (if any) listed below. [x] Yes   []  No   AE: cellulitis-see note from 11/8/24   States the cellulitis resolved after 2 days of treatment.   Took Keflex (cephalexin) 500 mg PO QID for 7 days). Completed treatment on 11/16/24.    Subject has been reminded that lipids should remain blinded for the duration of this study.   Central labs drawn? Yes Time 1032.  Urine Pregnancy for WOCBP N/A        IP Dispensed? [x] Yes   []  No     IP Lot Number: [id number]  IP Box Number: [id number]    Administered into left arm at 1059 by Research coordinator       Did the patient have any of the following events (endpoints):   [] Yes   [x]  No   Cardiovascular Death  [] Yes   [x]  No   Myocardial Infarction  [] Yes   [x]  No   Coronary revascularization  [] Yes   [x]  No   Any Coronary Artery Revascularization  [] Yes   [x]  No  Cerebrovascular Revascularization  [] Yes   [x]  No  Peripheral Artery Revascularization  [] Yes   [x]  No  Vascular amputation  [] Yes   [x]  No  Hospitalization for Unstable Angina  [] Yes   [x]  No  Stroke (Ischemic or Hemorrhagic) / Transient Ischemic Attack (TIA)  [] Yes   [x]  No  Deep Vein Thrombosis or Pulmonary Embolism  [] Yes   [x]  No  New onset or " clinical deterioration of aortic stenosis  [] Yes   [x]  No  Limb Ischemia  [] Yes   [x]  No  New Onset Diabetes Mellitus   [] Yes   [x]  No  Hospitalization for Heart Failure    Plan: Next study visit week 84 due ~2/15/2025. Non fasting.    Will call to schedule    Nafisa Rendon RN      Current Outpatient Medications:      aspirin (ASA) 81 MG chewable tablet, Take 81 mg by mouth daily, Disp: , Rfl:      atorvastatin (LIPITOR) 80 MG tablet, Take 1 tablet (80 mg) by mouth daily, Disp: 90 tablet, Rfl: 3     ezetimibe (ZETIA) 10 MG tablet, Take 1 tablet (10 mg) by mouth daily., Disp: 90 tablet, Rfl: 3     furosemide (LASIX) 40 MG tablet, Take 0.5 tablets (20 mg) by mouth daily, Disp: 45 tablet, Rfl: 3     metFORMIN (GLUCOPHAGE) 1000 MG tablet, Take 1 Tablet BY MOUTH EVERY DAY, Disp: 90 tablet, Rfl: 3     metoprolol tartrate (LOPRESSOR) 100 MG tablet, Take 1 tablet (100 mg) by mouth 2 times daily., Disp: 180 tablet, Rfl: 3     omeprazole (PRILOSEC) 40 MG DR capsule, Take 1 capsule (40 mg) by mouth every morning., Disp: 90 capsule, Rfl: 3     study - olpasiran, , vs placebo, IDS# [id number], 142 mg/mL pre-filled syringe, Inject 1 mL (142 mg) subcutaneously once for 1 dose. . Inject contents of 1 syringe subcutaneously to clean and clear skin of upper arm, upper thigh, or abdomen. Allow syringe to warm to room temperature for 30 minutes prior to administration., Disp: , Rfl:      warfarin ANTICOAGULANT (JANTOVEN ANTICOAGULANT) 5 MG tablet, Take 7.5 mg every Tues and 5 mg all other days of the week OR as directed by INR Clinic, Disp: 100 tablet, Rfl: 1      Thank you for allowing me to participate in the care of your patient.      Sincerely,     Nafisa Rendon RN     [facility]  cc:   Kala Eduardo MD  No address on file

## 2024-11-19 NOTE — LETTER
11/19/2024    Maria De Jesus Hay, APRN CNP  [address] [facility] [place] [address]    RE: Roger Bonilla       Dear Colleague,     I had the pleasure of seeing Roger SARGENT Chad in the [facility].  CARDIOLOGY CLINIC CONSULTATION    PRIMARY CARE PHYSICIAN:  Maria De Jesus Hay    Tests reviewed/interpreted independently in clinic today:   EKG: Normal sinus rhythm, incomplete RBBB, nonspecific ST changes  Echocardiogram: LV size and systolic function are normal, no significant valvular disease  Coronary angiogram: Stent in distal RCA and OM OM 2 are occluded.  90% proximal RCA, 50% proximal LAD disease.     The level of medical decision making during this visit was of moderate complexity.    HISTORY OF PRESENT ILLNESS:  Today, I had the pleasure of connecting with Roger ROSETTA Bonilla.  He is a pleasant 67-year-old gentleman with a past medical history of coronary disease s/p coronary bypass grafting, carotid stenosis s/p right CEA, hypertension, atrial fibrillation, hyperlipidemia, sleep apnea and obesity who presents to the clinic for a follow-up visit.  He has been followed in clinic by myself and  Kamryn Carroll.  His last visit was 6 months ago.  I had last seen him over a year ago.  Since then he has been diagnosed with atrial fibrillation.  He has also been enrolled in research study to study effect of Olpasiran on patients with atherosclerotic heart disease as well as elevated lipoprotein a.  He was started on warfarin after the diagnosis of atrial fibrillation and is tolerating well..  Today, he tells me that he has been doing well and does not have any major complaints.  He denies chest pain, shortness of breath, orthopnea, PND, lower extremity edema, presyncope or syncope.  His wife is scheduled to undergo surgical aortic valve replacement next Tuesday.    PAST MEDICAL HISTORY:  Past Medical History:   Diagnosis Date     AAA (abdominal aortic aneurysm) (H) 06/23/2021     Acute kidney injury (H) 06/23/2021      Acute superficial gastritis without hemorrhage 07/19/2021     Atherosclerosis of both carotid arteries 10/27/2008     Coronary artery disease 05/01/2005    2 stents put in heart     Disorder of bursae and tendons in shoulder region 09/01/2005     Hyperlipidemia LDL goal <70 11/18/2002     Morbid obesity (H) 09/16/2013     NSTEMI (non-ST elevated myocardial infarction) (H) 05/2001     Obstructive Sleep Apnea 11/23/2011     Peripheral artery disease (H) 11/23/2012     Pure hypercholesterolemia 11/18/2002     Tobacco abuse 1973     Tubular adenoma 01/05/2023     Type II or unspecified type diabetes mellitus without mention of complication, not stated as uncontrolled 11/18/2002     Unspecified cardiovascular disease 05/2001    MI -- Angioplasty two stents RCA & OM2     Unspecified essential hypertension 11/18/2002       MEDICATIONS:  Current Outpatient Medications   Medication Sig Dispense Refill     aspirin (ASA) 81 MG chewable tablet Take 81 mg by mouth daily       atorvastatin (LIPITOR) 80 MG tablet Take 1 tablet (80 mg) by mouth daily 90 tablet 3     ezetimibe (ZETIA) 10 MG tablet Take 1 tablet (10 mg) by mouth daily. 90 tablet 3     furosemide (LASIX) 40 MG tablet Take 0.5 tablets (20 mg) by mouth daily 45 tablet 3     metFORMIN (GLUCOPHAGE) 1000 MG tablet Take 1 Tablet BY MOUTH EVERY DAY 90 tablet 3     metoprolol tartrate (LOPRESSOR) 100 MG tablet Take 1 tablet (100 mg) by mouth 2 times daily. 180 tablet 3     omeprazole (PRILOSEC) 40 MG DR capsule Take 1 capsule (40 mg) by mouth every morning. 90 capsule 3     study - berenice, , vs placebo, IDS# [id number], 142 mg/mL pre-filled syringe Inject 1 mL (142 mg) subcutaneously once for 1 dose. . Inject contents of 1 syringe subcutaneously to clean and clear skin of upper arm, upper thigh, or abdomen. Allow syringe to warm to room temperature for 30 minutes prior to administration.       warfarin ANTICOAGULANT (JANTOVEN ANTICOAGULANT) 5 MG tablet Take 7.5 mg  every Tues and 5 mg all other days of the week OR as directed by INR Clinic 100 tablet 1     No current facility-administered medications for this visit.       ALLERGIES:  Allergies   Allergen Reactions     Nkda [No Known Drug Allergy]        SOCIAL HISTORY:  I have reviewed this patient's social history and updated it with pertinent information if needed. Roger Bonilla  reports that he has been smoking cigarettes. He started smoking about 52 years ago. He has a 26.4 pack-year smoking history. He has never been exposed to tobacco smoke. He has never used smokeless tobacco. He reports that he does not currently use alcohol. He reports that he does not use drugs.    FAMILY HISTORY:  I have reviewed this patient's family history and updated it with pertinent information if needed.   Family History   Problem Relation Age of Onset     Cerebrovascular Disease Mother      Respiratory Father         emphysema     Cerebrovascular Disease Maternal Grandmother      Alzheimer Disease Maternal Grandfather      Breast Cancer Sister      Arthritis Other         hip fracture     Cancer - colorectal No family hx of      Prostate Cancer No family hx of        REVIEW OF SYSTEMS:  Skin:        Eyes:       ENT:       Respiratory:  Negative    Cardiovascular:  Negative    Gastroenterology:      Genitourinary:       Musculoskeletal:       Neurologic:       Psychiatric:       Heme/Lymph/Imm:       Endocrine:           PHYSICAL EXAM:      BP: (!) 137/90 Pulse: 110   Resp: 16 SpO2: 98 %      Vital Signs with Ranges  Pulse:  [] 110  Resp:  [14-16] 16  BP: (120-137)/(81-90) 137/90  SpO2:  [94 %-98 %] 98 %  241 lbs 3.2 oz    Constitutional: alert, no distress  Respiratory: Good bilateral air entry  Cardiovascular: Normal S1-S2, no murmurs  GI: nondistended  Neuropsychiatric: appropriate affact    ASSESSMENT: Pertinent issues addressed/ reviewed during this cardiology visit    Coronary disease s/p coronary bypass 2022-LIMA to LAD, SVG to  OM, SVG to RPDA  Carotid stenosis post endarterectomy in 2022  Essential hypertension  Hyperlipidemia  Obesity  Sleep apnea  Type 2 diabetes  Tobacco use    RECOMMENDATIONS:  It was a pleasure to see Elza in clinic today.  He is doing significantly better than at the time of his last visit.  He also does not have exertional shortness of breath or chest tightness anymore.  He is going to continue all other cardiac medications unchanged.  I will have him come back and see us on a yearly basis going forward.  However if things change he can give us a call and we will see him sooner.  His LDL cholesterol when last checked last in 05/2024 was slightly on the higher side.  Since he is on a research study I will let them monitor this.    No orders of the defined types were placed in this encounter.    Parveen YANEZ, FACC, [facility]  Cardiology - [facility]  May 17, 2023    This note was completed in part using dictation via the Dragon voice recognition software. Some word and grammatical errors may occur and must be interpreted in the appropriate clinical context.  If there are any questions pertaining to this issue, please contact me for further clarification.      Thank you for allowing me to participate in the care of your patient.      Sincerely,     Parveen Ramirez MD     [facility]  cc:   SAL Fletcher CNP  [address] [place] [address]

## 2024-11-25 NOTE — PROGRESS NOTES
Research laboratory data from November 19 reviewed and results not clinically significant including mildly increased PT/PTT as well as increased hemoglobin A1c.  In addition reduced calculated MCH, MCHC as well as increased RDW and increased neutrophil and increased anucleated cells not clinically significant nor is slightly increased anion gap.  LF

## 2024-12-17 ENCOUNTER — ANTICOAGULATION THERAPY VISIT (OUTPATIENT)
Dept: ANTICOAGULATION | Facility: CLINIC | Age: 69
End: 2024-12-17

## 2024-12-17 ENCOUNTER — LAB (OUTPATIENT)
Dept: LAB | Facility: CLINIC | Age: 69
End: 2024-12-17
Payer: COMMERCIAL

## 2024-12-17 DIAGNOSIS — I48.91 ATRIAL FIBRILLATION/FLUTTER (H): Primary | ICD-10-CM

## 2024-12-17 DIAGNOSIS — I48.92 ATRIAL FIBRILLATION/FLUTTER (H): Primary | ICD-10-CM

## 2024-12-17 DIAGNOSIS — I48.92 ATRIAL FIBRILLATION/FLUTTER (H): ICD-10-CM

## 2024-12-17 DIAGNOSIS — I48.91 ATRIAL FIBRILLATION/FLUTTER (H): ICD-10-CM

## 2024-12-17 LAB — INR BLD: 1.7 (ref 0.9–1.1)

## 2024-12-17 PROCEDURE — 85610 PROTHROMBIN TIME: CPT

## 2024-12-17 PROCEDURE — 36416 COLLJ CAPILLARY BLOOD SPEC: CPT

## 2024-12-17 NOTE — PROGRESS NOTES
ANTICOAGULATION MANAGEMENT     Roger Bonilla 69 year old male is on warfarin with subtherapeutic INR result. (Goal INR 2.0-3.0)    Recent labs: (last 7 days)     12/17/24  1328   INR 1.7*       ASSESSMENT     Warfarin Lab Questionnaire    Warfarin Doses Last 7 Days      12/16/2024     2:42 PM   Dose in Tablet or Mg   TAB or MG? tablet (tab)     Pt Rptd Dose SUNDAY MONDAY TUESDAY WED THURS FRIDAY SATURDAY 12/16/2024   2:42 PM 1 1 1.5 1 1 1 1         12/16/2024   Warfarin Lab Questionnaire   Missed doses within past 14 days? No   Changes in diet or alcohol within past 14 days? No   Medication changes since last result? No   Injuries or illness since last result? No   New shortness of breath, severe headaches or sudden changes in vision since last result? No   Abnormal bleeding since last result? No   Upcoming surgery, procedure? No        Previous result: Therapeutic last 2(+) visits  Additional findings: None       PLAN     Recommended plan for no diet, medication or health factor changes affecting INR     Dosing Instructions: Continue your current warfarin dose with next INR in 2 weeks       Summary  As of 12/17/2024      Full warfarin instructions:  7.5 mg every Tue; 5 mg all other days   Next INR check:  1/7/2025               Telephone call with Flex who verbalizes understanding and agrees to plan    Patient elected to schedule next visit in 3 weeks    Education provided: Goal range and lab monitoring: goal range and significance of current result    Plan made per [facility] anticoagulation protocol    Kori Ames RN  12/17/2024  Anticoagulation Clinic  [facility] for routing messages: tila LARA  [facility] patient phone line: [phone]        _______________________________________________________________________     Anticoagulation Episode Summary       Current INR goal:  2.0-3.0   TTR:  64.3% (7.8 mo)   Target end date:  Indefinite   Send INR reminders to:  AKOSUA LARA    Indications    Atrial  fibrillation/flutter (H) [I48.91  I48.92]             Comments:  --             Anticoagulation Care Providers       Provider Role Specialty Phone number    Maria De Jesus Hay APRN [facility] Medicine [phone]

## 2025-01-07 ENCOUNTER — LAB (OUTPATIENT)
Dept: LAB | Facility: CLINIC | Age: 70
End: 2025-01-07
Payer: COMMERCIAL

## 2025-01-07 ENCOUNTER — ANTICOAGULATION THERAPY VISIT (OUTPATIENT)
Dept: ANTICOAGULATION | Facility: CLINIC | Age: 70
End: 2025-01-07

## 2025-01-07 DIAGNOSIS — I48.91 ATRIAL FIBRILLATION/FLUTTER (H): ICD-10-CM

## 2025-01-07 DIAGNOSIS — I48.92 ATRIAL FIBRILLATION/FLUTTER (H): Primary | ICD-10-CM

## 2025-01-07 DIAGNOSIS — I48.91 ATRIAL FIBRILLATION/FLUTTER (H): Primary | ICD-10-CM

## 2025-01-07 DIAGNOSIS — I48.92 ATRIAL FIBRILLATION/FLUTTER (H): ICD-10-CM

## 2025-01-07 LAB — INR BLD: 1.7 (ref 0.9–1.1)

## 2025-01-07 PROCEDURE — 36416 COLLJ CAPILLARY BLOOD SPEC: CPT

## 2025-01-07 PROCEDURE — 85610 PROTHROMBIN TIME: CPT

## 2025-01-07 NOTE — PROGRESS NOTES
ANTICOAGULATION MANAGEMENT     Roger Bonilla 69 year old male is on warfarin with subtherapeutic INR result. (Goal INR 2.0-3.0)    Recent labs: (last 7 days)     01/07/25  1331   INR 1.7*       ASSESSMENT     Source(s): Chart Review and Patient/Caregiver Call     Warfarin doses taken: Warfarin taken as instructed  Diet: No new diet changes identified  Medication/supplement changes: None noted  New illness, injury, or hospitalization: No  Signs or symptoms of bleeding or clotting: No  Previous result: Subtherapeutic  Additional findings: None       PLAN     Recommended plan for no diet, medication or health factor changes affecting INR     Dosing Instructions: Increase your warfarin dose (6.7% change) with next INR in 2 weeks       Summary  As of 1/7/2025      Full warfarin instructions:  7.5 mg every Tue, Fri; 5 mg all other days   Next INR check:  1/21/2025               Telephone call with Flex who verbalizes understanding and agrees to plan    Lab visit scheduled    Education provided: Goal range and lab monitoring: goal range and significance of current result    Plan made per [facility] anticoagulation protocol    Kori Ames RN  1/7/2025  Anticoagulation Clinic  [employer] for routing messages: p AKOSUA LARA  [facility] patient phone line: [phone]        _______________________________________________________________________     Anticoagulation Episode Summary       Current INR goal:  2.0-3.0   TTR:  59.0% (8.5 mo)   Target end date:  Indefinite   Send INR reminders to:  AKOSUA WYSTEPHANIE    Indications    Atrial fibrillation/flutter (H) [I48.91  I48.92]             Comments:  --             Anticoagulation Care Providers       Provider Role Specialty Phone number    Maria De Jesus Hay APRN [facility] [phone]

## 2025-01-09 ENCOUNTER — TELEPHONE (OUTPATIENT)
Dept: FAMILY MEDICINE | Facility: CLINIC | Age: 70
End: 2025-01-09
Payer: COMMERCIAL

## 2025-01-09 NOTE — TELEPHONE ENCOUNTER
"  Order/Referral Request    Who is requesting: Patient    Orders being requested: Referral to get his eyes scraped    Reason service is needed/diagnosis: He said \"it is growing out of my eye lids and into my eyeballs\"     When are orders needed by: ASAP    Has this been discussed with Provider: Yes    Does patient have a preference on a Group/Provider/Facility? [place]    Does patient have an appointment scheduled?: No    Where to send orders: Place orders within Epic    Could we send this information to you in [facility] or would you prefer to receive a phone call?:   Patient would prefer a phone call   Okay to leave a detailed message?: Yes at Cell number on file:    Telephone Information:   Mobile [phone]     "

## 2025-01-10 NOTE — TELEPHONE ENCOUNTER
[facility]23 mentions prominent lacrimal gland with fatty protrusion and that he was wanting removal of growths on eyes.   Should he be referred back to [facility]?    Eileen Shirley RN on 1/10/2025 at 2:12 PM

## 2025-01-13 NOTE — TELEPHONE ENCOUNTER
Sent patient MYchart message with Maria De Jesus's response.    Kavita Andrade on 1/13/2025 at 9:45 AM

## 2025-01-21 ENCOUNTER — LAB (OUTPATIENT)
Dept: LAB | Facility: CLINIC | Age: 70
End: 2025-01-21
Payer: COMMERCIAL

## 2025-01-21 ENCOUNTER — ANTICOAGULATION THERAPY VISIT (OUTPATIENT)
Dept: ANTICOAGULATION | Facility: CLINIC | Age: 70
End: 2025-01-21

## 2025-01-21 DIAGNOSIS — I48.91 ATRIAL FIBRILLATION/FLUTTER (H): Primary | ICD-10-CM

## 2025-01-21 DIAGNOSIS — I48.92 ATRIAL FIBRILLATION/FLUTTER (H): ICD-10-CM

## 2025-01-21 DIAGNOSIS — I48.92 ATRIAL FIBRILLATION/FLUTTER (H): Primary | ICD-10-CM

## 2025-01-21 DIAGNOSIS — I48.91 ATRIAL FIBRILLATION/FLUTTER (H): ICD-10-CM

## 2025-01-21 LAB — INR BLD: 1.6 (ref 0.9–1.1)

## 2025-01-21 PROCEDURE — 85610 PROTHROMBIN TIME: CPT

## 2025-01-21 PROCEDURE — 36416 COLLJ CAPILLARY BLOOD SPEC: CPT

## 2025-01-21 NOTE — PROGRESS NOTES
ANTICOAGULATION MANAGEMENT     Roger Bonilla 69 year old male is on warfarin with subtherapeutic INR result. (Goal INR 2.0-3.0)    Recent labs: (last 7 days)     01/21/25  1326   INR 1.6*       ASSESSMENT     Source(s): Chart Review and Patient/Caregiver Call     Warfarin doses taken: Warfarin taken as instructed  Diet: No new diet changes identified  Medication/supplement changes: Patient recently started a MV. He has no clue if it has Vitamin K. Unsure of name at this time.   New illness, injury, or hospitalization: No  Signs or symptoms of bleeding or clotting: No  Previous result: Subtherapeutic  Additional findings: None       PLAN     Recommended plan for ongoing change(s) affecting INR     Dosing Instructions: Increase your warfarin dose (12.5% change) with next INR in 2 weeks       Summary  As of 1/21/2025      Full warfarin instructions:  5 mg every Mon, Wed, Fri; 7.5 mg all other days   Next INR check:  2/4/2025               Telephone call with Flex who verbalizes understanding and agrees to plan    Lab visit scheduled    Education provided: Dietary considerations: Impact of vitamin K on the INR.  Healthy lifestyle considerations: MV effects on warfarin.   Interaction IS anticipated between warfarin and MV if it contains Vitamin K.     Plan made per [facility] anticoagulation protocol    Ze MITTAL RN  1/21/2025  Anticoagulation Clinic  [employer] for routing messages: tila LARA  [facility] patient phone line: [phone]        _______________________________________________________________________     Anticoagulation Episode Summary       Current INR goal:  2.0-3.0   TTR:  55.9% (9 mo)   Target end date:  Indefinite   Send INR reminders to:  AKOSUA LARA    Indications    Atrial fibrillation/flutter (H) [I48.91  I48.92]             Comments:  --             Anticoagulation Care Providers       Provider Role Specialty Phone number    Maria De Jesus Hay APRN CNP Referring Family Medicine [phone]

## 2025-01-22 ENCOUNTER — DOCUMENTATION ONLY (OUTPATIENT)
Dept: CARDIOLOGY | Facility: CLINIC | Age: 70
End: 2025-01-22
Payer: COMMERCIAL

## 2025-01-22 NOTE — PROGRESS NOTES
Subject had their cardiovascular medical history and severity reviewed at screening visit, unfortunately was not documented in error.     Past Medical History  Diagnosis Severity Date   Coronary Artery Disease Mild 01 May 2005   Pure Hypercholesterolemia Recovered 18 Nov 2002   Type 2 Diabetes Mellitus without complication, without long term current us of insulin Moderate 18 Nov 2002   AAA Mild 23 Jun 2021   Hyperlipidemia  Moderate 18 Nov 2002   Hypertension Mild 18 Nov 2002   MI Resolved May 2001   CABG Resolved 24 Mar 2022   PAD Moderate 23 Nov 2012   Carotid Artery Disease Moderate 27 Oct 2008        Evy De Luna RN

## 2025-01-29 ENCOUNTER — HOSPITAL ENCOUNTER (EMERGENCY)
Facility: CLINIC | Age: 70
Discharge: HOME | End: 2025-01-30
Attending: EMERGENCY MEDICINE
Payer: COMMERCIAL

## 2025-01-29 ENCOUNTER — APPOINTMENT (OUTPATIENT)
Dept: CT IMAGING | Facility: CLINIC | Age: 70
End: 2025-01-29
Attending: EMERGENCY MEDICINE
Payer: COMMERCIAL

## 2025-01-29 ENCOUNTER — APPOINTMENT (OUTPATIENT)
Dept: ULTRASOUND IMAGING | Facility: CLINIC | Age: 70
End: 2025-01-29
Attending: EMERGENCY MEDICINE
Payer: COMMERCIAL

## 2025-01-29 DIAGNOSIS — K81.9 CHOLECYSTITIS: ICD-10-CM

## 2025-01-29 DIAGNOSIS — I48.92 ATRIAL FLUTTER WITH RAPID VENTRICULAR RESPONSE (H): ICD-10-CM

## 2025-01-29 DIAGNOSIS — R06.00 DYSPNEA, UNSPECIFIED TYPE: ICD-10-CM

## 2025-01-29 DIAGNOSIS — J18.9 PNEUMONIA OF RIGHT LOWER LOBE DUE TO INFECTIOUS ORGANISM: ICD-10-CM

## 2025-01-29 DIAGNOSIS — J10.1 INFLUENZA A: ICD-10-CM

## 2025-01-29 DIAGNOSIS — A41.9 ACUTE SEPSIS (H): ICD-10-CM

## 2025-01-29 LAB
ANION GAP SERPL CALCULATED.3IONS-SCNC: 15 MMOL/L (ref 7–15)
BASOPHILS # BLD AUTO: 0 10E3/UL (ref 0–0.2)
BASOPHILS NFR BLD AUTO: 0 %
BUN SERPL-MCNC: 41.7 MG/DL (ref 8–23)
CALCIUM SERPL-MCNC: 9.4 MG/DL (ref 8.8–10.4)
CHLORIDE SERPL-SCNC: 100 MMOL/L (ref 98–107)
CREAT SERPL-MCNC: 1.39 MG/DL (ref 0.67–1.17)
D DIMER PPP FEU-MCNC: 0.52 UG/ML FEU (ref 0–0.5)
EGFRCR SERPLBLD CKD-EPI 2021: 55 ML/MIN/1.73M2
EOSINOPHIL # BLD AUTO: 0 10E3/UL (ref 0–0.7)
EOSINOPHIL NFR BLD AUTO: 0 %
ERYTHROCYTE [DISTWIDTH] IN BLOOD BY AUTOMATED COUNT: 19 % (ref 10–15)
FLUAV RNA SPEC QL NAA+PROBE: POSITIVE
FLUBV RNA RESP QL NAA+PROBE: NEGATIVE
GLUCOSE SERPL-MCNC: 114 MG/DL (ref 70–99)
HCO3 SERPL-SCNC: 26 MMOL/L (ref 22–29)
HCT VFR BLD AUTO: 41.1 % (ref 40–53)
HGB BLD-MCNC: 12.4 G/DL (ref 13.3–17.7)
HOLD SPECIMEN: NORMAL
HOLD SPECIMEN: NORMAL
IMM GRANULOCYTES # BLD: 0 10E3/UL
IMM GRANULOCYTES NFR BLD: 1 %
INR PPP: 1.91 (ref 0.85–1.15)
LACTATE SERPL-SCNC: 2.1 MMOL/L (ref 0.7–2)
LYMPHOCYTES # BLD AUTO: 0.7 10E3/UL (ref 0.8–5.3)
LYMPHOCYTES NFR BLD AUTO: 8 %
MCH RBC QN AUTO: 23.9 PG (ref 26.5–33)
MCHC RBC AUTO-ENTMCNC: 30.2 G/DL (ref 31.5–36.5)
MCV RBC AUTO: 79 FL (ref 78–100)
MONOCYTES # BLD AUTO: 1.3 10E3/UL (ref 0–1.3)
MONOCYTES NFR BLD AUTO: 15 %
NEUTROPHILS # BLD AUTO: 6.4 10E3/UL (ref 1.6–8.3)
NEUTROPHILS NFR BLD AUTO: 76 %
NRBC # BLD AUTO: 0 10E3/UL
NRBC BLD AUTO-RTO: 0 /100
NT-PROBNP SERPL-MCNC: ABNORMAL PG/ML (ref 0–900)
PLATELET # BLD AUTO: 260 10E3/UL (ref 150–450)
POTASSIUM SERPL-SCNC: 4.9 MMOL/L (ref 3.4–5.3)
RBC # BLD AUTO: 5.18 10E6/UL (ref 4.4–5.9)
RSV RNA SPEC NAA+PROBE: NEGATIVE
SARS-COV-2 RNA RESP QL NAA+PROBE: NEGATIVE
SODIUM SERPL-SCNC: 141 MMOL/L (ref 135–145)
TROPONIN T SERPL HS-MCNC: 58 NG/L
TROPONIN T SERPL HS-MCNC: 60 NG/L
WBC # BLD AUTO: 8.4 10E3/UL (ref 4–11)

## 2025-01-29 PROCEDURE — 96365 THER/PROPH/DIAG IV INF INIT: CPT | Mod: 59

## 2025-01-29 PROCEDURE — 93010 ELECTROCARDIOGRAM REPORT: CPT | Performed by: EMERGENCY MEDICINE

## 2025-01-29 PROCEDURE — 250N000011 HC RX IP 250 OP 636: Performed by: EMERGENCY MEDICINE

## 2025-01-29 PROCEDURE — 82247 BILIRUBIN TOTAL: CPT | Performed by: EMERGENCY MEDICINE

## 2025-01-29 PROCEDURE — 93005 ELECTROCARDIOGRAM TRACING: CPT

## 2025-01-29 PROCEDURE — 87181 SC STD AGAR DILUTION PER AGT: CPT | Performed by: EMERGENCY MEDICINE

## 2025-01-29 PROCEDURE — 258N000003 HC RX IP 258 OP 636: Performed by: EMERGENCY MEDICINE

## 2025-01-29 PROCEDURE — 87637 SARSCOV2&INF A&B&RSV AMP PRB: CPT | Performed by: EMERGENCY MEDICINE

## 2025-01-29 PROCEDURE — 36415 COLL VENOUS BLD VENIPUNCTURE: CPT | Performed by: EMERGENCY MEDICINE

## 2025-01-29 PROCEDURE — 83880 ASSAY OF NATRIURETIC PEPTIDE: CPT | Performed by: EMERGENCY MEDICINE

## 2025-01-29 PROCEDURE — 250N000013 HC RX MED GY IP 250 OP 250 PS 637: Performed by: EMERGENCY MEDICINE

## 2025-01-29 PROCEDURE — 83605 ASSAY OF LACTIC ACID: CPT | Performed by: EMERGENCY MEDICINE

## 2025-01-29 PROCEDURE — 85041 AUTOMATED RBC COUNT: CPT | Performed by: FAMILY MEDICINE

## 2025-01-29 PROCEDURE — 80048 BASIC METABOLIC PNL TOTAL CA: CPT | Performed by: FAMILY MEDICINE

## 2025-01-29 PROCEDURE — 85018 HEMOGLOBIN: CPT | Performed by: FAMILY MEDICINE

## 2025-01-29 PROCEDURE — 99291 CRITICAL CARE FIRST HOUR: CPT | Mod: 25

## 2025-01-29 PROCEDURE — 36415 COLL VENOUS BLD VENIPUNCTURE: CPT | Performed by: FAMILY MEDICINE

## 2025-01-29 PROCEDURE — 85379 FIBRIN DEGRADATION QUANT: CPT | Performed by: EMERGENCY MEDICINE

## 2025-01-29 PROCEDURE — 82310 ASSAY OF CALCIUM: CPT | Performed by: EMERGENCY MEDICINE

## 2025-01-29 PROCEDURE — 94640 AIRWAY INHALATION TREATMENT: CPT

## 2025-01-29 PROCEDURE — 80048 BASIC METABOLIC PNL TOTAL CA: CPT | Performed by: EMERGENCY MEDICINE

## 2025-01-29 PROCEDURE — 250N000009 HC RX 250: Performed by: EMERGENCY MEDICINE

## 2025-01-29 PROCEDURE — 87637 SARSCOV2&INF A&B&RSV AMP PRB: CPT | Performed by: FAMILY MEDICINE

## 2025-01-29 PROCEDURE — 85004 AUTOMATED DIFF WBC COUNT: CPT | Performed by: FAMILY MEDICINE

## 2025-01-29 PROCEDURE — 87149 DNA/RNA DIRECT PROBE: CPT | Performed by: EMERGENCY MEDICINE

## 2025-01-29 PROCEDURE — 85048 AUTOMATED LEUKOCYTE COUNT: CPT | Performed by: EMERGENCY MEDICINE

## 2025-01-29 PROCEDURE — 87040 BLOOD CULTURE FOR BACTERIA: CPT | Performed by: EMERGENCY MEDICINE

## 2025-01-29 PROCEDURE — 85004 AUTOMATED DIFF WBC COUNT: CPT | Performed by: EMERGENCY MEDICINE

## 2025-01-29 PROCEDURE — 80053 COMPREHEN METABOLIC PANEL: CPT | Performed by: FAMILY MEDICINE

## 2025-01-29 PROCEDURE — 84484 ASSAY OF TROPONIN QUANT: CPT | Performed by: EMERGENCY MEDICINE

## 2025-01-29 PROCEDURE — 85014 HEMATOCRIT: CPT | Performed by: EMERGENCY MEDICINE

## 2025-01-29 PROCEDURE — 96367 TX/PROPH/DG ADDL SEQ IV INF: CPT

## 2025-01-29 PROCEDURE — 85610 PROTHROMBIN TIME: CPT | Performed by: EMERGENCY MEDICINE

## 2025-01-29 PROCEDURE — 76705 ECHO EXAM OF ABDOMEN: CPT

## 2025-01-29 PROCEDURE — 85025 COMPLETE CBC W/AUTO DIFF WBC: CPT | Performed by: EMERGENCY MEDICINE

## 2025-01-29 PROCEDURE — 71260 CT THORAX DX C+: CPT

## 2025-01-29 PROCEDURE — 99291 CRITICAL CARE FIRST HOUR: CPT | Mod: 25 | Performed by: EMERGENCY MEDICINE

## 2025-01-29 PROCEDURE — 84484 ASSAY OF TROPONIN QUANT: CPT | Performed by: FAMILY MEDICINE

## 2025-01-29 RX ORDER — ACETAMINOPHEN 500 MG
1000 TABLET ORAL ONCE
Status: COMPLETED | OUTPATIENT
Start: 2025-01-29 | End: 2025-01-29

## 2025-01-29 RX ORDER — IPRATROPIUM BROMIDE AND ALBUTEROL SULFATE 2.5; .5 MG/3ML; MG/3ML
3 SOLUTION RESPIRATORY (INHALATION) ONCE
Status: COMPLETED | OUTPATIENT
Start: 2025-01-29 | End: 2025-01-29

## 2025-01-29 RX ORDER — CEFTRIAXONE 1 G/1
1 INJECTION, POWDER, FOR SOLUTION INTRAMUSCULAR; INTRAVENOUS EVERY 24 HOURS
Status: DISCONTINUED | OUTPATIENT
Start: 2025-01-29 | End: 2025-01-30 | Stop reason: HOSPADM

## 2025-01-29 RX ORDER — IOPAMIDOL 755 MG/ML
118 INJECTION, SOLUTION INTRAVASCULAR ONCE
Status: COMPLETED | OUTPATIENT
Start: 2025-01-29 | End: 2025-01-29

## 2025-01-29 RX ADMIN — CEFTRIAXONE SODIUM 1 G: 1 INJECTION, POWDER, FOR SOLUTION INTRAMUSCULAR; INTRAVENOUS at 22:53

## 2025-01-29 RX ADMIN — IOPAMIDOL 100 ML: 755 INJECTION, SOLUTION INTRAVENOUS at 21:17

## 2025-01-29 RX ADMIN — ACETAMINOPHEN 1000 MG: 500 TABLET, FILM COATED ORAL at 21:51

## 2025-01-29 RX ADMIN — SODIUM CHLORIDE 69 ML: 9 INJECTION, SOLUTION INTRAVENOUS at 21:18

## 2025-01-29 RX ADMIN — IPRATROPIUM BROMIDE AND ALBUTEROL SULFATE 3 ML: .5; 3 SOLUTION RESPIRATORY (INHALATION) at 23:24

## 2025-01-29 RX ADMIN — AZITHROMYCIN MONOHYDRATE 500 MG: 500 INJECTION, POWDER, LYOPHILIZED, FOR SOLUTION INTRAVENOUS at 23:41

## 2025-01-29 ASSESSMENT — COLUMBIA-SUICIDE SEVERITY RATING SCALE - C-SSRS
1. IN THE PAST MONTH, HAVE YOU WISHED YOU WERE DEAD OR WISHED YOU COULD GO TO SLEEP AND NOT WAKE UP?: YES
2. HAVE YOU ACTUALLY HAD ANY THOUGHTS OF KILLING YOURSELF IN THE PAST MONTH?: NO
6. HAVE YOU EVER DONE ANYTHING, STARTED TO DO ANYTHING, OR PREPARED TO DO ANYTHING TO END YOUR LIFE?: NO

## 2025-01-29 ASSESSMENT — ACTIVITIES OF DAILY LIVING (ADL)
ADLS_ACUITY_SCORE: 53

## 2025-01-30 ENCOUNTER — HOSPITAL ENCOUNTER (INPATIENT)
Facility: CLINIC | Age: 70
DRG: 193 | End: 2025-01-30
Attending: STUDENT IN AN ORGANIZED HEALTH CARE EDUCATION/TRAINING PROGRAM | Admitting: PEDIATRICS
Payer: COMMERCIAL

## 2025-01-30 ENCOUNTER — APPOINTMENT (OUTPATIENT)
Dept: CARDIOLOGY | Facility: CLINIC | Age: 70
DRG: 193 | End: 2025-01-30
Attending: PEDIATRICS
Payer: COMMERCIAL

## 2025-01-30 ENCOUNTER — DOCUMENTATION ONLY (OUTPATIENT)
Dept: ANTICOAGULATION | Facility: CLINIC | Age: 70
End: 2025-01-30
Payer: COMMERCIAL

## 2025-01-30 VITALS
BODY MASS INDEX: 38.93 KG/M2 | SYSTOLIC BLOOD PRESSURE: 144 MMHG | HEART RATE: 117 BPM | OXYGEN SATURATION: 97 % | DIASTOLIC BLOOD PRESSURE: 108 MMHG | HEIGHT: 66 IN | RESPIRATION RATE: 27 BRPM | TEMPERATURE: 98.1 F

## 2025-01-30 DIAGNOSIS — E87.3 METABOLIC ALKALOSIS: ICD-10-CM

## 2025-01-30 DIAGNOSIS — G47.33 OSA (OBSTRUCTIVE SLEEP APNEA): Chronic | ICD-10-CM

## 2025-01-30 DIAGNOSIS — N17.9 AKI (ACUTE KIDNEY INJURY): ICD-10-CM

## 2025-01-30 DIAGNOSIS — J10.1 INFLUENZA A: Primary | ICD-10-CM

## 2025-01-30 DIAGNOSIS — R65.10 SIRS (SYSTEMIC INFLAMMATORY RESPONSE SYNDROME) (H): ICD-10-CM

## 2025-01-30 DIAGNOSIS — I48.92 ATRIAL FIBRILLATION/FLUTTER (H): ICD-10-CM

## 2025-01-30 DIAGNOSIS — J96.01 ACUTE RESPIRATORY FAILURE WITH HYPOXIA AND HYPERCAPNIA (H): ICD-10-CM

## 2025-01-30 DIAGNOSIS — Z95.1 S/P CABG X 3: ICD-10-CM

## 2025-01-30 DIAGNOSIS — I48.91 ATRIAL FIBRILLATION/FLUTTER (H): ICD-10-CM

## 2025-01-30 DIAGNOSIS — R78.81 BACTEREMIA: ICD-10-CM

## 2025-01-30 DIAGNOSIS — J96.02 ACUTE RESPIRATORY FAILURE WITH HYPOXIA AND HYPERCAPNIA (H): ICD-10-CM

## 2025-01-30 DIAGNOSIS — E87.6 HYPOKALEMIA: ICD-10-CM

## 2025-01-30 DIAGNOSIS — I25.10 CORONARY ARTERY DISEASE INVOLVING NATIVE CORONARY ARTERY OF NATIVE HEART WITHOUT ANGINA PECTORIS: ICD-10-CM

## 2025-01-30 DIAGNOSIS — I50.21 ACUTE SYSTOLIC HEART FAILURE (H): ICD-10-CM

## 2025-01-30 PROBLEM — R74.01 ELEVATION OF LEVELS OF LIVER TRANSAMINASE LEVELS: Status: ACTIVE | Noted: 2025-01-30

## 2025-01-30 PROBLEM — D69.1 PLATELET DYSFUNCTION DUE TO ASPIRIN (H): Status: ACTIVE | Noted: 2025-01-30

## 2025-01-30 PROBLEM — E88.09 HYPOALBUMINEMIA: Status: ACTIVE | Noted: 2025-01-30

## 2025-01-30 PROBLEM — R79.89 ELEVATED LACTIC ACID LEVEL: Status: ACTIVE | Noted: 2025-01-30

## 2025-01-30 PROBLEM — Z95.5 HISTORY OF CORONARY ARTERY STENT PLACEMENT: Status: ACTIVE | Noted: 2025-01-30

## 2025-01-30 PROBLEM — D64.9 CHRONIC ANEMIA: Status: ACTIVE | Noted: 2025-01-30

## 2025-01-30 PROBLEM — I71.40 AAA (ABDOMINAL AORTIC ANEURYSM): Status: ACTIVE | Noted: 2021-06-25

## 2025-01-30 PROBLEM — D68.32 WARFARIN-INDUCED COAGULOPATHY: Status: ACTIVE | Noted: 2025-01-30

## 2025-01-30 PROBLEM — T45.515A WARFARIN-INDUCED COAGULOPATHY: Status: ACTIVE | Noted: 2025-01-30

## 2025-01-30 PROBLEM — I25.2 HISTORY OF MI (MYOCARDIAL INFARCTION): Status: ACTIVE | Noted: 2025-01-30

## 2025-01-30 PROBLEM — R79.89 ELEVATED TROPONIN: Status: ACTIVE | Noted: 2025-01-30

## 2025-01-30 PROBLEM — T39.015A PLATELET DYSFUNCTION DUE TO ASPIRIN (H): Status: ACTIVE | Noted: 2025-01-30

## 2025-01-30 PROBLEM — R79.89 ELEVATED BRAIN NATRIURETIC PEPTIDE (BNP) LEVEL: Status: ACTIVE | Noted: 2025-01-30

## 2025-01-30 PROBLEM — Z98.890 S/P CAROTID ENDARTERECTOMY: Status: ACTIVE | Noted: 2022-04-08

## 2025-01-30 PROBLEM — E11.51 TYPE 2 DIABETES MELLITUS WITH DIABETIC PERIPHERAL ANGIOPATHY WITHOUT GANGRENE, WITHOUT LONG-TERM CURRENT USE OF INSULIN (H): Status: ACTIVE | Noted: 2024-10-31

## 2025-01-30 LAB
ALBUMIN SERPL BCG-MCNC: 3.4 G/DL (ref 3.5–5.2)
ALBUMIN SERPL BCG-MCNC: 3.7 G/DL (ref 3.5–5.2)
ALP SERPL-CCNC: 69 U/L (ref 40–150)
ALP SERPL-CCNC: 73 U/L (ref 40–150)
ALT SERPL W P-5'-P-CCNC: 176 U/L (ref 0–70)
ALT SERPL W P-5'-P-CCNC: 195 U/L (ref 0–70)
ANION GAP SERPL CALCULATED.3IONS-SCNC: 11 MMOL/L (ref 7–15)
AST SERPL W P-5'-P-CCNC: 203 U/L (ref 0–45)
AST SERPL W P-5'-P-CCNC: 249 U/L (ref 0–45)
ATRIAL RATE - MUSE: 230 BPM
BACTERIA BLD CULT: ABNORMAL
BASOPHILS # BLD AUTO: 0 10E3/UL (ref 0–0.2)
BASOPHILS NFR BLD AUTO: 0 %
BILIRUB DIRECT SERPL-MCNC: 0.27 MG/DL (ref 0–0.3)
BILIRUB SERPL-MCNC: 0.4 MG/DL
BILIRUB SERPL-MCNC: 0.6 MG/DL
BUN SERPL-MCNC: 39.3 MG/DL (ref 8–23)
CALCIUM SERPL-MCNC: 8.3 MG/DL (ref 8.8–10.4)
CHLORIDE SERPL-SCNC: 102 MMOL/L (ref 98–107)
CREAT SERPL-MCNC: 1.28 MG/DL (ref 0.67–1.17)
DIASTOLIC BLOOD PRESSURE - MUSE: NORMAL MMHG
EGFRCR SERPLBLD CKD-EPI 2021: 61 ML/MIN/1.73M2
ENTEROCOCCUS FAECALIS: NOT DETECTED
ENTEROCOCCUS FAECIUM: NOT DETECTED
EOSINOPHIL # BLD AUTO: 0 10E3/UL (ref 0–0.7)
EOSINOPHIL NFR BLD AUTO: 0 %
ERYTHROCYTE [DISTWIDTH] IN BLOOD BY AUTOMATED COUNT: 18.8 % (ref 10–15)
GLUCOSE BLDC GLUCOMTR-MCNC: 122 MG/DL (ref 70–99)
GLUCOSE BLDC GLUCOMTR-MCNC: 96 MG/DL (ref 70–99)
GLUCOSE SERPL-MCNC: 95 MG/DL (ref 70–99)
HCO3 SERPL-SCNC: 27 MMOL/L (ref 22–29)
HCT VFR BLD AUTO: 37.8 % (ref 40–53)
HGB BLD-MCNC: 11.1 G/DL (ref 13.3–17.7)
IMM GRANULOCYTES # BLD: 0 10E3/UL
IMM GRANULOCYTES NFR BLD: 1 %
INR PPP: 1.86 (ref 0.85–1.15)
INTERPRETATION ECG - MUSE: NORMAL
LACTATE SERPL-SCNC: 1.2 MMOL/L (ref 0.7–2)
LIPASE SERPL-CCNC: 28 U/L (ref 13–60)
LISTERIA SPECIES (DETECTED/NOT DETECTED): NOT DETECTED
LVEF ECHO: NORMAL
LYMPHOCYTES # BLD AUTO: 0.8 10E3/UL (ref 0.8–5.3)
LYMPHOCYTES NFR BLD AUTO: 9 %
MCH RBC QN AUTO: 24 PG (ref 26.5–33)
MCHC RBC AUTO-ENTMCNC: 29.4 G/DL (ref 31.5–36.5)
MCV RBC AUTO: 82 FL (ref 78–100)
MONOCYTES # BLD AUTO: 1.1 10E3/UL (ref 0–1.3)
MONOCYTES NFR BLD AUTO: 12 %
NEUTROPHILS # BLD AUTO: 6.9 10E3/UL (ref 1.6–8.3)
NEUTROPHILS NFR BLD AUTO: 78 %
NRBC # BLD AUTO: 0 10E3/UL
NRBC BLD AUTO-RTO: 0 /100
P AXIS - MUSE: 126 DEGREES
PLATELET # BLD AUTO: 232 10E3/UL (ref 150–450)
POTASSIUM SERPL-SCNC: 4.5 MMOL/L (ref 3.4–5.3)
PR INTERVAL - MUSE: NORMAL MS
PROT SERPL-MCNC: 6.4 G/DL (ref 6.4–8.3)
PROT SERPL-MCNC: 6.7 G/DL (ref 6.4–8.3)
QRS DURATION - MUSE: 116 MS
QT - MUSE: 402 MS
QTC - MUSE: 556 MS
R AXIS - MUSE: 56 DEGREES
RBC # BLD AUTO: 4.62 10E6/UL (ref 4.4–5.9)
SODIUM SERPL-SCNC: 140 MMOL/L (ref 135–145)
STAPHYLOCOCCUS AUREUS: NOT DETECTED
STAPHYLOCOCCUS EPIDERMIDIS: NOT DETECTED
STAPHYLOCOCCUS LUGDUNENSIS: NOT DETECTED
STAPHYLOCOCCUS SPECIES: DETECTED
STREPTOCOCCUS AGALACTIAE: NOT DETECTED
STREPTOCOCCUS ANGINOSUS GROUP: NOT DETECTED
STREPTOCOCCUS PNEUMONIAE: NOT DETECTED
STREPTOCOCCUS PYOGENES: NOT DETECTED
STREPTOCOCCUS SPECIES: NOT DETECTED
SYSTOLIC BLOOD PRESSURE - MUSE: NORMAL MMHG
T AXIS - MUSE: 35 DEGREES
VENTRICULAR RATE- MUSE: 115 BPM
WBC # BLD AUTO: 8.8 10E3/UL (ref 4–11)

## 2025-01-30 PROCEDURE — 82040 ASSAY OF SERUM ALBUMIN: CPT | Performed by: EMERGENCY MEDICINE

## 2025-01-30 PROCEDURE — 83690 ASSAY OF LIPASE: CPT | Performed by: EMERGENCY MEDICINE

## 2025-01-30 PROCEDURE — 93306 TTE W/DOPPLER COMPLETE: CPT | Mod: 26 | Performed by: INTERNAL MEDICINE

## 2025-01-30 PROCEDURE — 82248 BILIRUBIN DIRECT: CPT | Performed by: EMERGENCY MEDICINE

## 2025-01-30 PROCEDURE — 36415 COLL VENOUS BLD VENIPUNCTURE: CPT | Performed by: EMERGENCY MEDICINE

## 2025-01-30 PROCEDURE — 258N000003 HC RX IP 258 OP 636: Performed by: PEDIATRICS

## 2025-01-30 PROCEDURE — 99418 PROLNG IP/OBS E/M EA 15 MIN: CPT | Performed by: PEDIATRICS

## 2025-01-30 PROCEDURE — 36415 COLL VENOUS BLD VENIPUNCTURE: CPT | Performed by: FAMILY MEDICINE

## 2025-01-30 PROCEDURE — 250N000013 HC RX MED GY IP 250 OP 250 PS 637: Performed by: PEDIATRICS

## 2025-01-30 PROCEDURE — 96361 HYDRATE IV INFUSION ADD-ON: CPT

## 2025-01-30 PROCEDURE — 85610 PROTHROMBIN TIME: CPT | Performed by: FAMILY MEDICINE

## 2025-01-30 PROCEDURE — 250N000013 HC RX MED GY IP 250 OP 250 PS 637: Performed by: FAMILY MEDICINE

## 2025-01-30 PROCEDURE — 93306 TTE W/DOPPLER COMPLETE: CPT

## 2025-01-30 PROCEDURE — 258N000003 HC RX IP 258 OP 636: Performed by: EMERGENCY MEDICINE

## 2025-01-30 PROCEDURE — 83605 ASSAY OF LACTIC ACID: CPT | Performed by: EMERGENCY MEDICINE

## 2025-01-30 PROCEDURE — 120N000001 HC R&B MED SURG/OB

## 2025-01-30 PROCEDURE — 250N000011 HC RX IP 250 OP 636: Performed by: PEDIATRICS

## 2025-01-30 PROCEDURE — 99223 1ST HOSP IP/OBS HIGH 75: CPT | Performed by: PEDIATRICS

## 2025-01-30 RX ORDER — ACETAMINOPHEN 325 MG/1
650 TABLET ORAL EVERY 4 HOURS PRN
Status: DISCONTINUED | OUTPATIENT
Start: 2025-01-30 | End: 2025-01-30 | Stop reason: HOSPADM

## 2025-01-30 RX ORDER — CALCIUM CARBONATE 500 MG/1
1000 TABLET, CHEWABLE ORAL 4 TIMES DAILY PRN
Status: DISCONTINUED | OUTPATIENT
Start: 2025-01-30 | End: 2025-01-30 | Stop reason: HOSPADM

## 2025-01-30 RX ORDER — ONDANSETRON 4 MG/1
4 TABLET, ORALLY DISINTEGRATING ORAL EVERY 6 HOURS PRN
Status: DISCONTINUED | OUTPATIENT
Start: 2025-01-30 | End: 2025-02-10 | Stop reason: HOSPADM

## 2025-01-30 RX ORDER — OSELTAMIVIR PHOSPHATE 75 MG/1
75 CAPSULE ORAL 2 TIMES DAILY
Status: DISCONTINUED | OUTPATIENT
Start: 2025-01-30 | End: 2025-02-02

## 2025-01-30 RX ORDER — FUROSEMIDE 10 MG/ML
40 INJECTION INTRAMUSCULAR; INTRAVENOUS
Status: DISCONTINUED | OUTPATIENT
Start: 2025-01-30 | End: 2025-01-31

## 2025-01-30 RX ORDER — METOPROLOL TARTRATE 50 MG
100 TABLET ORAL 2 TIMES DAILY
Status: DISCONTINUED | OUTPATIENT
Start: 2025-01-30 | End: 2025-01-30 | Stop reason: HOSPADM

## 2025-01-30 RX ORDER — ATORVASTATIN CALCIUM 40 MG/1
80 TABLET, FILM COATED ORAL DAILY
Status: DISCONTINUED | OUTPATIENT
Start: 2025-01-31 | End: 2025-02-10 | Stop reason: HOSPADM

## 2025-01-30 RX ORDER — ONDANSETRON 4 MG/1
4 TABLET, ORALLY DISINTEGRATING ORAL EVERY 6 HOURS PRN
Status: DISCONTINUED | OUTPATIENT
Start: 2025-01-30 | End: 2025-01-30 | Stop reason: HOSPADM

## 2025-01-30 RX ORDER — PANTOPRAZOLE SODIUM 40 MG/1
40 TABLET, DELAYED RELEASE ORAL EVERY MORNING
Status: DISCONTINUED | OUTPATIENT
Start: 2025-01-31 | End: 2025-02-10 | Stop reason: HOSPADM

## 2025-01-30 RX ORDER — ASPIRIN 81 MG/1
81 TABLET, CHEWABLE ORAL DAILY
Status: DISCONTINUED | OUTPATIENT
Start: 2025-01-31 | End: 2025-02-10 | Stop reason: HOSPADM

## 2025-01-30 RX ORDER — PROCHLORPERAZINE MALEATE 5 MG/1
5 TABLET ORAL EVERY 6 HOURS PRN
Status: DISCONTINUED | OUTPATIENT
Start: 2025-01-30 | End: 2025-02-10 | Stop reason: HOSPADM

## 2025-01-30 RX ORDER — DEXTROSE MONOHYDRATE 25 G/50ML
25-50 INJECTION, SOLUTION INTRAVENOUS
Status: DISCONTINUED | OUTPATIENT
Start: 2025-01-30 | End: 2025-02-10 | Stop reason: HOSPADM

## 2025-01-30 RX ORDER — GUAIFENESIN 200 MG/10ML
200 LIQUID ORAL EVERY 4 HOURS PRN
Status: DISCONTINUED | OUTPATIENT
Start: 2025-01-30 | End: 2025-02-10 | Stop reason: HOSPADM

## 2025-01-30 RX ORDER — ACETAMINOPHEN 325 MG/1
650 TABLET ORAL EVERY 8 HOURS PRN
Status: DISCONTINUED | OUTPATIENT
Start: 2025-01-30 | End: 2025-02-10 | Stop reason: HOSPADM

## 2025-01-30 RX ORDER — CALCIUM CARBONATE 500 MG/1
1000 TABLET, CHEWABLE ORAL 4 TIMES DAILY PRN
Status: DISCONTINUED | OUTPATIENT
Start: 2025-01-30 | End: 2025-02-10 | Stop reason: HOSPADM

## 2025-01-30 RX ORDER — LIDOCAINE 40 MG/G
CREAM TOPICAL
Status: DISCONTINUED | OUTPATIENT
Start: 2025-01-30 | End: 2025-02-10 | Stop reason: HOSPADM

## 2025-01-30 RX ORDER — SODIUM CHLORIDE 9 MG/ML
INJECTION, SOLUTION INTRAVENOUS CONTINUOUS
Status: DISCONTINUED | OUTPATIENT
Start: 2025-01-30 | End: 2025-01-30 | Stop reason: HOSPADM

## 2025-01-30 RX ORDER — METOPROLOL TARTRATE 100 MG/1
100 TABLET ORAL 2 TIMES DAILY
Status: DISCONTINUED | OUTPATIENT
Start: 2025-01-30 | End: 2025-01-31

## 2025-01-30 RX ORDER — EZETIMIBE 10 MG/1
10 TABLET ORAL DAILY
Status: DISCONTINUED | OUTPATIENT
Start: 2025-01-31 | End: 2025-02-10 | Stop reason: HOSPADM

## 2025-01-30 RX ORDER — ONDANSETRON 2 MG/ML
4 INJECTION INTRAMUSCULAR; INTRAVENOUS EVERY 6 HOURS PRN
Status: DISCONTINUED | OUTPATIENT
Start: 2025-01-30 | End: 2025-02-10 | Stop reason: HOSPADM

## 2025-01-30 RX ORDER — FUROSEMIDE 20 MG/1
20 TABLET ORAL DAILY
Status: DISCONTINUED | OUTPATIENT
Start: 2025-01-30 | End: 2025-01-30 | Stop reason: HOSPADM

## 2025-01-30 RX ORDER — ACETAMINOPHEN 650 MG/1
650 SUPPOSITORY RECTAL EVERY 4 HOURS PRN
Status: DISCONTINUED | OUTPATIENT
Start: 2025-01-30 | End: 2025-02-10 | Stop reason: HOSPADM

## 2025-01-30 RX ORDER — NICOTINE POLACRILEX 4 MG
15-30 LOZENGE BUCCAL
Status: DISCONTINUED | OUTPATIENT
Start: 2025-01-30 | End: 2025-02-10 | Stop reason: HOSPADM

## 2025-01-30 RX ORDER — ONDANSETRON 2 MG/ML
4 INJECTION INTRAMUSCULAR; INTRAVENOUS EVERY 6 HOURS PRN
Status: DISCONTINUED | OUTPATIENT
Start: 2025-01-30 | End: 2025-01-30 | Stop reason: HOSPADM

## 2025-01-30 RX ADMIN — WARFARIN SODIUM 7.5 MG: 5 TABLET ORAL at 17:38

## 2025-01-30 RX ADMIN — METOPROLOL TARTRATE 100 MG: 100 TABLET, FILM COATED ORAL at 20:56

## 2025-01-30 RX ADMIN — METOPROLOL TARTRATE 100 MG: 50 TABLET, FILM COATED ORAL at 08:50

## 2025-01-30 RX ADMIN — FUROSEMIDE 40 MG: 10 INJECTION, SOLUTION INTRAMUSCULAR; INTRAVENOUS at 16:01

## 2025-01-30 RX ADMIN — SODIUM CHLORIDE 500 ML: 9 INJECTION, SOLUTION INTRAVENOUS at 00:30

## 2025-01-30 RX ADMIN — FUROSEMIDE 20 MG: 20 TABLET ORAL at 08:50

## 2025-01-30 RX ADMIN — VANCOMYCIN HYDROCHLORIDE 1250 MG: 1 INJECTION, POWDER, LYOPHILIZED, FOR SOLUTION INTRAVENOUS at 20:56

## 2025-01-30 RX ADMIN — OSELTAMAVIR PHOSPHATE 75 MG: 75 CAPSULE ORAL at 20:56

## 2025-01-30 ASSESSMENT — ACTIVITIES OF DAILY LIVING (ADL)
ADLS_ACUITY_SCORE: 33
ADLS_ACUITY_SCORE: 53
ADLS_ACUITY_SCORE: 33
ADLS_ACUITY_SCORE: 27
ADLS_ACUITY_SCORE: 27
ADLS_ACUITY_SCORE: 33
ADLS_ACUITY_SCORE: 53
ADLS_ACUITY_SCORE: 53
ADLS_ACUITY_SCORE: 33
ADLS_ACUITY_SCORE: 53
ADLS_ACUITY_SCORE: 33
ADLS_ACUITY_SCORE: 53
ADLS_ACUITY_SCORE: 29
ADLS_ACUITY_SCORE: 33
ADLS_ACUITY_SCORE: 33
ADLS_ACUITY_SCORE: 29
ADLS_ACUITY_SCORE: 53
ADLS_ACUITY_SCORE: 29

## 2025-01-30 NOTE — ED TRIAGE NOTES
Pt states that he got short of breath on Monday, also has chest pain with activity, cough and weakness. Pt's O2 79% in triage     Triage Assessment (Adult)       Row Name 01/29/25 1824          Triage Assessment    Airway WDL WDL        Respiratory WDL    Respiratory WDL X;cough        Peripheral/Neurovascular WDL    Peripheral Neurovascular WDL WDL

## 2025-01-30 NOTE — ED PROVIDER NOTES
Signout note    Signout taken from Dr. Ramirez please see his note for details.  Patient was diagnosed with influenza A and pneumonia.  The patient is hypoxic and requiring 3 L nasal cannula.  Had an elevated lactate that improved with IV fluids.  Has a flutter on Coumadin.  Patient received antibiotics.  Patient had a chest CT that showed gallbladder wall thickening.  Patient was not have any abdominal pain.  Right upper quadrant ultrasound was then obtained which also showed gallbladder wall thickening and sludge.  There is negative Pool sign.  Patient was signed out pending following up on the LFTs.  LFTs show an AST of 249 and ALT of 195.  These were normal the last time it was checked.  I reassessed the patient, and he was sleeping.  He denies any abdominal pain.  He has no tenderness in the abdomen or right upper quadrant specifically.  I discussed the case with Dr. Rodriguez of general surgery.  He stated that given that the patient is not having any pain, he recommends against doing anything at this time.  He states that if the patient were to develop abdominal pain he recommends a HIDA scan.    MD Enrique Lopez Sean, MD  01/30/25 7260

## 2025-01-30 NOTE — ED NOTES
Updated son Emiliano ([phone]) on patient status and transfer to [facility].

## 2025-01-30 NOTE — ED PROVIDER NOTES
History     Chief Complaint   Patient presents with    Cough    Chest Pain    Shortness of Breath     HPI  Roger Bonilla is a 69 year old male past medical history significant for type 2 diabetes coronary artery disease with history of stenting and bypass grafting which was done in 2022 who presents emergency department complaining of fever cough congestion and bodyaches.  Patient states he began having low-grade fevers body aches and a cough on Monday and has been feeling short of breath.  Feels very winded with any activity feels weak.  Was exposed to several grandchildren who ended up having influenza.  Denies headache or visual changes has not achy chest as well as arms and legs.  Has not any nausea vomiting diarrhea denies any bowel or bladder dysfunction has not had any focal numbness weakness any extremity no leg swelling or rash.  Son Coumadin secondary to A-fib a flutter.    Allergies:  Allergies   Allergen Reactions    Nkda [No Known Drug Allergy]        Problem List:    Patient Active Problem List    Diagnosis Date Noted    Type 2 diabetes mellitus with diabetic peripheral angiopathy without gangrene, without long-term current use of insulin (H) 10/31/2024     Priority: Medium    Atrial fibrillation/flutter (H) 04/17/2024     Priority: Medium    Tubular adenoma 01/05/2023     Priority: Medium    S/P CABG (coronary artery bypass graft) 04/08/2022     Priority: Medium     x3      S/P carotid endarterectomy 04/08/2022     Priority: Medium     right      Atherosclerosis of native coronary artery of native heart with stable angina pectoris 03/24/2022     Priority: Medium    Status post coronary angiogram 12/29/2021     Priority: Medium    Epigastric pain 07/19/2021     Priority: Medium    AAA (abdominal aortic aneurysm) 06/25/2021     Priority: Medium     3.3 cm, consider repeat imaging in 2-3 years from 6/2021      Dehydration 06/23/2021     Priority: Medium    Hypomagnesemia 06/23/2021     Priority: Medium     Hypophosphatemia 06/23/2021     Priority: Medium    Atherosclerosis of both carotid arteries 06/20/2019     Priority: Medium    Coronary artery disease involving coronary bypass graft of native heart without angina pectoris 08/04/2017     Priority: Medium    Type 2 diabetes mellitus without complication (H) 10/21/2015     Priority: Medium    PAD (peripheral artery disease) 04/14/2014     Priority: Medium    Benign essential hypertension 08/05/2013     Priority: Medium    Tobacco abuse 11/13/2012     Priority: Medium    Morbid obesity (H) 12/01/2011     Priority: Medium    ABIGAIL (obstructive sleep apnea) 11/28/2011     Priority: Medium     Severe ABIGAIL with significant oxygen desaturations in REM (AHI 87.2, ba desat 66% in REM)  Incomplete titration with remaining events in supine sleep on CPAP 19 cm H2O.  Looked significantly improved during brief trial of bilevel PAP at 16/12 including lateral NREM / REM and supine NREM.        Hyperlipidemia LDL goal <70 09/30/2011     Priority: Medium    Disorder of bursae and tendons in shoulder region 11/10/2005     Priority: Medium     Problem list name updated by automated process. Provider to review      PVD (peripheral vascular disease) 05/01/2001     Priority: Medium     MI -- Angioplasty two stents RCA & OM2  Problem list name updated by automated process. Provider to review          Past Medical History:    Past Medical History:   Diagnosis Date    AAA (abdominal aortic aneurysm) 06/23/2021    Acute kidney injury 06/23/2021    Acute superficial gastritis without hemorrhage 07/19/2021    Atherosclerosis of both carotid arteries 10/27/2008    Coronary artery disease 05/01/2005    Disorder of bursae and tendons in shoulder region 09/01/2005    Hyperlipidemia LDL goal <70 11/18/2002    Morbid obesity (H) 09/16/2013    NSTEMI (non-ST elevated myocardial infarction) (H) 05/2001    Obstructive Sleep Apnea 11/23/2011    Peripheral artery disease 11/23/2012    Pure  hypercholesterolemia 11/18/2002    Tobacco abuse 1973    Tubular adenoma 01/05/2023    Type II or unspecified type diabetes mellitus without mention of complication, not stated as uncontrolled 11/18/2002    Unspecified cardiovascular disease 05/2001    Unspecified essential hypertension 11/18/2002       Past Surgical History:    Past Surgical History:   Procedure Laterality Date    BYPASS GRAFT ARTERY CORONARY N/A 3/24/2022    Procedure: CORONARY ARTERY BYPASS GRAFT x 3 (LIMA - LAD; SV - OM2; SV - PDA) WITH ENDOSCOPIC SAPHENOUS VEIN HARVEST ON BILATERAL LOWER EXTREMITY, AND ON CARDIOPULMONARY PUMP OXYGENATOR  (INTRAOPERATIVE TRANSESOPHAGEAL ECHOCARDIOGRAM BY ANESTHESIOLOGIST);  Surgeon: Glenna Waters MD;  Location:  OR    CARDIAC SURGERY  may 1, 2005    COLONOSCOPY  11/30/2011    Procedure:COLONOSCOPY; Screening Colonoscopy; Surgeon:LUTHER FLORES; Location:[facility]    COLONOSCOPY N/A 1/3/2023    Procedure: COLONOSCOPY with polypectomy;  Surgeon: Angel Skinner MD;  Location: [facility]    CV CORONARY ANGIOGRAM N/A 12/29/2021    Procedure: Coronary Angiogram;  Surgeon: Jonny Moore MD;  Location:  [facility] CARD[facility] CATH LAB    ENDARTERECTOMY CAROTID Right 2/18/2022    Procedure: Right carotid endarterectomy with ELECTROENCEPHALOGRAM(EEG);  Surgeon: Karma Adorno MD;  Location:  OR    ESOPHAGOSCOPY, GASTROSCOPY, DUODENOSCOPY (EGD), COMBINED N/A 7/21/2021    Procedure: ESOPHAGOGASTRODUODENOSCOPY, WITH BIOPSY;  Surgeon: Jeff Cunningham DO;  Location: [facility]    PHACOEMULSIFICATION WITH STANDARD INTRAOCULAR LENS IMPLANT Right 7/28/2021    Procedure: Right eye Cataract Extraction with Implant;  Surgeon: Reyes Valdez MD;  Location: [facility]    PHACOEMULSIFICATION WITH STANDARD INTRAOCULAR LENS IMPLANT Left 8/18/2021    Procedure: left eye Cataract Extraction with Implant;  Surgeon: Reyes Valdez MD;  Location: [facility]    SURGICAL HISTORY OF -       None    VASCULAR  "SURGERY  oct 2013    stent put in leg       Family History:    Family History   Problem Relation Age of Onset    Cerebrovascular Disease Mother     Respiratory Father         emphysema    Cerebrovascular Disease Maternal Grandmother     Alzheimer Disease Maternal Grandfather     Breast Cancer Sister     Arthritis Other         hip fracture    Cancer - colorectal No family hx of     Prostate Cancer No family hx of        Social History:  Marital Status:   [2]  Social History     Tobacco Use    Smoking status: Every Day     Current packs/day: 0.50     Average packs/day: 0.5 packs/day for 53.1 years (26.5 ttl pk-yrs)     Types: Cigarettes     Start date: 1/1/1972     Passive exposure: Never (per pt)    Smokeless tobacco: Never    Tobacco comments:     5-10 cigs daily 11/28/22   Vaping Use    Vaping status: Never Used   Substance Use Topics    Alcohol use: Not Currently     Comment: 2 beers a year    Drug use: No        Medications:    aspirin (ASA) 81 MG chewable tablet  atorvastatin (LIPITOR) 80 MG tablet  ezetimibe (ZETIA) 10 MG tablet  furosemide (LASIX) 40 MG tablet  metFORMIN (GLUCOPHAGE) 1000 MG tablet  metoprolol tartrate (LOPRESSOR) 100 MG tablet  omeprazole (PRILOSEC) 40 MG DR capsule  warfarin ANTICOAGULANT (JANTOVEN ANTICOAGULANT) 5 MG tablet          Review of Systems  As per HPI  Physical Exam   BP: 128/73  Pulse: 116  Temp: 97.7  F (36.5  C)  Resp: 18  Height: 167.6 cm (5' 6\")  SpO2: (!) 79 %      Physical Exam  Vitals and nursing note reviewed.   Constitutional:       Appearance: He is well-developed. He is ill-appearing. He is not toxic-appearing or diaphoretic.      Comments: Mild to moderate respiratory distress.   HENT:      Head: Normocephalic and atraumatic.      Nose: Nose normal.      Mouth/Throat:      Mouth: Mucous membranes are moist.      Pharynx: Oropharynx is clear.   Eyes:      Conjunctiva/sclera: Conjunctivae normal.   Cardiovascular:      Rate and Rhythm: Regular rhythm. " Tachycardia present.   Pulmonary:      Comments: There are faint expiratory wheeze with crackles and rhonchi at bases bilaterally.  Chest:      Chest wall: No tenderness.   Abdominal:      General: Abdomen is flat. Bowel sounds are normal. There is no distension.      Palpations: Abdomen is soft.      Tenderness: There is no abdominal tenderness. There is no right CVA tenderness or left CVA tenderness.   Musculoskeletal:      Cervical back: Normal range of motion and neck supple.      Comments: Moving all extremities well no calf tenderness.  There is mild nonpitting peripheral edema present bilaterally pulses sensation symmetrical.   Skin:     General: Skin is warm and dry.      Capillary Refill: Capillary refill takes less than 2 seconds.      Findings: No rash.   Neurological:      General: No focal deficit present.      Mental Status: He is alert and oriented to person, place, and time.      Cranial Nerves: No cranial nerve deficit.      Gait: Gait normal.      Deep Tendon Reflexes: Reflexes normal.   Psychiatric:         Mood and Affect: Mood normal.         ED Course        Procedures              EKG Interpretation:      Interpreted by Elio Ramirez MD    Symptoms at time of EKG:   Rhythm: atrial flutter  Rate: 110-120  Axis: Normal  Ectopy: none  Conduction: Right bundle branch block.  ST Segments/ T Waves: Non-specific ST-T wave changes  Q Waves: none  Comparison to prior: Previous on 6/4/2024 with atrial flutter with variable AV block.  This is not present now    Clinical Impression: Atrial flutter with bundle branch block and nonspecific ST-T wave abnormalities.            Critical Care time:  was 40 minutes for this patient excluding procedures.  Dyspnea with influenza pneumonia and hypoxia  The patient has signs of sepsis   Sepsis ED evaluation   The patient has signs of sepsis as evidenced by:  1. Presence of 2 SIRS criteria, suspected infection, AND  2. Organ dysfunction: Lactic Acidosis with  value >2.0 due to sepsis    Time zero: as this was the time when  1050p resulted, raising suspicion for bacterial infection and Lactate was resulted and the level was greater than 2.    Lactic Acid Results:  Recent Labs   Lab Test 01/30/25  0133 01/29/25  2248 03/24/22  1438   LACT 1.2 2.1* 0.9       3 Hour Bundle 6 Hour Bundle (Reassessment)   Blood Cultures before IV Antibiotics: Yes  Antibiotics given: see below  Prehospital fluid volume (mL):                     Total fluids given (ED +Pre-hospital):  The patient responded to a lesser volume of IV fluids. The initial volume ordered was 1500 mL.  Patient has history of CHF this is an proBNP is elevated and therefore control amount is not given   Repeat Lactic Acid Level: Ordered by reflex for 2 hours after initial lactic acid collection.  Vasopressors:   Repeat perfusion exam: I attest to having performed a repeat sepsis exam and assessment of perfusion at 1am   BMI Readings from Last 1 Encounters:   01/30/25 37.54 kg/m        Patient given ceftriaxone and azithromycin for possible pneumonia               Results for orders placed or performed during the hospital encounter of 01/29/25 (from the past 24 hours)   Influenza A/B, RSV and SARS-CoV2 PCR (COVID-19) Nose    Specimen: Nose; Swab   Result Value Ref Range    Influenza A PCR Positive (A) Negative    Influenza B PCR Negative Negative    RSV PCR Negative Negative    SARS CoV2 PCR Negative Negative    Narrative    Testing was performed using the Xpert Xpress CoV2/Flu/RSV Assay on the [employer] GeneXpert Instrument. This test should be ordered for the detection of SARS-CoV2, influenza, and RSV viruses in individuals with signs and symptoms of respiratory tract infection. This test is for in vitro diagnostic use under the [place] FDA for laboratories certified under CLIA to perform high or moderate complexity testing. This test has been [place] FDA cleared. A negative result does not rule out the presence of PCR inhibitors  in the specimen or target RNA in concentration below the limit of detection for the assay. If only one viral target is positive but coinfection with multiple targets is suspected, the sample should be re-tested with another FDA cleared, approved, or authorized test, if coninfection would change clinical management. This test was validated by the [facility]. These laboratories are certified under the Clinical Laboratory Improvement Amendments of 1988 (CLIA-88) as qualified to perfom high complexity laboratory testing.   CBC with Platelets & Differential    Narrative    The following orders were created for panel order CBC with Platelets & Differential.  Procedure                               Abnormality         Status                     ---------                               -----------         ------                     CBC with platelets and d...[[id number]]  Abnormal            Final result                 Please view results for these tests on the individual orders.   Basic metabolic panel   Result Value Ref Range    Sodium 141 135 - 145 mmol/L    Potassium 4.9 3.4 - 5.3 mmol/L    Chloride 100 98 - 107 mmol/L    Carbon Dioxide (CO2) 26 22 - 29 mmol/L    Anion Gap 15 7 - 15 mmol/L    Urea Nitrogen 41.7 (H) 8.0 - 23.0 mg/dL    Creatinine 1.39 (H) 0.67 - 1.17 mg/dL    GFR Estimate 55 (L) >60 mL/min/1.73m2    Calcium 9.4 8.8 - 10.4 mg/dL    Glucose 114 (H) 70 - 99 mg/dL   Troponin T, High Sensitivity   Result Value Ref Range    Troponin T, High Sensitivity 60 (H) <=22 ng/L   [place] Draw    Narrative    The following orders were created for panel order [place] Draw.  Procedure                               Abnormality         Status                     ---------                               -----------         ------                     Extra Blue Top Tube[[id number]]                              Final result               Extra Heparinized Syringe[[id number]]                        Final result                  Please view results for these tests on the individual orders.   CBC with platelets and differential   Result Value Ref Range    WBC Count 8.4 4.0 - 11.0 10e3/uL    RBC Count 5.18 4.40 - 5.90 10e6/uL    Hemoglobin 12.4 (L) 13.3 - 17.7 g/dL    Hematocrit 41.1 40.0 - 53.0 %    MCV 79 78 - 100 fL    MCH 23.9 (L) 26.5 - 33.0 pg    MCHC 30.2 (L) 31.5 - 36.5 g/dL    RDW 19.0 (H) 10.0 - 15.0 %    Platelet Count 260 150 - 450 10e3/uL    % Neutrophils 76 %    % Lymphocytes 8 %    % Monocytes 15 %    % Eosinophils 0 %    % Basophils 0 %    % Immature Granulocytes 1 %    NRBCs per 100 WBC 0 <1 /100    Absolute Neutrophils 6.4 1.6 - 8.3 10e3/uL    Absolute Lymphocytes 0.7 (L) 0.8 - 5.3 10e3/uL    Absolute Monocytes 1.3 0.0 - 1.3 10e3/uL    Absolute Eosinophils 0.0 0.0 - 0.7 10e3/uL    Absolute Basophils 0.0 0.0 - 0.2 10e3/uL    Absolute Immature Granulocytes 0.0 <=0.4 10e3/uL    Absolute NRBCs 0.0 10e3/uL   Extra Blue Top Tube   Result Value Ref Range    Hold Specimen JIC    Extra Heparinized Syringe   Result Value Ref Range    Hold Specimen         Medications - No data to display    Assessments & Plan (with Medical Decision Making) records were reviewed including past medical history medications and allergies.  Office visit with cardiology was reviewed.  This was on 11/19/2024.  Patient is on Coumadin for atrial fibs atrial flutter.  EKG revealed an atrial flutter/fib at 115 bpm.  This is no significant change from previous EKG.  Labs were obtained I independently reviewed and interpreted labs.  Patient's white count was 8.4 hemoglobin 12.4 platelet count was 260.  Influenza A was positive.  His metabolic panel with a creatinine elevated from baseline of 1.39 with a BUN of 41.7.  GFR 55 glucose 114.  INR is 1.91.  Patient is D-dimer is within normal limits age related at 0.52.  Initial troponin 62nd troponin 58.  proBNP significantly elevated at 20,000.  A CT scan was obtained of the chest with contrast.   Independently reviewed this and this revealed geographic groundglass attenuation throughout both lungs.  There is bilateral bronchial wall thickening focal nodular opacities within the perihilar aspect of the right lower lobe these findings likely represent atypical pneumonia with bronchial inflammation.  There appears to be wall thickening in the visualized gallbladder and a gallbladder ultrasound was recommended.  This was ordered.  Due to the pneumonia patient blood cultures drawn he was given a fluid bolus due to the elevated BUN and no evidence of fluid overload.  I ordered ceftriaxone and Zithromax to cover for the pneumonia.  I did add a lactic acid which was 2.1.  He received 500 mL from the CT scan and another 500 bolus along with the IV fluids he got about 1300 mL.  With a BNP of 20,000 I did not want want a give any further fluids.  Patient's elevated troponin more than likely due to stress reaction of influenza and hypoxia.  I did recheck patient's lactic acid after fluids and he was at 1.2.  I have treated this as a possible sepsis but lactic acid likely secondary to hypoxia and stress.  Ultrasound was obtained and revealed a thickened gallbladder wall with 6 mm with sludge in the gallbladder patient is not tender in the right upper quadrant.  Hepatic panel and lipase were added.  I am going to have patient followed up with Dr. Nunez to follow-up on the labs and for further management of the patient due to his hypoxia and elevated BNP I feel an echo is warranted in the morning.  If liver functions are elevated I will need to talk to general surgery for consideration of laparoscopic cholecystectomy at some point.  Hopefully a bed will be available in the morning to admit the patient.  Findings and plan were discussed with patient in detail on reexamination he is feeling much better he is able to lie down and talks in full sentences he received a DuoNeb and we will keep doing this as needed.  He is still  having atrial flutter present between 100-115.  All questions were answered and he feels comfortable with this plan at this time.     I have reviewed the nursing notes.    I have reviewed the findings, diagnosis, plan and need for follow up with the patient.         New Prescriptions    No medications on file       Final diagnoses:   Dyspnea, unspecified type   Influenza A   Pneumonia of right lower lobe due to infectious organism   Atrial flutter with rapid ventricular response (H)   Cholecystitis - possible       1/29/2025   [facility] EMERGENCY DEPT       Elio Ramirez MD  01/30/25 2215

## 2025-01-30 NOTE — PROGRESS NOTES
ANTICOAGULATION  MANAGEMENT: Discharge Review    Roger Bonilla chart reviewed for anticoagulation continuity of care    Emergency room visit on 1/29/25 for Dyspnea, influenza A, pneumonia of right lower lobe.    Discharge disposition: Transferred hospitals to [facility]    Results:    Recent labs: (last 7 days)     01/29/25  1853 01/30/25  0855   INR 1.91* 1.86*     Anticoagulation inpatient management:     not applicable     Anticoagulation discharge instructions:     Warfarin dosing: home regimen continued   Bridging: No   INR goal change: No      Medication changes affecting anticoagulation: Yes: patient given ceftriaxone and zithromax    Additional factors affecting anticoagulation: No     PLAN     No adjustment to anticoagulation plan needed    Patient not contacted  [facility] will resume monitoring once discharged from the hospital    No adjustment to Anticoagulation Calendar was required    Kori Ames RN  1/30/2025  Anticoagulation Clinic  [facility] for routing messages: tila LARA  [facility] patient phone line: [phone]

## 2025-01-30 NOTE — PHARMACY-ANTICOAGULATION SERVICE
Clinical Pharmacy - Warfarin Dosing Consult     Pharmacy has been consulted to manage this patient s warfarin therapy.  Indication: Atrial Fibrillation  Therapy Goal: INR 2-3  OP Anticoag Clinic: [facility]  Warfarin Prior to Admission: Yes  Warfarin PTA Regimen: 5 mg every Mon, Wed, Fri; 7.5 mg all other days  Recent documented change in oral intake/nutrition: Unknown  Dose Comments: Dose recently increased during [facility] visit on 1/21/25    INR   Date Value Ref Range Status   01/30/2025 1.86 (H) 0.85 - 1.15 Final   01/29/2025 1.91 (H) 0.85 - 1.15 Final       Recommend warfarin 7.5 mg today.  Pharmacy will monitor Roger A Ring daily and order warfarin doses to achieve specified goal.      Please contact pharmacy as soon as possible if the warfarin needs to be held for a procedure or if the warfarin goals change.

## 2025-01-30 NOTE — H&P
[facility]    History and Physical - Hospitalist Service       Date of Admission:  1/30/2025    Assessment & Plan      Roger Bonilla is a 69 year old male with chronic atrial fibrillation/flutter, CAD with history of MI with coronary artery stents and previous CABG, type 2 diabetes, hypertension, hyperlipidemia, PAD with previous carotid endarterectomy and AAA, chronic tobacco use which is ongoing, BAIGAIL, chronic anemia, and obesity who presented to ER on 1/29/2025 after onset of nonproductive cough, shortness of breath, malaise, fatigue, and bodyaches on January 27.  In the course of ER evaluation, he was severely hypoxic necessitating initiation of supplemental oxygen concerning for acute hypoxic respiratory failure, and he was tachycardic and tachypneic concerning for SIRS.  EKG and cardiac monitoring demonstrated atrial fibrillation and flutter.  He tested positive for influenza A with radiographic findings suspicious for lower respiratory tract infection including atypical pneumonia and possibly bronchitis.  Lab test results were abnormal including significant elevation of BNP, increase in creatinine from baseline concerning for acute kidney injury, mildly elevated troponin, and elevated lactic acid level.  He presents with clinical signs of volume overload concerning for acute heart failure which may be contributing to acute respiratory failure.  He is at high risk for an adverse outcome including worsening tachyarrhythmia, heart failure, and respiratory failure, so hospitalization is considered medically necessary.  Based on the presently available information, hospitalization for at least 2 midnights is anticipated.    Principal Problem:    Influenza A  Active Problems:    Atrial fibrillation/flutter (H)    Acute hypoxic respiratory failure (H)    SIRS (systemic inflammatory response syndrome) (H)    Hyperlipidemia LDL goal <70    ABIGAIL (obstructive sleep apnea)    Benign essential  hypertension    PAD (peripheral artery disease)    DYLAN (acute kidney injury)    S/P CABG (coronary artery bypass graft)    S/P carotid endarterectomy    Type 2 diabetes mellitus with diabetic peripheral angiopathy without gangrene, without long-term current use of insulin (H)    Elevated brain natriuretic peptide (BNP) level    Elevated lactic acid level    Elevation of levels of liver transaminase levels    Hypoalbuminemia    Chronic anemia    Elevated troponin    Warfarin-induced coagulopathy    Platelet dysfunction due to aspirin (H)    Morbid obesity (H)    Tobacco abuse    Coronary artery disease involving native coronary artery of native heart without angina pectoris    AAA (abdominal aortic aneurysm)    History of MI (myocardial infarction)    History of coronary artery stent placement    Influenza A with acute hypoxic respiratory failure and SIRS  Clinical presentation and test results are suspicious for influenza A lower respiratory tract infection.  Clinical presentation and severe hypoxia is also concerning for acute hypoxic respiratory failure likely due at least in part to influenza A infection.  Presentation with tachycardia and tachypnea is concerning for SIRS although these could be due to respiratory failure and atrial fibrillation.  Although he has been ill for several days, he has worsened and is now hospitalized for severe complications.  -Admit to inpatient  -Start Tamiflu to complete 5-day treatment course  -Titrate oxygen supplementation including escalation to high flow nasal cannula if necessary to keep saturations 90% and higher    Chronic atrial flutter/fibrillation with possible rapid ventricular response  Probable acute heart failure  In the context of acute illness including influenza A, presenting with chronic atrial flutter/fibrillation with resting heart rates consistently 110-120 concerning for rapid ventricular response.  Atrial flutter/fibrillation is chronically treated with  metoprolol for rate control and warfarin for anticoagulation.  Suspect chronic tachyarrhythmia is exacerbated by influenza A.  He also presents with severely elevated BNP and significant peripheral edema although weight is stable compared with previously and radiographic findings do not specifically mention interstitial pulmonary edema and/or pleural effusions.  He is not known to have chronic heart failure and previous echocardiogram demonstrated normal LVEF, normal RV size and function, and no significant valvular disease.  Nevertheless, acute heart failure is probable, likely triggered by tachyarrhythmia and influenza A.  Decompensated heart failure is probably also exacerbating acute hypoxic respiratory failure.  -Cardiac monitoring ordered  -For now, continue chronic dose of metoprolol 100 mg twice daily  -Added IV metoprolol 5 mg every 6 hours as needed for heart rate greater than 120  -Echocardiogram ordered  -Start IV Lasix  -Reassess CHF treatments depending upon clinical course and echo results  -Treat influenza and other acute medical problems    Acute kidney injury  Creatinine on presentation was 1.39, increased compared with baseline creatinine 1.0 concerning for acute kidney injury.  Creatinine did start to trend toward improvement during his stay in the ER.  -Ordered recheck renal function  -Avoid or limit use of nephrotoxic medication as much as possible  -Adjust medication dosing accordingly for changing renal function  -Optimize treatment of other acute medical problems    Elevated lactic acid  Presented to ER with mildly elevated lactic acid that normalized after initial treatment in the ER that included IV fluid boluses.  Infection is not identified nor suspected clinically.  He did not appear to have outright metabolic acidosis.  Suspect elevated lactic acid was due to or exacerbated by tachyarrhythmia and severe hypoxia.  -Monitor clinically  -Not routinely rechecking lactic acid but reconsider  depending upon clinical course    Elevated troponin  History of MI  CAD status post coronary artery stents and CABG  Known CAD with previous history of MI, placement of coronary stents, and CABG.  Presented with mildly elevated troponin 60 that quickly trended down.  EKG is difficult to interpret for acute ischemia because of tachyarrhythmia.  He has not had angina.  Suspect elevated troponin is due to acute illness including influenza, respiratory failure, and tachyarrhythmia superimposed upon known CAD.  -Not anticipating additional evaluation for acute ischemic heart disease during hospitalization unless his clinical status changes    Elevated hepatic transaminases  Hypoalbuminemia  Gallbladder wall thickening on radiographic studies  Patient noted to have elevated hepatic transaminases in ER with initially low serum albumin.  Gallbladder wall appeared thickened on initial CT and confirmed on subsequent ultrasound.  However, he does not have abdominal pain or tenderness.  Pool sign on ultrasound was negative.  Bilirubin and alkaline phosphatase are normal and there were no signs of bile duct dilation radiographically.  Suspect elevated transaminases and gallbladder wall thickening may be reflective of decompensated heart failure.  Doubt cholecystitis.  Serum albumin is low probably due to poor oral nutritional intake recently.  -Ordered recheck LFTs for trend  -Reconsider additional radiographic studies and/or surgical consultation if there is increasing concern for cholecystitis    Type 2 diabetes  Chronic type 2 diabetes treated with metformin with most recent A1c 6.9 in October 2024.  Blood sugars have been normal to minimally elevated.  He did receive IV contrast for radiographic study in the ER.  -Hold metformin due to IV contrast administration  -Monitor blood sugars 4 times daily  -Ordered NovoLog correction scale    PAD  Status post right carotid endarterectomy  AAA  Hyperlipidemia  Tobacco use  He has  known PAD manifest as AAA and carotid atherosclerosis and previously underwent carotid endarterectomy.  Chronic treatments include aspirin, Zetia, and statin.  He continues to smoke.  -Continue chronic aspirin, Zetia, and statin  -Smoking cessation advised  -Nicotine supplementation offered but declined by the patient at this time    Hypertension  Hypertension chronically treated with metoprolol and Lasix.  Blood pressure has been normotensive.  -Treating with IV Lasix as noted above because of concern for acute heart failure  -Continue chronic dose of metoprolol    Chronic anemia  Aspirin induced platelet function defect  Warfarin induced coagulopathy  Chronically anemic with baseline hemoglobin 11-12, currently stable.  Chronically taking aspirin that causes platelet function defect and warfarin that causes coagulopathy both of which increase his risk for bleeding.  INR is therapeutic.  Active bleeding not apparent or suspected.  -Continue chronic aspirin  -Pharmacy consulted to assist with warfarin dosing during hospitalization    Obesity  Appears obese with BMI 38.  Obesity likely contributes to health problems including diabetes.  -No acute intervention anticipated during hospitalization          Diet:  Moderate carbohydrate, 2 g sodium, fluid restrict 1.8 L/day  DVT Prophylaxis: Warfarin  Peter Catheter: Not present  Lines: None     Cardiac Monitoring: None  Code Status:  Full resuscitation per his preference    Clinically Significant Risk Factors Present on Admission           # Hypocalcemia: Lowest Ca = 8.3 mg/dL in last 2 days, will monitor and replace as appropriate     # Hypoalbuminemia: Lowest albumin = 3.4 g/dL at 1/29/2025 11:20 PM, will monitor as appropriate  # Drug Induced Coagulation Defect: home medication list includes an anticoagulant medication  # Drug Induced Platelet Defect: home medication list includes an antiplatelet medication   # Hypertension: Noted on problem list          # DMII: A1C =  "N/A within past 6 months    # Obesity: Estimated body mass index is 37.54 kg/m  as calculated from the following:    Height as of this encounter: 1.676 m (5' 6\").    Weight as of this encounter: 105.5 kg (232 lb 9.6 oz).        # History of CABG: noted on surgical history       Disposition Plan     Medically Ready for Discharge: Anticipated in 2-4 Days           Pasquale Wilde MD  Hospitalist Service  [facility]  Securely message with [employer] (more info)  Text page via [employer] Paging/Directory     ______________________________________________________________________    Chief Complaint   Shortness of breath and cough    History is obtained from the patient and electronic health record    History of Present Illness   Roger Bonilla is a 69 year old male who says he became sick on January 27 when he noted new onset of nonproductive cough and shortness of breath.  He also felt more tired with generalized malaise and bodyaches.  He is not sure if he had a fever but he did not have chills.  He was not sure what to do at home for treatment aside from rest.  Symptoms worsened over the course of the week, so he presented to the ER on 1/29 for evaluation.  In the ER, he was severely hypoxic with oxygen saturation 79% on room air, so low flow oxygen supplementation was started with resolution of hypoxia.  He was treated with a dose of ceftriaxone late in the day on January 29 and azithromycin around midnight January 29 January 30.  He was given a dose of oral Tylenol last evening.  He initially received normal saline bolus of IV fluids for CT scan with contrast around 9 PM followed by a second 500 mL bolus of normal saline between midnight and 1 AM this morning.  He was given a DuoNeb treatment in the ER about 11 PM last night.  This morning he received his usual chronic doses of oral Lasix and metoprolol.  He subsequently was transferred to this hospital by ambulance.  Upon arrival at this " hospital, he was afebrile but tachycardic and tachypneic.  Oxygen saturation has been adequate on low-flow oxygen supplementation.  He says he feels about the same overall today compared with yesterday.      Past Medical History    Past Medical History:   Diagnosis Date    AAA (abdominal aortic aneurysm) 06/23/2021    Acute kidney injury 06/23/2021    Acute superficial gastritis without hemorrhage 07/19/2021    Atherosclerosis of both carotid arteries 10/27/2008    Coronary artery disease 05/01/2005    2 stents put in heart    Disorder of bursae and tendons in shoulder region 09/01/2005    Hyperlipidemia LDL goal <70 11/18/2002    Morbid obesity (H) 09/16/2013    NSTEMI (non-ST elevated myocardial infarction) (H) 05/2001    Obstructive Sleep Apnea 11/23/2011    Peripheral artery disease 11/23/2012    Pure hypercholesterolemia 11/18/2002    Tobacco abuse 1973    Tubular adenoma 01/05/2023    Type II or unspecified type diabetes mellitus without mention of complication, not stated as uncontrolled 11/18/2002    Unspecified cardiovascular disease 05/2001    MI -- Angioplasty two stents RCA & OM2    Unspecified essential hypertension 11/18/2002       Past Surgical History   Past Surgical History:   Procedure Laterality Date    BYPASS GRAFT ARTERY CORONARY N/A 3/24/2022    Procedure: CORONARY ARTERY BYPASS GRAFT x 3 (LIMA - LAD; SV - OM2; SV - PDA) WITH ENDOSCOPIC SAPHENOUS VEIN HARVEST ON BILATERAL LOWER EXTREMITY, AND ON CARDIOPULMONARY PUMP OXYGENATOR  (INTRAOPERATIVE TRANSESOPHAGEAL ECHOCARDIOGRAM BY ANESTHESIOLOGIST);  Surgeon: Glenna Waters MD;  Location:  OR    CARDIAC SURGERY  may 1, 2005    COLONOSCOPY  11/30/2011    Procedure:COLONOSCOPY; Screening Colonoscopy; Surgeon:LUTHER FLORES; Location:[facility]    COLONOSCOPY N/A 1/3/2023    Procedure: COLONOSCOPY with polypectomy;  Surgeon: Angel Skinner MD;  Location: [facility]    CV CORONARY ANGIOGRAM N/A 12/29/2021    Procedure: Coronary  Angiogram;  Surgeon: Jonny Moore MD;  Location: [facility] CARDIAC CATH LAB    ENDARTERECTOMY CAROTID Right 2/18/2022    Procedure: Right carotid endarterectomy with ELECTROENCEPHALOGRAM(EEG);  Surgeon: Karma Adorno MD;  Location:  OR    ESOPHAGOSCOPY, GASTROSCOPY, DUODENOSCOPY (EGD), COMBINED N/A 7/21/2021    Procedure: ESOPHAGOGASTRODUODENOSCOPY, WITH BIOPSY;  Surgeon: Jeff Cunningham DO;  Location: [facility]    PHACOEMULSIFICATION WITH STANDARD INTRAOCULAR LENS IMPLANT Right 7/28/2021    Procedure: Right eye Cataract Extraction with Implant;  Surgeon: Reyes Valdez MD;  Location: [facility]    PHACOEMULSIFICATION WITH STANDARD INTRAOCULAR LENS IMPLANT Left 8/18/2021    Procedure: left eye Cataract Extraction with Implant;  Surgeon: Reyes Valdez MD;  Location: [facility]    SURGICAL HISTORY OF -       None    VASCULAR SURGERY  oct 2013    stent put in leg       Prior to Admission Medications   Prior to Admission Medications   Prescriptions Last Dose Informant Patient Reported? Taking?   aspirin (ASA) 81 MG chewable tablet 1/30/2025  Yes Yes   Sig: Take 81 mg by mouth daily   atorvastatin (LIPITOR) 80 MG tablet 1/30/2025  No Yes   Sig: Take 1 tablet (80 mg) by mouth daily   ezetimibe (ZETIA) 10 MG tablet 1/30/2025  No Yes   Sig: Take 1 tablet (10 mg) by mouth daily.   furosemide (LASIX) 40 MG tablet 1/30/2025  No Yes   Sig: Take 0.5 tablets (20 mg) by mouth daily   metFORMIN (GLUCOPHAGE) 1000 MG tablet Unknown  No No   Sig: Take 1 Tablet BY MOUTH EVERY DAY   Patient taking differently: Take 1,000 mg by mouth daily.   metoprolol tartrate (LOPRESSOR) 100 MG tablet 1/30/2025  No Yes   Sig: Take 1 tablet (100 mg) by mouth 2 times daily.   omeprazole (PRILOSEC) 40 MG DR capsule 1/30/2025  No Yes   Sig: Take 1 capsule (40 mg) by mouth every morning.   warfarin ANTICOAGULANT (JANTOVEN ANTICOAGULANT) 5 MG tablet 1/29/2025 Evening  No Yes   Sig: Take 7.5 mg every Tues and 5 mg all other days of  the week OR as directed by INR Clinic   Patient taking differently: Take 5-7.5 mg by mouth See Admin Instructions. 5 mg every Monday, Wednesday and Friday and 7.5 mg all other days      Facility-Administered Medications: None        Review of Systems    The 10 point Review of Systems is negative other than noted in the HPI or here.  He denies any abdominal pain or vomiting but has not had any appetite the last several days and is eating and drinking less than usual.  He also has been having explosive diarrhea although only once or twice a day.  He has not had chest pain, palpitations, or dizziness.    Social History   I have reviewed this patient's social history and updated it with pertinent information if needed.  Social History     Tobacco Use    Smoking status: Every Day     Current packs/day: 0.50     Average packs/day: 0.5 packs/day for 53.1 years (26.5 ttl pk-yrs)     Types: Cigarettes     Start date: 1/1/1972     Passive exposure: Never (per pt)    Smokeless tobacco: Never    Tobacco comments:     5-10 cigs daily 11/28/22   Vaping Use    Vaping status: Never Used   Substance Use Topics    Alcohol use: Not Currently     Comment: 2 beers a year    Drug use: No         Family History   I have reviewed this patient's family history and updated it with pertinent information if needed.  Family History   Problem Relation Age of Onset    Cerebrovascular Disease Mother     Respiratory Father         emphysema    Cerebrovascular Disease Maternal Grandmother     Alzheimer Disease Maternal Grandfather     Breast Cancer Sister     Arthritis Other         hip fracture    Cancer - colorectal No family hx of     Prostate Cancer No family hx of          Allergies   Allergies   Allergen Reactions    Nkda [No Known Drug Allergy]         Physical Exam   Vital Signs: Temp: 98.1  F (36.7  C) Temp src: Oral BP: 139/78 Pulse: 117   Resp: 22 SpO2: 93 % O2 Device: Nasal cannula Oxygen Delivery: 2 LPM    Weight: 232 lbs 9.6 oz Body  mass index is 37.54 kg/m .    Wt Readings from Last 4 Encounters:   01/30/25 105.5 kg (232 lb 9.6 oz)   11/19/24 109.4 kg (241 lb 3.2 oz)   11/19/24 108.6 kg (239 lb 6.4 oz)   10/31/24 106.1 kg (233 lb 12.8 oz)       Constitutional: Obese elderly man without signs of acute distress sitting up in a chair  Eyes: Anicteric sclerae, noninjected conjunctivae without drainage, possibly some swelling of the conjunctiva at the left eye lateral canthus  ENT: Clear nares, normal oropharynx, moist mucous membranes  Neck: Obese, trachea midline, no stridor, symmetric, nontender, no definite JVD although evaluation is limited by his obesity  Hematologic / Lymphatic: no cervical lymphadenopathy and no supraclavicular lymphadenopathy  Respiratory: Borderline tachypneic but able to speak full sentences and not using accessory muscles, moderately to severely diminished breath sounds throughout with clear lung fields  Cardiovascular: Tachycardic irregularly irregular heart rate and rhythm, weak but palpable radial pulse, capillary refill about 2 seconds in the fingertips, moderate to severe pitting edema present in the lower legs bilaterally  GI: Obese nondistended abdomen, soft, nontender  Skin: Pale color, no rashes  Musculoskeletal: No cords appreciated in the lower extremities  Neurologic: Alert and maintains wakefulness and attention, answering questions appropriately, purposefully and symmetrically moves limbs  Neuropsychiatric: Affect is blunted and mood appears depressed, he speaks of unexpected loss of his wife about 2 months ago but denies suicidal ideation or intent and reports that he wants to continue to go on living    Medical Decision Making       117 MINUTES SPENT BY ME on the date of service doing chart review, history, exam, documentation & further activities per the note.      Data     I have personally reviewed the following data over the past 24 hrs:    8.8  \   11.1 (L)   / 232     140 102 39.3 (H) /  95   4.5  27 1.28 (H) \     ALT: 195 (H) AST: 249 (H) AP: 73 TBILI: 0.6   ALB: 3.7 TOT PROTEIN: 6.7 LIPASE: 28     Trop: 58 (H) BNP: 20,577 (H)     Procal: N/A CRP: N/A Lactic Acid: 1.2       INR:  1.86 (H) PTT:  N/A   D-dimer:  0.52 (H) Fibrinogen:  N/A       In the ER, initial troponin had been 60 and lactic acid had been 2.1   Blood cultures drawn in the ER are pending  Rapid testing for influenza A was positive but negative for RSV, influenza B and COVID 19    For comparison, previous BNP in April 2022 was 3469    EKG performed in ER reviewed: Atrial flutter with heart rate 110-120, morphology of flutter waves makes interpretation challenging but he does not appear to have obvious ST segment depression or elevation and the only definitively inverted T waves appear to be in leads V1 and V2 which is new compared with June 4, 2024    Imaging results reviewed over the past 24 hrs:   Recent Results (from the past 24 hours)   CT Chest w Contrast    Narrative    EXAM: CT CHEST W CONTRAST  LOCATION: [facility]  DATE: 1/29/2025    INDICATION: Shortness of breath. Influenza.  COMPARISON: None.  TECHNIQUE: CT chest with IV contrast. Multiplanar reformats were obtained. Dose reduction techniques were used.    CONTRAST: 100mL Isovue 370    FINDINGS:   LUNGS AND PLEURA: New, focal nodular opacities within the inferior perihilar aspect of the right lower lobe (images [id number] series 4). Geographic groundglass attenuation throughout both lungs. Mild bronchial wall thickening. Subsegmental atelectasis,   inferior aspect of the lingula. No pleural effusions.    MEDIASTINUM/AXILLAE: No lymphadenopathy. Mild calcified atherosclerotic changes of a normal caliber thoracic aorta. Mild cardiomegaly.    CORONARY ARTERY CALCIFICATION: Previous intervention (CABG).    UPPER ABDOMEN: There appears to be ill-defined wall thickening of the visualized gallbladder.    MUSCULOSKELETAL: Sternotomy. Degenerative/hypertrophic  changes of the mid to lower thoracic and upper lumbar spine.      Impression    IMPRESSION:   1.  Geographic groundglass attenuation throughout both lungs. Bilateral bronchial wall thickening. Focal nodular opacities within the perihilar aspect of the right lower lobe. These findings likely represent atypical pneumonia with bronchial   inflammation.  2.  There appears to be wall thickening of the visualized gallbladder. If there is clinical suspicion for possible acute cholecystitis further imaging assessment may be done with a right upper quadrant ultrasound.   US Abdomen Limited    Narrative    EXAM: US ABDOMEN LIMITED  LOCATION: [facility]  DATE: 1/30/2025    INDICATION: RUQ with posssible thickened GB on CT scan of chest  COMPARISON: 01/29/2025  TECHNIQUE: Limited abdominal ultrasound.    FINDINGS:    GALLBLADDER: Small amount of sludge within the gallbladder. Gallbladder wall thickening, 6 mm. Sonographic Pool's sign negative. No biliary dilatation.    BILE DUCTS: No biliary dilatation. The common duct measures 4 mm.    LIVER: Slightly nodular in contour.    RIGHT KIDNEY: No hydronephrosis.    PANCREAS: Obscured by bowel gas.    No visible ascites.      Impression    IMPRESSION:  1.  Small amount of sludge in the gallbladder. Gallbladder wall thickening.  2.  Sonographic Pool's sign negative. No biliary dilatation.         Recent Labs   Lab 01/30/25  0855 01/30/25  0133 01/29/25  2320 01/29/25  1853   WBC  --   --  8.8 8.4   HGB  --   --  11.1* 12.4*   MCV  --   --  82 79   PLT  --   --  232 260   INR 1.86*  --   --  1.91*   NA  --   --  140 141   POTASSIUM  --   --  4.5 4.9   CHLORIDE  --   --  102 100   CO2  --   --  27 26   BUN  --   --  39.3* 41.7*   CR  --   --  1.28* 1.39*   ANIONGAP  --   --  11 15   SUE  --   --  8.3* 9.4   GLC  --   --  95 114*   ALBUMIN  --  3.7 3.4*  --    PROTTOTAL  --  6.7 6.4  --    BILITOTAL  --  0.6 0.4  --    ALKPHOS  --  73 69  --    ALT  --   195* 176*  --    AST  --  249* 203*  --    LIPASE  --  28  --   --

## 2025-01-30 NOTE — PROGRESS NOTES
"S-(situation): Patient arrives to room 256 via cart from EMS ([place], [place])    B-(background): Influenza A    A-(assessment): Patient alert and orientated x4. Delayed responses at times. Lungs coarse in bases, diminished. SOB with exertion. Currently on 2L NC, sats 93%. Denies pain and nausea. Heart tachy and irregular. Ambulates SBA with gait belt.     /78 (BP Location: Right arm, Patient Position: Sitting, Cuff Size: Adult Regular)   Pulse 117   Temp 98.1  F (36.7  C) (Oral)   Resp 22   Ht 1.676 m (5' 6\")   Wt 105.5 kg (232 lb 9.6 oz)   SpO2 93%   BMI 37.54 kg/m        R-(recommendations): Orders reviewed with Patient. Will monitor patient per MD orders.     Inpatient nursing criteria listed below were met:    Health care directives status obtained and documented: Yes  VTE ordered/documented: Yes  Skin issues/needs documented:NA  Isolation addressed and Signage used: Yes, Dropplet  Fall Prevention: Care plan updated Yes Education given and documented Yes  Care Plan initiated and Co-Morbidities added: Yes  Education Assessment documented:Yes  Admission Education Documented: Yes  If present CAUTI/CLABI Education done: NA  New medication patient education completed and documented (Possible Side Effects of Common Medications handout): Yes  Allergies Reviewed: Yes  Admission Medication Reconciliation completed: Yes  Home medications if not able to send immediately home with family stored here: NA  Reminder note placed in discharge instructions regarding home meds: NA  Individualized care needs/preferences addressed and charted: Yes  Provider Notified that patient has arrived to the unit: Yes        "

## 2025-01-30 NOTE — ED PROVIDER NOTES
"     Emergency Department Patient Sign-out       Brief HPI:  This is a 69 year old male signed out to me by Dr. Nunez .  See initial ED Provider note for details of the presentation.  Awaiting inpatient placement for influenza with respiratory failure requiring minimal respiratory support on low-flow O2 complicated by history of chronic A-fib with mildly elevated rates in the low 100s and significantly elevated NT proBNP but no interstitial fluid on CT scanning of the chest.           Significant Events prior to my assuming care: see initial MD notes      Exam:   Patient Vitals for the past 24 hrs:   BP Temp Temp src Pulse Resp SpO2 Height   01/30/25 0715 -- -- -- 115 17 97 % --   01/30/25 0700 -- -- -- 115 16 97 % --   01/30/25 0645 -- -- -- 114 23 96 % --   01/30/25 0630 -- -- -- 114 17 94 % --   01/30/25 0615 -- -- -- 115 17 97 % --   01/30/25 0600 -- -- -- 115 24 97 % --   01/30/25 0512 -- -- -- 115 17 96 % --   01/30/25 0342 (!) 144/108 -- -- 114 18 97 % --   01/30/25 0338 -- -- -- 114 21 96 % --   01/30/25 0334 (!) 132/102 -- -- 113 28 -- --   01/30/25 0251 -- 98.1  F (36.7  C) Oral 115 19 95 % --   01/30/25 0133 (!) 123/94 -- -- 114 16 95 % --   01/30/25 0033 -- -- -- 114 20 97 % --   01/29/25 2333 -- -- -- 117 19 95 % --   01/29/25 2030 -- -- -- 115 24 96 % --   01/29/25 2026 -- -- -- 115 27 96 % --   01/29/25 1829 128/73 97.7  F (36.5  C) Tympanic 116 18 (!) 79 % 1.676 m (5' 6\")           ED RESULTS:   Results for orders placed or performed during the hospital encounter of 01/29/25 (from the past 24 hours)   Influenza A/B, RSV and SARS-CoV2 PCR (COVID-19) Nose     Status: Abnormal    Collection Time: 01/29/25  6:51 PM    Specimen: Nose; Swab   Result Value Ref Range    Influenza A PCR Positive (A) Negative    Influenza B PCR Negative Negative    RSV PCR Negative Negative    SARS CoV2 PCR Negative Negative    Narrative    Testing was performed using the [employer] Xpress CoV2/Flu/RSV Assay on the [employer]" GeneXpert Instrument. This test should be ordered for the detection of SARS-CoV2, influenza, and RSV viruses in individuals with signs and symptoms of respiratory tract infection. This test is for in vitro diagnostic use under the [place] FDA for laboratories certified under CLIA to perform high or moderate complexity testing. This test has been [place] FDA cleared. A negative result does not rule out the presence of PCR inhibitors in the specimen or target RNA in concentration below the limit of detection for the assay. If only one viral target is positive but coinfection with multiple targets is suspected, the sample should be re-tested with another FDA cleared, approved, or authorized test, if coninfection would change clinical management. This test was validated by the [facility]. These laboratories are certified under the Clinical Laboratory Improvement Amendments of 1988 (CLIA-88) as qualified to perfom high complexity laboratory testing.   CBC with Platelets & Differential     Status: Abnormal    Collection Time: 01/29/25  6:53 PM    Narrative    The following orders were created for panel order CBC with Platelets & Differential.  Procedure                               Abnormality         Status                     ---------                               -----------         ------                     CBC with platelets and d...[[id number]]  Abnormal            Final result                 Please view results for these tests on the individual orders.   Basic metabolic panel     Status: Abnormal    Collection Time: 01/29/25  6:53 PM   Result Value Ref Range    Sodium 141 135 - 145 mmol/L    Potassium 4.9 3.4 - 5.3 mmol/L    Chloride 100 98 - 107 mmol/L    Carbon Dioxide (CO2) 26 22 - 29 mmol/L    Anion Gap 15 7 - 15 mmol/L    Urea Nitrogen 41.7 (H) 8.0 - 23.0 mg/dL    Creatinine 1.39 (H) 0.67 - 1.17 mg/dL    GFR Estimate 55 (L) >60 mL/min/1.73m2    Calcium 9.4 8.8 - 10.4 mg/dL    Glucose 114 (H) 70 -  99 mg/dL   Troponin T, High Sensitivity     Status: Abnormal    Collection Time: 01/29/25  6:53 PM   Result Value Ref Range    Troponin T, High Sensitivity 60 (H) <=22 ng/L   [facility] Draw     Status: None    Collection Time: 01/29/25  6:53 PM    Narrative    The following orders were created for panel order [facility] Draw.  Procedure                               Abnormality         Status                     ---------                               -----------         ------                     Extra Blue Top Tube[[id number]]                              Final result               Extra Heparinized Syringe[[id number]]                        Final result                 Please view results for these tests on the individual orders.   CBC with platelets and differential     Status: Abnormal    Collection Time: 01/29/25  6:53 PM   Result Value Ref Range    WBC Count 8.4 4.0 - 11.0 10e3/uL    RBC Count 5.18 4.40 - 5.90 10e6/uL    Hemoglobin 12.4 (L) 13.3 - 17.7 g/dL    Hematocrit 41.1 40.0 - 53.0 %    MCV 79 78 - 100 fL    MCH 23.9 (L) 26.5 - 33.0 pg    MCHC 30.2 (L) 31.5 - 36.5 g/dL    RDW 19.0 (H) 10.0 - 15.0 %    Platelet Count 260 150 - 450 10e3/uL    % Neutrophils 76 %    % Lymphocytes 8 %    % Monocytes 15 %    % Eosinophils 0 %    % Basophils 0 %    % Immature Granulocytes 1 %    NRBCs per 100 WBC 0 <1 /100    Absolute Neutrophils 6.4 1.6 - 8.3 10e3/uL    Absolute Lymphocytes 0.7 (L) 0.8 - 5.3 10e3/uL    Absolute Monocytes 1.3 0.0 - 1.3 10e3/uL    Absolute Eosinophils 0.0 0.0 - 0.7 10e3/uL    Absolute Basophils 0.0 0.0 - 0.2 10e3/uL    Absolute Immature Granulocytes 0.0 <=0.4 10e3/uL    Absolute NRBCs 0.0 10e3/uL   Extra Blue Top Tube     Status: None    Collection Time: 01/29/25  6:53 PM   Result Value Ref Range    Hold Specimen JIC    Extra Heparinized Syringe     Status: None    Collection Time: 01/29/25  6:53 PM   Result Value Ref Range    Hold Specimen     NT pro BNP     Status: Abnormal    Collection Time:  01/29/25  6:53 PM   Result Value Ref Range    N terminal Pro BNP Inpatient 20,577 (H) 0 - 900 pg/mL   D dimer quantitative     Status: Abnormal    Collection Time: 01/29/25  6:53 PM   Result Value Ref Range    D-Dimer Quantitative 0.52 (H) 0.00 - 0.50 ug/mL FEU    Narrative    This D-dimer assay is intended for use in conjunction with a clinical pretest probability assessment model to exclude pulmonary embolism (PE) and deep venous thrombosis (DVT) in outpatients suspected of PE or DVT. The cut-off value is 0.50 ug/mL FEU.    For patients 50 years of age or older, the application of age-adjusted cut-off values for D-Dimer may increase the specificity without significant effect on sensitivity. The literature suggested calculation age adjusted cut-off in ug/L = age in years x 10 ug/L. The results in this laboratory are reported as ug/mL rather than ug/L. The calculation for age adjusted cut off in ug/mL= age in years x 0.01 ug/mL. For example, the cut off for a 76 year old male is 76 x 0.01 ug/mL = 0.76 ug/mL (760 ug/L).    M Brauliohikeyana et al. Age adjusted D-dimer cut-off levels to rule out pulmonary embolism: The ADJUST-PE Study. MARY KAY 2014;311:8525-1987.; HJ Clark et al. Diagnostic accuracy of conventional or age adjusted D-dimer cutoff values in older patients with suspected venous thromboembolism. Systemic review and meta-analysis. BMJ 2013:346:f2492.   INR     Status: Abnormal    Collection Time: 01/29/25  6:53 PM   Result Value Ref Range    INR 1.91 (H) 0.85 - 1.15   EKG 12 lead     Status: None (Preliminary result)    Collection Time: 01/29/25  8:21 PM   Result Value Ref Range    Systolic Blood Pressure  mmHg    Diastolic Blood Pressure  mmHg    Ventricular Rate 115 BPM    Atrial Rate 230 BPM    AR Interval  ms    QRS Duration 116 ms     ms    QTc 556 ms    P Axis 126 degrees    R AXIS 56 degrees    T Axis 35 degrees    Interpretation ECG       Atrial flutter with 2:1 A-V conduction  Right bundle branch  block  T wave abnormality, consider lateral ischemia  Abnormal ECG  When compared with ECG of 04-Jun-2024 10:38,  ST depression has replaced ST elevation in Inferior leads  ST depression has replaced ST elevation in Anterior leads  T wave inversion now evident in Inferior leads  T wave inversion now evident in Anterolateral leads     Troponin T, High Sensitivity     Status: Abnormal    Collection Time: 01/29/25  9:02 PM   Result Value Ref Range    Troponin T, High Sensitivity 58 (H) <=22 ng/L   CT Chest w Contrast     Status: None    Collection Time: 01/29/25  9:31 PM    Narrative    EXAM: CT CHEST W CONTRAST  LOCATION: [facility]  DATE: 1/29/2025    INDICATION: Shortness of breath. Influenza.  COMPARISON: None.  TECHNIQUE: CT chest with IV contrast. Multiplanar reformats were obtained. Dose reduction techniques were used.    CONTRAST: 100mL Isovue 370    FINDINGS:   LUNGS AND PLEURA: New, focal nodular opacities within the inferior perihilar aspect of the right lower lobe (images [phone] series 4). Geographic groundglass attenuation throughout both lungs. Mild bronchial wall thickening. Subsegmental atelectasis,   inferior aspect of the lingula. No pleural effusions.    MEDIASTINUM/AXILLAE: No lymphadenopathy. Mild calcified atherosclerotic changes of a normal caliber thoracic aorta. Mild cardiomegaly.    CORONARY ARTERY CALCIFICATION: Previous intervention (CABG).    UPPER ABDOMEN: There appears to be ill-defined wall thickening of the visualized gallbladder.    MUSCULOSKELETAL: Sternotomy. Degenerative/hypertrophic changes of the mid to lower thoracic and upper lumbar spine.      Impression    IMPRESSION:   1.  Geographic groundglass attenuation throughout both lungs. Bilateral bronchial wall thickening. Focal nodular opacities within the perihilar aspect of the right lower lobe. These findings likely represent atypical pneumonia with bronchial   inflammation.  2.  There appears to  be wall thickening of the visualized gallbladder. If there is clinical suspicion for possible acute cholecystitis further imaging assessment may be done with a right upper quadrant ultrasound.   Lactic Acid Whole Blood with 1X Repeat in 2 HR when >2     Status: Abnormal    Collection Time: 01/29/25 10:48 PM   Result Value Ref Range    Lactic Acid, Initial 2.1 (H) 0.7 - 2.0 mmol/L   CBC with platelets differential     Status: Abnormal    Collection Time: 01/29/25 11:20 PM    Narrative    The following orders were created for panel order CBC with platelets differential.  Procedure                               Abnormality         Status                     ---------                               -----------         ------                     CBC with platelets and d...[[id number]]  Abnormal            Final result                 Please view results for these tests on the individual orders.   Comprehensive metabolic panel     Status: Abnormal    Collection Time: 01/29/25 11:20 PM   Result Value Ref Range    Sodium 140 135 - 145 mmol/L    Potassium 4.5 3.4 - 5.3 mmol/L    Carbon Dioxide (CO2) 27 22 - 29 mmol/L    Anion Gap 11 7 - 15 mmol/L    Urea Nitrogen 39.3 (H) 8.0 - 23.0 mg/dL    Creatinine 1.28 (H) 0.67 - 1.17 mg/dL    GFR Estimate 61 >60 mL/min/1.73m2    Calcium 8.3 (L) 8.8 - 10.4 mg/dL    Chloride 102 98 - 107 mmol/L    Glucose 95 70 - 99 mg/dL    Alkaline Phosphatase 69 40 - 150 U/L     (H) 0 - 45 U/L     (H) 0 - 70 U/L    Protein Total 6.4 6.4 - 8.3 g/dL    Albumin 3.4 (L) 3.5 - 5.2 g/dL    Bilirubin Total 0.4 <=1.2 mg/dL   CBC with platelets and differential     Status: Abnormal    Collection Time: 01/29/25 11:20 PM   Result Value Ref Range    WBC Count 8.8 4.0 - 11.0 10e3/uL    RBC Count 4.62 4.40 - 5.90 10e6/uL    Hemoglobin 11.1 (L) 13.3 - 17.7 g/dL    Hematocrit 37.8 (L) 40.0 - 53.0 %    MCV 82 78 - 100 fL    MCH 24.0 (L) 26.5 - 33.0 pg    MCHC 29.4 (L) 31.5 - 36.5 g/dL    RDW 18.8 (H) 10.0  - 15.0 %    Platelet Count 232 150 - 450 10e3/uL    % Neutrophils 78 %    % Lymphocytes 9 %    % Monocytes 12 %    % Eosinophils 0 %    % Basophils 0 %    % Immature Granulocytes 1 %    NRBCs per 100 WBC 0 <1 /100    Absolute Neutrophils 6.9 1.6 - 8.3 10e3/uL    Absolute Lymphocytes 0.8 0.8 - 5.3 10e3/uL    Absolute Monocytes 1.1 0.0 - 1.3 10e3/uL    Absolute Eosinophils 0.0 0.0 - 0.7 10e3/uL    Absolute Basophils 0.0 0.0 - 0.2 10e3/uL    Absolute Immature Granulocytes 0.0 <=0.4 10e3/uL    Absolute NRBCs 0.0 10e3/uL   US Abdomen Limited     Status: None    Collection Time: 01/30/25 12:11 AM    Narrative    EXAM: US ABDOMEN LIMITED  LOCATION: [facility]  DATE: 1/30/2025    INDICATION: RUQ with posssible thickened GB on CT scan of chest  COMPARISON: 01/29/2025  TECHNIQUE: Limited abdominal ultrasound.    FINDINGS:    GALLBLADDER: Small amount of sludge within the gallbladder. Gallbladder wall thickening, 6 mm. Sonographic Pool's sign negative. No biliary dilatation.    BILE DUCTS: No biliary dilatation. The common duct measures 4 mm.    LIVER: Slightly nodular in contour.    RIGHT KIDNEY: No hydronephrosis.    PANCREAS: Obscured by bowel gas.    No visible ascites.      Impression    IMPRESSION:  1.  Small amount of sludge in the gallbladder. Gallbladder wall thickening.  2.  Sonographic Pool's sign negative. No biliary dilatation.       Lactic acid whole blood     Status: Normal    Collection Time: 01/30/25  1:33 AM   Result Value Ref Range    Lactic Acid 1.2 0.7 - 2.0 mmol/L   Hepatic panel     Status: Abnormal    Collection Time: 01/30/25  1:33 AM   Result Value Ref Range    Protein Total 6.7 6.4 - 8.3 g/dL    Albumin 3.7 3.5 - 5.2 g/dL    Bilirubin Total 0.6 <=1.2 mg/dL    Alkaline Phosphatase 73 40 - 150 U/L     (H) 0 - 45 U/L     (H) 0 - 70 U/L    Bilirubin Direct 0.27 0.00 - 0.30 mg/dL   Lipase     Status: Normal    Collection Time: 01/30/25  1:33 AM   Result Value  Ref Range    Lipase 28 13 - 60 U/L       ED MEDICATIONS:   Medications   cefTRIAXone (ROCEPHIN) 1 g vial to attach to  mL bag for ADULTS or NS 50 mL bag for PEDS (0 g Intravenous Stopped 1/29/25 2323)   azithromycin (ZITHROMAX) 500 mg in sodium chloride 0.9 % 250 mL intermittent infusion (0 mg Intravenous Stopped 1/30/25 0041)   calcium carbonate (TUMS) chewable tablet 1,000 mg (has no administration in time range)   sodium chloride 0.9 % infusion (0 mLs Intravenous Hold 1/30/25 0513)   acetaminophen (TYLENOL) tablet 650 mg (has no administration in time range)   ondansetron (ZOFRAN ODT) ODT tab 4 mg (has no administration in time range)     Or   ondansetron (ZOFRAN) injection 4 mg (has no administration in time range)   Warfarin Dose Required Daily - Pharmacist Managed (has no administration in time range)   furosemide (LASIX) tablet 20 mg (has no administration in time range)   metFORMIN (GLUCOPHAGE) tablet 1,000 mg (has no administration in time range)   metoprolol tartrate (LOPRESSOR) tablet 100 mg (has no administration in time range)   sodium chloride 0.9% BOLUS 500 mL (0 mLs Intravenous Stopped 1/30/25 0511)   iopamidol (ISOVUE-370) solution 118 mL (100 mLs Intravenous $Given 1/29/25 2117)   sodium chloride 0.9 % bag 500mL for CT scan flush use (69 mLs As instructed $Given 1/29/25 2118)   acetaminophen (TYLENOL) tablet 1,000 mg (1,000 mg Oral $Given 1/29/25 2151)   ipratropium - albuterol 0.5 mg/2.5 mg/3 mL (DUONEB) neb solution 3 mL (3 mLs Nebulization $Given 1/29/25 2324)         Impression:    ICD-10-CM    1. Dyspnea, unspecified type  R06.00       2. Influenza A  J10.1       3. Pneumonia of right lower lobe due to infectious organism  J18.9       4. Atrial flutter with rapid ventricular response (H)  I48.92       5. Cholecystitis  K81.9     possible        7:30 AM: Reviewed with Dr. Martinez, hospitalist at [facility].  She accepts the patient for transfer.  Requests morning meds be given prior to  transfer.    7:50 AM: Meds reviewed with patient.  He normally takes his metformin in the evening.  He takes his metoprolol twice a day.  He uses his furosemide but only if he is at home typically takes it in the morning.  He is also on warfarin for stroke prophylaxis in the context of atrial fibrillation.  I have ordered the metformin, metoprolol, furosemide but not the atorvastatin and Zetia which can be deferred to the hospitalist service  Plan:      Final diagnoses:   Dyspnea, unspecified type   Influenza A   Pneumonia of right lower lobe due to infectious organism   Atrial flutter with rapid ventricular response (H)   Cholecystitis - possible       Transfer to [facility] for inpatient management      MD Cedric Goins Joseph, MD  01/30/25 0758

## 2025-01-31 LAB
ALBUMIN SERPL BCG-MCNC: 3.8 G/DL (ref 3.5–5.2)
ALLEN'S TEST: YES
ALP SERPL-CCNC: 74 U/L (ref 40–150)
ALT SERPL W P-5'-P-CCNC: 222 U/L (ref 0–70)
ANION GAP SERPL CALCULATED.3IONS-SCNC: 13 MMOL/L (ref 7–15)
AST SERPL W P-5'-P-CCNC: 189 U/L (ref 0–45)
BASE EXCESS BLDA CALC-SCNC: 4.3 MMOL/L (ref -3–3)
BASE EXCESS BLDV CALC-SCNC: 4.1 MMOL/L (ref -3–3)
BASE EXCESS BLDV CALC-SCNC: 6.6 MMOL/L (ref -3–3)
BASOPHILS # BLD AUTO: 0 10E3/UL (ref 0–0.2)
BASOPHILS NFR BLD AUTO: 0 %
BILIRUB SERPL-MCNC: 0.4 MG/DL
BUN SERPL-MCNC: 30.9 MG/DL (ref 8–23)
CALCIUM SERPL-MCNC: 9 MG/DL (ref 8.8–10.4)
CHLORIDE SERPL-SCNC: 101 MMOL/L (ref 98–107)
CREAT SERPL-MCNC: 1.11 MG/DL (ref 0.67–1.17)
EGFRCR SERPLBLD CKD-EPI 2021: 72 ML/MIN/1.73M2
EOSINOPHIL # BLD AUTO: 0 10E3/UL (ref 0–0.7)
EOSINOPHIL NFR BLD AUTO: 0 %
ERYTHROCYTE [DISTWIDTH] IN BLOOD BY AUTOMATED COUNT: 19.4 % (ref 10–15)
GLUCOSE BLDC GLUCOMTR-MCNC: 102 MG/DL (ref 70–99)
GLUCOSE BLDC GLUCOMTR-MCNC: 103 MG/DL (ref 70–99)
GLUCOSE BLDC GLUCOMTR-MCNC: 91 MG/DL (ref 70–99)
GLUCOSE BLDC GLUCOMTR-MCNC: 93 MG/DL (ref 70–99)
GLUCOSE SERPL-MCNC: 96 MG/DL (ref 70–99)
HCO3 BLD-SCNC: 32 MMOL/L (ref 21–28)
HCO3 BLDV-SCNC: 33 MMOL/L (ref 21–28)
HCO3 BLDV-SCNC: 35 MMOL/L (ref 21–28)
HCO3 SERPL-SCNC: 27 MMOL/L (ref 22–29)
HCT VFR BLD AUTO: 38.6 % (ref 40–53)
HGB BLD-MCNC: 11.6 G/DL (ref 13.3–17.7)
IMM GRANULOCYTES # BLD: 0 10E3/UL
IMM GRANULOCYTES NFR BLD: 1 %
INR PPP: 2.18 (ref 0.85–1.15)
LYMPHOCYTES # BLD AUTO: 0.9 10E3/UL (ref 0.8–5.3)
LYMPHOCYTES NFR BLD AUTO: 11 %
MCH RBC QN AUTO: 23.9 PG (ref 26.5–33)
MCHC RBC AUTO-ENTMCNC: 30.1 G/DL (ref 31.5–36.5)
MCV RBC AUTO: 80 FL (ref 78–100)
MONOCYTES # BLD AUTO: 1 10E3/UL (ref 0–1.3)
MONOCYTES NFR BLD AUTO: 12 %
MRSA DNA SPEC QL NAA+PROBE: NEGATIVE
NEUTROPHILS # BLD AUTO: 6.3 10E3/UL (ref 1.6–8.3)
NEUTROPHILS NFR BLD AUTO: 76 %
NRBC # BLD AUTO: 0 10E3/UL
NRBC BLD AUTO-RTO: 0 /100
O2/TOTAL GAS SETTING VFR VENT: 28 %
O2/TOTAL GAS SETTING VFR VENT: 28 %
O2/TOTAL GAS SETTING VFR VENT: 32 %
OXYHGB MFR BLDA: 91 % (ref 92–100)
OXYHGB MFR BLDV: 61 % (ref 70–75)
OXYHGB MFR BLDV: 69 % (ref 70–75)
PCO2 BLD: 64 MM HG (ref 35–45)
PCO2 BLDV: 69 MM HG (ref 40–50)
PCO2 BLDV: 76 MM HG (ref 40–50)
PH BLD: 7.31 [PH] (ref 7.35–7.45)
PH BLDV: 7.25 [PH] (ref 7.32–7.43)
PH BLDV: 7.31 [PH] (ref 7.32–7.43)
PLATELET # BLD AUTO: 243 10E3/UL (ref 150–450)
PO2 BLD: 72 MM HG (ref 80–105)
PO2 BLDV: 36 MM HG (ref 25–47)
PO2 BLDV: 42 MM HG (ref 25–47)
POTASSIUM SERPL-SCNC: 4 MMOL/L (ref 3.4–5.3)
PROCALCITONIN SERPL IA-MCNC: 0.19 NG/ML
PROT SERPL-MCNC: 7 G/DL (ref 6.4–8.3)
RBC # BLD AUTO: 4.85 10E6/UL (ref 4.4–5.9)
SA TARGET DNA: NEGATIVE
SAO2 % BLDA: 92.9 % (ref 95–96)
SAO2 % BLDV: 62.3 % (ref 70–75)
SAO2 % BLDV: 70.4 % (ref 70–75)
SODIUM SERPL-SCNC: 141 MMOL/L (ref 135–145)
WBC # BLD AUTO: 8.3 10E3/UL (ref 4–11)

## 2025-01-31 PROCEDURE — 85025 COMPLETE CBC W/AUTO DIFF WBC: CPT | Performed by: PEDIATRICS

## 2025-01-31 PROCEDURE — 87640 STAPH A DNA AMP PROBE: CPT | Performed by: NURSE PRACTITIONER

## 2025-01-31 PROCEDURE — 82805 BLOOD GASES W/O2 SATURATION: CPT | Performed by: NURSE PRACTITIONER

## 2025-01-31 PROCEDURE — 94660 CPAP INITIATION&MGMT: CPT

## 2025-01-31 PROCEDURE — 85610 PROTHROMBIN TIME: CPT | Performed by: PEDIATRICS

## 2025-01-31 PROCEDURE — 84145 PROCALCITONIN (PCT): CPT | Performed by: PEDIATRICS

## 2025-01-31 PROCEDURE — 80053 COMPREHEN METABOLIC PANEL: CPT | Performed by: PEDIATRICS

## 2025-01-31 PROCEDURE — 120N000001 HC R&B MED SURG/OB

## 2025-01-31 PROCEDURE — 250N000011 HC RX IP 250 OP 636: Performed by: PEDIATRICS

## 2025-01-31 PROCEDURE — 5A09357 ASSISTANCE WITH RESPIRATORY VENTILATION, LESS THAN 24 CONSECUTIVE HOURS, CONTINUOUS POSITIVE AIRWAY PRESSURE: ICD-10-PCS | Performed by: PEDIATRICS

## 2025-01-31 PROCEDURE — 82805 BLOOD GASES W/O2 SATURATION: CPT | Performed by: PEDIATRICS

## 2025-01-31 PROCEDURE — 250N000013 HC RX MED GY IP 250 OP 250 PS 637: Performed by: PEDIATRICS

## 2025-01-31 PROCEDURE — 250N000011 HC RX IP 250 OP 636: Performed by: NURSE PRACTITIONER

## 2025-01-31 PROCEDURE — 36415 COLL VENOUS BLD VENIPUNCTURE: CPT | Performed by: PEDIATRICS

## 2025-01-31 PROCEDURE — 99233 SBSQ HOSP IP/OBS HIGH 50: CPT | Performed by: NURSE PRACTITIONER

## 2025-01-31 PROCEDURE — 258N000003 HC RX IP 258 OP 636: Performed by: PEDIATRICS

## 2025-01-31 PROCEDURE — 999N000157 HC STATISTIC RCP TIME EA 10 MIN

## 2025-01-31 PROCEDURE — 36415 COLL VENOUS BLD VENIPUNCTURE: CPT | Performed by: NURSE PRACTITIONER

## 2025-01-31 PROCEDURE — 36600 WITHDRAWAL OF ARTERIAL BLOOD: CPT

## 2025-01-31 PROCEDURE — 250N000013 HC RX MED GY IP 250 OP 250 PS 637: Performed by: NURSE PRACTITIONER

## 2025-01-31 RX ORDER — WARFARIN SODIUM 5 MG/1
5 TABLET ORAL
Status: COMPLETED | OUTPATIENT
Start: 2025-01-31 | End: 2025-01-31

## 2025-01-31 RX ORDER — FUROSEMIDE 10 MG/ML
40 INJECTION INTRAMUSCULAR; INTRAVENOUS EVERY 8 HOURS
Status: DISCONTINUED | OUTPATIENT
Start: 2025-01-31 | End: 2025-02-02

## 2025-01-31 RX ADMIN — VANCOMYCIN HYDROCHLORIDE 1250 MG: 1 INJECTION, POWDER, LYOPHILIZED, FOR SOLUTION INTRAVENOUS at 16:37

## 2025-01-31 RX ADMIN — FUROSEMIDE 40 MG: 10 INJECTION, SOLUTION INTRAMUSCULAR; INTRAVENOUS at 08:30

## 2025-01-31 RX ADMIN — EZETIMIBE 10 MG: 10 TABLET ORAL at 08:37

## 2025-01-31 RX ADMIN — METOPROLOL TARTRATE 150 MG: 100 TABLET, FILM COATED ORAL at 21:10

## 2025-01-31 RX ADMIN — FUROSEMIDE 40 MG: 10 INJECTION, SOLUTION INTRAMUSCULAR; INTRAVENOUS at 23:39

## 2025-01-31 RX ADMIN — OSELTAMAVIR PHOSPHATE 75 MG: 75 CAPSULE ORAL at 08:37

## 2025-01-31 RX ADMIN — ASPIRIN 81 MG 81 MG: 81 TABLET ORAL at 08:37

## 2025-01-31 RX ADMIN — PANTOPRAZOLE SODIUM 40 MG: 40 TABLET, DELAYED RELEASE ORAL at 08:37

## 2025-01-31 RX ADMIN — OSELTAMAVIR PHOSPHATE 75 MG: 75 CAPSULE ORAL at 21:10

## 2025-01-31 RX ADMIN — METOPROLOL TARTRATE 100 MG: 100 TABLET, FILM COATED ORAL at 08:37

## 2025-01-31 RX ADMIN — ATORVASTATIN CALCIUM 80 MG: 40 TABLET, FILM COATED ORAL at 08:37

## 2025-01-31 RX ADMIN — WARFARIN SODIUM 5 MG: 5 TABLET ORAL at 17:15

## 2025-01-31 ASSESSMENT — ACTIVITIES OF DAILY LIVING (ADL)
ADLS_ACUITY_SCORE: 33

## 2025-01-31 NOTE — MEDICATION SCRIBE - ADMISSION MEDICATION HISTORY
Medication Scribe Admission Medication History    Admission medication history is complete. The information provided in this note is only as accurate as the sources available at the time of the update.    Information Source(s): Patient via phone    Pertinent Information: Med scribe review post RN review. Verified missing last doses and times.     Changes made to PTA medication list:  Added: None  Deleted: None  Changed: None    Allergies reviewed with patient and updates made in EHR: yes    Medication History Completed By: MIKO FLORES 1/31/2025 11:27 AM    PTA Med List   Medication Sig Last Dose/Taking    aspirin (ASA) 81 MG chewable tablet Take 81 mg by mouth daily 1/30/2025 Morning    atorvastatin (LIPITOR) 80 MG tablet Take 1 tablet (80 mg) by mouth daily 1/30/2025 Morning    ezetimibe (ZETIA) 10 MG tablet Take 1 tablet (10 mg) by mouth daily. 1/30/2025 Morning    furosemide (LASIX) 40 MG tablet Take 0.5 tablets (20 mg) by mouth daily 1/30/2025 Morning    metFORMIN (GLUCOPHAGE) 1000 MG tablet Take 1 Tablet BY MOUTH EVERY DAY (Patient taking differently: Take 1,000 mg by mouth daily.) Noon    metoprolol tartrate (LOPRESSOR) 100 MG tablet Take 1 tablet (100 mg) by mouth 2 times daily. 1/30/2025 Morning    omeprazole (PRILOSEC) 40 MG DR capsule Take 1 capsule (40 mg) by mouth every morning. 1/30/2025 Morning    warfarin ANTICOAGULANT (JANTOVEN ANTICOAGULANT) 5 MG tablet Take 7.5 mg every Tues and 5 mg all other days of the week OR as directed by INR Clinic (Patient taking differently: Take 5-7.5 mg by mouth See Admin Instructions. 5 mg every Monday, Wednesday and Friday and 7.5 mg Tuesday, Thursday, Saturday, Sunday) 1/29/2025 Evening

## 2025-01-31 NOTE — PLAN OF CARE
Goal Outcome Evaluation:      Plan of Care Reviewed With: patient    Overall Patient Progress: no changeOverall Patient Progress: no change     Patient A&Ox4. Denies pain. Currently on 2L of O2, baseline RA. SOB upon activity, assist of 1/SBA. Tele- Chronic a-fib. ECHO- EF 40-45%, provider aware. Continues on droplet precautions for influenza. IV lasix, urine output present. Warfarin as ordered. Strict I&O with fluid restriction.

## 2025-01-31 NOTE — PHARMACY-ANTICOAGULATION SERVICE
Clinical Pharmacy - Warfarin Dosing Consult     Pharmacy has been consulted to manage this patient s warfarin therapy.  Indication: Atrial Fibrillation  Therapy Goal: INR 2-3  OP Anticoag Clinic: [facility]  Warfarin Prior to Admission: Yes  Warfarin PTA Regimen: 5 mg every Mon, Wed, Fri; 7.5 mg all other days  Recent documented change in oral intake/nutrition: Unknown  Dose Comments: Dose recently increased during ACC visit on 1/21/25    INR   Date Value Ref Range Status   01/31/2025 2.18 (H) 0.85 - 1.15 Final   01/30/2025 1.86 (H) 0.85 - 1.15 Final       Recommend warfarin 5 mg today.  Pharmacy will monitor Roger A Ring daily and order warfarin doses to achieve specified goal.      Please contact pharmacy as soon as possible if the warfarin needs to be held for a procedure or if the warfarin goals change.

## 2025-01-31 NOTE — PROGRESS NOTES
Spoke with patient and was able to have him agree to wear BiPAP due to his elevated PCO2 level.  Patient was placed on hospital ResMed BiPAP with setting of 12/8 with 2 LPM O2 bled in.  Settings were selected based on patient's previous sleep study recommendations.   RT to follow.

## 2025-01-31 NOTE — PHARMACY-VANCOMYCIN DOSING SERVICE
Pharmacy Vancomycin Initial Note  Date of Service 2025  Patient's  1955  69 year old, male    Indication: Bacteremia    Current estimated CrCl = Estimated Creatinine Clearance: 62 mL/min (A) (based on SCr of 1.28 mg/dL (H)).    Creatinine for last 3 days  2025:  6:53 PM Creatinine 1.39 mg/dL; 11:20 PM Creatinine 1.28 mg/dL    Recent Vancomycin Level(s) for last 3 days  No results found for requested labs within last 3 days.      Vancomycin IV Administrations (past 72 hours)        No vancomycin orders with administrations in past 72 hours.                    Nephrotoxins and other renal medications (From now, onward)      Start     Dose/Rate Route Frequency Ordered Stop    25  vancomycin (VANCOCIN) 1,250 mg in 0.9% NaCl 262.5 mL intermittent infusion         1,250 mg  over 90 Minutes Intravenous EVERY 18 HOURS 25 1500  furosemide (LASIX) injection 40 mg         40 mg  over 1-3 Minutes Intravenous 2 TIMES DAILY. 25 1441              Contrast Orders - past 72 hours (72h ago, onward)      None            Insight[employer] Prediction of Planned Initial Vancomycin Regimen  Loading dose: N/A  Regimen: 1250 mg IV every 18 hours.  Start time: 20:11 on 2025  Exposure target: AUC24 (range)400-600 mg/L.hr   AUC24,ss: 486 mg/L.hr  Probability of AUC24 > 400: 72 %  Ctrough,ss: 15.5 mg/L  Probability of Ctrough,ss > 20: 27 %  Probability of nephrotoxicity (Lodise LINDA ): 11 %          Plan:  Start vancomycin  1250 mg IV q18h.   Vancomycin monitoring method: AUC  Vancomycin therapeutic monitoring goal: 400-600 mg*h/L  Pharmacy will check vancomycin levels as appropriate in 1-3 Days.    Serum creatinine levels will be ordered daily for the first week of therapy and at least twice weekly for subsequent weeks.      Kecia Cruz, LTAC, located within [facility]

## 2025-01-31 NOTE — PLAN OF CARE
Goal Outcome Evaluation:       Patient is A&Ox4 but forgetful. Flat affect. Denies pain. IV SL. Lungs are very coarse with frequent cough. On 1.5L NC in the low 90s. Up in chair most of the day. SBA to bathroom. BM today and voiding well. IV lasix given, no swelling noted. On 1800 fluid restriction and tolerating well. Poor appetite. Tele reads ST. Blood sugars of 93 and 103.

## 2025-01-31 NOTE — PLAN OF CARE
Goal Outcome Evaluation:           Overall Patient Progress: no changeOverall Patient Progress: no change    Outcome Evaluation: A&O x 4 w/intermittent confusion.VSS. On 2L via NC. Denies  pain. Culture pos cocci clusters and ID of streptococcus species, started on Vancomycin. SBA.

## 2025-01-31 NOTE — PROGRESS NOTES
[facility]    Medicine Progress Note - Hospitalist Service    Date of Admission:  1/30/2025    Assessment & Plan   Roger Bonilla is a 69 year old male with chronic atrial fibrillation/flutter, CAD with history of MI with coronary artery stents and previous CABG, type 2 diabetes, hypertension, hyperlipidemia, PAD with previous carotid endarterectomy and AAA, chronic tobacco use which is ongoing, ABIGAIL, chronic anemia, and obesity who presented to ER on 1/29/2025 after onset of nonproductive cough, shortness of breath, malaise, fatigue, and bodyaches on January 27.  In the course of ER evaluation, he was severely hypoxic necessitating initiation of supplemental oxygen concerning for acute hypoxic respiratory failure, and he was tachycardic and tachypneic concerning for SIRS.  EKG and cardiac monitoring demonstrated atrial fibrillation and flutter.  He tested positive for influenza A with radiographic findings suspicious for lower respiratory tract infection including atypical pneumonia and possibly bronchitis.  Lab test results were abnormal including significant elevation of BNP, increase in creatinine from baseline concerning for acute kidney injury, mildly elevated troponin, and elevated lactic acid level.  He presents with clinical signs of volume overload concerning for acute heart failure which may be contributing to acute respiratory failure.  He is at high risk for an adverse outcome including worsening tachyarrhythmia, heart failure, and respiratory failure, so hospitalization is considered medically necessary.  Based on the presently available information, hospitalization for at least 2 midnights is anticipated.     Principal Problem:    Influenza A  Active Problems:    Atrial fibrillation/flutter (H)    Acute hypoxic respiratory failure (H)    SIRS (systemic inflammatory response syndrome) (H)    Hyperlipidemia LDL goal <70    ABIGAIL (obstructive sleep apnea)    Benign essential  hypertension    PAD (peripheral artery disease)    DYLAN (acute kidney injury)    S/P CABG (coronary artery bypass graft)    S/P carotid endarterectomy    Type 2 diabetes mellitus with diabetic peripheral angiopathy without gangrene, without long-term current use of insulin (H)    Elevated brain natriuretic peptide (BNP) level    Elevated lactic acid level    Elevation of levels of liver transaminase levels    Hypoalbuminemia    Chronic anemia    Elevated troponin    Warfarin-induced coagulopathy    Platelet dysfunction due to aspirin (H)    Morbid obesity (H)    Tobacco abuse    Coronary artery disease involving native coronary artery of native heart without angina pectoris    AAA (abdominal aortic aneurysm)    History of MI (myocardial infarction)    History of coronary artery stent placement     Influenza A with acute hypoxic hypercarbic respiratory failure and SIRS  Possible superimposed bacterial pneumonia  Clinical presentation and test results are suspicious for influenza A lower respiratory tract infection.  Acute hypoxic respiratory failure likely due at least in part to influenza A infection.  Presentation with tachycardia and tachypnea was concerning for SIRS although these could be due to respiratory failure and atrial fibrillation. Required resmed bipap this am due to hypercarbia on venous blood gas:  pH 7.25/76.  Check ABG after resmed for two hours:  7.31/64/72/32.  Now on 1 1/2 liter n/c    -continue Tamiflu to complete 5-day treatment course  -Titrate oxygen including escalation to high flow nasal cannula if necessary to keep saturations greater than 90%   -check vbg this afternoon and in am    Bacteremia.  Blood culture done on 1/29 in ED growing staph hominus in both bottles.  Initiated on vancomycin overnight  -continue vancomycin  -MRSA swab  -Monitor markers of inflammation     Chronic atrial flutter/fibrillation with possible rapid ventricular response  Acute HFrEF  Presented with chronic atrial  flutter/fibrillation with resting heart rates consistently 110-120.  Home regimen: metoprolol tartrate 100 bid for rate control and warfarin for anticoagulation.    Chronic tachyarrhythmia likely exacerbated by influenza A.  He also presented with elevated BNP and significant peripheral edema although weight was stable compared with previously and radiographic findings did not specifically mention interstitial pulmonary edema and/or pleural effusions.  He is not known to have chronic heart failure and previous echocardiogram demonstrated normal LVEF, normal RV size and function, and no significant valvular disease.  Acute heart failure likely triggered by tachyarrhythmia and influenza A.      TTE done this admission while in rapid atrial flutter.  Demonstrated decreased EF of 40-45% (decreased from previous TTE in 2023 however may be falsely decreased due to RVR), inferolateral akinesis, moderate concentric LV hypertrophy, mildy decreased RV fxn.     Weight not down this am.  Still net positive I/O   -continue telemetry  -increased home metoprolol tartrate from 100 mg bid to 150 mg twice daily  -continue  IV metoprolol 5 mg every 6 hours as needed for heart rate greater than 120  -increase furosemide to 60mg q 8 hours  -Treat influenza and other acute medical problems     Acute kidney injury  Creatinine on presentation was 1.39, increased compared with baseline creatinine 1.0 concerning for acute kidney injury.  Creatinine on down trend now (1.11 this am)  -recheck renal function in am  -Avoid or limit use of nephrotoxic medication as much as possible  -Adjust medication dosing accordingly for changing renal function  -Optimize treatment of other acute medical problems     Elevated lactic acid  Presented to ER with mildly elevated lactic acid that normalized after initial treatment in the ER that included IV fluid boluses.  Infection was not identified nor suspected clinically initially however overnight has  positive blood culture in 2 bottles.     -Monitor clinically and repeat lactic acid if he clinically worsens       Elevated troponin  History of MI  CAD status post coronary artery stents and CABG  Known CAD with previous history of MI, placement of coronary stents, and CABG.  Presented with mildly elevated troponin 60 that quickly trended down.  EKG is difficult to interpret for acute ischemia because of tachyarrhythmia.  He has not had angina.  Suspect elevated troponin is due to acute illness including influenza, respiratory failure, and tachyarrhythmia superimposed upon known CAD and acute heart failure.  -Not anticipating additional evaluation for acute ischemic heart disease during hospitalization unless his clinical status changes  -treat underlying illness      Elevated hepatic transaminases  Hypoalbuminemia  Gallbladder wall thickening on radiographic studies  Patient noted to have elevated hepatic transaminases in ER with initially low serum albumin.  Gallbladder wall appeared thickened on initial CT and confirmed on subsequent ultrasound.  However, he does not have abdominal pain or tenderness.  Pool sign on ultrasound was negative.  Bilirubin and alkaline phosphatase are normal and there were no signs of bile duct dilation radiographically.  Suspect elevated transaminases and gallbladder wall thickening may be reflective of decompensated heart failure.  Doubt cholecystitis.  Serum albumin is low probably due to poor oral nutritional intake recently.  -Ordered recheck LFTs for trend  -Reconsider additional radiographic studies and/or surgical consultation if there is increasing concern for cholecystitis     Type 2 diabetes  Chronic type 2 diabetes treated with metformin with most recent A1c 6.9 in October 2024.  Blood sugars have been normal to minimally elevated (93 to 103).  He did receive IV contrast for radiographic study in the ER.  -Hold metformin due to IV contrast administration  -Monitor blood  sugars 4 times daily  -Ordered NovoLog correction scale     PAD  Status post right carotid endarterectomy  AAA  Hyperlipidemia  Tobacco use  He has known PAD manifest as AAA and carotid atherosclerosis and previously underwent carotid endarterectomy.  On aspirin, Zetia, and statin.  He continues to smoke.  -Continue chronic aspirin, Zetia, and statin  -Smoking cessation advised  -Nicotine supplementation offered but declined by the patient at this time     Hypertension  Hypertension chronically treated with metoprolol and Lasix.  Blood pressure has been normotensive.  -Treating with IV Lasix as noted above because of concern for acute heart failure  -Continue chronic dose of metoprolol at increased dose as above     Chronic anemia  Aspirin induced platelet function defect  Warfarin induced coagulopathy  Chronically anemic with baseline hemoglobin 11-12, currently stable.  Chronically taking aspirin that causes platelet function defect and warfarin that causes coagulopathy both of which increase his risk for bleeding.  INR is therapeutic.  Active bleeding not apparent or suspected.  -Continue chronic aspirin  -Pharmacy managing warfarin doseing     Obesity  Appears obese with BMI 38.  Obesity likely contributes to health problems including diabetes.  -No acute intervention anticipated during hospitalization          Diet: Combination Diet Moderate Consistent Carb (60 g CHO per Meal) Diet; 2 gm NA Diet  Fluid restriction 1800 ML FLUID    DVT Prophylaxis: Warfarin  Peter Catheter: Not present  Lines: None     Cardiac Monitoring: ACTIVE order. Indication: Acute decompensated heart failure (48 hours)  Code Status: Full Code      Clinically Significant Risk Factors           # Hypocalcemia: Lowest Ca = 8.3 mg/dL in last 2 days, will monitor and replace as appropriate     # Hypoalbuminemia: Lowest albumin = 3.4 g/dL at 1/29/2025 11:20 PM, will monitor as appropriate     # Hypertension: Noted on problem list     # Acute  "Hypercapnic Respiratory Failure: based on venous blood gas results.  Continue supplemental oxygen and ventilatory support as indicated.        # DMII: A1C = N/A within past 6 months, PRESENT ON ADMISSION  # Obesity: Estimated body mass index is 37.45 kg/m  as calculated from the following:    Height as of this encounter: 1.676 m (5' 6\").    Weight as of this encounter: 105.2 kg (232 lb)., PRESENT ON ADMISSION      # History of CABG: noted on surgical history       Social Drivers of Health    Tobacco Use: High Risk (11/19/2024)    Patient History     Smoking Tobacco Use: Every Day     Smokeless Tobacco Use: Never     Passive Exposure: Never   Physical Activity: Insufficiently Active (4/15/2024)    Exercise Vital Sign     Days of Exercise per Week: 3 days     Minutes of Exercise per Session: 10 min   Stress: Stress Concern Present (4/15/2024)    [employer] [place] [employer] - Occupational Stress Questionnaire     Feeling of Stress : To some extent   Social Connections: Unknown (4/15/2024)    Social Connection and Isolation Panel [NHANES]     Frequency of Social Gatherings with Friends and Family: Once a week          Disposition Plan     Medically Ready for Discharge: Anticipated in 2-4 Days           The patient's care was discussed with the  entire care team .    Nafisa Acosta CNP  Hospitalist Service  [facility]  Securely message with [employer] (more info)  Text page via [address] Paging/Directory   ______________________________________________________________________    Interval History   Events overnight reviewed, hypercarbia, positive blood cultures    Physical Exam   Vital Signs: Temp: 97.6  F (36.4  C) Temp src: Oral BP: 113/74 Pulse: 116   Resp: 24 SpO2: (!) 91 % O2 Device: Nasal cannula Oxygen Delivery: 1.5 LPM  Weight: 232 lbs 0 oz    Gen:  sitting in chair no acute distress  HEENT:  normocephalic, atraumatic, oropharynx clear  Resp:  decreased bibasilarly, rhonchi and " wheeze t/o  Card:  irregularly irregular, no murmur, rub or gallop  Abd:  Soft obese, non tender,  normoactive bowel sounds   Neuro:  Neuro exam non focal      Medical Decision Making       45 MINUTES SPENT BY ME on the date of service doing chart review, history, exam, documentation & further activities per the note.        Data     I have personally reviewed the following data over the past 24 hrs:    8.3  \   11.6 (L)   / 243     141 101 30.9 (H) /  103 (H)   4.0 27 1.11 \     ALT: 222 (H) AST: 189 (H) AP: 74 TBILI: 0.4   ALB: 3.8 TOT PROTEIN: 7.0 LIPASE: N/A     Procal: 0.19 CRP: N/A Lactic Acid: N/A       INR:  2.18 (H) PTT:  N/A   D-dimer:  N/A Fibrinogen:  N/A       Imaging results reviewed over the past 24 hrs:   No results found for this or any previous visit (from the past 24 hours).

## 2025-01-31 NOTE — PROGRESS NOTES
Patient has declined to take use CPAP/BiPAP tonight.  Further stating he did not wear one at home.  Informed patient that in the event that he should changed his mind he can still have RT to assist him with placing it on.  RT to follow.

## 2025-02-01 LAB
ALBUMIN SERPL BCG-MCNC: 3.6 G/DL (ref 3.5–5.2)
ALP SERPL-CCNC: 75 U/L (ref 40–150)
ALT SERPL W P-5'-P-CCNC: 201 U/L (ref 0–70)
ANION GAP SERPL CALCULATED.3IONS-SCNC: 13 MMOL/L (ref 7–15)
AST SERPL W P-5'-P-CCNC: 130 U/L (ref 0–45)
BACTERIA BLD CULT: ABNORMAL
BACTERIA BLD CULT: ABNORMAL
BASE EXCESS BLDV CALC-SCNC: 7.4 MMOL/L (ref -3–3)
BILIRUB SERPL-MCNC: 0.6 MG/DL
BUN SERPL-MCNC: 24 MG/DL (ref 8–23)
CALCIUM SERPL-MCNC: 8.9 MG/DL (ref 8.8–10.4)
CHLORIDE SERPL-SCNC: 99 MMOL/L (ref 98–107)
CREAT SERPL-MCNC: 0.97 MG/DL (ref 0.67–1.17)
CRP SERPL-MCNC: 17.6 MG/L
EGFRCR SERPLBLD CKD-EPI 2021: 85 ML/MIN/1.73M2
ERYTHROCYTE [DISTWIDTH] IN BLOOD BY AUTOMATED COUNT: 19.3 % (ref 10–15)
GLUCOSE BLDC GLUCOMTR-MCNC: 109 MG/DL (ref 70–99)
GLUCOSE BLDC GLUCOMTR-MCNC: 109 MG/DL (ref 70–99)
GLUCOSE BLDC GLUCOMTR-MCNC: 110 MG/DL (ref 70–99)
GLUCOSE BLDC GLUCOMTR-MCNC: 113 MG/DL (ref 70–99)
GLUCOSE BLDC GLUCOMTR-MCNC: 145 MG/DL (ref 70–99)
GLUCOSE SERPL-MCNC: 103 MG/DL (ref 70–99)
HCO3 BLDV-SCNC: 35 MMOL/L (ref 21–28)
HCO3 SERPL-SCNC: 28 MMOL/L (ref 22–29)
HCT VFR BLD AUTO: 38.6 % (ref 40–53)
HGB BLD-MCNC: 11.7 G/DL (ref 13.3–17.7)
INR PPP: 2.36 (ref 0.85–1.15)
MCH RBC QN AUTO: 23.8 PG (ref 26.5–33)
MCHC RBC AUTO-ENTMCNC: 30.3 G/DL (ref 31.5–36.5)
MCV RBC AUTO: 79 FL (ref 78–100)
NT-PROBNP SERPL-MCNC: 3872 PG/ML (ref 0–900)
O2/TOTAL GAS SETTING VFR VENT: 22 %
OXYHGB MFR BLDV: 78 % (ref 70–75)
PCO2 BLDV: 67 MM HG (ref 40–50)
PH BLDV: 7.33 [PH] (ref 7.32–7.43)
PLATELET # BLD AUTO: 225 10E3/UL (ref 150–450)
PO2 BLDV: 49 MM HG (ref 25–47)
POTASSIUM SERPL-SCNC: 3.4 MMOL/L (ref 3.4–5.3)
PROT SERPL-MCNC: 6.8 G/DL (ref 6.4–8.3)
RBC # BLD AUTO: 4.91 10E6/UL (ref 4.4–5.9)
SAO2 % BLDV: 79.1 % (ref 70–75)
SODIUM SERPL-SCNC: 140 MMOL/L (ref 135–145)
WBC # BLD AUTO: 6.7 10E3/UL (ref 4–11)

## 2025-02-01 PROCEDURE — 82565 ASSAY OF CREATININE: CPT | Performed by: NURSE PRACTITIONER

## 2025-02-01 PROCEDURE — 82805 BLOOD GASES W/O2 SATURATION: CPT | Performed by: NURSE PRACTITIONER

## 2025-02-01 PROCEDURE — 36415 COLL VENOUS BLD VENIPUNCTURE: CPT | Performed by: NURSE PRACTITIONER

## 2025-02-01 PROCEDURE — 120N000001 HC R&B MED SURG/OB

## 2025-02-01 PROCEDURE — 250N000013 HC RX MED GY IP 250 OP 250 PS 637: Performed by: NURSE PRACTITIONER

## 2025-02-01 PROCEDURE — 99233 SBSQ HOSP IP/OBS HIGH 50: CPT | Performed by: NURSE PRACTITIONER

## 2025-02-01 PROCEDURE — 85610 PROTHROMBIN TIME: CPT | Performed by: PEDIATRICS

## 2025-02-01 PROCEDURE — 87040 BLOOD CULTURE FOR BACTERIA: CPT | Performed by: NURSE PRACTITIONER

## 2025-02-01 PROCEDURE — 250N000011 HC RX IP 250 OP 636: Performed by: PEDIATRICS

## 2025-02-01 PROCEDURE — 250N000011 HC RX IP 250 OP 636: Performed by: NURSE PRACTITIONER

## 2025-02-01 PROCEDURE — 999N000157 HC STATISTIC RCP TIME EA 10 MIN

## 2025-02-01 PROCEDURE — 83880 ASSAY OF NATRIURETIC PEPTIDE: CPT | Performed by: NURSE PRACTITIONER

## 2025-02-01 PROCEDURE — 86140 C-REACTIVE PROTEIN: CPT | Performed by: NURSE PRACTITIONER

## 2025-02-01 PROCEDURE — 250N000013 HC RX MED GY IP 250 OP 250 PS 637: Performed by: PEDIATRICS

## 2025-02-01 PROCEDURE — 258N000003 HC RX IP 258 OP 636: Performed by: PEDIATRICS

## 2025-02-01 PROCEDURE — 85027 COMPLETE CBC AUTOMATED: CPT | Performed by: NURSE PRACTITIONER

## 2025-02-01 RX ORDER — METOPROLOL TARTRATE 100 MG/1
200 TABLET ORAL 2 TIMES DAILY
Status: DISCONTINUED | OUTPATIENT
Start: 2025-02-01 | End: 2025-02-10 | Stop reason: HOSPADM

## 2025-02-01 RX ORDER — CEFAZOLIN SODIUM 1 G/3ML
1 INJECTION, POWDER, FOR SOLUTION INTRAMUSCULAR; INTRAVENOUS EVERY 8 HOURS
Status: DISCONTINUED | OUTPATIENT
Start: 2025-02-01 | End: 2025-02-01

## 2025-02-01 RX ORDER — CEFAZOLIN SODIUM 2 G/50ML
2 SOLUTION INTRAVENOUS EVERY 8 HOURS
Status: DISCONTINUED | OUTPATIENT
Start: 2025-02-01 | End: 2025-02-04

## 2025-02-01 RX ADMIN — CEFAZOLIN SODIUM 2 G: 2 SOLUTION INTRAVENOUS at 13:50

## 2025-02-01 RX ADMIN — PANTOPRAZOLE SODIUM 40 MG: 40 TABLET, DELAYED RELEASE ORAL at 08:34

## 2025-02-01 RX ADMIN — WARFARIN SODIUM 7.5 MG: 5 TABLET ORAL at 17:37

## 2025-02-01 RX ADMIN — OSELTAMAVIR PHOSPHATE 75 MG: 75 CAPSULE ORAL at 20:58

## 2025-02-01 RX ADMIN — METOPROLOL TARTRATE 200 MG: 100 TABLET, FILM COATED ORAL at 20:58

## 2025-02-01 RX ADMIN — FUROSEMIDE 40 MG: 10 INJECTION, SOLUTION INTRAMUSCULAR; INTRAVENOUS at 08:32

## 2025-02-01 RX ADMIN — METOPROLOL TARTRATE 150 MG: 100 TABLET, FILM COATED ORAL at 08:33

## 2025-02-01 RX ADMIN — OSELTAMAVIR PHOSPHATE 75 MG: 75 CAPSULE ORAL at 08:34

## 2025-02-01 RX ADMIN — VANCOMYCIN HYDROCHLORIDE 1250 MG: 1 INJECTION, POWDER, LYOPHILIZED, FOR SOLUTION INTRAVENOUS at 08:32

## 2025-02-01 RX ADMIN — CEFAZOLIN SODIUM 2 G: 2 SOLUTION INTRAVENOUS at 21:00

## 2025-02-01 RX ADMIN — ASPIRIN 81 MG 81 MG: 81 TABLET ORAL at 08:32

## 2025-02-01 RX ADMIN — FUROSEMIDE 40 MG: 10 INJECTION, SOLUTION INTRAMUSCULAR; INTRAVENOUS at 15:58

## 2025-02-01 RX ADMIN — EZETIMIBE 10 MG: 10 TABLET ORAL at 08:32

## 2025-02-01 RX ADMIN — ATORVASTATIN CALCIUM 80 MG: 40 TABLET, FILM COATED ORAL at 08:33

## 2025-02-01 ASSESSMENT — ACTIVITIES OF DAILY LIVING (ADL)
ADLS_ACUITY_SCORE: 31
ADLS_ACUITY_SCORE: 33
ADLS_ACUITY_SCORE: 31
ADLS_ACUITY_SCORE: 33
DEPENDENT_IADLS:: INDEPENDENT
ADLS_ACUITY_SCORE: 33
ADLS_ACUITY_SCORE: 31
ADLS_ACUITY_SCORE: 33
ADLS_ACUITY_SCORE: 33

## 2025-02-01 NOTE — PHARMACY-ANTICOAGULATION SERVICE
Clinical Pharmacy - Warfarin Dosing Consult     Pharmacy has been consulted to manage this patient s warfarin therapy.  Indication: Atrial Fibrillation  Therapy Goal: INR 2-3  OP Anticoag Clinic: [facility]  Warfarin Prior to Admission: Yes  Warfarin PTA Regimen: 5 mg every Mon, Wed, Fri; 7.5 mg all other days  Recent documented change in oral intake/nutrition: Unknown  Dose Comments: Dose recently increased during [facility] visit on 1/21/25    INR   Date Value Ref Range Status   02/01/2025 2.36 (H) 0.85 - 1.15 Final   01/31/2025 2.18 (H) 0.85 - 1.15 Final       Recommend warfarin 7.5 mg today.  Pharmacy will monitor Roger A Ring daily and order warfarin doses to achieve specified goal.      Please contact pharmacy as soon as possible if the warfarin needs to be held for a procedure or if the warfarin goals change.

## 2025-02-01 NOTE — PLAN OF CARE
Goal Outcome Evaluation:    4 Hour Shift Note: Patient has been alert and oriented. Oxygen remains on at 1.5L. Lungs diminished throughout. Antibiotics changed and repeat blood cultures obtained this afternoon. Lopressor also increased today. Tele has been a flutter.

## 2025-02-01 NOTE — PLAN OF CARE
Goal Outcome Evaluation:    Patient alert, oriented x4. Pleasant and conversant. Lung sounds coarse and scratchy. VSS on 1.5L O2 NC. Refused CPAP. SBA to bathroom. Sinus tachy on tele. Denies pain.

## 2025-02-01 NOTE — PROGRESS NOTES
Patient has been alert and oriented. Very flat affect due to sadness that he will talk in length about regarding the recent loss of his wife. Writer offered additional resources, however, pt declined stating he has someone to talk to. Ambulating within the room stand by assist. Oxygen remains on at 1.5L with sats 91-94%. HR remains in the 110's; SR-SD. He reports mild generalized discomfort but has declined offered medication.

## 2025-02-01 NOTE — PROGRESS NOTES
[facility]    Medicine Progress Note - Hospitalist Service    Date of Admission:  1/30/2025    Assessment & Plan   Roger Bonilla is a 69 year old male with chronic atrial fibrillation/flutter, CAD with history of MI with coronary artery stents and previous CABG, type 2 diabetes, hypertension, hyperlipidemia, PAD with previous carotid endarterectomy and AAA, chronic tobacco use which is ongoing, ABIGAIL, chronic anemia, and obesity who presented to ER on 1/29/2025 after onset of nonproductive cough, shortness of breath, malaise, fatigue, and bodyaches on January 27.  In the course of ER evaluation, he was severely hypoxic necessitating initiation of supplemental oxygen concerning for acute hypoxic respiratory failure, and he was tachycardic and tachypneic concerning for SIRS.  EKG and cardiac monitoring demonstrated atrial fibrillation and flutter.  He tested positive for influenza A and  imaging suspicious for lower respiratory tract infection including atypical pneumonia and possibly bronchitis.  Lab test results with significant elevation of BNP, increase in creatinine from baseline concerning for acute kidney injury, mildly elevated troponin, and elevated lactic acid level.  He presented with clinical signs of volume overload concerning for acute heart failure which may be contributing to acute respiratory failure.  .     Principal Problem:    Influenza A  Active Problems:    Atrial fibrillation/flutter (H)    Acute hypoxic respiratory failure (H)    SIRS (systemic inflammatory response syndrome) (H)    Hyperlipidemia LDL goal <70    ABIGAIL (obstructive sleep apnea)    Benign essential hypertension    PAD (peripheral artery disease)    DYLAN (acute kidney injury)    S/P CABG (coronary artery bypass graft)    S/P carotid endarterectomy    Type 2 diabetes mellitus with diabetic peripheral angiopathy without gangrene, without long-term current use of insulin (H)    Elevated brain natriuretic  peptide (BNP) level    Elevated lactic acid level    Elevation of levels of liver transaminase levels    Hypoalbuminemia    Chronic anemia    Elevated troponin    Warfarin-induced coagulopathy    Platelet dysfunction due to aspirin (H)    Morbid obesity (H)    Tobacco abuse    Coronary artery disease involving native coronary artery of native heart without angina pectoris    AAA (abdominal aortic aneurysm)    History of MI (myocardial infarction)    History of coronary artery stent placement     Influenza A with acute hypoxic hypercarbic respiratory failure and SIRS  Possible superimposed bacterial pneumonia  Clinical presentation and test results are suspicious for influenza A lower respiratory tract infection.  Acute hypoxic respiratory failure likely due to influenza A infection and possible superimposed bacterial pneumonia.  He presented with tachycardia and tachypnea which was concerning for SIRS although these could be due to respiratory failure and atrial fibrillation. Required resmed bipap on 1/31 due to hypercarbia on venous blood gas:  pH 7.25/76.  Check ABG after resmed for two hours:  7.31/64/72/32.  Now on 1 1/2 liter n/c.  VBG on 2/1/25 pH 7.33/PCO2 67 metabolically compensated.  He still fees fatigue and malaise, exhibits coarse rhonchi      -continue Tamiflu to complete 5-day treatment course  -Titrate oxygen including escalation to high flow nasal cannula if necessary to keep saturations greater than 90%   -check vbg in am  -treat HFmrEF as (see below)     Bacteremia.  Blood culture done on 1/29 in ED growing staph hominus in both bottles.  Initiated on vancomycin but now speciation in and will change to cefazolin 2 g q 8 hours.  MRSA swab negative  -discontinue vancomycin  - start cefazolin 2 g q 8 hours.  -Monitor markers of inflammation  -repeat blood cultures     Chronic atrial flutter/fibrillation with possible rapid ventricular response  Acute HFmrEF  Presented with chronic atrial  flutter/fibrillation with resting heart rates consistently 110-120.  Home regimen: metoprolol tartrate 100 bid for rate control and warfarin for anticoagulation.    Chronic tachyarrhythmia likely exacerbated by influenza A.  He also presented with elevated BNP and significant peripheral edema although weight was stable compared with previously and radiographic findings did not specifically mention interstitial pulmonary edema and/or pleural effusions.  He is not known to have chronic heart failure and previous echocardiogram demonstrated normal LVEF, normal RV size and function, and no significant valvular disease.  Acute heart failure likely triggered by tachyarrhythmia and influenza A.       TTE done this admission while in rapid atrial flutter.  Demonstrated decreased EF of 40-45% (decreased from previous TTE in 2023 however may be falsely decreased due to RVR), inferolateral akinesis, moderate concentric LV hypertrophy, mildy decreased RV fxn.      Weight down some and finally net negative.  Heart rate still 114-118.  -continue telemetry  -increased home metoprolol tartrate from 100 mg bid to 150 mg twice daily on 1/31 will now increase to 200 bid  -continue  IV metoprolol 5 mg every 6 hours as needed for heart rate greater than 120  -continue furosemide 40mg q 8 hours  -Treat influenza and other acute medical problems  -daily weights  -strict I/O  -fluid restriction 1800 ml     Acute kidney injury-resolved  Creatinine on presentation was 1.39, increased compared with baseline creatinine 1.0 concerning for acute kidney injury.  Creatinine on down trend  -recheck renal function in am  -Avoid or limit use of nephrotoxic medication as much as possible  -Adjust medication dosing accordingly for changing renal function  -Optimize treatment of other acute medical problems     Elevated lactic acid  Presented to ER with mildly elevated lactic acid that normalized after initial treatment in the ER that included IV fluid  boluses.  Infection was not identified nor suspected clinically initially however subsequent had positive blood culture staph hominis in 2 bottles.     -Monitor clinically and repeat lactic acid if he clinically worsens     Elevated troponin  History of MI  CAD status post coronary artery stents and CABG  Known CAD with previous history of MI, placement of coronary stents, and CABG.  Presented with mildly elevated troponin 60 that quickly trended down.  EKG is difficult to interpret for acute ischemia because of tachyarrhythmia.  He has not had angina.  Suspect elevated troponin is due to acute illness including influenza, respiratory failure, and tachyarrhythmia superimposed upon known CAD and acute heart failure.  -No additional evaluation for acute ischemic heart disease during hospitalization unless his clinical status changes  -treat underlying illness      Elevated hepatic transaminases  Hypoalbuminemia  Gallbladder wall thickening on radiographic studies  Patient noted to have elevated hepatic transaminases in ER with initially low serum albumin.  Gallbladder wall appeared thickened on initial CT and confirmed on subsequent ultrasound.  However, he does not have abdominal pain or tenderness.  Pool sign on ultrasound was negative.  Bilirubin and alkaline phosphatase are normal and there were no signs of bile duct dilation radiographically.  Suspect elevated transaminases and gallbladder wall thickening may be reflective of decompensated heart failure versus acute viral illness (I.e. pro-inflammatory response).    Serum albumin is low probably due to poor oral nutritional intake recently.  2/1 on down trend  -Ordered recheck LFTs -trend       Type 2 diabetes.    Chronic type 2 diabetes treated with metformin with most recent A1c 6.9 in October 2024.  Blood sugars have been normal to minimally elevated (93 to 103).  He did receive IV contrast for radiographic study in the ER.  Blood sugars   -Hold  metformin due to IV contrast administration  -Monitor blood sugars 4 times daily  -continue NovoLog correction scale     PAD  Status post right carotid endarterectomy  AAA  Hyperlipidemia  Tobacco use  He has known PAD manifest as AAA and carotid atherosclerosis and previously underwent carotid endarterectomy.  On aspirin, Zetia, and statin.  He continues to smoke.  -Continue chronic aspirin, Zetia, and statin  -Smoking cessation advised  -Nicotine supplementation offered but declined by the patient at this time     Hypertension  Hypertension chronically treated with metoprolol and Lasix.  Blood pressure has been normotensive.  -Treating with IV Lasix as noted above because of concern for acute heart failure  -Continue chronic dose of metoprolol at increased dose as above     Chronic anemia  Aspirin induced platelet function defect  Warfarin induced coagulopathy  Chronically anemic with baseline hemoglobin 11-12, currently stable.  Chronically taking aspirin that causes platelet function defect and warfarin that causes coagulopathy both of which increase his risk for bleeding.  INR is therapeutic.  Active bleeding not apparent or suspected.  -Continue chronic aspirin  -Pharmacy managing warfarin doseing     Obesity  Appears obese with BMI 38.  Obesity likely contributes to health problems including diabetes.  -No acute intervention anticipated during hospitalization          Diet: Combination Diet Moderate Consistent Carb (60 g CHO per Meal) Diet; 2 gm NA Diet  Fluid restriction 1800 ML FLUID    DVT Prophylaxis: Warfarin  Peter Catheter: Not present  Lines: None     Cardiac Monitoring: ACTIVE order. Indication: Acute decompensated heart failure (48 hours)  Code Status: Full Code      Clinically Significant Risk Factors               # Hypoalbuminemia: Lowest albumin = 3.4 g/dL at 1/29/2025 11:20 PM, will monitor as appropriate     # Hypertension: Noted on problem list     # Acute Hypercapnic Respiratory Failure:  "based on venous blood gas results.  Continue supplemental oxygen and ventilatory support as indicated.        # DMII: A1C = N/A within past 6 months, PRESENT ON ADMISSION  # Obesity: Estimated body mass index is 37.38 kg/m  as calculated from the following:    Height as of this encounter: 1.676 m (5' 6\").    Weight as of this encounter: 105.1 kg (231 lb 9.6 oz)., PRESENT ON ADMISSION     # Financial/Environmental Concerns: none   # History of CABG: noted on surgical history       Social Drivers of Health    Tobacco Use: High Risk (11/19/2024)    Patient History     Smoking Tobacco Use: Every Day     Smokeless Tobacco Use: Never     Passive Exposure: Never   Physical Activity: Insufficiently Active (4/15/2024)    Exercise Vital Sign     Days of Exercise per Week: 3 days     Minutes of Exercise per Session: 10 min   Stress: Stress Concern Present (4/15/2024)    [employer] - Occupational Stress Questionnaire     Feeling of Stress : To some extent   Social Connections: Unknown (4/15/2024)    Social Connection and Isolation Panel [NHANES]     Frequency of Social Gatherings with Friends and Family: Once a week          Disposition Plan     Medically Ready for Discharge: Anticipated in 2-4 Days           The patient's care was discussed with the  entire care team .    Nafisa Acosta CNP  Hospitalist Service  [facility]  Securely message with [employer] (more info)  Text page via [employer] Paging/Directory   ______________________________________________________________________    Interval History   Reviewed clinical overnight data.  Patient does not feel much better    Physical Exam   Vital Signs: Temp: 97.9  F (36.6  C) Temp src: Oral BP: 132/84 Pulse: 114   Resp: 18 SpO2: 94 % O2 Device: Nasal cannula Oxygen Delivery: 1.5 LPM  Weight: 231 lbs 9.6 oz    Gen:sitting in chair,  no acute distress  HEENT:  normocephalic, atraumatic, oropharynx clear  Resp:  coarse rhonchi and " wheeze through out  Card:  S1,S2, RRR no murmur, rub or gallop  Abd:  Soft obese, nondistended, non tender,  normoactive bowel sounds   Neuro:  Neuro exam non focal      Medical Decision Making       45 MINUTES SPENT BY ME on the date of service doing chart review, history, exam, documentation & further activities per the note.        Data     I have personally reviewed the following data over the past 24 hrs:    6.7  \   11.7 (L)   / 225     140 99 24.0 (H) /  109 (H)   3.4 28 0.97 \     ALT: 201 (H) AST: 130 (H) AP: 75 TBILI: 0.6   ALB: 3.6 TOT PROTEIN: 6.8 LIPASE: N/A     INR:  2.36 (H) PTT:  N/A   D-dimer:  N/A Fibrinogen:  N/A       Imaging results reviewed over the past 24 hrs:   No results found for this or any previous visit (from the past 24 hours).

## 2025-02-01 NOTE — PROGRESS NOTES
Pt refused to wear hospital's BiPAP tonight due to claustrophobia. Will monitor pt for desats and the need to use. Informed RN.

## 2025-02-01 NOTE — CONSULTS
Care Management Initial Consult    General Information  Assessment completed with: Patient    Type of CM/SW Visit: Initial Assessment    Primary Care Provider verified and updated as needed: Yes   Readmission within the last 30 days: no previous admission in last 30 days      Reason for Consult: discharge planning  Advance Care Planning:         Communication Assessment  Patient's communication style: spoken language (English or Bilingual)    Hearing Difficulty or Deaf: no   Wear Glasses or Blind: no    Cognitive  Cognitive/Neuro/Behavioral: WDL  Level of Consciousness: alert  Arousal Level: opens eyes spontaneously  Orientation: oriented x 4  Mood/Behavior: calm, cooperative  Best Language: 0 - No aphasia  Speech: clear    Living Environment:   People in home: alone     Current living Arrangements: mobile home      Able to return to prior arrangements: yes     Family/Social Support:  Care provided by: self  Provides care for: pet(s)  Marital Status:   Support system: Children          Description of Support System: Supportive, Involved    Support Assessment: Adequate family and caregiver support, Adequate social supports    Current Resources:   Patient receiving home care services: No     Community Resources: None  Equipment currently used at home: none  Supplies currently used at home: None    Employment/Financial:  Employment Status: retired        Financial Concerns: none      Does the patient's insurance plan have a 3 day qualifying hospital stay waiver?  Yes     Which insurance plan 3 day waiver is available? Alternative insurance waiver    Will the waiver be used for post-acute placement? No    Lifestyle & Psychosocial Needs:  Social Drivers of Health     Food Insecurity: Low Risk  (1/30/2025)    Food Insecurity     Within the past 12 months, did you worry that your food would run out before you got money to buy more?: No     Within the past 12 months, did the food you bought just not last and you  didn t have money to get more?: No   Depression: Not at risk (10/28/2024)    PHQ-2     PHQ-2 Score: 0   Housing Stability: Low Risk  (1/30/2025)    Housing Stability     Do you have housing? : Yes     Are you worried about losing your housing?: No   Tobacco Use: High Risk (11/19/2024)    Patient History     Smoking Tobacco Use: Every Day     Smokeless Tobacco Use: Never     Passive Exposure: Never   Financial Resource Strain: Low Risk  (1/30/2025)    Financial Resource Strain     Within the past 12 months, have you or your family members you live with been unable to get utilities (heat, electricity) when it was really needed?: No   Alcohol Use: Not on file   Transportation Needs: Low Risk  (1/30/2025)    Transportation Needs     Within the past 12 months, has lack of transportation kept you from medical appointments, getting your medicines, non-medical meetings or appointments, work, or from getting things that you need?: No   Physical Activity: Insufficiently Active (4/15/2024)    Exercise Vital Sign     Days of Exercise per Week: 3 days     Minutes of Exercise per Session: 10 min   Interpersonal Safety: Low Risk  (1/30/2025)    Interpersonal Safety     Do you feel physically and emotionally safe where you currently live?: Yes     Within the past 12 months, have you been hit, slapped, kicked or otherwise physically hurt by someone?: No     Within the past 12 months, have you been humiliated or emotionally abused in other ways by your partner or ex-partner?: No   Stress: Stress Concern Present (4/15/2024)    [employer] - Occupational Stress Questionnaire     Feeling of Stress : To some extent   Social Connections: Unknown (4/15/2024)    Social Connection and Isolation Panel [NHANES]     Frequency of Communication with Friends and Family: Not on file     Frequency of Social Gatherings with Friends and Family: Once a week     Attends Buddhism Services: Not on file     Active Member of  Clubs or Organizations: Not on file     Attends Club or Organization Meetings: Not on file     Marital Status: Not on file   Health Literacy: Not on file     Functional Status:  Prior to admission patient needed assistance:   Dependent ADLs:: Independent  Dependent IADLs:: Independent     Mental Health Status:  Mental Health Status: No Current Concerns       Chemical Dependency Status:  Chemical Dependency Status: No Current Concerns           Values/Beliefs:  Spiritual, Cultural Beliefs, Worship Practices, Values that affect care: no             Discussed  Partnership in Safe Discharge Planning  document with patient/family: No      Additional Information:  Care Management received referral due to elevated risk score. CM met with patient at bedside - introduced self and explained role.     Patient resides in mobile home in [place] alone. Patient states that his wife passed away 2 months ago and is grieving this loss. Patient is independent with ADLs and IADLs at baseline and has no services/HHC at home. Does not use DME or O2 at home.     Patient plans to return home when medically ready - no discharge needs anticipated. [facility] task sent to follow up with PCP as patient has elevated risk score.     Plan: home - no needs anticipated  Transport: family      ANDREW Arenas  Inpatient Care Coordinator   [facility] [phone]  [facility] [phone]

## 2025-02-02 LAB
ALBUMIN SERPL BCG-MCNC: 3.6 G/DL (ref 3.5–5.2)
ALP SERPL-CCNC: 76 U/L (ref 40–150)
ALT SERPL W P-5'-P-CCNC: 127 U/L (ref 0–70)
ANION GAP SERPL CALCULATED.3IONS-SCNC: 13 MMOL/L (ref 7–15)
AST SERPL W P-5'-P-CCNC: 65 U/L (ref 0–45)
BILIRUB SERPL-MCNC: 0.6 MG/DL
BUN SERPL-MCNC: 20.7 MG/DL (ref 8–23)
CALCIUM SERPL-MCNC: 8.8 MG/DL (ref 8.8–10.4)
CHLORIDE SERPL-SCNC: 94 MMOL/L (ref 98–107)
CREAT SERPL-MCNC: 1 MG/DL (ref 0.67–1.17)
EGFRCR SERPLBLD CKD-EPI 2021: 81 ML/MIN/1.73M2
ERYTHROCYTE [DISTWIDTH] IN BLOOD BY AUTOMATED COUNT: 19.2 % (ref 10–15)
GLUCOSE BLDC GLUCOMTR-MCNC: 116 MG/DL (ref 70–99)
GLUCOSE BLDC GLUCOMTR-MCNC: 117 MG/DL (ref 70–99)
GLUCOSE BLDC GLUCOMTR-MCNC: 120 MG/DL (ref 70–99)
GLUCOSE BLDC GLUCOMTR-MCNC: 121 MG/DL (ref 70–99)
GLUCOSE SERPL-MCNC: 112 MG/DL (ref 70–99)
HCO3 SERPL-SCNC: 32 MMOL/L (ref 22–29)
HCT VFR BLD AUTO: 41.2 % (ref 40–53)
HGB BLD-MCNC: 12.7 G/DL (ref 13.3–17.7)
INR PPP: 2.41 (ref 0.85–1.15)
MCH RBC QN AUTO: 24 PG (ref 26.5–33)
MCHC RBC AUTO-ENTMCNC: 30.8 G/DL (ref 31.5–36.5)
MCV RBC AUTO: 78 FL (ref 78–100)
PLATELET # BLD AUTO: 233 10E3/UL (ref 150–450)
POTASSIUM SERPL-SCNC: 3.3 MMOL/L (ref 3.4–5.3)
PROT SERPL-MCNC: 6.9 G/DL (ref 6.4–8.3)
RBC # BLD AUTO: 5.3 10E6/UL (ref 4.4–5.9)
SODIUM SERPL-SCNC: 139 MMOL/L (ref 135–145)
WBC # BLD AUTO: 7.2 10E3/UL (ref 4–11)

## 2025-02-02 PROCEDURE — 36415 COLL VENOUS BLD VENIPUNCTURE: CPT | Performed by: NURSE PRACTITIONER

## 2025-02-02 PROCEDURE — 82565 ASSAY OF CREATININE: CPT | Performed by: NURSE PRACTITIONER

## 2025-02-02 PROCEDURE — 85610 PROTHROMBIN TIME: CPT | Performed by: PEDIATRICS

## 2025-02-02 PROCEDURE — 250N000011 HC RX IP 250 OP 636: Performed by: NURSE PRACTITIONER

## 2025-02-02 PROCEDURE — 250N000013 HC RX MED GY IP 250 OP 250 PS 637: Performed by: NURSE PRACTITIONER

## 2025-02-02 PROCEDURE — 120N000001 HC R&B MED SURG/OB

## 2025-02-02 PROCEDURE — 250N000013 HC RX MED GY IP 250 OP 250 PS 637: Performed by: PEDIATRICS

## 2025-02-02 PROCEDURE — 99233 SBSQ HOSP IP/OBS HIGH 50: CPT | Performed by: NURSE PRACTITIONER

## 2025-02-02 PROCEDURE — 85041 AUTOMATED RBC COUNT: CPT | Performed by: NURSE PRACTITIONER

## 2025-02-02 RX ORDER — OSELTAMIVIR PHOSPHATE 75 MG/1
75 CAPSULE ORAL 2 TIMES DAILY
Status: COMPLETED | OUTPATIENT
Start: 2025-02-02 | End: 2025-02-04

## 2025-02-02 RX ORDER — FUROSEMIDE 10 MG/ML
40 INJECTION INTRAMUSCULAR; INTRAVENOUS
Status: DISCONTINUED | OUTPATIENT
Start: 2025-02-02 | End: 2025-02-07

## 2025-02-02 RX ADMIN — FUROSEMIDE 40 MG: 10 INJECTION, SOLUTION INTRAMUSCULAR; INTRAVENOUS at 00:02

## 2025-02-02 RX ADMIN — METOPROLOL TARTRATE 200 MG: 100 TABLET, FILM COATED ORAL at 21:30

## 2025-02-02 RX ADMIN — CEFAZOLIN SODIUM 2 G: 2 SOLUTION INTRAVENOUS at 21:31

## 2025-02-02 RX ADMIN — FUROSEMIDE 40 MG: 10 INJECTION, SOLUTION INTRAMUSCULAR; INTRAVENOUS at 17:04

## 2025-02-02 RX ADMIN — ATORVASTATIN CALCIUM 80 MG: 40 TABLET, FILM COATED ORAL at 08:17

## 2025-02-02 RX ADMIN — CEFAZOLIN SODIUM 2 G: 2 SOLUTION INTRAVENOUS at 05:35

## 2025-02-02 RX ADMIN — OSELTAMIVIR PHOSPHATE 75 MG: 75 CAPSULE ORAL at 21:30

## 2025-02-02 RX ADMIN — PANTOPRAZOLE SODIUM 40 MG: 40 TABLET, DELAYED RELEASE ORAL at 08:18

## 2025-02-02 RX ADMIN — CEFAZOLIN SODIUM 2 G: 2 SOLUTION INTRAVENOUS at 14:09

## 2025-02-02 RX ADMIN — METOPROLOL TARTRATE 200 MG: 100 TABLET, FILM COATED ORAL at 08:18

## 2025-02-02 RX ADMIN — FUROSEMIDE 40 MG: 10 INJECTION, SOLUTION INTRAMUSCULAR; INTRAVENOUS at 08:17

## 2025-02-02 RX ADMIN — OSELTAMAVIR PHOSPHATE 75 MG: 75 CAPSULE ORAL at 08:18

## 2025-02-02 RX ADMIN — WARFARIN SODIUM 7.5 MG: 5 TABLET ORAL at 18:18

## 2025-02-02 RX ADMIN — EZETIMIBE 10 MG: 10 TABLET ORAL at 08:18

## 2025-02-02 RX ADMIN — ASPIRIN 81 MG 81 MG: 81 TABLET ORAL at 08:18

## 2025-02-02 ASSESSMENT — ACTIVITIES OF DAILY LIVING (ADL)
ADLS_ACUITY_SCORE: 31
ADLS_ACUITY_SCORE: 35
ADLS_ACUITY_SCORE: 31
ADLS_ACUITY_SCORE: 33
ADLS_ACUITY_SCORE: 35
ADLS_ACUITY_SCORE: 35
ADLS_ACUITY_SCORE: 33
ADLS_ACUITY_SCORE: 35
ADLS_ACUITY_SCORE: 33
ADLS_ACUITY_SCORE: 35
ADLS_ACUITY_SCORE: 33
ADLS_ACUITY_SCORE: 35
ADLS_ACUITY_SCORE: 33
ADLS_ACUITY_SCORE: 35
ADLS_ACUITY_SCORE: 35
ADLS_ACUITY_SCORE: 33
ADLS_ACUITY_SCORE: 35
ADLS_ACUITY_SCORE: 33
ADLS_ACUITY_SCORE: 33

## 2025-02-02 NOTE — PLAN OF CARE
Goal Outcome Evaluation:    A&Ox4. Flat affect. Denied pain. No insulin needed to be given. Ancef given per orders. IV lasix given with good output. Tele reading aflutter to NST. Tapped out of FR for the day. Up SBA with FWW to brv. Fair appetite for dinner. Call light in reach and can make needs known.

## 2025-02-02 NOTE — PROGRESS NOTES
"Alert and oriented x 4. Vitals stable on 1L NC. Denies pain, no nausea or vomiting. Some shortness of breath with activity. 40 mg lasix given with good output. Blood sugars elevated but stable. 1800 FR. Receiving IV ancef.  /69 (BP Location: Right arm)   Pulse 113   Temp 97.9  F (36.6  C) (Oral)   Resp 20   Ht 1.676 m (5' 6\")   Wt 104.4 kg (230 lb 1.6 oz)   SpO2 (!) 91%   BMI 37.14 kg/m      "

## 2025-02-02 NOTE — PLAN OF CARE
Goal Outcome Evaluation:                  Showered today and states he is feeling a bit better. No appetite for anything except a popsicle. Up in chair most of the shift. Refused to walk in [place]. Ancef infusing now. Has denied pain. Afebrile. Crackles in lungs. Reviewed CHF tool with patient and reviewed IS technique with him. Continues on 2 liters oxygen to sat in low 90's. Blood sugars were 117 and 121.

## 2025-02-02 NOTE — PHARMACY-ANTICOAGULATION SERVICE
Clinical Pharmacy - Warfarin Dosing Consult     Pharmacy has been consulted to manage this patient s warfarin therapy.  Indication: Atrial Fibrillation  Therapy Goal: INR 2-3  OP Anticoag Clinic: [facility]  Warfarin Prior to Admission: Yes  Warfarin PTA Regimen: 5 mg every Mon, Wed, Fri; 7.5 mg all other days  Recent documented change in oral intake/nutrition: Unknown  Dose Comments: Dose recently increased during [facility] visit on 1/21/25    INR   Date Value Ref Range Status   02/02/2025 2.41 (H) 0.85 - 1.15 Final   02/01/2025 2.36 (H) 0.85 - 1.15 Final       Recommend warfarin 7.5 mg today.  Pharmacy will monitor Roger A Ring daily and order warfarin doses to achieve specified goal.      Please contact pharmacy as soon as possible if the warfarin needs to be held for a procedure or if the warfarin goals change.

## 2025-02-02 NOTE — PROGRESS NOTES
[facility]    Medicine Progress Note - Hospitalist Service    Date of Admission:  1/30/2025    Assessment & Plan   Roger Bonilla is a 69 year old male with chronic atrial fibrillation/flutter, CAD with history of MI with coronary artery stents and previous CABG, type 2 diabetes, hypertension, hyperlipidemia, PAD with previous carotid endarterectomy and AAA, chronic tobacco use which is ongoing, ABIGAIL, chronic anemia, and obesity who presented to ER on 1/29/2025 after onset of nonproductive cough, shortness of breath, malaise, fatigue, and bodyaches on January 27.  In the course of ER evaluation, he was severely hypoxic necessitating initiation of supplemental oxygen concerning for acute hypoxic respiratory failure, and he was tachycardic and tachypneic concerning for SIRS.  EKG and cardiac monitoring demonstrated atrial fibrillation and flutter.  He tested positive for influenza A and  imaging suspicious for lower respiratory tract infection including atypical pneumonia and possibly bronchitis.  Lab test results with significant elevation of BNP, increase in creatinine from baseline concerning for acute kidney injury, mildly elevated troponin, and elevated lactic acid level.  He presented with clinical signs of volume overload concerning for acute heart failure which may be contributing to acute respiratory failure.      Principal Problem:    Influenza A  Active Problems:    Atrial fibrillation/flutter (H)    Acute hypoxic respiratory failure (H)    SIRS (systemic inflammatory response syndrome) (H)    Hyperlipidemia LDL goal <70    ABIGAIL (obstructive sleep apnea)    Benign essential hypertension    PAD (peripheral artery disease)    DYLAN (acute kidney injury)    S/P CABG (coronary artery bypass graft)    S/P carotid endarterectomy    Type 2 diabetes mellitus with diabetic peripheral angiopathy without gangrene, without long-term current use of insulin (H)    Elevated brain natriuretic  peptide (BNP) level    Elevated lactic acid level    Elevation of levels of liver transaminase levels    Hypoalbuminemia    Chronic anemia    Elevated troponin    Warfarin-induced coagulopathy    Platelet dysfunction due to aspirin (H)    Morbid obesity (H)    Tobacco abuse    Coronary artery disease involving native coronary artery of native heart without angina pectoris    AAA (abdominal aortic aneurysm)    History of MI (myocardial infarction)    History of coronary artery stent placement     Influenza A with acute hypoxic hypercarbic respiratory failure and SIRS  Possible superimposed bacterial pneumonia  Clinical presentation and test results are suspicious for influenza A lower respiratory tract infection.  Acute hypoxic respiratory failure likely due to influenza A infection and possible superimposed bacterial pneumonia.  He presented with tachycardia and tachypnea which was concerning for SIRS although these could be due to respiratory failure and atrial fibrillation. Required resmed bipap on 1/31 due to hypercarbia on venous blood gas:  pH 7.25/76.  Check ABG after resmed for two hours:  7.31/64/72/32.  Now on 1 1/2 liter n/c.  VBG on 2/1/25 pH 7.33/PCO2 67 metabolically compensated.  He still fees fatigue and malaise, exhibits coarse rhonchi.  Oxygen Saturation high 80's to low 90's on 2 liters.  Does not feel much better     -continue Tamiflu to complete 5-day treatment course (4 doses remaining)  -Titrate oxygen including escalation to high flow nasal cannula if necessary to keep saturations greater than 90%   -check vbg in am  -treat HFmrEF as (see below)     Bacteremia.  Blood culture done on 1/29 in ED growing staph hominus ? contaminate.  Initiated on vancomycin but now speciation in and will change to cefazolin 2 g q 8 hours.  MRSA swab negative.  Repeat cultures negative to date.  -discontinued vancomycin on 2/1  - continue cefazolin 2 g q 8 hours.  -Monitor markers of inflammation       Chronic  atrial flutter/fibrillation with possible rapid ventricular response  Acute HFmrEF  Presented with chronic atrial flutter/fibrillation with resting heart rates consistently 110-120.  Home regimen: metoprolol tartrate 100 bid for rate control and warfarin for anticoagulation.    Chronic tachyarrhythmia likely exacerbated by influenza A.  He also presented with elevated BNP and significant peripheral edema although weight was stable compared with previously and radiographic findings did not specifically mention interstitial pulmonary edema and/or pleural effusions.  He is not known to have chronic heart failure and previous echocardiogram demonstrated normal LVEF, normal RV size and function, and no significant valvular disease.  Acute heart failure likely triggered by tachyarrhythmia and influenza A.       TTE done this admission while in rapid atrial flutter.  Demonstrated decreased EF of 40-45% (decreased from previous TTE in 2023 however may be falsely decreased due to RVR), inferolateral akinesis, moderate concentric LV hypertrophy, mildy decreased RV fxn.      Weight down some (2 pounds from admission) and net negative.  Heart rate trending down now 90's to 113.  -continue telemetry  -increased home metoprolol tartrate from 100 mg bid to  200 bid on 2/1  -continue  IV metoprolol 5 mg every 6 hours as needed for heart rate greater than 120  -continue furosemide 40mg IV change from q 8 hours to bid  -Treat influenza and other acute medical problems  -daily weights  -strict I/O  -fluid restriction 1800 ml     Acute kidney injury-resolved  Creatinine on presentation was 1.39, increased compared with baseline creatinine 1.0 concerning for acute kidney injury.    -recheck renal function in am  -Avoid or limit use of nephrotoxic medication as much as possible  -Adjust medication dosing accordingly for changing renal function  -Optimize treatment of other acute medical problems     Elevated lactic acid  Presented to ER  with mildly elevated lactic acid that normalized after initial treatment in the ER that included IV fluid boluses.  Infection was not identified nor suspected clinically initially however subsequent had positive blood culture staph hominis.     -Monitor clinically and repeat lactic acid if he clinically worsens     Elevated troponin  History of MI  CAD status post coronary artery stents and CABG  Known CAD with previous history of MI, placement of coronary stents, and CABG.  Presented with mildly elevated troponin 60 that quickly trended down.  EKG is difficult to interpret for acute ischemia because of tachyarrhythmia.  He has not had angina.  Suspect elevated troponin is due to acute illness including influenza, respiratory failure, and tachyarrhythmia superimposed upon known CAD and acute heart failure.  -No additional evaluation for acute ischemic heart disease during hospitalization unless his clinical status changes  -treat underlying illness      Elevated hepatic transaminases  Hypoalbuminemia  Gallbladder wall thickening on radiographic studies  Patient noted to have elevated hepatic transaminases in ER with initially low serum albumin.  Gallbladder wall appeared thickened on initial CT and confirmed on subsequent ultrasound.  However, he does not have abdominal pain or tenderness.  Pool sign on ultrasound was negative.  Bilirubin and alkaline phosphatase were normal and there were no signs of bile duct dilation by imaging.  Suspect elevated transaminases and gallbladder wall thickening may be reflective of decompensated heart failure versus acute viral illness (I.e. pro-inflammatory response).    Serum albumin is low probably due to poor oral nutritional intake recently.  2/2 on down trend  -recheck LFTs     Type 2 diabetes.    Chronic type 2 diabetes treated with metformin with most recent A1c 6.9 in October 2024.  Blood sugars have been normal to minimally elevated (93 to 103).  He did receive IV  contrast for radiographic study in the ER.  Blood sugars   -Hold metformin due to IV contrast administration  -Monitor blood sugars 4 times daily  -continue NovoLog correction scale     PAD  Status post right carotid endarterectomy  AAA  Hyperlipidemia  Tobacco use  He has known PAD manifest as AAA and carotid atherosclerosis and previously underwent carotid endarterectomy.  On aspirin, Zetia, and statin.  He continues to smoke.  -Continue chronic aspirin, Zetia, and statin  -Smoking cessation advised  -Nicotine supplementation offered but declined by the patient at this time     Hypertension  Hypertension chronically treated with metoprolol and Lasix.  Has been normotensive.  -continue IV Lasix as noted above because of concern for acute heart failure  -Continue metoprolol tartrate at increased dose as above    Protein calorie malnutrition.  Has not been eating well at all.  -add ensure clear     Chronic anemia  Aspirin induced platelet function defect  Warfarin induced coagulopathy  Chronically anemic with baseline hemoglobin 11-12, currently stable.  Chronically taking aspirin that causes platelet function defect and warfarin that causes coagulopathy both of which increase his risk for bleeding.  INR is therapeutic.  Active bleeding not apparent or suspected.  -Continue chronic aspirin  -Pharmacy managing warfarin      Obesity  Appears obese with BMI 38.  Obesity likely contributes to health problems including diabetes.  -No acute intervention anticipated during hospitalization          Diet: Combination Diet Moderate Consistent Carb (60 g CHO per Meal) Diet; 2 gm NA Diet  Fluid restriction 1800 ML FLUID  Snacks/Supplements Adult: Ensure Clear; With Meals    DVT Prophylaxis: Warfarin  Peter Catheter: Not present  Lines: None     Cardiac Monitoring: ACTIVE order. Indication: Acute decompensated heart failure (48 hours)  Code Status: Full Code      Clinically Significant Risk Factors        # Hypokalemia:  "Lowest K = 3.3 mmol/L in last 2 days, will replace as needed   # Hypochloremia: Lowest Cl = 94 mmol/L in last 2 days, will monitor as appropriate      # Hypoalbuminemia: Lowest albumin = 3.4 g/dL at 1/29/2025 11:20 PM, will monitor as appropriate     # Hypertension: Noted on problem list     # Acute Hypercapnic Respiratory Failure: based on venous blood gas results.  Continue supplemental oxygen and ventilatory support as indicated.        # DMII: A1C = N/A within past 6 months, PRESENT ON ADMISSION  # Obesity: Estimated body mass index is 37.14 kg/m  as calculated from the following:    Height as of this encounter: 1.676 m (5' 6\").    Weight as of this encounter: 104.4 kg (230 lb 1.6 oz)., PRESENT ON ADMISSION     # Financial/Environmental Concerns: none   # History of CABG: noted on surgical history       Social Drivers of Health    Tobacco Use: High Risk (11/19/2024)    Patient History     Smoking Tobacco Use: Every Day     Smokeless Tobacco Use: Never     Passive Exposure: Never   Physical Activity: Insufficiently Active (4/15/2024)    Exercise Vital Sign     Days of Exercise per Week: 3 days     Minutes of Exercise per Session: 10 min   Stress: Stress Concern Present (4/15/2024)    [employer] - Occupational Stress Questionnaire     Feeling of Stress : To some extent   Social Connections: Unknown (4/15/2024)    Social Connection and Isolation Panel [NHANES]     Frequency of Social Gatherings with Friends and Family: Once a week          Disposition Plan     Medically Ready for Discharge: Anticipated in 2-4 Days           The patient's care was discussed with the  entire care team .    Nafisa Acosta CNP  Hospitalist Service  [facility]  Securely message with [employer] (more info)  Text page via [facility] Paging/Directory   ______________________________________________________________________    Interval History   No acute events overnight    Physical Exam "   Vital Signs: Temp: 98.3  F (36.8  C) Temp src: Oral BP: 110/64 Pulse: 89   Resp: 18 SpO2: (!) 91 % O2 Device: Nasal cannula Oxygen Delivery: 2 LPM  Weight: 230 lbs 1.6 oz    Gen:  sitting in chair no acute distress  HEENT:  normocephalic, atraumatic, oropharynx clear  Resp:  anterior wheeze present, decreased bibasilarly  Card:  S1,S2, RRR no murmur, rub or gallop  Abd:  Soft obese, nondistended, non tender,  normoactive bowel sounds   Neuro:  Neuro exam non focal      Medical Decision Making       45 MINUTES SPENT BY ME on the date of service doing chart review, history, exam, documentation & further activities per the note.        Data     I have personally reviewed the following data over the past 24 hrs:    7.2  \   12.7 (L)   / 233     139 94 (L) 20.7 /  121 (H)   3.3 (L) 32 (H) 1.00 \     ALT: 127 (H) AST: 65 (H) AP: 76 TBILI: 0.6   ALB: 3.6 TOT PROTEIN: 6.9 LIPASE: N/A     INR:  2.41 (H) PTT:  N/A   D-dimer:  N/A Fibrinogen:  N/A       Imaging results reviewed over the past 24 hrs:   No results found for this or any previous visit (from the past 24 hours).

## 2025-02-03 LAB
ALBUMIN SERPL BCG-MCNC: 3.4 G/DL (ref 3.5–5.2)
ALP SERPL-CCNC: 73 U/L (ref 40–150)
ALT SERPL W P-5'-P-CCNC: 57 U/L (ref 0–70)
ANION GAP SERPL CALCULATED.3IONS-SCNC: 11 MMOL/L (ref 7–15)
AST SERPL W P-5'-P-CCNC: 43 U/L (ref 0–45)
BASE EXCESS BLDV CALC-SCNC: 14.6 MMOL/L (ref -3–3)
BILIRUB SERPL-MCNC: 0.7 MG/DL
BUN SERPL-MCNC: 19.8 MG/DL (ref 8–23)
CALCIUM SERPL-MCNC: 8.6 MG/DL (ref 8.8–10.4)
CHLORIDE SERPL-SCNC: 95 MMOL/L (ref 98–107)
CREAT SERPL-MCNC: 1.08 MG/DL (ref 0.67–1.17)
EGFRCR SERPLBLD CKD-EPI 2021: 74 ML/MIN/1.73M2
ERYTHROCYTE [DISTWIDTH] IN BLOOD BY AUTOMATED COUNT: 18.8 % (ref 10–15)
GLUCOSE BLDC GLUCOMTR-MCNC: 120 MG/DL (ref 70–99)
GLUCOSE BLDC GLUCOMTR-MCNC: 129 MG/DL (ref 70–99)
GLUCOSE BLDC GLUCOMTR-MCNC: 183 MG/DL (ref 70–99)
GLUCOSE BLDC GLUCOMTR-MCNC: 99 MG/DL (ref 70–99)
GLUCOSE SERPL-MCNC: 106 MG/DL (ref 70–99)
HCO3 BLDV-SCNC: 44 MMOL/L (ref 21–28)
HCO3 SERPL-SCNC: 35 MMOL/L (ref 22–29)
HCT VFR BLD AUTO: 38.2 % (ref 40–53)
HGB BLD-MCNC: 11.6 G/DL (ref 13.3–17.7)
INR PPP: 3.17 (ref 0.85–1.15)
MCH RBC QN AUTO: 23.5 PG (ref 26.5–33)
MCHC RBC AUTO-ENTMCNC: 30.4 G/DL (ref 31.5–36.5)
MCV RBC AUTO: 77 FL (ref 78–100)
O2/TOTAL GAS SETTING VFR VENT: 26 %
OXYHGB MFR BLDV: 57 % (ref 70–75)
PCO2 BLDV: 78 MM HG (ref 40–50)
PH BLDV: 7.36 [PH] (ref 7.32–7.43)
PLATELET # BLD AUTO: 221 10E3/UL (ref 150–450)
PO2 BLDV: 35 MM HG (ref 25–47)
POTASSIUM SERPL-SCNC: 3.5 MMOL/L (ref 3.4–5.3)
PROT SERPL-MCNC: 6.9 G/DL (ref 6.4–8.3)
RBC # BLD AUTO: 4.94 10E6/UL (ref 4.4–5.9)
SAO2 % BLDV: 58.5 % (ref 70–75)
SODIUM SERPL-SCNC: 141 MMOL/L (ref 135–145)
WBC # BLD AUTO: 8 10E3/UL (ref 4–11)

## 2025-02-03 PROCEDURE — 36415 COLL VENOUS BLD VENIPUNCTURE: CPT | Performed by: NURSE PRACTITIONER

## 2025-02-03 PROCEDURE — 85014 HEMATOCRIT: CPT | Performed by: NURSE PRACTITIONER

## 2025-02-03 PROCEDURE — 99233 SBSQ HOSP IP/OBS HIGH 50: CPT | Performed by: NURSE PRACTITIONER

## 2025-02-03 PROCEDURE — 120N000001 HC R&B MED SURG/OB

## 2025-02-03 PROCEDURE — 250N000011 HC RX IP 250 OP 636: Performed by: NURSE PRACTITIONER

## 2025-02-03 PROCEDURE — 82805 BLOOD GASES W/O2 SATURATION: CPT | Performed by: NURSE PRACTITIONER

## 2025-02-03 PROCEDURE — 36415 COLL VENOUS BLD VENIPUNCTURE: CPT | Performed by: PEDIATRICS

## 2025-02-03 PROCEDURE — 250N000013 HC RX MED GY IP 250 OP 250 PS 637: Performed by: NURSE PRACTITIONER

## 2025-02-03 PROCEDURE — 82040 ASSAY OF SERUM ALBUMIN: CPT | Performed by: NURSE PRACTITIONER

## 2025-02-03 PROCEDURE — 85610 PROTHROMBIN TIME: CPT | Performed by: PEDIATRICS

## 2025-02-03 PROCEDURE — 250N000013 HC RX MED GY IP 250 OP 250 PS 637: Performed by: PEDIATRICS

## 2025-02-03 RX ORDER — WARFARIN SODIUM 5 MG/1
5 TABLET ORAL
Status: COMPLETED | OUTPATIENT
Start: 2025-02-03 | End: 2025-02-03

## 2025-02-03 RX ORDER — METOLAZONE 2.5 MG/1
2.5 TABLET ORAL ONCE
Status: COMPLETED | OUTPATIENT
Start: 2025-02-03 | End: 2025-02-03

## 2025-02-03 RX ADMIN — FUROSEMIDE 40 MG: 10 INJECTION, SOLUTION INTRAMUSCULAR; INTRAVENOUS at 08:15

## 2025-02-03 RX ADMIN — CEFAZOLIN SODIUM 2 G: 2 SOLUTION INTRAVENOUS at 05:54

## 2025-02-03 RX ADMIN — EZETIMIBE 10 MG: 10 TABLET ORAL at 08:15

## 2025-02-03 RX ADMIN — METOPROLOL TARTRATE 200 MG: 100 TABLET, FILM COATED ORAL at 08:15

## 2025-02-03 RX ADMIN — CEFAZOLIN SODIUM 2 G: 2 SOLUTION INTRAVENOUS at 13:03

## 2025-02-03 RX ADMIN — OSELTAMIVIR PHOSPHATE 75 MG: 75 CAPSULE ORAL at 08:15

## 2025-02-03 RX ADMIN — FUROSEMIDE 40 MG: 10 INJECTION, SOLUTION INTRAMUSCULAR; INTRAVENOUS at 17:12

## 2025-02-03 RX ADMIN — WARFARIN SODIUM 5 MG: 5 TABLET ORAL at 17:11

## 2025-02-03 RX ADMIN — PANTOPRAZOLE SODIUM 40 MG: 40 TABLET, DELAYED RELEASE ORAL at 08:15

## 2025-02-03 RX ADMIN — CEFAZOLIN SODIUM 2 G: 2 SOLUTION INTRAVENOUS at 21:09

## 2025-02-03 RX ADMIN — METOPROLOL TARTRATE 200 MG: 100 TABLET, FILM COATED ORAL at 21:10

## 2025-02-03 RX ADMIN — METOLAZONE 2.5 MG: 2.5 TABLET ORAL at 14:23

## 2025-02-03 RX ADMIN — ASPIRIN 81 MG 81 MG: 81 TABLET ORAL at 08:15

## 2025-02-03 RX ADMIN — ATORVASTATIN CALCIUM 80 MG: 40 TABLET, FILM COATED ORAL at 08:15

## 2025-02-03 RX ADMIN — OSELTAMIVIR PHOSPHATE 75 MG: 75 CAPSULE ORAL at 21:10

## 2025-02-03 NOTE — PHARMACY-ANTICOAGULATION SERVICE
Clinical Pharmacy - Warfarin Dosing Consult     Pharmacy has been consulted to manage this patient s warfarin therapy.  Indication: Atrial Fibrillation  Therapy Goal: INR 2-3  OP Anticoag Clinic: [facility]  Warfarin Prior to Admission: Yes  Warfarin PTA Regimen: 5 mg every Mon, Wed, Fri; 7.5 mg all other days  Recent documented change in oral intake/nutrition: Unknown  Dose Comments: Dose recently increased during [facility] visit on 1/21/25    INR   Date Value Ref Range Status   02/03/2025 3.17 (H) 0.85 - 1.15 Final   02/02/2025 2.41 (H) 0.85 - 1.15 Final       Recommend warfarin 5 mg today.  Pharmacy will monitor Roger A Ring daily and order warfarin doses to achieve specified goal.      Please contact pharmacy as soon as possible if the warfarin needs to be held for a procedure or if the warfarin goals change.

## 2025-02-03 NOTE — PROGRESS NOTES
Continues to be on 2L of O2 per NC. Sats in the 90s. Crackles on bilateral lung lobes. Cough is infrequent. SL, continued on Ancef. Denies pain. SBA to bathroom. Perineum continues to be red, denies any discomfort.

## 2025-02-03 NOTE — PLAN OF CARE
"Goal Outcome Evaluation:      Plan of Care Reviewed With: patient    Overall Patient Progress: no changeOverall Patient Progress: no change    Outcome Evaluation: Alert and orinted x 4. Vitals stable on 1L NC. Tried to wean off of oxygen but O2 drops to low 80's when off oxygen. Denies pain. No nausea or vomiting. No shortness of breath. Up with SBA to the bathroom overnight. Blood sugars stable. 1800 FR and strict i&O's. Tele shows aflutter. Receiving IV ancef and tamiflu. PCO2 came back this AM at 78 MD notified.    /78 (BP Location: Right arm)   Pulse 102   Temp 97.6  F (36.4  C) (Oral)   Resp 18   Ht 1.676 m (5' 6\")   Wt 104.4 kg (230 lb 3.2 oz)   SpO2 93%   BMI 37.16 kg/m      "

## 2025-02-03 NOTE — PROGRESS NOTES
[facility]    Medicine Progress Note - Hospitalist Service    Date of Admission:  1/30/2025    Assessment & Plan   Roger Bonilla is a 69 year old male with chronic atrial fibrillation/flutter, CAD with history of MI with coronary artery stents and previous CABG, type 2 diabetes, hypertension, hyperlipidemia, PAD with previous carotid endarterectomy and AAA, chronic tobacco use which is ongoing, ABIGAIL, chronic anemia, and obesity who presented to ER on 1/29/2025 after onset of nonproductive cough, shortness of breath, malaise, fatigue, and bodyaches on January 27.  In the course of ER evaluation, he was severely hypoxic necessitating initiation of supplemental oxygen concerning for acute hypoxic respiratory failure, and he was tachycardic and tachypneic concerning for SIRS.  EKG and cardiac monitoring demonstrated atrial fibrillation and flutter.  He tested positive for influenza A and  imaging suspicious for lower respiratory tract infection including atypical pneumonia and possibly bronchitis.  Lab test results with significant elevation of BNP, increase in creatinine from baseline concerning for acute kidney injury, mildly elevated troponin, and elevated lactic acid level.  He presented with clinical signs of volume overload concerning for acute heart failure which may be contributing to acute respiratory failure.      Principal Problem:    Influenza A  Active Problems:    Atrial fibrillation/flutter (H)    Acute hypoxic respiratory failure (H)    SIRS (systemic inflammatory response syndrome) (H)    Hyperlipidemia LDL goal <70    ABIGAIL (obstructive sleep apnea)    Benign essential hypertension    PAD (peripheral artery disease)    DYLAN (acute kidney injury)    S/P CABG (coronary artery bypass graft)    S/P carotid endarterectomy    Type 2 diabetes mellitus with diabetic peripheral angiopathy without gangrene, without long-term current use of insulin (H)    Elevated brain natriuretic  peptide (BNP) level    Elevated lactic acid level    Elevation of levels of liver transaminase levels    Hypoalbuminemia    Chronic anemia    Elevated troponin    Warfarin-induced coagulopathy    Platelet dysfunction due to aspirin (H)    Morbid obesity (H)    Tobacco abuse    Coronary artery disease involving native coronary artery of native heart without angina pectoris    AAA (abdominal aortic aneurysm)    History of MI (myocardial infarction)    History of coronary artery stent placement     Influenza A with acute hypoxic hypercarbic respiratory failure and SIRS  Possible superimposed bacterial pneumonia  Probably untreated ABIGAIL  Clinical presentation and test results are suspicious for influenza A lower respiratory tract infection.  Acute hypoxic respiratory failure likely due to influenza A infection and possible superimposed bacterial pneumonia.  He presented with tachycardia and tachypnea which was concerning for SIRS although these could be due to respiratory failure and atrial fibrillation. Required resmed bipap on 1/31 due to hypercarbia on venous blood gas:  pH 7.25/76.  Check ABG after resmed for two hours:  7.31/64/72/32.  Now on 1 1/2 liter n/c.  VBG on 2/1/25 pH 7.33/PCO2 67 metabolically compensated.  He still fees fatigue and malaise, exhibits coarse rhonchi.  Oxygen Saturation high 80's to low 90's on 2 liters now down to 1 liter oxgyen.  Hypercarbic by VBG but pH corrected and no signs of CO2 narcosis.  Does not feel much better     -continue Tamiflu to complete 5-day treatment course (3 doses remaining)  -Titrate oxygen including escalation to high flow nasal cannula if necessary to keep saturations greater than 89%   -treat HFmrEF as (see below)  -increase activity  -aggressive pulmonary hygiene     Bacteremia.  Blood culture done on 1/29 in ED growing staph hominus ? contaminate.  Initiated on vancomycin but now speciation in and will change to cefazolin 2 g q 8 hours.  MRSA swab negative.   Repeat cultures negative to date.    ? Contaminate.  Repeat blood cultures negative to date.  -discontinued vancomycin on 2/1  - continue cefazolin 2 g q 8 hours-consider de-escalating antibiotics in am  -Monitor markers of inflammation     Chronic atrial flutter/fibrillation with possible rapid ventricular response  Acute HFmrEF  Presented with chronic atrial flutter/fibrillation with resting heart rates consistently 110-120.  Home regimen: metoprolol tartrate 100 bid for rate control and warfarin for anticoagulation.    Chronic tachyarrhythmia likely exacerbated by influenza A.  He also presented with elevated BNP and significant peripheral edema although weight was stable compared with previously and radiographic findings did not specifically mention interstitial pulmonary edema and/or pleural effusions.  He is not known to have chronic heart failure and previous echocardiogram demonstrated normal LVEF, normal RV size and function, and no significant valvular disease.  Acute heart failure likely triggered by tachyarrhythmia and influenza A.       TTE done this admission while in rapid atrial flutter.  Demonstrated decreased EF of 40-45% (decreased from previous TTE in 2023 however may be falsely decreased due to RVR), inferolateral akinesis, moderate concentric LV hypertrophy, mildy decreased RV fxn.      Weight down some (2 pounds from admission) and net negative.  Heart rate trending down now trending down 89 to 102.  Still needs fluid off.  -add one dose metolazone 2.5 today  -continue telemetry  -continue increased metoprolol tartrate 200 bid (increased on 2/1)  -continue  IV metoprolol 5 mg every 6 hours as needed for heart rate greater than 120  -continue furosemide 40mg IV bid  -Treat influenza and other acute medical problems  -daily weights  -strict I/O  -fluid restriction 1800 ml     Acute kidney injury-resolved  Creatinine on presentation was 1.39, increased compared with baseline creatinine 1.0  concerning for acute kidney injury.    -recheck renal function in am  -Avoid or limit use of nephrotoxic medication as much as possible  -Adjust medication dosing accordingly for changing renal function  -Optimize treatment of other acute medical problems     Elevated lactic acid  Presented to ER with mildly elevated lactic acid that normalized after initial treatment in the ER that included IV fluid boluses.  Infection was not identified nor suspected clinically initially however subsequent had positive blood culture staph hominis.     -Monitor clinically and repeat lactic acid if he clinically worsens     Elevated troponin  History of MI  CAD status post coronary artery stents and CABG  Known CAD with previous history of MI, placement of coronary stents, and CABG.  Presented with mildly elevated troponin 60 that quickly trended down.  EKG is difficult to interpret for acute ischemia because of tachyarrhythmia.  He has not had angina.  Suspect elevated troponin is due to acute illness including influenza, respiratory failure, and tachyarrhythmia superimposed upon known CAD and acute heart failure.  -No additional evaluation for acute ischemic heart disease during hospitalization unless his clinical status changes  -treat underlying illness      Elevated hepatic transaminases  Hypoalbuminemia  Gallbladder wall thickening on radiographic studies  Patient noted to have elevated hepatic transaminases in ER with initially low serum albumin.  Gallbladder wall appeared thickened on initial CT and confirmed on subsequent ultrasound.  However, he does not have abdominal pain or tenderness.  Pool sign on ultrasound was negative.  Bilirubin and alkaline phosphatase were normal and there were no signs of bile duct dilation by imaging.  Suspect elevated transaminases and gallbladder wall thickening may be reflective of decompensated heart failure versus acute viral illness (I.e. pro-inflammatory response).    Serum albumin is  low probably due to poor oral nutritional intake recently.  Normalized on 2/3/25     Type 2 diabetes.    Chronic type 2 diabetes treated with metformin with most recent A1c 6.9 in October 2024.  Blood sugars have been normal to minimally elevated (93 to 103).  He did receive IV contrast for radiographic study in the ER.  Blood sugars   -Hold metformin due to IV contrast administration  -Monitor blood sugars 4 times daily  -continue NovoLog correction scale     PAD  Status post right carotid endarterectomy  AAA  Hyperlipidemia  Tobacco use  He has known PAD manifest as AAA and carotid atherosclerosis and previously underwent carotid endarterectomy.  On aspirin, Zetia, and statin.  He continues to smoke.  -Continue chronic aspirin, Zetia, and statin  -Smoking cessation advised  -Nicotine supplementation offered but declined by the patient at this time     Hypertension  Hypertension chronically treated with metoprolol and Lasix.  Has been normotensive.  -continue IV Lasix as noted above because of concern for acute heart failure  -Continue metoprolol tartrate at increased dose as above     Protein calorie malnutrition.  Has not been eating well at all.  -ensure clear between meals     Chronic anemia  Aspirin induced platelet function defect  Warfarin induced coagulopathy  Chronically anemic with baseline hemoglobin 11-12, currently stable.  Chronically taking aspirin that causes platelet function defect and warfarin that causes coagulopathy both of which increase his risk for bleeding.  INR is therapeutic.    -Continue chronic aspirin  -Pharmacy managing warfarin      Obesity  Appears obese with BMI 38.  Obesity likely contributes to health problems including diabetes and obesity hypoventilation.  -No acute intervention anticipated during hospitalization          Diet: Combination Diet Moderate Consistent Carb (60 g CHO per Meal) Diet; 2 gm NA Diet  Fluid restriction 1800 ML FLUID  Snacks/Supplements Adult:  "Ensure Clear; With Meals    DVT Prophylaxis: Warfarin  Peter Catheter: Not present  Lines: None     Cardiac Monitoring: ACTIVE order. Indication: Acute decompensated heart failure (48 hours)  Code Status: Full Code      Clinically Significant Risk Factors        # Hypokalemia: Lowest K = 3.3 mmol/L in last 2 days, will replace as needed   # Hypochloremia: Lowest Cl = 94 mmol/L in last 2 days, will monitor as appropriate  # Hypocalcemia: Lowest Ca = 8.6 mg/dL in last 2 days, will monitor and replace as appropriate     # Hypoalbuminemia: Lowest albumin = 3.4 g/dL at 2/3/2025  5:12 AM, will monitor as appropriate     # Hypertension: Noted on problem list     # Acute Hypercapnic Respiratory Failure: based on venous blood gas results.  Continue supplemental oxygen and ventilatory support as indicated.        # DMII: A1C = N/A within past 6 months, PRESENT ON ADMISSION  # Obesity: Estimated body mass index is 37.16 kg/m  as calculated from the following:    Height as of this encounter: 1.676 m (5' 6\").    Weight as of this encounter: 104.4 kg (230 lb 3.2 oz)., PRESENT ON ADMISSION     # Financial/Environmental Concerns: none   # History of CABG: noted on surgical history       Social Drivers of Health    Tobacco Use: High Risk (11/19/2024)    Patient History     Smoking Tobacco Use: Every Day     Smokeless Tobacco Use: Never     Passive Exposure: Never   Physical Activity: Insufficiently Active (4/15/2024)    Exercise Vital Sign     Days of Exercise per Week: 3 days     Minutes of Exercise per Session: 10 min   Stress: Stress Concern Present (4/15/2024)    [employer] [place] [employer] - Occupational Stress Questionnaire     Feeling of Stress : To some extent   Social Connections: Unknown (4/15/2024)    Social Connection and Isolation Panel [NHANES]     Frequency of Social Gatherings with Friends and Family: Once a week          Disposition Plan     Medically Ready for Discharge: Anticipated in 2-4 Days       "     The patient's care was discussed with the  entire care team .    Nafisa Acosta CNP  Hospitalist Service  [facility]  Securely message with [employer] (more info)  Text page via Swype Paging/Directory   ______________________________________________________________________    Interval History   No acute events overnight, does not feel to much better but appears better    Physical Exam   Vital Signs: Temp: 97.8  F (36.6  C) Temp src: Oral BP: 103/66 Pulse: 89   Resp: 17 SpO2: (!) 91 % O2 Device: Nasal cannula Oxygen Delivery: 1 LPM  Weight: 230 lbs 3.2 oz    Gen:  sitting in chair,  no acute distress  HEENT:  normocephalic, atraumatic, oropharynx clear  Resp:  decreased bibasilarly, normal respiratory rate  Card:  S1,S2, RRR no murmur, rub or gallop  Abd:  Soft obese, nondistended, non tender,  normoactive bowel sounds   Neuro:  Neuro exam non focal      Medical Decision Making       45 MINUTES SPENT BY ME on the date of service doing chart review, history, exam, documentation & further activities per the note.        Data     I have personally reviewed the following data over the past 24 hrs:    8.0  \   11.6 (L)   / 221     141 95 (L) 19.8 /  120 (H)   3.5 35 (H) 1.08 \     ALT: 57 AST: 43 AP: 73 TBILI: 0.7   ALB: 3.4 (L) TOT PROTEIN: 6.9 LIPASE: N/A     INR:  3.17 (H) PTT:  N/A   D-dimer:  N/A Fibrinogen:  N/A       Imaging results reviewed over the past 24 hrs:   No results found for this or any previous visit (from the past 24 hours).

## 2025-02-03 NOTE — PLAN OF CARE
Goal Outcome Evaluation:      Plan of Care Reviewed With: patient    Overall Patient Progress: no changeOverall Patient Progress: no change         Patient alert and oriented. Vital signs stable. IV lasix given. Denied pain.

## 2025-02-03 NOTE — PLAN OF CARE
"Goal Outcome Evaluation:      Plan of Care Reviewed With: patient    Overall Patient Progress: no changeOverall Patient Progress: no change    Outcome Evaluation: Patient alert and orientated x4. SBA. PCO2 78. Sats 92% on 1L O2 NC. Attempted to wean pateint down to RA but sats dropped to 86%. Crackles in bases. Telemetry Aflutter with HR 90. Ancef and Tamiflu. 1800 FR, tolerating well.    /66 (BP Location: Right arm, Patient Position: Sitting, Cuff Size: Adult Regular)   Pulse 89   Temp 97.8  F (36.6  C) (Oral)   Resp 17   Ht 1.676 m (5' 6\")   Wt 104.4 kg (230 lb 3.2 oz)   SpO2 (!) 91%   BMI 37.16 kg/m      "

## 2025-02-04 LAB
ANION GAP SERPL CALCULATED.3IONS-SCNC: 14 MMOL/L (ref 7–15)
BACTERIA BLD CULT: ABNORMAL
BUN SERPL-MCNC: 21.1 MG/DL (ref 8–23)
CALCIUM SERPL-MCNC: 9.6 MG/DL (ref 8.8–10.4)
CHLORIDE SERPL-SCNC: 90 MMOL/L (ref 98–107)
CREAT SERPL-MCNC: 1.11 MG/DL (ref 0.67–1.17)
CRP SERPL-MCNC: 19.25 MG/L
EGFRCR SERPLBLD CKD-EPI 2021: 72 ML/MIN/1.73M2
ERYTHROCYTE [DISTWIDTH] IN BLOOD BY AUTOMATED COUNT: 19.1 % (ref 10–15)
GLUCOSE BLDC GLUCOMTR-MCNC: 122 MG/DL (ref 70–99)
GLUCOSE BLDC GLUCOMTR-MCNC: 131 MG/DL (ref 70–99)
GLUCOSE BLDC GLUCOMTR-MCNC: 139 MG/DL (ref 70–99)
GLUCOSE BLDC GLUCOMTR-MCNC: 159 MG/DL (ref 70–99)
GLUCOSE SERPL-MCNC: 107 MG/DL (ref 70–99)
HCO3 SERPL-SCNC: 37 MMOL/L (ref 22–29)
HCT VFR BLD AUTO: 42.5 % (ref 40–53)
HGB BLD-MCNC: 13.1 G/DL (ref 13.3–17.7)
INR PPP: 3.31 (ref 0.85–1.15)
MCH RBC QN AUTO: 23.7 PG (ref 26.5–33)
MCHC RBC AUTO-ENTMCNC: 30.8 G/DL (ref 31.5–36.5)
MCV RBC AUTO: 77 FL (ref 78–100)
PLATELET # BLD AUTO: 260 10E3/UL (ref 150–450)
POTASSIUM SERPL-SCNC: 3 MMOL/L (ref 3.4–5.3)
POTASSIUM SERPL-SCNC: 3.7 MMOL/L (ref 3.4–5.3)
RBC # BLD AUTO: 5.52 10E6/UL (ref 4.4–5.9)
SODIUM SERPL-SCNC: 141 MMOL/L (ref 135–145)
WBC # BLD AUTO: 8.9 10E3/UL (ref 4–11)

## 2025-02-04 PROCEDURE — 85049 AUTOMATED PLATELET COUNT: CPT | Performed by: NURSE PRACTITIONER

## 2025-02-04 PROCEDURE — G0426 INPT/ED TELECONSULT50: HCPCS | Performed by: STUDENT IN AN ORGANIZED HEALTH CARE EDUCATION/TRAINING PROGRAM

## 2025-02-04 PROCEDURE — 82565 ASSAY OF CREATININE: CPT | Performed by: NURSE PRACTITIONER

## 2025-02-04 PROCEDURE — 250N000011 HC RX IP 250 OP 636: Performed by: NURSE PRACTITIONER

## 2025-02-04 PROCEDURE — 250N000013 HC RX MED GY IP 250 OP 250 PS 637: Performed by: NURSE PRACTITIONER

## 2025-02-04 PROCEDURE — 84132 ASSAY OF SERUM POTASSIUM: CPT | Performed by: NURSE PRACTITIONER

## 2025-02-04 PROCEDURE — 36415 COLL VENOUS BLD VENIPUNCTURE: CPT | Performed by: NURSE PRACTITIONER

## 2025-02-04 PROCEDURE — 250N000013 HC RX MED GY IP 250 OP 250 PS 637

## 2025-02-04 PROCEDURE — 99233 SBSQ HOSP IP/OBS HIGH 50: CPT | Performed by: NURSE PRACTITIONER

## 2025-02-04 PROCEDURE — 85610 PROTHROMBIN TIME: CPT | Performed by: PEDIATRICS

## 2025-02-04 PROCEDURE — 120N000001 HC R&B MED SURG/OB

## 2025-02-04 PROCEDURE — 250N000013 HC RX MED GY IP 250 OP 250 PS 637: Performed by: PEDIATRICS

## 2025-02-04 PROCEDURE — 86140 C-REACTIVE PROTEIN: CPT | Performed by: NURSE PRACTITIONER

## 2025-02-04 PROCEDURE — 250N000012 HC RX MED GY IP 250 OP 636 PS 637: Performed by: PEDIATRICS

## 2025-02-04 RX ORDER — CEFAZOLIN SODIUM 2 G/50ML
2 SOLUTION INTRAVENOUS EVERY 8 HOURS
Status: COMPLETED | OUTPATIENT
Start: 2025-02-04 | End: 2025-02-05

## 2025-02-04 RX ORDER — POTASSIUM CHLORIDE 1500 MG/1
20 TABLET, EXTENDED RELEASE ORAL ONCE
Status: DISCONTINUED | OUTPATIENT
Start: 2025-02-04 | End: 2025-02-04

## 2025-02-04 RX ORDER — WARFARIN SODIUM 2 MG/1
4 TABLET ORAL
Status: COMPLETED | OUTPATIENT
Start: 2025-02-04 | End: 2025-02-04

## 2025-02-04 RX ORDER — POTASSIUM CHLORIDE 1500 MG/1
20 TABLET, EXTENDED RELEASE ORAL ONCE
Status: COMPLETED | OUTPATIENT
Start: 2025-02-04 | End: 2025-02-04

## 2025-02-04 RX ORDER — POTASSIUM CHLORIDE 1500 MG/1
40 TABLET, EXTENDED RELEASE ORAL ONCE
Status: DISCONTINUED | OUTPATIENT
Start: 2025-02-04 | End: 2025-02-04

## 2025-02-04 RX ORDER — POTASSIUM CHLORIDE 1500 MG/1
40 TABLET, EXTENDED RELEASE ORAL ONCE
Status: COMPLETED | OUTPATIENT
Start: 2025-02-04 | End: 2025-02-04

## 2025-02-04 RX ADMIN — CEFAZOLIN SODIUM 2 G: 2 SOLUTION INTRAVENOUS at 15:59

## 2025-02-04 RX ADMIN — POTASSIUM CHLORIDE 40 MEQ: 1500 TABLET, EXTENDED RELEASE ORAL at 06:46

## 2025-02-04 RX ADMIN — WARFARIN SODIUM 4 MG: 2 TABLET ORAL at 18:37

## 2025-02-04 RX ADMIN — PANTOPRAZOLE SODIUM 40 MG: 40 TABLET, DELAYED RELEASE ORAL at 10:17

## 2025-02-04 RX ADMIN — ATORVASTATIN CALCIUM 80 MG: 40 TABLET, FILM COATED ORAL at 10:17

## 2025-02-04 RX ADMIN — CEFAZOLIN SODIUM 2 G: 2 SOLUTION INTRAVENOUS at 05:42

## 2025-02-04 RX ADMIN — ASPIRIN 81 MG 81 MG: 81 TABLET ORAL at 10:17

## 2025-02-04 RX ADMIN — FUROSEMIDE 40 MG: 10 INJECTION, SOLUTION INTRAMUSCULAR; INTRAVENOUS at 15:48

## 2025-02-04 RX ADMIN — METOPROLOL TARTRATE 200 MG: 100 TABLET, FILM COATED ORAL at 22:09

## 2025-02-04 RX ADMIN — INSULIN ASPART 1 UNITS: 100 INJECTION, SOLUTION INTRAVENOUS; SUBCUTANEOUS at 12:59

## 2025-02-04 RX ADMIN — EZETIMIBE 10 MG: 10 TABLET ORAL at 10:17

## 2025-02-04 RX ADMIN — OSELTAMIVIR PHOSPHATE 75 MG: 75 CAPSULE ORAL at 10:17

## 2025-02-04 RX ADMIN — POTASSIUM CHLORIDE 20 MEQ: 1500 TABLET, EXTENDED RELEASE ORAL at 07:46

## 2025-02-04 RX ADMIN — FUROSEMIDE 40 MG: 10 INJECTION, SOLUTION INTRAMUSCULAR; INTRAVENOUS at 07:40

## 2025-02-04 RX ADMIN — CEFAZOLIN SODIUM 2 G: 2 SOLUTION INTRAVENOUS at 23:31

## 2025-02-04 RX ADMIN — METOPROLOL TARTRATE 200 MG: 100 TABLET, FILM COATED ORAL at 10:17

## 2025-02-04 NOTE — PROGRESS NOTES
[facility], located within [facility]    Medicine Progress Note - Hospitalist Service    Date of Admission:  1/30/2025    Assessment & Plan   Roger Bonilla is a 69 year old male with chronic atrial fibrillation/flutter, CAD with history of MI with coronary artery stents and previous CABG, type 2 diabetes, hypertension, hyperlipidemia, PAD with previous carotid endarterectomy and AAA, chronic tobacco use which is ongoing, ABIGAIL, chronic anemia, and obesity who presented to ER on 1/29/2025 after onset of nonproductive cough, shortness of breath, malaise, fatigue, and bodyaches on January 27.  In the course of ER evaluation, he was severely hypoxic necessitating initiation of supplemental oxygen concerning for acute hypoxic respiratory failure, and he was tachycardic and tachypneic concerning for SIRS.  EKG and cardiac monitoring demonstrated atrial fibrillation and flutter.  He tested positive for influenza A and  imaging suspicious for lower respiratory tract infection including atypical pneumonia and possibly bronchitis.  Lab test results with significant elevation of BNP, increase in creatinine from baseline concerning for acute kidney injury, mildly elevated troponin, and elevated lactic acid level.  He presented with clinical signs of volume overload concerning for acute heart failure which may be contributing to acute respiratory failure.      Principal Problem:    Influenza A  Active Problems:    Atrial fibrillation/flutter (H)    Acute hypoxic respiratory failure (H)    SIRS (systemic inflammatory response syndrome) (H)    Hyperlipidemia LDL goal <70    ABIGAIL (obstructive sleep apnea)    Benign essential hypertension    PAD (peripheral artery disease)    DYLAN (acute kidney injury)    S/P CABG (coronary artery bypass graft)    S/P carotid endarterectomy    Type 2 diabetes mellitus with diabetic peripheral angiopathy without gangrene, without long-term current use of insulin (H)    Elevated brain natriuretic  peptide (BNP) level    Elevated lactic acid level    Elevation of levels of liver transaminase levels    Hypoalbuminemia    Chronic anemia    Elevated troponin    Warfarin-induced coagulopathy    Platelet dysfunction due to aspirin (H)    Morbid obesity (H)    Tobacco abuse    Coronary artery disease involving native coronary artery of native heart without angina pectoris    AAA (abdominal aortic aneurysm)    History of MI (myocardial infarction)    History of coronary artery stent placement     Influenza A with acute hypoxic hypercarbic respiratory failure and SIRS  Possible superimposed bacterial pneumonia  Probably untreated ABIGAIL  Clinical presentation and test results are suspicious for influenza A lower respiratory tract infection.  Acute hypoxic respiratory failure likely due to influenza A infection and possible superimposed bacterial pneumonia.  He presented with tachycardia and tachypnea which was concerning for SIRS although these could be due to respiratory failure and atrial fibrillation. Required resmed bipap on 1/31 due to hypercarbia on venous blood gas:  pH 7.25/76.  Check ABG after resmed for two hours:  7.31/64/72/32.  Now on 1 1/2 liter n/c.  VBG on 2/1/25 pH 7.33/PCO2 67 metabolically compensated.    Oxygen needs improving, down to 1 to 1.5 L.  Patient overall states he does not feel much better.  Course of Tamiflu completed 2/4  -Titrate oxygen including escalation to high flow nasal cannula if necessary to keep saturations greater than 89%   -treat HFmrEF as (see below)  -increase activity -walked in the halls with nursing today  -aggressive pulmonary hygiene     Bacteremia.  Blood culture done on 1/29 in ED growing staph hominus and peptoniphilus species.  Question contaminate, but also in 2 bottles.  Initiated on vancomycin,  but  speciation in and  changed to cefazolin 2 g q 8 hours on 2/1.  MRSA swab negative.  Repeat cultures negative to date.  He has been afebrile with normal WBC.  CRP  elevated at 19.25.   - continue cefazolin 2 g q 8 hours-consider de-escalating antibiotics in am  -ID consult looking for antibiotic choice and duration recommendations     Chronic atrial flutter/fibrillation with possible rapid ventricular response  Acute HFmrEF  Presented with chronic atrial flutter/fibrillation with resting heart rates consistently 110-120.  Home regimen: metoprolol tartrate 100 bid for rate control and warfarin for anticoagulation.    Chronic tachyarrhythmia likely exacerbated by influenza A.  He also presented with elevated BNP and significant peripheral edema although weight was stable compared with previously and radiographic findings did not specifically mention interstitial pulmonary edema and/or pleural effusions.  He is not known to have chronic heart failure and previous echocardiogram demonstrated normal LVEF, normal RV size and function, and no significant valvular disease.  Acute heart failure likely triggered by tachyarrhythmia and influenza A.     TTE done this admission while in rapid atrial flutter.  Demonstrated decreased EF of 40-45% (decreased from previous TTE in 2023 however may be falsely decreased due to RVR), inferolateral akinesis, moderate concentric LV hypertrophy, mildy decreased RV fxn.   2/3 had one dose of metolazone 2.5mg, today weight is down  222 lbs, about 7.5 lbs.     -continue telemetry  -continue increased metoprolol tartrate 200 bid (increased on 2/1)  -continue  IV metoprolol 5 mg every 6 hours as needed for heart rate greater than 120  -continue furosemide 40mg IV bid  -Treat influenza and other acute medical problems  -daily weights  -strict I/O  -fluid restriction 1800 ml     Acute kidney injury-resolved  Creatinine on presentation was 1.39, increased compared with baseline creatinine 1.0 concerning for acute kidney injury.    -recheck renal function in am  -Avoid or limit use of nephrotoxic medication as much as possible  -Adjust medication dosing  accordingly for changing renal function  -Optimize treatment of other acute medical problems     Elevated lactic acid  Presented to ER with mildly elevated lactic acid that normalized after initial treatment in the ER that included IV fluid boluses.  Infection was not identified nor suspected clinically initially however subsequent had positive blood culture staph hominis.     -Monitor clinically and repeat lactic acid if he clinically worsens     Elevated troponin  History of MI  CAD status post coronary artery stents and CABG  Known CAD with previous history of MI, placement of coronary stents, and CABG.  Presented with mildly elevated troponin 60 that quickly trended down.  EKG is difficult to interpret for acute ischemia because of tachyarrhythmia.  He has not had angina.  Suspect elevated troponin is due to acute illness including influenza, respiratory failure, and tachyarrhythmia superimposed upon known CAD and acute heart failure.  -No additional evaluation for acute ischemic heart disease during hospitalization unless his clinical status changes  -treat underlying illness      Elevated hepatic transaminases  Hypoalbuminemia  Gallbladder wall thickening on radiographic studies  Patient noted to have elevated hepatic transaminases in ER with initially low serum albumin.  Gallbladder wall appeared thickened on initial CT and confirmed on subsequent ultrasound.  However, he does not have abdominal pain or tenderness.  Pool sign on ultrasound was negative.  Bilirubin and alkaline phosphatase were normal and there were no signs of bile duct dilation by imaging.  Suspect elevated transaminases and gallbladder wall thickening may be reflective of decompensated heart failure versus acute viral illness (I.e. pro-inflammatory response).    Serum albumin is low probably due to poor oral nutritional intake recently.  Normalized on 2/3/25     Type 2 diabetes.    Chronic type 2 diabetes treated with metformin with most  recent A1c 6.9 in October 2024.  Blood sugars have been normal to minimally elevated (93 to 103).  He did receive IV contrast for radiographic study in the ER.  Blood sugars   -Hold metformin due to IV contrast administration  -Monitor blood sugars 4 times daily  -continue NovoLog correction scale     PAD  Status post right carotid endarterectomy  AAA  Hyperlipidemia  Tobacco use  He has known PAD manifest as AAA and carotid atherosclerosis and previously underwent carotid endarterectomy.  On aspirin, Zetia, and statin.  He continues to smoke.  -Continue chronic aspirin, Zetia, and statin  -Smoking cessation advised  -Nicotine supplementation offered but declined by the patient at this time     Hypertension  Hypertension chronically treated with metoprolol and Lasix.  Has been normotensive.  -continue IV Lasix as noted above because of concern for acute heart failure  -Continue metoprolol tartrate at increased dose as above     Protein calorie malnutrition.  Has not been eating well at all.  -ensure clear between meals     Chronic anemia  Aspirin induced platelet function defect  Warfarin induced coagulopathy  Chronically anemic with baseline hemoglobin 11-12, currently stable.  Chronically taking aspirin that causes platelet function defect and warfarin that causes coagulopathy both of which increase his risk for bleeding.  INR is therapeutic.    -Continue chronic aspirin  -Pharmacy managing warfarin      Obesity  Appears obese with BMI 38.  Obesity likely contributes to health problems including diabetes and obesity hypoventilation.  -No acute intervention anticipated during hospitalization          Diet: Combination Diet Moderate Consistent Carb (60 g CHO per Meal) Diet; 2 gm NA Diet  Fluid restriction 1800 ML FLUID  Snacks/Supplements Adult: Ensure Clear; With Meals    DVT Prophylaxis: Warfarin  Peter Catheter: Not present  Lines: None     Cardiac Monitoring: ACTIVE order. Indication: Acute decompensated  "heart failure (48 hours)  Code Status: Full Code      Clinically Significant Risk Factors        # Hypokalemia: Lowest K = 3 mmol/L in last 2 days, will replace as needed   # Hypochloremia: Lowest Cl = 90 mmol/L in last 2 days, will monitor as appropriate  # Hypocalcemia: Lowest Ca = 8.6 mg/dL in last 2 days, will monitor and replace as appropriate     # Hypoalbuminemia: Lowest albumin = 3.4 g/dL at 2/3/2025  5:12 AM, will monitor as appropriate     # Hypertension: Noted on problem list     # Acute Hypercapnic Respiratory Failure: based on venous blood gas results.  Continue supplemental oxygen and ventilatory support as indicated.        # DMII: A1C = N/A within past 6 months   # Obesity: Estimated body mass index is 35.96 kg/m  as calculated from the following:    Height as of this encounter: 1.676 m (5' 6\").    Weight as of this encounter: 101.1 kg (222 lb 12.8 oz).      # Financial/Environmental Concerns: none   # History of CABG: noted on surgical history       Social Drivers of Health    Tobacco Use: High Risk (11/19/2024)    Patient History     Smoking Tobacco Use: Every Day     Smokeless Tobacco Use: Never     Passive Exposure: Never   Physical Activity: Insufficiently Active (4/15/2024)    Exercise Vital Sign     Days of Exercise per Week: 3 days     Minutes of Exercise per Session: 10 min   Stress: Stress Concern Present (4/15/2024)    [employer] [employer] - Occupational Stress Questionnaire     Feeling of Stress : To some extent   Social Connections: Unknown (4/15/2024)    Social Connection and Isolation Panel [NHANES]     Frequency of Social Gatherings with Friends and Family: Once a week          Disposition Plan     Medically Ready for Discharge: Anticipated in 2-4 Days           The patient's care was discussed with the Patient.    Karma Farah, CNP  Hospitalist Service  [facility]  Securely message with [employer] (more info)  Text page via [employer] " Paging/Directory   ______________________________________________________________________    Interval History   No acute events overnight, nursing documentation reviewed.  Patient overall continues to feel fatigued, weak and overall not much better.    Physical Exam   Vital Signs: Temp: 97.7  F (36.5  C) Temp src: Oral BP: 137/82 Pulse: 86   Resp: 18 SpO2: 94 % O2 Device: Nasal cannula Oxygen Delivery: 1.5 LPM  Weight: 222 lbs 12.8 oz    Gen:  sitting in chair,  no acute distress  HEENT:  normocephalic, atraumatic, oropharynx clear  Resp:  decreased bibasilarly, normal respiratory rate, no wheezes rales or rhonchi noted  Card:  S1,S2, RRR no murmur, rub or gallop  Abd:  Soft obese, nondistended, non tender,  normoactive bowel sounds   Neuro:  Neuro exam non focal    Medical Decision Making       45 MINUTES SPENT BY ME on the date of service doing chart review, history, exam, documentation & further activities per the note.      Data     I have personally reviewed the following data over the past 24 hrs:    8.9  \   13.1 (L)   / 260     141 90 (L) 21.1 /  159 (H)   3.0 (L) 37 (H) 1.11 \     Procal: N/A CRP: 19.25 (H) Lactic Acid: N/A       INR:  3.31 (H) PTT:  N/A   D-dimer:  N/A Fibrinogen:  N/A       Imaging results reviewed over the past 24 hrs:   No results found for this or any previous visit (from the past 24 hours).

## 2025-02-04 NOTE — CONSULTS
Telemedicine Visit - General ID Service: Consult Note      Patient:  Roger Bonilla, Date of birth 1955, Medical record number [id number]  Date of Visit:  February 4, 2025         Assessment and Recommendations:   ID Problem List:  Hypoxic respiratory failure 2/2 Influenza A pneumonia  Positive blood cultures for Staph hominis  Single positive blood culture for Peptoniphilus species    Recommendations:  - Has completed 5 day course of Oseltamivir, no indications to extend the same  - Peptoniphilus in blood culture is likely a contaminant - no targeted treatment indicated  - S.hominis in blood culture is also most likely to be a contaminant. However, he has already completed 7 days of antibiotic therapy - which is sufficient treatment for uncomplicated CoNS bacteremia. Thus, recommend stopping Cefazolin after today  - ID will sign off. No outpatient follow up needed    Discussion:  70 y/o gentleman with multiple comorbid conditions presented to the ER 1/29/25 with 2 days of SOB, non-productive cough, malaise and body aches, found to have hypoxic respiratory failure requiring supplemental O2. Abnormal Chest CT with ground glass opacities and rapid testing positive for Influenza A. His symptoms can be explained on the basis of Influenza pneumonia. There is no objective evidence for a superimposed bacterial pneumonia (no fevers, no productive cough, no leukocytosis, negative procalcitonin and a Chest CT without lobar infiltrates)    Patient had 2 sets of blood cultures obtained on 1/29 - unclear why they were obtained as he had no fevers, no leukocytosis and no indwelling lines. Blood cultures positive for S.hominis (a coagulase negative Staphylococcus). 1 out of 2 bottles in both sets positive, time to positivity ~ 21-22 hours. Repeat blood cultures negative. S.hominis is part of the skin bryan and when found in blood cultures, is typically a contaminant. Although culture positivity in 2/2 cultures with a time  to positivity <24 hours would favor bacteremia, he has none of the risk factors that would be associated with CoNS bacteremia (no indwelling lines, no prosthetic devices, no e/o SSTI) and clinical presentation not consistent with bacteremia either. TTE unremarkable and patient cleared blood cultures when repeated. Although favor contamination in this case, patient has already received 6 days of appropriate antibiotic therapy, and a 5-7 day course is typically sufficient for uncomplicated CoNS bacteremia    Peptoniphilus is typically part of gut/vaginal bryan. Its presence here (in 1/4 blood culture bottles, 1/2 sets) is likely a contaminant. Regardless has received antibiotic coverage    I spent at least 70 minutes on the day of this encounter on chart review, patient interview/exam, and note preparation. This visit was performed remotely as a telemedicine encounter using the [employer] platform.    RENATA Lazo  Staff Physician, Infectious Diseases  Pager [phone]         History of Present Illness:     69 year old male with chronic atrial fibrillation/flutter, CAD with history of MI with coronary artery stents and previous CABG, type 2 diabetes, hypertension, hyperlipidemia, PAD with previous carotid endarterectomy and AAA, chronic tobacco use which is ongoing, ABIGAIL, chronic anemia, and obesity who is being evaluated for Influenza A pneumonia and possible S.hominis bacteremia    Patient originally presented to the ER on 1/29/25 with 2 days of SOB, non-productive cough, fatigue, generalized malaise and bodyaches. No reported fevers or chills. On arrival to the ER, afebrile, tachycardic to 110s and hypoxic to 79% on room air, started supplemental O2. Rapid test positive for Influenza A. Chest CT with focal nodular opacities and geographic groundglass attenuation. Was started on Oseltamivir    No leukocytosis on admission, nor fevers and vitals stable. Procalcitonin 0.19. 2 sets of blood  cultures obtained. Both sets with Staph hominis in 1/2 bottles each and 1 of 2 sets with Peptoniphilus in 1/2 bottles. Patient received Vancomycin 1/30 - 2/1 and then transitioned to Cefazolin 2/1 onwards (day 6 of antibiotics today)    As previously stated, patient had no fevers at home. He had no indwelling lines/catheters on admission. No known implanted prosthetic devices. No SSTI on admission         Review of Systems:   Remaining systems reviewed and negative         Current Antimicrobials     Current:  Oseltamivir 1/30 - present  Cefazolin 2/1 - present    Prior:  Vancomycin 1/30 - 2/1  Ceftriaxone 1/29  Azithromycin 1/29       Past Medical History:     Past Medical History:   Diagnosis Date    AAA (abdominal aortic aneurysm) 06/23/2021    Acute kidney injury 06/23/2021    Acute superficial gastritis without hemorrhage 07/19/2021    Atherosclerosis of both carotid arteries 10/27/2008    Coronary artery disease 05/01/2005    2 stents put in heart    Disorder of bursae and tendons in shoulder region 09/01/2005    Hyperlipidemia LDL goal <70 11/18/2002    Morbid obesity (H) 09/16/2013    NSTEMI (non-ST elevated myocardial infarction) (H) 05/2001    Obstructive Sleep Apnea 11/23/2011    Peripheral artery disease 11/23/2012    Pure hypercholesterolemia 11/18/2002    Tobacco abuse 1973    Tubular adenoma 01/05/2023    Type II or unspecified type diabetes mellitus without mention of complication, not stated as uncontrolled 11/18/2002    Unspecified cardiovascular disease 05/2001    MI -- Angioplasty two stents RCA & OM2    Unspecified essential hypertension 11/18/2002     Past Surgical History:   Procedure Laterality Date    BYPASS GRAFT ARTERY CORONARY N/A 3/24/2022    Procedure: CORONARY ARTERY BYPASS GRAFT x 3 (LIMA - LAD; SV - OM2; SV - PDA) WITH ENDOSCOPIC SAPHENOUS VEIN HARVEST ON BILATERAL LOWER EXTREMITY, AND ON CARDIOPULMONARY PUMP OXYGENATOR  (INTRAOPERATIVE TRANSESOPHAGEAL ECHOCARDIOGRAM BY  ANESTHESIOLOGIST);  Surgeon: Glenna Waters MD;  Location:  OR    CARDIAC SURGERY  may 1, 2005    COLONOSCOPY  11/30/2011    Procedure:COLONOSCOPY; Screening Colonoscopy; Surgeon:LUTHER FLORES; Location:[facility]    COLONOSCOPY N/A 1/3/2023    Procedure: COLONOSCOPY with polypectomy;  Surgeon: Angel Skinner MD;  Location: [facility]    CV CORONARY ANGIOGRAM N/A 12/29/2021    Procedure: Coronary Angiogram;  Surgeon: Jonny Moore MD;  Location:  [facility]    ENDARTERECTOMY CAROTID Right 2/18/2022    Procedure: Right carotid endarterectomy with ELECTROENCEPHALOGRAM(EEG);  Surgeon: Karma Adorno MD;  [facility]    ESOPHAGOSCOPY, GASTROSCOPY, DUODENOSCOPY (EGD), COMBINED N/A 7/21/2021    Procedure: ESOPHAGOGASTRODUODENOSCOPY, WITH BIOPSY;  Surgeon: Jeff Cunningham DO;  Location: [facility]    PHACOEMULSIFICATION WITH STANDARD INTRAOCULAR LENS IMPLANT Right 7/28/2021    Procedure: Right eye Cataract Extraction with Implant;  Surgeon: Reyes Valdez MD;  Location: [facility]    PHACOEMULSIFICATION WITH STANDARD INTRAOCULAR LENS IMPLANT Left 8/18/2021    Procedure: left eye Cataract Extraction with Implant;  Surgeon: Reyes Valdez MD;  Location: [facility]    SURGICAL HISTORY OF -       None    VASCULAR SURGERY  oct 2013    stent put in leg          Family History:     Family History   Problem Relation Age of Onset    Cerebrovascular Disease Mother     Respiratory Father         emphysema    Cerebrovascular Disease Maternal Grandmother     Alzheimer Disease Maternal Grandfather     Breast Cancer Sister     Arthritis Other         hip fracture    Cancer - colorectal No family hx of     Prostate Cancer No family hx of           Social History:     Social History     Socioeconomic History    Marital status:      Spouse name: Not on file    Number of children: Not on file    Years of education: Not on file    Highest education level: Not on file    Occupational History    Not on file   Tobacco Use    Smoking status: Every Day     Current packs/day: 0.50     Average packs/day: 0.5 packs/day for 53.1 years (26.5 ttl pk-yrs)     Types: Cigarettes     Start date: 1/1/1972     Passive exposure: Never (per pt)    Smokeless tobacco: Never    Tobacco comments:     5-10 cigs daily 11/28/22   Vaping Use    Vaping status: Never Used   Substance and Sexual Activity    Alcohol use: Not Currently     Comment: 2 beers a year    Drug use: No    Sexual activity: Yes     Partners: Female     Birth control/protection: Condom   Other Topics Concern    Parent/sibling w/ CABG, MI or angioplasty before 65F 55M? No   Social History Narrative    Not on file     Social Drivers of Health     Financial Resource Strain: Low Risk  (1/30/2025)    Financial Resource Strain     Within the past 12 months, have you or your family members you live with been unable to get utilities (heat, electricity) when it was really needed?: No   Food Insecurity: Low Risk  (1/30/2025)    Food Insecurity     Within the past 12 months, did you worry that your food would run out before you got money to buy more?: No     Within the past 12 months, did the food you bought just not last and you didn t have money to get more?: No   Transportation Needs: Low Risk  (1/30/2025)    Transportation Needs     Within the past 12 months, has lack of transportation kept you from medical appointments, getting your medicines, non-medical meetings or appointments, work, or from getting things that you need?: No   Physical Activity: Insufficiently Active (4/15/2024)    Exercise Vital Sign     Days of Exercise per Week: 3 days     Minutes of Exercise per Session: 10 min   Stress: Stress Concern Present (4/15/2024)    [employer] - Occupational Stress Questionnaire     Feeling of Stress : To some extent   Social Connections: Unknown (4/15/2024)    Social Connection and Isolation Panel [NHANES]      Frequency of Communication with Friends and Family: Not on file     Frequency of Social Gatherings with Friends and Family: Once a week     Attends Scientologist Services: Not on file     Active Member of Clubs or Organizations: Not on file     Attends Club or Organization Meetings: Not on file     Marital Status: Not on file   Interpersonal Safety: Low Risk  (1/30/2025)    Interpersonal Safety     Do you feel physically and emotionally safe where you currently live?: Yes     Within the past 12 months, have you been hit, slapped, kicked or otherwise physically hurt by someone?: No     Within the past 12 months, have you been humiliated or emotionally abused in other ways by your partner or ex-partner?: No   Housing Stability: Low Risk  (1/30/2025)    Housing Stability     Do you have housing? : Yes     Are you worried about losing your housing?: No            Physical Exam:   Physical exam performed via the [employer] cart with bedside nursing assistance.    Ranges for vital signs:  Temp:  [97.6  F (36.4  C)-97.9  F (36.6  C)] 97.8  F (36.6  C)  Pulse:  [] 85  Resp:  [16-20] 18  BP: (118-138)/(57-82) 126/66  SpO2:  [88 %-95 %] 93 %    Intake/Output Summary (Last 24 hours) at 2/4/2025 1338  Last data filed at 2/4/2025 1303  Gross per 24 hour   Intake 1600 ml   Output 4350 ml   Net -2750 ml     Exam:  GENERAL:  well-developed, sitting in bed in no acute distress.   ENT:  Head is normocephalic, atraumatic. Oropharynx is moist without exudates or ulcers.  EYES:  Eyes have anicteric sclerae.    NECK:  Supple.  LUNGS:  On supplemental O2 by nasal cannula, no use of accessory muscles  CARDIOVASCULAR:  Appears well perfused  SKIN:  No rashes on face  NEUROLOGIC:  AAO x 3         Laboratory Data:   Reviewed.  Pertinent for:    Microbiology:  Culture   Date Value Ref Range Status   02/01/2025 No growth after 2 days  Preliminary   02/01/2025 No growth after 2 days  Preliminary   01/29/2025 Positive on the 1st day of  incubation (A)  Final   01/29/2025 Staphylococcus hominis (AA)  Final     Comment:     1 of 2 bottles  Susceptibilities done on previous cultures   01/29/2025 Positive on the 1st day of incubation (A)  Final   01/29/2025 Staphylococcus hominis (AA)  Final     Comment:     1 of 2 bottles   01/29/2025 Peptoniphilus species (AA)  Final     Comment:     1 of 2 bottles  Identification obtained by MALDI-TOF mass spectrometry research use only database. Test characteristics determined and verified by the Infectious Diseases Diagnostic Laboratory.   05/06/2022 2+ Normal bryan  Final   04/13/2022 4+ Enterobacter cloacae complex (A)  Final   04/13/2022 2+ Staphylococcus epidermidis (A)  Final     Comment:     Susceptibilities not routinely done   03/28/2022   Final    >10 Squamous epithelial cells/low power field indicates oral contamination. Please recollect.   03/28/2022 No Growth  Final   03/28/2022 No Growth  Final       Last check of C difficile  C Diff Toxin B PCR   Date Value Ref Range Status   06/24/2021 Negative NEG^Negative Final     Comment:     Negative: C. difficile target DNA sequences NOT detected, presumed negative   for C.difficile toxin B or the number of bacteria present may be below the   limit of detection for the test.  FDA approved assay performed using [employer] GeneXpert real-time PCR.  A negative result does not exclude actual disease due to C. difficile and may   be due to improper collection, handling and storage of the specimen or the   number of organisms in the specimen is below the detection limit of the assay.         Virology:  Rapid test Influenza A positive 1/29/25    Metabolic Studies       Recent Labs   Lab Test 02/04/25  1136 02/04/25  0727 02/04/25  0510 02/03/25  2122 02/03/25  1651 02/03/25  1128 02/03/25  0826 02/03/25  0512 02/02/25  0755 02/02/25  0637 02/01/25  0741 02/01/25  0531 01/31/25  0801 01/31/25  0556 01/30/25  1638 01/30/25  0133 01/29/25  2320 01/29/25  2248  01/29/25  1853 10/31/24  1333 03/30/22  0725 03/30/22  0527 03/29/22  1214 03/29/22  0554 06/23/21  1547 06/23/21  1156   NA  --   --  141  --   --   --   --  141  --  139  --  140  --  141  --   --  140  --    < > 140   < > 137  --  137   < > 134   POTASSIUM  --   --  3.0*  --   --   --   --  3.5  --  3.3*  --  3.4  --  4.0  --   --  4.5  --    < > 4.4   < > 3.7  --  4.0   < > 5.1   CHLORIDE  --   --  90*  --   --   --   --  95*  --  94*  --  99  --  101  --   --  102  --    < > 100   < > 102  --  103   < > 102   CO2  --   --  37*  --   --   --   --  35*  --  32*  --  28  --  27  --   --  27  --    < > 27   < > 32  --  29   < > 24   ANIONGAP  --   --  14  --   --   --   --  11  --  13  --  13  --  13  --   --  11  --    < > 13   < > 3  --  5   < > 8   BUN  --   --  21.1  --   --   --   --  19.8  --  20.7  --  24.0*  --  30.9*  --   --  39.3*  --    < > 13.4   < > 31*  --  35*   < > 66*   CR  --   --  1.11  --   --   --   --  1.08  --  1.00  --  0.97  --  1.11  --   --  1.28*  --    < > 1.03   < > 1.25  --  1.28*   < > 3.56*   GFRESTIMATED  --   --  72  --   --   --   --  74  --  81  --  85  --  72  --   --  61  --    < > 79   < > 64  --  62   < > 17*   * 131* 107* 183* 99 120*   < > 106*   < > 112*   < > 103*   < > 96   < >  --  95  --    < > 90   < > 115*   < > 117*   < > 88   A1C  --   --   --   --   --   --   --   --   --   --   --   --   --   --   --   --   --   --   --  6.9*   < >  --   --   --    < >  --    SUE  --   --  9.6  --   --   --   --  8.6*  --  8.8  --  8.9  --  9.0  --   --  8.3*  --    < > 9.3   < > 8.7  --  8.8   < > 10.0   PHOS  --   --   --   --   --   --   --   --   --   --   --   --   --   --   --   --   --   --   --   --   --  3.5  --  3.9   < >  --    MAG  --   --   --   --   --   --   --   --   --   --   --   --   --   --   --   --   --   --   --   --   --  2.1  --  2.2   < >  --    LACT  --   --   --   --   --   --   --   --   --   --   --   --   --   --   --  1.2  --  2.1*  --    "--   --   --   --   --    < >  --    CKT  --   --   --   --   --   --   --   --   --   --   --   --   --   --   --   --   --   --   --   --   --   --   --   --   --  119    < > = values in this interval not displayed.        Hepatic Studies    Recent Labs   Lab Test 02/03/25  0512 02/02/25 0637 02/01/25 0531 01/31/25  0556 01/30/25  0133 01/29/25  2320   BILITOTAL 0.7 0.6 0.6 0.4 0.6 0.4   ALKPHOS 73 76 75 74 73 69   ALBUMIN 3.4* 3.6 3.6 3.8 3.7 3.4*   AST 43 65* 130* 189* 249* 203*   ALT 57 127* 201* 222* 195* 176*     Hematology Studies      Recent Labs   Lab Test 02/04/25  0510 02/03/25  0512 02/02/25 0525 02/01/25 0531 01/31/25  0556 01/29/25  2320 06/23/21  1923 06/23/21  1156   WBC 8.9 8.0 7.2 6.7 8.3 8.8   < > 13.7*   ANEU  --   --   --   --   --   --   --  10.3*   ALYM  --   --   --   --   --   --   --  2.1   BRIGIDO  --   --   --   --   --   --   --  1.1   AEOS  --   --   --   --   --   --   --  0.1   HGB 13.1* 11.6* 12.7* 11.7* 11.6* 11.1*   < > 15.7   HCT 42.5 38.2* 41.2 38.6* 38.6* 37.8*   < > 47.4    221 233 225 243 232   < > 229    < > = values in this interval not displayed.     Urine Studies     Recent Labs   Lab Test 03/28/22  0924 03/17/22  0937 07/20/21  0042 06/23/21  1741   URINEPH 6.0 6.0 5.0 5.0   NITRITE Negative Negative Negative Negative   LEUKEST Negative Negative Trace* Negative   WBCU 3 None Seen 12* 1     Tacrolimus levels    Invalid input(s): \"TACROLIMUS\", \"TAC\", \"TACR\"      Latest Ref Rng & Units 2/4/2025     5:10 AM 2/3/2025     5:12 AM 2/2/2025     6:37 AM 2/2/2025     5:25 AM 2/1/2025     5:31 AM   Transplant Immunosuppression Labs   Creat 0.67 - 1.17 mg/dL 1.11  1.08  1.00   0.97    Urea Nitrogen 8.0 - 23.0 mg/dL 21.1  19.8  20.7   24.0    WBC 4.0 - 11.0 10e3/uL 8.9  8.0   7.2  6.7           Imaging:   Echocardiogram Complete  Result Date: 1/30/2025  Interpretation Summary:  Technically difficult imaging. The rhythm was atrial flutter. With rapid atrial flutter, the visual " approximation of left ventricular systolic function may be falsely decreased.  The visual ejection fraction is 40-45%. There is inferolateral wall akinesis. There is moderate concentric left ventricular hypertrophy. Mildly decreased right ventricular systolic function  Borderline right ventricular enlargement. There is moderate biatrial enlargement.  Right ventricular systolic pressure is elevated, consistent with moderate pulmonary hypertension.      US Abdomen Limited  Result Date: 1/30/2025  IMPRESSION: 1.  Small amount of sludge in the gallbladder. Gallbladder wall thickening. 2.  Sonographic Pool's sign negative. No biliary dilatation.     CT Chest w Contrast  Result Date: 1/29/2025  IMPRESSION: 1.  Geographic groundglass attenuation throughout both lungs. Bilateral bronchial wall thickening. Focal nodular opacities within the perihilar aspect of the right lower lobe. These findings likely represent atypical pneumonia with bronchial inflammation. 2.  There appears to be wall thickening of the visualized gallbladder. If there is clinical suspicion for possible acute cholecystitis further imaging assessment may be done with a right upper quadrant ultrasound.       Video-Visit Details    Type of service:  Video Telemedicine Visit   Video Start Time: 2:31 PM  Video End Time: 2:40 PM  Originating Location (pt. Location): [facility]  Distant Location (provider location):  Offsite  Platform used for Video Visit: [employer]

## 2025-02-04 NOTE — PLAN OF CARE
Goal Outcome Evaluation:      Plan of Care Reviewed With: patient    Overall Patient Progress: improvingOverall Patient Progress: improving    Outcome Evaluation: Pt A&O x 4. Up SBA. VSS. On 1-2L NC. Denies pain. Tele reads atrial flutter. Weight down 7#. Ancef continued as ordered. Added potassium protocol per orders. Tolerating fluid restriction.    Temp: 97.6  F (36.4  C) Temp src: Oral BP: 118/72 Pulse: 75   Resp: 20 SpO2: (!) 88 % O2 Device: Nasal cannula Oxygen Delivery: 1.5 LPM

## 2025-02-04 NOTE — PLAN OF CARE
Goal Outcome Evaluation:      Plan of Care Reviewed With: patient    Overall Patient Progress: no change    Outcome Evaluation: Pt A&O x 4. SBA to bathroom. Sats 90-92% on 2L NC. Denies pain or SOB. Crackles in bilateral bases. Went over medication with pt per his request.

## 2025-02-04 NOTE — PHARMACY-ANTICOAGULATION SERVICE
Clinical Pharmacy - Warfarin Dosing Consult     Pharmacy has been consulted to manage this patient s warfarin therapy.  Indication: Atrial Fibrillation  Therapy Goal: INR 2-3  OP Anticoag Clinic: [facility]  Warfarin Prior to Admission: Yes  Warfarin PTA Regimen: 5 mg every Mon, Wed, Fri; 7.5 mg all other days  Recent documented change in oral intake/nutrition: Unknown  Dose Comments: Dose recently increased during ACC visit on 1/21/25    INR   Date Value Ref Range Status   02/04/2025 3.31 (H) 0.85 - 1.15 Final   02/03/2025 3.17 (H) 0.85 - 1.15 Final       Recommend warfarin 4 mg today.  Pharmacy will monitor Roger A Ring daily and order warfarin doses to achieve specified goal.      Please contact pharmacy as soon as possible if the warfarin needs to be held for a procedure or if the warfarin goals change.       Thank you,    Joie Almanza, PharmD

## 2025-02-05 LAB
ANION GAP SERPL CALCULATED.3IONS-SCNC: 13 MMOL/L (ref 7–15)
BUN SERPL-MCNC: 26.2 MG/DL (ref 8–23)
CALCIUM SERPL-MCNC: 9.5 MG/DL (ref 8.8–10.4)
CHLORIDE SERPL-SCNC: 90 MMOL/L (ref 98–107)
CREAT SERPL-MCNC: 1.17 MG/DL (ref 0.67–1.17)
EGFRCR SERPLBLD CKD-EPI 2021: 67 ML/MIN/1.73M2
ERYTHROCYTE [DISTWIDTH] IN BLOOD BY AUTOMATED COUNT: 19.1 % (ref 10–15)
GLUCOSE BLDC GLUCOMTR-MCNC: 104 MG/DL (ref 70–99)
GLUCOSE BLDC GLUCOMTR-MCNC: 122 MG/DL (ref 70–99)
GLUCOSE BLDC GLUCOMTR-MCNC: 151 MG/DL (ref 70–99)
GLUCOSE BLDC GLUCOMTR-MCNC: 159 MG/DL (ref 70–99)
GLUCOSE SERPL-MCNC: 108 MG/DL (ref 70–99)
HCO3 SERPL-SCNC: 37 MMOL/L (ref 22–29)
HCT VFR BLD AUTO: 41.5 % (ref 40–53)
HGB BLD-MCNC: 12.8 G/DL (ref 13.3–17.7)
INR PPP: 3.29 (ref 0.85–1.15)
MCH RBC QN AUTO: 23.7 PG (ref 26.5–33)
MCHC RBC AUTO-ENTMCNC: 30.8 G/DL (ref 31.5–36.5)
MCV RBC AUTO: 77 FL (ref 78–100)
PLATELET # BLD AUTO: 244 10E3/UL (ref 150–450)
POTASSIUM SERPL-SCNC: 3.1 MMOL/L (ref 3.4–5.3)
POTASSIUM SERPL-SCNC: 3.8 MMOL/L (ref 3.4–5.3)
RBC # BLD AUTO: 5.4 10E6/UL (ref 4.4–5.9)
SODIUM SERPL-SCNC: 140 MMOL/L (ref 135–145)
WBC # BLD AUTO: 10.2 10E3/UL (ref 4–11)

## 2025-02-05 PROCEDURE — 250N000013 HC RX MED GY IP 250 OP 250 PS 637: Performed by: NURSE PRACTITIONER

## 2025-02-05 PROCEDURE — 250N000013 HC RX MED GY IP 250 OP 250 PS 637: Performed by: PEDIATRICS

## 2025-02-05 PROCEDURE — 36415 COLL VENOUS BLD VENIPUNCTURE: CPT | Performed by: NURSE PRACTITIONER

## 2025-02-05 PROCEDURE — 84132 ASSAY OF SERUM POTASSIUM: CPT | Performed by: NURSE PRACTITIONER

## 2025-02-05 PROCEDURE — 82947 ASSAY GLUCOSE BLOOD QUANT: CPT | Performed by: NURSE PRACTITIONER

## 2025-02-05 PROCEDURE — 85027 COMPLETE CBC AUTOMATED: CPT | Performed by: NURSE PRACTITIONER

## 2025-02-05 PROCEDURE — 250N000011 HC RX IP 250 OP 636: Performed by: NURSE PRACTITIONER

## 2025-02-05 PROCEDURE — 99233 SBSQ HOSP IP/OBS HIGH 50: CPT | Performed by: NURSE PRACTITIONER

## 2025-02-05 PROCEDURE — 120N000001 HC R&B MED SURG/OB

## 2025-02-05 PROCEDURE — 85610 PROTHROMBIN TIME: CPT | Performed by: PEDIATRICS

## 2025-02-05 PROCEDURE — 250N000013 HC RX MED GY IP 250 OP 250 PS 637

## 2025-02-05 RX ORDER — POTASSIUM CHLORIDE 1500 MG/1
40 TABLET, EXTENDED RELEASE ORAL ONCE
Status: COMPLETED | OUTPATIENT
Start: 2025-02-05 | End: 2025-02-05

## 2025-02-05 RX ORDER — METOLAZONE 2.5 MG/1
2.5 TABLET ORAL ONCE
Status: COMPLETED | OUTPATIENT
Start: 2025-02-05 | End: 2025-02-05

## 2025-02-05 RX ADMIN — WARFARIN SODIUM 3 MG: 2 TABLET ORAL at 18:11

## 2025-02-05 RX ADMIN — ASPIRIN 81 MG 81 MG: 81 TABLET ORAL at 07:48

## 2025-02-05 RX ADMIN — METOPROLOL TARTRATE 200 MG: 100 TABLET, FILM COATED ORAL at 07:47

## 2025-02-05 RX ADMIN — ATORVASTATIN CALCIUM 80 MG: 40 TABLET, FILM COATED ORAL at 07:48

## 2025-02-05 RX ADMIN — POTASSIUM CHLORIDE 40 MEQ: 1500 TABLET, EXTENDED RELEASE ORAL at 09:11

## 2025-02-05 RX ADMIN — FUROSEMIDE 40 MG: 10 INJECTION, SOLUTION INTRAMUSCULAR; INTRAVENOUS at 16:07

## 2025-02-05 RX ADMIN — EZETIMIBE 10 MG: 10 TABLET ORAL at 07:48

## 2025-02-05 RX ADMIN — METOPROLOL TARTRATE 200 MG: 100 TABLET, FILM COATED ORAL at 20:19

## 2025-02-05 RX ADMIN — FUROSEMIDE 40 MG: 10 INJECTION, SOLUTION INTRAMUSCULAR; INTRAVENOUS at 07:35

## 2025-02-05 RX ADMIN — INSULIN ASPART 1 UNITS: 100 INJECTION, SOLUTION INTRAVENOUS; SUBCUTANEOUS at 12:20

## 2025-02-05 RX ADMIN — PANTOPRAZOLE SODIUM 40 MG: 40 TABLET, DELAYED RELEASE ORAL at 07:47

## 2025-02-05 RX ADMIN — METOLAZONE 2.5 MG: 2.5 TABLET ORAL at 07:48

## 2025-02-05 ASSESSMENT — ACTIVITIES OF DAILY LIVING (ADL)
ADLS_ACUITY_SCORE: 38
ADLS_ACUITY_SCORE: 35
ADLS_ACUITY_SCORE: 35
ADLS_ACUITY_SCORE: 38
ADLS_ACUITY_SCORE: 38
ADLS_ACUITY_SCORE: 35
ADLS_ACUITY_SCORE: 38
ADLS_ACUITY_SCORE: 35
ADLS_ACUITY_SCORE: 38
ADLS_ACUITY_SCORE: 35
ADLS_ACUITY_SCORE: 38

## 2025-02-05 NOTE — CONSULTS
"SPIRITUAL HEALTH SERVICES Consult Note    Medical Surgical Unit at [facility]    REFERRAL SOURCE/REASON FOR VISIT - Saw patient per Consult.    SUMMARY - I visited Flex, who was open to my introduction and to emotional and spiritual support.  I had been informed that he recently lost his wife to death, so I inquired about this.  Flex reported that her death came as a surprise, in December, and that he \"still can't believe it.\"  He added, \"It makes it hard.\"  The main way he is coping is through the emotional support of his mother, who is 90.  I listened to Flex, offered empathy, encouragement, reassurance, and bereavement support.  With his consent, I also said a prayer for him, including in my prayer a prayer for his health, and gave him a blessing.         Plan - I will continue to follow patient and be available for any ongoing support needs until his discharge.     Chano Sanchez, Ph.D, [facility]  [address] [place] [address] [phone]  [email or web address]    Assessment -     Distress and Loss - Loss of wife suddenly.    Strengths, Coping, and Resources - family, hung    Meaning, Beliefs, and Spirituality - Patient reported at admission that he is [employer].  He is not a member of a [employer].  "

## 2025-02-05 NOTE — PLAN OF CARE
Goal Outcome Evaluation:      Plan of Care Reviewed With: patient    Overall Patient Progress: improvingOverall Patient Progress: improving    Outcome Evaluation: patient A&O, vss, 1L o2 NC, denies pain, a-flutter, ambulated in wilkins x4, IV ancef given

## 2025-02-05 NOTE — PHARMACY-ANTICOAGULATION SERVICE
Clinical Pharmacy - Warfarin Dosing Consult     Pharmacy has been consulted to manage this patient s warfarin therapy.  Indication: Atrial Fibrillation  Therapy Goal: INR 2-3  OP Anticoag Clinic: [facility]  Warfarin Prior to Admission: Yes  Warfarin PTA Regimen: 5 mg every Mon, Wed, Fri; 7.5 mg all other days  Recent documented change in oral intake/nutrition: Unknown  Dose Comments: Dose recently increased during ACC visit on 1/21/25    INR   Date Value Ref Range Status   02/05/2025 3.29 (H) 0.85 - 1.15 Final   02/04/2025 3.31 (H) 0.85 - 1.15 Final       Recommend warfarin 3 mg today.  Pharmacy will monitor Roger A Ring daily and order warfarin doses to achieve specified goal.      Please contact pharmacy as soon as possible if the warfarin needs to be held for a procedure or if the warfarin goals change.       Thank you,    Joie Almanza, PharmD

## 2025-02-05 NOTE — PROGRESS NOTES
[facility]    Medicine Progress Note - Hospitalist Service    Date of Admission:  1/30/2025    Assessment & Plan   Roger Bonilla is a 69 year old male with chronic atrial fibrillation/flutter, CAD with history of MI with coronary artery stents and previous CABG, type 2 diabetes, hypertension, hyperlipidemia, PAD with previous carotid endarterectomy and AAA, chronic tobacco use which is ongoing, ABIGAIL, chronic anemia, and obesity who presented to ER on 1/29/2025 after onset of nonproductive cough, shortness of breath, malaise, fatigue, and bodyaches on January 27.  In the course of ER evaluation, he was severely hypoxic necessitating initiation of supplemental oxygen concerning for acute hypoxic respiratory failure, and he was tachycardic and tachypneic concerning for SIRS.  EKG and cardiac monitoring demonstrated atrial fibrillation and flutter.  He tested positive for influenza A and  imaging suspicious for lower respiratory tract infection including atypical pneumonia and possibly bronchitis.  Lab test results with significant elevation of BNP, increase in creatinine from baseline concerning for acute kidney injury, mildly elevated troponin, and elevated lactic acid level.  He presented with clinical signs of volume overload concerning for acute heart failure which may be contributing to acute respiratory failure.      Principal Problem:    Influenza A  Active Problems:    Atrial fibrillation/flutter (H)    Acute hypoxic respiratory failure (H)    SIRS (systemic inflammatory response syndrome) (H)    Hyperlipidemia LDL goal <70    ABIGAIL (obstructive sleep apnea)    Benign essential hypertension    PAD (peripheral artery disease)    DYLAN (acute kidney injury)    S/P CABG (coronary artery bypass graft)    S/P carotid endarterectomy    Type 2 diabetes mellitus with diabetic peripheral angiopathy without gangrene, without long-term current use of insulin (H)    Elevated brain natriuretic  peptide (BNP) level    Elevated lactic acid level    Elevation of levels of liver transaminase levels    Hypoalbuminemia    Chronic anemia    Elevated troponin    Warfarin-induced coagulopathy    Platelet dysfunction due to aspirin (H)    Morbid obesity (H)    Tobacco abuse    Coronary artery disease involving native coronary artery of native heart without angina pectoris    AAA (abdominal aortic aneurysm)    History of MI (myocardial infarction)    History of coronary artery stent placement     Influenza A with acute hypoxic hypercarbic respiratory failure and SIRS  Possible superimposed bacterial pneumonia  Probably untreated ABIGAIL  Clinical presentation and test results are suspicious for influenza A lower respiratory tract infection.  Acute hypoxic respiratory failure likely due to influenza A infection and possible superimposed bacterial pneumonia.  He presented with tachycardia and tachypnea which was concerning for SIRS although these could be due to respiratory failure and atrial fibrillation. Required resmed bipap on 1/31 due to hypercarbia on venous blood gas:  pH 7.25/76.  Check ABG after resmed for two hours:  7.31/64/72/32.  Now on 1 1/2 liter n/c.  VBG on 2/1/25 pH 7.33/PCO2 67 metabolically compensated.    Oxygen needs improving, down to 1 L.  Patient overall states he does not feel much better.  Course of Tamiflu completed 2/4  -Titrate oxygen including escalation to high flow nasal cannula if necessary to keep saturations greater than 89%   -treat HFmrEF as (see below)  -increase activity -walked in the halls with nursing today  -aggressive pulmonary hygiene     Bacteremia.  Blood culture done on 1/29 in ED growing staph hominus and peptoniphilus species.  Question contaminate, but also in 2 bottles.  Initiated on vancomycin,  but  speciation in and  changed to cefazolin 2 g q 8 hours on 2/1.  MRSA swab negative.  Repeat cultures negative to date.  He has been afebrile with normal WBC.  CRP elevated  at 19.25.   2/4 consulted infectious disease who feels that Peptoniphilus likely contaminant, as hominis in the blood cultures also most likely to be a  contaminant but he had already completed 7 days of antibiotic therapy which is sufficient for treatment of uncomplicated CoNS bacteremia.  He completed cefazolin on 2/4     Chronic atrial flutter/fibrillation with possible rapid ventricular response  Acute HFmrEF  Presented with chronic atrial flutter/fibrillation with resting heart rates consistently 110-120.  Home regimen: metoprolol tartrate 100 bid for rate control and warfarin for anticoagulation.    Chronic tachyarrhythmia likely exacerbated by influenza A.  He also presented with elevated BNP and significant peripheral edema although weight was stable compared with previously and radiographic findings did not specifically mention interstitial pulmonary edema and/or pleural effusions.  He is not known to have chronic heart failure and previous echocardiogram demonstrated normal LVEF, normal RV size and function, and no significant valvular disease.  Acute heart failure likely triggered by tachyarrhythmia and influenza A.     TTE done this admission while in rapid atrial flutter.  Demonstrated decreased EF of 40-45% (decreased from previous TTE in 2023 however may be falsely decreased due to RVR), inferolateral akinesis, moderate concentric LV hypertrophy, mildy decreased RV fxn.   2/3 had one dose of metolazone 2.5mg, 2 4 weight is down  222 lbs, about 7.5 lbs.   today weight remained stable at 222 pounds.  Heart rate has ranged  over the last 24 hours  -Will give 1 dose of metolazone 2.5 mg today  -continue telemetry  -continue increased metoprolol tartrate 200 bid (increased on 2/1)  -continue  IV metoprolol 5 mg every 6 hours as needed for heart rate greater than 120  -continue furosemide 40mg IV bid  -daily weights  -strict I/O  -fluid restriction 1800 ml     Acute kidney injury-resolved  Creatinine  on presentation was 1.39, increased compared with baseline creatinine 1.0 concerning for acute kidney injury.  Creatinine had returned to baseline, today is 1.17  -recheck renal function in am  -Avoid or limit use of nephrotoxic medication as much as possible  -Adjust medication dosing accordingly for changing renal function  -Optimize treatment of other acute medical problems     Elevated lactic acid  Presented to ER with mildly elevated lactic acid that normalized after initial treatment in the ER that included IV fluid boluses.  Infection was not identified nor suspected clinically initially however subsequent had positive blood culture staph hominis.     -Monitor clinically and repeat lactic acid if he clinically worsens     Elevated troponin  History of MI  CAD status post coronary artery stents and CABG  Known CAD with previous history of MI, placement of coronary stents, and CABG.  Presented with mildly elevated troponin 60 that quickly trended down.  EKG is difficult to interpret for acute ischemia because of tachyarrhythmia.  He has not had angina.  Suspect elevated troponin is due to acute illness including influenza, respiratory failure, and tachyarrhythmia superimposed upon known CAD and acute heart failure.  -No additional evaluation for acute ischemic heart disease during hospitalization unless his clinical status changes  -treat underlying illness      Elevated hepatic transaminases  Hypoalbuminemia  Gallbladder wall thickening on radiographic studies  Patient noted to have elevated hepatic transaminases in ER with initially low serum albumin.  Gallbladder wall appeared thickened on initial CT and confirmed on subsequent ultrasound.  However, he does not have abdominal pain or tenderness.  Pool sign on ultrasound was negative.  Bilirubin and alkaline phosphatase were normal and there were no signs of bile duct dilation by imaging.  Suspect elevated transaminases and gallbladder wall thickening may  be reflective of decompensated heart failure versus acute viral illness (I.e. pro-inflammatory response).    Serum albumin is low probably due to poor oral nutritional intake recently.  Normalized on 2/3/25     Type 2 diabetes.    Chronic type 2 diabetes treated with metformin with most recent A1c 6.9 in October 2024.  Blood sugars have been normal to minimally elevated (93 to 103).  He did receive IV contrast for radiographic study in the ER.  Blood sugars in 104-151  -Hold metformin due to IV contrast administration -can resume on discharge  -Monitor blood sugars 4 times daily  -continue NovoLog correction scale     PAD  Status post right carotid endarterectomy  AAA  Hyperlipidemia  Tobacco use  He has known PAD manifest as AAA and carotid atherosclerosis and previously underwent carotid endarterectomy.  On aspirin, Zetia, and statin.  He continues to smoke.  -Continue chronic aspirin, Zetia, and statin  -Smoking cessation advised  -Nicotine supplementation offered but declined by the patient at this time     Hypertension  Hypertension chronically treated with metoprolol and Lasix.  Has been normotensive.  -continue IV Lasix as noted above because of concern for acute heart failure  -Continue metoprolol tartrate at increased dose as above     Protein calorie malnutrition.  Has not been eating well at all.  -ensure clear between meals     Chronic anemia  Aspirin induced platelet function defect  Warfarin induced coagulopathy  Chronically anemic with baseline hemoglobin 11-12, currently stable.  Chronically taking aspirin that causes platelet function defect and warfarin that causes coagulopathy both of which increase his risk for bleeding.  INR is therapeutic.    -Continue chronic aspirin  -Pharmacy managing warfarin      Obesity  Appears obese with BMI 38.  Obesity likely contributes to health problems including diabetes and obesity hypoventilation.  -No acute intervention anticipated during hospitalization         "  Diet: Combination Diet Moderate Consistent Carb (60 g CHO per Meal) Diet; 2 gm NA Diet  Fluid restriction 1800 ML FLUID  Snacks/Supplements Adult: Ensure Clear; With Meals    DVT Prophylaxis: Warfarin  Peter Catheter: Not present  Lines: None     Cardiac Monitoring: ACTIVE order. Indication: Acute decompensated heart failure (48 hours)  Code Status: Full Code      Clinically Significant Risk Factors        # Hypokalemia: Lowest K = 3 mmol/L in last 2 days, will replace as needed   # Hypochloremia: Lowest Cl = 90 mmol/L in last 2 days, will monitor as appropriate      # Hypoalbuminemia: Lowest albumin = 3.4 g/dL at 2/3/2025  5:12 AM, will monitor as appropriate     # Hypertension: Noted on problem list     # Acute Hypercapnic Respiratory Failure: based on venous blood gas results.  Continue supplemental oxygen and ventilatory support as indicated.        # DMII: A1C = N/A within past 6 months   # Obesity: Estimated body mass index is 35.88 kg/m  as calculated from the following:    Height as of this encounter: 1.676 m (5' 6\").    Weight as of this encounter: 100.8 kg (222 lb 4.8 oz).      # Financial/Environmental Concerns: none   # History of CABG: noted on surgical history       Social Drivers of Health    Tobacco Use: High Risk (11/19/2024)    Patient History     Smoking Tobacco Use: Every Day     Smokeless Tobacco Use: Never     Passive Exposure: Never   Physical Activity: Insufficiently Active (4/15/2024)    Exercise Vital Sign     Days of Exercise per Week: 3 days     Minutes of Exercise per Session: 10 min   Stress: Stress Concern Present (4/15/2024)    [employer] [place] [employer] - Occupational Stress Questionnaire     Feeling of Stress : To some extent   Social Connections: Unknown (4/15/2024)    Social Connection and Isolation Panel [NHANES]     Frequency of Social Gatherings with Friends and Family: Once a week          Disposition Plan     Medically Ready for Discharge: Anticipated in 2-4 " Days           The patient's care was discussed with the Patient.    Karma Farah CNP  Hospitalist Service  [facility]  Securely message with [employer] (more info)  Text page via [employer] Paging/Directory   ______________________________________________________________________    Interval History   No acute changes overnight, patient continues to feel unwell with intermittent dyspnea although oxygen needs are slowly improving    Physical Exam   Vital Signs: Temp: 98.2  F (36.8  C) Temp src: Oral BP: 112/79 Pulse: 101   Resp: 18 SpO2: (!) 91 % O2 Device: None (Room air) Oxygen Delivery: 1 LPM  Weight: 222 lbs 4.8 oz    Gen:  sitting in chair,  no acute distress  HEENT:  normocephalic, atraumatic, oropharynx clear  Resp:  decreased bibasilarly, normal respiratory rate, no wheezes rales or rhonchi noted  Card:  S1,S2, RRR no murmur, rub or gallop  Abd:  Soft obese, nondistended, non tender,  normoactive bowel sounds   Neuro:  Neuro exam non focal     Medical Decision Making       40 MINUTES SPENT BY ME on the date of service doing chart review, history, exam, documentation & further activities per the note.      Data     I have personally reviewed the following data over the past 24 hrs:    10.2  \   12.8 (L)   / 244     140 90 (L) 26.2 (H) /  104 (H)   3.8 37 (H) 1.17 \     INR:  3.29 (H) PTT:  N/A   D-dimer:  N/A Fibrinogen:  N/A       Imaging results reviewed over the past 24 hrs:   No results found for this or any previous visit (from the past 24 hours).

## 2025-02-05 NOTE — PLAN OF CARE
Goal Outcome Evaluation:      Plan of Care Reviewed With: patient    Overall Patient Progress: improving    Outcome Evaluation: Patient A&O x4. VSS. 1L O2 via NC. Pt ambulated in room to bathroom and back. Last dose of IV ancef given. Pt reminisced on past with writer. In pleasant mood and anxious to get home to his dog.Good urine output. Weight unchanged. Tele reads atrial flutter.

## 2025-02-06 VITALS
TEMPERATURE: 97.4 F | DIASTOLIC BLOOD PRESSURE: 73 MMHG | RESPIRATION RATE: 20 BRPM | OXYGEN SATURATION: 96 % | BODY MASS INDEX: 35.65 KG/M2 | WEIGHT: 221.8 LBS | HEIGHT: 66 IN | SYSTOLIC BLOOD PRESSURE: 123 MMHG | HEART RATE: 106 BPM

## 2025-02-06 PROBLEM — N17.9 ACUTE KIDNEY FAILURE, UNSPECIFIED: Status: ACTIVE | Noted: 2025-02-06

## 2025-02-06 LAB
ANION GAP SERPL CALCULATED.3IONS-SCNC: 12 MMOL/L (ref 7–15)
BACTERIA BLD CULT: NO GROWTH
BACTERIA BLD CULT: NO GROWTH
BUN SERPL-MCNC: 32.4 MG/DL (ref 8–23)
CALCIUM SERPL-MCNC: 9.6 MG/DL (ref 8.8–10.4)
CHLORIDE SERPL-SCNC: 88 MMOL/L (ref 98–107)
CREAT SERPL-MCNC: 1.49 MG/DL (ref 0.67–1.17)
EGFRCR SERPLBLD CKD-EPI 2021: 50 ML/MIN/1.73M2
ERYTHROCYTE [DISTWIDTH] IN BLOOD BY AUTOMATED COUNT: 19.3 % (ref 10–15)
GLUCOSE BLDC GLUCOMTR-MCNC: 116 MG/DL (ref 70–99)
GLUCOSE BLDC GLUCOMTR-MCNC: 132 MG/DL (ref 70–99)
GLUCOSE BLDC GLUCOMTR-MCNC: 201 MG/DL (ref 70–99)
GLUCOSE BLDC GLUCOMTR-MCNC: 214 MG/DL (ref 70–99)
GLUCOSE SERPL-MCNC: 100 MG/DL (ref 70–99)
HCO3 SERPL-SCNC: 38 MMOL/L (ref 22–29)
HCT VFR BLD AUTO: 42.3 % (ref 40–53)
HGB BLD-MCNC: 12.9 G/DL (ref 13.3–17.7)
INR PPP: 3.23 (ref 0.85–1.15)
MCH RBC QN AUTO: 23.7 PG (ref 26.5–33)
MCHC RBC AUTO-ENTMCNC: 30.5 G/DL (ref 31.5–36.5)
MCV RBC AUTO: 78 FL (ref 78–100)
NT-PROBNP SERPL-MCNC: 1764 PG/ML (ref 0–900)
PLATELET # BLD AUTO: 295 10E3/UL (ref 150–450)
POTASSIUM SERPL-SCNC: 3.5 MMOL/L (ref 3.4–5.3)
RBC # BLD AUTO: 5.45 10E6/UL (ref 4.4–5.9)
SODIUM SERPL-SCNC: 138 MMOL/L (ref 135–145)
WBC # BLD AUTO: 9.6 10E3/UL (ref 4–11)

## 2025-02-06 PROCEDURE — 250N000013 HC RX MED GY IP 250 OP 250 PS 637: Performed by: NURSE PRACTITIONER

## 2025-02-06 PROCEDURE — 85014 HEMATOCRIT: CPT | Performed by: NURSE PRACTITIONER

## 2025-02-06 PROCEDURE — 99233 SBSQ HOSP IP/OBS HIGH 50: CPT | Performed by: NURSE PRACTITIONER

## 2025-02-06 PROCEDURE — 36415 COLL VENOUS BLD VENIPUNCTURE: CPT | Performed by: NURSE PRACTITIONER

## 2025-02-06 PROCEDURE — 250N000013 HC RX MED GY IP 250 OP 250 PS 637: Performed by: PEDIATRICS

## 2025-02-06 PROCEDURE — 85610 PROTHROMBIN TIME: CPT | Performed by: PEDIATRICS

## 2025-02-06 PROCEDURE — 120N000001 HC R&B MED SURG/OB

## 2025-02-06 PROCEDURE — 83880 ASSAY OF NATRIURETIC PEPTIDE: CPT | Performed by: NURSE PRACTITIONER

## 2025-02-06 PROCEDURE — 80048 BASIC METABOLIC PNL TOTAL CA: CPT | Performed by: NURSE PRACTITIONER

## 2025-02-06 RX ADMIN — EZETIMIBE 10 MG: 10 TABLET ORAL at 08:25

## 2025-02-06 RX ADMIN — ATORVASTATIN CALCIUM 80 MG: 40 TABLET, FILM COATED ORAL at 08:25

## 2025-02-06 RX ADMIN — INSULIN ASPART 2 UNITS: 100 INJECTION, SOLUTION INTRAVENOUS; SUBCUTANEOUS at 12:14

## 2025-02-06 RX ADMIN — PANTOPRAZOLE SODIUM 40 MG: 40 TABLET, DELAYED RELEASE ORAL at 08:25

## 2025-02-06 RX ADMIN — ASPIRIN 81 MG 81 MG: 81 TABLET ORAL at 08:25

## 2025-02-06 RX ADMIN — WARFARIN SODIUM 3 MG: 2 TABLET ORAL at 17:16

## 2025-02-06 RX ADMIN — METOPROLOL TARTRATE 200 MG: 100 TABLET, FILM COATED ORAL at 21:30

## 2025-02-06 RX ADMIN — INSULIN ASPART 2 UNITS: 100 INJECTION, SOLUTION INTRAVENOUS; SUBCUTANEOUS at 17:15

## 2025-02-06 ASSESSMENT — ACTIVITIES OF DAILY LIVING (ADL)
ADLS_ACUITY_SCORE: 35
ADLS_ACUITY_SCORE: 35
ADLS_ACUITY_SCORE: 38
ADLS_ACUITY_SCORE: 35
ADLS_ACUITY_SCORE: 38
ADLS_ACUITY_SCORE: 35
ADLS_ACUITY_SCORE: 38
ADLS_ACUITY_SCORE: 35
ADLS_ACUITY_SCORE: 38
ADLS_ACUITY_SCORE: 35
ADLS_ACUITY_SCORE: 35
ADLS_ACUITY_SCORE: 38
ADLS_ACUITY_SCORE: 35
ADLS_ACUITY_SCORE: 35
ADLS_ACUITY_SCORE: 38
ADLS_ACUITY_SCORE: 38
ADLS_ACUITY_SCORE: 35

## 2025-02-06 NOTE — PLAN OF CARE
Goal Outcome Evaluation:      Plan of Care Reviewed With: patient    Overall Patient Progress: improvingOverall Patient Progress: improving    Outcome Evaluation: Vss. 1L nc O2 with O2 sats 90-92% overnight. Lungs are diminished t/o. Trace edema to BLE legs, ankles, and feet. Pt has been up to the bathroom to void with standby assist and tolerates activity well, slight dyspnea noted. Denies pain. Telemetry is Afib/flutter with BBB.

## 2025-02-06 NOTE — PLAN OF CARE
Goal Outcome Evaluation:      Plan of Care Reviewed With: patient    Overall Patient Progress: improving    Outcome Evaluation: VSS, continues on 1L O2/NC d/t dropping into the 80's on RA. Lungs are diminished/clear. Trace edema to BLE. Denies pain, up with SBA, SOB with activity. Telemetry today A Flutter. Fluid restriction increased to 2000, d/t dryness.

## 2025-02-06 NOTE — PHARMACY-ANTICOAGULATION SERVICE
Clinical Pharmacy - Warfarin Dosing Consult     Pharmacy has been consulted to manage this patient s warfarin therapy.  Indication: Atrial Fibrillation  Therapy Goal: INR 2-3  OP Anticoag Clinic: [facility]  Warfarin Prior to Admission: Yes  Warfarin PTA Regimen: 5 mg every Mon, Wed, Fri; 7.5 mg all other days  Recent documented change in oral intake/nutrition: Unknown  Dose Comments: Dose recently increased during [facility] visit on 1/21/25    INR   Date Value Ref Range Status   02/06/2025 3.23 (H) 0.85 - 1.15 Final   02/05/2025 3.29 (H) 0.85 - 1.15 Final       Recommend warfarin 3 mg today.  Pharmacy will monitor Roger A Ring daily and order warfarin doses to achieve specified goal.      Please contact pharmacy as soon as possible if the warfarin needs to be held for a procedure or if the warfarin goals change.

## 2025-02-06 NOTE — PROGRESS NOTES
[facility]    Medicine Progress Note - Hospitalist Service    Date of Admission:  1/30/2025    Assessment & Plan   Roger Bonilla is a 69 year old male with chronic atrial fibrillation/flutter, CAD with history of MI with coronary artery stents and previous CABG, type 2 diabetes, hypertension, hyperlipidemia, PAD with previous carotid endarterectomy and AAA, chronic tobacco use which is ongoing, ABIGAIL, chronic anemia, and obesity who presented to ER on 1/29/2025 after onset of nonproductive cough, shortness of breath, malaise, fatigue, and bodyaches on January 27.  In the course of ER evaluation, he was severely hypoxic necessitating initiation of supplemental oxygen concerning for acute hypoxic respiratory failure, and he was tachycardic and tachypneic concerning for SIRS.  EKG and cardiac monitoring demonstrated atrial fibrillation and flutter.  He tested positive for influenza A and  imaging suspicious for lower respiratory tract infection including atypical pneumonia and possibly bronchitis.  Lab test results with significant elevation of BNP, increase in creatinine from baseline concerning for acute kidney injury, mildly elevated troponin, and elevated lactic acid level.  He presented with clinical signs of volume overload concerning for acute heart failure which may be contributing to acute respiratory failure.      Principal Problem:    Influenza A  Active Problems:    Atrial fibrillation/flutter (H)    Acute hypoxic respiratory failure (H)    SIRS (systemic inflammatory response syndrome) (H)    Hyperlipidemia LDL goal <70    ABIGAIL (obstructive sleep apnea)    Benign essential hypertension    PAD (peripheral artery disease)    DYLAN (acute kidney injury)    S/P CABG (coronary artery bypass graft)    S/P carotid endarterectomy    Type 2 diabetes mellitus with diabetic peripheral angiopathy without gangrene, without long-term current use of insulin (H)    Elevated brain natriuretic  peptide (BNP) level    Elevated lactic acid level    Elevation of levels of liver transaminase levels    Hypoalbuminemia    Chronic anemia    Elevated troponin    Warfarin-induced coagulopathy    Platelet dysfunction due to aspirin (H)    Morbid obesity (H)    Tobacco abuse    Coronary artery disease involving native coronary artery of native heart without angina pectoris    AAA (abdominal aortic aneurysm)    History of MI (myocardial infarction)    History of coronary artery stent placement     Influenza A with acute hypoxic hypercarbic respiratory failure and SIRS  Possible superimposed bacterial pneumonia  Probably untreated ABIGAIL  Clinical presentation and test results are suspicious for influenza A lower respiratory tract infection.  Acute hypoxic respiratory failure likely due to influenza A infection and possible superimposed bacterial pneumonia.  He presented with tachycardia and tachypnea which was concerning for SIRS although these could be due to respiratory failure and atrial fibrillation. Required resmed bipap on 1/31 due to hypercarbia on venous blood gas:  pH 7.25/76.  Check ABG after resmed for two hours:  7.31/64/72/32.  Now on 1 1/2 liter n/c.  VBG on 2/1/25 pH 7.33/PCO2 67 metabolically compensated.    Oxygen needs improving, down to 1 L.  Patient overall states he does not feel much better.  Course of Tamiflu completed 2/4  -Titrate oxygen including escalation to high flow nasal cannula if necessary to keep saturations greater than 89%   -treat HFmrEF as (see below)  -increase activity -walked in the halls with nursing today  -aggressive pulmonary hygiene    DYLAN  2/6 creatinine up to 1.49, yesterday was 1.17.  He had been on Lasix 40 mg IV twice a day, given extra dose of metolazone on 2/3 and 2/5.  Suspect patient is over diuresed.  -Hold Lasix today  -Blood pressure is soft if it does not improve could consider small bolus of IV fluids  -Will increase fluid restriction to 2 L a day      Bacteremia.  Blood culture done on 1/29 in ED growing staph hominus and peptoniphilus species.  Question contaminate, but also in 2 bottles.  Initiated on vancomycin,  but  speciation in and  changed to cefazolin 2 g q 8 hours on 2/1.  MRSA swab negative.  Repeat cultures negative to date.  He has been afebrile with normal WBC.  CRP elevated at 19.25.   2/4 consulted infectious disease who feels that Peptoniphilus likely contaminant, as hominis in the blood cultures also most likely to be a  contaminant but he had already completed 7 days of antibiotic therapy which is sufficient for treatment of uncomplicated CoNS bacteremia.  He completed cefazolin on 2/4     Chronic atrial flutter/fibrillation with possible rapid ventricular response  Acute HFmrEF  Presented with chronic atrial flutter/fibrillation with resting heart rates consistently 110-120.  Home regimen: metoprolol tartrate 100 bid for rate control and warfarin for anticoagulation.    Chronic tachyarrhythmia likely exacerbated by influenza A.  He also presented with elevated BNP and significant peripheral edema although weight was stable compared with previously and radiographic findings did not specifically mention interstitial pulmonary edema and/or pleural effusions.  He is not known to have chronic heart failure and previous echocardiogram demonstrated normal LVEF, normal RV size and function, and no significant valvular disease.  Acute heart failure likely triggered by tachyarrhythmia and influenza A.     TTE done this admission while in rapid atrial flutter.  Demonstrated decreased EF of 40-45% (decreased from previous TTE in 2023 however may be falsely decreased due to RVR), inferolateral akinesis, moderate concentric LV hypertrophy, mildy decreased RV fxn.   2/3 had one dose of metolazone 2.5mg, 2 4 weight is down  222 lbs, about 7.5 lbs. weight had been stable at 222 pounds for 2 days, 2/5 gave an extra dose of metolazone, patient's weight only  down about a half a pound overnight.  Bump in creatinine and BUN tells me that patient may be over diuresed.  BNP down to 1764.  Heart rate has ranged  over the last 24 hours  -continue telemetry  -continue increased metoprolol tartrate 200 bid (increased on 2/1)  -continue  IV metoprolol 5 mg every 6 hours as needed for heart rate greater than 120  -hold furosemide 40mg IV bid  -daily weights  -strict I/O  -fluid restriction 2000ml        Elevated lactic acid  Presented to ER with mildly elevated lactic acid that normalized after initial treatment in the ER that included IV fluid boluses.  Infection was not identified nor suspected clinically initially however subsequent had positive blood culture staph hominis.     -Monitor clinically and repeat lactic acid if he clinically worsens     Elevated troponin  History of MI  CAD status post coronary artery stents and CABG  Known CAD with previous history of MI, placement of coronary stents, and CABG.  Presented with mildly elevated troponin 60 that quickly trended down.  EKG is difficult to interpret for acute ischemia because of tachyarrhythmia.  He has not had angina.  Suspect elevated troponin is due to acute illness including influenza, respiratory failure, and tachyarrhythmia superimposed upon known CAD and acute heart failure.  -No additional evaluation for acute ischemic heart disease during hospitalization unless his clinical status changes  -treat underlying illness      Elevated hepatic transaminases  Hypoalbuminemia  Gallbladder wall thickening on radiographic studies  Patient noted to have elevated hepatic transaminases in ER with initially low serum albumin.  Gallbladder wall appeared thickened on initial CT and confirmed on subsequent ultrasound.  However, he does not have abdominal pain or tenderness.  Pool sign on ultrasound was negative.  Bilirubin and alkaline phosphatase were normal and there were no signs of bile duct dilation by imaging.   Suspect elevated transaminases and gallbladder wall thickening may be reflective of decompensated heart failure versus acute viral illness (I.e. pro-inflammatory response).    Serum albumin is low probably due to poor oral nutritional intake recently.  Normalized on 2/3/25     Type 2 diabetes.    Chronic type 2 diabetes treated with metformin with most recent A1c 6.9 in October 2024.  Blood sugars have been normal to minimally elevated (93 to 103).  He did receive IV contrast for radiographic study in the ER.  Blood sugars in 104-201  -Hold metformin due to IV contrast administration -can resume on discharge  -Monitor blood sugars 4 times daily  -continue NovoLog correction scale     PAD  Status post right carotid endarterectomy  AAA  Hyperlipidemia  Tobacco use  He has known PAD manifest as AAA and carotid atherosclerosis and previously underwent carotid endarterectomy.  On aspirin, Zetia, and statin.  He continues to smoke.  -Continue chronic aspirin, Zetia, and statin  -Smoking cessation advised  -Nicotine supplementation offered but declined by the patient at this time     Hypertension  Hypertension chronically treated with metoprolol and Lasix.  This morning patient mildly hypotensive with a systolic blood pressure of 93.  -Holding IV Lasix today a  -Continue metoprolol tartrate at increased dose as above     Protein calorie malnutrition.  Has not been eating well at all.  -ensure clear between meals     Chronic anemia  Aspirin induced platelet function defect  Warfarin induced coagulopathy  Chronically anemic with baseline hemoglobin 11-12, currently stable.  Chronically taking aspirin that causes platelet function defect and warfarin that causes coagulopathy both of which increase his risk for bleeding.  INR is therapeutic.    -Continue chronic aspirin  -Pharmacy managing warfarin      Obesity  Appears obese with BMI 38.  Obesity likely contributes to health problems including diabetes and obesity  "hypoventilation.  -No acute intervention anticipated during hospitalization          Diet: Combination Diet Moderate Consistent Carb (60 g CHO per Meal) Diet; 2 gm NA Diet  Snacks/Supplements Adult: Ensure Clear; With Meals  Fluid restriction 2000 ML FLUID    DVT Prophylaxis: Warfarin  Peter Catheter: Not present  Lines: None     Cardiac Monitoring: ACTIVE order. Indication: Acute decompensated heart failure (48 hours)  Code Status: Full Code      Clinically Significant Risk Factors        # Hypokalemia: Lowest K = 3.1 mmol/L in last 2 days, will replace as needed   # Hypochloremia: Lowest Cl = 88 mmol/L in last 2 days, will monitor as appropriate      # Hypoalbuminemia: Lowest albumin = 3.4 g/dL at 2/3/2025  5:12 AM, will monitor as appropriate    # Acute Kidney Injury, unspecified: based on a >150% or 0.3 mg/dL increase in last creatinine compared to past 90 day average, will monitor renal function  # Hypertension: Noted on problem list     # Acute Hypercapnic Respiratory Failure: based on venous blood gas results.  Continue supplemental oxygen and ventilatory support as indicated.        # DMII: A1C = N/A within past 6 months   # Obesity: Estimated body mass index is 35.88 kg/m  as calculated from the following:    Height as of this encounter: 1.676 m (5' 6\").    Weight as of this encounter: 100.8 kg (222 lb 4.8 oz).      # Financial/Environmental Concerns: none   # History of CABG: noted on surgical history       Social Drivers of Health    Tobacco Use: High Risk (11/19/2024)    Patient History     Smoking Tobacco Use: Every Day     Smokeless Tobacco Use: Never     Passive Exposure: Never   Physical Activity: Insufficiently Active (4/15/2024)    Exercise Vital Sign     Days of Exercise per Week: 3 days     Minutes of Exercise per Session: 10 min   Stress: Stress Concern Present (4/15/2024)    [employer] - Occupational Stress Questionnaire     Feeling of Stress : To some extent "   Social Connections: Unknown (4/15/2024)    Social Connection and Isolation Panel [NHANES]     Frequency of Social Gatherings with Friends and Family: Once a week          Disposition Plan     Medically Ready for Discharge: Anticipated Tomorrow           The patient's care was discussed with the Patient.    Karma Farah CNP  Hospitalist Service  [facility]  Securely message with [email or web address] (more info)  Text page via [employer] Paging/Directory   ______________________________________________________________________    Interval History   Patient has no new concerns from overnight.  Continues on 1 L of oxygen.  Overall states he does not feel any better.    Physical Exam   Vital Signs: Temp: 98.1  F (36.7  C) Temp src: Oral BP: 93/64 Pulse: 74   Resp: 18 SpO2: 92 % O2 Device: Nasal cannula Oxygen Delivery: 1 LPM  Weight: 222 lbs 4.8 oz    Gen:  sitting in chair,  no acute distress  HEENT:  normocephalic, atraumatic, oropharynx clear  Resp:  decreased bibasilarly, normal respiratory rate, no wheezes rales or rhonchi noted  Card:  S1,S2, RRR no murmur, rub or gallop  Abd:  Soft obese, nondistended, non tender,  normoactive bowel sounds   Neuro:  Neuro exam non focal     Medical Decision Making       40 MINUTES SPENT BY ME on the date of service doing chart review, history, exam, documentation & further activities per the note.      Data     I have personally reviewed the following data over the past 24 hrs:    9.6  \   12.9 (L)   / 295     138 88 (L) 32.4 (H) /  201 (H)   3.5 38 (H) 1.49 (H) \     Trop: N/A BNP: 1,764 (H)     INR:  3.23 (H) PTT:  N/A   D-dimer:  N/A Fibrinogen:  N/A       Imaging results reviewed over the past 24 hrs:   No results found for this or any previous visit (from the past 24 hours).

## 2025-02-06 NOTE — PLAN OF CARE
Goal Outcome Evaluation:      Plan of Care Reviewed With: patient    Overall Patient Progress: improvingOverall Patient Progress: improving    Outcome Evaluation: patient A&O, vss except 1-1.5 L o2 NC, ambulating in wilkins SBA walker/GB, IV lasix, good appetite

## 2025-02-07 LAB
ALLEN'S TEST: YES
ANION GAP SERPL CALCULATED.3IONS-SCNC: 11 MMOL/L (ref 7–15)
BASE EXCESS BLDA CALC-SCNC: 19.1 MMOL/L (ref -3–3)
BASE EXCESS BLDV CALC-SCNC: 15.5 MMOL/L (ref -3–3)
BUN SERPL-MCNC: 35.9 MG/DL (ref 8–23)
CALCIUM SERPL-MCNC: 9.2 MG/DL (ref 8.8–10.4)
CHLORIDE SERPL-SCNC: 87 MMOL/L (ref 98–107)
CREAT SERPL-MCNC: 1.34 MG/DL (ref 0.67–1.17)
EGFRCR SERPLBLD CKD-EPI 2021: 57 ML/MIN/1.73M2
GLUCOSE BLDC GLUCOMTR-MCNC: 107 MG/DL (ref 70–99)
GLUCOSE BLDC GLUCOMTR-MCNC: 124 MG/DL (ref 70–99)
GLUCOSE BLDC GLUCOMTR-MCNC: 127 MG/DL (ref 70–99)
GLUCOSE BLDC GLUCOMTR-MCNC: 139 MG/DL (ref 70–99)
GLUCOSE SERPL-MCNC: 118 MG/DL (ref 70–99)
HCO3 BLD-SCNC: 45 MMOL/L (ref 21–28)
HCO3 BLDV-SCNC: 44 MMOL/L (ref 21–28)
HCO3 SERPL-SCNC: 41 MMOL/L (ref 22–29)
HOLD SPECIMEN: NORMAL
INR PPP: 2.69 (ref 0.85–1.15)
O2/TOTAL GAS SETTING VFR VENT: 24 %
O2/TOTAL GAS SETTING VFR VENT: 24 %
OXYHGB MFR BLDA: 84 % (ref 92–100)
OXYHGB MFR BLDV: 58 % (ref 70–75)
PCO2 BLD: 57 MM HG (ref 35–45)
PCO2 BLDV: 72 MM HG (ref 40–50)
PH BLD: 7.51 [PH] (ref 7.35–7.45)
PH BLDV: 7.4 [PH] (ref 7.32–7.43)
PO2 BLD: 50 MM HG (ref 80–105)
PO2 BLDV: 34 MM HG (ref 25–47)
POTASSIUM SERPL-SCNC: 3.8 MMOL/L (ref 3.4–5.3)
SAO2 % BLDA: 86.2 % (ref 95–96)
SAO2 % BLDV: 59.7 % (ref 70–75)
SODIUM SERPL-SCNC: 139 MMOL/L (ref 135–145)

## 2025-02-07 PROCEDURE — 36415 COLL VENOUS BLD VENIPUNCTURE: CPT | Performed by: PEDIATRICS

## 2025-02-07 PROCEDURE — 36415 COLL VENOUS BLD VENIPUNCTURE: CPT | Performed by: NURSE PRACTITIONER

## 2025-02-07 PROCEDURE — 120N000001 HC R&B MED SURG/OB

## 2025-02-07 PROCEDURE — 36600 WITHDRAWAL OF ARTERIAL BLOOD: CPT

## 2025-02-07 PROCEDURE — 250N000013 HC RX MED GY IP 250 OP 250 PS 637: Performed by: NURSE PRACTITIONER

## 2025-02-07 PROCEDURE — 82805 BLOOD GASES W/O2 SATURATION: CPT | Performed by: NURSE PRACTITIONER

## 2025-02-07 PROCEDURE — 99233 SBSQ HOSP IP/OBS HIGH 50: CPT | Performed by: NURSE PRACTITIONER

## 2025-02-07 PROCEDURE — 82565 ASSAY OF CREATININE: CPT | Performed by: NURSE PRACTITIONER

## 2025-02-07 PROCEDURE — 250N000013 HC RX MED GY IP 250 OP 250 PS 637: Performed by: PEDIATRICS

## 2025-02-07 PROCEDURE — 85610 PROTHROMBIN TIME: CPT | Performed by: PEDIATRICS

## 2025-02-07 RX ORDER — WARFARIN SODIUM 2 MG/1
4 TABLET ORAL
Status: COMPLETED | OUTPATIENT
Start: 2025-02-07 | End: 2025-02-07

## 2025-02-07 RX ORDER — ACETAZOLAMIDE 125 MG/1
250 TABLET ORAL
Status: DISCONTINUED | OUTPATIENT
Start: 2025-02-07 | End: 2025-02-10 | Stop reason: HOSPADM

## 2025-02-07 RX ADMIN — ATORVASTATIN CALCIUM 80 MG: 40 TABLET, FILM COATED ORAL at 08:20

## 2025-02-07 RX ADMIN — EZETIMIBE 10 MG: 10 TABLET ORAL at 08:20

## 2025-02-07 RX ADMIN — ASPIRIN 81 MG 81 MG: 81 TABLET ORAL at 08:20

## 2025-02-07 RX ADMIN — METOPROLOL TARTRATE 200 MG: 100 TABLET, FILM COATED ORAL at 08:20

## 2025-02-07 RX ADMIN — WARFARIN SODIUM 4 MG: 2 TABLET ORAL at 17:14

## 2025-02-07 RX ADMIN — PANTOPRAZOLE SODIUM 40 MG: 40 TABLET, DELAYED RELEASE ORAL at 08:20

## 2025-02-07 RX ADMIN — METOPROLOL TARTRATE 200 MG: 100 TABLET, FILM COATED ORAL at 21:26

## 2025-02-07 RX ADMIN — ACETAZOLAMIDE 250 MG: 125 TABLET ORAL at 12:10

## 2025-02-07 RX ADMIN — ACETAZOLAMIDE 250 MG: 125 TABLET ORAL at 17:14

## 2025-02-07 ASSESSMENT — ACTIVITIES OF DAILY LIVING (ADL)
ADLS_ACUITY_SCORE: 38

## 2025-02-07 NOTE — PROGRESS NOTES
CLINICAL NUTRITION SERVICES     Reviewed nutrition risk factors due to LOS. Average recorded meal intakes 73% since 2/2/25 and pt supplemented with Ensure Clear TID offering 720kcal and 24g protein /d. Reviewed wt hx: wt decline 1/31-2/6/25 r/t diuresis, not unintentional wt loss. No indication of pressure injury. Remote RD attempted to check on pt via phone at 13:40, no answer.     Follow Up / Monitoring:  Per policy unless consulted

## 2025-02-07 NOTE — PLAN OF CARE
Goal Outcome Evaluation:      Plan of Care Reviewed With: patient    Overall Patient Progress: improving    Outcome Evaluation: VSS, BS's 116, 201, 214 today, continues on 1L O2/NC d/t dropping into the 80's on RA. Lungs are diminished/clear. Trace edema to BLE. Denies pain, up with SBA, SOB with activity. Telemetry today A Flutter. Fluid restriction increased to 2000, d/t dryness.

## 2025-02-07 NOTE — PROGRESS NOTES
[facility]    Medicine Progress Note - Hospitalist Service    Date of Admission:  1/30/2025    Assessment & Plan   Roger Bonilla is a 69 year old male with chronic atrial fibrillation/flutter, CAD with history of MI with coronary artery stents and previous CABG, type 2 diabetes, hypertension, hyperlipidemia, PAD with previous carotid endarterectomy and AAA, chronic tobacco use which is ongoing, ABIGAIL, chronic anemia, and obesity who presented to ER on 1/29/2025 after onset of nonproductive cough, shortness of breath, malaise, fatigue, and bodyaches on January 27.  In the course of ER evaluation, he was severely hypoxic necessitating initiation of supplemental oxygen concerning for acute hypoxic respiratory failure, and he was tachycardic and tachypneic concerning for SIRS.  EKG and cardiac monitoring demonstrated atrial fibrillation and flutter.  He tested positive for influenza A and  imaging suspicious for lower respiratory tract infection including atypical pneumonia and possibly bronchitis.  Lab test results with significant elevation of BNP, increase in creatinine from baseline concerning for acute kidney injury, mildly elevated troponin, and elevated lactic acid level.  He presented with clinical signs of volume overload concerning for acute heart failure which may be contributing to acute respiratory failure.      Principal Problem:    Influenza A  Active Problems:    Atrial fibrillation/flutter (H)    Acute hypoxic respiratory failure (H)    SIRS (systemic inflammatory response syndrome) (H)    Hyperlipidemia LDL goal <70    ABIGAIL (obstructive sleep apnea)    Benign essential hypertension    PAD (peripheral artery disease)    DYLAN (acute kidney injury)    S/P CABG (coronary artery bypass graft)    S/P carotid endarterectomy    Type 2 diabetes mellitus with diabetic peripheral angiopathy without gangrene, without long-term current use of insulin (H)    Elevated brain natriuretic  peptide (BNP) level    Elevated lactic acid level    Elevation of levels of liver transaminase levels    Hypoalbuminemia    Chronic anemia    Elevated troponin    Warfarin-induced coagulopathy    Platelet dysfunction due to aspirin (H)    Morbid obesity (H)    Tobacco abuse    Coronary artery disease involving native coronary artery of native heart without angina pectoris    AAA (abdominal aortic aneurysm)    History of MI (myocardial infarction)    History of coronary artery stent placement     Influenza A with acute hypoxic hypercarbic respiratory failure and SIRS  Possible superimposed bacterial pneumonia  Probably untreated ABIGAIL  Clinical presentation and test results are suspicious for influenza A lower respiratory tract infection.  Acute hypoxic respiratory failure likely due to influenza A infection and possible superimposed bacterial pneumonia.  He presented with tachycardia and tachypnea which was concerning for SIRS although these could be due to respiratory failure and atrial fibrillation. Required resmed bipap on 1/31 due to hypercarbia on venous blood gas:  pH 7.25/76.  Check ABG after resmed for two hours:  7.31/64/72/32.  Now on 1 1/2 liter n/c.  VBG on 2/1/25 pH 7.33/PCO2 67 metabolically compensated.    Oxygen needs improving, down to 1 liter.      Obtained ABG on 2/7 which revealed respiratory acidosis with metabolic alkalosis.  Will plan on treating the likely aguilar metabolic alkalosis (also hypochloremic) as to avoid compensatory hypoventilation.  -Course of Tamiflu completed 2/4.    -Titrate oxygen keep saturations greater than 88%   -treat aguilar alkalosis -initiated acetazolamide 250 mg twice daily  -increasing activity -walked in the halls  -vbg in am  -aggressive pulmonary hygiene    Aguilar alkalosis.  Patient had been aggressively diuresed over several days now with increase in creatinine, chloride 87.  And mixed ABG as above.    -initiate acetazolamide as above  -if slow  improvement could give small crystalloid bolus   -vbg in am     DYLAN  2/6 creatinine up to 1.49, now 1.34.  He had been on Lasix 40 mg IV twice a day, given extra dose of metolazone on 2/3 and 2/5.  Suspect patient is over diuresed.  -Hold Lasix   -acetazolamide as above  -fluid restriction 2 liter /day     Bacteremia.  Blood culture done on 1/29 in ED growing staph hominus and peptoniphilus species.  Question contaminate, but also in 2 bottles.  Initiated on vancomycin,  but  speciation in and  changed to cefazolin 2 g q 8 hours on 2/1.  MRSA swab negative.  Repeat cultures negative to date.  He has been afebrile with normal WBC.  CRP elevated at 19.25.   2/4 consulted infectious disease who feels that Peptoniphilus likely contaminant, as hominis in the blood cultures also most likely to be a  contaminant but he had already completed 7 days of antibiotic therapy which is sufficient for treatment of uncomplicated CoNS bacteremia.  He completed cefazolin on 2/4     Chronic atrial flutter/fibrillation with possible rapid ventricular response  Acute HFmrEF  Presented with chronic atrial flutter/fibrillation with resting heart rates consistently 110-120.  Home regimen: metoprolol tartrate 100 bid for rate control and warfarin for anticoagulation.    Chronic tachyarrhythmia likely exacerbated by influenza A.  He also presented with elevated BNP and significant peripheral edema although weight was stable compared with previously and radiographic findings did not specifically mention interstitial pulmonary edema and/or pleural effusions.  He is not known to have chronic heart failure and previous echocardiogram demonstrated normal LVEF, normal RV size and function, and no significant valvular disease.  Acute heart failure likely triggered by tachyarrhythmia and influenza A.     TTE done this admission while in rapid atrial flutter.  Demonstrated decreased EF of 40-45% (decreased from previous TTE in 2023 however may be  falsely decreased due to RVR), inferolateral akinesis, moderate concentric LV hypertrophy, mildy decreased RV fxn.   2/3 had one dose of metolazone 2.5mg, 2 4 weight is down  222 lbs, about 7.5 lbs. weight had been stable at 222 pounds for 2 days, 2/5 gave an extra dose of metolazone, patient's weight only down about a half a pound overnight.  Bump in creatinine and BUN tells me that patient may be over diuresed.  BNP down to 1764.  Heart rate has ranged  over the last 24 hours  -discontinue telemetry  -continue increased metoprolol tartrate 200 bid (increased on 2/1)  -IV diuresis discontinued on 2/6  -hold diuretics (will likely need oral diuretic regimen at home)  -daily weights  -strict I/O  -fluid restriction 2000ml  -acetazolamide as above     Elevated lactic acid  Presented to ER with mildly elevated lactic acid that normalized after initial treatment in the ER that included IV fluid boluses.  Infection was not identified nor suspected clinically initially however subsequent had positive blood culture staph hominis.     -Monitor clinically and repeat lactic acid if he clinically worsens     Elevated troponin  History of MI  CAD status post coronary artery stents and CABG  Known CAD with previous history of MI, placement of coronary stents, and CABG.  Presented with mildly elevated troponin 60 that quickly trended down.  EKG is difficult to interpret for acute ischemia because of tachyarrhythmia.  He has not had angina.  Suspect elevated troponin is due to acute illness including influenza, respiratory failure, and tachyarrhythmia superimposed upon known CAD and acute heart failure.  -No additional evaluation for acute ischemic heart disease during hospitalization unless his clinical status changes  -treat underlying illness      Elevated hepatic transaminases  Hypoalbuminemia  Gallbladder wall thickening on radiographic studies  Patient noted to have elevated hepatic transaminases in ER with initially  "low serum albumin.  Gallbladder wall appeared thickened on initial CT and confirmed on subsequent ultrasound.  However, he does not have abdominal pain or tenderness.  Pool sign on ultrasound was negative.  Bilirubin and alkaline phosphatase were normal and there were no signs of bile duct dilation by imaging.  Suspect elevated transaminases and gallbladder wall thickening may be reflective of decompensated heart failure versus acute viral illness (I.e. pro-inflammatory response).    Serum albumin is low probably due to poor oral nutritional intake recently.  Normalized on 2/3/25     Type 2 diabetes.    Chronic type 2 diabetes treated with metformin with most recent A1c 6.9 in October 2024.  Blood sugars have been normal to minimally elevated (93 to 103).  He did receive IV contrast for radiographic study in the ER.  Blood sugars in 104-201  -Hold metformin due to IV contrast administration -can resume on discharge  -Monitor blood sugars 4 times daily  -continue NovoLog correction scale     PAD  Status post right carotid endarterectomy  AAA  Hyperlipidemia  Tobacco use  He has known PAD manifest as AAA and carotid atherosclerosis and previously underwent carotid endarterectomy.  On aspirin, Zetia, and statin.  He continues to smoke.  -Continue chronic aspirin, Zetia, and statin  -Smoking cessation advised  -Nicotine supplementation offered but declined by the patient at this time    Hx. Untreated ABIGAIL.  Patient has had sleep study in the past but refusing cpap.  I had a long discussion with him regarding the importance of treating ABIGAIL.  He said he would consider having a repeat sleep study.      Head tremor.  Has a head (\"shaking no\") head tremor.  He states he has had this for a long time -has never been to Neurology.  Also has flat affect however his wife has recently passed away.  I did mention he could consider an outpatient Neurology follow up after he is released from the hospital.     "   Hypertension  Hypertension chronically treated with metoprolol and Lasix.  This morning patient mildly hypotensive with a systolic blood pressure of 93.  -Holding IV Lasix today a  -Continue metoprolol tartrate at increased dose as above     Protein calorie malnutrition.  Has not been eating well at all.  -ensure clear between meals     Chronic anemia  Aspirin induced platelet function defect  Warfarin induced coagulopathy  Chronically anemic with baseline hemoglobin 11-12, currently stable.  Chronically taking aspirin that causes platelet function defect and warfarin that causes coagulopathy both of which increase his risk for bleeding.  INR is therapeutic.    -Continue chronic aspirin  -Pharmacy managing warfarin      Obesity  Appears obese with BMI 38.  Obesity likely contributes to health problems including diabetes and probable obesity hypoventilation.  -No acute intervention anticipated during hospitalization          Diet: Combination Diet Moderate Consistent Carb (60 g CHO per Meal) Diet; 2 gm NA Diet  Snacks/Supplements Adult: Ensure Clear; With Meals  Fluid restriction 2000 ML FLUID    DVT Prophylaxis: Warfarin  Peter Catheter: Not present  Lines: None     Cardiac Monitoring: None  Code Status: Full Code      Clinically Significant Risk Factors          # Hypochloremia: Lowest Cl = 87 mmol/L in last 2 days, will monitor as appropriate      # Hypoalbuminemia: Lowest albumin = 3.4 g/dL at 2/3/2025  5:12 AM, will monitor as appropriate     # Hypertension: Noted on problem list     # Acute Hypoxic Respiratory Failure: Documented O2 saturation < 90%. Continue supplemental oxygen as needed  # Acute Hypercapnic Respiratory Failure: based on venous blood gas results.  Continue supplemental oxygen and ventilatory support as indicated.        # DMII: A1C = N/A within past 6 months   # Obesity: Estimated body mass index is 36.01 kg/m  as calculated from the following:    Height as of this encounter: 1.676 m (5'  "6\").    Weight as of this encounter: 101.2 kg (223 lb 1.6 oz).      # Financial/Environmental Concerns: none   # History of CABG: noted on surgical history       Social Drivers of Health    Tobacco Use: High Risk (11/19/2024)    Patient History     Smoking Tobacco Use: Every Day     Smokeless Tobacco Use: Never     Passive Exposure: Never   Physical Activity: Insufficiently Active (4/15/2024)    Exercise Vital Sign     Days of Exercise per Week: 3 days     Minutes of Exercise per Session: 10 min   Stress: Stress Concern Present (4/15/2024)    [place] [employer] [place] [employer] - Occupational Stress Questionnaire     Feeling of Stress : To some extent   Social Connections: Unknown (4/15/2024)    Social Connection and Isolation Panel [NHANES]     Frequency of Social Gatherings with Friends and Family: Once a week          Disposition Plan     Medically Ready for Discharge: Anticipated in 2-4 Days         The patient's care was discussed with the  entire care team .    Nafisa Acosta CNP  Hospitalist Service  [facility]  Securely message with [employer] (more info)  Text page via [employer] Paging/Directory   ______________________________________________________________________    Interval History   No acute events overnight    Physical Exam   Vital Signs: Temp: 97.5  F (36.4  C) Temp src: Oral BP: 108/73 Pulse: 101   Resp: 20 SpO2: (!) 90 % O2 Device: Nasal cannula Oxygen Delivery: 1 LPM  Weight: 223 lbs 1.6 oz    Gen:  sitting in chair, no acute distress  HEENT:  normocephalic, atraumatic, oropharynx clear  Resp:  decreased bibasilary, shallow respirations  Card:  S1,S2, RRR no murmur, rub or gallop  Abd:  Soft obese, nondistended, non tender,  normoactive bowel sounds   Neuro:  Neuro exam non focal      Medical Decision Making       45 MINUTES SPENT BY ME on the date of service doing chart review, history, exam, documentation & further activities per the note.        Data     I have " personally reviewed the following data over the past 24 hrs:    N/A  \   N/A   / N/A     139 87 (L) 35.9 (H) /  139 (H)   3.8 41 (H) 1.34 (H) \     INR:  2.69 (H) PTT:  N/A   D-dimer:  N/A Fibrinogen:  N/A       Imaging results reviewed over the past 24 hrs:   No results found for this or any previous visit (from the past 24 hours).

## 2025-02-07 NOTE — PLAN OF CARE
Goal Outcome Evaluation:    Patient continues to be alert, oriented. 1L of O2, noted significant decrease in sats when sleeping. Lung sounds clear, diminished, cough is infrequent. SBA to bathroom. Voiding spontaneously. A flutter on tele.

## 2025-02-07 NOTE — PHARMACY-ANTICOAGULATION SERVICE
Clinical Pharmacy - Warfarin Dosing Consult     Pharmacy has been consulted to manage this patient s warfarin therapy.       INR   Date Value Ref Range Status   02/07/2025 2.69 (H) 0.85 - 1.15 Final   02/06/2025 3.23 (H) 0.85 - 1.15 Final       Recommend warfarin 4 mg today.  Pharmacy will monitor Roger A Ring daily and order warfarin doses to achieve specified goal.      Please contact pharmacy as soon as possible if the warfarin needs to be held for a procedure or if the warfarin goals change.

## 2025-02-08 LAB
ANION GAP SERPL CALCULATED.3IONS-SCNC: 12 MMOL/L (ref 7–15)
BASE EXCESS BLDV CALC-SCNC: 14.7 MMOL/L (ref -3–3)
BUN SERPL-MCNC: 35.8 MG/DL (ref 8–23)
CALCIUM SERPL-MCNC: 9.2 MG/DL (ref 8.8–10.4)
CHLORIDE SERPL-SCNC: 91 MMOL/L (ref 98–107)
CREAT SERPL-MCNC: 1.42 MG/DL (ref 0.67–1.17)
EGFRCR SERPLBLD CKD-EPI 2021: 53 ML/MIN/1.73M2
GLUCOSE BLDC GLUCOMTR-MCNC: 115 MG/DL (ref 70–99)
GLUCOSE BLDC GLUCOMTR-MCNC: 130 MG/DL (ref 70–99)
GLUCOSE BLDC GLUCOMTR-MCNC: 140 MG/DL (ref 70–99)
GLUCOSE BLDC GLUCOMTR-MCNC: 227 MG/DL (ref 70–99)
GLUCOSE SERPL-MCNC: 111 MG/DL (ref 70–99)
HCO3 BLDV-SCNC: 44 MMOL/L (ref 21–28)
HCO3 SERPL-SCNC: 36 MMOL/L (ref 22–29)
HOLD SPECIMEN: NORMAL
INR PPP: 2.52 (ref 0.85–1.15)
O2/TOTAL GAS SETTING VFR VENT: 28 %
OXYHGB MFR BLDV: 69 % (ref 70–75)
PCO2 BLDV: 76 MM HG (ref 40–50)
PH BLDV: 7.37 [PH] (ref 7.32–7.43)
PO2 BLDV: 41 MM HG (ref 25–47)
POTASSIUM SERPL-SCNC: 3.5 MMOL/L (ref 3.4–5.3)
SAO2 % BLDV: 70.8 % (ref 70–75)
SODIUM SERPL-SCNC: 139 MMOL/L (ref 135–145)

## 2025-02-08 PROCEDURE — 82805 BLOOD GASES W/O2 SATURATION: CPT | Performed by: NURSE PRACTITIONER

## 2025-02-08 PROCEDURE — 85610 PROTHROMBIN TIME: CPT | Performed by: PEDIATRICS

## 2025-02-08 PROCEDURE — 250N000013 HC RX MED GY IP 250 OP 250 PS 637: Performed by: NURSE PRACTITIONER

## 2025-02-08 PROCEDURE — 36415 COLL VENOUS BLD VENIPUNCTURE: CPT | Performed by: NURSE PRACTITIONER

## 2025-02-08 PROCEDURE — 99232 SBSQ HOSP IP/OBS MODERATE 35: CPT | Performed by: PEDIATRICS

## 2025-02-08 PROCEDURE — 82565 ASSAY OF CREATININE: CPT | Performed by: NURSE PRACTITIONER

## 2025-02-08 PROCEDURE — 250N000013 HC RX MED GY IP 250 OP 250 PS 637: Performed by: PEDIATRICS

## 2025-02-08 PROCEDURE — 120N000001 HC R&B MED SURG/OB

## 2025-02-08 RX ORDER — WARFARIN SODIUM 5 MG/1
5 TABLET ORAL
Status: COMPLETED | OUTPATIENT
Start: 2025-02-08 | End: 2025-02-08

## 2025-02-08 RX ADMIN — PANTOPRAZOLE SODIUM 40 MG: 40 TABLET, DELAYED RELEASE ORAL at 08:01

## 2025-02-08 RX ADMIN — INSULIN ASPART 1 UNITS: 100 INJECTION, SOLUTION INTRAVENOUS; SUBCUTANEOUS at 17:06

## 2025-02-08 RX ADMIN — ACETAZOLAMIDE 250 MG: 125 TABLET ORAL at 17:05

## 2025-02-08 RX ADMIN — ASPIRIN 81 MG 81 MG: 81 TABLET ORAL at 08:01

## 2025-02-08 RX ADMIN — ACETAZOLAMIDE 250 MG: 125 TABLET ORAL at 08:01

## 2025-02-08 RX ADMIN — ATORVASTATIN CALCIUM 80 MG: 40 TABLET, FILM COATED ORAL at 08:01

## 2025-02-08 RX ADMIN — INSULIN ASPART 2 UNITS: 100 INJECTION, SOLUTION INTRAVENOUS; SUBCUTANEOUS at 12:03

## 2025-02-08 RX ADMIN — METOPROLOL TARTRATE 200 MG: 100 TABLET, FILM COATED ORAL at 20:43

## 2025-02-08 RX ADMIN — METOPROLOL TARTRATE 200 MG: 100 TABLET, FILM COATED ORAL at 08:01

## 2025-02-08 RX ADMIN — WARFARIN SODIUM 5 MG: 5 TABLET ORAL at 17:05

## 2025-02-08 RX ADMIN — EZETIMIBE 10 MG: 10 TABLET ORAL at 08:01

## 2025-02-08 ASSESSMENT — ACTIVITIES OF DAILY LIVING (ADL)
ADLS_ACUITY_SCORE: 38

## 2025-02-08 NOTE — PLAN OF CARE
Goal Outcome Evaluation:      Plan of Care Reviewed With: patient    Overall Patient Progress: improvingOverall Patient Progress: improving    Outcome Evaluation: A&O. VSS on 2L O2 NC. O2 desating into 70s-80s on 1.5L overnight, increased to 2L NC. Denies pain overnight. Fluid restriction maintained. Voiding adequately. Tolerating oral intake. Ambulating SBA. NSR on tele. IV saline locked.

## 2025-02-08 NOTE — PROGRESS NOTES
"4 hour note: Pt A&Ox4.  Currently on 1.5L NC.  Did not attempt to wean. States hx of sleep apnea. SOB with activity. Lungs diminished throughout.  Denies pain.     /75 (BP Location: Right arm)   Pulse 95   Temp 98.3  F (36.8  C) (Oral)   Resp 20   Ht 1.676 m (5' 6\")   Wt 101.2 kg (223 lb 1.6 oz)   SpO2 95%   BMI 36.01 kg/m      "

## 2025-02-08 NOTE — PROGRESS NOTES
[facility]    Medicine Progress Note - Hospitalist Service    Date of Admission:  1/30/2025    Assessment & Plan   Roger Bonilla is a 69 year old male with chronic atrial fibrillation/flutter, CAD with history of MI with coronary artery stents and previous CABG, type 2 diabetes, hypertension, hyperlipidemia, PAD with previous carotid endarterectomy and AAA, chronic tobacco use which is ongoing, ABIGAIL, chronic anemia, and obesity who presented to ER on 1/29/2025 after onset of nonproductive cough, shortness of breath, malaise, fatigue, and bodyaches on January 27.  Due to concerns for acute hypoxic respiratory failure, SIRS, atrial fibrillation/flutter, influenza A, lower respiratory tract infection, acute kidney injury, and acute heart failure, he was admitted.       Influenza A with acute hypoxic and hypercapneic respiratory failure and SIRS  Possible superimposed bacterial pneumonia  Probable untreated ABIGAIL  Clinical presentation and test results were suspicious for influenza A lower respiratory tract infection and acute hypoxic and hypercapnic respiratory failure likely due to influenza A infection and possible superimposed bacterial pneumonia.  Tachycardia and tachypnea at admission were which concerning for SIRS although could have also been due to respiratory failure and atrial fibrillation.   Respiratory failure worsened requiring bipap treatment.  After additional treatment of influenza, possible bacterial pneumonia, and CHF, respiratory failure has improved and he has since weaned off of need for BiPAP although continues to be hypoxic requiring oxygen supplementation and remains hypercapnic but has compensatory metabolic alkalosis.  He completed treatment course with Tamiflu on 2/4.  He is improving and tolerating advancing activity although continuing to require supplemental oxygen which is different than his usual baseline.  -Titrate oxygen to keep saturations greater than 88%,  may need to assess for his need for supplemental oxygen during sleep prior to discharge if nocturnal hypoxia persists despite resolution of severe hypoxia while awake  -Ordered recheck vbg in am  -Continue aggressive pulmonary hygiene    Possible coagulase-negative staph bacteremia   Blood culture done on 1/29 in ED grew staph hominus and peptoniphilus species which are questionably contaminants but did grow from both blood culture bottles.  He was treated empirically with parenteral antibiotics and completed 7-day treatment course on 2/4 during hospitalization. Infectious disease was consulted virtually and suspected Peptoniphilus was likely a contaminant and that staff hominis in the blood cultures also may have been a contaminant.  However, but he had already completed 7 days of antibiotic therapy, sufficient for treatment of uncomplicated CoNS bacteremia.  Repeat blood cultures were negative.  -Continue clinical monitoring     Chronic atrial flutter/fibrillation with possible rapid ventricular response  Acute HFrEF  Presented with chronic atrial flutter/fibrillation with resting heart rates consistently 110-120 on previous chronic treatment regimen of metoprolol tartrate 100 bid for rate control and warfarin for anticoagulation.  Suspect chronic tachyarrhythmia was exacerbated by influenza A.  He also presented with elevated BNP and significant peripheral edema although he was not known to have chronic heart failure, weight was stable compared with previously, and radiographic findings did not specifically mention interstitial pulmonary edema and/or pleural effusions.  TTE was performed this hospitalization while in rapid atrial flutter and demonstrated decreased LV EF of 40-45% as well as mildy decreased RV fxn.  This increases concern for possible acute heart failure with reduced EF most likely due to acute illness with tachyarrhythmia.  Acute heart failure was treated with aggressive doses of diuretics with  improvement in weight and BNP.  IV diuresis was discontinued on 2/6.  Tachyarrhythmia was treated with increased dose of beta-blocker with improved rate control.  -continue increased metoprolol tartrate 200 bid (increased on 2/1)  -Continuing to titrate diuretics due to concerns for DYLAN and contraction alkalosis  -Monitor daily weights  -Monitor strict I/O  -Continue fluid restriction 2000ml  -Continuing acetazolamide but not yet restarting loop diuretic    Contraction metabolic alkalosis   DYLAN  Presented with creatinine 1.39, increased compared with 1.0 in October 2024 possibly indicative of DYLAN at admission.  Creatinine subsequently improved to 1 earlier in hospitalization but then increased as high as 1.49 on 2/6 concerning for acute kidney injury which coincided with aggressive diuretic therapy (metolazone plus IV Lasix).  He also developed contraction metabolic alkalosis, so loop diuretics were stopped and acetazolamide treatment was added.  Renal function is improving and metabolic alkalosis has improved.  -Continue acetazolamide as above but not yet restarting loop diuretic  -Ordered recheck renal function and VBG in am     Elevated lactic acid  Presented to ER with mildly elevated lactic acid that normalized after initial treatment in the ER that included IV fluid boluses.  Although bacterial infection was not initially identified nor suspected, he subsequently had positive blood cultures for staph hominis concerning for possible coagulase-negative staph bacteremia and was treated accordingly, so elevated lactic acid could have been due to infection and sepsis.     -Monitor clinically and repeat lactic acid if he clinically worsens     Elevated troponin  History of MI  CAD status post coronary artery stents and CABG  Known CAD with previous history of MI, placement of coronary stents, and CABG.  Presented with mildly elevated troponin 60 that quickly trended down.  EKG is difficult to interpret for acute  ischemia because of tachyarrhythmia.  He has not had angina.  Suspect elevated troponin is due to acute illness including influenza, respiratory failure, and tachyarrhythmia superimposed upon known CAD and acute heart failure.  -No additional evaluation for acute ischemic heart disease during hospitalization unless his clinical status changes  -treat underlying illness      Elevated hepatic transaminases  Hypoalbuminemia  Gallbladder wall thickening on radiographic studies  Patient noted to have elevated hepatic transaminases in ER with initially low serum albumin.  Gallbladder wall appeared thickened on initial CT and confirmed on subsequent ultrasound.  However, he has not had abdominal pain or tenderness.  Pool sign on ultrasound was negative.  Bilirubin and alkaline phosphatase were normal and there were no signs of bile duct dilation by imaging.  Suspect elevated transaminases and gallbladder wall thickening were due to decompensated heart failure and/or acute viral illness (pro-inflammatory response) rather than cholecystitis.    Serum albumin was low probably due to poor oral nutritional intake recently and subsequently normalized on 2/3/25.  -Monitor LFTs as indicated     Type 2 diabetes  Chronic type 2 diabetes treated with metformin with most recent A1c 6.9 in October 2024.  Blood sugars have been normal to minimally elevated (93 to 103).  He did receive IV contrast for radiographic study in the ER.  Blood sugars in 104-201.  Held metformin due to IV contrast administration   -Anticipate resuming metformin on discharge assuming renal function normalizes  -Monitor blood sugars 4 times daily  -continue NovoLog correction scale     PAD  Status post right carotid endarterectomy  AAA  Hyperlipidemia  Tobacco use  He has known PAD manifest as AAA and carotid atherosclerosis and previously underwent carotid endarterectomy.  On aspirin, Zetia, and statin.  He continues to smoke.  -Continue chronic aspirin,  "Zetia, and statin  -Smoking cessation advised  -Nicotine supplementation offered but declined by the patient    Untreated ABIGAIL.    Patient had sleep study in the past confirming ABIGAIL but declined cpap treatment.  During hospitalization, importance of treating ABIGAIL has been emphasized and patient indicated he would consider having a repeat sleep study.  He continues to have worsening sleep-related oxygen desaturation as he recovers from other acute illness  -Reassess willingness to repeat sleep study at discharge and if he is willing to do so will place referral    Head tremor   Has a chronic head (\"shaking no\") head tremor but has never been to Neurology.  Outpatient Neurology follow up after he is released from the hospital has been offered although so far declined.       Hypertension  Hypertension chronically treated with metoprolol and Lasix.  Blood pressure has been generally stable after adjusting metoprolol and diuretics  -Continue metoprolol tartrate at increased dose  -Not yet restarting loop diuretics     Protein calorie malnutrition.  Has not been eating well at all and there has been concern for malnutrition during hospitalization.  He has been evaluated by registered dietitian.  -Continue ensure clear between meals     Chronic anemia  Aspirin induced platelet function defect  Warfarin induced coagulopathy  Chronically anemic with baseline hemoglobin 11-12 has been stable.  Chronically taking aspirin that causes platelet function defect and warfarin that causes coagulopathy both of which increase his risk for bleeding.  Active bleeding has not been suspected.  INR has been therapeutic.    -Continue chronic aspirin  -Pharmacy managing warfarin dosing during hospitalization     Obesity  Appears obese with BMI 38.  Obesity likely contributes to health problems including diabetes and probable obesity hypoventilation.  -No acute intervention anticipated during hospitalization          Diet: Combination Diet " "Moderate Consistent Carb (60 g CHO per Meal) Diet; 2 gm NA Diet  Snacks/Supplements Adult: Ensure Clear; With Meals  Fluid restriction 2000 ML FLUID    DVT Prophylaxis: Warfarin  Peter Catheter: Not present  Lines: None     Cardiac Monitoring: None  Code Status: Full Code      Clinically Significant Risk Factors          # Hypochloremia: Lowest Cl = 87 mmol/L in last 2 days, will monitor as appropriate      # Hypoalbuminemia: Lowest albumin = 3.4 g/dL at 2/3/2025  5:12 AM, will monitor as appropriate     # Hypertension: Noted on problem list     # Acute Hypoxic Respiratory Failure: Documented O2 saturation < 90%. Continue supplemental oxygen as needed  # Acute Hypercapnic Respiratory Failure: based on venous blood gas results.  Continue supplemental oxygen and ventilatory support as indicated.        # DMII: A1C = N/A within past 6 months   # Obesity: Estimated body mass index is 35.93 kg/m  as calculated from the following:    Height as of this encounter: 1.676 m (5' 6\").    Weight as of this encounter: 101 kg (222 lb 9.6 oz).      # Financial/Environmental Concerns: none   # History of CABG: noted on surgical history       Social Drivers of Health    Tobacco Use: High Risk (11/19/2024)    Patient History     Smoking Tobacco Use: Every Day     Smokeless Tobacco Use: Never     Passive Exposure: Never   Physical Activity: Insufficiently Active (4/15/2024)    Exercise Vital Sign     Days of Exercise per Week: 3 days     Minutes of Exercise per Session: 10 min   Stress: Stress Concern Present (4/15/2024)    [employer] - Occupational Stress Questionnaire     Feeling of Stress : To some extent   Social Connections: Unknown (4/15/2024)    Social Connection and Isolation Panel [NHANES]     Frequency of Social Gatherings with Friends and Family: Once a week          Disposition Plan     Medically Ready for Discharge: Anticipated in 1 to 3 days             Pasquale Wilde MD  Hospitalist " [facility]  Securely message with [employer] (more info)  Text page via [facility] Paging/Directory   ______________________________________________________________________    Interval History   There were no significant overnight events.  Nursing reports that they noticed that his oxygenation worsened when he falls asleep.  He had weaned to lower flow nasal cannula oxygen while awake but required more oxygen supplementation during sleep overnight last night.  Again this morning after waking, he has quickly weaned to low-flow oxygen supplementation.  Patient says he feels better overall.  He is tolerating advancing activity including limited ambulation but says that he is normally limited in his ambulation at baseline.  He remains afebrile and hemodynamically stable.  He is tolerating oral intake and voiding spontaneously with good urine output.    Physical Exam   Vital Signs: Temp: 98.1  F (36.7  C) Temp src: Oral BP: 103/71 Pulse: 77   Resp: 18 SpO2: 92 % O2 Device: Nasal cannula Oxygen Delivery: 1 LPM  Patient Vitals for the past 24 hrs:   BP Temp Temp src Pulse Resp SpO2 Weight   02/08/25 0738 103/71 98.1  F (36.7  C) Oral 77 18 92 % --   02/08/25 0437 -- -- -- -- -- 96 % --   02/08/25 0305 123/87 97.7  F (36.5  C) Oral 83 16 97 % --   02/08/25 0117 -- -- -- -- -- -- 101 kg (222 lb 9.6 oz)   02/07/25 2332 117/65 97.9  F (36.6  C) Oral -- 20 (!) 91 % --   02/07/25 2126 114/75 -- -- 95 -- -- --   02/07/25 1931 107/81 98.3  F (36.8  C) Oral 87 20 95 % --   02/07/25 1629 113/68 97.8  F (36.6  C) Oral 89 20 94 % --   02/07/25 1153 110/64 98.1  F (36.7  C) Oral 70 20 95 % --   02/07/25 1107 -- -- -- -- -- (!) 90 % --   02/07/25 1100 -- -- -- -- -- (!) 85 % --   02/07/25 0809 108/73 97.5  F (36.4  C) Oral 101 20 (!) 90 % --     Weight: 222 lbs 9.6 oz  Vitals:    02/04/25 0454 02/05/25 0523 02/06/25 0501 02/07/25 0432   Weight: 101.1 kg (222 lb 12.8 oz) 100.8 kg (222 lb 4.8 oz) 100.6 kg  (221 lb 12.8 oz) 101.2 kg (223 lb 1.6 oz)    02/08/25 0117   Weight: 101 kg (222 lb 9.6 oz)       Intake/Output Summary (Last 24 hours) at 2/8/2025 0750  Last data filed at 2/8/2025 0423  Gross per 24 hour   Intake 1380 ml   Output 2225 ml   Net -845 ml       General Appearance: Obese elderly man, no acute distress sitting up in a chair  Respiratory: Normal respiratory effort, diminished breath sounds throughout, clear lungs  Cardiovascular: Somewhat irregularly irregular heart rate and rhythm, good radial pulse, normal capillary refill, no significant peripheral edema     Medical Decision Making       43 MINUTES SPENT BY ME on the date of service doing chart review, history, exam, documentation & further activities per the note.      Data     I have personally reviewed the following data over the past 24 hrs:    N/A  \   N/A   / N/A     139 91 (L) 35.8 (H) /  115 (H)   3.5 36 (H) 1.42 (H) \     INR:  2.52 (H) PTT:  N/A   D-dimer:  N/A Fibrinogen:  N/A       Blood sugars ranged 170-139 over the last day  Blood cultures from February 1 were negative    Venous Blood Gas  Recent Labs   Lab 02/08/25  0604 02/07/25  1750 02/07/25  1053 02/03/25  0512   PHV 7.37 7.40  --  7.36   PCO2V 76* 72*  --  78*   PO2V 41 34  --  35   HCO3V 44* 44*  --  44*   ROJAS 14.7* 15.5*  --  14.6*   O2PER 28 24 24 26       Recent Labs   Lab 02/08/25  0736 02/08/25  0604 02/07/25 2034 02/07/25  0804 02/07/25  0602 02/06/25  0755 02/06/25  0516 02/05/25  0748 02/05/25  0519 02/04/25  0727 02/04/25  0510 02/03/25  0640 02/03/25  0512 02/02/25  0755 02/02/25  0637   WBC  --   --   --   --   --   --  9.6  --  10.2  --  8.9  --  8.0  --   --    HGB  --   --   --   --   --   --  12.9*  --  12.8*  --  13.1*  --  11.6*  --   --    MCV  --   --   --   --   --   --  78  --  77*  --  77*  --  77*  --   --    PLT  --   --   --   --   --   --  295  --  244  --  260  --  221  --   --    INR  --  2.52*  --   --  2.69*  --  3.23*  --  3.29*  --  3.31*   < >   --   --   --    NA  --  139  --   --  139  --  138  --  140  --  141  --  141  --  139   POTASSIUM  --  3.5  --   --  3.8  --  3.5   < > 3.1*   < > 3.0*  --  3.5  --  3.3*   CHLORIDE  --  91*  --   --  87*  --  88*  --  90*  --  90*  --  95*  --  94*   CO2  --  36*  --   --  41*  --  38*  --  37*  --  37*  --  35*  --  32*   BUN  --  35.8*  --   --  35.9*  --  32.4*  --  26.2*  --  21.1  --  19.8  --  20.7   CR  --  1.42*  --   --  1.34*  --  1.49*  --  1.17  --  1.11  --  1.08  --  1.00   ANIONGAP  --  12  --   --  11  --  12  --  13  --  14  --  11  --  13   SUE  --  9.2  --   --  9.2  --  9.6  --  9.5  --  9.6  --  8.6*  --  8.8   * 111* 127*   < > 118*   < > 100*   < > 108*   < > 107*   < > 106*   < > 112*   ALBUMIN  --   --   --   --   --   --   --   --   --   --   --   --  3.4*  --  3.6   PROTTOTAL  --   --   --   --   --   --   --   --   --   --   --   --  6.9  --  6.9   BILITOTAL  --   --   --   --   --   --   --   --   --   --   --   --  0.7  --  0.6   ALKPHOS  --   --   --   --   --   --   --   --   --   --   --   --  73  --  76   ALT  --   --   --   --   --   --   --   --   --   --   --   --  57  --  127*   AST  --   --   --   --   --   --   --   --   --   --   --   --  43  --  65*    < > = values in this interval not displayed.

## 2025-02-08 NOTE — PROGRESS NOTES
Pt A&Ox4. VSS on 1L oxygen. Unable to wean. Lungs clear, diminished. Tele discontinued. Blood sugars 124, 139, and 107. Potassium 3.8, no replacement needed and is an AM redraw. Up with SBA. Compliant with fluid restriction.

## 2025-02-08 NOTE — PHARMACY-ANTICOAGULATION SERVICE
Clinical Pharmacy - Warfarin Dosing Consult     Pharmacy has been consulted to manage this patient s warfarin therapy.       INR   Date Value Ref Range Status   02/08/2025 2.52 (H) 0.85 - 1.15 Final   02/07/2025 2.69 (H) 0.85 - 1.15 Final       Recommend warfarin 5 mg today.  Pharmacy will monitor Roger A Ring daily and order warfarin doses to achieve specified goal.      Please contact pharmacy as soon as possible if the warfarin needs to be held for a procedure or if the warfarin goals change.

## 2025-02-09 LAB
ANION GAP SERPL CALCULATED.3IONS-SCNC: 11 MMOL/L (ref 7–15)
BASE EXCESS BLDV CALC-SCNC: 9.8 MMOL/L (ref -3–3)
BASE EXCESS BLDV CALC-SCNC: 9.8 MMOL/L (ref -3–3)
BUN SERPL-MCNC: 43.6 MG/DL (ref 8–23)
CALCIUM SERPL-MCNC: 9.3 MG/DL (ref 8.8–10.4)
CHLORIDE SERPL-SCNC: 93 MMOL/L (ref 98–107)
CREAT SERPL-MCNC: 1.51 MG/DL (ref 0.67–1.17)
EGFRCR SERPLBLD CKD-EPI 2021: 50 ML/MIN/1.73M2
GLUCOSE BLDC GLUCOMTR-MCNC: 113 MG/DL (ref 70–99)
GLUCOSE BLDC GLUCOMTR-MCNC: 140 MG/DL (ref 70–99)
GLUCOSE BLDC GLUCOMTR-MCNC: 171 MG/DL (ref 70–99)
GLUCOSE BLDC GLUCOMTR-MCNC: 178 MG/DL (ref 70–99)
GLUCOSE SERPL-MCNC: 109 MG/DL (ref 70–99)
HCO3 BLDV-SCNC: 38 MMOL/L (ref 21–28)
HCO3 BLDV-SCNC: 40 MMOL/L (ref 21–28)
HCO3 SERPL-SCNC: 35 MMOL/L (ref 22–29)
INR PPP: 2.32 (ref 0.85–1.15)
O2/TOTAL GAS SETTING VFR VENT: 21 %
O2/TOTAL GAS SETTING VFR VENT: 24 %
OXYHGB MFR BLDV: 37 % (ref 70–75)
OXYHGB MFR BLDV: 38 % (ref 70–75)
PCO2 BLDV: 72 MM HG (ref 40–50)
PCO2 BLDV: 79 MM HG (ref 40–50)
PH BLDV: 7.31 [PH] (ref 7.32–7.43)
PH BLDV: 7.34 [PH] (ref 7.32–7.43)
PO2 BLDV: 28 MM HG (ref 25–47)
PO2 BLDV: 28 MM HG (ref 25–47)
POTASSIUM SERPL-SCNC: 3.8 MMOL/L (ref 3.4–5.3)
SAO2 % BLDV: 37.3 % (ref 70–75)
SAO2 % BLDV: 38.5 % (ref 70–75)
SODIUM SERPL-SCNC: 139 MMOL/L (ref 135–145)

## 2025-02-09 PROCEDURE — 82805 BLOOD GASES W/O2 SATURATION: CPT | Performed by: PEDIATRICS

## 2025-02-09 PROCEDURE — 99232 SBSQ HOSP IP/OBS MODERATE 35: CPT | Performed by: PEDIATRICS

## 2025-02-09 PROCEDURE — 120N000001 HC R&B MED SURG/OB

## 2025-02-09 PROCEDURE — 36415 COLL VENOUS BLD VENIPUNCTURE: CPT | Performed by: PEDIATRICS

## 2025-02-09 PROCEDURE — 85610 PROTHROMBIN TIME: CPT | Performed by: PEDIATRICS

## 2025-02-09 PROCEDURE — 82565 ASSAY OF CREATININE: CPT | Performed by: PEDIATRICS

## 2025-02-09 PROCEDURE — 250N000013 HC RX MED GY IP 250 OP 250 PS 637: Performed by: PEDIATRICS

## 2025-02-09 PROCEDURE — 250N000013 HC RX MED GY IP 250 OP 250 PS 637: Performed by: NURSE PRACTITIONER

## 2025-02-09 RX ADMIN — ACETAZOLAMIDE 250 MG: 125 TABLET ORAL at 08:01

## 2025-02-09 RX ADMIN — INSULIN ASPART 1 UNITS: 100 INJECTION, SOLUTION INTRAVENOUS; SUBCUTANEOUS at 12:26

## 2025-02-09 RX ADMIN — METOPROLOL TARTRATE 200 MG: 100 TABLET, FILM COATED ORAL at 08:01

## 2025-02-09 RX ADMIN — WARFARIN SODIUM 7.5 MG: 5 TABLET ORAL at 17:10

## 2025-02-09 RX ADMIN — ASPIRIN 81 MG 81 MG: 81 TABLET ORAL at 08:01

## 2025-02-09 RX ADMIN — PANTOPRAZOLE SODIUM 40 MG: 40 TABLET, DELAYED RELEASE ORAL at 08:01

## 2025-02-09 RX ADMIN — ATORVASTATIN CALCIUM 80 MG: 40 TABLET, FILM COATED ORAL at 08:01

## 2025-02-09 RX ADMIN — INSULIN ASPART 1 UNITS: 100 INJECTION, SOLUTION INTRAVENOUS; SUBCUTANEOUS at 17:10

## 2025-02-09 RX ADMIN — METOPROLOL TARTRATE 200 MG: 100 TABLET, FILM COATED ORAL at 20:49

## 2025-02-09 RX ADMIN — ACETAZOLAMIDE 250 MG: 125 TABLET ORAL at 17:10

## 2025-02-09 RX ADMIN — EZETIMIBE 10 MG: 10 TABLET ORAL at 08:01

## 2025-02-09 ASSESSMENT — ACTIVITIES OF DAILY LIVING (ADL)
ADLS_ACUITY_SCORE: 38
ADLS_ACUITY_SCORE: 39
ADLS_ACUITY_SCORE: 38
ADLS_ACUITY_SCORE: 39
ADLS_ACUITY_SCORE: 38
ADLS_ACUITY_SCORE: 38
ADLS_ACUITY_SCORE: 39
ADLS_ACUITY_SCORE: 38
ADLS_ACUITY_SCORE: 38
ADLS_ACUITY_SCORE: 39
ADLS_ACUITY_SCORE: 39
ADLS_ACUITY_SCORE: 38
ADLS_ACUITY_SCORE: 39
ADLS_ACUITY_SCORE: 39
ADLS_ACUITY_SCORE: 38

## 2025-02-09 NOTE — CONSULTS
Care Management Initial Consult    General Information  Assessment completed with: Patient,    Type of CM/SW Visit: Initial Assessment    Primary Care Provider verified and updated as needed: Yes   Readmission within the last 30 days: no previous admission in last 30 days      Reason for Consult: discharge planning  Advance Care Planning:            Communication Assessment  Patient's communication style: spoken language (English or Bilingual)    Hearing Difficulty or Deaf: no   Wear Glasses or Blind: no    Cognitive  Cognitive/Neuro/Behavioral: WDL  Level of Consciousness: alert  Arousal Level: opens eyes spontaneously  Orientation: oriented x 4  Mood/Behavior: calm, cooperative  Best Language: 0 - No aphasia  Speech: clear, spontaneous    Living Environment:   People in home: alone     Current living Arrangements: mobile home      Able to return to prior arrangements: yes       Family/Social Support:  Care provided by: self  Provides care for: pet(s)  Marital Status:   Support system: Children          Description of Support System: Supportive, Involved    Support Assessment: Adequate family and caregiver support, Adequate social supports    Current Resources:   Patient receiving home care services: No        Community Resources: None  Equipment currently used at home: none  Supplies currently used at home: None    Employment/Financial:  Employment Status: retired        Financial Concerns: none           Does the patient's insurance plan have a 3 day qualifying hospital stay waiver?  Yes     Which insurance plan 3 day waiver is available? Alternative insurance waiver    Will the waiver be used for post-acute placement? No    Lifestyle & Psychosocial Needs:  Social Drivers of Health     Food Insecurity: Low Risk  (1/30/2025)    Food Insecurity     Within the past 12 months, did you worry that your food would run out before you got money to buy more?: No     Within the past 12 months, did the food you bought  just not last and you didn t have money to get more?: No   Depression: Not at risk (10/28/2024)    PHQ-2     PHQ-2 Score: 0   Housing Stability: Low Risk  (1/30/2025)    Housing Stability     Do you have housing? : Yes     Are you worried about losing your housing?: No   Tobacco Use: High Risk (11/19/2024)    Patient History     Smoking Tobacco Use: Every Day     Smokeless Tobacco Use: Never     Passive Exposure: Never   Financial Resource Strain: Low Risk  (1/30/2025)    Financial Resource Strain     Within the past 12 months, have you or your family members you live with been unable to get utilities (heat, electricity) when it was really needed?: No   Alcohol Use: Not on file   Transportation Needs: Low Risk  (1/30/2025)    Transportation Needs     Within the past 12 months, has lack of transportation kept you from medical appointments, getting your medicines, non-medical meetings or appointments, work, or from getting things that you need?: No   Physical Activity: Insufficiently Active (4/15/2024)    Exercise Vital Sign     Days of Exercise per Week: 3 days     Minutes of Exercise per Session: 10 min   Interpersonal Safety: Low Risk  (1/30/2025)    Interpersonal Safety     Do you feel physically and emotionally safe where you currently live?: Yes     Within the past 12 months, have you been hit, slapped, kicked or otherwise physically hurt by someone?: No     Within the past 12 months, have you been humiliated or emotionally abused in other ways by your partner or ex-partner?: No   Stress: Stress Concern Present (4/15/2024)    [employer] - Occupational Stress Questionnaire     Feeling of Stress : To some extent   Social Connections: Unknown (4/15/2024)    Social Connection and Isolation Panel [NHANES]     Frequency of Communication with Friends and Family: Not on file     Frequency of Social Gatherings with Friends and Family: Once a week     Attends [employer] Services: Not on file      Active Member of Clubs or Organizations: Not on file     Attends Club or Organization Meetings: Not on file     Marital Status: Not on file   Health Literacy: Not on file       Functional Status:  Prior to admission patient needed assistance:   Dependent ADLs:: Independent  Dependent IADLs:: Independent       Mental Health Status:  Mental Health Status: No Current Concerns       Chemical Dependency Status:  Chemical Dependency Status: No Current Concerns             Values/Beliefs:  Spiritual, Cultural Beliefs, Christianity Practices, Values that affect care: no               Discussed  Partnership in Safe Discharge Planning  document with patient/family: No    Additional Information:  Care Management has been re-consulted for discharge planning.      Writer visited with patient.  Discussed that it has been determined that he will need home oxygen at discharge.  Physician recommending home care RN to follow up at home regarding new oxygen and post hospital assessments.  Patient in agreement with this.     Writer provided patient with a list of Medicare certified home care agencies/Medicare.gov website info.  Patient has no agency preference. Referral has been sent to the home care hub to secure an agency.      Patient anticipates discharge to home tomorrow and for his son, Emiliano, to transport him.      Next Steps: Care Management to continue to follow.  Await a home care agency to be secured by the home care hub.      ANDREW Castillo  [facility]   [phone]

## 2025-02-09 NOTE — PROGRESS NOTES
4 hour shift note:      Received patient sleeping. Flat but cooperative. Lung sounds clear, diminished. Trace edema on BLE. Soft BP reading of 91/47, provider notified. BP recheck of 109/47, provider advised to give dose of Metoprolol. Noted hypoxic intermittently while sleeping with sats as low as 70%, O2 increased to 1L.

## 2025-02-09 NOTE — PROVIDER NOTIFICATION
Read - 8:53 pm  Good evening doctor. [facility] [address] Ring admitted for Influenza/Pneumonia since the 30th. BP of 91/47 MAP of 75. HR of 99. A flutter on tele. With scheduled Metoprolol at HS. No parameters in place. Do we give this dose?      CO  Janelle Sawant - 8:59 pm  Yes, please give as I see a new BP of 109/47.      CB  Read - 9:00 pm  Yes. Just rechecked it. Thank you

## 2025-02-09 NOTE — PLAN OF CARE
Goal Outcome Evaluation:      Plan of Care Reviewed With: patient    Overall Patient Progress: no changeOverall Patient Progress: no change    Outcome Evaluation: Vss. O2 sats fluctuate with O2 on 1/2-1.5L nc 88-94%, currently 1.5Lnc O2. Lungs are diminished. Pt ambulates to the bathroom with standby assist. Voiding well. Trace edema to bilateral lower legs, ankles, feet. Denies pain.

## 2025-02-09 NOTE — PROGRESS NOTES
[facility]    Medicine Progress Note - Hospitalist Service    Date of Admission:  1/30/2025    Assessment & Plan   Roger Bonilla is a 69 year old male with chronic atrial fibrillation/flutter, CAD with history of MI with coronary artery stents and previous CABG, type 2 diabetes, hypertension, hyperlipidemia, PAD with previous carotid endarterectomy and AAA, chronic tobacco use which is ongoing, ABIGAIL, chronic anemia, and obesity who presented to ER on 1/29/2025 after onset of nonproductive cough, shortness of breath, malaise, fatigue, and bodyaches on January 27.  Due to concerns for acute hypoxic respiratory failure, SIRS, atrial fibrillation/flutter, influenza A, lower respiratory tract infection, acute kidney injury, and acute heart failure, he was admitted.       Influenza A with acute hypoxic and hypercapneic respiratory failure and SIRS  Possible superimposed bacterial pneumonia  Untreated ABIGAIL  Clinical presentation and test results were suspicious for influenza A lower respiratory tract infection and acute hypoxic and hypercapnic respiratory failure likely due to influenza A infection and possible superimposed bacterial pneumonia.  Tachycardia and tachypnea at admission were concerning for SIRS although could have also been due to respiratory failure and atrial fibrillation.   Respiratory failure worsened requiring bipap treatment.  After additional treatment of influenza, possible bacterial pneumonia, and CHF, respiratory failure has improved and he has since weaned off of need for BiPAP although continues to be hypoxic requiring oxygen supplementation and remains hypercapnic but has compensatory metabolic alkalosis.  He completed treatment course with Tamiflu on 2/4 and also received 7 days of antibiotic treatment during hospitalization.  He is improving and tolerating advancing activity although continuing to require supplemental oxygen which is different than his usual  baseline.  He continues to have worsening respiratory acidosis overnights likely because of his untreated ABIGAIL (documented on sleep study November 2011).  -Titrate oxygen to keep saturations greater than 88%  -ordered home oxygen assessment today and discussed with RT, anticipate he will require continuous oxygen supplementation at discharge at least in the short term  -If he qualifies for home oxygen supplementation, discussed recommendation for home care nursing at discharge with case management team due to new need for oxygen supplementation at home  -Ordered recheck vbg this morning after waking  -Continue aggressive pulmonary hygiene  -Discussed treatment options for ABIGAIL again today and patient denies willingness to use assisted ventilation such as CPAP so not pursuing repeat sleep study after hospital discharge for this reason    Possible coagulase-negative staph bacteremia   Possible SIRS and sepsis  Blood culture done on 1/29 in ED grew staph hominus and peptoniphilus species which were questionably contaminants but did grow from both blood culture bottles.  He was treated empirically with parenteral antibiotics and completed 7-day treatment course on 2/4 during hospitalization. Infectious disease was consulted virtually and suspected Peptoniphilus was likely a contaminant and that staff hominis in the blood cultures also may have been a contaminant.  However, but he had already completed 7 days of antibiotic therapy, sufficient for treatment of uncomplicated CoNS bacteremia.  Repeat blood cultures were negative.  He had been admitted with SIRS which, in the context of possible superimposed bacterial pneumonia and bacteremia, may have been due to sepsis.  -Continue clinical monitoring     Chronic atrial flutter/fibrillation with possible rapid ventricular response  Acute HFrEF  Presented with chronic atrial flutter/fibrillation with resting heart rates consistently 110-120 on previous chronic treatment  regimen of metoprolol tartrate 100 bid for rate control and warfarin for anticoagulation.  Suspect chronic tachyarrhythmia was exacerbated by influenza A.  He also presented with elevated BNP and significant peripheral edema although he was not known to have chronic heart failure, weight was stable compared with previously, and radiographic findings did not specifically mention interstitial pulmonary edema and/or pleural effusions.  TTE was performed this hospitalization while in rapid atrial flutter and demonstrated decreased LV EF of 40-45% as well as mildy decreased RV fxn.  This increases concern for possible acute heart failure with reduced EF most likely due to acute illness with tachyarrhythmia.  Acute heart failure was treated with aggressive doses of diuretics with improvement in weight and BNP.  IV diuresis was discontinued on 2/6.  Tachyarrhythmia was treated with increased dose of beta-blocker with improved rate control.  -continue increased metoprolol tartrate 200 bid (increased on 2/1)  -Continue current dose of diuretics (acetazolamide but not loop diuretic)  -Monitor daily weights  -Monitor strict I/O  -Continue fluid restriction 2000 ml    Contraction metabolic alkalosis   DYLAN  Presented with creatinine 1.39, increased compared with 1.0 in October 2024 possibly indicative of DYLAN at admission.  Creatinine subsequently improved to 1 earlier in hospitalization but then increased as high as 1.49 on 2/6 concerning for acute kidney injury which coincided with aggressive diuretic therapy (metolazone plus IV Lasix).  He also developed contraction metabolic alkalosis, so loop diuretics were stopped and acetazolamide treatment was added.  Renal function and metabolic alkalosis had improved until 2/9 when creatinine has again increased to 1.51.  -Continue acetazolamide but not yet restarting loop diuretic  -Ordered recheck renal function and VBG in am     Elevated lactic acid  Presented to ER with mildly  elevated lactic acid that normalized after initial treatment in the ER that included IV fluid boluses.  Although bacterial infection was not initially identified nor suspected, he subsequently had positive blood cultures for staph hominis concerning for possible coagulase-negative staph bacteremia and was treated accordingly, so elevated lactic acid could have been due to infection and sepsis.     -Monitor clinically and repeat lactic acid if he clinically worsens     Elevated troponin  History of MI  CAD status post coronary artery stents and CABG  Known CAD with previous history of MI, placement of coronary stents, and CABG.  Presented with mildly elevated troponin 60 that quickly trended down.  EKG is difficult to interpret for acute ischemia because of tachyarrhythmia.  He has not had angina.  Suspect elevated troponin is due to acute illness including influenza, respiratory failure, and tachyarrhythmia superimposed upon known CAD and acute heart failure.  -No additional evaluation for acute ischemic heart disease during hospitalization unless his clinical status changes  -treat underlying illness      Elevated hepatic transaminases  Hypoalbuminemia  Gallbladder wall thickening on radiographic studies  Patient noted to have elevated hepatic transaminases in ER with initially low serum albumin.  Gallbladder wall appeared thickened on initial CT and confirmed on subsequent ultrasound.  However, he has not had abdominal pain or tenderness.  Pool sign on ultrasound was negative.  Bilirubin and alkaline phosphatase were normal and there were no signs of bile duct dilation by imaging.  Suspect elevated transaminases and gallbladder wall thickening were due to decompensated heart failure and/or acute viral illness (pro-inflammatory response) rather than cholecystitis.    Serum albumin was low probably due to poor oral nutritional intake recently and subsequently normalized on 2/3/25.  -Monitor LFTs as indicated    "  Type 2 diabetes  Chronic type 2 diabetes treated with metformin with most recent A1c 6.9 in October 2024.  Blood sugars have been normal to minimally elevated (93 to 103).  He did receive IV contrast for radiographic study in the ER.  Blood sugars in 104-201.  Held metformin due to IV contrast administration   -not anticipating resuming metformin until renal function recovers to baseline  -Monitor blood sugars 4 times daily  -continue NovoLog correction scale but not anticipating recommending NovoLog after discharge    Hypochloremia  Serum chloride was normal at admission but subsequently decreased as low as 97 on February 7 coinciding with aggressive diuresis including treatment with metolazone and IV Lasix.  Chloride is starting to trend toward improvement.  -Ordered recheck electrolytes     PAD  Status post right carotid endarterectomy  AAA  Hyperlipidemia  Tobacco use  He has known PAD manifest as AAA and carotid atherosclerosis and previously underwent carotid endarterectomy.  On aspirin, Zetia, and statin.  He continues to smoke.  -Continue chronic aspirin, Zetia, and statin  -Smoking cessation advised  -Nicotine supplementation offered but declined by the patient    Untreated ABIGAIL  Patient had sleep study in November 2011 confirming ABIGAIL but declined cpap treatment.  During hospitalization, importance of treating ABIGAIL has been emphasized and patient initially indicated he would consider having a repeat sleep study.  He continues to have worsening sleep-related oxygen desaturation as he recovers from other acute illness.  He continues to indicate that he is unwilling to use CPAP, so no longer recommending repeat sleep study at discharge  -Anticipate using low-flow oxygen supplementation during sleep but cautiously given his comorbid hypercapnia    Head tremor   Has a chronic head (\"shaking no\") head tremor but has never been to Neurology.  Outpatient Neurology follow up after he is released from the hospital " has been offered although so far declined.       Hypertension  Hypertension chronically treated with metoprolol and Lasix.  Blood pressure has been generally stable after adjusting metoprolol and diuretics  -Continue metoprolol tartrate at increased dose  -Not yet restarting loop diuretics     Protein calorie malnutrition.  Has not been eating well at all and there has been concern for malnutrition during hospitalization.  He has been evaluated by registered dietitian.  -Continue ensure clear between meals     Chronic anemia  Aspirin induced platelet function defect  Warfarin induced coagulopathy  Chronically anemic with baseline hemoglobin 11-12 has been stable.  Chronically taking aspirin that causes platelet function defect and warfarin that causes coagulopathy both of which increase his risk for bleeding.  Active bleeding has not been suspected.  INR has been therapeutic.    -Continue chronic aspirin  -Pharmacy managing warfarin dosing during hospitalization     Obesity  Appears obese with BMI 38.  Obesity likely contributes to health problems including diabetes and probable obesity hypoventilation.  -No acute intervention anticipated during hospitalization          Diet: Combination Diet Moderate Consistent Carb (60 g CHO per Meal) Diet; 2 gm NA Diet  Snacks/Supplements Adult: Ensure Clear; With Meals  Fluid restriction 2000 ML FLUID    DVT Prophylaxis: Warfarin  Peter Catheter: Not present  Lines: None     Cardiac Monitoring: None  Code Status: Full Code      Clinically Significant Risk Factors          # Hypochloremia: Lowest Cl = 91 mmol/L in last 2 days, will monitor as appropriate      # Hypoalbuminemia: Lowest albumin = 3.4 g/dL at 2/3/2025  5:12 AM, will monitor as appropriate     # Hypertension: Noted on problem list     # Acute Hypoxic Respiratory Failure: Documented O2 saturation < 90%. Continue supplemental oxygen as needed  # Acute Hypoxic Respiratory Failure: Documented O2 saturation < 90%.  "Continue supplemental oxygen as needed  # Acute Hypercapnic Respiratory Failure: based on venous blood gas results.  Continue supplemental oxygen and ventilatory support as indicated.        # DMII: A1C = N/A within past 6 months   # Obesity: Estimated body mass index is 35.93 kg/m  as calculated from the following:    Height as of this encounter: 1.676 m (5' 6\").    Weight as of this encounter: 101 kg (222 lb 9.6 oz).      # Financial/Environmental Concerns: none   # History of CABG: noted on surgical history       Social Drivers of Health    Tobacco Use: High Risk (11/19/2024)    Patient History     Smoking Tobacco Use: Every Day     Smokeless Tobacco Use: Never     Passive Exposure: Never   Physical Activity: Insufficiently Active (4/15/2024)    Exercise Vital Sign     Days of Exercise per Week: 3 days     Minutes of Exercise per Session: 10 min   Stress: Stress Concern Present (4/15/2024)    [employer] [place] [employer] - Occupational Stress Questionnaire     Feeling of Stress : To some extent   Social Connections: Unknown (4/15/2024)    Social Connection and Isolation Panel [NHANES]     Frequency of Social Gatherings with Friends and Family: Once a week          Disposition Plan     Medically Ready for Discharge: Anticipated Tomorrow             Pasquale Wilde MD  Hospitalist Service  [facility]  Securely message with [employer] (more info)  Text page via [employer] Paging/Directory   ______________________________________________________________________    Interval History   Patient says he did not sleep well overnight because his audible alarms were frequently going off through the night apparently related to his oxygen levels.  He otherwise offers no complaints today.  He remains afebrile and hemodynamically stable.  He had 1 lower blood pressure reading 91/47 briefly last evening but was asymptomatic from lower blood pressure.  He continues to require oxygen " supplementation continuously to maintain adequate saturations.  He denies any worsening shortness of breath.  He is tolerating oral intake and advancing activity.  He says he rarely leaves his home at his normal baseline, typically leaving home to go to doctor appointments.  He does have assistance at home from his son.  He says he has not wanted to use CPAP to treat sleep apnea because he heard bad things about it from his mother.  He still will not use CPAP during sleep even if recommended medically.    Physical Exam   Vital Signs: Temp: 97.7  F (36.5  C) Temp src: Oral BP: 119/58 Pulse: 82   Resp: 17 SpO2: 92 % O2 Device: Nasal cannula Oxygen Delivery: 1/2 LPM  Patient Vitals for the past 24 hrs:   BP Temp Temp src Pulse Resp SpO2 Weight   02/09/25 0726 -- 97.7  F (36.5  C) Oral 82 17 92 % --   02/09/25 0611 -- -- -- -- -- (!) 89 % --   02/09/25 0437 -- -- -- -- -- 93 % --   02/09/25 0435 -- -- -- -- -- (!) 88 % --   02/09/25 0234 119/58 97.8  F (36.6  C) Oral 88 16 92 % 101 kg (222 lb 9.6 oz)   02/09/25 0003 -- -- -- -- -- (!) 91 % --   02/08/25 2300 119/51 97.9  F (36.6  C) Oral 98 16 94 % --   02/08/25 2157 -- -- -- -- -- (!) 90 % --   02/08/25 2155 -- -- -- -- -- (!) 73 % --   02/08/25 2058 109/47 -- -- 92 -- -- --   02/08/25 2043 91/47 -- -- 99 -- -- --   02/08/25 1700 102/70 97.8  F (36.6  C) Oral 80 20 92 % --   02/08/25 1200 128/79 98.2  F (36.8  C) Oral 77 20 93 % --     Weight: 222 lbs 9.6 oz  Vitals:    02/05/25 0523 02/06/25 0501 02/07/25 0432 02/08/25 0117   Weight: 100.8 kg (222 lb 4.8 oz) 100.6 kg (221 lb 12.8 oz) 101.2 kg (223 lb 1.6 oz) 101 kg (222 lb 9.6 oz)    02/09/25 0234   Weight: 101 kg (222 lb 9.6 oz)       Intake/Output Summary (Last 24 hours) at 2/9/2025 0815  Last data filed at 2/9/2025 0729  Gross per 24 hour   Intake 1470 ml   Output 2175 ml   Net -705 ml         General Appearance: Obese elderly man without signs of distress sitting up in a chair  Respiratory: Normal respiratory  effort, diminished breath sounds throughout, symmetric breath sounds with clear lung fields  Cardiovascular: Somewhat irregularly irregular heart rate and rhythm, brisk capillary refill     Medical Decision Making             Data     I have personally reviewed the following data over the past 24 hrs:    N/A  \   N/A   / N/A     139 93 (L) 43.6 (H) /  113 (H)   3.8 35 (H) 1.51 (H) \     INR:  2.32 (H) PTT:  N/A   D-dimer:  N/A Fibrinogen:  N/A       Blood sugars 109-227 over the last day    Venous Blood Gas  Recent Labs   Lab 02/09/25  0527 02/08/25  0604 02/07/25  1750 02/07/25  1053 02/03/25  0512   PHV 7.31* 7.37 7.40  --  7.36   PCO2V 79* 76* 72*  --  78*   PO2V 28 41 34  --  35   HCO3V 40* 44* 44*  --  44*   ROJAS 9.8* 14.7* 15.5*  --  14.6*   O2PER 24 28 24 24 26       Recent Labs   Lab 02/09/25  0728 02/09/25  0527 02/08/25  2031 02/08/25  0736 02/08/25  0604 02/07/25  0804 02/07/25  0602 02/06/25  0755 02/06/25  0516 02/05/25  0748 02/05/25  0519 02/04/25  0727 02/04/25  0510 02/03/25  0640 02/03/25  0512   WBC  --   --   --   --   --   --   --   --  9.6  --  10.2  --  8.9  --  8.0   HGB  --   --   --   --   --   --   --   --  12.9*  --  12.8*  --  13.1*  --  11.6*   MCV  --   --   --   --   --   --   --   --  78  --  77*  --  77*  --  77*   PLT  --   --   --   --   --   --   --   --  295  --  244  --  260  --  221   INR  --  2.32*  --   --  2.52*  --  2.69*  --  3.23*  --  3.29*  --  3.31*   < >  --    NA  --  139  --   --  139  --  139  --  138  --  140  --  141  --  141   POTASSIUM  --  3.8  --   --  3.5  --  3.8  --  3.5   < > 3.1*   < > 3.0*  --  3.5   CHLORIDE  --  93*  --   --  91*  --  87*  --  88*  --  90*  --  90*  --  95*   CO2  --  35*  --   --  36*  --  41*  --  38*  --  37*  --  37*  --  35*   BUN  --  43.6*  --   --  35.8*  --  35.9*  --  32.4*  --  26.2*  --  21.1  --  19.8   CR  --  1.51*  --   --  1.42*  --  1.34*  --  1.49*  --  1.17  --  1.11  --  1.08   ANIONGAP  --  11  --   --  12  --   11  --  12  --  13  --  14  --  11   SUE  --  9.3  --   --  9.2  --  9.2  --  9.6  --  9.5  --  9.6  --  8.6*   * 109* 130*   < > 111*   < > 118*   < > 100*   < > 108*   < > 107*   < > 106*   ALBUMIN  --   --   --   --   --   --   --   --   --   --   --   --   --   --  3.4*   PROTTOTAL  --   --   --   --   --   --   --   --   --   --   --   --   --   --  6.9   BILITOTAL  --   --   --   --   --   --   --   --   --   --   --   --   --   --  0.7   ALKPHOS  --   --   --   --   --   --   --   --   --   --   --   --   --   --  73   ALT  --   --   --   --   --   --   --   --   --   --   --   --   --   --  57   AST  --   --   --   --   --   --   --   --   --   --   --   --   --   --  43    < > = values in this interval not displayed.

## 2025-02-09 NOTE — PROGRESS NOTES
Patient has been assessed for Home Oxygen needs. Oxygen readings:    *Pulse oximetry (SpO2) = 88% on room air at rest while awake.    *SpO2 improved to 95% on 1 liters/minute at rest.    *SpO2 improved to 90% on 1 liters/minute during activity/with exercise.

## 2025-02-09 NOTE — PLAN OF CARE
Goal Outcome Evaluation:      Plan of Care Reviewed With: patient    Overall Patient Progress: improvingOverall Patient Progress: improving    Outcome Evaluation: Pt A&Ox4. Continues on 0.5L nasal cannula, other VSS. Denies pain. Reports feeling better. Does get short of breath with exertion. Potassium 3.5, no replacement needed. Blood sugars 115, 227 and 140. Tolerating diet. Good output.

## 2025-02-09 NOTE — PROVIDER NOTIFICATION
Notified Dr. Sawant: Critical lab from venous blood gas: PCO2= 79, (yesterday PCO2 was 76). Pt has had 1/2-1.5L nc O2 on to keep O2 sats >89%. No change in mentation noted.    Awaiting response at this time.

## 2025-02-09 NOTE — PHARMACY-ANTICOAGULATION SERVICE
Clinical Pharmacy - Warfarin Dosing Consult     Pharmacy has been consulted to manage this patient s warfarin therapy.       INR   Date Value Ref Range Status   02/09/2025 2.32 (H) 0.85 - 1.15 Final   02/08/2025 2.52 (H) 0.85 - 1.15 Final       Recommend warfarin 7.5 mg today.  Pharmacy will monitor Roger A Ring daily and order warfarin doses to achieve specified goal.      Please contact pharmacy as soon as possible if the warfarin needs to be held for a procedure or if the warfarin goals change.

## 2025-02-10 ENCOUNTER — DOCUMENTATION ONLY (OUTPATIENT)
Dept: CARDIOLOGY | Facility: CLINIC | Age: 70
End: 2025-02-10

## 2025-02-10 VITALS
SYSTOLIC BLOOD PRESSURE: 93 MMHG | BODY MASS INDEX: 35.74 KG/M2 | RESPIRATION RATE: 20 BRPM | WEIGHT: 222.4 LBS | DIASTOLIC BLOOD PRESSURE: 66 MMHG | HEIGHT: 66 IN | TEMPERATURE: 97.6 F | HEART RATE: 100 BPM | OXYGEN SATURATION: 90 %

## 2025-02-10 DIAGNOSIS — E78.5 HYPERLIPIDEMIA LDL GOAL <70: ICD-10-CM

## 2025-02-10 DIAGNOSIS — Z00.6 EXAMINATION OF PARTICIPANT IN CLINICAL TRIAL: Primary | ICD-10-CM

## 2025-02-10 PROBLEM — J18.9 PNEUMONIA: Status: ACTIVE | Noted: 2025-02-10

## 2025-02-10 PROBLEM — E46 MALNUTRITION: Status: ACTIVE | Noted: 2025-02-10

## 2025-02-10 PROBLEM — E87.8 HYPOCHLOREMIA: Status: ACTIVE | Noted: 2025-02-10

## 2025-02-10 PROBLEM — E87.3 METABOLIC ALKALOSIS: Status: ACTIVE | Noted: 2025-02-10

## 2025-02-10 PROBLEM — I50.21 ACUTE SYSTOLIC HEART FAILURE (H): Status: ACTIVE | Noted: 2025-02-10

## 2025-02-10 PROBLEM — J96.02 ACUTE RESPIRATORY FAILURE WITH HYPOXIA AND HYPERCAPNIA (H): Status: ACTIVE | Noted: 2025-01-30

## 2025-02-10 PROBLEM — G25.0 BENIGN HEAD TREMOR: Status: ACTIVE | Noted: 2025-02-10

## 2025-02-10 PROBLEM — R78.81 BACTEREMIA: Status: ACTIVE | Noted: 2025-02-10

## 2025-02-10 LAB
ANION GAP SERPL CALCULATED.3IONS-SCNC: 11 MMOL/L (ref 7–15)
BASE EXCESS BLDV CALC-SCNC: 8.2 MMOL/L (ref -3–3)
BUN SERPL-MCNC: 41.9 MG/DL (ref 8–23)
CALCIUM SERPL-MCNC: 9.2 MG/DL (ref 8.8–10.4)
CHLORIDE SERPL-SCNC: 96 MMOL/L (ref 98–107)
CREAT SERPL-MCNC: 1.49 MG/DL (ref 0.67–1.17)
EGFRCR SERPLBLD CKD-EPI 2021: 50 ML/MIN/1.73M2
GLUCOSE BLDC GLUCOMTR-MCNC: 123 MG/DL (ref 70–99)
GLUCOSE BLDC GLUCOMTR-MCNC: 183 MG/DL (ref 70–99)
GLUCOSE SERPL-MCNC: 114 MG/DL (ref 70–99)
HCO3 BLDV-SCNC: 37 MMOL/L (ref 21–28)
HCO3 SERPL-SCNC: 32 MMOL/L (ref 22–29)
INR PPP: 2.56 (ref 0.85–1.15)
O2/TOTAL GAS SETTING VFR VENT: 24 %
OXYHGB MFR BLDV: 54 % (ref 70–75)
PCO2 BLDV: 71 MM HG (ref 40–50)
PH BLDV: 7.33 [PH] (ref 7.32–7.43)
PO2 BLDV: 34 MM HG (ref 25–47)
POTASSIUM SERPL-SCNC: 3.4 MMOL/L (ref 3.4–5.3)
POTASSIUM SERPL-SCNC: 3.6 MMOL/L (ref 3.4–5.3)
SAO2 % BLDV: 54.7 % (ref 70–75)
SODIUM SERPL-SCNC: 139 MMOL/L (ref 135–145)

## 2025-02-10 PROCEDURE — 250N000013 HC RX MED GY IP 250 OP 250 PS 637: Performed by: NURSE PRACTITIONER

## 2025-02-10 PROCEDURE — 84132 ASSAY OF SERUM POTASSIUM: CPT | Performed by: PEDIATRICS

## 2025-02-10 PROCEDURE — 99239 HOSP IP/OBS DSCHRG MGMT >30: CPT | Performed by: PEDIATRICS

## 2025-02-10 PROCEDURE — 36415 COLL VENOUS BLD VENIPUNCTURE: CPT | Performed by: PEDIATRICS

## 2025-02-10 PROCEDURE — 82805 BLOOD GASES W/O2 SATURATION: CPT | Performed by: PEDIATRICS

## 2025-02-10 PROCEDURE — 250N000013 HC RX MED GY IP 250 OP 250 PS 637: Performed by: PEDIATRICS

## 2025-02-10 PROCEDURE — 82310 ASSAY OF CALCIUM: CPT | Performed by: PEDIATRICS

## 2025-02-10 PROCEDURE — 85610 PROTHROMBIN TIME: CPT | Performed by: PEDIATRICS

## 2025-02-10 RX ORDER — POTASSIUM CHLORIDE 1500 MG/1
40 TABLET, EXTENDED RELEASE ORAL ONCE
Status: COMPLETED | OUTPATIENT
Start: 2025-02-10 | End: 2025-02-10

## 2025-02-10 RX ORDER — WARFARIN SODIUM 5 MG/1
5 TABLET ORAL
Status: DISCONTINUED | OUTPATIENT
Start: 2025-02-10 | End: 2025-02-10 | Stop reason: HOSPADM

## 2025-02-10 RX ORDER — POTASSIUM CHLORIDE 1500 MG/1
20 TABLET, EXTENDED RELEASE ORAL DAILY
Qty: 30 TABLET | Refills: 0 | Status: SHIPPED | OUTPATIENT
Start: 2025-02-10

## 2025-02-10 RX ORDER — ACETAZOLAMIDE 250 MG/1
250 TABLET ORAL
Qty: 60 TABLET | Refills: 0 | Status: SHIPPED | OUTPATIENT
Start: 2025-02-10

## 2025-02-10 RX ORDER — POTASSIUM CHLORIDE 1500 MG/1
40 TABLET, EXTENDED RELEASE ORAL ONCE
Status: DISCONTINUED | OUTPATIENT
Start: 2025-02-10 | End: 2025-02-10

## 2025-02-10 RX ORDER — WARFARIN SODIUM 5 MG/1
TABLET ORAL
COMMUNITY
Start: 2025-02-10

## 2025-02-10 RX ORDER — METOPROLOL TARTRATE 100 MG/1
200 TABLET ORAL 2 TIMES DAILY
Qty: 120 TABLET | Refills: 0 | Status: SHIPPED | OUTPATIENT
Start: 2025-02-10

## 2025-02-10 RX ADMIN — ATORVASTATIN CALCIUM 80 MG: 40 TABLET, FILM COATED ORAL at 09:33

## 2025-02-10 RX ADMIN — PANTOPRAZOLE SODIUM 40 MG: 40 TABLET, DELAYED RELEASE ORAL at 09:34

## 2025-02-10 RX ADMIN — METOPROLOL TARTRATE 200 MG: 100 TABLET, FILM COATED ORAL at 09:35

## 2025-02-10 RX ADMIN — EZETIMIBE 10 MG: 10 TABLET ORAL at 09:34

## 2025-02-10 RX ADMIN — ASPIRIN 81 MG 81 MG: 81 TABLET ORAL at 09:33

## 2025-02-10 RX ADMIN — POTASSIUM CHLORIDE 40 MEQ: 1500 TABLET, EXTENDED RELEASE ORAL at 09:16

## 2025-02-10 RX ADMIN — ACETAZOLAMIDE 250 MG: 125 TABLET ORAL at 09:16

## 2025-02-10 RX ADMIN — INSULIN ASPART 1 UNITS: 100 INJECTION, SOLUTION INTRAVENOUS; SUBCUTANEOUS at 11:57

## 2025-02-10 ASSESSMENT — ACTIVITIES OF DAILY LIVING (ADL)
ADLS_ACUITY_SCORE: 35
ADLS_ACUITY_SCORE: 38
ADLS_ACUITY_SCORE: 35
ADLS_ACUITY_SCORE: 35
ADLS_ACUITY_SCORE: 38
ADLS_ACUITY_SCORE: 35
ADLS_ACUITY_SCORE: 35
ADLS_ACUITY_SCORE: 38
ADLS_ACUITY_SCORE: 35
ADLS_ACUITY_SCORE: 38
ADLS_ACUITY_SCORE: 35
ADLS_ACUITY_SCORE: 35
ADLS_ACUITY_SCORE: 38
ADLS_ACUITY_SCORE: 38

## 2025-02-10 NOTE — PROVIDER NOTIFICATION
Clarified with Dr. Chani moya to give metoprolol this AM with BP 93/66 and HR in the 80's and up to 100. Ok to give per Dr. Wilde.

## 2025-02-10 NOTE — PLAN OF CARE
Goal Outcome Evaluation:      Plan of Care Reviewed With: patient    Overall Patient Progress: improvingOverall Patient Progress: improving    Outcome Evaluation: Pt A&Ox4. 1L O2 applied via NC. Pt does have apneic episodes when sleeping and intermittently desats to 80% on 1L, however quickly returns to >89%. LS diminished. .

## 2025-02-10 NOTE — PROGRESS NOTES
[facility] called for home O2 set up. [phone]    Waiting for contact from  liaison.    Update 1613  Paperwork received, POC delivered and patient instructed on equipment and use.

## 2025-02-10 NOTE — PROGRESS NOTES
Care Management Discharge Note    Discharge Date: 02/10/2025     Discharge Disposition: Home, Home Care    Discharge Services: None    Discharge DME: None    Discharge Transportation: family or friend will provide (Son- Emiliano)    Private pay costs discussed: Not applicable    Does the patient's insurance plan have a 3 day qualifying hospital stay waiver?  No    Patient/family educated on Medicare website which has current facility and service quality ratings: no    Education Provided on the Discharge Plan: Yes  Persons Notified of Discharge Plans:   Patient/Family in Agreement with the Plan: yes    Handoff Referral Completed: Yes, [facility] PCP: Internal handoff referral completed      Additional Information:  Per IDT rounds, patient is medically ready for discharge today.      Patient will discharge to home with new home O2. New HHC RN was requested due to patient's new home O2 - [facility] (Phone: [phone]) has accepted for HHC RN.     Patient's family will provide transportation to home.       ANDREW Arenas  Inpatient Care Coordinator   [facility] [phone]  [facility] [phone]

## 2025-02-10 NOTE — PHARMACY-ANTICOAGULATION SERVICE
Clinical Pharmacy - Warfarin Dosing Consult     Pharmacy has been consulted to manage this patient s warfarin therapy.       INR   Date Value Ref Range Status   02/10/2025 2.56 (H) 0.85 - 1.15 Final   02/09/2025 2.32 (H) 0.85 - 1.15 Final       Recommend warfarin 5 mg today.  Pharmacy will monitor Roger A Ring daily and order warfarin doses to achieve specified goal.      Please contact pharmacy as soon as possible if the warfarin needs to be held for a procedure or if the warfarin goals change.

## 2025-02-10 NOTE — DISCHARGE SUMMARY
"[facility]  Hospitalist Discharge Summary      Date of Admission:  1/30/2025  Date of Discharge:  2/10/2025  Discharging Provider: Pasquale Wilde MD  Discharge Service: Hospitalist Service    Discharge Diagnoses   Principal Problem:    Influenza A  Active Problems:    Atrial fibrillation/flutter (H)    Acute respiratory failure with hypoxia and hypercapnia (H)    SIRS (systemic inflammatory response syndrome) (H)    ABIGAIL (obstructive sleep apnea)    Benign essential hypertension    PAD (peripheral artery disease)    DYLAN (acute kidney injury)    S/P CABG (coronary artery bypass graft)    Type 2 diabetes mellitus with diabetic peripheral angiopathy without gangrene, without long-term current use of insulin (H)    Elevated lactic acid level    Elevation of levels of liver transaminase levels    Hypoalbuminemia    Chronic anemia    Elevated troponin    Warfarin-induced coagulopathy    Platelet dysfunction due to aspirin (H)    Acute systolic heart failure (H)    Pneumonia    Bacteremia    Metabolic alkalosis    Hypochloremia    Malnutrition    Hyperlipidemia LDL goal <70    Obesity    Tobacco abuse    Coronary artery disease involving native coronary artery of native heart without angina pectoris    AAA (abdominal aortic aneurysm)    S/P carotid endarterectomy    History of MI (myocardial infarction)    History of coronary artery stent placement    Benign head tremor      Clinically Significant Risk Factors     # DMII: A1C = N/A within past 6 months  # Obesity: Estimated body mass index is 35.9 kg/m  as calculated from the following:    Height as of this encounter: 1.676 m (5' 6\").    Weight as of this encounter: 100.9 kg (222 lb 6.4 oz).       Follow-ups Needed After Discharge   Follow-up Appointments       Follow-up and recommended labs and tests       Follow up with primary care provider, Maria De Jesus Hay, within 7 days to evaluate medication change, for hospital follow- up, and " regarding new diagnosis.  The following labs/tests are recommended: BMP, VBG and INR within 1 week.              Discharge Disposition   Admited to home care:   Agency: TBD  Discharged to home  Condition at discharge: Stable    Hospital Course   Roger Bonilla is a 69 year old male with chronic atrial fibrillation/flutter, CAD with history of MI with coronary artery stents and previous CABG, type 2 diabetes, hypertension, hyperlipidemia, PAD with previous carotid endarterectomy and AAA, chronic tobacco use which is ongoing, ABIGAIL, chronic anemia, and obesity who presented to ER on 1/29/2025 after onset of nonproductive cough, shortness of breath, malaise, fatigue, and bodyaches on January 27.  Due to concerns for acute hypoxic respiratory failure, SIRS, atrial fibrillation/flutter, influenza A, lower respiratory tract infection, acute kidney injury, and acute heart failure, he was admitted.       Influenza A with acute hypoxic and hypercapneic respiratory failure and SIRS  Possible superimposed bacterial pneumonia  Untreated ABIGAIL  Clinical presentation and test results were suspicious for influenza A lower respiratory tract infection and acute hypoxic and hypercapnic respiratory failure likely due to influenza A infection and possible superimposed bacterial pneumonia.  Tachycardia and tachypnea at admission were concerning for SIRS.   Respiratory failure worsened requiring Bipap treatment.  After additional treatment of influenza, possible bacterial pneumonia, and CHF, respiratory failure improved and he weaned off of need for BiPAP although continued to be hypoxic requiring low-flow oxygen supplementation continuously.  He also remained hypercapnic but had compensatory metabolic alkalosis.  He completed treatment course with Tamiflu and also received 7 days of antibiotic treatment during hospitalization.  He improved and tolerated advancing activity.  At discharge, continuous oxygen supplementation was recommended at  least in the short term.  During hospitalization, evaluation treatment for CPAP was discussed, but patient clearly indicated preference to avoid CPAP or BiPAP treatment of ABIGAIL, so no additional testing for sleep apnea was recommended at discharge.    Coagulase-negative staph bacteremia   Possible SIRS and sepsis  Blood cultures drawn on 1/29 in ED grew staph hominus and peptoniphilus species from both blood culture bottles.  He was treated empirically with parenteral antibiotics and completed 7-day treatment course during hospitalization. Repeat blood cultures were negative.  He had been admitted with SIRS which, in the context of possible superimposed bacterial pneumonia and bacteremia, may have been due to sepsis.  Later during hospitalization, infectious disease was consulted virtually and suspected Peptoniphilus was likely a contaminant and that Staph hominis (coagulase negative) in the blood cultures also may have been a contaminant.  However, he had already completed 7 days of antibiotic therapy considered to be sufficient for treatment of uncomplicated CoNS bacteremia.       Chronic atrial flutter/fibrillation with rapid ventricular response  Acute HFrEF  Presented with chronic atrial flutter/fibrillation with resting heart rates consistently 110-120 on previous chronic treatment regimen of metoprolol tartrate 100 mg twice daily for rate control and warfarin for anticoagulation.  Suspect chronic tachyarrhythmia was exacerbated by influenza A.  He also presented with elevated BNP and significant peripheral edema although he was not known to have chronic heart failure, weight was stable compared with previously, and radiographic findings did not specifically mention interstitial pulmonary edema and/or pleural effusions.  TTE was performed this hospitalization while in rapid atrial flutter and demonstrated decreased LV EF of 40-45% as well as mildly decreased RV fxn.  Accordingly, there was increased concern for  acute heart failure with reduced EF most likely due to acute illness with tachyarrhythmia.  Acute heart failure was treated with aggressive doses of diuretics with improvement, and tachyarrhythmia was treated with increased dose of beta-blocker with improved rate control.  At discharge, he was advised to continue increased dose of metoprolol tartrate compared with previously.  However, as noted below, loop diuretic therapy was discontinued and replaced with acetazolamide which was continued at discharge.  Ongoing 2 L fluid restriction per day was also advised at discharge along with regular monitoring of daily weights.  Close outpatient follow-up was suggested.    Contraction metabolic alkalosis   DYLAN  Presented with creatinine 1.39, increased compared with 1.0 in October 2024 possibly indicative of DYLAN at admission.  After creatinine initially improved to 1, creatinine again increased as high as 1.51 on 2/9 concerning for acute kidney injury and then stabilized.  DYLAN coincided with aggressive diuretic therapy (metolazone plus IV Lasix).  Aggressive diuretic therapy also precipitated contraction metabolic alkalosis, so loop diuretics were stopped and acetazolamide treatment was added.  At discharge he was advised to avoid restarting Lasix but to continue acetazolamide until close outpatient follow-up to recheck renal function and blood gas.     Elevated lactic acid  Presented to ER with mildly elevated lactic acid that normalized after initial treatment in the ER that included IV fluid boluses and antibiotics.  Subsequently he had bacteremia and was treated accordingly, so elevated lactic acid may have been due to infection and severe sepsis.        Elevated troponin  History of MI  CAD status post coronary artery stents and CABG  Known CAD with previous history of MI, placement of coronary stents, and CABG.  Presented with mildly elevated troponin 60 that quickly trended down.  EKG was difficult to interpret for  acute ischemia because of tachyarrhythmia.  He did not have angina.  Suspect elevated troponin was due to acute illness including influenza, respiratory failure, and tachyarrhythmia superimposed upon known CAD and acute heart failure.     Elevated hepatic transaminases  Hypoalbuminemia  Gallbladder wall thickening on radiographic studies  Patient noted to have elevated hepatic transaminases in ER with initially low serum albumin.  Gallbladder wall appeared thickened on initial CT and confirmed on subsequent ultrasound.  However, he did not have abdominal pain or tenderness and Pool sign on ultrasound was negative.  Additionally, bilirubin and alkaline phosphatase were normal and there were no signs of bile duct dilation by imaging.  Suspect elevated transaminases and gallbladder wall thickening were due to decompensated heart failure and/or acute viral illness (pro-inflammatory response) rather than cholecystitis.  Serum albumin was low probably due to poor oral nutritional intake recently and subsequently normalized.     Type 2 diabetes  Chronic type 2 diabetes treated with metformin with most recent A1c 6.9 in October 2024.  Blood sugars were normal to minimally elevated.  Because he received IV contrast for radiographic study in the ER, metformin was held.  Because renal function worsened compared with previous baseline, metformin was not restarted during hospitalization or at discharge.  Monitoring of blood sugars per his usual home routine was recommended at discharge.    Hypochloremia  Serum chloride was normal at admission but subsequently decreased as low as 97 on February 7 coinciding with aggressive diuresis including treatment with metolazone and IV Lasix.  Chloride started to trend toward improvement by discharge.     PAD  Status post right carotid endarterectomy  AAA  Hyperlipidemia  Tobacco use  He has known PAD manifest as AAA and carotid atherosclerosis and previously underwent carotid  "endarterectomy.  He had chronically been treated with aspirin, Zetia, and statin but continued to smoke. Chronic aspirin, Zetia, and statin were continued.  Smoking cessation was advised. Nicotine supplementation was offered but declined by the patient.    Untreated ABIGAIL  Patient had sleep study in November 2011 confirming ABIGAIL but declined CPAP treatment.  During hospitalization, importance of treating ABIGAIL was emphasized.  He continued to have worsening sleep-related oxygen desaturation as he recovered from other acute illness.  After reflection during hospitalization, patient indicated that he was unwilling to use CPAP either during hospitalization or after discharge.  Accordingly, repeat sleep study after discharge was not arranged.    Head tremor   Has a chronic head (\"shaking no\") tremor but had never been to Neurology for that problem.  Outpatient Neurology follow up was offered but declined by the patient.       Hypertension  Hypertension chronically treated with metoprolol and Lasix.  Blood pressure was generally stable after increasing metoprolol and stopping loop diuretic.  Ambulatory monitoring of blood pressure after discharge was recommended.     Possible malnutrition.  He was not been eating well during hospitalization reporting poor appetite and raising concern for malnutrition during hospitalization.  He was evaluated by registered dietitian who provided recommendations for nutritional supplementation.     Chronic anemia  Aspirin induced platelet function defect  Warfarin induced coagulopathy  Chronic anemia with baseline hemoglobin 11-12 was stable.  Chronically taking aspirin that causes platelet function defect and warfarin that causes coagulopathy both of which increased his risk for bleeding.  Active bleeding was not suspected.  INR was therapeutic.  Chronic aspirin was continued and pharmacy was consulted for recommendations regarding warfarin dosing      Obesity  Appeared obese with BMI 38.  " Obesity likely contributed to health problems including diabetes and probable obesity hypoventilation.    Consultations This Hospital Stay   PHARMACY TO DOSE WARFARIN  PHARMACY TO DOSE VANCO  CARE MANAGEMENT / SOCIAL WORK IP CONSULT  INFECTIOUS DISEASES IP CONSULT  SPIRITUAL HEALTH SERVICES IP CONSULT  CARE MANAGEMENT / SOCIAL WORK IP CONSULT  CARE MANAGEMENT / SOCIAL WORK IP CONSULT    Code Status   Full Code    Time Spent on this Encounter   I, Pasquale Wilde MD, personally saw the patient today and spent greater than 30 minutes discharging this patient.       Pasquale Wilde MD  [address] MEDICAL SURGICAL  [address] [facility]   [place] [address]  Phone: [phone]  ______________________________________________________________________    Physical Exam   Vital Signs: Temp: 97.6  F (36.4  C) Temp src: Oral BP: 93/66 Pulse: 100   Resp: 20 SpO2: (!) 90 %  Oxygen Delivery: 1 LPM  Weight: 222 lbs 6.4 oz  General Appearance: NAD  Respiratory: normal effort, symmetric breath sounds, clear lungs  Cardiovascular: irregular RR        Primary Care Physician   Maria De Jesus Hay    Discharge Orders      Primary Care - Care Coordination Referral      Home Care Referral      Reason for your hospital stay    Hospitalized due to influenza A with breathing problems and improved     Follow-up and recommended labs and tests     Follow up with primary care provider, Maria De Jesus Hay, within 7 days to evaluate medication change, for hospital follow- up, and regarding new diagnosis.  The following labs/tests are recommended: BMP, VBG and INR within 1 week.     Activity    Your activity upon discharge: activity as tolerated     Monitor and record    Blood glucose: .  Blood pressure: .  Weight: every day.     Oxygen Adult/Peds    Oxygen Documentation  I certify that this patient, Roger Bonilla has been under my care (or a nurse practitioner or physican's assistant working with me). This is the face-to-face  encounter for oxygen medical necessity.      At the time of this encounter, I have reviewed the qualifying testing and have determined that supplemental oxygen is reasonable and necessary and is expected to improve the patient's condition in a home setting.         Patient has continued oxygen desaturation due to Acute Respiratory Failure J96.01  CAD I25.10  Influenza J10.1  Pneumonia J18.9.    If portability is ordered, is the patient mobile within the home? yes    Was this visit performed as a telehealth visit: No     Diet    Follow this diet upon discharge: Current Diet:Orders Placed This Encounter      Snacks/Supplements Adult: Ensure Clear; With Meals      Fluid restriction 2000 ML FLUID      Combination Diet Moderate Consistent Carb (60 g CHO per Meal) Diet; 2 gm NA Diet       Significant Results and Procedures   Most Recent 3 CBC's:  Recent Labs   Lab Test 02/06/25  0516 02/05/25  0519 02/04/25  0510   WBC 9.6 10.2 8.9   HGB 12.9* 12.8* 13.1*   MCV 78 77* 77*    244 260     Most Recent 3 BMP's:  Recent Labs   Lab Test 02/10/25  0811 02/10/25  0641 02/09/25 2025 02/09/25  0728 02/09/25  0527 02/08/25  0736 02/08/25  0604   NA  --  139  --   --  139  --  139   POTASSIUM  --  3.4  --   --  3.8  --  3.5   CHLORIDE  --  96*  --   --  93*  --  91*   CO2  --  32*  --   --  35*  --  36*   BUN  --  41.9*  --   --  43.6*  --  35.8*   CR  --  1.49*  --   --  1.51*  --  1.42*   ANIONGAP  --  11  --   --  11  --  12   SUE  --  9.2  --   --  9.3  --  9.2   * 114* 171*   < > 109*   < > 111*    < > = values in this interval not displayed.     Most Recent 2 LFT's:  Recent Labs   Lab Test 02/03/25  0512 02/02/25  0637   AST 43 65*   ALT 57 127*   ALKPHOS 73 76   BILITOTAL 0.7 0.6     Most Recent INR's and Anticoagulation Dosing History:  Anticoagulation Dose History  More data exists         Latest Ref Rng & Units 2/4/2025 2/5/2025 2/6/2025 2/7/2025 2/8/2025 2/9/2025 2/10/2025   Recent Dosing and Labs   warfarin  ANTICOAGULANT (COUMADIN) 1 MG tablet - - 3 mg, $Given 3 mg, $Given - - - -   warfarin ANTICOAGULANT (COUMADIN) 2 MG tablet - 4 mg, $Given - - 4 mg, $Given - - -   warfarin ANTICOAGULANT (COUMADIN) 0.25 mg TABS quarter-tab - - - - - - 7.5 mg, $Given -   warfarin ANTICOAGULANT (COUMADIN) 5 MG tablet - - - - - 5 mg, $Given - -   INR 0.85 - 1.15 3.31  3.29  3.23  2.69  2.52  2.32  2.56      7-Day Micro Results       No results found for the last 168 hours.          Most Recent Hemoglobin A1c:  Recent Labs   Lab Test 10/31/24  1333   A1C 6.9*     Most Recent 6 glucoses:  Recent Labs   Lab Test 02/10/25  0811 02/10/25  0641 255 25  1639 25  1139 25  0728   * 114* 171* 140* 178* 113*     Most Recent Venous Blood Gas  Recent Labs   Lab 02/10/25  0640 25  1017 25  0527 25  0604   PHV 7.33 7.34 7.31* 7.37   PCO2V 71* 72* 79* 76*   PO2V 34 28 28 41   HCO3V 37* 38* 40* 44*   ROJAS 8.2* 9.8* 9.8* 14.7*   O2PER 24 21 24 28      Results for orders placed or performed during the hospital encounter of 25   Echocardiogram Complete     Value    LVEF  40-45%    Narrative    [id number]  [id number]  [id number]  [id number]^[email or web address]^HOLGER^VANESSA     [facility]  Echocardiography Laboratory  [address] [place] [address]     Name: JOSEFINA WHEELER  MRN: [id number]  : 1955  Study Date: 2025 03:03 PM  Age: 69 yrs  Gender: Male  Patient Location: [facility]  Reason For Study: Heart Failure  History: CAD, Stent, Smoker, HTN, PAD, ABIGAIL, Diabetes  Ordering Physician: HOLGER MANN  Referring Physician: Maria De Jesus Hay  Performed By: Maria De Jesus Francisco     BSA: 2.1 m2  Height: 66 in  Weight: 232 lb  HR: 118  BP: 139/78 mmHg  ______________________________________________________________________________  Procedure  Echocardiogram with two-dimensional, color and spectral  Doppler.  ______________________________________________________________________________  Interpretation Summary     Technically difficult imaging  The rhythm was atrial flutter.  With rapid atrial flutter , the visual approximation of left ventricular  systolic function may be falsely decreased.  The visual ejection fraction is 40-45%.  There is inferolateral wall akinesis.  There is moderate concentric left ventricular hypertrophy.  Mildly decreased right ventricular systolic function  Borderline right ventricular enlargement.  There is moderate biatrial enlargement.  Right ventricular systolic pressure is elevated, consistent with moderate  pulmonary hypertension.     Since the last study 1/9/2023 the patient's ventricular response rate has  inceased ( note the patient was in atrial flutter with a controlled  ventriuclar response rate during the last study, not sinus) and there has  lora decline in estimated LV/RV systolic performance.  ______________________________________________________________________________  Left Ventricle  The left ventricle is normal in size. There is moderate concentric left  ventricular hypertrophy. With rapid atrial fibrillation, the visual  approximation of left ventricular systolic function may be falsely decreased.  The visual ejection fraction is 40-45%. Left ventricular diastolic function is  indeterminate. There is inferolateral wall akinesis. There is no thrombus seen  in the left ventricle.     Right Ventricle  Borderline right ventricular enlargement. Mildly decreased right ventricular  systolic function.     Atria  There is moderate biatrial enlargement. There is no atrial shunt seen. The  left atrial appendage is not well visualized.     Mitral Valve  The mitral valve leaflets appear normal. There is no evidence of stenosis,  fluttering, or prolapse. There is no mitral regurgitation noted. There is no  mitral valve stenosis.     Tricuspid Valve  Normal tricuspid valve.  There is mild to moderate (1-2+) tricuspid  regurgitation. Right ventricular systolic pressure is elevated, consistent  with moderate pulmonary hypertension. The right ventricular systolic pressure  is elevated at 46.9 mmHg. There is no tricuspid stenosis.     Aortic Valve  The aortic valve is trileaflet. No aortic regurgitation is present. No aortic  stenosis is present.     Pulmonic Valve  Normal pulmonic valve. There is no pulmonic valvular stenosis.     Vessels  The aortic root is normal size. Normal size ascending aorta. Dilation of the  inferior vena cava is present with normal respiratory variation in diameter.  The pulmonary artery is normal size.     Pericardium  The pericardium appears normal. There is no pleural effusion.     Rhythm  The rhythm was atrial flutter.  ______________________________________________________________________________  MMode/2D Measurements & Calculations  IVSd: 1.7 cm     LVIDd: 4.2 cm  LVIDs: 3.3 cm  LVPWd: 1.4 cm  FS: 20.9 %  LV mass(C)d: 253.6 grams  LV mass(C)dI: 119.0 grams/m2  Ao root diam: 3.4 cm  LA dimension: 4.3 cm  asc Aorta Diam: 3.9 cm  LA/Ao: 1.3  Ao root diam index Ht(cm/m): 2.0  Ao root diam index BSA (cm/m2): 1.6  Asc Ao diam index BSA (cm/m2): 1.8  Asc Ao diam index Ht(cm/m): 2.3  EF Biplane: 31.1 %  LA Volume (BP): 56.4 ml     LA Volume Index (BP): 26.5 ml/m2  RV Base: 4.1 cm  RWT: 0.66  TAPSE: 0.92 cm     Doppler Measurements & Calculations  TR max shahram: 342.4 cm/sec  TR max P.9 mmHg  RV S Shahram: 6.1 cm/sec     ______________________________________________________________________________  Report approved by: Dr. Reyes English on 2025 04:34 PM             Discharge Medications   Current Discharge Medication List        START taking these medications    Details   acetaZOLAMIDE (DIAMOX) 250 MG tablet Take 1 tablet (250 mg) by mouth 2 times daily.  Qty: 60 tablet, Refills: 0    Associated Diagnoses: Metabolic alkalosis      potassium chloride birttany ER  (KLOR-CON M20) 20 MEQ CR tablet Take 1 tablet (20 mEq) by mouth daily.  Qty: 30 tablet, Refills: 0    Associated Diagnoses: Hypokalemia           CONTINUE these medications which have CHANGED    Details   metoprolol tartrate (LOPRESSOR) 100 MG tablet Take 2 tablets (200 mg) by mouth 2 times daily.  Qty: 120 tablet, Refills: 0    Associated Diagnoses: S/P CABG x 3      warfarin ANTICOAGULANT (JANTOVEN ANTICOAGULANT) 5 MG tablet Take 7.5 mg every Monday and Thursday and 5 mg all other days of the week OR as directed by INR Clinic    Associated Diagnoses: Atrial fibrillation/flutter (H)           CONTINUE these medications which have NOT CHANGED    Details   aspirin (ASA) 81 MG chewable tablet Take 81 mg by mouth daily      atorvastatin (LIPITOR) 80 MG tablet Take 1 tablet (80 mg) by mouth daily  Qty: 90 tablet, Refills: 3    Associated Diagnoses: Peripheral vascular disease; Coronary artery disease of native artery of native heart with stable angina pectoris; Hyperlipidemia LDL goal <70      ezetimibe (ZETIA) 10 MG tablet Take 1 tablet (10 mg) by mouth daily.  Qty: 90 tablet, Refills: 3    Associated Diagnoses: Hyperlipidemia LDL goal <70      omeprazole (PRILOSEC) 40 MG DR capsule Take 1 capsule (40 mg) by mouth every morning.  Qty: 90 capsule, Refills: 3    Associated Diagnoses: Gastroesophageal reflux disease without esophagitis           STOP taking these medications       furosemide (LASIX) 40 MG tablet Comments:   Reason for Stopping:         metFORMIN (GLUCOPHAGE) 1000 MG tablet Comments:   Reason for Stopping:             Allergies   Allergies   Allergen Reactions    Nkda [No Known Drug Allergy]

## 2025-02-10 NOTE — PLAN OF CARE
Goal Outcome Evaluation:      Plan of Care Reviewed With: patient    Overall Patient Progress: improvingOverall Patient Progress: improving    Outcome Evaluation: Pt A&Ox4. Utilizing 0.5-1L oxygen via nasal cannula to maintain oxygen saturation. Other VSS. Denies pain. Reports feeling better each day and happy that he can go home soon. Does get winded with activity. Home oxygen assessment reveals qualifying for home oxygen. Lungs clear but diminished. Potassium 3.8, no replacement needed. Blood sugars 113, 178 and 140. Intakes good. Voiding without issues. x2 BMs today. Has been up independently and calls for assistance as needed.

## 2025-02-10 NOTE — PROGRESS NOTES
A Double-blind, Randomized, Placebo-controlled, Multicenter Study Assessing the impact of Olpasiran on Major Cardiovascular Events in Patients with Atherosclerotic cardiovascular Disease and Elevated Lipoprotein (a)      Diagnosis/event description (diagnosis preferred):  Influenza A with acute hypoxic respiratory failure and SIRS      Serious adverse event?   [x] Yes   [] No  []Results in death  [x]Is life threatening  [x]Requires or prolongs hospitalization  []Congenital anomaly or birth defect  []Persistent or significant disability/incapacity  []Other medically important serious event    Start date/Change date: 30 JAN 2025     Stop date: Ongoing  Date Study team became aware of event: 10 FEB 2025  Intensity: ([] mild /[]  moderate / [x] severe)    CTCAE grade: [] 1 [] 2 [x] 3 [] 4 [] 5    Action taken with Study Drug  [] Drug withdrawn  [] Drug interrupted  [] Dose reduced  [] Dose not changed  [] Dose increased  [x] Not applicable  [] Unknown  Outcome:  [] recovered/resolved  [x] recovering/resolving  [] recovered/resolved with sequelae  [] not recovered/not resolved  [] fatal  [] unknown    Was treatment given for this event:  [x] Yes   [] No If yes, provide details   O2 by NC  Ceftriaxone  Azithromycin  IV fluids  Tylenol  Ipatropium albuterol nebulizer  Tamiflu      Were there other actions/treatments of Adverse Events? [x] Yes   [] No If yes, comment:    Additional comments:   Outcome event?  [] Yes   [x] No    Dr. Tello: Reasonable possibility that AE is related to study treatment    [] Yes   [x] No    Dr. Tello: Reasonable causal relationship to protocol required procedure(s)  [] Yes   [x] No

## 2025-02-10 NOTE — PROGRESS NOTES
S-(situation): Patient discharged to Home via w/c with self and son picking him up at door. Going home with home care and oxygen.    B-(background): 69 year old here with influenza A.    A-(assessment): VSS on 1 L NC. SBP soft in the 90's. Denies pain. Up in chair and ambulating in room with SBA. LS clear with crackles RLL.     R-(recommendations): Discharge instructions reviewed with patient. Listed belongings gathered and returned to patient.          Discharge Nursing Criteria:   Patient Education given and documented: (STROKE, CHF, Diabetes):  {Yes   Care Plan and Patient education resolved: Yes    New Medications- pt has been educated about purpose and side effects: Yes    Vaccines  Influenza status verified at discharge:  Yes    Intentionally Retained Items (examples: Drains, Wound packing, ureteral stents, da silva, PICC line or IV)  Patient was sent home with nothing left in place.   Follow up appointment made for removal:  NA    MISC  Prescriptions if needed, hard copies sent with patient  NA  Home medications returned to patient: NA  Medication Bin checked and emptied on discharge Yes  Patient reports post-discharge pain management plan is effective: Yes

## 2025-02-11 ENCOUNTER — TELEPHONE (OUTPATIENT)
Dept: ANTICOAGULATION | Facility: CLINIC | Age: 70
End: 2025-02-11
Payer: COMMERCIAL

## 2025-02-11 ENCOUNTER — TELEPHONE (OUTPATIENT)
Dept: FAMILY MEDICINE | Facility: CLINIC | Age: 70
End: 2025-02-11
Payer: COMMERCIAL

## 2025-02-11 ENCOUNTER — PATIENT OUTREACH (OUTPATIENT)
Dept: CARE COORDINATION | Facility: CLINIC | Age: 70
End: 2025-02-11
Payer: COMMERCIAL

## 2025-02-11 DIAGNOSIS — I48.92 ATRIAL FIBRILLATION/FLUTTER (H): Primary | ICD-10-CM

## 2025-02-11 DIAGNOSIS — I48.91 ATRIAL FIBRILLATION/FLUTTER (H): Primary | ICD-10-CM

## 2025-02-11 ASSESSMENT — ACTIVITIES OF DAILY LIVING (ADL): DEPENDENT_IADLS:: INDEPENDENT

## 2025-02-11 NOTE — TELEPHONE ENCOUNTER
[facility] at [facility] calling [phone]        Home Care is calling regarding an established patient with [facility].       Requesting orders from: Maria De Jesus Hay  Provider is following patient: Yes  Is this a 60-day recertification request?  No    Orders Requested    Skilled Nursing  Request for initial certification (first set of orders)   Frequency:  2x/wk for 2 wks, Then 1 x week x 2 week then every other week x 5 weeks    Occupational Therapy  Request for initial certification (first set of orders)   Frequency:     Social Work  Request for initial evaluation and treatment (one time)     Confirmed ok to leave a detailed message with call back.  Contact information confirmed and updated as needed.    Zoe Payne RN

## 2025-02-11 NOTE — TELEPHONE ENCOUNTER
ANTICOAGULATION  MANAGEMENT: Discharge Review    Roger Bonilla chart reviewed for anticoagulation continuity of care    Hospital Admission on -2/10 for Influenza A.    Discharge disposition: Home with Home Care- [facility] (visit today 25)    Results:    Recent labs: (last 7 days)     25  0519 25  0516 25  0602 25  0604 25  0527 02/10/25  0641   INR 3.29* 3.23* 2.69* 2.52* 2.32* 2.56*     Anticoagulation inpatient management:     anticoagulation calendar updated with inpatient dosing    Anticoagulation discharge instructions:     Warfarin dosin.5 M, TH, 5 mg row   Bridging: No   INR goal change: No      Medication changes affecting anticoagulation: No    Additional factors affecting anticoagulation: Yes: Illness, possible diet changes     PLAN     Recommend to adjust dose to 5 mg , Wed, . INR check on Friday with Home care    Spoke with pt and [facility] home care nurse ([facility])    Anticoagulation Calendar updated    Ana Laura Hoffman RN  2025  Anticoagulation Clinic  [facility] for routing messages: tila LARA  [facility] patient phone line: [phone]

## 2025-02-11 NOTE — LETTER
[facility]  Patient Centered Plan of Care  About Me:        Patient Name:  Roger Bonilla    YOB: 1955  Age:         69 year old   [facility] MRN:    [id number] Telephone Information:  Home Phone [phone]   Mobile [phone]       Address:  [address]  [place] [address] Email address:  [email or web address]      Emergency Contact(s)    Name Relationship Lgl Grd Work Phone Home Phone Mobile Phone   1. KRISTA BONILLA Son    [phone]   2. MANNY BONILLA Son    [phone]           Primary language:  English     needed? No   [facility] [employer]:  [phone] op. 1  Other communication barriers:None    Preferred Method of Communication:  Mail  Current living arrangement: I live alone    Mobility Status/ Medical Equipment: Independent        Health Maintenance  Health Maintenance Reviewed: Not assessed      My Access Plan  Medical Emergency 911   Primary Clinic Line [facility] - [phone]   24 Hour Appointment Line [phone] or  [phone]) (toll-free)   24 Hour Nurse Line [phone] (toll-free)   Preferred Urgent Care [facility] - [place], [phone]     Preferred Hospital [place], [place]  [phone]     Preferred Pharmacy [facility] [id number] [place] [address]     Behavioral Health Crisis Line The National Suicide Prevention Lifeline at 1-831.446.4281 or Text/Call 148           My Care Team Members  Patient Care Team         Relationship Specialty Notifications Start End    Maria De Jesus Hay APRN CNP PCP - General Nurse Practitioner  4/14/14     Phone: [phone] Fax: [phone] [address] [facility] [place] [address]    Maria De Jesus Hay APRN CNP Assigned PCP   9/1/13     Phone: [phone] Fax: [phone] [address] [facility] [address]    Parveen aRmirez MD MD Cardiology  10/20/22     Phone: [phone] Fax: [phone] [address] [facility]  [address] [facility] [address]    Tammy Hernandez PA-C Physician Assistant Cardiovascular Disease  10/20/22     Opal Baird APRN CNP Assigned Surgical Provider   10/7/23     Phone: [phone] Fax: [phone] [address] [facility] [address]    Kamryn Carroll APRN CNP Assigned Heart and Vascular Provider   9/23/24     Phone: [phone] Fax: [phone] [address] [facility] [address]    Erin Pope, RN Lead Care Coordinator Primary Care - [facility] Admissions 2/11/25     Phone: [phone]                     My Care Plans  Self Management and Treatment Plan    Care Plan  Care Plan: General       Problem: HP GENERAL PROBLEM       Goal: My Pneumonia symptoms will be resolved in the next month       Start Date: 2/11/2025 Expected End Date: 3/11/2025    This Visit's Progress: 20%    Priority: High    Note:     Barriers: Deconditioned   Strengths: motivated   Patient expressed understanding of goal: Yes   Action steps to achieve this goal:  1. I will take my medications as directed   2. I will used my oxygen at 1 liter as directed   3. I will check my weight ,blood sugar and blood pressure daily  4. I will call  if I am experiencing increased symptoms of concern such as increased shortness of breath episodes,increased leg swelling  or weight gain 2-3# in a day or 5# in a week                                Action Plans on File:                       Advance Care Plans/Directives:   Advanced Care Plan/Directives on file: No    Discussed with patient/caregiver(s): No data recorded           My Medical and Care Information  Problem List   Patient Active Problem List   Diagnosis    Disorder of bursae and tendons in shoulder region    PVD (peripheral vascular disease)    Hyperlipidemia LDL goal <70    ABIGAIL (obstructive sleep apnea)    Obesity    Tobacco abuse    Benign essential hypertension    PAD (peripheral artery disease)    Type 2 diabetes mellitus without complication (H)     Coronary artery disease involving native coronary artery of native heart without angina pectoris    Atherosclerosis of both carotid arteries    DYLAN (acute kidney injury)    Dehydration    Hypomagnesemia    Hypophosphatemia    AAA (abdominal aortic aneurysm)    Epigastric pain    Status post coronary angiogram    Atherosclerosis of native coronary artery of native heart with stable angina pectoris    S/P CABG (coronary artery bypass graft)    S/P carotid endarterectomy    Tubular adenoma    Atrial fibrillation/flutter (H)    Type 2 diabetes mellitus with diabetic peripheral angiopathy without gangrene, without long-term current use of insulin (H)    Influenza A    Acute respiratory failure with hypoxia and hypercapnia (H)    History of MI (myocardial infarction)    History of coronary artery stent placement    Elevated brain natriuretic peptide (BNP) level    Elevated lactic acid level    SIRS (systemic inflammatory response syndrome) (H)    Elevation of levels of liver transaminase levels    Hypoalbuminemia    Chronic anemia    Elevated troponin    Warfarin-induced coagulopathy    Platelet dysfunction due to aspirin (H)    Acute kidney failure, unspecified (H)    Acute systolic heart failure (H)    Pneumonia    Bacteremia    Metabolic alkalosis    Hypochloremia    Malnutrition    Benign head tremor      Current Medications:    Current Outpatient Medications   Medication Sig Dispense Refill    acetaZOLAMIDE (DIAMOX) 250 MG tablet Take 1 tablet (250 mg) by mouth 2 times daily. 60 tablet 0    aspirin (ASA) 81 MG chewable tablet Take 81 mg by mouth daily      atorvastatin (LIPITOR) 80 MG tablet Take 1 tablet (80 mg) by mouth daily 90 tablet 3    ezetimibe (ZETIA) 10 MG tablet Take 1 tablet (10 mg) by mouth daily. 90 tablet 3    metoprolol tartrate (LOPRESSOR) 100 MG tablet Take 2 tablets (200 mg) by mouth 2 times daily. 120 tablet 0    omeprazole (PRILOSEC) 40 MG DR capsule Take 1 capsule (40 mg) by mouth every  morning. 90 capsule 3    potassium chloride brittany ER (KLOR-CON M20) 20 MEQ CR tablet Take 1 tablet (20 mEq) by mouth daily. 30 tablet 0    warfarin ANTICOAGULANT (JANTOVEN ANTICOAGULANT) 5 MG tablet Take 7.5 mg every Monday and Thursday and 5 mg all other days of the week OR as directed by INR Clinic       No current facility-administered medications for this visit.        Care Coordination Start Date: 2/11/2025   Frequency of Care Coordination: weekly, more frequently as needed     Form Last Updated: 02/11/2025

## 2025-02-11 NOTE — TELEPHONE ENCOUNTER
General Call    Contacts       Contact Date/Time Type Contact Phone/Fax    02/11/2025 12:41 PM CST Phone (Outgoing) Flex Bonilla (Self) [phone] (M)    02/11/2025 01:31 PM CST Phone (Outgoing) Flex Bonilla (Self) [phone] (M)    02/11/2025 03:42 PM CST Phone (Outgoing) Flex Bonilla (Self) [phone] (M)          Reason for Call:  returning call     What are your questions or concerns:  patient returning call from inr nurse    Date of last appointment with provider: n/a    Could we send this information to you in [facility] or would you prefer to receive a phone call?:   Patient would prefer a phone call   Okay to leave a detailed message?: Yes at Cell number on file:    Telephone Information:   Mobile [phone]

## 2025-02-11 NOTE — LETTER
[facility] CARE COORDINATION  [address] [facility] [address]     February 11, 2025    Roger SARGENT Ring  [address]   [facility] [address]      Dear Flex,    I am a clinic care coordinator who works with SAL Kc CNP with the [facility]. I wanted to thank you for spending the time to talk with me.  Below is a description of clinic care coordination and how I can further assist you.       The clinic care coordination team is made up of a registered nurse, , financial resource worker and community health worker who understand the health care system. The goal of clinic care coordination is to help you manage your health and improve access to the health care system. Our team works alongside your provider to assist you in determining your health and social needs. We can help you obtain health care and community resources, providing you with necessary information and education. We can work with you through any barriers and develop a care plan that helps coordinate and strengthen the communication between you and your care team.  Our services are voluntary and are offered without charge to you personally.    Please feel free to contact me with any questions or concerns regarding care coordination and what we can offer.      We are focused on providing you with the highest-quality healthcare experience possible.    Sincerely,     [facility]   Erin Pope RN, Care Coordinator   [facility]'s   E-mail [email or web address]   [phone]     Enclosed: I have enclosed a copy of the Patient Centered Plan of Care. This has helpful information and goals that we have talked about. Please keep this in an easy to access place to use as needed.

## 2025-02-11 NOTE — PROGRESS NOTES
A Double-blind, Randomized, Placebo-controlled, Multicenter Study Assessing the impact of Olpasiran on Major Cardiovascular Events in Patients with Atherosclerotic cardiovascular Disease and Elevated Lipoprotein (a)      Dr. Tello: Please assign causality of AEs below:    Adverse Event: (diagnosis preferred) Adverse event   Coag negative staph bacteremia    Adverse event   Chronic Afib with RVR  Adverse event   Acute HFrEF   Description: Blood cultures drawn on 1/29 in ED grew staph hominus and peptoniphilus species from both blood culture bottles.  He was treated empirically with parenteral antibiotics and completed 7-day treatment course during hospitalization.  Presented with chronic atrial flutter/fibrillation with resting heart rates consistently 110-120 on previous chronic treatment regimen of metoprolol tartrate 100 mg twice daily for rate control and warfarin for anticoagulation.  Suspect chronic tachyarrhythmia was exacerbated by influenza A.   He also presented with elevated BNP and significant peripheral edema   TTE performed while in RVR, EF 40-45%   Start Date: 1/29/2025 1/29/2025 1/29/2025   End Date: 2/5/2025 2/2/2025 2/10/2025   Site Date of Awareness: 2/10/2025 2/10/2025 2/10/2025   Severity: CTCAE grade    [] 1   [x] 2   [] 3   [] 4   [] 5   [] 1   [x] 2   [] 3   [] 4   [] 5   [] 1   [x] 2   [] 3   [] 4   [] 5   Severity (mild/moderate/severe):   Moderate [x] mild  [] moderate  [] severe   [] mild  [x] moderate  [] severe     Reportable to IRB:  No   Yes  []     No  [x]     Yes  []     No  [x]      Serious?   No   Yes  []     No  [x]     Yes  []     No  [x]        Relationship: Is there a reasonable possibility that the event may have been caused by Investigational Medicinal Product?    Yes  []     No  []    Yes  []     No  []      Yes  []     No  []     Is there a reasonable possibility that the event may have been caused by Lipid-lowering therapy?    Yes  []     No  []    Yes  []     No  []     Yes  []     No  []   Is there a reasonable possibility that the event may have been caused by the investigational device?    Yes  []     No  []    Yes  []     No  []    Yes  []     No  []   Is there a reasonable possibility that the event may have been caused by study procedure/activity?    Yes  []     No  []    Yes  []     No  []    Yes  []     No  []     Action taken with study treatment:   []Drug Interrupted  []Drug Withdrawn  []Not Applicable   []Dose Reduced  []Dose Increased              [x]No Action taken []Drug Interrupted  []Drug Withdrawn  []Not Applicable   []Dose Reduced  []Dose Increased              [x]No Action taken []Drug Interrupted  []Drug Withdrawn  []Not Applicable   []Dose Reduced  []Dose Increased              [x]No Action taken   Action taken with Lipid-lowering therapy   []Drug Interrupted  []Drug Withdrawn  []Not Applicable   []Dose Reduced  []Dose Increased              [x]No Action taken   []Drug Interrupted  []Drug Withdrawn  []Not Applicable   []Dose Reduced  []Dose Increased              [x]No Action taken   []Drug Interrupted  []Drug Withdrawn  []Not Applicable   []Dose Reduced  []Dose Increased              [x]No Action taken      Action taken with the device for this event   []Drug Interrupted  []Drug Withdrawn  []Not Applicable   []Dose Reduced  []Dose Increased              [x]No Action taken   []Drug Interrupted  []Drug Withdrawn  []Not Applicable   []Dose Reduced  []Dose Increased              [x]No Action taken   []Drug Interrupted  []Drug Withdrawn  []Not Applicable   []Dose Reduced  []Dose Increased              [x]No Action taken         Outcome:  []Not Recovered/Not Resolved  [x]Recovered/Resolved  []Unknown   []Fatal   []Not Recovered/Not Resolved  [x]Recovered/Resolved  []Unknown   []Fatal []Not Recovered/Not Resolved  [x]Recovered/Resolved  []Unknown   []Fatal         Adverse Event: (diagnosis preferred) Adverse event   DYLAN   Adverse event   Elevated lactic acid  Adverse event   Elevated Troponin   Description: Presented with creatinine 1.39, increased compared with 1.0 in October 2024 possibly indicative of DYLAN at admission.  After creatinine initially improved to 1, creatinine again increased as high as 1.51 on 2/9 concerning for acute kidney injury and then stabilized.  Presented with elevated lactic acid to ER and this was normalized in ER after IV fluid and antibiotics were started Presented to ER with mildly elevated troponin of 60, quickly trended down. EKG was hard to interpret due to tachyarrhythmia, did not have angina. Suspected to be due to acute illness   Start Date: 1/29/2025 1/29/2025 1/29/2025   End Date: Ongoing 1/30/2025 1/29/2025   Site Date of Awareness: 2/10/2025 2/10/2025 2/10/2025   Severity: CTCAE grade    [] 1   [x] 2   [] 3   [] 4   [] 5   [] 1   [x] 2   [] 3   [] 4   [] 5   [] 1   [x] 2   [] 3   [] 4   [] 5   Severity (mild/moderate/severe): [x] mild  [] moderate  [] severe   [x] mild  [] moderate  [] severe   [x] mild  [] moderate  [] severe     Reportable to IRB:  Yes  []     No  [x]     Yes  []     No  [x]     Yes  []     No  [x]      Serious?  Yes  []     No  [x]     Yes  []     No  [x]     Yes  []     No  [x]        Relationship: Is there a reasonable possibility that the event may have been caused by Investigational Medicinal Product?    Yes  []     No  []    Yes  []     No  []      Yes  []     No  []     Is there a reasonable possibility that the event may have been caused by Lipid-lowering therapy?    Yes  []     No  []    Yes  []     No  []    Yes  []     No  []   Is there a reasonable possibility that the event may have been caused by the investigational device?    Yes  []     No  []    Yes  []     No  []    Yes  []     No  []   Is there a reasonable possibility that the event may have been caused by study procedure/activity?    Yes  []     No  []    Yes  []     No  []    Yes  []     No  []     Action taken with study treatment:   []Drug  Interrupted  []Drug Withdrawn  []Not Applicable   []Dose Reduced  []Dose Increased              [x]No Action taken []Drug Interrupted  []Drug Withdrawn  []Not Applicable   []Dose Reduced  []Dose Increased              [x]No Action taken []Drug Interrupted  []Drug Withdrawn  []Not Applicable   []Dose Reduced  []Dose Increased              [x]No Action taken   Action taken with Lipid-lowering therapy   []Drug Interrupted  []Drug Withdrawn  []Not Applicable   []Dose Reduced  []Dose Increased              [x]No Action taken   []Drug Interrupted  []Drug Withdrawn  []Not Applicable   []Dose Reduced  []Dose Increased              [x]No Action taken   []Drug Interrupted  []Drug Withdrawn  []Not Applicable   []Dose Reduced  []Dose Increased              [x]No Action taken      Action taken with the device for this event   []Drug Interrupted  []Drug Withdrawn  []Not Applicable   []Dose Reduced  []Dose Increased              [x]No Action taken   []Drug Interrupted  []Drug Withdrawn  []Not Applicable   []Dose Reduced  []Dose Increased              [x]No Action taken   []Drug Interrupted  []Drug Withdrawn  []Not Applicable   []Dose Reduced  []Dose Increased              [x]No Action taken         Outcome:  [x]Not Recovered/Not Resolved  []Recovered/Resolved  []Unknown   []Fatal   []Not Recovered/Not Resolved  [x]Recovered/Resolved  []Unknown   []Fatal []Not Recovered/Not Resolved  [x]Recovered/Resolved  []Unknown   []Fatal         Adverse Event: (diagnosis preferred) Adverse event   Elevated ALT   Adverse event   Possible malnutrition Adverse event   Anemia   Description:  Highest 176 at admit, 57 at discharge  He was not been eating well during hospitalization reporting poor appetite and raising concern for malnutrition during hospitalization.  He was evaluated by registered dietitian who provided recommendations for nutritional supplementation. Noted occasional episodes of hypoalbuminemia  Baseline hgb 11.1-12.9 during  admission   Start Date: 1/29/2025 1/29/2025 1/29/2025   End Date: 2/3/2025 Ongoing 2/6/2025   Site Date of Awareness: 2/10/2025 2/10/2025 2/10/2025   Severity: CTCAE grade    [] 1   [x] 2   [] 3   [] 4   [] 5   [x] 1   [] 2   [] 3   [] 4   [] 5   [x] 1   [] 2   [] 3   [] 4   [] 5   Severity (mild/moderate/severe): [x] mild  [] moderate  [] severe   [x] mild  [] moderate  [] severe   [x] mild  [] moderate  [] severe     Reportable to IRB:  Yes  []     No  [x]     Yes  []     No  [x]     Yes  []     No  [x]      Serious?  Yes  []     No  [x]     Yes  []     No  [x]     Yes  []     No  [x]        Relationship: Is there a reasonable possibility that the event may have been caused by Investigational Medicinal Product?    Yes  []     No  []    Yes  []     No  []      Yes  []     No  []     Is there a reasonable possibility that the event may have been caused by Lipid-lowering therapy?    Yes  []     No  []    Yes  []     No  []    Yes  []     No  []   Is there a reasonable possibility that the event may have been caused by the investigational device?    Yes  []     No  []    Yes  []     No  []    Yes  []     No  []   Is there a reasonable possibility that the event may have been caused by study procedure/activity?    Yes  []     No  []    Yes  []     No  []    Yes  []     No  []     Action taken with study treatment:   []Drug Interrupted  []Drug Withdrawn  []Not Applicable   []Dose Reduced  []Dose Increased              [x]No Action taken []Drug Interrupted  []Drug Withdrawn  []Not Applicable   []Dose Reduced  []Dose Increased              [x]No Action taken []Drug Interrupted  []Drug Withdrawn  []Not Applicable   []Dose Reduced  []Dose Increased              [x]No Action taken   Action taken with Lipid-lowering therapy   []Drug Interrupted  []Drug Withdrawn  []Not Applicable   []Dose Reduced  []Dose Increased              [x]No Action taken   []Drug Interrupted  []Drug Withdrawn  []Not Applicable   []Dose Reduced  []Dose  Increased              [x]No Action taken   []Drug Interrupted  []Drug Withdrawn  []Not Applicable   []Dose Reduced  []Dose Increased              [x]No Action taken      Action taken with the device for this event   []Drug Interrupted  []Drug Withdrawn  []Not Applicable   []Dose Reduced  []Dose Increased              [x]No Action taken   []Drug Interrupted  []Drug Withdrawn  []Not Applicable   []Dose Reduced  []Dose Increased              [x]No Action taken   []Drug Interrupted  []Drug Withdrawn  []Not Applicable   []Dose Reduced  []Dose Increased              [x]No Action taken         Outcome:  []Not Recovered/Not Resolved  [x]Recovered/Resolved  []Unknown   []Fatal   [x]Not Recovered/Not Resolved  []Recovered/Resolved  []Unknown   []Fatal []Not Recovered/Not Resolved  [x]Recovered/Resolved  []Unknown   []Fatal

## 2025-02-11 NOTE — PROGRESS NOTES
Clinic Care Coordination Contact  Clinic Care Coordination Contact  OUTREACH    Referral Information:  Referral Source: IP Handoff    Primary Diagnosis: Respiratory Disorders - other    Chief Complaint   Patient presents with    Clinic Care Coordination - Post Hospital     Clinic Care Coordination RN         Universal Utilization:   1/30/2025 - 2/10/2025 (11 days)  [facility]     Pasquale Wilde MD  Last attending  Treatment team Influenza A  Principal problem     Clinic Utilization  Difficulty keeping appointments:: No  Compliance Concerns: No  No-Show Concerns: No  No PCP office visit in Past Year: No  Utilization      No Show Count (past year)  1             ED Visits  2             Hospital Admissions  1                    Current as of: 2/11/2025  9:05 AM                Clinical Concerns:  Current Medical Concerns:  Patient is on oxygen at 1 liter continuous.  No increased SOB episodes noticed  Patient has a home care RN visit between 1-1:30  Continues to have low energy. Instructed to drink Ensure but the patient doesn't like Ensure and will concentrate on high protein foods    Current Behavioral Concerns: No    Education Provided to patient: CC introductory letter and care plan sent via My Chart    Pain  Pain (GOAL):: No  Health Maintenance Reviewed: Not assessed  Clinical Pathway: None    Medication Management:  Medication review status: Medications reviewed.  Changes noted per patient report.       Functional Status:  Dependent ADLs:: Independent  Dependent IADLs:: Independent  Bed or wheelchair confined:: No  Mobility Status: Independent  Fallen 2 or more times in the past year?: (!) Yes  Any fall with injury in the past year?: No    Living Situation:  Current living arrangement:: I live alone    Lifestyle & Psychosocial Needs:    Social Drivers of Health     Food Insecurity: Low Risk  (1/30/2025)    Food Insecurity     Within the past 12 months, did you worry that your  food would run out before you got money to buy more?: No     Within the past 12 months, did the food you bought just not last and you didn t have money to get more?: No   Depression: Not at risk (10/28/2024)    PHQ-2     PHQ-2 Score: 0   Housing Stability: Low Risk  (1/30/2025)    Housing Stability     Do you have housing? : Yes     Are you worried about losing your housing?: No   Tobacco Use: High Risk (11/19/2024)    Patient History     Smoking Tobacco Use: Every Day     Smokeless Tobacco Use: Never     Passive Exposure: Never   Financial Resource Strain: Low Risk  (1/30/2025)    Financial Resource Strain     Within the past 12 months, have you or your family members you live with been unable to get utilities (heat, electricity) when it was really needed?: No   Alcohol Use: Not on file   Transportation Needs: Low Risk  (1/30/2025)    Transportation Needs     Within the past 12 months, has lack of transportation kept you from medical appointments, getting your medicines, non-medical meetings or appointments, work, or from getting things that you need?: No   Physical Activity: Insufficiently Active (4/15/2024)    Exercise Vital Sign     Days of Exercise per Week: 3 days     Minutes of Exercise per Session: 10 min   Interpersonal Safety: Low Risk  (1/30/2025)    Interpersonal Safety     Do you feel physically and emotionally safe where you currently live?: Yes     Within the past 12 months, have you been hit, slapped, kicked or otherwise physically hurt by someone?: No     Within the past 12 months, have you been humiliated or emotionally abused in other ways by your partner or ex-partner?: No   Stress: Stress Concern Present (4/15/2024)    [employer] - Occupational Stress Questionnaire     Feeling of Stress : To some extent   Social Connections: Unknown (4/15/2024)    Social Connection and Isolation Panel [NHANES]     Frequency of Communication with Friends and Family: Not on file      Frequency of Social Gatherings with Friends and Family: Once a week     Attends Synagogue Services: Not on file     Active Member of Clubs or Organizations: Not on file     Attends Club or Organization Meetings: Not on file     Marital Status: Not on file   Health Literacy: Not on file     Diet:: Diabetic diet  Inadequate nutrition (GOAL):: No  Tube Feeding: No  Inadequate activity/exercise (GOAL):: No  Significant changes in sleep pattern (GOAL): No        Synagogue or spiritual beliefs that impact treatment:: No  Mental health DX:: No  Mental health management concern (GOAL):: No  Chemical Dependency Status: No Current Concerns           Resources and Interventions:  Current Resources:   Skilled Home Care Services: Skilled Nursing  Community Resources: Home Care  Supplies Currently Used at Home: Diabetic Supplies, Oxygen Tubing/Supplies  Equipment Currently Used at Home: none  Employment Status: retired         Advance Care Plan/Directive  Advanced Care Plans/Directives on file:: No    Referrals Placed: None         Care Plan:  Care Plan: General       Problem: HP GENERAL PROBLEM       Goal: My Pneumonia symptoms will be resolved in the next month       Start Date: 2/11/2025 Expected End Date: 3/11/2025    This Visit's Progress: 20%    Priority: High    Note:     Barriers: Deconditioned   Strengths: motivated   Patient expressed understanding of goal: Yes   Action steps to achieve this goal:  1. I will take my medications as directed   2. I will used my oxygen at 1 liter as directed   3. I will check my weight ,blood sugar and blood pressure daily  4. I will call  if I am experiencing increased symptoms of concern such as increased shortness of breath episodes,increased leg swelling  or weight gain 2-3# in a day or 5# in a week                                Patient/Caregiver understanding: Expresses good understanding of discharge instructions     Outreach Frequency: weekly, more frequently as needed  Future  Appointments                In 6 days Maria De Jesus Hay APRN CNP [facility]  Arrive at: Clinic A    In 2 months Maria De Jesus Hay APRN CNP [facility]  Arrive at: Clinic A            Plan:   Patient will have a home care RN visit today  Patient will start checking weigh,blood pressure and blood sugar daily   Patient will keep hospital follow up with PCP 2/17/2025  [facility]   Erin oPpe RN, Care Coordinator   [facility]   E-mail [email or web address]   [phone]   CC RN will follow up in 3-5 business days

## 2025-02-12 NOTE — TELEPHONE ENCOUNTER
"Genet at [facility] notified of okay for home orders as requested.     Genet at [facility] also stated: \"I forgot to mention that he is diabetic and does not check his Blood sugars if you can let her know as an FYI..\"     Zoe Lemon RN    "

## 2025-02-13 NOTE — PROGRESS NOTES
A Double-blind, Randomized, Placebo-controlled, Multicenter Study Assessing the impact of Olpasiran on Major Cardiovascular Events in Patients with Atherosclerotic cardiovascular Disease and Elevated Lipoprotein (a)      Dr. Tello: Please assign causality of AEs below:    Adverse Event: (diagnosis preferred) Adverse event   Coag negative staph bacteremia    Adverse event   Chronic Afib with RVR  Adverse event   Acute HFrEF   Description: Blood cultures drawn on 1/29 in ED grew staph hominus and peptoniphilus species from both blood culture bottles.  He was treated empirically with parenteral antibiotics and completed 7-day treatment course during hospitalization.  Presented with chronic atrial flutter/fibrillation with resting heart rates consistently 110-120 on previous chronic treatment regimen of metoprolol tartrate 100 mg twice daily for rate control and warfarin for anticoagulation.  Suspect chronic tachyarrhythmia was exacerbated by influenza A.   He also presented with elevated BNP and significant peripheral edema   TTE performed while in RVR, EF 40-45%   Start Date: 1/29/2025 1/29/2025 1/29/2025   End Date: 2/5/2025 2/2/2025 2/10/2025   Site Date of Awareness: 2/10/2025 2/10/2025 2/10/2025   Severity: CTCAE grade    [] 1   [x] 2   [] 3   [] 4   [] 5   [] 1   [x] 2   [] 3   [] 4   [] 5   [] 1   [x] 2   [] 3   [] 4   [] 5   Severity (mild/moderate/severe):   Moderate [x] mild  [] moderate  [] severe   [] mild  [x] moderate  [] severe     Reportable to IRB:  No   Yes  []     No  [x]     Yes  []     No  [x]      Serious?   No   Yes  []     No  [x]     Yes  []     No  [x]        Relationship: Is there a reasonable possibility that the event may have been caused by Investigational Medicinal Product?    Yes  []     No  [x]    Yes  []     No  [x]      Yes  []     No  [x]     Is there a reasonable possibility that the event may have been caused by Lipid-lowering therapy?    Yes  []     No  [x]    Yes  []     No   [x]    Yes  []     No  [x]   Is there a reasonable possibility that the event may have been caused by the investigational device?    Yes  []     No  [x]    Yes  []     No  [x]    Yes  []     No  [x]   Is there a reasonable possibility that the event may have been caused by study procedure/activity?    Yes  []     No  [x]    Yes  []     No  [x]    Yes  []     No  [x]     Action taken with study treatment:   []Drug Interrupted  []Drug Withdrawn  []Not Applicable   []Dose Reduced  []Dose Increased              [x]No Action taken []Drug Interrupted  []Drug Withdrawn  []Not Applicable   []Dose Reduced  []Dose Increased              [x]No Action taken []Drug Interrupted  []Drug Withdrawn  []Not Applicable   []Dose Reduced  []Dose Increased              [x]No Action taken   Action taken with Lipid-lowering therapy   []Drug Interrupted  []Drug Withdrawn  []Not Applicable   []Dose Reduced  []Dose Increased              [x]No Action taken   []Drug Interrupted  []Drug Withdrawn  []Not Applicable   []Dose Reduced  []Dose Increased              [x]No Action taken   []Drug Interrupted  []Drug Withdrawn  []Not Applicable   []Dose Reduced  []Dose Increased              [x]No Action taken      Action taken with the device for this event   []Drug Interrupted  []Drug Withdrawn  []Not Applicable   []Dose Reduced  []Dose Increased              [x]No Action taken   []Drug Interrupted  []Drug Withdrawn  []Not Applicable   []Dose Reduced  []Dose Increased              [x]No Action taken   []Drug Interrupted  []Drug Withdrawn  []Not Applicable   []Dose Reduced  []Dose Increased              [x]No Action taken         Outcome:  []Not Recovered/Not Resolved  [x]Recovered/Resolved  []Unknown   []Fatal   []Not Recovered/Not Resolved  [x]Recovered/Resolved  []Unknown   []Fatal []Not Recovered/Not Resolved  [x]Recovered/Resolved  []Unknown   []Fatal         Adverse Event: (diagnosis preferred) Adverse event   DYLAN   Adverse event   Elevated lactic  acid Adverse event   Elevated Troponin   Description: Presented with creatinine 1.39, increased compared with 1.0 in October 2024 possibly indicative of DYLAN at admission.  After creatinine initially improved to 1, creatinine again increased as high as 1.51 on 2/9 concerning for acute kidney injury and then stabilized.  Presented with elevated lactic acid to ER and this was normalized in ER after IV fluid and antibiotics were started Presented to ER with mildly elevated troponin of 60, quickly trended down. EKG was hard to interpret due to tachyarrhythmia, did not have angina. Suspected to be due to acute illness   Start Date: 1/29/2025 1/29/2025 1/29/2025   End Date: Ongoing 1/30/2025 1/29/2025   Site Date of Awareness: 2/10/2025 2/10/2025 2/10/2025   Severity: CTCAE grade    [] 1   [x] 2   [] 3   [] 4   [] 5   [] 1   [x] 2   [] 3   [] 4   [] 5   [] 1   [x] 2   [] 3   [] 4   [] 5   Severity (mild/moderate/severe): [x] mild  [] moderate  [] severe   [x] mild  [] moderate  [] severe   [x] mild  [] moderate  [] severe     Reportable to IRB:  Yes  []     No  [x]     Yes  []     No  [x]     Yes  []     No  [x]      Serious?  Yes  []     No  [x]     Yes  []     No  [x]     Yes  []     No  [x]        Relationship: Is there a reasonable possibility that the event may have been caused by Investigational Medicinal Product?    Yes  []     No  [x]    Yes  []     No  [x]      Yes  []     No  [x]     Is there a reasonable possibility that the event may have been caused by Lipid-lowering therapy?    Yes  []     No  [x]    Yes  []     No  [x]    Yes  []     No  [x]   Is there a reasonable possibility that the event may have been caused by the investigational device?    Yes  []     No  [x]    Yes  []     No  [x]    Yes  []     No  [x]   Is there a reasonable possibility that the event may have been caused by study procedure/activity?    Yes  []     No  [x]    Yes  []     No  [x]    Yes  []     No  [x]     Action taken with study  treatment:   []Drug Interrupted  []Drug Withdrawn  []Not Applicable   []Dose Reduced  []Dose Increased              [x]No Action taken []Drug Interrupted  []Drug Withdrawn  []Not Applicable   []Dose Reduced  []Dose Increased              [x]No Action taken []Drug Interrupted  []Drug Withdrawn  []Not Applicable   []Dose Reduced  []Dose Increased              [x]No Action taken   Action taken with Lipid-lowering therapy   []Drug Interrupted  []Drug Withdrawn  []Not Applicable   []Dose Reduced  []Dose Increased              [x]No Action taken   []Drug Interrupted  []Drug Withdrawn  []Not Applicable   []Dose Reduced  []Dose Increased              [x]No Action taken   []Drug Interrupted  []Drug Withdrawn  []Not Applicable   []Dose Reduced  []Dose Increased              [x]No Action taken      Action taken with the device for this event   []Drug Interrupted  []Drug Withdrawn  []Not Applicable   []Dose Reduced  []Dose Increased              [x]No Action taken   []Drug Interrupted  []Drug Withdrawn  []Not Applicable   []Dose Reduced  []Dose Increased              [x]No Action taken   []Drug Interrupted  []Drug Withdrawn  []Not Applicable   []Dose Reduced  []Dose Increased              [x]No Action taken         Outcome:  [x]Not Recovered/Not Resolved  []Recovered/Resolved  []Unknown   []Fatal   []Not Recovered/Not Resolved  [x]Recovered/Resolved  []Unknown   []Fatal []Not Recovered/Not Resolved  [x]Recovered/Resolved  []Unknown   []Fatal         Adverse Event: (diagnosis preferred) Adverse event   Elevated ALT   Adverse event   Possible malnutrition Adverse event   Anemia   Description:  Highest 176 at admit, 57 at discharge  He was not been eating well during hospitalization reporting poor appetite and raising concern for malnutrition during hospitalization.  He was evaluated by registered dietitian who provided recommendations for nutritional supplementation. Noted occasional episodes of hypoalbuminemia  Baseline hgb  11.1-12.9 during admission   Start Date: 1/29/2025 1/29/2025 1/29/2025   End Date: 2/3/2025 Ongoing 2/6/2025   Site Date of Awareness: 2/10/2025 2/10/2025 2/10/2025   Severity: CTCAE grade    [] 1   [x] 2   [] 3   [] 4   [] 5   [x] 1   [] 2   [] 3   [] 4   [] 5   [x] 1   [] 2   [] 3   [] 4   [] 5   Severity (mild/moderate/severe): [x] mild  [] moderate  [] severe   [x] mild  [] moderate  [] severe   [x] mild  [] moderate  [] severe     Reportable to IRB:  Yes  []     No  [x]     Yes  []     No  [x]     Yes  []     No  [x]      Serious?  Yes  []     No  [x]     Yes  []     No  [x]     Yes  []     No  [x]        Relationship: Is there a reasonable possibility that the event may have been caused by Investigational Medicinal Product?    Yes  []     No  [x]    Yes  []     No  [x]      Yes  []     No  [x]     Is there a reasonable possibility that the event may have been caused by Lipid-lowering therapy?    Yes  []     No  [x]    Yes  []     No  [x]    Yes  []     No  [x]   Is there a reasonable possibility that the event may have been caused by the investigational device?    Yes  []     No  [x]    Yes  []     No  [x]    Yes  []     No  [x]   Is there a reasonable possibility that the event may have been caused by study procedure/activity?    Yes  []     No  [x]    Yes  []     No  [x]    Yes  []     No  [x]     Action taken with study treatment:   []Drug Interrupted  []Drug Withdrawn  []Not Applicable   []Dose Reduced  []Dose Increased              [x]No Action taken []Drug Interrupted  []Drug Withdrawn  []Not Applicable   []Dose Reduced  []Dose Increased              [x]No Action taken []Drug Interrupted  []Drug Withdrawn  []Not Applicable   []Dose Reduced  []Dose Increased              [x]No Action taken   Action taken with Lipid-lowering therapy   []Drug Interrupted  []Drug Withdrawn  []Not Applicable   []Dose Reduced  []Dose Increased              [x]No Action taken   []Drug Interrupted  []Drug Withdrawn  []Not  Applicable   []Dose Reduced  []Dose Increased              [x]No Action taken   []Drug Interrupted  []Drug Withdrawn  []Not Applicable   []Dose Reduced  []Dose Increased              [x]No Action taken      Action taken with the device for this event   []Drug Interrupted  []Drug Withdrawn  []Not Applicable   []Dose Reduced  []Dose Increased              [x]No Action taken   []Drug Interrupted  []Drug Withdrawn  []Not Applicable   []Dose Reduced  []Dose Increased              [x]No Action taken   []Drug Interrupted  []Drug Withdrawn  []Not Applicable   []Dose Reduced  []Dose Increased              [x]No Action taken         Outcome:  []Not Recovered/Not Resolved  [x]Recovered/Resolved  []Unknown   []Fatal   [x]Not Recovered/Not Resolved  []Recovered/Resolved  []Unknown   []Fatal []Not Recovered/Not Resolved  [x]Recovered/Resolved  []Unknown   []Fatal

## 2025-02-18 ENCOUNTER — TELEPHONE (OUTPATIENT)
Dept: ANTICOAGULATION | Facility: CLINIC | Age: 70
End: 2025-02-18
Payer: COMMERCIAL

## 2025-02-18 ENCOUNTER — ANTICOAGULATION THERAPY VISIT (OUTPATIENT)
Dept: ANTICOAGULATION | Facility: CLINIC | Age: 70
End: 2025-02-18
Payer: COMMERCIAL

## 2025-02-18 DIAGNOSIS — I48.91 ATRIAL FIBRILLATION/FLUTTER (H): Primary | ICD-10-CM

## 2025-02-18 DIAGNOSIS — I48.92 ATRIAL FIBRILLATION/FLUTTER (H): Primary | ICD-10-CM

## 2025-02-18 LAB — INR (EXTERNAL): 1.8 (ref 0.9–1.1)

## 2025-02-18 NOTE — TELEPHONE ENCOUNTER
ANTICOAGULATION     Roger Bonilla is overdue for an INR check.     Per chart, patient was supposed to have had INR drawn at 2/17/25 office visit. Per chart, this office visit got moved to 2/25/25.    Spoke with Mary at [facility] who states patient has an home care visit this afternoon and an INR can be drawn today.     Johanna Hendrix, RN  2/18/2025  Anticoagulation Clinic  [facility] for routing messages: tila DAI  [facility] patient phone line: [phone]

## 2025-02-18 NOTE — PROGRESS NOTES
ANTICOAGULATION MANAGEMENT     Roger Bonilla 69 year old male is on warfarin with subtherapeutic INR result. (Goal INR 2.0-3.0)    Recent labs: (last 7 days)     02/18/25  0000   INR 1.8*       ASSESSMENT     Source(s): Chart Review and Home Care/Facility Nurse     Warfarin doses taken: Warfarin taken as instructed  Diet: Appetite is still decreased since hospitalization.   Medication/supplement changes: None noted  New illness, injury, or hospitalization: No - feeling better since hospitalization r/t influenza   Signs or symptoms of bleeding or clotting: No  Previous result: Subtherapeutic  Additional findings: None       PLAN     Recommended plan for temporary change(s) and ongoing change(s) affecting INR     Dosing Instructions: Increase your warfarin dose (5.9% change) with next INR in 3 days       Summary  As of 2/18/2025      Full warfarin instructions:  5 mg every Mon, Thu, Sat; 7.5 mg all other days   Next INR check:  2/21/2025               Telephone call with Mary home care nurse who agrees to plan and repeated back plan correctly    Orders given to  Homecare nurse/facility to recheck    Education provided: Goal range and lab monitoring: goal range and significance of current result and Importance of therapeutic range    Plan made per [facility] anticoagulation protocol    Carlos De La Rosa, RN  2/18/2025  Anticoagulation Clinic  [employer] for routing messages: tila DAI  [facility] patient phone line: [phone]        _______________________________________________________________________     Anticoagulation Episode Summary       Current INR goal:  2.0-3.0   TTR:  53.5% (9.4 mo)   Target end date:  Indefinite   Send INR reminders to:  AKOSUA DAI    Indications    Atrial fibrillation/flutter (H) [I48.91  I48.92]             Comments:  [facility] case mngr             Anticoagulation Care Providers       Provider Role Specialty Phone number    Maria De Jesus Hay APRN CNP Referring Family Medicine  [phone]

## 2025-02-24 ENCOUNTER — TELEPHONE (OUTPATIENT)
Dept: CARDIOLOGY | Facility: CLINIC | Age: 70
End: 2025-02-24

## 2025-02-24 NOTE — TELEPHONE ENCOUNTER
Contacted Roger to reschedule research visit for Olpasiran CVOT study week 84. Missed 2/10/25 appt d/t hospitalization. Rescheduled for 3/3/25 at 10am, STEVE ordered.

## 2025-02-25 ENCOUNTER — OFFICE VISIT (OUTPATIENT)
Dept: FAMILY MEDICINE | Facility: CLINIC | Age: 70
End: 2025-02-25
Payer: COMMERCIAL

## 2025-02-25 ENCOUNTER — ANTICOAGULATION THERAPY VISIT (OUTPATIENT)
Dept: ANTICOAGULATION | Facility: CLINIC | Age: 70
End: 2025-02-25

## 2025-02-25 VITALS
OXYGEN SATURATION: 98 % | RESPIRATION RATE: 24 BRPM | HEIGHT: 66 IN | WEIGHT: 235 LBS | HEART RATE: 98 BPM | SYSTOLIC BLOOD PRESSURE: 110 MMHG | DIASTOLIC BLOOD PRESSURE: 78 MMHG | BODY MASS INDEX: 37.77 KG/M2 | TEMPERATURE: 98.2 F

## 2025-02-25 DIAGNOSIS — Z09 HOSPITAL DISCHARGE FOLLOW-UP: ICD-10-CM

## 2025-02-25 DIAGNOSIS — J96.02 ACUTE RESPIRATORY FAILURE WITH HYPOXIA AND HYPERCAPNIA (H): ICD-10-CM

## 2025-02-25 DIAGNOSIS — I73.9 PERIPHERAL VASCULAR DISEASE: ICD-10-CM

## 2025-02-25 DIAGNOSIS — I48.91 ATRIAL FIBRILLATION/FLUTTER (H): Primary | ICD-10-CM

## 2025-02-25 DIAGNOSIS — E87.6 HYPOKALEMIA: ICD-10-CM

## 2025-02-25 DIAGNOSIS — I25.118 CORONARY ARTERY DISEASE OF NATIVE ARTERY OF NATIVE HEART WITH STABLE ANGINA PECTORIS: ICD-10-CM

## 2025-02-25 DIAGNOSIS — E87.3 METABOLIC ALKALOSIS: ICD-10-CM

## 2025-02-25 DIAGNOSIS — I48.91 ATRIAL FIBRILLATION/FLUTTER (H): ICD-10-CM

## 2025-02-25 DIAGNOSIS — Z95.1 S/P CABG X 3: ICD-10-CM

## 2025-02-25 DIAGNOSIS — Z72.0 TOBACCO ABUSE: ICD-10-CM

## 2025-02-25 DIAGNOSIS — N17.9 AKI (ACUTE KIDNEY INJURY): ICD-10-CM

## 2025-02-25 DIAGNOSIS — I48.92 ATRIAL FIBRILLATION/FLUTTER (H): ICD-10-CM

## 2025-02-25 DIAGNOSIS — J96.01 ACUTE RESPIRATORY FAILURE WITH HYPOXIA AND HYPERCAPNIA (H): ICD-10-CM

## 2025-02-25 DIAGNOSIS — R65.10 SIRS (SYSTEMIC INFLAMMATORY RESPONSE SYNDROME) (H): ICD-10-CM

## 2025-02-25 DIAGNOSIS — I48.92 ATRIAL FIBRILLATION/FLUTTER (H): Primary | ICD-10-CM

## 2025-02-25 DIAGNOSIS — J10.1 INFLUENZA A: Primary | ICD-10-CM

## 2025-02-25 DIAGNOSIS — E11.51 TYPE 2 DIABETES MELLITUS WITH DIABETIC PERIPHERAL ANGIOPATHY WITHOUT GANGRENE, WITHOUT LONG-TERM CURRENT USE OF INSULIN (H): ICD-10-CM

## 2025-02-25 DIAGNOSIS — E78.5 HYPERLIPIDEMIA LDL GOAL <70: ICD-10-CM

## 2025-02-25 DIAGNOSIS — I50.21 ACUTE SYSTOLIC HEART FAILURE (H): ICD-10-CM

## 2025-02-25 LAB
ANION GAP SERPL CALCULATED.3IONS-SCNC: 8 MMOL/L (ref 7–15)
BUN SERPL-MCNC: 16.7 MG/DL (ref 8–23)
CALCIUM SERPL-MCNC: 9 MG/DL (ref 8.8–10.4)
CHLORIDE SERPL-SCNC: 111 MMOL/L (ref 98–107)
CREAT SERPL-MCNC: 1.06 MG/DL (ref 0.67–1.17)
EGFRCR SERPLBLD CKD-EPI 2021: 76 ML/MIN/1.73M2
GLUCOSE SERPL-MCNC: 99 MG/DL (ref 70–99)
HCO3 SERPL-SCNC: 22 MMOL/L (ref 22–29)
INR (EXTERNAL): 2 (ref 0.9–1.1)
POTASSIUM SERPL-SCNC: 4.8 MMOL/L (ref 3.4–5.3)
SODIUM SERPL-SCNC: 141 MMOL/L (ref 135–145)

## 2025-02-25 PROCEDURE — 80048 BASIC METABOLIC PNL TOTAL CA: CPT | Performed by: NURSE PRACTITIONER

## 2025-02-25 PROCEDURE — 36415 COLL VENOUS BLD VENIPUNCTURE: CPT | Performed by: NURSE PRACTITIONER

## 2025-02-25 RX ORDER — POTASSIUM CHLORIDE 1500 MG/1
20 TABLET, EXTENDED RELEASE ORAL DAILY
Qty: 90 TABLET | Refills: 3 | Status: SHIPPED | OUTPATIENT
Start: 2025-02-25

## 2025-02-25 RX ORDER — ATORVASTATIN CALCIUM 80 MG/1
80 TABLET, FILM COATED ORAL DAILY
Qty: 90 TABLET | Refills: 3 | Status: SHIPPED | OUTPATIENT
Start: 2025-02-25

## 2025-02-25 RX ORDER — FUROSEMIDE 40 MG/1
0.5 TABLET ORAL
COMMUNITY
Start: 2025-02-10 | End: 2025-02-25

## 2025-02-25 RX ORDER — ACETAZOLAMIDE 250 MG/1
250 TABLET ORAL
Qty: 60 TABLET | Refills: 0 | Status: CANCELLED | OUTPATIENT
Start: 2025-02-25

## 2025-02-25 RX ORDER — METOPROLOL TARTRATE 100 MG/1
200 TABLET ORAL 2 TIMES DAILY
Qty: 360 TABLET | Refills: 3 | Status: SHIPPED | OUTPATIENT
Start: 2025-02-25

## 2025-02-25 ASSESSMENT — PAIN SCALES - GENERAL: PAINLEVEL_OUTOF10: NO PAIN (0)

## 2025-02-25 NOTE — PROGRESS NOTES
Assessment & Plan     Influenza A  Metabolic alkalosis  - Basic metabolic panel  (Ca, Cl, CO2, Creat, Gluc, K, Na, BUN); Future  - Basic metabolic panel  (Ca, Cl, CO2, Creat, Gluc, K, Na, BUN)  S/P CABG x 3  - metoprolol tartrate (LOPRESSOR) 100 MG tablet; Take 2 tablets (200 mg) by mouth 2 times daily.  - Basic metabolic panel  (Ca, Cl, CO2, Creat, Gluc, K, Na, BUN); Future  - Basic metabolic panel  (Ca, Cl, CO2, Creat, Gluc, K, Na, BUN)  Hypokalemia  - potassium chloride brittany ER (KLOR-CON M20) 20 MEQ CR tablet; Take 1 tablet (20 mEq) by mouth daily.  - Basic metabolic panel  (Ca, Cl, CO2, Creat, Gluc, K, Na, BUN); Future  - Basic metabolic panel  (Ca, Cl, CO2, Creat, Gluc, K, Na, BUN)  SIRS (systemic inflammatory response syndrome) (H)  Acute respiratory failure with hypoxia and hypercapnia (H)  Atrial fibrillation/flutter (H)  Acute systolic heart failure (H)  DYLAN (acute kidney injury)  Type 2 diabetes mellitus with diabetic peripheral angiopathy without gangrene, without long-term current use of insulin (H)  Tobacco abuse  Peripheral vascular disease  - atorvastatin (LIPITOR) 80 MG tablet; Take 1 tablet (80 mg) by mouth daily.  Coronary artery disease of native artery of native heart with stable angina pectoris  - atorvastatin (LIPITOR) 80 MG tablet; Take 1 tablet (80 mg) by mouth daily.  Hyperlipidemia LDL goal <70  - atorvastatin (LIPITOR) 80 MG tablet; Take 1 tablet (80 mg) by mouth daily.  Hospital discharge follow-up    69-year-old male with past medical history significant for chronic A-fib/flutter, CAD with history of MI and status post CABG, type 2 diabetes, hypertension, hyperlipidemia, PAD, status post carotid enterectomy, AAA, chronic tobacco abuse, and obesity.  Presented to the ER on January 29 with acute cough, shortness of breath, malaise, fatigue and bodyaches.  He was admitted to hospital with concerns for acute hypoxic respiratory failure, SIRS, acute kidney injury and acute heart failure.       Will ask patient's home care agency to wean him off home oxygen if able.  He should continue home therapies until completion.  Encouraged ongoing work for smoking cessation; patient has cut back since his discharge.  He is not interested in medication to help with smoking cessation.  In hospital it was suggested patient have sleep study to rule out sleep apnea; patient declined.      Today patient's weight is up and he notices more peripheral edema.  BMP rechecked today - improved. Stop the Diamox. Restart furosemide 20 mg daily. Follow up in one month.      MED REC REQUIRED  Post Medication Reconciliation Status: discharge medications reconciled and changed, per note/orders    The risks, benefits and treatment options of prescribed medications or other treatments have been discussed with the patient. The patient verbalized their understanding and should call or follow up if no improvement or if they develop further problems.  DILEEP Kc   Flex is a 69 year old, presenting for the following health issues:  Hospital F/U        2/25/2025     2:05 PM   Additional Questions   Roomed by Barbie GAR CMA   Accompanied by self         2/25/2025     2:05 PM   Patient Reported Additional Medications   Patient reports taking the following new medications none     HPI       Hospital Follow-up Visit:    Hospital/Nursing Home/IP Rehab Facility: [facility]  Date of Admission: 1/30/25  Date of Discharge: 2/10/25  Reason(s) for Admission: Influenza A  Was the patient in the ICU or did the patient experience delirium during hospitalization?  No  Do you have any other stressors you would like to discuss with your provider? Health Concerns  Patient would like to get off the oxygen  Can he get a home oximeter?  Problems taking medications regularly:  None  Medication changes since discharge: None  Problems adhering to non-medication therapy:  None    Patient reporting  swelling in lower legs today  Wasn't there yesterday    Of note- lasix was discontinued per discharge summary.    Summary of hospitalization:  [facility] discharge summary reviewed  Diagnostic Tests/Treatments reviewed.  Follow up needed: none  Other Healthcare Providers Involved in Patient s Care:         Homecare  Update since discharge: improved.         Plan of care communicated with patient           HPI: 69-year-old male with past medical history significant for chronic A-fib/flutter, CAD with history of MI and status post CABG, type 2 diabetes, hypertension, hyperlipidemia, PAD, status post carotid enterectomy, AAA, chronic tobacco abuse, and obesity.  Presented to the ER on January 29 with acute cough, shortness of breath, malaise, fatigue and bodyaches.  He was admitted to hospital with concerns for acute hypoxic respiratory failure, SIRS, acute kidney injury and acute heart failure.  While hospitalized patient was treated with BiPAP and then supplemental oxygen, Tamiflu and antibiotics ( Vanco and Ancef).  Patient remained on metoprolol for rate control and warfarin for anticoagulation.  He had an elevation in BNP and significant peripheral edema suspicious for heart failure.  TTE was performed in hospital while patient was in rapid atrial flutter; EF noted 40 to 45% with mildly decreased RV function.  Heart failure was treated with diuretics (furosemide).  Furosemide eventually was discontinued due to metabolic alkalosis.  It was recommended patient stick to a 2 L fluid restriction.  After furosemide was discontinued, Diamox was added.  Patient was discharged on February 10 with home oxygen, and improving condition.    Patient states that overall he feels better than on admission in the hospital.  Still does not feel at his baseline.  Most of his complaints today are in regards to the supplemental oxygen.  He notes more swelling today. Denies shortness of breath or chest pain.  Portable  "oxygen set at 1L. Patient wants to get off the oxygen. Home care coming out to the house. Has cut way back on smoking since hospitalization.  Will need BMP today to determine whether or not patient can be switched back to oral furosemide.      Wt Readings from Last 4 Encounters:   02/25/25 106.6 kg (235 lb)   02/10/25 100.9 kg (222 lb 6.4 oz)   11/19/24 109.4 kg (241 lb 3.2 oz)   11/19/24 108.6 kg (239 lb 6.4 oz)               Review of Systems  Constitutional, neuro, ENT, endocrine, pulmonary, cardiac, gastrointestinal, genitourinary, musculoskeletal, integument and psychiatric systems are negative, except as otherwise noted.      Objective    /78 (BP Location: Right arm, Patient Position: Sitting, Cuff Size: Adult Large)   Pulse 98   Temp 98.2  F (36.8  C) (Tympanic)   Resp 24   Ht 1.676 m (5' 6\")   Wt 106.6 kg (235 lb)   SpO2 98%   BMI 37.93 kg/m    Body mass index is 37.93 kg/m .  Physical Exam   GENERAL: alert and no distress  NECK: no adenopathy, no asymmetry, masses, or scars  RESP: lungs clear to auscultation - no rales, rhonchi or wheezes  CV: regular rate and rhythm, normal S1 S2, no S3 or S4, no murmur, click or rub  MS: +2 pitting edema BLE            Signed Electronically by: SAL Kc CNP    "

## 2025-02-25 NOTE — PROGRESS NOTES
ANTICOAGULATION MANAGEMENT     Roger Bonilla 69 year old male is on warfarin with therapeutic INR result. (Goal INR 2.0-3.0)    Recent labs: (last 7 days)     02/25/25  0843   INR 2.0*       ASSESSMENT     Source(s): Chart Review, Patient/Caregiver Call, and Home Care/Facility Nurse     Warfarin doses taken: Warfarin taken as instructed  Diet: No new diet changes identified  Medication/supplement changes: None noted  New illness, injury, or hospitalization: No  Signs or symptoms of bleeding or clotting: No  Previous result: Therapeutic last visit; previously outside of goal range  Additional findings: None       PLAN     Recommended plan for no diet, medication or health factor changes affecting INR     Dosing Instructions: Continue your current warfarin dose with next INR in 1 week       Summary  As of 2/25/2025      Full warfarin instructions:  5 mg every Mon, Thu, Sat; 7.5 mg all other days   Next INR check:  3/4/2025               Telephone call with Destin Hernandez home care nurse who verbalizes understanding and agrees to plan    Orders given to  Homecare nurse/facility to recheck    Education provided: None required    Plan made per [facility] anticoagulation protocol    Mary Ramirez RN  2/25/2025  Anticoagulation Clinic  [employer] for routing messages: tila DAI  [facility] patient phone line: [phone]        _______________________________________________________________________     Anticoagulation Episode Summary       Current INR goal:  2.0-3.0   TTR:  54.3% (9.6 mo)   Target end date:  Indefinite   Send INR reminders to:  AKOSUA DAI    Indications    Atrial fibrillation/flutter (H) [I48.91  I48.92]             Comments:  [facility] case m[facility]             Anticoagulation Care Providers       Provider Role Specialty Phone number    Maria De Jesus Hay APRN [facility] Referring Family Medicine [phone]

## 2025-02-26 ENCOUNTER — MYC MEDICAL ADVICE (OUTPATIENT)
Dept: FAMILY MEDICINE | Facility: CLINIC | Age: 70
End: 2025-02-26
Payer: COMMERCIAL

## 2025-02-26 ENCOUNTER — TELEPHONE (OUTPATIENT)
Dept: FAMILY MEDICINE | Facility: CLINIC | Age: 70
End: 2025-02-26
Payer: COMMERCIAL

## 2025-02-26 RX ORDER — FUROSEMIDE 20 MG/1
20 TABLET ORAL DAILY
Qty: 90 TABLET | Refills: 3 | Status: SHIPPED | OUTPATIENT
Start: 2025-02-26

## 2025-02-28 ENCOUNTER — TELEPHONE (OUTPATIENT)
Dept: CARE COORDINATION | Facility: CLINIC | Age: 70
End: 2025-02-28
Payer: COMMERCIAL

## 2025-02-28 DIAGNOSIS — I50.21 ACUTE SYSTOLIC HEART FAILURE (H): ICD-10-CM

## 2025-02-28 RX ORDER — FUROSEMIDE 40 MG/1
40 TABLET ORAL DAILY
COMMUNITY
Start: 2025-02-28

## 2025-02-28 NOTE — TELEPHONE ENCOUNTER
Clinic Care Coordination RN      Patient reports a weight gain of 10 # 4-5 days ago 222 and yesterday and today 233#   Patient denies any increased SOB episodes .  Leg edema increased   On lasix 20 mg daily   Blood pressure 120/70    Please advise and have your care team call patient back to discuss a lasix dose adjustment       [facility]   Erni Pope RN, Care Coordinator   [facility]'s   E-mail [email or web address]   [phone]

## 2025-02-28 NOTE — TELEPHONE ENCOUNTER
Covering for primary/ordering provider  Recommend to increase his furosemide from 20 mg once daily to 40 mg once daily.  He should be seen by another provider sometime next week

## 2025-03-03 ENCOUNTER — ALLIED HEALTH/NURSE VISIT (OUTPATIENT)
Dept: CARDIOLOGY | Facility: CLINIC | Age: 70
End: 2025-03-03
Payer: COMMERCIAL

## 2025-03-03 VITALS
WEIGHT: 235 LBS | DIASTOLIC BLOOD PRESSURE: 82 MMHG | BODY MASS INDEX: 37.93 KG/M2 | SYSTOLIC BLOOD PRESSURE: 104 MMHG | HEART RATE: 92 BPM

## 2025-03-03 DIAGNOSIS — E78.5 HYPERLIPIDEMIA LDL GOAL <70: Primary | ICD-10-CM

## 2025-03-03 DIAGNOSIS — Z00.6 EXAMINATION OF PARTICIPANT IN CLINICAL TRIAL: ICD-10-CM

## 2025-03-03 NOTE — TELEPHONE ENCOUNTER
I spoke with patient, he already increased his lasix on his own. Appt made in clinic tomorrow.  Lucy Scott RN on 3/3/2025 at 8:55 AM

## 2025-03-03 NOTE — PROGRESS NOTES
A Double-blind, Randomized, Placebo-controlled, Multicenter Study Assessing the impact of Ol[place]iran on Major Cardiovascular Events in Patients with Atherosclerotic cardiovascular Disease and Elevated Lipoprotein (a)        Roger Bonilla was seen in clinic today for Visit Week 84    Vitals:    03/03/25 1003   BP: 104/82   BP Location: Left arm   Patient Position: Sitting   Cuff Size: Adult Large   Pulse: 92   Weight: 106.6 kg (235 lb)       Any Adverse events, Serious Adverse event, changes in medications,  (if any) listed below.      Subject has been reminded that lipids should remain blinded for the duration of this study.     Central, Local labs  drawn? N/A Time n/a.  Urine Pregnancy for WOCBP N/A    IP Dispensed? [x] Yes   []  No    IP Lot Number: [id number]  IP Box Number: [id number]  Administered into left abdomen at 1030 by Research coordinator        Did the patient have any of the following events (endpoints):   [] Yes   [x]  No   Cardiovascular Death  [] Yes   [x]  No   Myocardial Infarction  [] Yes   [x]  No   Coronary revascularization  [] Yes   [x]  No   Any Coronary Artery Revascularization  [] Yes   [x]  No  Cerebrovascular Revascularization  [] Yes   [x]  No  Peripheral Artery Revascularization  [] Yes   [x]  No  Vascular amputation  [] Yes   [x]  No  Hospitalization for Unstable Angina  [] Yes   [x]  No  Stroke (Ischemic or Hemorrhagic) / Transient Ischemic Attack (TIA)  [] Yes   [x]  No  Deep Vein Thrombosis or Pulmonary Embolism  [] Yes   [x]  No  New onset or clinical deterioration of aortic stenosis  [] Yes   [x]  No  Limb Ischemia  [] Yes   [x]  No  New Onset Diabetes Mellitus   [] Yes   [x]  No  Hospitalization for Heart Failure    Plan: scheduled Week 96     Evy De Luna RN      Current Outpatient Medications:     aspirin (ASA) 81 MG chewable tablet, Take 81 mg by mouth daily, Disp: , Rfl:     atorvastatin (LIPITOR) 80 MG tablet, Take 1 tablet (80 mg) by mouth daily., Disp: 90 tablet, Rfl: 3     ezetimibe (ZETIA) 10 MG tablet, Take 1 tablet (10 mg) by mouth daily., Disp: 90 tablet, Rfl: 3    furosemide (LASIX) 40 MG tablet, Take 1 tablet (40 mg) by mouth daily., Disp: , Rfl:     metoprolol tartrate (LOPRESSOR) 100 MG tablet, Take 2 tablets (200 mg) by mouth 2 times daily., Disp: 360 tablet, Rfl: 3    omeprazole (PRILOSEC) 40 MG DR capsule, Take 1 capsule (40 mg) by mouth every morning., Disp: 90 capsule, Rfl: 3    potassium chloride brittany ER (KLOR-CON M20) 20 MEQ CR tablet, Take 1 tablet (20 mEq) by mouth daily., Disp: 90 tablet, Rfl: 3    warfarin ANTICOAGULANT (JANTOVEN ANTICOAGULANT) 5 MG tablet, Take 7.5 mg every Monday and Thursday and 5 mg all other days of the week OR as directed by INR Clinic, Disp: , Rfl:

## 2025-03-04 ENCOUNTER — ANTICOAGULATION THERAPY VISIT (OUTPATIENT)
Dept: ANTICOAGULATION | Facility: CLINIC | Age: 70
End: 2025-03-04

## 2025-03-04 ENCOUNTER — DOCUMENTATION ONLY (OUTPATIENT)
Dept: ANTICOAGULATION | Facility: CLINIC | Age: 70
End: 2025-03-04

## 2025-03-04 ENCOUNTER — OFFICE VISIT (OUTPATIENT)
Dept: PEDIATRICS | Facility: CLINIC | Age: 70
End: 2025-03-04
Payer: COMMERCIAL

## 2025-03-04 ENCOUNTER — ANCILLARY PROCEDURE (OUTPATIENT)
Dept: GENERAL RADIOLOGY | Facility: CLINIC | Age: 70
End: 2025-03-04
Payer: COMMERCIAL

## 2025-03-04 VITALS
SYSTOLIC BLOOD PRESSURE: 118 MMHG | TEMPERATURE: 97.3 F | RESPIRATION RATE: 20 BRPM | WEIGHT: 235 LBS | BODY MASS INDEX: 37.77 KG/M2 | HEIGHT: 66 IN | DIASTOLIC BLOOD PRESSURE: 84 MMHG | OXYGEN SATURATION: 96 % | HEART RATE: 113 BPM

## 2025-03-04 DIAGNOSIS — I48.91 ATRIAL FIBRILLATION/FLUTTER (H): Primary | ICD-10-CM

## 2025-03-04 DIAGNOSIS — I50.23 ACUTE ON CHRONIC SYSTOLIC (CONGESTIVE) HEART FAILURE (H): ICD-10-CM

## 2025-03-04 DIAGNOSIS — I48.92 ATRIAL FIBRILLATION/FLUTTER (H): Primary | ICD-10-CM

## 2025-03-04 DIAGNOSIS — I48.20 CHRONIC ATRIAL FIBRILLATION WITH RAPID VENTRICULAR RESPONSE (H): ICD-10-CM

## 2025-03-04 DIAGNOSIS — I50.23 ACUTE ON CHRONIC SYSTOLIC (CONGESTIVE) HEART FAILURE (H): Primary | ICD-10-CM

## 2025-03-04 DIAGNOSIS — I27.22: ICD-10-CM

## 2025-03-04 LAB
ANION GAP SERPL CALCULATED.3IONS-SCNC: 9 MMOL/L (ref 7–15)
BUN SERPL-MCNC: 13.9 MG/DL (ref 8–23)
CALCIUM SERPL-MCNC: 9.2 MG/DL (ref 8.8–10.4)
CHLORIDE SERPL-SCNC: 102 MMOL/L (ref 98–107)
CREAT SERPL-MCNC: 0.97 MG/DL (ref 0.67–1.17)
EGFRCR SERPLBLD CKD-EPI 2021: 85 ML/MIN/1.73M2
ERYTHROCYTE [DISTWIDTH] IN BLOOD BY AUTOMATED COUNT: 22.2 % (ref 10–15)
GLUCOSE SERPL-MCNC: 117 MG/DL (ref 70–99)
HCO3 SERPL-SCNC: 28 MMOL/L (ref 22–29)
HCT VFR BLD AUTO: 40.8 % (ref 40–53)
HGB BLD-MCNC: 12.2 G/DL (ref 13.3–17.7)
INR PPP: 2.66 (ref 0.85–1.15)
MCH RBC QN AUTO: 24.3 PG (ref 26.5–33)
MCHC RBC AUTO-ENTMCNC: 29.9 G/DL (ref 31.5–36.5)
MCV RBC AUTO: 81 FL (ref 78–100)
NT-PROBNP SERPL-MCNC: 2459 PG/ML (ref 0–900)
PLATELET # BLD AUTO: 216 10E3/UL (ref 150–450)
POTASSIUM SERPL-SCNC: 4.4 MMOL/L (ref 3.4–5.3)
RBC # BLD AUTO: 5.03 10E6/UL (ref 4.4–5.9)
SODIUM SERPL-SCNC: 139 MMOL/L (ref 135–145)
WBC # BLD AUTO: 9.2 10E3/UL (ref 4–11)

## 2025-03-04 PROCEDURE — 80048 BASIC METABOLIC PNL TOTAL CA: CPT

## 2025-03-04 PROCEDURE — 83880 ASSAY OF NATRIURETIC PEPTIDE: CPT

## 2025-03-04 PROCEDURE — 99215 OFFICE O/P EST HI 40 MIN: CPT

## 2025-03-04 PROCEDURE — 85610 PROTHROMBIN TIME: CPT

## 2025-03-04 PROCEDURE — 3074F SYST BP LT 130 MM HG: CPT

## 2025-03-04 PROCEDURE — 3079F DIAST BP 80-89 MM HG: CPT

## 2025-03-04 PROCEDURE — 1126F AMNT PAIN NOTED NONE PRSNT: CPT

## 2025-03-04 PROCEDURE — 85027 COMPLETE CBC AUTOMATED: CPT

## 2025-03-04 PROCEDURE — 71046 X-RAY EXAM CHEST 2 VIEWS: CPT | Mod: TC | Performed by: RADIOLOGY

## 2025-03-04 PROCEDURE — 36415 COLL VENOUS BLD VENIPUNCTURE: CPT

## 2025-03-04 ASSESSMENT — PAIN SCALES - GENERAL: PAINLEVEL_OUTOF10: NO PAIN (0)

## 2025-03-04 NOTE — PROGRESS NOTES
Acute and Diagnostic Services Clinic Visit    Assessment & Plan     (I50.23) Acute on chronic systolic (congestive) heart failure (H)  (primary encounter diagnosis)  Comment: Roger has known, chronic systolic congestive heart failure.  Most recent echocardiogram was performed 1/30/2025, which demonstrated a visual ejection fraction of 40 to 45% with inferolateral wall akinesis.  That study also demonstrated an elevated RVSP at 46.9 plus right atrium, consistent with moderate pulmonary hypertension.  He has recent history has been a complicated one, including a hospital stay for influenza A ending 2/10/2025, as well as a contraction alkalosis to loop diuresis which required that he be discharged off of diuretics and on acetazolamide.  He is back on furosemide as of 2/25/2025, initially at 20 mg daily, and increase furosemide to 40 mg daily as of 2/28/2025.  Despite that, he presents with bilateral lower extremity edema worse than baseline, a weight roughly 10 pounds above baseline, as well as some exertional dyspnea and cough, though I am not sure that these are new.  Plan:   I think that it is clear that Roger requires additional diuresis.  However, before advising a short-term increase in furosemide, I think that we should make sure his electrolytes and renal function are okay.  I have ordered a basic metabolic panel.  BNP-N terminal pro ordered, though utility may be limited, as all prior BNP values have been elevated dating back to 2022.  Most recent BNP was 1764 during his hospital stay drawn on 2/6/2025.    XR Chest 2 Views looking for evidence of pulmonary edema.    (I27.22) Pulmonary hypertension due to left ventricular systolic dysfunction (H)  Comment: RVSP was 47 plus right atrium on an echocardiogram of 1/30/2025.  I presume that this is group 2 pulmonary hypertension due to left heart disease.  There is also some concern that he has underlying and untreated obstructive sleep apnea.  Because he has  expressed opposition to the use of PAP, the sleep study has not been performed.  He was discharged on home oxygen 2 L/min per nasal cannula, and continues to use it nearly around-the-clock.  Plan:  Roger asked me what my recommendation was for titrating oxygen use off.  Oxygen saturation on room air today is 96%, suggesting that he may no longer have any daytime needs.  Assessment of nocturnal oxygen needs is difficult at home, and would require some sort of an laboratory sleep study.  See management of left heart failure above.    (I48.20) Chronic atrial fibrillation with rapid ventricular response (H)  Comment: He is on metoprolol at 200 mg twice daily for rate control, and warfarin anticoagulation.  On arrival here today, heart rate was 113, though after about 10 minutes of rest, heart rate was less than 100 during my visit.  Plan:   Roger was scheduled to have his INR checked later this afternoon.  We will draw that here.  Continue metoprolol 200 mg twice daily.    DISCUSSION/MEDICAL DECISION MAKING:  CBC was notable for mild, stable, chronic anemia, hemoglobin 12.2, and was otherwise normal.  Basic metabolic panel was entirely normal.  ProBNP was 2459, consistent with systolic congestive heart failure.  INR was 2.66.  Chest x-ray (reviewed personally) showed no evidence of pulmonary vascular congestion, and no effusions.    Overall, Roger still has an element of fluid overload, that is not severe.    We discussed 2 options: Option 1 is that Roger can continue to take furosemide 40 mg daily, which should, over the next couple of weeks, result and ongoing diuresis back to his volume baseline.  Alternatively, Roger could double furosemide for a length of time to effect greater diuresis, then revert back to 40 mg daily.  Roger wishes to do the latter.  Therefore, on Wednesday, Thursday, and Friday of this week, Roger is going to take furosemide 40 mg twice daily.  Starting Saturday, 3/8/2025, he will go back to  "furosemide 40 mg once daily.  He agrees to let Maria De Jesus Hay APRN-CNP know if edema and/or weight fail to respond to diuresis, or if other symptoms evolve.    Finally, I let Roger know that he no longer needs to wear daytime supplemental oxygen; room air saturation here today is 96%.  I suggested to him that he continue to use nocturnal supplemental oxygen, as we do not know what happens to his nocturnal oxygenation.  I am wondering if in-home continuous nocturnal oximetry on room air might be an option to see if supplemental nocturnal oxygen continues to be necessary.  I am going to ask Maria De Jesus Hay about this.    54 minutes were spent doing chart review, history and exam, documentation and further activities per the note.     MED REC REQUIRED    BMI  Estimated body mass index is 37.93 kg/m  as calculated from the following:    Height as of this encounter: 1.676 m (5' 6\").    Weight as of this encounter: 106.6 kg (235 lb).     Brittani Mccord is a 69 year old, presenting for the following health issues:  Leg Swelling        2/25/2025     2:05 PM   Additional Questions   Roomed by Maria De Jesus MITTAL MA   Accompanied by self     HPI      Edema/Swelling  Onset/Duration: Three weeks since getting out of the hospital   Description:   Location: feet/ankles  Associated redness: No  Associated skin changes:  No  Associated pain:  No  Progression of Symptoms:  improving some  Accompanying Signs & Symptoms:  Chest pain: No  Shortness of breath: No           Nausea or vomiting: No  Lightheadedness: YES- especially when on O2 for extended periods  Palpitations: No  Fever/Chills: No  Cough: YES- started today  History:   History of heart disease or heart failure: YES- two heart attacks  History of sleep apnea: No  Tobacco use: YES- smoking cigarettes daily           Previous similar symptoms: YES    Roger \"Flex\" Chad is a 69-year-old man seen today in the acute diagnostic services (ADS) clinic at [facility] regarding " "progressively worsening bilateral lower extremity edema.    Roger has known, chronic systolic congestive heart failure.  Most recent echocardiogram was performed 1/30/2025, which demonstrated a visual ejection fraction of 40 to 45% with inferolateral wall akinesis.  That study also demonstrated an elevated RVSP at 46.9 plus right atrium, consistent with moderate pulmonary hypertension.  He has chronic atrial fibrillation, and is on warfarin anticoagulation along with metoprolol at a dose of 200 mg p.o. twice daily.    Roger spent 1/30 through 2/10/2025 at [facility] for influenza A with multiple associated complications.  Efforts to diurese him in the hospital were complicated by a contraction alkalosis.  As a result, he was discharged off of furosemide, but on acetazolamide, and advised to adhere to a 2 L daily fluid restriction.  Of note: He was also discharged on home oxygen.    He was seen 2/25/2025 in a hospital follow-up visit by Maria De Jesus HALL.  Labs were performed that visit, which demonstrated resolution of the alkalosis; HCO3 was 22.  Renal function on that date was also normal, with a GFR of 76 (creatinine 1.06).  Based on those labs, Roger was restarted on furosemide 20 mg daily on 2/25/2025.  On 2/28/2025, Roger sent a [employer] message reporting a weight gain of 10 pounds over 5 days despite furosemide 20 mg daily.  Before Roger received a reply, he increase furosemide to 40 mg daily on his own, starting that dose 2/28/2025.    He was referred here to the [place] [facility] today for evaluation of ongoing lower extremity edema worse than baseline, and weight roughly 10 pounds above baseline weight.  He has noticed a slight improvement in his lower extremity edema since increasing furosemide to 40 mg daily on 2/28/2025, and thinks that his weight has come down \"a pound or two\".  Roger has some exertional dyspnea, occurring walking room to room at home, and performing some activities of " "daily living, though it is difficult to tell whether this is new or old.  He also reports a mild, nonproductive cough, beginning after eating breakfast this morning.  He has no exertional chest pain, and has not had palpitations.      Review of Systems  As per the history of present illness.  No additional positives or negatives are obtained.      Objective    /84 (BP Location: Right arm, Patient Position: Sitting, Cuff Size: Adult Regular)   Pulse 113   Temp 97.3  F (36.3  C) (Tympanic)   Resp 20   Ht 1.676 m (5' 6\")   Wt 106.6 kg (235 lb)   SpO2 96%   BMI 37.93 kg/m    Body mass index is 37.93 kg/m .  Physical Exam   GENERAL: Pleasant man, who is in no acute distress.  RESP: No accessory muscle use.  Lungs are notable for mild, inspiratory crackles limited to both posterior lung bases, though inspiration is clear elsewhere.  Expiration sounds mildly prolonged, but is clear, no wheeze.  CV: Irregularly irregular, borderline tachycardic.  Normal S1 S2, no murmur or extra sound.  There is bilateral lower extremity edema, worse on the right than on the left, which Roger says is chronic.  Edema severity is 3+ on the right to the level of the knee.  Edema is 2+ on the left to the level of the mid shin.  ABDOMEN: Soft, non-tender, no guarding.  Bowel sounds positive.  MS: No bony deformities noted.  No red or inflamed joints.  SKIN: Warm and dry, no rashes.  NEURO: Alert, oriented, conversant.  Cranial nerves III - XII grossly intact.  No gross motor or sensory deficits.    PSYCH: Calm, alert, conversant.  Able to articulate logical thoughts, no tangential thoughts, no hallucinations or delusions.  Affect normal.      Results for orders placed or performed in visit on 03/04/25 (from the past 24 hours)   Basic metabolic panel   Result Value Ref Range    Sodium 139 135 - 145 mmol/L    Potassium 4.4 3.4 - 5.3 mmol/L    Chloride 102 98 - 107 mmol/L    Carbon Dioxide (CO2) 28 22 - 29 mmol/L    Anion Gap 9 7 - 15 " mmol/L    Urea Nitrogen 13.9 8.0 - 23.0 mg/dL    Creatinine 0.97 0.67 - 1.17 mg/dL    GFR Estimate 85 >60 mL/min/1.73m2    Calcium 9.2 8.8 - 10.4 mg/dL    Glucose 117 (H) 70 - 99 mg/dL   CBC with platelets   Result Value Ref Range    WBC Count 9.2 4.0 - 11.0 10e3/uL    RBC Count 5.03 4.40 - 5.90 10e6/uL    Hemoglobin 12.2 (L) 13.3 - 17.7 g/dL    Hematocrit 40.8 40.0 - 53.0 %    MCV 81 78 - 100 fL    MCH 24.3 (L) 26.5 - 33.0 pg    MCHC 29.9 (L) 31.5 - 36.5 g/dL    RDW 22.2 (H) 10.0 - 15.0 %    Platelet Count 216 150 - 450 10e3/uL   BNP-N terminal pro   Result Value Ref Range    N Terminal Pro BNP Outpatient 2,459 (H) 0 - 900 pg/mL   INR   Result Value Ref Range    INR 2.66 (H) 0.85 - 1.15         In 1212  Out 1251  In 1344  Out 1359    Signed Electronically by: Jose Francisco Borja MD

## 2025-03-04 NOTE — PROGRESS NOTES
ANTICOAGULATION CLINIC REFERRAL RENEWAL REQUEST       An annual renewal order is required for all patients referred to [facility].?  Please review and sign the pended referral order for Roger Bonilla.       ANTICOAGULATION SUMMARY      Warfarin indication(s)   Atrial Fibrillation    Mechanical heart valve present?  NO       Current goal range   INR: 2.0-3.0     Goal appropriate for indication? Goal INR 2-3, standard for indication(s) above     Time in Therapeutic Range (TTR)  (Goal > 60%) 55.4%       Office visit with referring provider's group within last year yes on 2/25/25       Dipti Lynch RN  [facility]

## 2025-03-04 NOTE — Clinical Note
I had the pleasure of seeing Mr. Bonilla in the [place] [facility] today regarding increased bilateral lower extremity edema and increased body weight.  Overall, Mr. Bonilla continues to show evidence of acute on chronic systolic heart failure, though he is in better shape than he was, I think, when you saw him in posthospital follow-up a couple of weeks ago.  His chest x-ray showed no evidence of pulmonary edema or effusion, and his electrolytes and renal function are normal.  He is going to double furosemide to 40 mg twice daily for 3 days, after which, on Saturday, 3/8/2025, he is going to go back to furosemide 40 mg daily.  He will let you know if this increase in furosemide dose fails to improve edema or weight gain.  Finally: Room air saturation here in clinic today was 96%.  I told Mr. Bonilla that he no longer needs to use daytime oxygen, which please do very much.  I suggested to him that he continue to use nocturnal supplemental oxygen until such time as home nocturnal oximetry can be performed.  Thank you.

## 2025-03-04 NOTE — PATIENT INSTRUCTIONS
As we discussed, your labs and chest x-ray all looked pretty good.  There was no evidence of excess fluid in your lungs on chest x-ray, and the most notable finding on your labs was that your electrolytes and kidney function are normal.  I think that you do still have excess fluid on board, explaining your weight that is higher than normal, and the increased swelling of both legs, and as result I think that we should increase your furosemide for the next few days in an effort to get this excess fluid off.  Specifically, starting tomorrow morning, please take furosemide 40 mg twice daily (rather than once daily), and do that Wednesday, Thursday, and Friday.  Starting Saturday, 3/8/2025, please go back to furosemide 40 mg once daily.  If your weight and/or the degree of leg swelling does not improve with this temporary increase in your furosemide dose, please let your primary care provider know.    Based upon oxygen levels that we got in clinic today, you no longer need to wear your oxygen during the daytime.  I would suggest that you continue to use oxygen at night, because we do not know what happens to your nighttime oxygen levels.  It is possible that a device can be delivered to your home to study your oxygen levels at night; I will send a message to Maria De Jesus HALL to see if this could be arranged.    It was a pleasure meeting you today.

## 2025-03-04 NOTE — PROGRESS NOTES
ANTICOAGULATION MANAGEMENT     Roger Bonilla 69 year old male is on warfarin with therapeutic INR result. (Goal INR 2.0-3.0)    Recent labs: (last 7 days)     03/04/25  1239   INR 2.66*       ASSESSMENT     Source(s): Chart Review and Patient/Caregiver Call     Warfarin doses taken: Warfarin taken as instructed  Diet: No new diet changes identified  Medication/supplement changes:  Patient taking Lasix 40 mg BID 3/5-3/7 and will resume Lasix 40 mg daily on 3/8 due to fluid retention.  New illness, injury, or hospitalization: No  Signs or symptoms of bleeding or clotting: No  Previous result: Therapeutic last visit; previously outside of goal range  Additional findings: None       PLAN     Recommended plan for temporary change(s) affecting INR     Dosing Instructions: Continue your current warfarin dose with next INR in 1 week       Summary  As of 3/4/2025      Full warfarin instructions:  5 mg every Mon, Thu, Sat; 7.5 mg all other days   Next INR check:  3/11/2025               Telephone call with Flex who verbalizes understanding and agrees to plan    Orders given to  Homecare nurse/facility to recheck  Spoke with Destin Hernandez RN, to confirm dosing and INR recheck date    Education provided: Please call back if any changes to your diet, medications or how you've been taking warfarin  Contact [phone] with any changes, questions or concerns.     Plan made per [facility] anticoagulation protocol    Dipti Lynch, RN  3/4/2025  Anticoagulation Clinic  [facility] for routing messages: tila DAI  [facility] patient phone line: [phone]        _______________________________________________________________________     Anticoagulation Episode Summary       Current INR goal:  2.0-3.0   TTR:  55.4% (9.9 mo)   Target end date:  Indefinite   Send INR reminders to:  AKOSUA DAI    Indications    Atrial fibrillation/flutter (H) [I48.91  I48.92]             Comments:  [facility] case mngr              Anticoagulation Care Providers       Provider Role Specialty Phone number    Maria De Jesus Hay APRN [facility] Referring Family Medicine [phone]

## 2025-03-11 ENCOUNTER — ANTICOAGULATION THERAPY VISIT (OUTPATIENT)
Dept: ANTICOAGULATION | Facility: CLINIC | Age: 70
End: 2025-03-11
Payer: COMMERCIAL

## 2025-03-11 DIAGNOSIS — I48.92 ATRIAL FIBRILLATION/FLUTTER (H): Primary | ICD-10-CM

## 2025-03-11 DIAGNOSIS — I48.91 ATRIAL FIBRILLATION/FLUTTER (H): Primary | ICD-10-CM

## 2025-03-11 LAB — INR (EXTERNAL): 2.4

## 2025-03-11 NOTE — PROGRESS NOTES
ANTICOAGULATION MANAGEMENT     Roger Bonilla 69 year old male is on warfarin with therapeutic INR result. (Goal INR 2.0-3.0)    Recent labs: (last 7 days)     03/11/25  0905   INR 2.4       ASSESSMENT     Source(s): Chart Review  Previous INR was Therapeutic last 2(+) visits  Medication, diet, health changes since last INR chart reviewed; none identified         PLAN     Recommended plan for no diet, medication or health factor changes affecting INR     Dosing Instructions: Continue your current warfarin dose with next INR in 1 week       Summary  As of 3/11/2025      Full warfarin instructions:  5 mg every Mon, Thu, Sat; 7.5 mg all other days   Next INR check:  3/18/2025               Detailed voice message left for [place] home care nurse with dosing instructions and follow up date.     Orders given to  Homecare nurse/facility to recheck    Education provided: Please call back if any changes to your diet, medications or how you've been taking warfarin    Plan made per [facility] anticoagulation protocol    Natalie Morris RN  3/11/2025  Anticoagulation Clinic  [facility] for routing messages: tila DAI  [facility] patient phone line: [phone]        _______________________________________________________________________     Anticoagulation Episode Summary       Current INR goal:  2.0-3.0   TTR:  56.4% (10.1 mo)   Target end date:  Indefinite   Send INR reminders to:  AKOSUA DAI    Indications    Atrial fibrillation/flutter (H) [I48.91  I48.92]             Comments:  [facility] case [facility]             Anticoagulation Care Providers       Provider Role Specialty Phone number    Maria De Jesus Hay APRN CNP Referring Family Medicine [phone]    Nicole Lincoln APRN CNP Referring Family Medicine [phone]

## 2025-03-12 ENCOUNTER — MYC MEDICAL ADVICE (OUTPATIENT)
Dept: FAMILY MEDICINE | Facility: CLINIC | Age: 70
End: 2025-03-12
Payer: COMMERCIAL

## 2025-03-12 DIAGNOSIS — J96.01 ACUTE RESPIRATORY FAILURE WITH HYPOXIA AND HYPERCAPNIA (H): Primary | ICD-10-CM

## 2025-03-12 DIAGNOSIS — J96.02 ACUTE RESPIRATORY FAILURE WITH HYPOXIA AND HYPERCAPNIA (H): Primary | ICD-10-CM

## 2025-03-13 ENCOUNTER — PATIENT OUTREACH (OUTPATIENT)
Dept: CARE COORDINATION | Facility: CLINIC | Age: 70
End: 2025-03-13
Payer: COMMERCIAL

## 2025-03-13 ASSESSMENT — ACTIVITIES OF DAILY LIVING (ADL): DEPENDENT_IADLS:: INDEPENDENT

## 2025-03-13 NOTE — PROGRESS NOTES
Clinic Care Coordination Contact  1/30/2025 - 2/10/2025 (11 days)  [facility]     Pasquale Wilde MD  Last attending  Treatment team Influenza A  Principal problem     Follow Up Progress Note      Assessment: Patient reports his leg edema decreased since increasing Lasix to 40 mg daily  Weight yesterday 226 and today 224#  No oxygen during the day and 2 liters at night only  Waiting for the Ziopatch to come in the mail   Patient feels he is getting back to his normal self     Care Gaps:    Health Maintenance Due   Topic Date Due    HF ACTION PLAN  Never done    RSV VACCINE (1 - Risk 60-74 years 1-dose series) Never done    DIABETIC FOOT EXAM  10/14/2023       Postponed to next outreach      Care Plans  Care Plan: General       Problem: HP GENERAL PROBLEM       Goal: My Pneumonia symptoms will be resolved in the next month       Start Date: 2/11/2025 Expected End Date: 3/11/2025    This Visit's Progress: 80% Recent Progress: 50%    Priority: High    Note:     Barriers: Deconditioned   Strengths: motivated   Patient expressed understanding of goal: Yes   Action steps to achieve this goal:  1. I will take my medications as directed   2. I will used my oxygen at 1 liter as directed   3. I will check my weight , and blood pressure daily  4. I will call  if I am experiencing increased symptoms of concern such as increased shortness of breath episodes,increased leg swelling  or weight gain 2-3# in a day or 5# in a week  5. I will restrict my fluid intake to 2 liters a day (2000 cc)                                Intervention/Education provided during outreach: Continue daily weight checks and call if weight gain of concern, increased leg swelling or increased SOB episodes      Outreach Frequency: 2 weeks, more frequently as needed        Plan:     Care Coordinator will follow up in 1-2 weeks     [facility]   Erin Pope RN, Care Coordinator   [facility] and  [facility]'s   E-mail [email or web address]   [phone]

## 2025-03-18 ENCOUNTER — ANTICOAGULATION THERAPY VISIT (OUTPATIENT)
Dept: ANTICOAGULATION | Facility: CLINIC | Age: 70
End: 2025-03-18
Payer: COMMERCIAL

## 2025-03-18 DIAGNOSIS — I48.91 ATRIAL FIBRILLATION/FLUTTER (H): Primary | ICD-10-CM

## 2025-03-18 DIAGNOSIS — I48.92 ATRIAL FIBRILLATION/FLUTTER (H): Primary | ICD-10-CM

## 2025-03-18 LAB — INR (EXTERNAL): 2.2 (ref 0.9–1.1)

## 2025-03-18 NOTE — PROGRESS NOTES
ANTICOAGULATION MANAGEMENT     Roger Bonilla 69 year old male is on warfarin with therapeutic INR result. (Goal INR 2.0-3.0)    Recent labs: (last 7 days)     03/18/25  1248   INR 2.2*       ASSESSMENT     Source(s): Chart Review and Home Care/Facility Nurse     Warfarin doses taken: Warfarin taken as instructed  Diet: No new diet changes identified  Medication/supplement changes: None noted  New illness, injury, or hospitalization: No. No changes in lower leg fluid retention.   Signs or symptoms of bleeding or clotting: No  Previous result: Therapeutic last 2(+) visits  Additional findings: None       PLAN     Recommended plan for no diet, medication or health factor changes affecting INR     Dosing Instructions: Continue your current warfarin dose with next INR in 2 weeks       Summary  As of 3/18/2025      Full warfarin instructions:  5 mg every Mon, Thu, Sat; 7.5 mg all other days   Next INR check:  4/1/2025               Telephone call with [facility] care nurse who verbalizes understanding and agrees to plan    Orders given to  Homecare nurse/facility to recheck    Education provided: Please call back if any changes to your diet, medications or how you've been taking warfarin  Symptom monitoring: monitoring for bleeding signs and symptoms and monitoring for clotting signs and symptoms    Plan made per [facility] anticoagulation protocol    Johanna Hendrix RN  3/18/2025  Anticoagulation Clinic  [employer] for routing messages: tila DAI  [facility] patient phone line: [phone]        _______________________________________________________________________     Anticoagulation Episode Summary       Current INR goal:  2.0-3.0   TTR:  57.4% (10.3 mo)   Target end date:  Indefinite   Send INR reminders to:  AKOSUA DAI    Indications    Atrial fibrillation/flutter (H) [I48.91  I48.92]             Comments:  [facility] case mngr             Anticoagulation Care Providers       Provider Role Specialty Phone  number    Maria De Jesus Hay APRN CNP Referring Family Medicine [phone]    Nicole Lincoln APRN CNP Referring Family Medicine [phone]

## 2025-03-27 ENCOUNTER — PATIENT OUTREACH (OUTPATIENT)
Dept: CARE COORDINATION | Facility: CLINIC | Age: 70
End: 2025-03-27
Payer: COMMERCIAL

## 2025-03-27 ASSESSMENT — ACTIVITIES OF DAILY LIVING (ADL): DEPENDENT_IADLS:: INDEPENDENT

## 2025-03-27 NOTE — PROGRESS NOTES
Clinic Care Coordination Contact  1/30/2025 - 2/10/2025 (11 days)  [facility]     Pasquale Wilde MD  Last attending  Treatment team Influenza A  Principal problem     Follow Up Progress Note      Assessment: Denies any increased SOB episodes or increased feet edema  Weight stable at 224#  Uses oxygen at 1 liter at night     Care Gaps:    Health Maintenance Due   Topic Date Due    HF ACTION PLAN  Never done    RSV VACCINE (1 - Risk 60-74 years 1-dose series) Never done    DIABETIC FOOT EXAM  10/14/2023    MEDICARE ANNUAL WELLNESS VISIT  04/15/2025       Will address at next PCP visit         Intervention/Education provided during outreach: Call clinic with increased symptoms of concern              Plan:   No unmet needs, no further care coordination is needed at this time   Graduation letter sent via My Chart     [facility]   Erin Pope RN, Care Coordinator   [facility]'s   E-mail [email or web address]   [phone]

## 2025-03-27 NOTE — LETTER
[facility] CARE COORDINATION  [address] [facility] [address]     March 27, 2025    Roger SARGENT Chad  [address]   [place] [address]    Dear Roger,  Your Care Team congratulates you on your journey to maintain wellness. This document will help guide you on your journey to maintain a healthy lifestyle.  You can use this to help you overcome any barriers you may encounter.  If you should have any questions or concerns, you can contact the members of your Care Team or contact your Primary Care Clinic for assistance.     Health Maintenance  Health Maintenance Reviewed: Not assessed    My Access Plan  Medical Emergency 911   Primary Clinic Line [facility] - [phone]   24 Hour Appointment Line [phone] or  [phone]) (toll-free)   24 Hour Nurse Line [phone] (toll-free)   Preferred Urgent Care [facility] [place], [phone]   [facility] [place] [place]  [phone]   Preferred Pharmacy [place] [facility] [address] [facility]     Behavioral Health Crisis Line The National Suicide Prevention Lifeline at 1-610.268.9390 or 911     My Care Team Members  Patient Care Team         Relationship Specialty Notifications Start End    Maria De Jesus Hay APRN CNP PCP - General Nurse Practitioner  4/14/14     Phone: [phone] Fax: [phone]         [address] [facility] [address]    Maria De Jesus Hay APRN CNP Assigned PCP   9/1/13     Phone: [phone] Fax: [phone]         [address] [facility] [address]    Parveen Ramirez MD MD Cardiology  10/20/22     Phone: [phone] Fax: [phone] [address] [place] [address]    Tammy Hernandez PA-C Physician Assistant Cardiovascular Disease  10/20/22     Opal Baird APRN CNP Assigned Surgical Provider   10/7/23     Phone: [phone] Fax: [phone] [address] [facility] [address]     Kamryn Carroll APRN CNP Assigned Heart and Vascular Provider   9/23/24     Phone: [phone] Fax: [phone] [address] [facility] [address]              Advance Care Plans/Directives Type:      We notice that you do not have an Advance Directive on file. Upon completion of your Health Care Directive, please bring a copy with you to your next office visit.    It has been your Clinic Care Team's pleasure to work with you on accomplishing your goals.    Regards,  [facility]   Erin Pope RN, Care Coordinator   [facility]'s   E-mail [email or web address]   [phone]   Your Clinic Care Team

## 2025-04-01 ENCOUNTER — ANTICOAGULATION THERAPY VISIT (OUTPATIENT)
Dept: ANTICOAGULATION | Facility: CLINIC | Age: 70
End: 2025-04-01
Payer: COMMERCIAL

## 2025-04-01 DIAGNOSIS — I48.91 ATRIAL FIBRILLATION/FLUTTER (H): Primary | ICD-10-CM

## 2025-04-01 DIAGNOSIS — I48.92 ATRIAL FIBRILLATION/FLUTTER (H): Primary | ICD-10-CM

## 2025-04-01 LAB — INR (EXTERNAL): 2.5 (ref 0.9–1.1)

## 2025-04-01 NOTE — PROGRESS NOTES
ANTICOAGULATION MANAGEMENT     Roger Bonilla 69 year old male is on warfarin with therapeutic INR result. (Goal INR 2.0-3.0)    Recent labs: (last 7 days)     04/01/25  0848   INR 2.5*       ASSESSMENT     Source(s): Chart Review and Home Care/Facility Nurse     Warfarin doses taken: Warfarin taken as instructed  Diet: No new diet changes identified  Medication/supplement changes: None noted  New illness, injury, or hospitalization: No  Signs or symptoms of bleeding or clotting: No  Previous result: Therapeutic last 2(+) visits  Additional findings: None       PLAN     Recommended plan for no diet, medication or health factor changes affecting INR     Dosing Instructions: Continue your current warfarin dose with next INR in 1 week when home care returns for re-certification. Home care is not sure if they will continue nursing services at this time.        Summary  As of 4/1/2025      Full warfarin instructions:  5 mg every Mon, Thu, Sat; 7.5 mg all other days   Next INR check:  4/8/2025               Telephone call with [facility] who verbalizes understanding and agrees to plan    Orders given to  Homecare nurse/facility to recheck    Education provided: Please call back if any changes to your diet, medications or how you've been taking warfarin  Symptom monitoring: monitoring for bleeding signs and symptoms and monitoring for clotting signs and symptoms    Plan made per [facility] anticoagulation protocol    Johanna Hendrix RN  4/1/2025  Anticoagulation Clinic  [employer] for routing messages: tila DAI  [facility] patient phone line: [phone]        _______________________________________________________________________     Anticoagulation Episode Summary       Current INR goal:  2.0-3.0   TTR:  59.2% (10.8 mo)   Target end date:  Indefinite   Send INR reminders to:  AKOSUA DAI    Indications    Atrial fibrillation/flutter (H) [I48.91  I48.92]             Comments:  [facility] case [facility]              Anticoagulation Care Providers       Provider Role Specialty Phone number    Maria De Jesus Hay APRN CNP Referring Family Medicine [phone]    Nicole Lincoln APRN CNP Referring Family Medicine [phone]

## 2025-04-02 ENCOUNTER — TELEPHONE (OUTPATIENT)
Dept: FAMILY MEDICINE | Facility: CLINIC | Age: 70
End: 2025-04-02
Payer: COMMERCIAL

## 2025-04-02 NOTE — TELEPHONE ENCOUNTER
Test Results        Who ordered the test:  Maria De Jesus Hay    Type of test: Overnight Oximetry Results    Date of test:  3/12/25    Where was the test performed:  At home and pt sent it back a few weeks ago    What are your questions/concerns?:  He is concerned that it seemed that he do this urgently and no one has contacted him with results. He would like a callback ASA.    I called over to [facility] and they are looking into why results aren't in yet. She said she will send a message over to the Oximetry team. Routing to provider as KENDRA.    Could we send this information to you in SMS [place] or would you prefer to receive a phone call?:   Patient would prefer a phone call   Okay to leave a detailed message?: Yes at Home number on file [phone] (home)      Kitty Green on 4/2/2025 at 12:35 PM

## 2025-04-02 NOTE — TELEPHONE ENCOUNTER
Pt was given Maria De Jesus Hay's message to use his Oxygen at night when sleeping.  Pt is going to get a oxygen sensor at the pharmacy to check his oxygen during the day.   He said he wants to know what his daytime readings are.

## 2025-04-02 NOTE — TELEPHONE ENCOUNTER
I reviewed the fax that I received today.  Please let patient know that per this study, his oxygen levels drop consistently throughout the night.  I recommend that he continue to use oxygen at night while he is sleeping.    If he has further questions about this, he should let me know.  Maria De Jesus Hay, CNP

## 2025-04-08 ENCOUNTER — TELEPHONE (OUTPATIENT)
Dept: FAMILY MEDICINE | Facility: CLINIC | Age: 70
End: 2025-04-08
Payer: COMMERCIAL

## 2025-04-08 ENCOUNTER — ANTICOAGULATION THERAPY VISIT (OUTPATIENT)
Dept: ANTICOAGULATION | Facility: CLINIC | Age: 70
End: 2025-04-08
Payer: COMMERCIAL

## 2025-04-08 DIAGNOSIS — I48.91 ATRIAL FIBRILLATION/FLUTTER (H): Primary | ICD-10-CM

## 2025-04-08 DIAGNOSIS — I48.92 ATRIAL FIBRILLATION/FLUTTER (H): Primary | ICD-10-CM

## 2025-04-08 LAB — INR (EXTERNAL): 4 (ref 0.9–1.1)

## 2025-04-08 NOTE — PROGRESS NOTES
ANTICOAGULATION MANAGEMENT     Roger Bonilla 69 year old male is on warfarin with supratherapeutic INR result. (Goal INR 2.0-3.0)    Recent labs: (last 7 days)     04/08/25  1143   INR 4.0*       ASSESSMENT     Source(s): Chart Review and Home Care/Facility Nurse     Warfarin doses taken: Warfarin taken as instructed  Diet: No new diet changes identified  Medication/supplement changes:  Stopped about 1 week ago Vitamin K multivitamin supplement - per chart review this was started around 1/2025 and patient was therapeutic with MD of 37.5mg-40mg weekly  New illness, injury, or hospitalization: No  Signs or symptoms of bleeding or clotting: No  Previous result: Therapeutic last 2(+) visits  Additional findings: None       PLAN     Recommended plan for no diet, medication or health factor changes affecting INR     Dosing Instructions: hold dose then decrease your warfarin dose (11.1% change) with next INR in 1 week       Summary  As of 4/8/2025      Full warfarin instructions:  4/8: Hold; Otherwise 7.5 mg every Mon, Fri; 5 mg all other days   Next INR check:  4/15/2025               Telephone call with [facility] home care nurse who verbalizes understanding and agrees to plan and who agrees to plan and repeated back plan correctly    Orders given to  Homecare nurse/facility to recheck    Education provided: Interaction IS anticipated between warfarin and stopping of multivitamin with vitamin k    Plan made per [facility] anticoagulation protocol and per [facility] anticoagulation protocol; closest % change with current tablet size made per protocol for for INR 4-4.4 (goal 2-3)    Janice Muller RN  4/8/2025  Anticoagulation Clinic  [employer] for routing messages: tila DAI  [facility] patient phone line: [phone]        _______________________________________________________________________     Anticoagulation Episode Summary       Current INR goal:  2.0-3.0   TTR:  58.7% (11 mo)   Target end date:  Indefinite   [facility]    Indications    Atrial fibrillation/flutter (H) [I48.91  I48.92]             Comments:  [facility] case m[facility]             Anticoagulation Care Providers       Provider Role Specialty Phone number    Maria De Jesus Hay APRN CNP Referring Family Medicine [phone]    Nicole Lincoln APRN CNP Referring Family Medicine [phone]

## 2025-04-08 NOTE — TELEPHONE ENCOUNTER
Home Care is calling regarding an established patient with [facility].  Requesting orders from: Maria De Jesus Hay RN APPROVED: RN able to provide verbal orders.  Home Care will send orders for signature.  RN will close encounter.  Is this a request for a temporary pause in the home care episode?  No    Orders Requested    Skilled Nursing  Request for continuation of care with no increase or decrease in frequency  Frequency: 1 time a week for 1 week, then once every other week for 60 days (PRN INR checks)  RN gave verbal order: Yes        Phone number Home Care can be reached at: [phone]  Okay to leave a detailed message?: Yes    Contacts       Contact Date/Time Type Contact Phone/Fax    04/08/2025 12:18 PM CDT Phone (Incoming) Destin De León [phone]          Zoe Payne RN

## 2025-04-15 ENCOUNTER — ANTICOAGULATION THERAPY VISIT (OUTPATIENT)
Dept: ANTICOAGULATION | Facility: CLINIC | Age: 70
End: 2025-04-15
Payer: COMMERCIAL

## 2025-04-15 DIAGNOSIS — I48.91 ATRIAL FIBRILLATION/FLUTTER (H): Primary | ICD-10-CM

## 2025-04-15 DIAGNOSIS — I48.92 ATRIAL FIBRILLATION/FLUTTER (H): Primary | ICD-10-CM

## 2025-04-15 LAB — INR (EXTERNAL): 2

## 2025-04-15 NOTE — PROGRESS NOTES
ANTICOAGULATION MANAGEMENT     Roger Bonilla 69 year old male is on warfarin with therapeutic INR result. (Goal INR 2.0-3.0)    Recent labs: (last 7 days)     04/15/25  0000   INR 2.0       ASSESSMENT     Source(s): Chart Review and Home Care/Facility Nurse     Warfarin doses taken: Warfarin taken as instructed; patient held Warfarin and had MD decrease at last encounter.  Diet: No new diet changes identified  Medication/supplement changes:  Patient stopped multivitamin with vit K ~ 2 weeks ago; MD decreased 11%.  New illness, injury, or hospitalization: No  Signs or symptoms of bleeding or clotting: No  Previous result: Supratherapeutic  Additional findings: None       PLAN     Recommended plan for no diet, medication or health factor changes affecting INR     Dosing Instructions: Continue your current warfarin dose with next INR in 2 weeks       Summary  As of 4/15/2025      Full warfarin instructions:  7.5 mg every Mon, Fri; 5 mg all other days   Next INR check:  4/29/2025               Telephone call with [facility] care nurse who verbalizes understanding and agrees to plan    Orders given to  Homecare nurse/facility to recheck    Education provided: Please call back if any changes to your diet, medications or how you've been taking warfarin  Contact [phone] with any changes, questions or concerns.     Plan made per [facility] anticoagulation protocol    Dipti Lynch RN  4/15/2025  Anticoagulation Clinic  [employer] for routing messages: tila DAI  [facility] patient phone line: [phone]        _______________________________________________________________________     Anticoagulation Episode Summary       Current INR goal:  2.0-3.0   TTR:  58.5% (11.2 mo)   Target end date:  Indefinite   Send INR reminders to:  AKOSUA DAI    Indications    Atrial fibrillation/flutter (H) [I48.91  I48.92]             Comments:  [facility] case mngr             Anticoagulation Care Providers       Provider  Role Specialty Phone number    Maria De Jesus Hay APRN CNP Referring Family Medicine [phone]    Nicole Lincoln APRN CNP Referring Family Medicine [phone]

## 2025-04-21 ENCOUNTER — OFFICE VISIT (OUTPATIENT)
Dept: FAMILY MEDICINE | Facility: CLINIC | Age: 70
End: 2025-04-21
Payer: COMMERCIAL

## 2025-04-21 ENCOUNTER — ANTICOAGULATION THERAPY VISIT (OUTPATIENT)
Dept: ANTICOAGULATION | Facility: CLINIC | Age: 70
End: 2025-04-21

## 2025-04-21 VITALS
RESPIRATION RATE: 20 BRPM | WEIGHT: 243.4 LBS | HEIGHT: 66 IN | OXYGEN SATURATION: 95 % | TEMPERATURE: 97.8 F | SYSTOLIC BLOOD PRESSURE: 102 MMHG | HEART RATE: 72 BPM | BODY MASS INDEX: 39.12 KG/M2 | DIASTOLIC BLOOD PRESSURE: 80 MMHG

## 2025-04-21 DIAGNOSIS — I48.91 ATRIAL FIBRILLATION/FLUTTER (H): Primary | ICD-10-CM

## 2025-04-21 DIAGNOSIS — Z12.5 SCREENING FOR PROSTATE CANCER: ICD-10-CM

## 2025-04-21 DIAGNOSIS — I25.118 ATHEROSCLEROSIS OF NATIVE CORONARY ARTERY OF NATIVE HEART WITH STABLE ANGINA PECTORIS: ICD-10-CM

## 2025-04-21 DIAGNOSIS — I48.92 ATRIAL FIBRILLATION/FLUTTER (H): Primary | ICD-10-CM

## 2025-04-21 DIAGNOSIS — E66.01 CLASS 2 SEVERE OBESITY DUE TO EXCESS CALORIES WITH SERIOUS COMORBIDITY AND BODY MASS INDEX (BMI) OF 39.0 TO 39.9 IN ADULT (H): ICD-10-CM

## 2025-04-21 DIAGNOSIS — E11.51 TYPE 2 DIABETES MELLITUS WITH DIABETIC PERIPHERAL ANGIOPATHY WITHOUT GANGRENE, WITHOUT LONG-TERM CURRENT USE OF INSULIN (H): ICD-10-CM

## 2025-04-21 DIAGNOSIS — E66.812 CLASS 2 SEVERE OBESITY DUE TO EXCESS CALORIES WITH SERIOUS COMORBIDITY AND BODY MASS INDEX (BMI) OF 39.0 TO 39.9 IN ADULT (H): ICD-10-CM

## 2025-04-21 DIAGNOSIS — I48.91 ATRIAL FIBRILLATION/FLUTTER (H): ICD-10-CM

## 2025-04-21 DIAGNOSIS — Z00.00 ENCOUNTER FOR MEDICARE ANNUAL WELLNESS EXAM: Primary | ICD-10-CM

## 2025-04-21 DIAGNOSIS — I48.92 ATRIAL FIBRILLATION/FLUTTER (H): ICD-10-CM

## 2025-04-21 PROBLEM — D69.1 PLATELET DYSFUNCTION DUE TO ASPIRIN (H): Status: RESOLVED | Noted: 2025-01-30 | Resolved: 2025-04-21

## 2025-04-21 PROBLEM — J10.1 INFLUENZA A: Status: RESOLVED | Noted: 2025-01-30 | Resolved: 2025-04-21

## 2025-04-21 PROBLEM — R78.81 BACTEREMIA: Status: RESOLVED | Noted: 2025-02-10 | Resolved: 2025-04-21

## 2025-04-21 PROBLEM — T39.015A PLATELET DYSFUNCTION DUE TO ASPIRIN (H): Status: RESOLVED | Noted: 2025-01-30 | Resolved: 2025-04-21

## 2025-04-21 PROBLEM — J18.9 PNEUMONIA: Status: RESOLVED | Noted: 2025-02-10 | Resolved: 2025-04-21

## 2025-04-21 LAB
ANION GAP SERPL CALCULATED.3IONS-SCNC: 11 MMOL/L (ref 7–15)
BUN SERPL-MCNC: 14.5 MG/DL (ref 8–23)
CALCIUM SERPL-MCNC: 9.5 MG/DL (ref 8.8–10.4)
CHLORIDE SERPL-SCNC: 97 MMOL/L (ref 98–107)
CREAT SERPL-MCNC: 1.21 MG/DL (ref 0.67–1.17)
EGFRCR SERPLBLD CKD-EPI 2021: 65 ML/MIN/1.73M2
EST. AVERAGE GLUCOSE BLD GHB EST-MCNC: 148 MG/DL
GLUCOSE SERPL-MCNC: 99 MG/DL (ref 70–99)
HBA1C MFR BLD: 6.8 % (ref 0–5.6)
HCO3 SERPL-SCNC: 32 MMOL/L (ref 22–29)
INR BLD: 2.5 (ref 0.9–1.1)
POTASSIUM SERPL-SCNC: 4.3 MMOL/L (ref 3.4–5.3)
PSA SERPL DL<=0.01 NG/ML-MCNC: 4.07 NG/ML (ref 0–4.5)
SODIUM SERPL-SCNC: 140 MMOL/L (ref 135–145)

## 2025-04-21 PROCEDURE — 83036 HEMOGLOBIN GLYCOSYLATED A1C: CPT | Performed by: NURSE PRACTITIONER

## 2025-04-21 PROCEDURE — 80048 BASIC METABOLIC PNL TOTAL CA: CPT | Performed by: NURSE PRACTITIONER

## 2025-04-21 PROCEDURE — 85610 PROTHROMBIN TIME: CPT | Performed by: NURSE PRACTITIONER

## 2025-04-21 PROCEDURE — G0103 PSA SCREENING: HCPCS | Performed by: NURSE PRACTITIONER

## 2025-04-21 PROCEDURE — 36415 COLL VENOUS BLD VENIPUNCTURE: CPT | Performed by: NURSE PRACTITIONER

## 2025-04-21 SDOH — HEALTH STABILITY: PHYSICAL HEALTH: ON AVERAGE, HOW MANY DAYS PER WEEK DO YOU ENGAGE IN MODERATE TO STRENUOUS EXERCISE (LIKE A BRISK WALK)?: 2 DAYS

## 2025-04-21 ASSESSMENT — SOCIAL DETERMINANTS OF HEALTH (SDOH): HOW OFTEN DO YOU GET TOGETHER WITH FRIENDS OR RELATIVES?: THREE TIMES A WEEK

## 2025-04-21 ASSESSMENT — PAIN SCALES - GENERAL: PAINLEVEL_OUTOF10: NO PAIN (0)

## 2025-04-21 NOTE — PROGRESS NOTES
"Preventive Care Visit  [facility]  SAL Kc CNP, Family Medicine  Apr 21, 2025          Assessment & Plan     Encounter for Medicare annual wellness exam    Type 2 diabetes mellitus with diabetic peripheral angiopathy without gangrene, without long-term current use of insulin (H)  Has been well controlled with diet.  Due for A1c today  - HEMOGLOBIN A1C; Future  - OFFICE/OUTPT VISIT,EST,LEVL IV  - Basic metabolic panel  (Ca, Cl, CO2, Creat, Gluc, K, Na, BUN); Future  - HEMOGLOBIN A1C  - Basic metabolic panel  (Ca, Cl, CO2, Creat, Gluc, K, Na, BUN)    Atherosclerosis of native coronary artery of native heart with stable angina pectoris  Asymptomatic today  - OFFICE/OUTPT VISIT,EST,LEVL IV  - Basic metabolic panel  (Ca, Cl, CO2, Creat, Gluc, K, Na, BUN); Future  - Basic metabolic panel  (Ca, Cl, CO2, Creat, Gluc, K, Na, BUN)    Class 2 severe obesity due to excess calories with serious comorbidity and body mass index (BMI) of 39.0 to 39.9 in adult (H)  Encouraged weight loss - would likely help with blood sugars, lipids, ABIGAIL  - OFFICE/OUTPT VISIT,EST,LEVL IV    Atrial fibrillation/flutter (H)  - OFFICE/OUTPT VISIT,EST,LEVL IV  - INR point of care (finger stick)    Screening for prostate cancer  - PSA, screen; Future  - PSA, screen    Home overnight oximetry study was completed - patient has significant desaturations while sleeping. Will need oxygen while sleeping, likely indefinitely. Again, strongly encouraged patient to quit smoking.    BMI  Estimated body mass index is 39.29 kg/m  as calculated from the following:    Height as of this encounter: 1.676 m (5' 6\").    Weight as of this encounter: 110.4 kg (243 lb 6.4 oz).   Weight management plan: Discussed healthy diet and exercise guidelines    Counseling  Appropriate preventive services were addressed with this patient via screening, questionnaire, or discussion as appropriate for fall prevention, nutrition, physical " activity, Tobacco-use cessation, social engagement, weight loss and cognition.  Checklist reviewing preventive services available has been given to the patient.  Reviewed patient's diet, addressing concerns and/or questions.   He is at risk for lack of exercise and has been provided with information to increase physical activity for the benefit of his well-being.   He is at risk for psychosocial distress and has been provided with information to reduce risk.   The patient was provided with written information regarding signs of hearing loss.     The risks, benefits and treatment options of prescribed medications or other treatments have been discussed with the patient. The patient verbalized their understanding and should call or follow up if no improvement or if they develop further problems.  Maria De Jesus Bharti, CNP                Brittani Mccord is a 69 year old, presenting for the following:  Wellness Visit, Orders (Has had oxygen machine at home since February when he was discharged from Hospital- he states he was never shown how to use it . Needs someone to show him . ), and Derm Problem (Area on  mid back is itchy x 3 weeks )    Part of a research study - do not draw lipids.          4/21/2025     1:51 PM   Additional Questions   Roomed by Maco CHESTER CMA   Accompanied by self         4/21/2025     1:51 PM   Patient Reported Additional Medications   Patient reports taking the following new medications Vitamin D3 1000iu daily       HPI     Chief Complaint   Patient presents with    Wellness Visit    Orders     Has had oxygen machine at home since February when he was discharged from Hospital- he states he was never shown how to use it . Needs someone to show him .     Derm Problem     Area on  mid back is itchy x 3 weeks        Orders Has had oxygen machine at home since February when he was discharged from Hospital- he states he was never shown how to use it . Needs someone to show him .      Concern - Itchy  Area On Mid Back   Onset: x 3 week   Description: itchy area on mid back   Intensity: mild, moderate  Progression of Symptoms:  same  Accompanying Signs & Symptoms: none   Previous history of similar problem: none   Precipitating factors:        Worsened by: none   Alleviating factors:        Improved by: none   Therapies tried and outcome: None          Advance Care Planning    Patient states has Health Care Directive and will send to [facility].        4/21/2025   General Health   How would you rate your overall physical health? (!) POOR   Feel stress (tense, anxious, or unable to sleep) To some extent   (!) STRESS CONCERN      4/21/2025   Nutrition   Diet: Regular (no restrictions)         4/21/2025   Exercise   Days per week of moderate/strenous exercise 2 days   (!) EXERCISE CONCERN      4/21/2025   Social Factors   Frequency of gathering with friends or relatives Three times a week   Worry food won't last until get money to buy more No   Food not last or not have enough money for food? No   Do you have housing? (Housing is defined as stable permanent housing and does not include staying ouside in a car, in a tent, in an abandoned building, in an overnight shelter, or couch-surfing.) Yes   Are you worried about losing your housing? No   Lack of transportation? No   Unable to get utilities (heat,electricity)? No         4/21/2025   Fall Risk   Fallen 2 or more times in the past year? No   Trouble with walking or balance? Yes   Gait Speed Test (Document in seconds) 5.46   Gait Speed Test Interpretation Greater than 5.01 seconds - ABNORMAL          4/21/2025   Activities of Daily Living- Home Safety   Needs help with the following daily activites None of the above   Safety concerns in the home None of the above         4/21/2025   Dental   Dentist two times every year? Yes         4/21/2025   Hearing Screening   Hearing concerns? (!) TROUBLE UNDERSTANDING SOFT OR WHISPERED SPEECH.         4/21/2025   Driving  Risk Screening   Patient/family members have concerns about driving No         4/21/2025   General Alertness/Fatigue Screening   Have you been more tired than usual lately? No         4/21/2025   Urinary Incontinence Screening   Bothered by leaking urine in past 6 months No         Today's PHQ-2 Score:       4/21/2025     1:48 PM   PHQ-2 ( 1999 [employer])   Q1: Little interest or pleasure in doing things 1   Q2: Feeling down, depressed or hopeless 1   PHQ-2 Score 2    Q1: Little interest or pleasure in doing things Several days   Q2: Feeling down, depressed or hopeless Several days   PHQ-2 Score 2       Patient-reported           4/21/2025   Substance Use   If I could quit smoking, I would Neutral   I want to quit somking, worry about health affects Neutral   Willing to make a plan to quit smoking Neutral   Willing to cut down before quitting Neutral   Alcohol more than 3/day or more than 7/wk No   Do you have a current opioid prescription? No   How severe/bad is pain from 1 to 10? 0/10 (No Pain)   Do you use any other substances recreationally? (!) CANNABIS PRODUCTS     Social History     Tobacco Use    Smoking status: Every Day     Current packs/day: 0.50     Average packs/day: 0.5 packs/day for 53.3 years (26.7 ttl pk-yrs)     Types: Cigarettes     Start date: 1/1/1972     Passive exposure: Never (per pt)    Smokeless tobacco: Never    Tobacco comments:     5-10 cigs daily 4/21/25 ;     Vaping Use    Vaping status: Never Used   Substance Use Topics    Alcohol use: Not Currently     Comment: 2 beers a year    Drug use: No       Last PSA:   PSA   Date Value Ref Range Status   09/30/2011 1.59 0 - 4 ug/L Final     Prostate Specific Antigen Screen   Date Value Ref Range Status   04/15/2024 3.17 0.00 - 4.50 ng/mL Final     ASCVD Risk   The 10-year ASCVD risk score (Savage URIBE, et al., 2019) is: 31.3%    Values used to calculate the score:      Age: 69 years      Sex: Male      Is Non- :  No      Diabetic: Yes      Tobacco smoker: Yes      Systolic Blood Pressure: 102 mmHg      Is BP treated: Yes      HDL Cholesterol: 30 mg/dL      Total Cholesterol: 142 mg/dL            Reviewed and updated as needed this visit by Provider   Tobacco  Allergies  Meds  Problems  Med Hx  Surg Hx  Fam Hx              Current providers sharing in care for this patient include:  Patient Care Team:  Maria De Jesus Hay APRN CNP as PCP - General (Nurse Practitioner)  Maria De Jesus Hay APRN CNP as Assigned PCP  Parveen Ramirez MD as MD (Cardiology)  Tammy Hernandez PA-C as Physician Assistant (Cardiovascular Disease)  Opal Baird APRN CNP as Assigned Surgical Provider  Kamryn Carroll APRN CNP as Assigned Heart and Vascular Provider    The following health maintenance items are reviewed in Epic and correct as of today:  Health Maintenance   Topic Date Due    HF ACTION PLAN  Never done    RSV VACCINE (1 - Risk 60-74 years 1-dose series) Never done    DIABETIC FOOT EXAM  10/14/2023    MEDICARE ANNUAL WELLNESS VISIT  04/15/2025    A1C  04/30/2025    LIPID  05/10/2025    COVID-19 Vaccine (8 - 2024-25 season) 04/30/2025    BMP  09/04/2025    NICOTINE/TOBACCO CESSATION COUNSELING Q 1 YR  10/31/2025    MICROALBUMIN  10/31/2025    LUNG CANCER SCREENING  01/29/2026    ALT  02/03/2026    ANNUAL REVIEW OF HM ORDERS  02/25/2026    CBC  03/04/2026    FALL RISK ASSESSMENT  04/21/2026    EYE EXAM  02/27/2027    ADVANCE CARE PLANNING  04/15/2029    COLORECTAL CANCER SCREENING  01/03/2030    DTAP/TDAP/TD IMMUNIZATION (3 - Td or Tdap) 10/23/2033    TSH W/FREE T4 REFLEX  Completed    HEPATITIS C SCREENING  Completed    PHQ-2 (once per calendar year)  Completed    INFLUENZA VACCINE  Completed    Pneumococcal Vaccine: 50+ Years  Completed    ZOSTER IMMUNIZATION  Completed    AORTIC ANEURYSM SCREENING (SYSTEM ASSIGNED)  Completed    HPV IMMUNIZATION  Aged Out    MENINGITIS IMMUNIZATION  Aged Out         Review of  "Systems  Constitutional, HEENT, cardiovascular, pulmonary, gi and gu systems are negative, except as otherwise noted.     Objective    Exam  /80 (BP Location: Right arm, Patient Position: Sitting, Cuff Size: Adult Regular)   Pulse 72   Temp 97.8  F (36.6  C) (Tympanic)   Resp 20   Ht 1.676 m (5' 6\")   Wt 110.4 kg (243 lb 6.4 oz)   SpO2 95%   BMI 39.29 kg/m     Estimated body mass index is 39.29 kg/m  as calculated from the following:    Height as of this encounter: 1.676 m (5' 6\").    Weight as of this encounter: 110.4 kg (243 lb 6.4 oz).    Physical Exam  GENERAL: alert and no distress  HENT: ear canals and TM's normal, nose and mouth without ulcers or lesions  NECK: no adenopathy, no asymmetry, masses, or scars  RESP: lungs clear to auscultation - no rales, rhonchi or wheezes  CV: irregularly irregular rhythm, normal S1 S2, no S3 or S4, no murmur, click or rub, peripheral pulses strong, and no peripheral edema  ABDOMEN: soft, nontender, no hepatosplenomegaly, no masses and bowel sounds normal  MS: no gross musculoskeletal defects noted, no edema  PSYCH: mentation appears normal and affect flat        4/21/2025   Mini Cog   Clock Draw Score 2 Normal   3 Item Recall 3 objects recalled   Mini Cog Total Score 5              Signed Electronically by: SAL Kc CNP    "

## 2025-04-21 NOTE — PATIENT INSTRUCTIONS
Patient Education   Preventive Care Advice   This is general advice given by our system to help you stay healthy. However, your care team may have specific advice just for you. Please talk to your care team about your preventive care needs.  Nutrition  Eat 5 or more servings of fruits and vegetables each day.  Try wheat bread, brown rice and whole grain pasta (instead of white bread, rice, and pasta).  Get enough calcium and vitamin D. Check the label on foods and aim for 100% of the RDA (recommended daily allowance).  Lifestyle  Exercise at least 150 minutes each week  (30 minutes a day, 5 days a week).  Do muscle strengthening activities 2 days a week. These help control your weight and prevent disease.  No smoking.  Wear sunscreen to prevent skin cancer.  Have a dental exam and cleaning every 6 months.  Yearly exams  See your health care team every year to talk about:  Any changes in your health.  Any medicines your care team has prescribed.  Preventive care, family planning, and ways to prevent chronic diseases.  Shots (vaccines)   HPV shots (up to age 26), if you've never had them before.  Hepatitis B shots (up to age 59), if you've never had them before.  COVID-19 shot: Get this shot when it's due.  Flu shot: Get a flu shot every year.  Tetanus shot: Get a tetanus shot every 10 years.  Pneumococcal, hepatitis A, and RSV shots: Ask your care team if you need these based on your risk.  Shingles shot (for age 50 and up)  General health tests  Diabetes screening:  Starting at age 35, Get screened for diabetes at least every 3 years.  If you are younger than age 35, ask your care team if you should be screened for diabetes.  Cholesterol test: At age 39, start having a cholesterol test every 5 years, or more often if advised.  Bone density scan (DEXA): At age 50, ask your care team if you should have this scan for osteoporosis (brittle bones).  Hepatitis C: Get tested at least once in your life.  STIs (sexually  transmitted infections)  Before age 24: Ask your care team if you should be screened for STIs.  After age 24: Get screened for STIs if you're at risk. You are at risk for STIs (including HIV) if:  You are sexually active with more than one person.  You don't use condoms every time.  You or a partner was diagnosed with a sexually transmitted infection.  If you are at risk for HIV, ask about PrEP medicine to prevent HIV.  Get tested for HIV at least once in your life, whether you are at risk for HIV or not.  Cancer screening tests  Cervical cancer screening: If you have a cervix, begin getting regular cervical cancer screening tests starting at age 21.  Breast cancer scan (mammogram): If you've ever had breasts, begin having regular mammograms starting at age 40. This is a scan to check for breast cancer.  Colon cancer screening: It is important to start screening for colon cancer at age 45.  Have a colonoscopy test every 10 years (or more often if you're at risk) Or, ask your provider about stool tests like a FIT test every year or Cologuard test every 3 years.  To learn more about your testing options, visit:   .  For help making a decision, visit:   https://bit.ly/wl93573.  Prostate cancer screening test: If you have a prostate, ask your care team if a prostate cancer screening test (PSA) at age 55 is right for you.  Lung cancer screening: If you are a current or former smoker ages 50 to 80, ask your care team if ongoing lung cancer screenings are right for you.  For informational purposes only. Not to replace the advice of your health care provider. Copyright   2023 [facility]. All rights reserved. Clinically reviewed by the [facility]. [employer] AB [id number] - REV 01/24.  Preventing Falls: Care Instructions  Injuries and health problems such as trouble walking or poor eyesight can increase your risk of falling. So can some medicines. But there are things you can do to help  "prevent falls. You can exercise to get stronger. You can also arrange your home to make it safer.    Talk to your doctor about the medicines you take. Ask if any of them increase the risk of falls and whether they can be changed or stopped.   Try to exercise regularly. It can help improve your strength and balance. This can help lower your risk of falling.         Practice fall safety and prevention.   Wear low-heeled shoes that fit well and give your feet good support. Talk to your doctor if you have foot problems that make this hard.  Carry a cellphone or wear a medical alert device that you can use to call for help.  Use stepladders instead of chairs to reach high objects. Don't climb if you're at risk for falls. Ask for help, if needed.  Wear the correct eyeglasses, if you need them.        Make your home safer.   Remove rugs, cords, clutter, and furniture from walkways.  Keep your house well lit. Use night-lights in hallways and bathrooms.  Install and use sturdy handrails on stairways.  Wear nonskid footwear, even inside. Don't walk barefoot or in socks without shoes.        Be safe outside.   Use handrails, curb cuts, and ramps whenever possible.  Keep your hands free by using a shoulder bag or backpack.  Try to walk in well-lit areas. Watch out for uneven ground, changes in pavement, and debris.  Be careful in the winter. Walk on the grass or gravel when sidewalks are slippery. Use de-icer on steps and walkways. Add non-slip devices to shoes.    Put grab bars and nonskid mats in your shower or tub and near the toilet. Try to use a shower chair or bath bench when bathing.   Get into a tub or shower by putting in your weaker leg first. Get out with your strong side first. Have a phone or medical alert device in the bathroom with you.   Where can you learn more?  Go to https://www.[employer].net/patiented  Enter G117 in the search box to learn more about \"Preventing Falls: Care Instructions.\"  Current as of: " July 31, 2024  Content Version: 14.4    2524-2643 [employer]   Care instructions adapted under license by your healthcare professional. If you have questions about a medical condition or this instruction, always ask your healthcare professional. [employer] disclaims any warranty or liability for your use of this information.    Hearing Loss: Care Instructions  Overview     Hearing loss is a sudden or slow decrease in how well you hear. It can range from slight to profound. Permanent hearing loss can occur with aging. It also can happen when you are exposed long-term to loud noise. Examples include listening to loud music, riding motorcycles, or being around other loud machines.  Hearing loss can affect your work and home life. It can make you feel lonely or depressed. You may feel that you have lost your independence. But hearing aids and other devices can help you hear better and feel connected to others.  Follow-up care is a key part of your treatment and safety. Be sure to make and go to all appointments, and call your doctor if you are having problems. It's also a good idea to know your test results and keep a list of the medicines you take.  How can you care for yourself at home?  Avoid loud noises whenever possible. This helps keep your hearing from getting worse.  Always wear hearing protection around loud noises.  Wear a hearing aid as directed.  A professional can help you pick a hearing aid that will work best for you.  You can also get hearing aids over the counter for mild to moderate hearing loss.  Have hearing tests as your doctor suggests. They can show whether your hearing has changed. Your hearing aid may need to be adjusted.  Use other devices as needed. These may include:  Telephone amplifiers and hearing aids that can connect to a television, stereo, radio, or microphone.  Devices that use lights or vibrations. These alert you to the doorbell, a ringing telephone, or a  "baby monitor.  Television closed-captioning. This shows the words at the bottom of the screen. Most new TVs can do this.  TTY (text telephone). This lets you type messages back and forth on the telephone instead of talking or listening. These devices are also called TDD. When messages are typed on the keyboard, they are sent over the phone line to a receiving TTY. The message is shown on a monitor.  Use text messaging, social media, and email if it is hard for you to communicate by telephone.  Try to learn a listening technique called speechreading. It is not lipreading. You pay attention to people's gestures, expressions, posture, and tone of voice. These clues can help you understand what a person is saying. Face the person you are talking to, and have them face you. Make sure the lighting is good. You need to see the other person's face clearly.  Think about counseling if you need help to adjust to your hearing loss.  When should you call for help?  Watch closely for changes in your health, and be sure to contact your doctor if:    You think your hearing is getting worse.     You have new symptoms, such as dizziness or nausea.   Where can you learn more?  Go to https://www.[employer].net/patiented  Enter R798 in the search box to learn more about \"Hearing Loss: Care Instructions.\"  Current as of: October 27, 2024  Content Version: 14.4    3167-2664 [email or web address].   Care instructions adapted under license by your healthcare professional. If you have questions about a medical condition or this instruction, always ask your healthcare professional. [email or web address] disclaims any warranty or liability for your use of this information.    Learning About Stress  What is stress?     Stress is your body's response to a hard situation. Your body can have a physical, emotional, or mental response. Stress is a fact of life for most people, and it affects everyone differently. What causes stress for you may not " be stressful for someone else.  A lot of things can cause stress. You may feel stress when you go on a job interview, take a test, or run a race. This kind of short-term stress is normal and even useful. It can help you if you need to work hard or react quickly. For example, stress can help you finish an important job on time.  Long-term stress is caused by ongoing stressful situations or events. Examples of long-term stress include long-term health problems, ongoing problems at work, or conflicts in your family. Long-term stress can harm your health.  How does stress affect your health?  When you are stressed, your body responds as though you are in danger. It makes hormones that speed up your heart, make you breathe faster, and give you a burst of energy. This is called the fight-or-flight stress response. If the stress is over quickly, your body goes back to normal and no harm is done.  But if stress happens too often or lasts too long, it can have bad effects. Long-term stress can make you more likely to get sick, and it can make symptoms of some diseases worse. If you tense up when you are stressed, you may develop neck, shoulder, or low back pain. Stress is linked to high blood pressure and heart disease.  Stress also harms your emotional health. It can make you segal, tense, or depressed. Your relationships may suffer, and you may not do well at work or school.  What can you do to manage stress?  You can try these things to help manage stress:   Do something active. Exercise or activity can help reduce stress. Walking is a great way to get started. Even everyday activities such as housecleaning or yard work can help.  Try yoga or steve chi. These techniques combine exercise and meditation. You may need some training at first to learn them.  Do something you enjoy. For example, listen to music or go to a movie. Practice your hobby or do volunteer work.  Meditate. This can help you relax, because you are not  "worrying about what happened before or what may happen in the future.  Do guided imagery. Imagine yourself in any setting that helps you feel calm. You can use online videos, books, or a teacher to guide you.  Do breathing exercises. For example:  From a standing position, bend forward from the waist with your knees slightly bent. Let your arms dangle close to the floor.  Breathe in slowly and deeply as you return to a standing position. Roll up slowly and lift your head last.  Hold your breath for just a few seconds in the standing position.  Breathe out slowly and bend forward from the waist.  Let your feelings out. Talk, laugh, cry, and express anger when you need to. Talking with supportive friends or family, a counselor, or a hung leader about your feelings is a healthy way to relieve stress. Avoid discussing your feelings with people who make you feel worse.  Write. It may help to write about things that are bothering you. This helps you find out how much stress you feel and what is causing it. When you know this, you can find better ways to cope.  What can you do to prevent stress?  You might try some of these things to help prevent stress:  Manage your time. This helps you find time to do the things you want and need to do.  Get enough sleep. Your body recovers from the stresses of the day while you are sleeping.  Get support. Your family, friends, and community can make a difference in how you experience stress.  Limit your news feed. Avoid or limit time on social media or news that may make you feel stressed.  Do something active. Exercise or activity can help reduce stress. Walking is a great way to get started.  Where can you learn more?  Go to https://www.WeiPhone.com.net/patiented  Enter N032 in the search box to learn more about \"Learning About Stress.\"  Current as of: October 24, 2024  Content Version: 14.4    0477-4326 [employer] [facility] [employer].   Care instructions adapted under license by your " healthcare professional. If you have questions about a medical condition or this instruction, always ask your healthcare professional. [employer] disclaims any warranty or liability for your use of this information.    Substance Use Disorder: Care Instructions  Overview     You can improve your life and health by stopping your use of alcohol or drugs. When you don't drink or use drugs, you may feel and sleep better. You may get along better with your family, friends, and coworkers. There are medicines and programs that can help with substance use disorder.  How can you care for yourself at home?  Here are some ways to help you stay sober and prevent relapse.  If you have been given medicine to help keep you sober or reduce your cravings, be sure to take it exactly as prescribed.  Talk to your doctor about programs that can help you stop using drugs or drinking alcohol.  Do not keep alcohol or drugs in your home.  Plan ahead. Think about what you'll say if other people ask you to drink or use drugs. Try not to spend time with people who drink or use drugs.  Use the time and money spent on drinking or drugs to do something that's important to you.  Preventing a relapse  Have a plan to deal with relapse. Learn to recognize changes in your thinking that lead you to drink or use drugs. Get help before you start to drink or use drugs again.  Try to stay away from situations, friends, or places that may lead you to drink or use drugs.  If you feel the need to drink alcohol or use drugs again, seek help right away. Call a trusted friend or family member. Some people get support from organizations such as Narcotics Anonymous or [employer] or from treatment facilities.  If you relapse, get help as soon as you can. Some people make a plan with another person that outlines what they want that person to do for them if they relapse. The plan usually includes how to handle the relapse and who to notify in case of  "relapse.  Don't give up. Remember that a relapse doesn't mean that you have failed. Use the experience to learn the triggers that lead you to drink or use drugs. Then quit again. Recovery is a lifelong process. Many people have several relapses before they are able to quit for good.  Follow-up care is a key part of your treatment and safety. Be sure to make and go to all appointments, and call your doctor if you are having problems. It's also a good idea to know your test results and keep a list of the medicines you take.  When should you call for help?   Call 911  anytime you think you may need emergency care. For example, call if you or someone else:    Has overdosed or has withdrawal signs. Be sure to tell the emergency workers that you are or someone else is using or trying to quit using drugs. Overdose or withdrawal signs may include:  Losing consciousness.  Seizure.  Seeing or hearing things that aren't there (hallucinations).     Is thinking or talking about suicide or harming others.   Where to get help 24 hours a day, 7 days a week   If you or someone you know talks about suicide, self-harm, a mental health crisis, a substance use crisis, or any other kind of emotional distress, get help right away. You can:    Call the Suicide and Crisis Lifeline at 988.     Call 7-203-558-TQLY (1-548.570.3805).     Text HOME to 988254 to access the Crisis Text Line.   Consider saving these numbers in your phone.  Go to [employer] for more information or to chat online.  Call your doctor now or seek immediate medical care if:    You are having withdrawal symptoms. These may include nausea or vomiting, sweating, shakiness, and anxiety.   Watch closely for changes in your health, and be sure to contact your doctor if:    You have a relapse.     You need more help or support to stop.   Where can you learn more?  Go to https://www.healthwise.net/patiented  Enter H573 in the search box to learn more about \"Substance Use " "Disorder: Care Instructions.\"  Current as of: August 20, 2024  Content Version: 14.4    2103-1312 [employer].   Care instructions adapted under license by your healthcare professional. If you have questions about a medical condition or this instruction, always ask your healthcare professional. [employer] disclaims any warranty or liability for your use of this information.       " good balance

## 2025-04-21 NOTE — PROGRESS NOTES
ANTICOAGULATION MANAGEMENT     Roger Bonilla 69 year old male is on warfarin with therapeutic INR result. (Goal INR 2.0-3.0)    Recent labs: (last 7 days)     04/21/25  1427   INR 2.5*       ASSESSMENT     Source(s): Chart Review and Patient/Caregiver Call     Warfarin doses taken: Warfarin taken as instructed  Diet: No new diet changes identified  Medication/supplement changes: None noted  New illness, injury, or hospitalization: No  Signs or symptoms of bleeding or clotting: No  Previous result: Therapeutic last visit; previously outside of goal range  Additional findings: None       PLAN     Recommended plan for no diet, medication or health factor changes affecting INR     Dosing Instructions: Continue your current warfarin dose with next INR in 1 week       Summary  As of 4/21/2025      Full warfarin instructions:  7.5 mg every Mon, Fri; 5 mg all other days   Next INR check:  4/29/2025               Telephone call with [employer] who verbalizes understanding and agrees to plan    Advised patient home care is scheduled to see him on 4/29/25 and will check his INR then    Education provided: Please call back if any changes to your diet, medications or how you've been taking warfarin    Plan made per [facility] anticoagulation protocol    Rosemarie Melara, RN  4/21/2025  Anticoagulation Clinic  [employer] for routing messages: tila DAI  [facility] patient phone line: [phone]        _______________________________________________________________________     Anticoagulation Episode Summary       Current INR goal:  2.0-3.0   TTR:  59.3% (11.5 mo)   Target end date:  Indefinite   Send INR reminders to:  AKOSUA DAI    Indications    Atrial fibrillation/flutter (H) [I48.91  I48.92]             Comments:  [facility] Care- [place] case [facility]             Anticoagulation Care Providers       Provider Role Specialty Phone number    Maria De Jesus Hay APRN [facility] Referring Family Medicine [phone]    Nicole Lincoln APRN  CNP Referring Family Medicine [phone]

## 2025-04-21 NOTE — PROGRESS NOTES
RN reached out to [facility] at provider's request:  [phone].  Spoke with Tia, and she is going to have someone reach out to patient regarding setting up an in-home demonstration for oxygen use.    Teri Harris RN  [facility]

## 2025-04-29 ENCOUNTER — ANTICOAGULATION THERAPY VISIT (OUTPATIENT)
Dept: ANTICOAGULATION | Facility: CLINIC | Age: 70
End: 2025-04-29
Payer: COMMERCIAL

## 2025-04-29 ENCOUNTER — TELEPHONE (OUTPATIENT)
Dept: FAMILY MEDICINE | Facility: CLINIC | Age: 70
End: 2025-04-29
Payer: COMMERCIAL

## 2025-04-29 DIAGNOSIS — I48.92 ATRIAL FIBRILLATION/FLUTTER (H): Primary | ICD-10-CM

## 2025-04-29 DIAGNOSIS — I48.91 ATRIAL FIBRILLATION/FLUTTER (H): Primary | ICD-10-CM

## 2025-04-29 LAB — INR (EXTERNAL): 2.2

## 2025-04-29 NOTE — PROGRESS NOTES
ANTICOAGULATION MANAGEMENT     Roger Bonilla 69 year old male is on warfarin with therapeutic INR result. (Goal INR 2.0-3.0)    Recent labs: (last 7 days)     04/29/25  0831   INR 2.2       ASSESSMENT     Source(s): Chart Review and Patient/Caregiver Call     Warfarin doses taken: Warfarin taken as instructed  Diet: No new diet changes identified  Medication/supplement changes: None noted  New illness, injury, or hospitalization: No  Signs or symptoms of bleeding or clotting: No  Previous result: Therapeutic last 2(+) visits  Additional findings: None       PLAN     Recommended plan for no diet, medication or health factor changes affecting INR     Dosing Instructions: Continue your current warfarin dose with next INR in 2 weeks       Summary  As of 4/29/2025      Full warfarin instructions:  7.5 mg every Mon, Fri; 5 mg all other days   Next INR check:  5/13/2025               Detailed voice message left for Mary home care nurse with dosing instructions and follow up date.     Orders given to  Homecare nurse/facility to recheck    Education provided: Please call back if any changes to your diet, medications or how you've been taking warfarin    Plan made per [facility] anticoagulation protocol    Natalie Morris, RN  4/29/2025  Anticoagulation Clinic  [employer] for routing messages: tila DAI  [facility] patient phone line: [phone]        _______________________________________________________________________     Anticoagulation Episode Summary       Current INR goal:  2.0-3.0   TTR:  60.0% (11.7 mo)   Target end date:  Indefinite   Send INR reminders to:  AKOSUA DAI    Indications    Atrial fibrillation/flutter (H) [I48.91  I48.92]             Comments:  [facility]- [place] case [place]             Anticoagulation Care Providers       Provider Role Specialty Phone number    Maria De Jesus Hay APRN CNP Referring Family Medicine [phone]    Nicole Lincoln APRN CNP Referring Family Medicine [phone]

## 2025-05-03 DIAGNOSIS — I48.92 ATRIAL FIBRILLATION/FLUTTER (H): ICD-10-CM

## 2025-05-03 DIAGNOSIS — I48.91 ATRIAL FIBRILLATION/FLUTTER (H): ICD-10-CM

## 2025-05-03 DIAGNOSIS — E11.9 TYPE 2 DIABETES MELLITUS WITHOUT COMPLICATION, WITHOUT LONG-TERM CURRENT USE OF INSULIN (H): ICD-10-CM

## 2025-05-05 RX ORDER — WARFARIN SODIUM 5 MG/1
5-7.5 TABLET ORAL EVERY EVENING
Qty: 100 TABLET | Refills: 1 | Status: SHIPPED | OUTPATIENT
Start: 2025-05-05

## 2025-05-05 NOTE — TELEPHONE ENCOUNTER
ANTICOAGULATION MANAGEMENT:  Medication Refill    Anticoagulation Summary  As of 4/29/2025      Warfarin maintenance plan:  7.5 mg (5 mg x 1.5) every Mon, Fri; 5 mg (5 mg x 1) all other days   Next INR check:  5/13/2025   Target end date:  Indefinite    Indications    Atrial fibrillation/flutter (H) [I48.91  I48.92]                 Anticoagulation Care Providers       Provider Role Specialty Phone number    Maria De Jesus Hay APRN CNP Referring Family Medicine [phone]    Nicole Lincoln APRN CNP Referring Family Medicine [phone]            Refill Criteria    Visit with referring provider/group: Meets criteria: visit within referring provider group in the last 15 months on 4/21/25    [facility] referral last signed: 03/05/2025; within last year:  Yes    Lab monitoring is up to date (not exceeding 2 weeks overdue): Yes    Roger meets all criteria for refill. Rx instructions and quantity supplied updated to match patient's current dosing plan. Warfarin 90 day supply with 1 refill granted per [facility] protocol     Natalie Morris, RN  Anticoagulation Clinic

## 2025-05-10 ENCOUNTER — MYC MEDICAL ADVICE (OUTPATIENT)
Dept: FAMILY MEDICINE | Facility: CLINIC | Age: 70
End: 2025-05-10
Payer: COMMERCIAL

## 2025-05-10 DIAGNOSIS — I50.21 ACUTE SYSTOLIC HEART FAILURE (H): ICD-10-CM

## 2025-05-12 RX ORDER — FUROSEMIDE 40 MG/1
40 TABLET ORAL DAILY
Qty: 90 TABLET | Refills: 3 | Status: SHIPPED | OUTPATIENT
Start: 2025-05-12

## 2025-05-12 NOTE — TELEPHONE ENCOUNTER
"  Medication Question or Refill    What medication are you calling about (include dose and sig)?: furosemide 20mg tab    Fax received from [employer] with the following message    \"Pt thinks he's supposed to be on a full 40mg dose every day. Please check on this. Thanks!\"          "

## 2025-05-12 NOTE — TELEPHONE ENCOUNTER
[facility] must have an old medication list.  His medication list in Epic is correct. Potassium is not on his medication list. The Furosemide was listed as 40 mg daily. I sent in 40 mg tabs.  Maria De Jesus Hay, CNP

## 2025-05-13 ENCOUNTER — ANTICOAGULATION THERAPY VISIT (OUTPATIENT)
Dept: ANTICOAGULATION | Facility: CLINIC | Age: 70
End: 2025-05-13
Payer: COMMERCIAL

## 2025-05-13 DIAGNOSIS — I48.91 ATRIAL FIBRILLATION/FLUTTER (H): Primary | ICD-10-CM

## 2025-05-13 DIAGNOSIS — I48.92 ATRIAL FIBRILLATION/FLUTTER (H): Primary | ICD-10-CM

## 2025-05-13 LAB — INR (EXTERNAL): 2.5 (ref 0.9–1.1)

## 2025-05-13 NOTE — PROGRESS NOTES
ANTICOAGULATION MANAGEMENT     Roger Bonilla 69 year old male is on warfarin with therapeutic INR result. (Goal INR 2.0-3.0)    Recent labs: (last 7 days)     05/13/25  1517   INR 2.5*       ASSESSMENT     Source(s): Chart Review and Home Care/Facility Nurse     Warfarin doses taken: Warfarin taken as instructed  Diet: No new diet changes identified  Medication/supplement changes: None noted  New illness, injury, or hospitalization: No  Signs or symptoms of bleeding or clotting: No  Previous result: Therapeutic last 2(+) visits  Additional findings: noted patient will be discharged from home care on 6/9/25       PLAN     Recommended plan for no diet, medication or health factor changes affecting INR     Dosing Instructions: Continue your current warfarin dose with next INR in 2 weeks       Summary  As of 5/13/2025      Full warfarin instructions:  7.5 mg every Mon, Fri; 5 mg all other days   Next INR check:  5/27/2025               Telephone call with Ollie home care nurse who verbalizes understanding and agrees to plan and who agrees to plan and repeated back plan correctly    Orders given to  Homecare nurse/facility to recheck    Education provided: Contact [phone] with any changes, questions or concerns.     Plan made per [facility] anticoagulation protocol    Janice Muller RN  5/13/2025  Anticoagulation Clinic  [employer] for routing messages: tila DAI  [facility] patient phone line: [phone]        _______________________________________________________________________     Anticoagulation Episode Summary       Current INR goal:  2.0-3.0   TTR:  60.6% (11.7 mo)   Target end date:  Indefinite   Send INR reminders to:  AKOSUA DAI    Indications    Atrial fibrillation/flutter (H) [I48.91  I48.92]             Comments:  [facility]- [facility] case mngr             Anticoagulation Care Providers       Provider Role Specialty Phone number    Maria De Jesus Hay APRN CNP Referring Family Medicine  [phone]    Nicole Lincoln APRN [facility] Medicine [phone]

## 2025-05-14 ENCOUNTER — TELEPHONE (OUTPATIENT)
Dept: VASCULAR SURGERY | Facility: CLINIC | Age: 70
End: 2025-05-14
Payer: COMMERCIAL

## 2025-05-14 ENCOUNTER — TRANSFERRED RECORDS (OUTPATIENT)
Dept: CARDIOLOGY | Facility: CLINIC | Age: 70
End: 2025-05-14
Payer: COMMERCIAL

## 2025-05-14 NOTE — PROGRESS NOTES
Research laboratory data from May 9 not clinically significant including mildly increased direct bilirubin, mildly low hemoglobin in the setting of increased RDW, mildly high neutrophil count as well as monocyte count and mildly increased hemoglobin A1c, glucose, and calculated anion gap.  LF

## 2025-05-14 NOTE — TELEPHONE ENCOUNTER
Left Voicemail (1st Attempt) and Sent Mychart (1st Attempt) for the patient to call back and schedule the following:Annual Follow up CEA/PVD follow-up;     Location: [facility]  Provider: Opal Baird CNP  Appointment type: Return Vascular Patient  Appointment mode: In Person or Virtual Visit  Return date: October 2025    Specialty phone number: [phone] or [phone]    Referred to [facility]: No    Is Imaging Needed: Yes, US x2    Additional Notes: Please schedule imaging prior to Provider visit.    -Carlos Reym on 5/14/2025 at 4:16 PM

## 2025-05-21 ENCOUNTER — TELEPHONE (OUTPATIENT)
Dept: FAMILY MEDICINE | Facility: CLINIC | Age: 70
End: 2025-05-21
Payer: COMMERCIAL

## 2025-05-21 NOTE — TELEPHONE ENCOUNTER
Home Care is calling regarding an established patient with [facility].  Requesting orders from: Maria De Jesus Hay RN APPROVED: RN able to provide verbal orders.  Home Care will send orders for signature.  RN will close encounter.  Is this a request for a temporary pause in the home care episode?  No    Orders Requested    Skilled Nursing  Request for delay in care, service to be provided within 7 days of previously   Service rescheduled to 05/27/25. This is per patient request since he is not due for an INR check.     RN gave verbal order: Yes      Phone number Home Care can be reached at: see below  Okay to leave a detailed message?: N/A    Contacts       Contact Date/Time Type Contact Phone/Fax    05/21/2025 12:08 PM CDT Phone (Incoming) nurse Mary with [facility] (Home Care) [phone]          Luisana Farris RN

## 2025-05-27 ENCOUNTER — ANTICOAGULATION THERAPY VISIT (OUTPATIENT)
Dept: ANTICOAGULATION | Facility: CLINIC | Age: 70
End: 2025-05-27
Payer: COMMERCIAL

## 2025-05-27 DIAGNOSIS — I48.92 ATRIAL FIBRILLATION/FLUTTER (H): Primary | ICD-10-CM

## 2025-05-27 DIAGNOSIS — I48.91 ATRIAL FIBRILLATION/FLUTTER (H): Primary | ICD-10-CM

## 2025-05-27 LAB — INR (EXTERNAL): 2.9

## 2025-05-27 NOTE — PROGRESS NOTES
ANTICOAGULATION MANAGEMENT     Roger Bonilla 69 year old male is on warfarin with therapeutic INR result. (Goal INR 2.0-3.0)    Recent labs: (last 7 days)     05/27/25  0000   INR 2.9       ASSESSMENT     Source(s): Chart Review and Home Care/Facility Nurse     Warfarin doses taken: Warfarin taken as instructed  Diet: No new diet changes identified  Medication/supplement changes: None noted  New illness, injury, or hospitalization: No  Signs or symptoms of bleeding or clotting: No  Previous result: Therapeutic last 2(+) visits  Additional findings: 6/9/25 last home care visit, patient will return to lab after       PLAN     Recommended plan for no diet, medication or health factor changes affecting INR     Dosing Instructions: Continue your current warfarin dose with next INR in 2 weeks       Summary  As of 5/27/2025      Full warfarin instructions:  7.5 mg every Mon, Fri; 5 mg all other days   Next INR check:  6/9/2025               Telephone call with Mary home care nurse who verbalizes understanding and agrees to plan    Orders given to  Homecare nurse/facility to recheck    Education provided: Contact [phone] with any changes, questions or concerns.     Plan made per [facility] anticoagulation protocol    Dipti Lynch, RN  5/27/2025  Anticoagulation Clinic  [employer] for routing messages: tila DAI  [facility] patient phone line: [phone]        _______________________________________________________________________     Anticoagulation Episode Summary       Current INR goal:  2.0-3.0   TTR:  60.5% (11.6 mo)   Target end date:  Indefinite   Send INR reminders to:  AKOSUA DAI    Indications    Atrial fibrillation/flutter (H) [I48.91  I48.92]             Comments:  [facility] case [facility]             Anticoagulation Care Providers       Provider Role Specialty Phone number    Maria De Jesus Hay APRN CNP Referring Family Medicine [phone]    Nicole Lincoln APRN CNP Referring Family  Medicine [phone]

## 2025-05-29 ENCOUNTER — TELEPHONE (OUTPATIENT)
Dept: FAMILY MEDICINE | Facility: CLINIC | Age: 70
End: 2025-05-29
Payer: COMMERCIAL

## 2025-05-29 NOTE — TELEPHONE ENCOUNTER
"Writer noted the following message in the \"Pt CRM Request\" folder, routing to care team for follow up.    Roger SARGENT Chad \"[employer]\"  P [facility] Pool5 hours ago (11:47 AM)     SR  Topic: Non-Medical Question.     NELA COELHO RN    I SEE YOU HAVE TOLD TIA TO CONTACT ME ON INSTRUCTIONS OF THE OXYGEN MACHINE, BUT IT'S BEEN OVER A MONTH AND NO CONTACT YET. PLEASE CHECK ON THIS. SEE WHO DROPPED THE BALL. FROM VISIT ON APRIL 21ST.  CC; TYLER MOORE.      THANKS FLEX Whitten RN  [facility]  "

## 2025-06-02 NOTE — TELEPHONE ENCOUNTER
"The patient \"Customer Service\" message below was received and routed to RN team.  See OV note dated 4/21/25 below regarding oxygen correspondence. It sounds like [facility] never reached out to patient to schedule a time to go out and complete an in-service.    RN reached out to patient to see how things are going with his oxygen and if he still even needs this in-service over a month later, as this is something he really needs to arrange with them if he has questions, etc.  Patient was instructed to return call to [facility] main line at [phone] to speak with an RN, or just reach out to [facility] directly at: [phone]    Teri Harris RN  [facility]      4/21/25 OV note:  Progress Notes  Teri Harris, RN (Registered Nurse)  RN reached out to [facility] at provider's request:  [phone].  Spoke with Tia, and she is going to have someone reach out to patient regarding setting up an in-home demonstration for oxygen use.     Teri Harris RN  [facility]        "

## 2025-06-09 ENCOUNTER — ANTICOAGULATION THERAPY VISIT (OUTPATIENT)
Dept: ANTICOAGULATION | Facility: CLINIC | Age: 70
End: 2025-06-09
Payer: COMMERCIAL

## 2025-06-09 ENCOUNTER — TELEPHONE (OUTPATIENT)
Dept: FAMILY MEDICINE | Facility: CLINIC | Age: 70
End: 2025-06-09
Payer: COMMERCIAL

## 2025-06-09 DIAGNOSIS — I48.92 ATRIAL FIBRILLATION/FLUTTER (H): Primary | ICD-10-CM

## 2025-06-09 DIAGNOSIS — I48.91 ATRIAL FIBRILLATION/FLUTTER (H): Primary | ICD-10-CM

## 2025-06-09 LAB — INR (EXTERNAL): 2.3 (ref 0.9–1.1)

## 2025-06-09 NOTE — TELEPHONE ENCOUNTER
Home Care is calling regarding an established patient with [facility].  Requesting orders from: Maria De Jesus Hay RN APPROVED: RN able to provide verbal orders.  Home Care will send orders for signature.  RN will close encounter.  Is this a request for a temporary pause in the home care episode?  No    Orders Requested    Skilled Nursing  Request for discontinuation of care   Goals have been met/progressing.  Frequency: once every other week  RN gave verbal order: Yes        Phone number Home Care can be reached at:    Contact Date/Time Type Contact Phone/Fax    06/09/2025 03:32 PM CDT Phone (Incoming) JASON De León with [facility] [phone]          Okay to leave a detailed message?: Yes      Josi Leon, RN

## 2025-06-09 NOTE — PROGRESS NOTES
ANTICOAGULATION MANAGEMENT     Roger Bonilla 69 year old male is on warfarin with therapeutic INR result. (Goal INR 2.0-3.0)    Recent labs: (last 7 days)     06/09/25  1348   INR 2.3*       ASSESSMENT     Source(s): Chart Review and Home Care/Facility Nurse     Warfarin doses taken: Warfarin taken as instructed  Diet: No new diet changes identified  Medication/supplement changes: None noted  New illness, injury, or hospitalization: No  Signs or symptoms of bleeding or clotting: No  Previous result: Therapeutic last 2(+) visits  Additional findings: Patient is discharging from home care today       PLAN     Recommended plan for no diet, medication or health factor changes affecting INR     Dosing Instructions: Continue your current warfarin dose with next INR in 3 weeks       Summary  As of 6/9/2025      Full warfarin instructions:  7.5 mg every Mon, Fri; 5 mg all other days   Next INR check:  --               Telephone call with [facility] home care nurse who verbalizes understanding and agrees to plan    Lab visit scheduled    Education provided: Please call back if any changes to your diet, medications or how you've been taking warfarin    Plan made per [facility] anticoagulation protocol    Johanna Hendrix RN  6/9/2025  Anticoagulation Clinic  [employer] for routing messages: tila DAI  [facility] patient phone line: [phone]        _______________________________________________________________________     Anticoagulation Episode Summary       Current INR goal:  2.0-3.0   TTR:  63.1% (11.7 mo)   Target end date:  Indefinite   Send INR reminders to:  AKOSUA DAI    Indications    Atrial fibrillation/flutter (H) [I48.91  I48.92]             Comments:  --             Anticoagulation Care Providers       Provider Role Specialty Phone number    Maria De Jesus Hay APRN CNP Referring Family Medicine [phone]    Nicole Lincoln APRN CNP Referring Family Medicine [phone]

## 2025-07-01 ENCOUNTER — ANTICOAGULATION THERAPY VISIT (OUTPATIENT)
Dept: ANTICOAGULATION | Facility: CLINIC | Age: 70
End: 2025-07-01

## 2025-07-01 ENCOUNTER — LAB (OUTPATIENT)
Dept: LAB | Facility: CLINIC | Age: 70
End: 2025-07-01
Payer: COMMERCIAL

## 2025-07-01 ENCOUNTER — RESULTS FOLLOW-UP (OUTPATIENT)
Dept: ANTICOAGULATION | Facility: CLINIC | Age: 70
End: 2025-07-01

## 2025-07-01 DIAGNOSIS — I48.92 ATRIAL FIBRILLATION/FLUTTER (H): Primary | ICD-10-CM

## 2025-07-01 DIAGNOSIS — I48.92 ATRIAL FIBRILLATION/FLUTTER (H): ICD-10-CM

## 2025-07-01 DIAGNOSIS — I48.91 ATRIAL FIBRILLATION/FLUTTER (H): Primary | ICD-10-CM

## 2025-07-01 DIAGNOSIS — I48.91 ATRIAL FIBRILLATION/FLUTTER (H): ICD-10-CM

## 2025-07-01 LAB — INR BLD: 2.9 (ref 0.9–1.1)

## 2025-07-01 PROCEDURE — 36416 COLLJ CAPILLARY BLOOD SPEC: CPT

## 2025-07-01 PROCEDURE — 85610 PROTHROMBIN TIME: CPT

## 2025-07-01 NOTE — PROGRESS NOTES
ANTICOAGULATION MANAGEMENT     Roger Bonilla 69 year old male is on warfarin with therapeutic INR result. (Goal INR 2.0-3.0)    Recent labs: (last 7 days)     07/01/25  1239   INR 2.9*       ASSESSMENT     Warfarin Lab Questionnaire    Warfarin Doses Last 7 Days      6/30/2025     1:14 PM   Dose in Tablet or Mg   TAB or MG? milligram (mg)     Pt Rptd Dose SUNDAY MONDAY TUESDAY WED THURS FRIDAY SATURDAY 6/30/2025   1:14 PM 5 7.5 5 5 5 7.5 5         6/30/2025   Warfarin Lab Questionnaire   Missed doses within past 14 days? No   Changes in diet or alcohol within past 14 days? No   Medication changes since last result? No   Injuries or illness since last result? No   New shortness of breath, severe headaches or sudden changes in vision since last result? No   Abnormal bleeding since last result? No   Upcoming surgery, procedure? No   Best number to call with results? [phone]     Previous result: Therapeutic last 2(+) visits  Additional findings: None       PLAN     Recommended plan for no diet, medication or health factor changes affecting INR     Dosing Instructions: Continue your current warfarin dose with next INR in 4 weeks       Summary  As of 7/1/2025      Full warfarin instructions:  7.5 mg every Mon, Fri; 5 mg all other days   Next INR check:  7/29/2025               Telephone call with Roger who verbalizes understanding and agrees to plan    Lab visit scheduled    Education provided: Contact [phone] with any changes, questions or concerns.     Plan made per [facility] anticoagulation protocol    Tiffanie Zazueta RN  7/1/2025  Anticoagulation Clinic  [facility] for routing messages: tila LARA  [facility] patient phone line: [phone]        _______________________________________________________________________     Anticoagulation Episode Summary       Current INR goal:  2.0-3.0   TTR:  67.5% (11.7 mo)   Target end date:  Indefinite   Send INR reminders to:  AKOSUA LARA    Indications    Atrial  fibrillation/flutter (H) [I48.91  I48.92]             Comments:  --             Anticoagulation Care Providers       Provider Role Specialty Phone number    Maria De Jesus Hay APRN CNP Referring Family Medicine [phone]    Nicole Lincoln APRN CNP Referring Family Medicine [phone]

## 2025-07-29 ENCOUNTER — LAB (OUTPATIENT)
Dept: LAB | Facility: CLINIC | Age: 70
End: 2025-07-29
Payer: COMMERCIAL

## 2025-07-29 ENCOUNTER — RESULTS FOLLOW-UP (OUTPATIENT)
Dept: NURSING | Facility: CLINIC | Age: 70
End: 2025-07-29

## 2025-07-29 ENCOUNTER — ANTICOAGULATION THERAPY VISIT (OUTPATIENT)
Dept: ANTICOAGULATION | Facility: CLINIC | Age: 70
End: 2025-07-29

## 2025-07-29 DIAGNOSIS — I48.92 ATRIAL FIBRILLATION/FLUTTER (H): ICD-10-CM

## 2025-07-29 DIAGNOSIS — I48.91 ATRIAL FIBRILLATION/FLUTTER (H): ICD-10-CM

## 2025-07-29 DIAGNOSIS — I48.91 ATRIAL FIBRILLATION/FLUTTER (H): Primary | ICD-10-CM

## 2025-07-29 DIAGNOSIS — I48.92 ATRIAL FIBRILLATION/FLUTTER (H): Primary | ICD-10-CM

## 2025-07-29 LAB — INR BLD: 2.1 (ref 0.9–1.1)

## 2025-07-29 PROCEDURE — 36415 COLL VENOUS BLD VENIPUNCTURE: CPT

## 2025-07-29 PROCEDURE — 85610 PROTHROMBIN TIME: CPT

## 2025-07-29 NOTE — PROGRESS NOTES
ANTICOAGULATION MANAGEMENT     Roger Bonilla 69 year old male is on warfarin with therapeutic INR result. (Goal INR 2.0-3.0)    Recent labs: (last 7 days)     07/29/25  1343   INR 2.1*       ASSESSMENT     Source(s): Chart Review and Patient/Caregiver Call     Warfarin doses taken: Warfarin taken as instructed  Diet: No new diet changes identified  Medication/supplement changes: quite taking vitamin supplement  New illness, injury, or hospitalization: No  Signs or symptoms of bleeding or clotting: No  Previous result: Therapeutic last 2(+) visits  Additional findings: None       PLAN     Recommended plan for no diet, medication or health factor changes affecting INR     Dosing Instructions: Continue your current warfarin dose with next INR in 5 weeks       Summary  As of 7/29/2025      Full warfarin instructions:  7.5 mg every Mon, Fri; 5 mg all other days   Next INR check:  9/2/2025               Telephone call with [employer] who verbalizes understanding and agrees to plan    Lab visit scheduled    Education provided: Goal range and lab monitoring: goal range and significance of current result    Plan made per [facility] anticoagulation protocol    Kori Ames RN  7/29/2025  Anticoagulation Clinic  [employer] for routing messages: tila LARA  [facility] patient phone line: [phone]        _______________________________________________________________________     Anticoagulation Episode Summary       Current INR goal:  2.0-3.0   TTR:  72.8% (11.7 mo)   Target end date:  Indefinite   Send INR reminders to:  AKOSUA LARA    Indications    Atrial fibrillation/flutter (H) [I48.91  I48.92]             Comments:  --             Anticoagulation Care Providers       Provider Role Specialty Phone number    Maria De Jesus Hay APRN CNP Referring Family Medicine [phone]    Nicole Lincoln APRN CNP Referring Family Medicine [phone]

## 2025-08-04 DIAGNOSIS — Z00.6 EXAMINATION OF PARTICIPANT IN CLINICAL TRIAL: Primary | ICD-10-CM

## 2025-08-04 DIAGNOSIS — E78.5 HYPERLIPIDEMIA LDL GOAL <70: ICD-10-CM

## 2025-08-05 ENCOUNTER — OFFICE VISIT (OUTPATIENT)
Dept: CARDIOLOGY | Facility: CLINIC | Age: 70
End: 2025-08-05
Payer: COMMERCIAL

## 2025-08-05 VITALS
WEIGHT: 259 LBS | BODY MASS INDEX: 41.8 KG/M2 | DIASTOLIC BLOOD PRESSURE: 70 MMHG | SYSTOLIC BLOOD PRESSURE: 114 MMHG | RESPIRATION RATE: 16 BRPM | HEART RATE: 80 BPM

## 2025-08-05 DIAGNOSIS — Z00.6 EXAMINATION OF PARTICIPANT IN CLINICAL TRIAL: Primary | ICD-10-CM

## 2025-08-05 DIAGNOSIS — E78.5 HYPERLIPIDEMIA LDL GOAL <70: ICD-10-CM

## 2025-08-05 RX ORDER — FUROSEMIDE 20 MG/1
1 TABLET ORAL
COMMUNITY
Start: 2025-05-09

## 2025-09-02 ENCOUNTER — LAB (OUTPATIENT)
Dept: LAB | Facility: CLINIC | Age: 70
End: 2025-09-02
Payer: COMMERCIAL

## 2025-09-02 ENCOUNTER — ANTICOAGULATION THERAPY VISIT (OUTPATIENT)
Dept: ANTICOAGULATION | Facility: CLINIC | Age: 70
End: 2025-09-02

## 2025-09-02 ENCOUNTER — RESULTS FOLLOW-UP (OUTPATIENT)
Dept: NURSING | Facility: CLINIC | Age: 70
End: 2025-09-02

## 2025-09-02 DIAGNOSIS — I48.92 ATRIAL FIBRILLATION/FLUTTER (H): ICD-10-CM

## 2025-09-02 DIAGNOSIS — I48.91 ATRIAL FIBRILLATION/FLUTTER (H): Primary | ICD-10-CM

## 2025-09-02 DIAGNOSIS — I48.91 ATRIAL FIBRILLATION/FLUTTER (H): ICD-10-CM

## 2025-09-02 DIAGNOSIS — I48.92 ATRIAL FIBRILLATION/FLUTTER (H): Primary | ICD-10-CM

## 2025-09-02 LAB — INR BLD: 3 (ref 0.9–1.1)

## 2025-09-02 PROCEDURE — 36415 COLL VENOUS BLD VENIPUNCTURE: CPT

## 2025-09-02 PROCEDURE — 85610 PROTHROMBIN TIME: CPT

## (undated) DEVICE — SU ETHIBOND 0 CT-1 CR 8X18" CX21D

## (undated) DEVICE — CLIP HORIZON MED BLUE 002200

## (undated) DEVICE — SU SILK 3-0 TIE 12X30" A304H

## (undated) DEVICE — PITCHER STERILE 1000ML  SSK9004A

## (undated) DEVICE — SPONGE SURGIFOAM 100 1974

## (undated) DEVICE — DRAPE STERI FLUOROSCOPE 35X43"1012 LATEX FREE

## (undated) DEVICE — SU STEEL 6 CCS 4X18" M654G

## (undated) DEVICE — RESERVOIR CELL SAVING BLOOD COLLECTION EL2120

## (undated) DEVICE — SU VICRYL 3-0 SH 27" UND J416H

## (undated) DEVICE — SU ETHIBOND 3-0 BBDA 36" X588H

## (undated) DEVICE — BLADE KNIFE SURG 15 371115

## (undated) DEVICE — CANNULA VENOUS 2 STAGE 32-40FR

## (undated) DEVICE — KIT WASH CELL SAVING ATL2001

## (undated) DEVICE — VESSEL LOOPS RED MINI 31145710

## (undated) DEVICE — SOL NACL 0.9% IRRIG 1000ML BOTTLE 2F7124

## (undated) DEVICE — SU STRATAFIX 2-0 MH 36" SXPD2B412

## (undated) DEVICE — SU PROLENE 6-0 C-1DA 30" M8706

## (undated) DEVICE — ESU ELEC BLADE 2.75" COATED/INSULATED E1455

## (undated) DEVICE — PREP CHLORAPREP 26ML TINTED ORANGE  260815

## (undated) DEVICE — SU PROLENE 4-0 RB-1DA 36" 8557H

## (undated) DEVICE — DRAIN CHEST TUBE RIGHT ANGLED 28FR 8128

## (undated) DEVICE — BNDG ELASTIC 6"X5YDS DOUBLE STERILE

## (undated) DEVICE — BLOWER/MISTER CLEARVIEW 22150

## (undated) DEVICE — SU PROLENE 2-0 SHDA 36" 8523H

## (undated) DEVICE — SU MONOCRYL 4-0 PS-2 18" UND Y496G

## (undated) DEVICE — CANNULA VESSEL DLP  30001

## (undated) DEVICE — SOL WATER IRRIG 1000ML BOTTLE 07139-09

## (undated) DEVICE — SYR EAR BULB 3OZ 0035830

## (undated) DEVICE — NDL 25GA 1.5" 305127

## (undated) DEVICE — PACK TUBING MINI VAC CUSTOM 1/2X3/8T BB9J78R4

## (undated) DEVICE — SU SILK 4-0 TIE 12X30" A303H

## (undated) DEVICE — INTRO GLIDESHEATH SLENDER 6FR 10X45CM 60-1060

## (undated) DEVICE — WIPE INSTRUMENT MEROCEL 400200

## (undated) DEVICE — ESU ELEC BLADE 6" COATED/INSULATED E1455-6

## (undated) DEVICE — KIT ENDO VASOVIEW HEMOPRO 2 VH-4000

## (undated) DEVICE — SU UMBILICAL TAPE .125X30" U11T

## (undated) DEVICE — DRAIN CHEST TUBE 36FR STR 8036

## (undated) DEVICE — TOTE ANGIO CORP PC15AT SAN32CC83O

## (undated) DEVICE — DECANTER BAG 2002S

## (undated) DEVICE — SOL NACL 0.9% IRRIG 1000ML BOTTLE 07138-09

## (undated) DEVICE — SU VICRYL 2-0 CT-1 27" UND J259H

## (undated) DEVICE — DEFIB PRO-PADZ LVP LQD GEL ADULT 8900-2105-01

## (undated) DEVICE — CANNULA PERFUSION ARTERIAL 20FR 12" 77420

## (undated) DEVICE — SHUNT CAROTID ARGYLE

## (undated) DEVICE — SUCTION CANISTER MEDIVAC LINER 3000ML W/LID 65651-530

## (undated) DEVICE — SU SILK 0 TIE 6X18" A186H

## (undated) DEVICE — SU SILK 3-0 SH CR 8X18" C013D

## (undated) DEVICE — LINEN GOWN OVERSIZE 5408

## (undated) DEVICE — RX SURGIFLO HEMOSTATIC MATRIX W/THROMBIN 8ML 2994

## (undated) DEVICE — CANNULA PERFUSION AORTIC ROOT 22FR 20012

## (undated) DEVICE — CONNECTOR DRAIN CHEST Y EXTENSION SET 19909

## (undated) DEVICE — DRAPE SHEET REV FOLD 3/4 9349

## (undated) DEVICE — DEVICE ASSEMBLY SUCTION/ANTI COAG BTC93

## (undated) DEVICE — SOMASENSOR CEREBRAL OXIMETER ADULT SAFB-SM

## (undated) DEVICE — SUCTION CATH AIRLIFE TRI-FLO W/CONTROL PORT 14FR  T60C

## (undated) DEVICE — SU PROLENE 7-0 BV-1DA 4X24" M8702

## (undated) DEVICE — SLEEVE TR BAND RADIAL COMPRESSION DEVICE 24CM TRB24-REG

## (undated) DEVICE — PREP CHLORAPREP W/ORANGE TINT 10.5ML 930715

## (undated) DEVICE — ESU PENCIL SMOKE EVAC W/ROCKER SWITCH 0703-047-000

## (undated) DEVICE — CONNECTOR PERFUSION STR 1/2X1/2" W/O LL 6025

## (undated) DEVICE — PROBE TEMPERATURE MYOCARDIAL 30MMX22GA MTS-40030

## (undated) DEVICE — SU STRATAFIX PDS PLUS 0 CT-1 18" SXPP1A401

## (undated) DEVICE — SPONGE LAP 18X18" X8435

## (undated) DEVICE — BONE WAX 2.5GM W31G

## (undated) DEVICE — COVER TABLE POLY 65X90" 8186

## (undated) DEVICE — LINEN TOWEL PACK X6 WHITE 5487

## (undated) DEVICE — SU MONOCRYL 4-0 PS-2 27" UND Y426H

## (undated) DEVICE — CLIP HORIZON MULTI SM YELLOW 001204

## (undated) DEVICE — SOL WATER IRRIG 3000ML BAG 2B7117

## (undated) DEVICE — SU ETHIBOND 2-0 SHDA 30" X563H

## (undated) DEVICE — SYR 10ML FINGER CONTROL W/O NDL 309695

## (undated) DEVICE — Device

## (undated) DEVICE — SU SILK 1 TIE 10X30" SA87G

## (undated) DEVICE — PROTECTOR ARM ONE-STEP TRENDELENBURG 40418

## (undated) DEVICE — PACK MINI VAC CUSTOM CARDOPULMONARY BB5Z97R15

## (undated) DEVICE — DRSG KERLIX 4 1/2"X4YDS ROLL 6715

## (undated) DEVICE — DEVICE TISSUE STABILIZATION OCTOBASE 28707

## (undated) DEVICE — SU SILK 2-0 TIE 12X30" A305H

## (undated) DEVICE — ADPT PERFUSION MULTIPLE

## (undated) DEVICE — SU PROLENE 6-0 BV-1DA 18" 8709H

## (undated) DEVICE — KIT HAND CONTROL ANGIOTOUCH ACIST 65CM AT-P65

## (undated) DEVICE — CABLE MYO/LEAD PACING WHITE DISP 019-530

## (undated) DEVICE — INSERT FOGARTY 33MM TRACTION HYDRAJAW HYDRA33

## (undated) DEVICE — SOL WATER IRRIG 1000ML BOTTLE 2F7114

## (undated) DEVICE — SU PROLENE 4-0 SHDA 36" 8521H

## (undated) DEVICE — PUNCH AORTIC 4.0MMX8" RCB40

## (undated) DEVICE — BLADE KNIFE BEAVER 6900

## (undated) DEVICE — SURGICEL HEMOSTAT 2X14" 1951

## (undated) DEVICE — PACK OPEN HEART PV12OH524

## (undated) DEVICE — LINEN LEG ROLL 5489

## (undated) DEVICE — GLOVE BIOGEL PI ULTRATOUCH G SZ 7.0 42170

## (undated) DEVICE — SYR 30ML LL W/O NDL 302832

## (undated) DEVICE — IOM SUPPLIES/CASE FEE

## (undated) DEVICE — LEAD PACER MYOCARDIAL BIPOLAR TEMPORARY 53CM 6495F

## (undated) DEVICE — TOURNIQUET VASCULAR KIT ARGYLE 8888-585000

## (undated) DEVICE — SU PROLENE 6-0 C-1DA 30" 8706H

## (undated) DEVICE — LINEN TOWEL PACK X5 5464

## (undated) DEVICE — COVER NEOPROBE SOFTFLEX 5X96" W/BANDS 20-PC596

## (undated) DEVICE — SPONGE PEANUT

## (undated) DEVICE — SU STRATAFIX MONOCRYL 4-0 SPIRAL 30CM PS-2 SXMP1B117

## (undated) DEVICE — POSITIONER ASSIST ESSTECH 3S T401210S

## (undated) DEVICE — LINEN TOWEL PACK X30 5481

## (undated) DEVICE — SU PLEDGET SOFT TFE 3/8"X3/26"X1/16" PCP40

## (undated) DEVICE — MANIFOLD KIT ANGIO AUTOMATED 014613

## (undated) DEVICE — CATH DIAGNOSTIC RADIAL 5FR TIG 4.0

## (undated) RX ORDER — FENTANYL CITRATE 50 UG/ML
INJECTION, SOLUTION INTRAMUSCULAR; INTRAVENOUS
Status: DISPENSED
Start: 2022-02-18

## (undated) RX ORDER — SODIUM CHLORIDE, SODIUM LACTATE, POTASSIUM CHLORIDE, CALCIUM CHLORIDE 600; 310; 30; 20 MG/100ML; MG/100ML; MG/100ML; MG/100ML
INJECTION, SOLUTION INTRAVENOUS
Status: DISPENSED
Start: 2022-02-18

## (undated) RX ORDER — LIDOCAINE HYDROCHLORIDE 10 MG/ML
INJECTION, SOLUTION EPIDURAL; INFILTRATION; INTRACAUDAL; PERINEURAL
Status: DISPENSED
Start: 2022-02-18

## (undated) RX ORDER — PROPOFOL 10 MG/ML
INJECTION, EMULSION INTRAVENOUS
Status: DISPENSED
Start: 2022-03-24

## (undated) RX ORDER — FENTANYL CITRATE-0.9 % NACL/PF 10 MCG/ML
PLASTIC BAG, INJECTION (ML) INTRAVENOUS
Status: DISPENSED
Start: 2022-02-18

## (undated) RX ORDER — SODIUM CHLORIDE, SODIUM GLUCONATE, SODIUM ACETATE, POTASSIUM CHLORIDE AND MAGNESIUM CHLORIDE 526; 502; 368; 37; 30 MG/100ML; MG/100ML; MG/100ML; MG/100ML; MG/100ML
INJECTION, SOLUTION INTRAVENOUS
Status: DISPENSED
Start: 2022-03-24

## (undated) RX ORDER — PAPAVERINE HYDROCHLORIDE 30 MG/ML
INJECTION INTRAMUSCULAR; INTRAVENOUS
Status: DISPENSED
Start: 2022-03-24

## (undated) RX ORDER — REGADENOSON 0.08 MG/ML
INJECTION, SOLUTION INTRAVENOUS
Status: DISPENSED
Start: 2021-12-08

## (undated) RX ORDER — SODIUM CHLORIDE 9 MG/ML
INJECTION, SOLUTION INTRAVENOUS
Status: DISPENSED
Start: 2022-02-18

## (undated) RX ORDER — HEPARIN SODIUM 1000 [USP'U]/ML
INJECTION, SOLUTION INTRAVENOUS; SUBCUTANEOUS
Status: DISPENSED
Start: 2022-02-18

## (undated) RX ORDER — BUPIVACAINE HYDROCHLORIDE AND EPINEPHRINE 5; 5 MG/ML; UG/ML
INJECTION, SOLUTION EPIDURAL; INTRACAUDAL; PERINEURAL
Status: DISPENSED
Start: 2022-02-18

## (undated) RX ORDER — FENTANYL CITRATE 50 UG/ML
INJECTION, SOLUTION INTRAMUSCULAR; INTRAVENOUS
Status: DISPENSED
Start: 2021-12-29

## (undated) RX ORDER — PROPOFOL 10 MG/ML
INJECTION, EMULSION INTRAVENOUS
Status: DISPENSED
Start: 2022-02-18

## (undated) RX ORDER — LIDOCAINE HYDROCHLORIDE 20 MG/ML
INJECTION, SOLUTION EPIDURAL; INFILTRATION; INTRACAUDAL; PERINEURAL
Status: DISPENSED
Start: 2022-02-18

## (undated) RX ORDER — CHLORHEXIDINE GLUCONATE ORAL RINSE 1.2 MG/ML
SOLUTION DENTAL
Status: DISPENSED
Start: 2022-03-24

## (undated) RX ORDER — PROPOFOL 10 MG/ML
INJECTION, EMULSION INTRAVENOUS
Status: DISPENSED
Start: 2021-07-21

## (undated) RX ORDER — VECURONIUM BROMIDE 1 MG/ML
INJECTION, POWDER, LYOPHILIZED, FOR SOLUTION INTRAVENOUS
Status: DISPENSED
Start: 2022-03-24

## (undated) RX ORDER — CEFAZOLIN SODIUM 1 G/3ML
INJECTION, POWDER, FOR SOLUTION INTRAMUSCULAR; INTRAVENOUS
Status: DISPENSED
Start: 2022-03-24

## (undated) RX ORDER — EPHEDRINE SULFATE 50 MG/ML
INJECTION, SOLUTION INTRAMUSCULAR; INTRAVENOUS; SUBCUTANEOUS
Status: DISPENSED
Start: 2022-02-18

## (undated) RX ORDER — HEPARIN SODIUM 1000 [USP'U]/ML
INJECTION, SOLUTION INTRAVENOUS; SUBCUTANEOUS
Status: DISPENSED
Start: 2021-12-29

## (undated) RX ORDER — LIDOCAINE HYDROCHLORIDE 20 MG/ML
INJECTION, SOLUTION EPIDURAL; INFILTRATION; INTRACAUDAL; PERINEURAL
Status: DISPENSED
Start: 2022-03-24

## (undated) RX ORDER — PROTAMINE SULFATE 10 MG/ML
INJECTION, SOLUTION INTRAVENOUS
Status: DISPENSED
Start: 2022-03-24

## (undated) RX ORDER — DEXAMETHASONE SODIUM PHOSPHATE 4 MG/ML
INJECTION, SOLUTION INTRA-ARTICULAR; INTRALESIONAL; INTRAMUSCULAR; INTRAVENOUS; SOFT TISSUE
Status: DISPENSED
Start: 2022-02-18

## (undated) RX ORDER — HEPARIN SODIUM 1000 [USP'U]/ML
INJECTION, SOLUTION INTRAVENOUS; SUBCUTANEOUS
Status: DISPENSED
Start: 2022-03-24

## (undated) RX ORDER — ASPIRIN 81 MG/1
TABLET, CHEWABLE ORAL
Status: DISPENSED
Start: 2022-03-24

## (undated) RX ORDER — CEFAZOLIN SODIUM 1 G/50ML
SOLUTION INTRAVENOUS
Status: DISPENSED
Start: 2022-02-18

## (undated) RX ORDER — ONDANSETRON 2 MG/ML
INJECTION INTRAMUSCULAR; INTRAVENOUS
Status: DISPENSED
Start: 2022-02-18

## (undated) RX ORDER — NITROGLYCERIN 5 MG/ML
VIAL (ML) INTRAVENOUS
Status: DISPENSED
Start: 2021-12-29

## (undated) RX ORDER — CEFAZOLIN SODIUM/WATER 2 G/20 ML
SYRINGE (ML) INTRAVENOUS
Status: DISPENSED
Start: 2022-03-24

## (undated) RX ORDER — FENTANYL CITRATE 50 UG/ML
INJECTION, SOLUTION INTRAMUSCULAR; INTRAVENOUS
Status: DISPENSED
Start: 2022-03-24

## (undated) RX ORDER — HEPARIN SODIUM 200 [USP'U]/100ML
INJECTION, SOLUTION INTRAVENOUS
Status: DISPENSED
Start: 2021-12-29

## (undated) RX ORDER — GLYCOPYRROLATE 0.2 MG/ML
INJECTION, SOLUTION INTRAMUSCULAR; INTRAVENOUS
Status: DISPENSED
Start: 2022-03-24

## (undated) RX ORDER — TROPICAMIDE 10 MG/ML
SOLUTION/ DROPS OPHTHALMIC
Status: DISPENSED
Start: 2021-07-28

## (undated) RX ORDER — LIDOCAINE HYDROCHLORIDE 10 MG/ML
INJECTION, SOLUTION EPIDURAL; INFILTRATION; INTRACAUDAL; PERINEURAL
Status: DISPENSED
Start: 2021-12-29

## (undated) RX ORDER — FENTANYL CITRATE 0.05 MG/ML
INJECTION, SOLUTION INTRAMUSCULAR; INTRAVENOUS
Status: DISPENSED
Start: 2022-03-24

## (undated) RX ORDER — NEOSTIGMINE METHYLSULFATE 1 MG/ML
VIAL (ML) INJECTION
Status: DISPENSED
Start: 2022-03-24

## (undated) RX ORDER — VERAPAMIL HYDROCHLORIDE 2.5 MG/ML
INJECTION, SOLUTION INTRAVENOUS
Status: DISPENSED
Start: 2021-12-29

## (undated) RX ORDER — TROPICAMIDE 10 MG/ML
SOLUTION/ DROPS OPHTHALMIC
Status: DISPENSED
Start: 2021-08-18